# Patient Record
Sex: FEMALE | Race: WHITE | NOT HISPANIC OR LATINO | Employment: OTHER | URBAN - METROPOLITAN AREA
[De-identification: names, ages, dates, MRNs, and addresses within clinical notes are randomized per-mention and may not be internally consistent; named-entity substitution may affect disease eponyms.]

---

## 2017-01-25 DIAGNOSIS — I10 BENIGN ESSENTIAL HYPERTENSION: ICD-10-CM

## 2017-01-26 RX ORDER — METOPROLOL TARTRATE 50 MG/1
TABLET, FILM COATED ORAL
Qty: 180 TABLET | OUTPATIENT
Start: 2017-01-26

## 2017-08-24 ENCOUNTER — OFFICE VISIT (OUTPATIENT)
Dept: CARDIOLOGY | Facility: CLINIC | Age: 72
End: 2017-08-24

## 2017-08-24 VITALS
DIASTOLIC BLOOD PRESSURE: 70 MMHG | BODY MASS INDEX: 19.29 KG/M2 | HEART RATE: 70 BPM | WEIGHT: 113 LBS | SYSTOLIC BLOOD PRESSURE: 110 MMHG | HEIGHT: 64 IN

## 2017-08-24 DIAGNOSIS — I25.10 CHRONIC CORONARY ARTERY DISEASE: Primary | ICD-10-CM

## 2017-08-24 DIAGNOSIS — I10 BENIGN ESSENTIAL HYPERTENSION: ICD-10-CM

## 2017-08-24 DIAGNOSIS — E78.5 HYPERLIPIDEMIA, UNSPECIFIED HYPERLIPIDEMIA TYPE: ICD-10-CM

## 2017-08-24 DIAGNOSIS — N18.30 CHRONIC KIDNEY DISEASE, STAGE III (MODERATE) (HCC): ICD-10-CM

## 2017-08-24 DIAGNOSIS — I63.9 CEREBROVASCULAR ACCIDENT (CVA), UNSPECIFIED MECHANISM (HCC): ICD-10-CM

## 2017-08-24 PROCEDURE — 93000 ELECTROCARDIOGRAM COMPLETE: CPT | Performed by: INTERNAL MEDICINE

## 2017-08-24 PROCEDURE — 99213 OFFICE O/P EST LOW 20 MIN: CPT | Performed by: INTERNAL MEDICINE

## 2017-08-24 RX ORDER — LEVOTHYROXINE SODIUM 0.07 MG/1
1 TABLET ORAL DAILY
COMMUNITY
Start: 2017-07-08 | End: 2019-07-02 | Stop reason: DRUGHIGH

## 2017-08-24 NOTE — PROGRESS NOTES
Date of Office Visit: 2017  Encounter Provider: Tammy Pina MD  Place of Service: [facility] CARDIOLOGY  Patient Name: Anaid Moura  :1945      Patient ID:  Anaid Moura is a 72 y.o. female is here for  followup for CAd.         History of Present Illness    She had an abnormal stress study showing anterior infarct with bryan-infarct  ischemia done in 2009. She had a known history of myocardial  infarction in . She had a cardiac catheterization in  which showed  100% occlusion of the right coronary artery with collateralization to that  area. She had no intervention at that time.     In 2008, she suffered a hemorrhagic stroke leaving her with left arm  and left leg paresis. She has been unable to walk and has been  wheelchair-bound. She had transient ischemic attack prior to that. Her  carotid duplex study done on May 16, 2011, showed right internal carotid  artery occlusion with mild intimal thickening of the left internal carotid  artery. It also showed a large thyroid cyst which she is following up with  Dr. Aguilar about.      In 2010, she was not feeling well and had gastrointestinal upset with  vomiting. She says her symptoms are similar to what she had prior to her  myocardial infarction in , and she was very fatigued. We set her up for  a repeat cardiac catheterization done in 2010 which showed occluded  right coronary artery with collateralization and moderate left anterior  descending artery disease. She was treated medically after that.     She follows with Dr. Kaminski from neurology who has got her in [facility] for physical therapy which she is enjoying greatly. She has  lost about eight pounds since her last visit.     She had a carotid duplex study done on 2013, which showed occluded right  internal carotid artery and a 2.3 cm x 2.2 cm mass in the right neck which is a thyroid cyst.   She had  surgery for her thyroid nodules.     Overall she's doing well.  She is wheelchair-bound but maneuvers herself in a wheelchair.  She has no chest pain, dyspnea, tachycardia, dizziness or syncope.  Her energy level stable and she is taking her medications as directed.  She and her  do take care of his father is 95 with dementia.  She also several grandchildren which she cares for which she enjoys.    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   • EYE SURGERY  1951   • OTHER SURGICAL HISTORY      Ventricular [place] for drainage of subdural hematoma   • TOTAL THYROIDECTOMY  08/2015    Dr. Ahmadi       Current Outpatient Prescriptions on File Prior to Visit   Medication Sig Dispense Refill   • atorvastatin (LIPITOR) 80 MG tablet Take 1 tablet by mouth Daily. Needs an appointment for further refills 90 tablet 0   • Calcium Citrate (CALCITRATE PO) Take by mouth.     • losartan (COZAAR) 50 MG tablet Take 1 tablet by mouth daily. 90 tablet 1   • metoprolol  tartrate (LOPRESSOR) 50 MG tablet Take 1 tablet by mouth 2 (Two) Times a Day. 180 tablet 0   • Multiple Vitamins-Minerals (MULTIVITAL) tablet Take by mouth daily.     • tolterodine (DETROL) 1 MG tablet TAKE 1 TABLET TWICE A  tablet 0   • [DISCONTINUED] benzonatate (TESSALON) 100 MG capsule Take by mouth.     • [DISCONTINUED] levothyroxine (SYNTHROID, LEVOTHROID) 88 MCG tablet      • [DISCONTINUED] tiotropium (SPIRIVA HANDIHALER) 18 MCG per inhalation capsule Spiriva HandiHaler 18 MCG Inhalation Capsule; Patient Sig: Spiriva HandiHaler 18 MCG Inhalation Capsule ; 0; 09-Jan-2013; Active       No current facility-administered medications on file prior to visit.        Social History     Social History   • Marital status:      Spouse name: N/A   • Number of children: N/A   • Years of education: N/A     Occupational History   • Not on file.     Social History Main Topics   • Smoking status: Former Smoker   • Smokeless tobacco: Never Used   • Alcohol use 8.4 oz/week     14 Cans of beer per week      Comment: caffeine use   • Drug use: No   • Sexual activity: Defer     Other Topics Concern   • Not on file     Social History Narrative           Review of Systems   Constitution: Negative.   HENT: Negative for congestion and headaches.    Eyes: Negative for vision loss in left eye and vision loss in right eye.   Respiratory: Negative.  Negative for cough, hemoptysis, shortness of breath, sleep disturbances due to breathing, snoring, sputum production and wheezing.    Endocrine: Negative.    Hematologic/Lymphatic: Negative.    Skin: Negative for poor wound healing and rash.   Musculoskeletal: Negative for falls, gout, muscle cramps and myalgias.   Gastrointestinal: Negative for abdominal pain, diarrhea, dysphagia, hematemesis, melena, nausea and vomiting.   Neurological: Negative for excessive daytime sleepiness, dizziness, light-headedness, loss of balance, seizures and vertigo.   Psychiatric/Behavioral:  "Negative for depression and substance abuse. The patient is not nervous/anxious.        Procedures    ECG 12 Lead  Date/Time: 8/24/2017 1:55 PM  Performed by: TONI BURR  Authorized by: TONI BURR   Comparison: compared with previous ECG   Similar to previous ECG  Rhythm: sinus rhythm  Clinical impression: normal ECG               Objective:      Vitals:    08/24/17 1341   BP: 110/70   BP Location: Right arm   Patient Position: Sitting   Pulse: 70   Weight: 113 lb (51.3 kg)   Height: 64\" (162.6 cm)     Body mass index is 19.4 kg/(m^2).    Physical Exam   Constitutional: She is oriented to person, place, and time. She appears well-developed and well-nourished. No distress.   HENT:   Head: Normocephalic and atraumatic.   Eyes: Conjunctivae are normal. No scleral icterus.   Neck: Neck supple. No JVD present. Carotid bruit is not present. No thyromegaly present.   Cardiovascular: Normal rate, regular rhythm, S1 normal, S2 normal, normal heart sounds and intact distal pulses.   No extrasystoles are present. PMI is not displaced.  Exam reveals no gallop.    No murmur heard.  Pulses:       Carotid pulses are 2+ on the right side, and 2+ on the left side.       Radial pulses are 2+ on the right side, and 2+ on the left side.        Dorsalis pedis pulses are 2+ on the right side, and 2+ on the left side.        Posterior tibial pulses are 2+ on the right side, and 2+ on the left side.   Pulmonary/Chest: Effort normal and breath sounds normal. No respiratory distress. She has no wheezes. She has no rhonchi. She has no rales. She exhibits no tenderness.   Abdominal: Soft. Bowel sounds are normal. She exhibits no distension, no abdominal bruit and no mass. There is no tenderness.   Musculoskeletal: She exhibits no edema or deformity.   Lymphadenopathy:     She has no cervical adenopathy.   Neurological: She is alert and oriented to person, place, and time. No cranial nerve deficit.   Skin: Skin is warm and " dry. No rash noted. She is not diaphoretic. No cyanosis. No pallor. Nails show no clubbing.   Psychiatric: She has a normal mood and affect. Judgment normal.   Vitals reviewed.      Lab Review:       Assessment:      Diagnosis Plan   1. Chronic coronary artery disease     2. Hyperlipidemia, unspecified hyperlipidemia type     3. Benign essential hypertension     4. Cerebrovascular accident (CVA), unspecified mechanism     5. Chronic kidney disease, stage III (moderate)       1. Coronary artery disease with occluded right coronary artery,  collateralization to the vessel, and moderate left anterior descending  artery disease. She has an abnormal ECG which is chronically abnormal with  anterior lateral T-wave inversions. She has had a history of an abnormal  stress nuclear perfusion study showing inferior infarct with bryan-infarct  ischemia. At this time, she has no angina. Continue medical therapy. There  is no evidence of heart failure.  2. Hemorrhagic stroke with left spastic hemiplegia.   3. Occluded right carotid artery with mild disease of the left internal  carotid artery.  4. Hypertension, BP is controlled.  5. Hyperlipidemia on statin therapy.   6. Normal left ventricular systolic function.   7. Right thyroid cyst and thyroid nodules - s/p surgery for this.     Plan:       She is doing well, no changes, see in 1 year. Take asa 81mg daily.     Coronary Artery Disease  Assessment  • The patient has no angina    Subjective - Objective  • There is a history of past MI  • Current antiplatelet therapy includes aspirin 81 mg

## 2018-08-29 ENCOUNTER — OFFICE VISIT (OUTPATIENT)
Dept: CARDIOLOGY | Facility: CLINIC | Age: 73
End: 2018-08-29

## 2018-08-29 DIAGNOSIS — I65.23 BILATERAL CAROTID ARTERY STENOSIS: ICD-10-CM

## 2018-08-29 DIAGNOSIS — I10 BENIGN ESSENTIAL HYPERTENSION: ICD-10-CM

## 2018-08-29 DIAGNOSIS — N18.30 CHRONIC KIDNEY DISEASE, STAGE III (MODERATE) (HCC): ICD-10-CM

## 2018-08-29 DIAGNOSIS — I63.9 CEREBROVASCULAR ACCIDENT (CVA), UNSPECIFIED MECHANISM (HCC): ICD-10-CM

## 2018-08-29 DIAGNOSIS — E78.5 HYPERLIPIDEMIA, UNSPECIFIED HYPERLIPIDEMIA TYPE: ICD-10-CM

## 2018-08-29 DIAGNOSIS — I25.10 CORONARY ARTERY DISEASE INVOLVING NATIVE CORONARY ARTERY OF NATIVE HEART WITHOUT ANGINA PECTORIS: Primary | ICD-10-CM

## 2018-08-29 PROCEDURE — 99214 OFFICE O/P EST MOD 30 MIN: CPT | Performed by: NURSE PRACTITIONER

## 2018-08-29 PROCEDURE — 93000 ELECTROCARDIOGRAM COMPLETE: CPT | Performed by: NURSE PRACTITIONER

## 2018-08-30 VITALS
WEIGHT: 99.8 LBS | HEART RATE: 66 BPM | DIASTOLIC BLOOD PRESSURE: 60 MMHG | BODY MASS INDEX: 17.04 KG/M2 | SYSTOLIC BLOOD PRESSURE: 110 MMHG | HEIGHT: 64 IN

## 2018-09-07 ENCOUNTER — TELEPHONE (OUTPATIENT)
Dept: CARDIOLOGY | Facility: CLINIC | Age: 73
End: 2018-09-07

## 2018-09-07 NOTE — TELEPHONE ENCOUNTER
----- Message from SOPHIA Sutherland sent at 9/3/2018 10:15 AM EDT -----    Please obtain last CBC, CMP/BMP, lipid panel from PCP. Thanks

## 2018-09-11 NOTE — TELEPHONE ENCOUNTER
I called and left a message at Dr. Taylor's office regarding lab results to be faxed./[facility]    Dr. Taylor# [phone]

## 2018-10-23 ENCOUNTER — TELEPHONE (OUTPATIENT)
Dept: CARDIOLOGY | Facility: CLINIC | Age: 73
End: 2018-10-23

## 2018-10-23 ENCOUNTER — HOSPITAL ENCOUNTER (OUTPATIENT)
Dept: CARDIOLOGY | Facility: HOSPITAL | Age: 73
Discharge: HOME | End: 2018-10-23
Admitting: NURSE PRACTITIONER

## 2018-10-23 DIAGNOSIS — I65.23 BILATERAL CAROTID ARTERY STENOSIS: ICD-10-CM

## 2018-10-23 LAB
BH CV XLRA MEAS LEFT DIST ICA EDV: -22.2 CM/SEC
BH CV XLRA MEAS LEFT DIST ICA PSV: -61.7 CM/SEC
BH CV XLRA MEAS LEFT ICA/CCA RATIO: 0.92
BH CV XLRA MEAS LEFT MID ICA EDV: -19.7 CM/SEC
BH CV XLRA MEAS LEFT MID ICA PSV: -69.3 CM/SEC
BH CV XLRA MEAS LEFT PROX CCA EDV: -17.3 CM/SEC
BH CV XLRA MEAS LEFT PROX CCA PSV: -75.7 CM/SEC
BH CV XLRA MEAS LEFT PROX ECA PSV: -168 CM/SEC
BH CV XLRA MEAS LEFT PROX ICA EDV: -17.8 CM/SEC
BH CV XLRA MEAS LEFT PROX ICA PSV: -64.8 CM/SEC
BH CV XLRA MEAS LEFT PROX SCLA PSV: 60.9 CM/SEC
BH CV XLRA MEAS RIGHT DIST ICA EDV: -11.7 CM/SEC
BH CV XLRA MEAS RIGHT DIST ICA PSV: -101 CM/SEC
BH CV XLRA MEAS RIGHT ICA/CCA RATIO: 2.5
BH CV XLRA MEAS RIGHT MID ICA EDV: -16.8 CM/SEC
BH CV XLRA MEAS RIGHT MID ICA PSV: -131 CM/SEC
BH CV XLRA MEAS RIGHT PROX CCA EDV: 3.9 CM/SEC
BH CV XLRA MEAS RIGHT PROX CCA PSV: 56.6 CM/SEC
BH CV XLRA MEAS RIGHT PROX ECA PSV: -114 CM/SEC
BH CV XLRA MEAS RIGHT PROX ICA EDV: -16.8 CM/SEC
BH CV XLRA MEAS RIGHT PROX ICA PSV: -140 CM/SEC
BH CV XLRA MEAS RIGHT PROX SCLA PSV: 66.3 CM/SEC

## 2018-10-23 PROCEDURE — 93880 EXTRACRANIAL BILAT STUDY: CPT

## 2018-10-23 PROCEDURE — 93880 EXTRACRANIAL BILAT STUDY: CPT | Performed by: INTERNAL MEDICINE

## 2018-10-23 NOTE — TELEPHONE ENCOUNTER
Carotid Results:    · There is a complex plaque in the right carotid bulb without evidence of stenosis.  · Left common carotid artery with extensive plaquing that extends into the carotid bulb. No definitive stenosis noted.     Continue aspirin and atorvastatin.     I left a message for patient to return my call.

## 2018-10-29 ENCOUNTER — TRANSCRIBE ORDERS (OUTPATIENT)
Dept: ADMINISTRATIVE | Facility: HOSPITAL | Age: 73
End: 2018-10-29

## 2018-10-29 DIAGNOSIS — Z12.31 VISIT FOR SCREENING MAMMOGRAM: Primary | ICD-10-CM

## 2018-11-06 ENCOUNTER — HOSPITAL ENCOUNTER (OUTPATIENT)
Dept: MAMMOGRAPHY | Facility: HOSPITAL | Age: 73
Discharge: HOME | End: 2018-11-06
Attending: SPECIALIST | Admitting: SPECIALIST

## 2018-11-06 DIAGNOSIS — Z12.31 VISIT FOR SCREENING MAMMOGRAM: ICD-10-CM

## 2018-11-06 PROCEDURE — 77063 BREAST TOMOSYNTHESIS BI: CPT

## 2018-11-06 PROCEDURE — 77067 SCR MAMMO BI INCL CAD: CPT

## 2019-01-01 ENCOUNTER — APPOINTMENT (OUTPATIENT)
Dept: ONCOLOGY | Facility: HOSPITAL | Age: 74
End: 2019-01-01

## 2019-01-01 ENCOUNTER — LAB (OUTPATIENT)
Dept: LAB | Facility: HOSPITAL | Age: 74
End: 2019-01-01

## 2019-01-01 ENCOUNTER — OFFICE VISIT (OUTPATIENT)
Dept: ONCOLOGY | Facility: CLINIC | Age: 74
End: 2019-01-01

## 2019-01-01 ENCOUNTER — HOSPITAL ENCOUNTER (OUTPATIENT)
Dept: CT IMAGING | Facility: HOSPITAL | Age: 74
Discharge: HOME | End: 2019-10-29

## 2019-01-01 ENCOUNTER — OFFICE VISIT (OUTPATIENT)
Dept: PSYCHIATRY | Facility: HOSPITAL | Age: 74
End: 2019-01-01

## 2019-01-01 ENCOUNTER — HOSPITAL ENCOUNTER (OUTPATIENT)
Dept: PET IMAGING | Facility: HOSPITAL | Age: 74
Discharge: HOME | End: 2019-11-14

## 2019-01-01 ENCOUNTER — TELEPHONE (OUTPATIENT)
Dept: ONCOLOGY | Facility: CLINIC | Age: 74
End: 2019-01-01

## 2019-01-01 ENCOUNTER — HOSPITAL ENCOUNTER (OUTPATIENT)
Dept: MRI IMAGING | Facility: HOSPITAL | Age: 74
Discharge: HOME | End: 2019-10-29
Admitting: INTERNAL MEDICINE

## 2019-01-01 ENCOUNTER — HOSPITAL ENCOUNTER (OUTPATIENT)
Dept: PET IMAGING | Facility: HOSPITAL | Age: 74
Discharge: HOME | End: 2019-11-14
Admitting: INTERNAL MEDICINE

## 2019-01-01 ENCOUNTER — APPOINTMENT (OUTPATIENT)
Dept: RADIATION ONCOLOGY | Facility: HOSPITAL | Age: 74
End: 2019-01-01

## 2019-01-01 ENCOUNTER — INFUSION (OUTPATIENT)
Dept: ONCOLOGY | Facility: HOSPITAL | Age: 74
End: 2019-01-01

## 2019-01-01 ENCOUNTER — HOSPITAL ENCOUNTER (OUTPATIENT)
Dept: CT IMAGING | Facility: HOSPITAL | Age: 74
Discharge: HOME | End: 2019-12-04
Admitting: INTERNAL MEDICINE

## 2019-01-01 ENCOUNTER — RADIATION ONCOLOGY WEEKLY ASSESSMENT (OUTPATIENT)
Dept: RADIATION ONCOLOGY | Facility: HOSPITAL | Age: 74
End: 2019-01-01

## 2019-01-01 ENCOUNTER — CONSULT (OUTPATIENT)
Dept: RADIATION ONCOLOGY | Facility: HOSPITAL | Age: 74
End: 2019-01-01

## 2019-01-01 ENCOUNTER — TELEPHONE (OUTPATIENT)
Dept: ONCOLOGY | Facility: HOSPITAL | Age: 74
End: 2019-01-01

## 2019-01-01 ENCOUNTER — TRANSCRIBE ORDERS (OUTPATIENT)
Dept: ADMINISTRATIVE | Facility: HOSPITAL | Age: 74
End: 2019-01-01

## 2019-01-01 ENCOUNTER — OFFICE VISIT (OUTPATIENT)
Dept: CARDIOLOGY | Facility: CLINIC | Age: 74
End: 2019-01-01

## 2019-01-01 VITALS
OXYGEN SATURATION: 89 % | WEIGHT: 89.4 LBS | HEIGHT: 64 IN | BODY MASS INDEX: 15.26 KG/M2 | HEART RATE: 110 BPM | DIASTOLIC BLOOD PRESSURE: 63 MMHG | TEMPERATURE: 97.4 F | RESPIRATION RATE: 18 BRPM | SYSTOLIC BLOOD PRESSURE: 93 MMHG

## 2019-01-01 VITALS
BODY MASS INDEX: 15.3 KG/M2 | RESPIRATION RATE: 18 BRPM | HEART RATE: 114 BPM | OXYGEN SATURATION: 90 % | HEIGHT: 64 IN | WEIGHT: 89.6 LBS | DIASTOLIC BLOOD PRESSURE: 71 MMHG | SYSTOLIC BLOOD PRESSURE: 115 MMHG | TEMPERATURE: 97.6 F

## 2019-01-01 VITALS
WEIGHT: 87.8 LBS | OXYGEN SATURATION: 92 % | DIASTOLIC BLOOD PRESSURE: 80 MMHG | BODY MASS INDEX: 14.99 KG/M2 | SYSTOLIC BLOOD PRESSURE: 153 MMHG | RESPIRATION RATE: 18 BRPM | TEMPERATURE: 97.6 F | HEIGHT: 64 IN | HEART RATE: 114 BPM

## 2019-01-01 VITALS
TEMPERATURE: 97.8 F | OXYGEN SATURATION: 94 % | BODY MASS INDEX: 15.96 KG/M2 | HEART RATE: 104 BPM | RESPIRATION RATE: 16 BRPM | DIASTOLIC BLOOD PRESSURE: 69 MMHG | HEIGHT: 64 IN | SYSTOLIC BLOOD PRESSURE: 108 MMHG

## 2019-01-01 VITALS
BODY MASS INDEX: 15.98 KG/M2 | WEIGHT: 93.6 LBS | OXYGEN SATURATION: 91 % | SYSTOLIC BLOOD PRESSURE: 127 MMHG | DIASTOLIC BLOOD PRESSURE: 84 MMHG | HEIGHT: 64 IN | TEMPERATURE: 97.9 F | HEART RATE: 110 BPM | RESPIRATION RATE: 18 BRPM

## 2019-01-01 VITALS
HEART RATE: 114 BPM | WEIGHT: 88.4 LBS | DIASTOLIC BLOOD PRESSURE: 81 MMHG | HEIGHT: 64 IN | SYSTOLIC BLOOD PRESSURE: 146 MMHG | TEMPERATURE: 97.6 F | BODY MASS INDEX: 15.09 KG/M2 | RESPIRATION RATE: 14 BRPM | OXYGEN SATURATION: 91 %

## 2019-01-01 VITALS
HEART RATE: 121 BPM | TEMPERATURE: 97.4 F | HEIGHT: 64 IN | OXYGEN SATURATION: 92 % | DIASTOLIC BLOOD PRESSURE: 79 MMHG | SYSTOLIC BLOOD PRESSURE: 127 MMHG | RESPIRATION RATE: 16 BRPM | WEIGHT: 88 LBS | BODY MASS INDEX: 15.03 KG/M2

## 2019-01-01 VITALS
OXYGEN SATURATION: 87 % | BODY MASS INDEX: 15.1 KG/M2 | HEART RATE: 92 BPM | DIASTOLIC BLOOD PRESSURE: 73 MMHG | WEIGHT: 88 LBS | SYSTOLIC BLOOD PRESSURE: 115 MMHG

## 2019-01-01 VITALS
SYSTOLIC BLOOD PRESSURE: 103 MMHG | WEIGHT: 89.4 LBS | OXYGEN SATURATION: 90 % | BODY MASS INDEX: 15.34 KG/M2 | HEART RATE: 115 BPM | DIASTOLIC BLOOD PRESSURE: 68 MMHG

## 2019-01-01 VITALS
HEIGHT: 64 IN | BODY MASS INDEX: 15.03 KG/M2 | SYSTOLIC BLOOD PRESSURE: 141 MMHG | OXYGEN SATURATION: 90 % | TEMPERATURE: 97.6 F | WEIGHT: 88 LBS | DIASTOLIC BLOOD PRESSURE: 68 MMHG | HEART RATE: 89 BPM | RESPIRATION RATE: 16 BRPM

## 2019-01-01 VITALS
OXYGEN SATURATION: 88 % | HEART RATE: 107 BPM | HEIGHT: 64 IN | DIASTOLIC BLOOD PRESSURE: 62 MMHG | SYSTOLIC BLOOD PRESSURE: 112 MMHG | WEIGHT: 93 LBS | BODY MASS INDEX: 15.88 KG/M2

## 2019-01-01 VITALS — HEART RATE: 110 BPM | OXYGEN SATURATION: 90 %

## 2019-01-01 DIAGNOSIS — C34.92 ADENOCARCINOMA, LUNG, LEFT (HCC): Primary | ICD-10-CM

## 2019-01-01 DIAGNOSIS — Z79.899 HIGH RISK MEDICATION USE: ICD-10-CM

## 2019-01-01 DIAGNOSIS — F33.42 RECURRENT MAJOR DEPRESSIVE DISORDER, IN FULL REMISSION (HCC): Primary | ICD-10-CM

## 2019-01-01 DIAGNOSIS — E03.9 ACQUIRED HYPOTHYROIDISM: ICD-10-CM

## 2019-01-01 DIAGNOSIS — C34.92 ADENOCARCINOMA, LUNG, LEFT (HCC): ICD-10-CM

## 2019-01-01 DIAGNOSIS — C34.32 MALIGNANT NEOPLASM OF LOWER LOBE, LEFT BRONCHUS OR LUNG (HCC): ICD-10-CM

## 2019-01-01 DIAGNOSIS — J44.9 CHRONIC OBSTRUCTIVE PULMONARY DISEASE, UNSPECIFIED COPD TYPE (HCC): ICD-10-CM

## 2019-01-01 DIAGNOSIS — R42 LIGHTHEADEDNESS: Primary | ICD-10-CM

## 2019-01-01 DIAGNOSIS — C79.72 MALIGNANT NEOPLASM METASTATIC TO LEFT ADRENAL GLAND (HCC): Primary | ICD-10-CM

## 2019-01-01 DIAGNOSIS — I65.23 BILATERAL CAROTID ARTERY STENOSIS: ICD-10-CM

## 2019-01-01 DIAGNOSIS — Z79.899 HIGH RISK MEDICATION USE: Primary | ICD-10-CM

## 2019-01-01 DIAGNOSIS — R53.1 WEAKNESS: ICD-10-CM

## 2019-01-01 DIAGNOSIS — E27.8 ADRENAL NODULE (HCC): ICD-10-CM

## 2019-01-01 DIAGNOSIS — J18.9 PNEUMONITIS: ICD-10-CM

## 2019-01-01 DIAGNOSIS — E87.6 HYPOKALEMIA: ICD-10-CM

## 2019-01-01 DIAGNOSIS — R91.8 PULMONARY INFILTRATE: ICD-10-CM

## 2019-01-01 DIAGNOSIS — R91.8 PULMONARY INFILTRATE: Primary | ICD-10-CM

## 2019-01-01 DIAGNOSIS — I25.10 CHRONIC CORONARY ARTERY DISEASE: ICD-10-CM

## 2019-01-01 DIAGNOSIS — Z86.73 HISTORY OF STROKE: ICD-10-CM

## 2019-01-01 DIAGNOSIS — I10 BENIGN ESSENTIAL HYPERTENSION: ICD-10-CM

## 2019-01-01 LAB
ALBUMIN SERPL-MCNC: 3 G/DL (ref 3.5–5.2)
ALBUMIN SERPL-MCNC: 3.1 G/DL (ref 3.5–5.2)
ALBUMIN SERPL-MCNC: 3.3 G/DL (ref 3.5–5.2)
ALBUMIN SERPL-MCNC: 3.5 G/DL (ref 3.5–5.2)
ALBUMIN/GLOB SERPL: 0.6 G/DL (ref 1.1–2.4)
ALBUMIN/GLOB SERPL: 0.6 G/DL (ref 1.1–2.4)
ALBUMIN/GLOB SERPL: 0.7 G/DL (ref 1.1–2.4)
ALP SERPL-CCNC: 111 U/L (ref 38–116)
ALP SERPL-CCNC: 77 U/L (ref 38–116)
ALP SERPL-CCNC: 81 U/L (ref 38–116)
ALP SERPL-CCNC: 81 U/L (ref 38–116)
ALP SERPL-CCNC: 82 U/L (ref 38–116)
ALP SERPL-CCNC: 86 U/L (ref 38–116)
ALP SERPL-CCNC: 90 U/L (ref 38–116)
ALP SERPL-CCNC: 91 U/L (ref 38–116)
ALT SERPL W P-5'-P-CCNC: 10 U/L (ref 0–33)
ALT SERPL W P-5'-P-CCNC: 10 U/L (ref 0–33)
ALT SERPL W P-5'-P-CCNC: 11 U/L (ref 0–33)
ALT SERPL W P-5'-P-CCNC: 6 U/L (ref 0–33)
ALT SERPL W P-5'-P-CCNC: 7 U/L (ref 0–33)
ALT SERPL W P-5'-P-CCNC: 7 U/L (ref 0–33)
ALT SERPL W P-5'-P-CCNC: 8 U/L (ref 0–33)
ALT SERPL W P-5'-P-CCNC: <5 U/L (ref 0–33)
ANION GAP SERPL CALCULATED.3IONS-SCNC: 11.1 MMOL/L (ref 5–15)
ANION GAP SERPL CALCULATED.3IONS-SCNC: 11.3 MMOL/L (ref 5–15)
ANION GAP SERPL CALCULATED.3IONS-SCNC: 12.3 MMOL/L (ref 5–15)
ANION GAP SERPL CALCULATED.3IONS-SCNC: 12.6 MMOL/L (ref 5–15)
ANION GAP SERPL CALCULATED.3IONS-SCNC: 13.4 MMOL/L (ref 5–15)
ANION GAP SERPL CALCULATED.3IONS-SCNC: 14.1 MMOL/L (ref 5–15)
ANION GAP SERPL CALCULATED.3IONS-SCNC: 14.4 MMOL/L (ref 5–15)
ANION GAP SERPL CALCULATED.3IONS-SCNC: 14.6 MMOL/L (ref 5–15)
AST SERPL-CCNC: 11 U/L (ref 0–32)
AST SERPL-CCNC: 11 U/L (ref 0–32)
AST SERPL-CCNC: 12 U/L (ref 0–32)
AST SERPL-CCNC: 13 U/L (ref 0–32)
AST SERPL-CCNC: 15 U/L (ref 0–32)
AST SERPL-CCNC: 16 U/L (ref 0–32)
AST SERPL-CCNC: 17 U/L (ref 0–32)
AST SERPL-CCNC: 22 U/L (ref 0–32)
BASOPHILS # BLD AUTO: 0.01 10*3/MM3 (ref 0–0.2)
BASOPHILS # BLD AUTO: 0.02 10*3/MM3 (ref 0–0.2)
BASOPHILS # BLD AUTO: 0.03 10*3/MM3 (ref 0–0.2)
BASOPHILS # BLD AUTO: 0.05 10*3/MM3 (ref 0–0.2)
BASOPHILS NFR BLD AUTO: 0.1 % (ref 0–1.5)
BASOPHILS NFR BLD AUTO: 0.3 % (ref 0–1.5)
BASOPHILS NFR BLD AUTO: 0.3 % (ref 0–1.5)
BASOPHILS NFR BLD AUTO: 0.4 % (ref 0–1.5)
BASOPHILS NFR BLD AUTO: 0.6 % (ref 0–1.5)
BILIRUB SERPL-MCNC: 0.3 MG/DL (ref 0.2–1.2)
BILIRUB SERPL-MCNC: 0.4 MG/DL (ref 0.2–1.2)
BUN BLD-MCNC: 10 MG/DL (ref 6–20)
BUN BLD-MCNC: 12 MG/DL (ref 6–20)
BUN BLD-MCNC: 15 MG/DL (ref 6–20)
BUN BLD-MCNC: 16 MG/DL (ref 6–20)
BUN BLD-MCNC: 17 MG/DL (ref 6–20)
BUN BLD-MCNC: 19 MG/DL (ref 6–20)
BUN BLD-MCNC: 8 MG/DL (ref 6–20)
BUN BLD-MCNC: 9 MG/DL (ref 6–20)
BUN/CREAT SERPL: 11.1 (ref 7.3–30)
BUN/CREAT SERPL: 12.2 (ref 7.3–30)
BUN/CREAT SERPL: 12.7 (ref 7.3–30)
BUN/CREAT SERPL: 17.1 (ref 7.3–30)
BUN/CREAT SERPL: 21.4 (ref 7.3–30)
BUN/CREAT SERPL: 22.2 (ref 7.3–30)
BUN/CREAT SERPL: 25.8 (ref 7.3–30)
BUN/CREAT SERPL: 27.1 (ref 7.3–30)
CALCIUM SPEC-SCNC: 8.5 MG/DL (ref 8.5–10.2)
CALCIUM SPEC-SCNC: 8.6 MG/DL (ref 8.5–10.2)
CALCIUM SPEC-SCNC: 8.7 MG/DL (ref 8.5–10.2)
CALCIUM SPEC-SCNC: 8.8 MG/DL (ref 8.5–10.2)
CALCIUM SPEC-SCNC: 9.3 MG/DL (ref 8.5–10.2)
CALCIUM SPEC-SCNC: 9.4 MG/DL (ref 8.5–10.2)
CALCIUM SPEC-SCNC: 9.5 MG/DL (ref 8.5–10.2)
CALCIUM SPEC-SCNC: 9.6 MG/DL (ref 8.5–10.2)
CHLORIDE SERPL-SCNC: 102 MMOL/L (ref 98–107)
CHLORIDE SERPL-SCNC: 105 MMOL/L (ref 98–107)
CHLORIDE SERPL-SCNC: 98 MMOL/L (ref 98–107)
CO2 SERPL-SCNC: 20.9 MMOL/L (ref 22–29)
CO2 SERPL-SCNC: 22.6 MMOL/L (ref 22–29)
CO2 SERPL-SCNC: 23.6 MMOL/L (ref 22–29)
CO2 SERPL-SCNC: 24.4 MMOL/L (ref 22–29)
CO2 SERPL-SCNC: 24.7 MMOL/L (ref 22–29)
CO2 SERPL-SCNC: 25.4 MMOL/L (ref 22–29)
CO2 SERPL-SCNC: 25.9 MMOL/L (ref 22–29)
CO2 SERPL-SCNC: 27.7 MMOL/L (ref 22–29)
CREAT BLD-MCNC: 0.66 MG/DL (ref 0.6–1.1)
CREAT BLD-MCNC: 0.7 MG/DL (ref 0.6–1.1)
CREAT BLD-MCNC: 0.71 MG/DL (ref 0.6–1.1)
CREAT BLD-MCNC: 0.72 MG/DL (ref 0.6–1.1)
CREAT BLD-MCNC: 0.72 MG/DL (ref 0.6–1.1)
CREAT BLD-MCNC: 0.82 MG/DL (ref 0.6–1.1)
CREAT BLDA-MCNC: 0.8 MG/DL (ref 0.6–1.3)
DEPRECATED RDW RBC AUTO: 48.3 FL (ref 37–54)
DEPRECATED RDW RBC AUTO: 51.2 FL (ref 37–54)
DEPRECATED RDW RBC AUTO: 51.5 FL (ref 37–54)
DEPRECATED RDW RBC AUTO: 52.6 FL (ref 37–54)
DEPRECATED RDW RBC AUTO: 53.8 FL (ref 37–54)
DEPRECATED RDW RBC AUTO: 55.6 FL (ref 37–54)
DEPRECATED RDW RBC AUTO: 55.9 FL (ref 37–54)
DEPRECATED RDW RBC AUTO: 56.3 FL (ref 37–54)
EOSINOPHIL # BLD AUTO: 0 10*3/MM3 (ref 0–0.4)
EOSINOPHIL # BLD AUTO: 0.01 10*3/MM3 (ref 0–0.4)
EOSINOPHIL # BLD AUTO: 0.01 10*3/MM3 (ref 0–0.4)
EOSINOPHIL # BLD AUTO: 0.04 10*3/MM3 (ref 0–0.4)
EOSINOPHIL # BLD AUTO: 0.04 10*3/MM3 (ref 0–0.4)
EOSINOPHIL # BLD AUTO: 0.16 10*3/MM3 (ref 0–0.4)
EOSINOPHIL # BLD AUTO: 0.22 10*3/MM3 (ref 0–0.4)
EOSINOPHIL # BLD AUTO: 0.39 10*3/MM3 (ref 0–0.4)
EOSINOPHIL NFR BLD AUTO: 0 % (ref 0.3–6.2)
EOSINOPHIL NFR BLD AUTO: 0.1 % (ref 0.3–6.2)
EOSINOPHIL NFR BLD AUTO: 0.1 % (ref 0.3–6.2)
EOSINOPHIL NFR BLD AUTO: 0.4 % (ref 0.3–6.2)
EOSINOPHIL NFR BLD AUTO: 0.6 % (ref 0.3–6.2)
EOSINOPHIL NFR BLD AUTO: 2.1 % (ref 0.3–6.2)
EOSINOPHIL NFR BLD AUTO: 3.8 % (ref 0.3–6.2)
EOSINOPHIL NFR BLD AUTO: 4.6 % (ref 0.3–6.2)
ERYTHROCYTE [DISTWIDTH] IN BLOOD BY AUTOMATED COUNT: 15 % (ref 12.3–15.4)
ERYTHROCYTE [DISTWIDTH] IN BLOOD BY AUTOMATED COUNT: 15.1 % (ref 12.3–15.4)
ERYTHROCYTE [DISTWIDTH] IN BLOOD BY AUTOMATED COUNT: 15.9 % (ref 12.3–15.4)
ERYTHROCYTE [DISTWIDTH] IN BLOOD BY AUTOMATED COUNT: 16.5 % (ref 12.3–15.4)
ERYTHROCYTE [DISTWIDTH] IN BLOOD BY AUTOMATED COUNT: 16.8 % (ref 12.3–15.4)
ERYTHROCYTE [DISTWIDTH] IN BLOOD BY AUTOMATED COUNT: 17.3 % (ref 12.3–15.4)
ERYTHROCYTE [DISTWIDTH] IN BLOOD BY AUTOMATED COUNT: 17.3 % (ref 12.3–15.4)
ERYTHROCYTE [DISTWIDTH] IN BLOOD BY AUTOMATED COUNT: 17.4 % (ref 12.3–15.4)
GFR SERPL CREATININE-BSD FRML MDRD: 68 ML/MIN/1.73
GFR SERPL CREATININE-BSD FRML MDRD: 79 ML/MIN/1.73
GFR SERPL CREATININE-BSD FRML MDRD: 79 ML/MIN/1.73
GFR SERPL CREATININE-BSD FRML MDRD: 81 ML/MIN/1.73
GFR SERPL CREATININE-BSD FRML MDRD: 82 ML/MIN/1.73
GFR SERPL CREATININE-BSD FRML MDRD: 88 ML/MIN/1.73
GLOBULIN UR ELPH-MCNC: 4.2 GM/DL (ref 1.8–3.5)
GLOBULIN UR ELPH-MCNC: 4.3 GM/DL (ref 1.8–3.5)
GLOBULIN UR ELPH-MCNC: 4.3 GM/DL (ref 1.8–3.5)
GLOBULIN UR ELPH-MCNC: 4.4 GM/DL (ref 1.8–3.5)
GLOBULIN UR ELPH-MCNC: 4.6 GM/DL (ref 1.8–3.5)
GLOBULIN UR ELPH-MCNC: 4.8 GM/DL (ref 1.8–3.5)
GLOBULIN UR ELPH-MCNC: 4.8 GM/DL (ref 1.8–3.5)
GLOBULIN UR ELPH-MCNC: 4.9 GM/DL (ref 1.8–3.5)
GLUCOSE BLD-MCNC: 100 MG/DL (ref 74–124)
GLUCOSE BLD-MCNC: 102 MG/DL (ref 74–124)
GLUCOSE BLD-MCNC: 106 MG/DL (ref 74–124)
GLUCOSE BLD-MCNC: 113 MG/DL (ref 74–124)
GLUCOSE BLD-MCNC: 117 MG/DL (ref 74–124)
GLUCOSE BLD-MCNC: 119 MG/DL (ref 74–124)
GLUCOSE BLD-MCNC: 151 MG/DL (ref 74–124)
GLUCOSE BLD-MCNC: 99 MG/DL (ref 74–124)
GLUCOSE BLDC GLUCOMTR-MCNC: 107 MG/DL (ref 70–130)
HCT VFR BLD AUTO: 34.6 % (ref 34–46.6)
HCT VFR BLD AUTO: 36 % (ref 34–46.6)
HCT VFR BLD AUTO: 36.4 % (ref 34–46.6)
HCT VFR BLD AUTO: 37.4 % (ref 34–46.6)
HCT VFR BLD AUTO: 37.5 % (ref 34–46.6)
HCT VFR BLD AUTO: 38.1 % (ref 34–46.6)
HCT VFR BLD AUTO: 39.3 % (ref 34–46.6)
HCT VFR BLD AUTO: 39.8 % (ref 34–46.6)
HGB BLD-MCNC: 10.5 G/DL (ref 12–15.9)
HGB BLD-MCNC: 10.5 G/DL (ref 12–15.9)
HGB BLD-MCNC: 10.8 G/DL (ref 12–15.9)
HGB BLD-MCNC: 10.9 G/DL (ref 12–15.9)
HGB BLD-MCNC: 11.2 G/DL (ref 12–15.9)
HGB BLD-MCNC: 11.2 G/DL (ref 12–15.9)
HGB BLD-MCNC: 11.5 G/DL (ref 12–15.9)
HGB BLD-MCNC: 11.6 G/DL (ref 12–15.9)
IMM GRANULOCYTES # BLD AUTO: 0.04 10*3/MM3 (ref 0–0.05)
IMM GRANULOCYTES # BLD AUTO: 0.05 10*3/MM3 (ref 0–0.05)
IMM GRANULOCYTES # BLD AUTO: 0.06 10*3/MM3 (ref 0–0.05)
IMM GRANULOCYTES # BLD AUTO: 0.09 10*3/MM3 (ref 0–0.05)
IMM GRANULOCYTES # BLD AUTO: 0.1 10*3/MM3 (ref 0–0.05)
IMM GRANULOCYTES NFR BLD AUTO: 0.5 % (ref 0–0.5)
IMM GRANULOCYTES NFR BLD AUTO: 0.6 % (ref 0–0.5)
IMM GRANULOCYTES NFR BLD AUTO: 0.7 % (ref 0–0.5)
IMM GRANULOCYTES NFR BLD AUTO: 0.7 % (ref 0–0.5)
IMM GRANULOCYTES NFR BLD AUTO: 0.8 % (ref 0–0.5)
IMM GRANULOCYTES NFR BLD AUTO: 0.9 % (ref 0–0.5)
LYMPHOCYTES # BLD AUTO: 0.14 10*3/MM3 (ref 0.7–3.1)
LYMPHOCYTES # BLD AUTO: 0.22 10*3/MM3 (ref 0.7–3.1)
LYMPHOCYTES # BLD AUTO: 0.52 10*3/MM3 (ref 0.7–3.1)
LYMPHOCYTES # BLD AUTO: 0.53 10*3/MM3 (ref 0.7–3.1)
LYMPHOCYTES # BLD AUTO: 0.54 10*3/MM3 (ref 0.7–3.1)
LYMPHOCYTES # BLD AUTO: 0.65 10*3/MM3 (ref 0.7–3.1)
LYMPHOCYTES # BLD AUTO: 0.76 10*3/MM3 (ref 0.7–3.1)
LYMPHOCYTES # BLD AUTO: 0.98 10*3/MM3 (ref 0.7–3.1)
LYMPHOCYTES NFR BLD AUTO: 1.8 % (ref 19.6–45.3)
LYMPHOCYTES NFR BLD AUTO: 3 % (ref 19.6–45.3)
LYMPHOCYTES NFR BLD AUTO: 5 % (ref 19.6–45.3)
LYMPHOCYTES NFR BLD AUTO: 6.2 % (ref 19.6–45.3)
LYMPHOCYTES NFR BLD AUTO: 7.2 % (ref 19.6–45.3)
LYMPHOCYTES NFR BLD AUTO: 8.8 % (ref 19.6–45.3)
LYMPHOCYTES NFR BLD AUTO: 8.9 % (ref 19.6–45.3)
LYMPHOCYTES NFR BLD AUTO: 9 % (ref 19.6–45.3)
MCH RBC QN AUTO: 25.6 PG (ref 26.6–33)
MCH RBC QN AUTO: 25.8 PG (ref 26.6–33)
MCH RBC QN AUTO: 25.9 PG (ref 26.6–33)
MCH RBC QN AUTO: 26 PG (ref 26.6–33)
MCH RBC QN AUTO: 26.1 PG (ref 26.6–33)
MCH RBC QN AUTO: 26.3 PG (ref 26.6–33)
MCH RBC QN AUTO: 26.5 PG (ref 26.6–33)
MCH RBC QN AUTO: 26.9 PG (ref 26.6–33)
MCHC RBC AUTO-ENTMCNC: 28.9 G/DL (ref 31.5–35.7)
MCHC RBC AUTO-ENTMCNC: 29.1 G/DL (ref 31.5–35.7)
MCHC RBC AUTO-ENTMCNC: 29.2 G/DL (ref 31.5–35.7)
MCHC RBC AUTO-ENTMCNC: 29.4 G/DL (ref 31.5–35.7)
MCHC RBC AUTO-ENTMCNC: 29.5 G/DL (ref 31.5–35.7)
MCHC RBC AUTO-ENTMCNC: 29.7 G/DL (ref 31.5–35.7)
MCHC RBC AUTO-ENTMCNC: 29.9 G/DL (ref 31.5–35.7)
MCHC RBC AUTO-ENTMCNC: 30.3 G/DL (ref 31.5–35.7)
MCV RBC AUTO: 85.6 FL (ref 79–97)
MCV RBC AUTO: 87.8 FL (ref 79–97)
MCV RBC AUTO: 88 FL (ref 79–97)
MCV RBC AUTO: 88.3 FL (ref 79–97)
MCV RBC AUTO: 88.6 FL (ref 79–97)
MCV RBC AUTO: 88.6 FL (ref 79–97)
MCV RBC AUTO: 89.4 FL (ref 79–97)
MCV RBC AUTO: 92.6 FL (ref 79–97)
MONOCYTES # BLD AUTO: 0.09 10*3/MM3 (ref 0.1–0.9)
MONOCYTES # BLD AUTO: 0.22 10*3/MM3 (ref 0.1–0.9)
MONOCYTES # BLD AUTO: 0.25 10*3/MM3 (ref 0.1–0.9)
MONOCYTES # BLD AUTO: 0.4 10*3/MM3 (ref 0.1–0.9)
MONOCYTES # BLD AUTO: 0.41 10*3/MM3 (ref 0.1–0.9)
MONOCYTES # BLD AUTO: 0.43 10*3/MM3 (ref 0.1–0.9)
MONOCYTES # BLD AUTO: 0.5 10*3/MM3 (ref 0.1–0.9)
MONOCYTES # BLD AUTO: 0.6 10*3/MM3 (ref 0.1–0.9)
MONOCYTES NFR BLD AUTO: 1.2 % (ref 5–12)
MONOCYTES NFR BLD AUTO: 3 % (ref 5–12)
MONOCYTES NFR BLD AUTO: 3.4 % (ref 5–12)
MONOCYTES NFR BLD AUTO: 3.9 % (ref 5–12)
MONOCYTES NFR BLD AUTO: 3.9 % (ref 5–12)
MONOCYTES NFR BLD AUTO: 5.3 % (ref 5–12)
MONOCYTES NFR BLD AUTO: 5.9 % (ref 5–12)
MONOCYTES NFR BLD AUTO: 7 % (ref 5–12)
NEUTROPHILS # BLD AUTO: 13.71 10*3/MM3 (ref 1.7–7)
NEUTROPHILS # BLD AUTO: 4.65 10*3/MM3 (ref 1.7–7)
NEUTROPHILS # BLD AUTO: 6.25 10*3/MM3 (ref 1.7–7)
NEUTROPHILS # BLD AUTO: 6.35 10*3/MM3 (ref 1.7–7)
NEUTROPHILS # BLD AUTO: 6.91 10*3/MM3 (ref 1.7–7)
NEUTROPHILS # BLD AUTO: 6.98 10*3/MM3 (ref 1.7–7)
NEUTROPHILS # BLD AUTO: 7.46 10*3/MM3 (ref 1.7–7)
NEUTROPHILS # BLD AUTO: 9.52 10*3/MM3 (ref 1.7–7)
NEUTROPHILS NFR BLD AUTO: 79.3 % (ref 42.7–76)
NEUTROPHILS NFR BLD AUTO: 82.2 % (ref 42.7–76)
NEUTROPHILS NFR BLD AUTO: 84.5 % (ref 42.7–76)
NEUTROPHILS NFR BLD AUTO: 85.9 % (ref 42.7–76)
NEUTROPHILS NFR BLD AUTO: 86.2 % (ref 42.7–76)
NEUTROPHILS NFR BLD AUTO: 90.3 % (ref 42.7–76)
NEUTROPHILS NFR BLD AUTO: 92.8 % (ref 42.7–76)
NEUTROPHILS NFR BLD AUTO: 96.4 % (ref 42.7–76)
NRBC BLD AUTO-RTO: 0 /100 WBC (ref 0–0.2)
PLATELET # BLD AUTO: 255 10*3/MM3 (ref 140–450)
PLATELET # BLD AUTO: 262 10*3/MM3 (ref 140–450)
PLATELET # BLD AUTO: 270 10*3/MM3 (ref 140–450)
PLATELET # BLD AUTO: 287 10*3/MM3 (ref 140–450)
PLATELET # BLD AUTO: 296 10*3/MM3 (ref 140–450)
PLATELET # BLD AUTO: 307 10*3/MM3 (ref 140–450)
PLATELET # BLD AUTO: 343 10*3/MM3 (ref 140–450)
PLATELET # BLD AUTO: 372 10*3/MM3 (ref 140–450)
PMV BLD AUTO: 8.3 FL (ref 6–12)
PMV BLD AUTO: 8.5 FL (ref 6–12)
PMV BLD AUTO: 8.6 FL (ref 6–12)
PMV BLD AUTO: 8.6 FL (ref 6–12)
PMV BLD AUTO: 8.8 FL (ref 6–12)
PMV BLD AUTO: 9.1 FL (ref 6–12)
POTASSIUM BLD-SCNC: 3.7 MMOL/L (ref 3.5–4.7)
POTASSIUM BLD-SCNC: 3.8 MMOL/L (ref 3.5–4.7)
POTASSIUM BLD-SCNC: 4.3 MMOL/L (ref 3.5–4.7)
POTASSIUM BLD-SCNC: 4.4 MMOL/L (ref 3.5–4.7)
POTASSIUM BLD-SCNC: 4.4 MMOL/L (ref 3.5–4.7)
POTASSIUM BLD-SCNC: 4.5 MMOL/L (ref 3.5–4.7)
POTASSIUM BLD-SCNC: 4.7 MMOL/L (ref 3.5–4.7)
POTASSIUM BLD-SCNC: 4.8 MMOL/L (ref 3.5–4.7)
PROT SERPL-MCNC: 7.3 G/DL (ref 6.3–8)
PROT SERPL-MCNC: 7.4 G/DL (ref 6.3–8)
PROT SERPL-MCNC: 7.8 G/DL (ref 6.3–8)
PROT SERPL-MCNC: 7.8 G/DL (ref 6.3–8)
PROT SERPL-MCNC: 7.9 G/DL (ref 6.3–8)
PROT SERPL-MCNC: 8.4 G/DL (ref 6.3–8)
RBC # BLD AUTO: 4.04 10*6/MM3 (ref 3.77–5.28)
RBC # BLD AUTO: 4.05 10*6/MM3 (ref 3.77–5.28)
RBC # BLD AUTO: 4.1 10*6/MM3 (ref 3.77–5.28)
RBC # BLD AUTO: 4.11 10*6/MM3 (ref 3.77–5.28)
RBC # BLD AUTO: 4.22 10*6/MM3 (ref 3.77–5.28)
RBC # BLD AUTO: 4.33 10*6/MM3 (ref 3.77–5.28)
RBC # BLD AUTO: 4.45 10*6/MM3 (ref 3.77–5.28)
RBC # BLD AUTO: 4.45 10*6/MM3 (ref 3.77–5.28)
SODIUM BLD-SCNC: 138 MMOL/L (ref 134–145)
SODIUM BLD-SCNC: 138 MMOL/L (ref 134–145)
SODIUM BLD-SCNC: 139 MMOL/L (ref 134–145)
SODIUM BLD-SCNC: 140 MMOL/L (ref 134–145)
SODIUM BLD-SCNC: 142 MMOL/L (ref 134–145)
T4 FREE SERPL-MCNC: 1.15 NG/DL (ref 0.93–1.7)
T4 FREE SERPL-MCNC: 1.3 NG/DL (ref 0.93–1.7)
T4 FREE SERPL-MCNC: 1.31 NG/DL (ref 0.93–1.7)
TSH SERPL DL<=0.05 MIU/L-ACNC: 12.1 UIU/ML (ref 0.27–4.2)
TSH SERPL DL<=0.05 MIU/L-ACNC: 19.3 UIU/ML (ref 0.27–4.2)
TSH SERPL DL<=0.05 MIU/L-ACNC: 37.5 UIU/ML (ref 0.27–4.2)
WBC NRBC COR # BLD: 11.08 10*3/MM3 (ref 3.4–10.8)
WBC NRBC COR # BLD: 15.19 10*3/MM3 (ref 3.4–10.8)
WBC NRBC COR # BLD: 5.86 10*3/MM3 (ref 3.4–10.8)
WBC NRBC COR # BLD: 7.25 10*3/MM3 (ref 3.4–10.8)
WBC NRBC COR # BLD: 7.44 10*3/MM3 (ref 3.4–10.8)
WBC NRBC COR # BLD: 7.52 10*3/MM3 (ref 3.4–10.8)
WBC NRBC COR # BLD: 7.74 10*3/MM3 (ref 3.4–10.8)
WBC NRBC COR # BLD: 8.49 10*3/MM3 (ref 3.4–10.8)

## 2019-01-01 PROCEDURE — 25010000002 IOPAMIDOL 61 % SOLUTION: Performed by: INTERNAL MEDICINE

## 2019-01-01 PROCEDURE — 77387 GUIDANCE FOR RADJ TX DLVR: CPT | Performed by: RADIOLOGY

## 2019-01-01 PROCEDURE — 77263 THER RADIOLOGY TX PLNG CPLX: CPT | Performed by: RADIOLOGY

## 2019-01-01 PROCEDURE — 80053 COMPREHEN METABOLIC PANEL: CPT

## 2019-01-01 PROCEDURE — 71260 CT THORAX DX C+: CPT

## 2019-01-01 PROCEDURE — 78815 PET IMAGE W/CT SKULL-THIGH: CPT

## 2019-01-01 PROCEDURE — 99215 OFFICE O/P EST HI 40 MIN: CPT | Performed by: INTERNAL MEDICINE

## 2019-01-01 PROCEDURE — 74177 CT ABD & PELVIS W/CONTRAST: CPT

## 2019-01-01 PROCEDURE — 85025 COMPLETE CBC W/AUTO DIFF WBC: CPT

## 2019-01-01 PROCEDURE — 77412 RADIATION TX DELIVERY LVL 3: CPT | Performed by: RADIOLOGY

## 2019-01-01 PROCEDURE — 77014 CHG CT GUIDANCE RADIATION THERAPY FLDS PLACEMENT: CPT | Performed by: RADIOLOGY

## 2019-01-01 PROCEDURE — 77336 RADIATION PHYSICS CONSULT: CPT | Performed by: RADIOLOGY

## 2019-01-01 PROCEDURE — 77334 RADIATION TREATMENT AID(S): CPT | Performed by: RADIOLOGY

## 2019-01-01 PROCEDURE — 77290 THER RAD SIMULAJ FIELD CPLX: CPT | Performed by: RADIOLOGY

## 2019-01-01 PROCEDURE — 25010000002 PEMBROLIZUMAB 100 MG/4ML SOLUTION 4 ML VIAL: Performed by: NURSE PRACTITIONER

## 2019-01-01 PROCEDURE — 82565 ASSAY OF CREATININE: CPT

## 2019-01-01 PROCEDURE — 99213 OFFICE O/P EST LOW 20 MIN: CPT | Performed by: NURSE PRACTITIONER

## 2019-01-01 PROCEDURE — A9577 INJ MULTIHANCE: HCPCS | Performed by: INTERNAL MEDICINE

## 2019-01-01 PROCEDURE — 36415 COLL VENOUS BLD VENIPUNCTURE: CPT

## 2019-01-01 PROCEDURE — 77300 RADIATION THERAPY DOSE PLAN: CPT | Performed by: RADIOLOGY

## 2019-01-01 PROCEDURE — 84439 ASSAY OF FREE THYROXINE: CPT | Performed by: INTERNAL MEDICINE

## 2019-01-01 PROCEDURE — A9552 F18 FDG: HCPCS | Performed by: INTERNAL MEDICINE

## 2019-01-01 PROCEDURE — 99214 OFFICE O/P EST MOD 30 MIN: CPT | Performed by: NURSE PRACTITIONER

## 2019-01-01 PROCEDURE — 77280 THER RAD SIMULAJ FIELD SMPL: CPT | Performed by: RADIOLOGY

## 2019-01-01 PROCEDURE — 71250 CT THORAX DX C-: CPT

## 2019-01-01 PROCEDURE — 84443 ASSAY THYROID STIM HORMONE: CPT | Performed by: INTERNAL MEDICINE

## 2019-01-01 PROCEDURE — 77293 RESPIRATOR MOTION MGMT SIMUL: CPT | Performed by: RADIOLOGY

## 2019-01-01 PROCEDURE — 77427 RADIATION TX MANAGEMENT X5: CPT | Performed by: RADIOLOGY

## 2019-01-01 PROCEDURE — 70553 MRI BRAIN STEM W/O & W/DYE: CPT

## 2019-01-01 PROCEDURE — 99214 OFFICE O/P EST MOD 30 MIN: CPT | Performed by: RADIOLOGY

## 2019-01-01 PROCEDURE — 77295 3-D RADIOTHERAPY PLAN: CPT | Performed by: RADIOLOGY

## 2019-01-01 PROCEDURE — G0463 HOSPITAL OUTPT CLINIC VISIT: HCPCS | Performed by: INTERNAL MEDICINE

## 2019-01-01 PROCEDURE — 0 FLUDEOXYGLUCOSE F18 SOLUTION: Performed by: INTERNAL MEDICINE

## 2019-01-01 PROCEDURE — G0463 HOSPITAL OUTPT CLINIC VISIT: HCPCS | Performed by: RADIOLOGY

## 2019-01-01 PROCEDURE — 82962 GLUCOSE BLOOD TEST: CPT

## 2019-01-01 PROCEDURE — G0463 HOSPITAL OUTPT CLINIC VISIT: HCPCS | Performed by: NURSE PRACTITIONER

## 2019-01-01 PROCEDURE — 0 GADOBENATE DIMEGLUMINE 529 MG/ML SOLUTION: Performed by: INTERNAL MEDICINE

## 2019-01-01 PROCEDURE — 96413 CHEMO IV INFUSION 1 HR: CPT | Performed by: NURSE PRACTITIONER

## 2019-01-01 RX ORDER — SODIUM CHLORIDE 9 MG/ML
250 INJECTION, SOLUTION INTRAVENOUS ONCE
Status: COMPLETED | OUTPATIENT
Start: 2019-01-01 | End: 2019-01-01

## 2019-01-01 RX ORDER — PREDNISONE 1 MG/1
5 TABLET ORAL 2 TIMES DAILY
Qty: 60 TABLET | Refills: 1 | Status: ON HOLD | OUTPATIENT
Start: 2019-01-01 | End: 2020-01-01

## 2019-01-01 RX ORDER — SODIUM CHLORIDE 9 MG/ML
250 INJECTION, SOLUTION INTRAVENOUS ONCE
Status: CANCELLED | OUTPATIENT
Start: 2019-01-01

## 2019-01-01 RX ORDER — POTASSIUM CHLORIDE 1500 MG/1
20 TABLET, FILM COATED, EXTENDED RELEASE ORAL DAILY
Qty: 30 TABLET | Refills: 2 | Status: SHIPPED | OUTPATIENT
Start: 2019-01-01 | End: 2020-01-01 | Stop reason: HOSPADM

## 2019-01-01 RX ORDER — LEVOTHYROXINE SODIUM 0.07 MG/1
75 TABLET ORAL DAILY
Qty: 30 TABLET | Refills: 0 | Status: SHIPPED | OUTPATIENT
Start: 2019-01-01 | End: 2020-01-01 | Stop reason: SDUPTHER

## 2019-01-01 RX ORDER — LEVOTHYROXINE SODIUM 0.07 MG/1
75 TABLET ORAL DAILY
Qty: 30 TABLET | Refills: 3 | Status: SHIPPED | OUTPATIENT
Start: 2019-01-01 | End: 2019-01-01 | Stop reason: SDUPTHER

## 2019-01-01 RX ORDER — AMOXICILLIN AND CLAVULANATE POTASSIUM 500; 125 MG/1; MG/1
1 TABLET, FILM COATED ORAL EVERY 8 HOURS
COMMUNITY
Start: 2019-01-01 | End: 2019-01-01

## 2019-01-01 RX ORDER — PREDNISONE 20 MG/1
20 TABLET ORAL DAILY
Qty: 60 TABLET | Refills: 1 | Status: SHIPPED | OUTPATIENT
Start: 2019-01-01 | End: 2020-01-01

## 2019-01-01 RX ADMIN — SODIUM CHLORIDE 200 MG: 9 INJECTION, SOLUTION INTRAVENOUS at 12:38

## 2019-01-01 RX ADMIN — GADOBENATE DIMEGLUMINE 8 ML: 529 INJECTION, SOLUTION INTRAVENOUS at 11:59

## 2019-01-01 RX ADMIN — IOPAMIDOL 85 ML: 612 INJECTION, SOLUTION INTRAVENOUS at 13:37

## 2019-01-01 RX ADMIN — SODIUM CHLORIDE 250 ML: 9 INJECTION, SOLUTION INTRAVENOUS at 12:35

## 2019-01-01 RX ADMIN — FLUDEOXYGLUCOSE F18 1 DOSE: 300 INJECTION INTRAVENOUS at 09:53

## 2019-04-30 ENCOUNTER — HOSPITAL ENCOUNTER (OUTPATIENT)
Dept: GENERAL RADIOLOGY | Facility: HOSPITAL | Age: 74
Discharge: HOME | End: 2019-04-30
Admitting: SPECIALIST

## 2019-04-30 DIAGNOSIS — J18.9 PNEUMONIA DUE TO INFECTIOUS ORGANISM, UNSPECIFIED LATERALITY, UNSPECIFIED PART OF LUNG: ICD-10-CM

## 2019-04-30 PROCEDURE — 71046 X-RAY EXAM CHEST 2 VIEWS: CPT

## 2019-05-16 ENCOUNTER — HOSPITAL ENCOUNTER (INPATIENT)
Facility: HOSPITAL | Age: 74
LOS: 6 days | Discharge: HOME | End: 2019-05-22
Attending: EMERGENCY MEDICINE | Admitting: SPECIALIST

## 2019-05-16 ENCOUNTER — APPOINTMENT (OUTPATIENT)
Dept: CT IMAGING | Facility: HOSPITAL | Age: 74
End: 2019-05-16

## 2019-05-16 ENCOUNTER — APPOINTMENT (OUTPATIENT)
Dept: GENERAL RADIOLOGY | Facility: HOSPITAL | Age: 74
End: 2019-05-16

## 2019-05-16 DIAGNOSIS — A41.9 SEPSIS, DUE TO UNSPECIFIED ORGANISM: ICD-10-CM

## 2019-05-16 DIAGNOSIS — J18.9 COMMUNITY ACQUIRED PNEUMONIA OF RIGHT LOWER LOBE OF LUNG: Primary | ICD-10-CM

## 2019-05-16 DIAGNOSIS — J43.1 PANLOBULAR EMPHYSEMA (HCC): ICD-10-CM

## 2019-05-16 DIAGNOSIS — J98.4 CAVITARY LESION OF LUNG: ICD-10-CM

## 2019-05-16 DIAGNOSIS — R59.0 MEDIASTINAL ADENOPATHY: ICD-10-CM

## 2019-05-16 LAB
ALBUMIN SERPL-MCNC: 3.3 G/DL (ref 3.5–5.2)
ALBUMIN/GLOB SERPL: 0.8 G/DL
ALP SERPL-CCNC: 111 U/L (ref 39–117)
ALT SERPL W P-5'-P-CCNC: 12 U/L (ref 1–33)
ANION GAP SERPL CALCULATED.3IONS-SCNC: 14.3 MMOL/L
AST SERPL-CCNC: 20 U/L (ref 1–32)
B PARAPERT DNA SPEC QL NAA+PROBE: NOT DETECTED
B PERT DNA SPEC QL NAA+PROBE: NOT DETECTED
BACTERIA UR QL AUTO: NORMAL /HPF
BASOPHILS # BLD AUTO: 0.02 10*3/MM3 (ref 0–0.2)
BASOPHILS NFR BLD AUTO: 0.1 % (ref 0–1.5)
BILIRUB SERPL-MCNC: 0.5 MG/DL (ref 0.2–1.2)
BILIRUB UR QL STRIP: NEGATIVE
BUN BLD-MCNC: 13 MG/DL (ref 8–23)
BUN/CREAT SERPL: 14.9 (ref 7–25)
C PNEUM DNA NPH QL NAA+NON-PROBE: NOT DETECTED
CALCIUM SPEC-SCNC: 9.3 MG/DL (ref 8.6–10.5)
CHLORIDE SERPL-SCNC: 96 MMOL/L (ref 98–107)
CLARITY UR: CLEAR
CO2 SERPL-SCNC: 19.7 MMOL/L (ref 22–29)
COLOR UR: YELLOW
CREAT BLD-MCNC: 0.87 MG/DL (ref 0.57–1)
D-LACTATE SERPL-SCNC: 1.3 MMOL/L (ref 0.5–2)
DEPRECATED RDW RBC AUTO: 47.2 FL (ref 37–54)
EOSINOPHIL # BLD AUTO: 0.06 10*3/MM3 (ref 0–0.4)
EOSINOPHIL NFR BLD AUTO: 0.4 % (ref 0.3–6.2)
ERYTHROCYTE [DISTWIDTH] IN BLOOD BY AUTOMATED COUNT: 14.7 % (ref 12.3–15.4)
FLUAV H1 2009 PAND RNA NPH QL NAA+PROBE: NOT DETECTED
FLUAV H1 HA GENE NPH QL NAA+PROBE: NOT DETECTED
FLUAV H3 RNA NPH QL NAA+PROBE: NOT DETECTED
FLUAV SUBTYP SPEC NAA+PROBE: NOT DETECTED
FLUBV RNA ISLT QL NAA+PROBE: NOT DETECTED
GFR SERPL CREATININE-BSD FRML MDRD: 64 ML/MIN/1.73
GLOBULIN UR ELPH-MCNC: 4.2 GM/DL
GLUCOSE BLD-MCNC: 113 MG/DL (ref 65–99)
GLUCOSE UR STRIP-MCNC: NEGATIVE MG/DL
HADV DNA SPEC NAA+PROBE: NOT DETECTED
HCOV 229E RNA SPEC QL NAA+PROBE: NOT DETECTED
HCOV HKU1 RNA SPEC QL NAA+PROBE: NOT DETECTED
HCOV NL63 RNA SPEC QL NAA+PROBE: NOT DETECTED
HCOV OC43 RNA SPEC QL NAA+PROBE: NOT DETECTED
HCT VFR BLD AUTO: 42.4 % (ref 34–46.6)
HGB BLD-MCNC: 13.1 G/DL (ref 12–15.9)
HGB UR QL STRIP.AUTO: ABNORMAL
HMPV RNA NPH QL NAA+NON-PROBE: NOT DETECTED
HPIV1 RNA SPEC QL NAA+PROBE: NOT DETECTED
HPIV2 RNA SPEC QL NAA+PROBE: NOT DETECTED
HPIV3 RNA NPH QL NAA+PROBE: NOT DETECTED
HPIV4 P GENE NPH QL NAA+PROBE: NOT DETECTED
HYALINE CASTS UR QL AUTO: NORMAL /LPF
IMM GRANULOCYTES # BLD AUTO: 0.07 10*3/MM3 (ref 0–0.05)
IMM GRANULOCYTES NFR BLD AUTO: 0.5 % (ref 0–0.5)
KETONES UR QL STRIP: NEGATIVE
LEUKOCYTE ESTERASE UR QL STRIP.AUTO: NEGATIVE
LYMPHOCYTES # BLD AUTO: 0.52 10*3/MM3 (ref 0.7–3.1)
LYMPHOCYTES NFR BLD AUTO: 3.9 % (ref 19.6–45.3)
M PNEUMO IGG SER IA-ACNC: NOT DETECTED
MCH RBC QN AUTO: 27.3 PG (ref 26.6–33)
MCHC RBC AUTO-ENTMCNC: 30.9 G/DL (ref 31.5–35.7)
MCV RBC AUTO: 88.3 FL (ref 79–97)
MONOCYTES # BLD AUTO: 0.35 10*3/MM3 (ref 0.1–0.9)
MONOCYTES NFR BLD AUTO: 2.6 % (ref 5–12)
NEUTROPHILS # BLD AUTO: 12.44 10*3/MM3 (ref 1.7–7)
NEUTROPHILS NFR BLD AUTO: 92.5 % (ref 42.7–76)
NITRITE UR QL STRIP: NEGATIVE
NRBC BLD AUTO-RTO: 0 /100 WBC (ref 0–0.2)
PH UR STRIP.AUTO: 6.5 [PH] (ref 5–8)
PLATELET # BLD AUTO: 241 10*3/MM3 (ref 140–450)
PMV BLD AUTO: 10.7 FL (ref 6–12)
POTASSIUM BLD-SCNC: 3.9 MMOL/L (ref 3.5–5.2)
PROCALCITONIN SERPL-MCNC: 0.22 NG/ML (ref 0.1–0.25)
PROT SERPL-MCNC: 7.5 G/DL (ref 6–8.5)
PROT UR QL STRIP: NEGATIVE
RBC # BLD AUTO: 4.8 10*6/MM3 (ref 3.77–5.28)
RBC # UR: NORMAL /HPF
REF LAB TEST METHOD: NORMAL
RHINOVIRUS RNA SPEC NAA+PROBE: NOT DETECTED
RSV RNA NPH QL NAA+NON-PROBE: NOT DETECTED
SODIUM BLD-SCNC: 130 MMOL/L (ref 136–145)
SP GR UR STRIP: 1.01 (ref 1–1.03)
SQUAMOUS #/AREA URNS HPF: NORMAL /HPF
TROPONIN T SERPL-MCNC: <0.01 NG/ML (ref 0–0.03)
UROBILINOGEN UR QL STRIP: ABNORMAL
WBC NRBC COR # BLD: 13.46 10*3/MM3 (ref 3.4–10.8)
WBC UR QL AUTO: NORMAL /HPF

## 2019-05-16 PROCEDURE — 93010 ELECTROCARDIOGRAM REPORT: CPT | Performed by: INTERNAL MEDICINE

## 2019-05-16 PROCEDURE — 80053 COMPREHEN METABOLIC PANEL: CPT | Performed by: EMERGENCY MEDICINE

## 2019-05-16 PROCEDURE — 25010000002 AZITHROMYCIN PER 500 MG: Performed by: EMERGENCY MEDICINE

## 2019-05-16 PROCEDURE — 87040 BLOOD CULTURE FOR BACTERIA: CPT | Performed by: EMERGENCY MEDICINE

## 2019-05-16 PROCEDURE — 85025 COMPLETE CBC W/AUTO DIFF WBC: CPT | Performed by: EMERGENCY MEDICINE

## 2019-05-16 PROCEDURE — 71046 X-RAY EXAM CHEST 2 VIEWS: CPT

## 2019-05-16 PROCEDURE — 93005 ELECTROCARDIOGRAM TRACING: CPT | Performed by: EMERGENCY MEDICINE

## 2019-05-16 PROCEDURE — 81001 URINALYSIS AUTO W/SCOPE: CPT | Performed by: EMERGENCY MEDICINE

## 2019-05-16 PROCEDURE — 87581 M.PNEUMON DNA AMP PROBE: CPT | Performed by: EMERGENCY MEDICINE

## 2019-05-16 PROCEDURE — 84145 PROCALCITONIN (PCT): CPT | Performed by: EMERGENCY MEDICINE

## 2019-05-16 PROCEDURE — 25010000002 CEFTRIAXONE PER 250 MG: Performed by: EMERGENCY MEDICINE

## 2019-05-16 PROCEDURE — 87633 RESP VIRUS 12-25 TARGETS: CPT | Performed by: EMERGENCY MEDICINE

## 2019-05-16 PROCEDURE — 87798 DETECT AGENT NOS DNA AMP: CPT | Performed by: EMERGENCY MEDICINE

## 2019-05-16 PROCEDURE — 84484 ASSAY OF TROPONIN QUANT: CPT | Performed by: EMERGENCY MEDICINE

## 2019-05-16 PROCEDURE — 83605 ASSAY OF LACTIC ACID: CPT | Performed by: EMERGENCY MEDICINE

## 2019-05-16 PROCEDURE — 99285 EMERGENCY DEPT VISIT HI MDM: CPT

## 2019-05-16 PROCEDURE — 71250 CT THORAX DX C-: CPT

## 2019-05-16 PROCEDURE — 87486 CHLMYD PNEUM DNA AMP PROBE: CPT | Performed by: EMERGENCY MEDICINE

## 2019-05-16 RX ORDER — CEFTRIAXONE SODIUM 1 G/50ML
1 INJECTION, SOLUTION INTRAVENOUS ONCE
Status: COMPLETED | OUTPATIENT
Start: 2019-05-16 | End: 2019-05-16

## 2019-05-16 RX ORDER — ONDANSETRON 2 MG/ML
4 INJECTION INTRAMUSCULAR; INTRAVENOUS EVERY 4 HOURS PRN
Status: DISCONTINUED | OUTPATIENT
Start: 2019-05-16 | End: 2019-05-22 | Stop reason: HOSPADM

## 2019-05-16 RX ORDER — ACETAMINOPHEN 500 MG
1000 TABLET ORAL ONCE
Status: COMPLETED | OUTPATIENT
Start: 2019-05-16 | End: 2019-05-16

## 2019-05-16 RX ORDER — IPRATROPIUM BROMIDE AND ALBUTEROL SULFATE 2.5; .5 MG/3ML; MG/3ML
3 SOLUTION RESPIRATORY (INHALATION) EVERY 4 HOURS PRN
Status: DISCONTINUED | OUTPATIENT
Start: 2019-05-16 | End: 2019-05-22 | Stop reason: HOSPADM

## 2019-05-16 RX ORDER — CEFTRIAXONE SODIUM 1 G/50ML
1 INJECTION, SOLUTION INTRAVENOUS EVERY 24 HOURS
Status: DISCONTINUED | OUTPATIENT
Start: 2019-05-17 | End: 2019-05-21

## 2019-05-16 RX ORDER — IPRATROPIUM BROMIDE AND ALBUTEROL SULFATE 2.5; .5 MG/3ML; MG/3ML
3 SOLUTION RESPIRATORY (INHALATION)
Status: DISCONTINUED | OUTPATIENT
Start: 2019-05-16 | End: 2019-05-16

## 2019-05-16 RX ORDER — IPRATROPIUM BROMIDE AND ALBUTEROL SULFATE 2.5; .5 MG/3ML; MG/3ML
3 SOLUTION RESPIRATORY (INHALATION)
Status: DISCONTINUED | OUTPATIENT
Start: 2019-05-16 | End: 2019-05-22 | Stop reason: HOSPADM

## 2019-05-16 RX ORDER — ACETAMINOPHEN 325 MG/1
650 TABLET ORAL EVERY 4 HOURS PRN
Status: DISCONTINUED | OUTPATIENT
Start: 2019-05-16 | End: 2019-05-22 | Stop reason: HOSPADM

## 2019-05-16 RX ORDER — SODIUM CHLORIDE AND POTASSIUM CHLORIDE 150; 900 MG/100ML; MG/100ML
100 INJECTION, SOLUTION INTRAVENOUS CONTINUOUS
Status: DISCONTINUED | OUTPATIENT
Start: 2019-05-16 | End: 2019-05-20

## 2019-05-16 RX ORDER — ASPIRIN 81 MG/1
81 TABLET, CHEWABLE ORAL DAILY
COMMUNITY

## 2019-05-16 RX ORDER — SODIUM CHLORIDE 0.9 % (FLUSH) 0.9 %
10 SYRINGE (ML) INJECTION AS NEEDED
Status: DISCONTINUED | OUTPATIENT
Start: 2019-05-16 | End: 2019-05-22 | Stop reason: HOSPADM

## 2019-05-16 RX ADMIN — SODIUM CHLORIDE 500 ML: 9 INJECTION, SOLUTION INTRAVENOUS at 19:46

## 2019-05-16 RX ADMIN — AZITHROMYCIN MONOHYDRATE 500 MG: 500 INJECTION, POWDER, LYOPHILIZED, FOR SOLUTION INTRAVENOUS at 20:29

## 2019-05-16 RX ADMIN — ACETAMINOPHEN 1000 MG: 500 TABLET, FILM COATED ORAL at 19:39

## 2019-05-16 RX ADMIN — CEFTRIAXONE SODIUM 1 G: 1 INJECTION, SOLUTION INTRAVENOUS at 19:46

## 2019-05-17 PROBLEM — R59.0 MEDIASTINAL ADENOPATHY: Status: ACTIVE | Noted: 2019-05-17

## 2019-05-17 PROBLEM — J98.4 CAVITARY LESION OF LUNG: Status: ACTIVE | Noted: 2019-05-17

## 2019-05-17 LAB
ALBUMIN SERPL-MCNC: 2.2 G/DL (ref 3.5–5.2)
ALBUMIN/GLOB SERPL: 0.7 G/DL
ALP SERPL-CCNC: 76 U/L (ref 39–117)
ALT SERPL W P-5'-P-CCNC: 8 U/L (ref 1–33)
ANION GAP SERPL CALCULATED.3IONS-SCNC: 11.1 MMOL/L
AST SERPL-CCNC: 12 U/L (ref 1–32)
BILIRUB SERPL-MCNC: 0.3 MG/DL (ref 0.2–1.2)
BUN BLD-MCNC: 12 MG/DL (ref 8–23)
BUN/CREAT SERPL: 15.6 (ref 7–25)
CALCIUM SPEC-SCNC: 7.8 MG/DL (ref 8.6–10.5)
CHLORIDE SERPL-SCNC: 107 MMOL/L (ref 98–107)
CO2 SERPL-SCNC: 19.9 MMOL/L (ref 22–29)
CREAT BLD-MCNC: 0.77 MG/DL (ref 0.57–1)
GFR SERPL CREATININE-BSD FRML MDRD: 73 ML/MIN/1.73
GLOBULIN UR ELPH-MCNC: 3.2 GM/DL
GLUCOSE BLD-MCNC: 109 MG/DL (ref 65–99)
MAGNESIUM SERPL-MCNC: 1.7 MG/DL (ref 1.6–2.4)
POTASSIUM BLD-SCNC: 3.5 MMOL/L (ref 3.5–5.2)
PROT SERPL-MCNC: 5.4 G/DL (ref 6–8.5)
SODIUM BLD-SCNC: 138 MMOL/L (ref 136–145)

## 2019-05-17 PROCEDURE — 25010000002 ENOXAPARIN PER 10 MG: Performed by: SPECIALIST

## 2019-05-17 PROCEDURE — 94799 UNLISTED PULMONARY SVC/PX: CPT

## 2019-05-17 PROCEDURE — 25010000002 AZITHROMYCIN PER 500 MG: Performed by: SPECIALIST

## 2019-05-17 PROCEDURE — 25810000003 SODIUM CHLORIDE 0.9 % WITH KCL 20 MEQ 20-0.9 MEQ/L-% SOLUTION: Performed by: SPECIALIST

## 2019-05-17 PROCEDURE — 80053 COMPREHEN METABOLIC PANEL: CPT | Performed by: SPECIALIST

## 2019-05-17 PROCEDURE — 25010000002 CEFTRIAXONE PER 250 MG: Performed by: SPECIALIST

## 2019-05-17 PROCEDURE — 94640 AIRWAY INHALATION TREATMENT: CPT

## 2019-05-17 PROCEDURE — 83735 ASSAY OF MAGNESIUM: CPT | Performed by: SPECIALIST

## 2019-05-17 RX ORDER — METOPROLOL TARTRATE 50 MG/1
50 TABLET, FILM COATED ORAL EVERY 12 HOURS SCHEDULED
Status: DISCONTINUED | OUTPATIENT
Start: 2019-05-18 | End: 2019-05-22 | Stop reason: HOSPADM

## 2019-05-17 RX ORDER — LOSARTAN POTASSIUM 50 MG/1
50 TABLET ORAL
Status: DISCONTINUED | OUTPATIENT
Start: 2019-05-18 | End: 2019-05-22 | Stop reason: HOSPADM

## 2019-05-17 RX ORDER — ASPIRIN 81 MG/1
81 TABLET, CHEWABLE ORAL DAILY
Status: DISCONTINUED | OUTPATIENT
Start: 2019-05-17 | End: 2019-05-19

## 2019-05-17 RX ORDER — MELATONIN
5000 DAILY
Status: DISCONTINUED | OUTPATIENT
Start: 2019-05-17 | End: 2019-05-22 | Stop reason: HOSPADM

## 2019-05-17 RX ORDER — LEVOTHYROXINE SODIUM 0.07 MG/1
75 TABLET ORAL
Status: DISCONTINUED | OUTPATIENT
Start: 2019-05-17 | End: 2019-05-22 | Stop reason: HOSPADM

## 2019-05-17 RX ORDER — SODIUM CHLORIDE 0.9 % (FLUSH) 0.9 %
3-10 SYRINGE (ML) INJECTION AS NEEDED
Status: DISCONTINUED | OUTPATIENT
Start: 2019-05-17 | End: 2019-05-22 | Stop reason: HOSPADM

## 2019-05-17 RX ORDER — ACETAMINOPHEN 325 MG/1
650 TABLET ORAL EVERY 4 HOURS PRN
Status: DISCONTINUED | OUTPATIENT
Start: 2019-05-17 | End: 2019-05-22 | Stop reason: HOSPADM

## 2019-05-17 RX ORDER — SODIUM CHLORIDE 0.9 % (FLUSH) 0.9 %
3 SYRINGE (ML) INJECTION EVERY 12 HOURS SCHEDULED
Status: DISCONTINUED | OUTPATIENT
Start: 2019-05-17 | End: 2019-05-22 | Stop reason: HOSPADM

## 2019-05-17 RX ORDER — ATORVASTATIN CALCIUM 80 MG/1
80 TABLET, FILM COATED ORAL NIGHTLY
Status: DISCONTINUED | OUTPATIENT
Start: 2019-05-17 | End: 2019-05-22 | Stop reason: HOSPADM

## 2019-05-17 RX ORDER — OXYBUTYNIN CHLORIDE 5 MG/1
5 TABLET, EXTENDED RELEASE ORAL DAILY
Status: DISCONTINUED | OUTPATIENT
Start: 2019-05-17 | End: 2019-05-22 | Stop reason: HOSPADM

## 2019-05-17 RX ADMIN — ATORVASTATIN CALCIUM 80 MG: 80 TABLET, FILM COATED ORAL at 02:38

## 2019-05-17 RX ADMIN — ASPIRIN 81 MG: 81 TABLET, CHEWABLE ORAL at 08:16

## 2019-05-17 RX ADMIN — AZITHROMYCIN MONOHYDRATE 500 MG: 500 INJECTION, POWDER, LYOPHILIZED, FOR SOLUTION INTRAVENOUS at 20:11

## 2019-05-17 RX ADMIN — SODIUM CHLORIDE, PRESERVATIVE FREE 3 ML: 5 INJECTION INTRAVENOUS at 20:10

## 2019-05-17 RX ADMIN — POTASSIUM CHLORIDE AND SODIUM CHLORIDE 100 ML/HR: 900; 150 INJECTION, SOLUTION INTRAVENOUS at 00:53

## 2019-05-17 RX ADMIN — CEFTRIAXONE SODIUM 1 G: 1 INJECTION, SOLUTION INTRAVENOUS at 18:34

## 2019-05-17 RX ADMIN — POTASSIUM CHLORIDE AND SODIUM CHLORIDE 100 ML/HR: 900; 150 INJECTION, SOLUTION INTRAVENOUS at 10:46

## 2019-05-17 RX ADMIN — IPRATROPIUM BROMIDE AND ALBUTEROL SULFATE 3 ML: 2.5; .5 SOLUTION RESPIRATORY (INHALATION) at 07:14

## 2019-05-17 RX ADMIN — IPRATROPIUM BROMIDE AND ALBUTEROL SULFATE 3 ML: 2.5; .5 SOLUTION RESPIRATORY (INHALATION) at 23:09

## 2019-05-17 RX ADMIN — IPRATROPIUM BROMIDE AND ALBUTEROL SULFATE 3 ML: 2.5; .5 SOLUTION RESPIRATORY (INHALATION) at 14:55

## 2019-05-17 RX ADMIN — IPRATROPIUM BROMIDE AND ALBUTEROL SULFATE 3 ML: 2.5; .5 SOLUTION RESPIRATORY (INHALATION) at 00:01

## 2019-05-17 RX ADMIN — ATORVASTATIN CALCIUM 80 MG: 80 TABLET, FILM COATED ORAL at 20:10

## 2019-05-17 RX ADMIN — VITAMIN D, TAB 1000IU (100/BT) 5000 UNITS: 25 TAB at 08:16

## 2019-05-17 RX ADMIN — SODIUM CHLORIDE, PRESERVATIVE FREE 3 ML: 5 INJECTION INTRAVENOUS at 14:06

## 2019-05-17 RX ADMIN — LEVOTHYROXINE SODIUM 75 MCG: 75 TABLET ORAL at 05:57

## 2019-05-17 RX ADMIN — OXYBUTYNIN CHLORIDE 5 MG: 5 TABLET, EXTENDED RELEASE ORAL at 08:17

## 2019-05-18 PROCEDURE — 94799 UNLISTED PULMONARY SVC/PX: CPT

## 2019-05-18 PROCEDURE — 25010000002 CEFTRIAXONE PER 250 MG: Performed by: SPECIALIST

## 2019-05-18 PROCEDURE — 25810000003 SODIUM CHLORIDE 0.9 % WITH KCL 20 MEQ 20-0.9 MEQ/L-% SOLUTION: Performed by: SPECIALIST

## 2019-05-18 RX ADMIN — ASPIRIN 81 MG: 81 TABLET, CHEWABLE ORAL at 08:32

## 2019-05-18 RX ADMIN — LOSARTAN POTASSIUM 50 MG: 50 TABLET, FILM COATED ORAL at 08:32

## 2019-05-18 RX ADMIN — IPRATROPIUM BROMIDE AND ALBUTEROL SULFATE 3 ML: 2.5; .5 SOLUTION RESPIRATORY (INHALATION) at 15:50

## 2019-05-18 RX ADMIN — LEVOTHYROXINE SODIUM 75 MCG: 75 TABLET ORAL at 06:23

## 2019-05-18 RX ADMIN — SODIUM CHLORIDE, PRESERVATIVE FREE 3 ML: 5 INJECTION INTRAVENOUS at 08:33

## 2019-05-18 RX ADMIN — IPRATROPIUM BROMIDE AND ALBUTEROL SULFATE 3 ML: 2.5; .5 SOLUTION RESPIRATORY (INHALATION) at 23:06

## 2019-05-18 RX ADMIN — VITAMIN D, TAB 1000IU (100/BT) 5000 UNITS: 25 TAB at 08:32

## 2019-05-18 RX ADMIN — POTASSIUM CHLORIDE AND SODIUM CHLORIDE 100 ML/HR: 900; 150 INJECTION, SOLUTION INTRAVENOUS at 21:50

## 2019-05-18 RX ADMIN — CEFTRIAXONE SODIUM 1 G: 1 INJECTION, SOLUTION INTRAVENOUS at 17:56

## 2019-05-18 RX ADMIN — ATORVASTATIN CALCIUM 80 MG: 80 TABLET, FILM COATED ORAL at 20:33

## 2019-05-18 RX ADMIN — POTASSIUM CHLORIDE AND SODIUM CHLORIDE 100 ML/HR: 900; 150 INJECTION, SOLUTION INTRAVENOUS at 10:11

## 2019-05-18 RX ADMIN — IPRATROPIUM BROMIDE AND ALBUTEROL SULFATE 3 ML: 2.5; .5 SOLUTION RESPIRATORY (INHALATION) at 06:59

## 2019-05-18 RX ADMIN — AZITHROMYCIN MONOHYDRATE 500 MG: 500 INJECTION, POWDER, LYOPHILIZED, FOR SOLUTION INTRAVENOUS at 19:42

## 2019-05-18 RX ADMIN — METOPROLOL TARTRATE 50 MG: 50 TABLET, FILM COATED ORAL at 20:33

## 2019-05-18 RX ADMIN — METOPROLOL TARTRATE 50 MG: 50 TABLET, FILM COATED ORAL at 08:32

## 2019-05-18 RX ADMIN — SODIUM CHLORIDE, PRESERVATIVE FREE 3 ML: 5 INJECTION INTRAVENOUS at 20:33

## 2019-05-18 RX ADMIN — OXYBUTYNIN CHLORIDE 5 MG: 5 TABLET, EXTENDED RELEASE ORAL at 08:32

## 2019-05-19 PROCEDURE — 94799 UNLISTED PULMONARY SVC/PX: CPT

## 2019-05-19 PROCEDURE — 25010000002 CEFTRIAXONE PER 250 MG: Performed by: SPECIALIST

## 2019-05-19 PROCEDURE — 25010000002 AZITHROMYCIN PER 500 MG: Performed by: SPECIALIST

## 2019-05-19 PROCEDURE — 25810000003 SODIUM CHLORIDE 0.9 % WITH KCL 20 MEQ 20-0.9 MEQ/L-% SOLUTION: Performed by: SPECIALIST

## 2019-05-19 RX ADMIN — POTASSIUM CHLORIDE AND SODIUM CHLORIDE 100 ML/HR: 900; 150 INJECTION, SOLUTION INTRAVENOUS at 19:49

## 2019-05-19 RX ADMIN — LOSARTAN POTASSIUM 50 MG: 50 TABLET, FILM COATED ORAL at 08:51

## 2019-05-19 RX ADMIN — METOPROLOL TARTRATE 50 MG: 50 TABLET, FILM COATED ORAL at 08:51

## 2019-05-19 RX ADMIN — IPRATROPIUM BROMIDE AND ALBUTEROL SULFATE 3 ML: 2.5; .5 SOLUTION RESPIRATORY (INHALATION) at 23:22

## 2019-05-19 RX ADMIN — AZITHROMYCIN MONOHYDRATE 500 MG: 500 INJECTION, POWDER, LYOPHILIZED, FOR SOLUTION INTRAVENOUS at 21:31

## 2019-05-19 RX ADMIN — CEFTRIAXONE SODIUM 1 G: 1 INJECTION, SOLUTION INTRAVENOUS at 18:06

## 2019-05-19 RX ADMIN — IPRATROPIUM BROMIDE AND ALBUTEROL SULFATE 3 ML: 2.5; .5 SOLUTION RESPIRATORY (INHALATION) at 07:07

## 2019-05-19 RX ADMIN — VITAMIN D, TAB 1000IU (100/BT) 5000 UNITS: 25 TAB at 08:51

## 2019-05-19 RX ADMIN — POTASSIUM CHLORIDE AND SODIUM CHLORIDE 100 ML/HR: 900; 150 INJECTION, SOLUTION INTRAVENOUS at 08:51

## 2019-05-19 RX ADMIN — SODIUM CHLORIDE, PRESERVATIVE FREE 3 ML: 5 INJECTION INTRAVENOUS at 11:59

## 2019-05-19 RX ADMIN — ATORVASTATIN CALCIUM 80 MG: 80 TABLET, FILM COATED ORAL at 19:49

## 2019-05-19 RX ADMIN — ASPIRIN 81 MG: 81 TABLET, CHEWABLE ORAL at 08:51

## 2019-05-19 RX ADMIN — OXYBUTYNIN CHLORIDE 5 MG: 5 TABLET, EXTENDED RELEASE ORAL at 08:51

## 2019-05-19 RX ADMIN — SODIUM CHLORIDE, PRESERVATIVE FREE 3 ML: 5 INJECTION INTRAVENOUS at 21:35

## 2019-05-19 RX ADMIN — IPRATROPIUM BROMIDE AND ALBUTEROL SULFATE 3 ML: 2.5; .5 SOLUTION RESPIRATORY (INHALATION) at 14:24

## 2019-05-19 RX ADMIN — LEVOTHYROXINE SODIUM 75 MCG: 75 TABLET ORAL at 06:13

## 2019-05-19 RX ADMIN — METOPROLOL TARTRATE 50 MG: 50 TABLET, FILM COATED ORAL at 19:49

## 2019-05-20 ENCOUNTER — ANESTHESIA (OUTPATIENT)
Dept: GASTROENTEROLOGY | Facility: HOSPITAL | Age: 74
End: 2019-05-20

## 2019-05-20 ENCOUNTER — ANESTHESIA EVENT (OUTPATIENT)
Dept: GASTROENTEROLOGY | Facility: HOSPITAL | Age: 74
End: 2019-05-20

## 2019-05-20 LAB
APPEARANCE FLD: ABNORMAL
B PARAPERT DNA SPEC QL NAA+PROBE: NOT DETECTED
B PERT DNA SPEC QL NAA+PROBE: NOT DETECTED
C PNEUM DNA NPH QL NAA+NON-PROBE: NOT DETECTED
COLOR FLD: ABNORMAL
EOSINOPHIL NFR FLD MANUAL: 1 %
FLUAV H1 2009 PAND RNA NPH QL NAA+PROBE: NOT DETECTED
FLUAV H1 HA GENE NPH QL NAA+PROBE: NOT DETECTED
FLUAV H3 RNA NPH QL NAA+PROBE: NOT DETECTED
FLUAV SUBTYP SPEC NAA+PROBE: NOT DETECTED
FLUBV RNA ISLT QL NAA+PROBE: NOT DETECTED
GIE STN SPEC: NORMAL
HADV DNA SPEC NAA+PROBE: NOT DETECTED
HCOV 229E RNA SPEC QL NAA+PROBE: NOT DETECTED
HCOV HKU1 RNA SPEC QL NAA+PROBE: NOT DETECTED
HCOV NL63 RNA SPEC QL NAA+PROBE: NOT DETECTED
HCOV OC43 RNA SPEC QL NAA+PROBE: NOT DETECTED
HMPV RNA NPH QL NAA+NON-PROBE: NOT DETECTED
HPIV1 RNA SPEC QL NAA+PROBE: NOT DETECTED
HPIV2 RNA SPEC QL NAA+PROBE: NOT DETECTED
HPIV3 RNA NPH QL NAA+PROBE: NOT DETECTED
HPIV4 P GENE NPH QL NAA+PROBE: NOT DETECTED
LYMPHOCYTES NFR FLD MANUAL: 14 %
M PNEUMO IGG SER IA-ACNC: NOT DETECTED
MONOS+MACROS NFR FLD: 5 %
NEUTROPHILS NFR FLD MANUAL: 80 %
RBC # FLD AUTO: 845 /MM3
RHINOVIRUS RNA SPEC NAA+PROBE: NOT DETECTED
RSV RNA NPH QL NAA+NON-PROBE: NOT DETECTED
WBC # FLD AUTO: 188 /MM3

## 2019-05-20 PROCEDURE — 94799 UNLISTED PULMONARY SVC/PX: CPT

## 2019-05-20 PROCEDURE — 87633 RESP VIRUS 12-25 TARGETS: CPT | Performed by: INTERNAL MEDICINE

## 2019-05-20 PROCEDURE — 87116 MYCOBACTERIA CULTURE: CPT | Performed by: INTERNAL MEDICINE

## 2019-05-20 PROCEDURE — 0B9D8ZX DRAINAGE OF RIGHT MIDDLE LUNG LOBE, VIA NATURAL OR ARTIFICIAL OPENING ENDOSCOPIC, DIAGNOSTIC: ICD-10-PCS | Performed by: INTERNAL MEDICINE

## 2019-05-20 PROCEDURE — 0BB78ZX EXCISION OF LEFT MAIN BRONCHUS, VIA NATURAL OR ARTIFICIAL OPENING ENDOSCOPIC, DIAGNOSTIC: ICD-10-PCS | Performed by: INTERNAL MEDICINE

## 2019-05-20 PROCEDURE — 25010000002 CEFTRIAXONE PER 250 MG: Performed by: SPECIALIST

## 2019-05-20 PROCEDURE — 88305 TISSUE EXAM BY PATHOLOGIST: CPT | Performed by: INTERNAL MEDICINE

## 2019-05-20 PROCEDURE — 0B9J8ZX DRAINAGE OF LEFT LOWER LUNG LOBE, VIA NATURAL OR ARTIFICIAL OPENING ENDOSCOPIC, DIAGNOSTIC: ICD-10-PCS | Performed by: INTERNAL MEDICINE

## 2019-05-20 PROCEDURE — 87071 CULTURE AEROBIC QUANT OTHER: CPT | Performed by: INTERNAL MEDICINE

## 2019-05-20 PROCEDURE — 88360 TUMOR IMMUNOHISTOCHEM/MANUAL: CPT

## 2019-05-20 PROCEDURE — 87102 FUNGUS ISOLATION CULTURE: CPT | Performed by: INTERNAL MEDICINE

## 2019-05-20 PROCEDURE — 0BD28ZX EXTRACTION OF CARINA, VIA NATURAL OR ARTIFICIAL OPENING ENDOSCOPIC, DIAGNOSTIC: ICD-10-PCS | Performed by: INTERNAL MEDICINE

## 2019-05-20 PROCEDURE — 87486 CHLMYD PNEUM DNA AMP PROBE: CPT | Performed by: INTERNAL MEDICINE

## 2019-05-20 PROCEDURE — 88112 CYTOPATH CELL ENHANCE TECH: CPT | Performed by: INTERNAL MEDICINE

## 2019-05-20 PROCEDURE — 88313 SPECIAL STAINS GROUP 2: CPT | Performed by: INTERNAL MEDICINE

## 2019-05-20 PROCEDURE — 25010000002 AZITHROMYCIN PER 500 MG: Performed by: SPECIALIST

## 2019-05-20 PROCEDURE — 88341 IMHCHEM/IMCYTCHM EA ADD ANTB: CPT | Performed by: INTERNAL MEDICINE

## 2019-05-20 PROCEDURE — C1726 CATH, BAL DIL, NON-VASCULAR: HCPCS | Performed by: INTERNAL MEDICINE

## 2019-05-20 PROCEDURE — 88342 IMHCHEM/IMCYTCHM 1ST ANTB: CPT | Performed by: INTERNAL MEDICINE

## 2019-05-20 PROCEDURE — 88173 CYTOPATH EVAL FNA REPORT: CPT | Performed by: INTERNAL MEDICINE

## 2019-05-20 PROCEDURE — 25010000002 PROPOFOL 1000 MG/ML EMULSION: Performed by: ANESTHESIOLOGY

## 2019-05-20 PROCEDURE — 89051 BODY FLUID CELL COUNT: CPT | Performed by: INTERNAL MEDICINE

## 2019-05-20 PROCEDURE — 87206 SMEAR FLUORESCENT/ACID STAI: CPT | Performed by: INTERNAL MEDICINE

## 2019-05-20 PROCEDURE — 88381 MICRODISSECTION MANUAL: CPT

## 2019-05-20 PROCEDURE — 25810000003 SODIUM CHLORIDE 0.9 % WITH KCL 20 MEQ 20-0.9 MEQ/L-% SOLUTION: Performed by: SPECIALIST

## 2019-05-20 PROCEDURE — 87205 SMEAR GRAM STAIN: CPT | Performed by: INTERNAL MEDICINE

## 2019-05-20 PROCEDURE — 25010000002 PROPOFOL 10 MG/ML EMULSION: Performed by: ANESTHESIOLOGY

## 2019-05-20 PROCEDURE — 87581 M.PNEUMON DNA AMP PROBE: CPT | Performed by: INTERNAL MEDICINE

## 2019-05-20 PROCEDURE — 87798 DETECT AGENT NOS DNA AMP: CPT | Performed by: INTERNAL MEDICINE

## 2019-05-20 PROCEDURE — 88312 SPECIAL STAINS GROUP 1: CPT | Performed by: INTERNAL MEDICINE

## 2019-05-20 PROCEDURE — 88377 M/PHMTRC ALYS ISHQUANT/SEMIQ: CPT

## 2019-05-20 PROCEDURE — 07D78ZX EXTRACTION OF THORAX LYMPHATIC, VIA NATURAL OR ARTIFICIAL OPENING ENDOSCOPIC, DIAGNOSTIC: ICD-10-PCS | Performed by: INTERNAL MEDICINE

## 2019-05-20 RX ORDER — CLONIDINE HYDROCHLORIDE 0.1 MG/1
0.1 TABLET ORAL EVERY 6 HOURS PRN
Status: DISCONTINUED | OUTPATIENT
Start: 2019-05-20 | End: 2019-05-22 | Stop reason: HOSPADM

## 2019-05-20 RX ORDER — LIDOCAINE HYDROCHLORIDE 20 MG/ML
INJECTION, SOLUTION INFILTRATION; PERINEURAL AS NEEDED
Status: DISCONTINUED | OUTPATIENT
Start: 2019-05-20 | End: 2019-05-20 | Stop reason: SURG

## 2019-05-20 RX ORDER — LIDOCAINE HYDROCHLORIDE 20 MG/ML
INJECTION, SOLUTION EPIDURAL; INFILTRATION; INTRACAUDAL; PERINEURAL AS NEEDED
Status: DISCONTINUED | OUTPATIENT
Start: 2019-05-20 | End: 2019-05-20 | Stop reason: HOSPADM

## 2019-05-20 RX ORDER — PROPOFOL 10 MG/ML
VIAL (ML) INTRAVENOUS AS NEEDED
Status: DISCONTINUED | OUTPATIENT
Start: 2019-05-20 | End: 2019-05-20 | Stop reason: SURG

## 2019-05-20 RX ORDER — SODIUM CHLORIDE 9 MG/ML
30 INJECTION, SOLUTION INTRAVENOUS CONTINUOUS PRN
Status: DISCONTINUED | OUTPATIENT
Start: 2019-05-20 | End: 2019-05-22 | Stop reason: HOSPADM

## 2019-05-20 RX ORDER — LIDOCAINE HYDROCHLORIDE 10 MG/ML
INJECTION, SOLUTION EPIDURAL; INFILTRATION; INTRACAUDAL; PERINEURAL AS NEEDED
Status: DISCONTINUED | OUTPATIENT
Start: 2019-05-20 | End: 2019-05-20 | Stop reason: HOSPADM

## 2019-05-20 RX ADMIN — IPRATROPIUM BROMIDE AND ALBUTEROL SULFATE 3 ML: 2.5; .5 SOLUTION RESPIRATORY (INHALATION) at 15:18

## 2019-05-20 RX ADMIN — ALFENTANIL HYDROCHLORIDE 250 MCG: 500 INJECTION, SOLUTION INTRAVENOUS at 09:43

## 2019-05-20 RX ADMIN — SODIUM CHLORIDE, PRESERVATIVE FREE 3 ML: 5 INJECTION INTRAVENOUS at 20:08

## 2019-05-20 RX ADMIN — SODIUM CHLORIDE 30 ML/HR: 9 INJECTION, SOLUTION INTRAVENOUS at 08:49

## 2019-05-20 RX ADMIN — OXYBUTYNIN CHLORIDE 5 MG: 5 TABLET, EXTENDED RELEASE ORAL at 12:50

## 2019-05-20 RX ADMIN — LOSARTAN POTASSIUM 50 MG: 50 TABLET, FILM COATED ORAL at 08:00

## 2019-05-20 RX ADMIN — ATORVASTATIN CALCIUM 80 MG: 80 TABLET, FILM COATED ORAL at 20:08

## 2019-05-20 RX ADMIN — ALFENTANIL HYDROCHLORIDE 250 MCG: 500 INJECTION, SOLUTION INTRAVENOUS at 09:32

## 2019-05-20 RX ADMIN — LEVOTHYROXINE SODIUM 75 MCG: 75 TABLET ORAL at 08:00

## 2019-05-20 RX ADMIN — AZITHROMYCIN MONOHYDRATE 500 MG: 500 INJECTION, POWDER, LYOPHILIZED, FOR SOLUTION INTRAVENOUS at 19:49

## 2019-05-20 RX ADMIN — LIDOCAINE HYDROCHLORIDE 40 MG: 20 INJECTION, SOLUTION INFILTRATION; PERINEURAL at 09:32

## 2019-05-20 RX ADMIN — IPRATROPIUM BROMIDE AND ALBUTEROL SULFATE 3 ML: 2.5; .5 SOLUTION RESPIRATORY (INHALATION) at 07:12

## 2019-05-20 RX ADMIN — VITAMIN D, TAB 1000IU (100/BT) 5000 UNITS: 25 TAB at 12:50

## 2019-05-20 RX ADMIN — PROPOFOL 200 MCG/KG/MIN: 10 INJECTION, EMULSION INTRAVENOUS at 09:32

## 2019-05-20 RX ADMIN — SODIUM CHLORIDE, PRESERVATIVE FREE 3 ML: 5 INJECTION INTRAVENOUS at 08:01

## 2019-05-20 RX ADMIN — POTASSIUM CHLORIDE AND SODIUM CHLORIDE 100 ML/HR: 900; 150 INJECTION, SOLUTION INTRAVENOUS at 05:40

## 2019-05-20 RX ADMIN — METOPROLOL TARTRATE 50 MG: 50 TABLET, FILM COATED ORAL at 08:00

## 2019-05-20 RX ADMIN — PROPOFOL 100 MG: 10 INJECTION, EMULSION INTRAVENOUS at 09:32

## 2019-05-20 RX ADMIN — CEFTRIAXONE SODIUM 1 G: 1 INJECTION, SOLUTION INTRAVENOUS at 18:30

## 2019-05-20 RX ADMIN — IPRATROPIUM BROMIDE AND ALBUTEROL SULFATE 3 ML: 2.5; .5 SOLUTION RESPIRATORY (INHALATION) at 23:21

## 2019-05-20 RX ADMIN — METOPROLOL TARTRATE 50 MG: 50 TABLET, FILM COATED ORAL at 20:08

## 2019-05-20 RX ADMIN — ACETAMINOPHEN 650 MG: 325 TABLET, FILM COATED ORAL at 00:27

## 2019-05-20 RX ADMIN — SODIUM CHLORIDE, PRESERVATIVE FREE 10 ML: 5 INJECTION INTRAVENOUS at 10:36

## 2019-05-20 NOTE — ANESTHESIA POSTPROCEDURE EVALUATION
"Patient: Anaid Moura    Procedure Summary     Date:  05/20/19 Room / Location:  [facility] ENDOSCOPY 7 /  [facility]    Anesthesia Start:  0927 Anesthesia Stop:  1011    Procedure:  BRONCHOSCOPY WITH  BIOPSY AND BAL, WITH ENDOBRONCHIAL ULTRASOUND WITH FNA (Bilateral Bronchus) Diagnosis:       Cavitary lesion of lung      Mediastinal adenopathy      (Cavitary lesion of lung [J98.4])      (Mediastinal adenopathy [R59.0])    Surgeon:  Chris Tirado MD Provider:  Dyllan Rg MD    Anesthesia Type:  general ASA Status:  3          Anesthesia Type: general  Last vitals  BP   163/95 (05/20/19 1132)   Temp   36.6 °C (97.9 °F) (05/20/19 0833)   Pulse   86 (05/20/19 1132)   Resp   16 (05/20/19 1035)     SpO2   94 % (05/20/19 1132)     Post Anesthesia Care and Evaluation    Patient location during evaluation: bedside  Patient participation: complete - patient participated  Level of consciousness: awake and alert  Pain management: adequate  Airway patency: patent  Anesthetic complications: No anesthetic complications    Cardiovascular status: acceptable  Respiratory status: acceptable  Hydration status: acceptable    Comments: /95   Pulse 86   Temp 36.6 °C (97.9 °F) (Oral)   Resp 16   Ht 162.6 cm (64.02\")   Wt 49.4 kg (109 lb)   SpO2 94%   BMI 18.70 kg/m²           "

## 2019-05-20 NOTE — ANESTHESIA PROCEDURE NOTES
Airway  Urgency: elective    Airway not difficult    General Information and Staff    Patient location during procedure: OR    Indications and Patient Condition    Preoxygenated: yes  MILS maintained throughout  Mask difficulty assessment: 1 - vent by mask    Final Airway Details  Final airway type: supraglottic airway      Successful airway: unique  Size 4    Number of attempts at approach: 1

## 2019-05-20 NOTE — ANESTHESIA PREPROCEDURE EVALUATION
Anesthesia Evaluation     Patient summary reviewed and Nursing notes reviewed   no history of anesthetic complications:  NPO Solid Status: > 6 hours  NPO Liquid Status: > 6 hours           Airway   Mallampati: II  TM distance: >3 FB  Neck ROM: full  no difficulty expected and No difficulty expected  Dental - normal exam     Pulmonary - normal exam    breath sounds clear to auscultation  (+) pneumonia , COPD,   (-) rhonchi, decreased breath sounds, wheezes, rales, stridor  Cardiovascular - normal exam    NYHA Classification: I  ECG reviewed  Patient on routine beta blocker and Beta blocker given within 24 hours of surgery  Rhythm: regular  Rate: normal    (+) hypertension, past MI  >12 months, CAD, PVD, hyperlipidemia,  carotid artery disease  (-) murmur, weak pulses, friction rub, systolic click, carotid bruits, JVD, peripheral edema      Neuro/Psych  (+) CVA,     GI/Hepatic/Renal/Endo    (+)  GERD,      Musculoskeletal (-) negative ROS    Abdominal  - normal exam    Abdomen: soft.   Substance History - negative use     OB/GYN negative ob/gyn ROS         Other - negative ROS                       Anesthesia Plan    ASA 3     general     intravenous induction   Anesthetic plan, all risks, benefits, and alternatives have been provided, discussed and informed consent has been obtained with: patient.

## 2019-05-21 LAB
BACTERIA SPEC AEROBE CULT: NORMAL
BACTERIA SPEC AEROBE CULT: NORMAL
CYTO UR: NORMAL
CYTO UR: NORMAL
LAB AP CASE REPORT: NORMAL
LAB AP CASE REPORT: NORMAL
LAB AP DIAGNOSIS COMMENT: NORMAL
LAB AP DIAGNOSIS COMMENT: NORMAL
LAB AP NON-GYN SPECIMEN ADEQUACY: NORMAL
PATH REPORT.FINAL DX SPEC: NORMAL
PATH REPORT.FINAL DX SPEC: NORMAL
PATH REPORT.GROSS SPEC: NORMAL
PATH REPORT.GROSS SPEC: NORMAL

## 2019-05-21 PROCEDURE — 94799 UNLISTED PULMONARY SVC/PX: CPT

## 2019-05-21 RX ORDER — ALBUTEROL SULFATE 2.5 MG/3ML
2.5 SOLUTION RESPIRATORY (INHALATION) 4 TIMES DAILY PRN
Qty: 125 VIAL | Refills: 11 | Status: SHIPPED | OUTPATIENT
Start: 2019-05-21 | End: 2019-06-04 | Stop reason: HOSPADM

## 2019-05-21 RX ORDER — CEFDINIR 300 MG/1
300 CAPSULE ORAL EVERY 12 HOURS SCHEDULED
Status: DISCONTINUED | OUTPATIENT
Start: 2019-05-21 | End: 2019-05-22 | Stop reason: HOSPADM

## 2019-05-21 RX ADMIN — SODIUM CHLORIDE, PRESERVATIVE FREE 3 ML: 5 INJECTION INTRAVENOUS at 21:39

## 2019-05-21 RX ADMIN — METOPROLOL TARTRATE 50 MG: 50 TABLET, FILM COATED ORAL at 08:00

## 2019-05-21 RX ADMIN — CEFDINIR 300 MG: 300 CAPSULE ORAL at 10:33

## 2019-05-21 RX ADMIN — LEVOTHYROXINE SODIUM 75 MCG: 75 TABLET ORAL at 06:29

## 2019-05-21 RX ADMIN — IPRATROPIUM BROMIDE AND ALBUTEROL SULFATE 3 ML: 2.5; .5 SOLUTION RESPIRATORY (INHALATION) at 16:09

## 2019-05-21 RX ADMIN — IPRATROPIUM BROMIDE AND ALBUTEROL SULFATE 3 ML: 2.5; .5 SOLUTION RESPIRATORY (INHALATION) at 07:25

## 2019-05-21 RX ADMIN — VITAMIN D, TAB 1000IU (100/BT) 5000 UNITS: 25 TAB at 08:00

## 2019-05-21 RX ADMIN — METOPROLOL TARTRATE 50 MG: 50 TABLET, FILM COATED ORAL at 21:38

## 2019-05-21 RX ADMIN — ATORVASTATIN CALCIUM 80 MG: 80 TABLET, FILM COATED ORAL at 21:38

## 2019-05-21 RX ADMIN — SODIUM CHLORIDE, PRESERVATIVE FREE 3 ML: 5 INJECTION INTRAVENOUS at 10:33

## 2019-05-21 RX ADMIN — LOSARTAN POTASSIUM 50 MG: 50 TABLET, FILM COATED ORAL at 08:00

## 2019-05-21 RX ADMIN — CEFDINIR 300 MG: 300 CAPSULE ORAL at 21:38

## 2019-05-21 RX ADMIN — OXYBUTYNIN CHLORIDE 5 MG: 5 TABLET, EXTENDED RELEASE ORAL at 08:00

## 2019-05-22 VITALS
OXYGEN SATURATION: 95 % | SYSTOLIC BLOOD PRESSURE: 142 MMHG | RESPIRATION RATE: 18 BRPM | BODY MASS INDEX: 18.59 KG/M2 | TEMPERATURE: 97.9 F | WEIGHT: 108.91 LBS | HEART RATE: 88 BPM | HEIGHT: 64 IN | DIASTOLIC BLOOD PRESSURE: 86 MMHG

## 2019-05-22 LAB
ALBUMIN SERPL-MCNC: 2.9 G/DL (ref 3.5–5.2)
ALBUMIN/GLOB SERPL: 0.9 G/DL
ALP SERPL-CCNC: 83 U/L (ref 39–117)
ALT SERPL W P-5'-P-CCNC: 10 U/L (ref 1–33)
ANION GAP SERPL CALCULATED.3IONS-SCNC: 11.8 MMOL/L
AST SERPL-CCNC: 15 U/L (ref 1–32)
BACTERIA SPEC AEROBE CULT: NO GROWTH
BILIRUB SERPL-MCNC: 0.5 MG/DL (ref 0.2–1.2)
BUN BLD-MCNC: 7 MG/DL (ref 8–23)
BUN/CREAT SERPL: 7.9 (ref 7–25)
CALCIUM SPEC-SCNC: 8.3 MG/DL (ref 8.6–10.5)
CHLORIDE SERPL-SCNC: 104 MMOL/L (ref 98–107)
CO2 SERPL-SCNC: 22.2 MMOL/L (ref 22–29)
CREAT BLD-MCNC: 0.89 MG/DL (ref 0.57–1)
DEPRECATED RDW RBC AUTO: 47.8 FL (ref 37–54)
ERYTHROCYTE [DISTWIDTH] IN BLOOD BY AUTOMATED COUNT: 15.1 % (ref 12.3–15.4)
GFR SERPL CREATININE-BSD FRML MDRD: 62 ML/MIN/1.73
GLOBULIN UR ELPH-MCNC: 3.3 GM/DL
GLUCOSE BLD-MCNC: 87 MG/DL (ref 65–99)
GRAM STN SPEC: NORMAL
HCT VFR BLD AUTO: 33.3 % (ref 34–46.6)
HGB BLD-MCNC: 10.2 G/DL (ref 12–15.9)
MAGNESIUM SERPL-MCNC: 1.7 MG/DL (ref 1.6–2.4)
MCH RBC QN AUTO: 26.6 PG (ref 26.6–33)
MCHC RBC AUTO-ENTMCNC: 30.6 G/DL (ref 31.5–35.7)
MCV RBC AUTO: 86.9 FL (ref 79–97)
PLATELET # BLD AUTO: 257 10*3/MM3 (ref 140–450)
PMV BLD AUTO: 10 FL (ref 6–12)
POTASSIUM BLD-SCNC: 4.3 MMOL/L (ref 3.5–5.2)
PROT SERPL-MCNC: 6.2 G/DL (ref 6–8.5)
RBC # BLD AUTO: 3.83 10*6/MM3 (ref 3.77–5.28)
SODIUM BLD-SCNC: 138 MMOL/L (ref 136–145)
WBC NRBC COR # BLD: 7.54 10*3/MM3 (ref 3.4–10.8)

## 2019-05-22 PROCEDURE — 83735 ASSAY OF MAGNESIUM: CPT | Performed by: SPECIALIST

## 2019-05-22 PROCEDURE — 94799 UNLISTED PULMONARY SVC/PX: CPT

## 2019-05-22 PROCEDURE — 80053 COMPREHEN METABOLIC PANEL: CPT | Performed by: SPECIALIST

## 2019-05-22 PROCEDURE — 85027 COMPLETE CBC AUTOMATED: CPT | Performed by: SPECIALIST

## 2019-05-22 RX ORDER — CEFDINIR 300 MG/1
300 CAPSULE ORAL EVERY 12 HOURS SCHEDULED
Qty: 4 CAPSULE | Refills: 0 | Status: SHIPPED | OUTPATIENT
Start: 2019-05-22 | End: 2019-05-24

## 2019-05-22 RX ADMIN — LEVOTHYROXINE SODIUM 75 MCG: 75 TABLET ORAL at 05:37

## 2019-05-22 RX ADMIN — OXYBUTYNIN CHLORIDE 5 MG: 5 TABLET, EXTENDED RELEASE ORAL at 09:32

## 2019-05-22 RX ADMIN — SODIUM CHLORIDE, PRESERVATIVE FREE 3 ML: 5 INJECTION INTRAVENOUS at 09:33

## 2019-05-22 RX ADMIN — IPRATROPIUM BROMIDE AND ALBUTEROL SULFATE 3 ML: 2.5; .5 SOLUTION RESPIRATORY (INHALATION) at 14:19

## 2019-05-22 RX ADMIN — IPRATROPIUM BROMIDE AND ALBUTEROL SULFATE 3 ML: 2.5; .5 SOLUTION RESPIRATORY (INHALATION) at 06:56

## 2019-05-22 RX ADMIN — LOSARTAN POTASSIUM 50 MG: 50 TABLET, FILM COATED ORAL at 09:32

## 2019-05-22 RX ADMIN — METOPROLOL TARTRATE 50 MG: 50 TABLET, FILM COATED ORAL at 09:32

## 2019-05-22 RX ADMIN — VITAMIN D, TAB 1000IU (100/BT) 5000 UNITS: 25 TAB at 09:32

## 2019-05-22 RX ADMIN — CEFDINIR 300 MG: 300 CAPSULE ORAL at 09:32

## 2019-05-22 RX ADMIN — IPRATROPIUM BROMIDE AND ALBUTEROL SULFATE 3 ML: 2.5; .5 SOLUTION RESPIRATORY (INHALATION) at 00:54

## 2019-05-23 ENCOUNTER — READMISSION MANAGEMENT (OUTPATIENT)
Dept: CALL CENTER | Facility: HOSPITAL | Age: 74
End: 2019-05-23

## 2019-05-23 NOTE — OUTREACH NOTE
Prep Survey      Responses   Facility patient discharged from?  [facility]   Is patient eligible?  Yes   Discharge diagnosis  **Community acquired pneumonia of right lower lobe of lung    Does the patient have one of the following disease processes/diagnoses(primary or secondary)?  COPD/Pneumonia   Does the patient have Home health ordered?  No   Is there a DME ordered?  Yes   What DME was ordered?  Nebulizer per Susanne's    Prep survey completed?  Yes          Lacey Weathers RN

## 2019-05-24 ENCOUNTER — READMISSION MANAGEMENT (OUTPATIENT)
Dept: CALL CENTER | Facility: HOSPITAL | Age: 74
End: 2019-05-24

## 2019-05-24 NOTE — OUTREACH NOTE
COPD/PN Week 1 Survey      Responses   Facility patient discharged from?  [facility]   Does the patient have one of the following disease processes/diagnoses(primary or secondary)?  COPD/Pneumonia   Is there a successful TCM telephone encounter documented?  No   Was the primary reason for admission:  Pneumonia   Week 1 attempt successful?  No   Unsuccessful attempts  Attempt 1          Paul Paiz RN

## 2019-05-27 ENCOUNTER — APPOINTMENT (OUTPATIENT)
Dept: GENERAL RADIOLOGY | Facility: HOSPITAL | Age: 74
End: 2019-05-27

## 2019-05-27 ENCOUNTER — READMISSION MANAGEMENT (OUTPATIENT)
Dept: CALL CENTER | Facility: HOSPITAL | Age: 74
End: 2019-05-27

## 2019-05-27 ENCOUNTER — HOSPITAL ENCOUNTER (INPATIENT)
Facility: HOSPITAL | Age: 74
LOS: 8 days | Discharge: HOME HEALTH | End: 2019-06-04
Attending: EMERGENCY MEDICINE | Admitting: SPECIALIST

## 2019-05-27 DIAGNOSIS — C34.92 ADENOCARCINOMA, LUNG, LEFT (HCC): ICD-10-CM

## 2019-05-27 DIAGNOSIS — R26.89 DECREASED MOBILITY: ICD-10-CM

## 2019-05-27 DIAGNOSIS — I63.9 CEREBROVASCULAR ACCIDENT (CVA), UNSPECIFIED MECHANISM (HCC): ICD-10-CM

## 2019-05-27 DIAGNOSIS — J44.1 COPD EXACERBATION (HCC): Primary | ICD-10-CM

## 2019-05-27 PROBLEM — J96.21 ACUTE ON CHRONIC RESPIRATORY FAILURE WITH HYPOXEMIA (HCC): Status: ACTIVE | Noted: 2019-05-27

## 2019-05-27 PROBLEM — J98.11 ATELECTASIS OF LEFT LUNG: Status: ACTIVE | Noted: 2019-05-27

## 2019-05-27 LAB
ALBUMIN SERPL-MCNC: 3.5 G/DL (ref 3.5–5.2)
ALBUMIN/GLOB SERPL: 0.9 G/DL
ALP SERPL-CCNC: 107 U/L (ref 39–117)
ALT SERPL W P-5'-P-CCNC: 9 U/L (ref 1–33)
ANION GAP SERPL CALCULATED.3IONS-SCNC: 14.8 MMOL/L
ARTERIAL PATENCY WRIST A: ABNORMAL
ARTERIAL PATENCY WRIST A: POSITIVE
AST SERPL-CCNC: 17 U/L (ref 1–32)
ATMOSPHERIC PRESS: 747.9 MMHG
ATMOSPHERIC PRESS: 754.6 MMHG
BASE EXCESS BLDA CALC-SCNC: -0.6 MMOL/L (ref 0–2)
BASE EXCESS BLDA CALC-SCNC: -2.5 MMOL/L (ref 0–2)
BASOPHILS # BLD AUTO: 0.03 10*3/MM3 (ref 0–0.2)
BASOPHILS NFR BLD AUTO: 0.3 % (ref 0–1.5)
BDY SITE: ABNORMAL
BDY SITE: ABNORMAL
BILIRUB SERPL-MCNC: 0.5 MG/DL (ref 0.2–1.2)
BUN BLD-MCNC: 7 MG/DL (ref 8–23)
BUN/CREAT SERPL: 8.4 (ref 7–25)
CALCIUM SPEC-SCNC: 9 MG/DL (ref 8.6–10.5)
CHLORIDE SERPL-SCNC: 100 MMOL/L (ref 98–107)
CO2 SERPL-SCNC: 24.2 MMOL/L (ref 22–29)
CREAT BLD-MCNC: 0.83 MG/DL (ref 0.57–1)
D-LACTATE SERPL-SCNC: 1.6 MMOL/L (ref 0.5–2)
DEPRECATED RDW RBC AUTO: 47.9 FL (ref 37–54)
EOSINOPHIL # BLD AUTO: 0.25 10*3/MM3 (ref 0–0.4)
EOSINOPHIL NFR BLD AUTO: 2.4 % (ref 0.3–6.2)
ERYTHROCYTE [DISTWIDTH] IN BLOOD BY AUTOMATED COUNT: 15 % (ref 12.3–15.4)
GAS FLOW AIRWAY: 15 LPM
GFR SERPL CREATININE-BSD FRML MDRD: 67 ML/MIN/1.73
GLOBULIN UR ELPH-MCNC: 4 GM/DL
GLUCOSE BLD-MCNC: 125 MG/DL (ref 65–99)
GLUCOSE BLDC GLUCOMTR-MCNC: 136 MG/DL (ref 70–130)
GLUCOSE BLDC GLUCOMTR-MCNC: 148 MG/DL (ref 70–130)
GLUCOSE BLDC GLUCOMTR-MCNC: 179 MG/DL (ref 70–130)
HCO3 BLDA-SCNC: 20.3 MMOL/L (ref 22–28)
HCO3 BLDA-SCNC: 21.4 MMOL/L (ref 22–28)
HCT VFR BLD AUTO: 38.9 % (ref 34–46.6)
HGB BLD-MCNC: 11.9 G/DL (ref 12–15.9)
HOROWITZ INDEX BLD+IHG-RTO: 100 %
IMM GRANULOCYTES # BLD AUTO: 0.05 10*3/MM3 (ref 0–0.05)
IMM GRANULOCYTES NFR BLD AUTO: 0.5 % (ref 0–0.5)
LYMPHOCYTES # BLD AUTO: 1.19 10*3/MM3 (ref 0.7–3.1)
LYMPHOCYTES NFR BLD AUTO: 11.4 % (ref 19.6–45.3)
MCH RBC QN AUTO: 26.4 PG (ref 26.6–33)
MCHC RBC AUTO-ENTMCNC: 30.6 G/DL (ref 31.5–35.7)
MCV RBC AUTO: 86.4 FL (ref 79–97)
MODALITY: ABNORMAL
MODALITY: ABNORMAL
MONOCYTES # BLD AUTO: 0.36 10*3/MM3 (ref 0.1–0.9)
MONOCYTES NFR BLD AUTO: 3.4 % (ref 5–12)
NEUTROPHILS # BLD AUTO: 8.58 10*3/MM3 (ref 1.7–7)
NEUTROPHILS NFR BLD AUTO: 82 % (ref 42.7–76)
NRBC BLD AUTO-RTO: 0 /100 WBC (ref 0–0.2)
NT-PROBNP SERPL-MCNC: 1475 PG/ML (ref 5–900)
O2 A-A PPRESDIFF RESPIRATORY: 0.8 MMHG
PCO2 BLDA: 27.2 MM HG (ref 35–45)
PCO2 BLDA: 28.2 MM HG (ref 35–45)
PEEP RESPIRATORY: 10 CM[H2O]
PH BLDA: 7.46 PH UNITS (ref 7.35–7.45)
PH BLDA: 7.5 PH UNITS (ref 7.35–7.45)
PLATELET # BLD AUTO: 417 10*3/MM3 (ref 140–450)
PMV BLD AUTO: 9.4 FL (ref 6–12)
PO2 BLDA: 45.5 MM HG (ref 80–100)
PO2 BLDA: 584.8 MM HG (ref 80–100)
POTASSIUM BLD-SCNC: 3.5 MMOL/L (ref 3.5–5.2)
PROCALCITONIN SERPL-MCNC: 0.14 NG/ML (ref 0.1–0.25)
PROT SERPL-MCNC: 7.5 G/DL (ref 6–8.5)
RBC # BLD AUTO: 4.5 10*6/MM3 (ref 3.77–5.28)
SAO2 % BLDCOA: 100 % (ref 92–99)
SAO2 % BLDCOA: 86.1 % (ref 92–99)
SET MECH RESP RATE: 16
SODIUM BLD-SCNC: 139 MMOL/L (ref 136–145)
TOTAL RATE: 16 BREATHS/MINUTE
TOTAL RATE: 28 BREATHS/MINUTE
TROPONIN T SERPL-MCNC: <0.01 NG/ML (ref 0–0.03)
VENTILATOR MODE: ABNORMAL
WBC NRBC COR # BLD: 10.46 10*3/MM3 (ref 3.4–10.8)

## 2019-05-27 PROCEDURE — 83605 ASSAY OF LACTIC ACID: CPT | Performed by: PHYSICIAN ASSISTANT

## 2019-05-27 PROCEDURE — 25010000002 METHYLPREDNISOLONE PER 40 MG: Performed by: INTERNAL MEDICINE

## 2019-05-27 PROCEDURE — 71045 X-RAY EXAM CHEST 1 VIEW: CPT

## 2019-05-27 PROCEDURE — 94799 UNLISTED PULMONARY SVC/PX: CPT

## 2019-05-27 PROCEDURE — 82962 GLUCOSE BLOOD TEST: CPT

## 2019-05-27 PROCEDURE — 25010000002 PROPOFOL 1000 MG/ML EMULSION: Performed by: INTERNAL MEDICINE

## 2019-05-27 PROCEDURE — 83880 ASSAY OF NATRIURETIC PEPTIDE: CPT | Performed by: PHYSICIAN ASSISTANT

## 2019-05-27 PROCEDURE — 0BH17EZ INSERTION OF ENDOTRACHEAL AIRWAY INTO TRACHEA, VIA NATURAL OR ARTIFICIAL OPENING: ICD-10-PCS | Performed by: INTERNAL MEDICINE

## 2019-05-27 PROCEDURE — 25010000002 PIPERACILLIN SOD-TAZOBACTAM PER 1 G: Performed by: INTERNAL MEDICINE

## 2019-05-27 PROCEDURE — 0B9G8ZX DRAINAGE OF LEFT UPPER LUNG LOBE, VIA NATURAL OR ARTIFICIAL OPENING ENDOSCOPIC, DIAGNOSTIC: ICD-10-PCS | Performed by: INTERNAL MEDICINE

## 2019-05-27 PROCEDURE — 84145 PROCALCITONIN (PCT): CPT | Performed by: PHYSICIAN ASSISTANT

## 2019-05-27 PROCEDURE — 87070 CULTURE OTHR SPECIMN AEROBIC: CPT | Performed by: INTERNAL MEDICINE

## 2019-05-27 PROCEDURE — 36600 WITHDRAWAL OF ARTERIAL BLOOD: CPT

## 2019-05-27 PROCEDURE — 93010 ELECTROCARDIOGRAM REPORT: CPT | Performed by: INTERNAL MEDICINE

## 2019-05-27 PROCEDURE — 25010000002 PROPOFOL 10 MG/ML EMULSION

## 2019-05-27 PROCEDURE — 80053 COMPREHEN METABOLIC PANEL: CPT | Performed by: PHYSICIAN ASSISTANT

## 2019-05-27 PROCEDURE — 93005 ELECTROCARDIOGRAM TRACING: CPT | Performed by: PHYSICIAN ASSISTANT

## 2019-05-27 PROCEDURE — 94640 AIRWAY INHALATION TREATMENT: CPT

## 2019-05-27 PROCEDURE — 94002 VENT MGMT INPAT INIT DAY: CPT

## 2019-05-27 PROCEDURE — 25010000002 VANCOMYCIN PER 500 MG: Performed by: INTERNAL MEDICINE

## 2019-05-27 PROCEDURE — 82803 BLOOD GASES ANY COMBINATION: CPT

## 2019-05-27 PROCEDURE — 87205 SMEAR GRAM STAIN: CPT | Performed by: INTERNAL MEDICINE

## 2019-05-27 PROCEDURE — 85025 COMPLETE CBC W/AUTO DIFF WBC: CPT | Performed by: PHYSICIAN ASSISTANT

## 2019-05-27 PROCEDURE — 88112 CYTOPATH CELL ENHANCE TECH: CPT | Performed by: INTERNAL MEDICINE

## 2019-05-27 PROCEDURE — 99285 EMERGENCY DEPT VISIT HI MDM: CPT

## 2019-05-27 PROCEDURE — 87040 BLOOD CULTURE FOR BACTERIA: CPT | Performed by: PHYSICIAN ASSISTANT

## 2019-05-27 PROCEDURE — 84484 ASSAY OF TROPONIN QUANT: CPT | Performed by: PHYSICIAN ASSISTANT

## 2019-05-27 PROCEDURE — 25010000002 METHYLPREDNISOLONE PER 125 MG: Performed by: PHYSICIAN ASSISTANT

## 2019-05-27 PROCEDURE — 25010000002 FENTANYL CITRATE (PF) 100 MCG/2ML SOLUTION: Performed by: INTERNAL MEDICINE

## 2019-05-27 PROCEDURE — 5A1935Z RESPIRATORY VENTILATION, LESS THAN 24 CONSECUTIVE HOURS: ICD-10-PCS | Performed by: INTERNAL MEDICINE

## 2019-05-27 RX ORDER — SODIUM CHLORIDE 0.9 % (FLUSH) 0.9 %
3 SYRINGE (ML) INJECTION EVERY 12 HOURS SCHEDULED
Status: DISCONTINUED | OUTPATIENT
Start: 2019-05-27 | End: 2019-05-29

## 2019-05-27 RX ORDER — METHYLPREDNISOLONE SODIUM SUCCINATE 125 MG/2ML
125 INJECTION, POWDER, LYOPHILIZED, FOR SOLUTION INTRAMUSCULAR; INTRAVENOUS ONCE
Status: COMPLETED | OUTPATIENT
Start: 2019-05-27 | End: 2019-05-27

## 2019-05-27 RX ORDER — SODIUM CHLORIDE, SODIUM LACTATE, POTASSIUM CHLORIDE, CALCIUM CHLORIDE 600; 310; 30; 20 MG/100ML; MG/100ML; MG/100ML; MG/100ML
100 INJECTION, SOLUTION INTRAVENOUS CONTINUOUS
Status: DISCONTINUED | OUTPATIENT
Start: 2019-05-27 | End: 2019-05-28

## 2019-05-27 RX ORDER — ALBUTEROL SULFATE 90 UG/1
6 AEROSOL, METERED RESPIRATORY (INHALATION)
Status: DISCONTINUED | OUTPATIENT
Start: 2019-05-27 | End: 2019-05-28

## 2019-05-27 RX ORDER — FENTANYL CITRATE 50 UG/ML
50 INJECTION, SOLUTION INTRAMUSCULAR; INTRAVENOUS
Status: DISCONTINUED | OUTPATIENT
Start: 2019-05-27 | End: 2019-05-28

## 2019-05-27 RX ORDER — VANCOMYCIN HYDROCHLORIDE 1 G/200ML
1000 INJECTION, SOLUTION INTRAVENOUS ONCE
Status: COMPLETED | OUTPATIENT
Start: 2019-05-27 | End: 2019-05-27

## 2019-05-27 RX ORDER — ALBUTEROL SULFATE 2.5 MG/3ML
2.5 SOLUTION RESPIRATORY (INHALATION) ONCE
Status: COMPLETED | OUTPATIENT
Start: 2019-05-27 | End: 2019-05-27

## 2019-05-27 RX ORDER — FAMOTIDINE 10 MG/ML
20 INJECTION, SOLUTION INTRAVENOUS DAILY
Status: DISCONTINUED | OUTPATIENT
Start: 2019-05-27 | End: 2019-05-28

## 2019-05-27 RX ORDER — SODIUM CHLORIDE 0.9 % (FLUSH) 0.9 %
3-10 SYRINGE (ML) INJECTION AS NEEDED
Status: DISCONTINUED | OUTPATIENT
Start: 2019-05-27 | End: 2019-05-28

## 2019-05-27 RX ORDER — PROPOFOL 10 MG/ML
VIAL (ML) INTRAVENOUS
Status: COMPLETED
Start: 2019-05-27 | End: 2019-05-27

## 2019-05-27 RX ORDER — METHYLPREDNISOLONE SODIUM SUCCINATE 40 MG/ML
20 INJECTION, POWDER, LYOPHILIZED, FOR SOLUTION INTRAMUSCULAR; INTRAVENOUS EVERY 12 HOURS
Status: DISCONTINUED | OUTPATIENT
Start: 2019-05-27 | End: 2019-05-28

## 2019-05-27 RX ORDER — DEXMEDETOMIDINE HYDROCHLORIDE 4 UG/ML
.2-1.5 INJECTION, SOLUTION INTRAVENOUS
Status: DISCONTINUED | OUTPATIENT
Start: 2019-05-27 | End: 2019-05-28

## 2019-05-27 RX ORDER — CHLORHEXIDINE GLUCONATE 0.12 MG/ML
15 RINSE ORAL EVERY 12 HOURS SCHEDULED
Status: DISCONTINUED | OUTPATIENT
Start: 2019-05-27 | End: 2019-05-28

## 2019-05-27 RX ORDER — SODIUM CHLORIDE 0.9 % (FLUSH) 0.9 %
10 SYRINGE (ML) INJECTION AS NEEDED
Status: DISCONTINUED | OUTPATIENT
Start: 2019-05-27 | End: 2019-06-04 | Stop reason: HOSPADM

## 2019-05-27 RX ADMIN — SODIUM CHLORIDE, PRESERVATIVE FREE 3 ML: 5 INJECTION INTRAVENOUS at 20:12

## 2019-05-27 RX ADMIN — CHLORHEXIDINE GLUCONATE 15 ML: 1.2 RINSE ORAL at 20:12

## 2019-05-27 RX ADMIN — SODIUM CHLORIDE, POTASSIUM CHLORIDE, SODIUM LACTATE AND CALCIUM CHLORIDE 100 ML/HR: 600; 310; 30; 20 INJECTION, SOLUTION INTRAVENOUS at 13:36

## 2019-05-27 RX ADMIN — METHYLPREDNISOLONE SODIUM SUCCINATE 125 MG: 125 INJECTION, POWDER, FOR SOLUTION INTRAMUSCULAR; INTRAVENOUS at 07:44

## 2019-05-27 RX ADMIN — PROPOFOL 40 MCG/KG/MIN: 10 INJECTION, EMULSION INTRAVENOUS at 19:39

## 2019-05-27 RX ADMIN — TAZOBACTAM SODIUM AND PIPERACILLIN SODIUM 3.38 G: 375; 3 INJECTION, SOLUTION INTRAVENOUS at 12:21

## 2019-05-27 RX ADMIN — FENTANYL CITRATE 50 MCG: 50 INJECTION INTRAMUSCULAR; INTRAVENOUS at 11:48

## 2019-05-27 RX ADMIN — IPRATROPIUM BROMIDE 6 PUFF: 17 AEROSOL, METERED RESPIRATORY (INHALATION) at 11:59

## 2019-05-27 RX ADMIN — SODIUM CHLORIDE, POTASSIUM CHLORIDE, SODIUM LACTATE AND CALCIUM CHLORIDE 100 ML/HR: 600; 310; 30; 20 INJECTION, SOLUTION INTRAVENOUS at 19:35

## 2019-05-27 RX ADMIN — ALBUTEROL SULFATE 2.5 MG: 2.5 SOLUTION RESPIRATORY (INHALATION) at 07:50

## 2019-05-27 RX ADMIN — TAZOBACTAM SODIUM AND PIPERACILLIN SODIUM 3.38 G: 375; 3 INJECTION, SOLUTION INTRAVENOUS at 19:41

## 2019-05-27 RX ADMIN — METHYLPREDNISOLONE SODIUM SUCCINATE 20 MG: 40 INJECTION, POWDER, FOR SOLUTION INTRAMUSCULAR; INTRAVENOUS at 13:36

## 2019-05-27 RX ADMIN — SODIUM CHLORIDE, POTASSIUM CHLORIDE, SODIUM LACTATE AND CALCIUM CHLORIDE 1000 ML: 600; 310; 30; 20 INJECTION, SOLUTION INTRAVENOUS at 14:30

## 2019-05-27 RX ADMIN — PROPOFOL 40 MCG/KG/MIN: 10 INJECTION, EMULSION INTRAVENOUS at 11:30

## 2019-05-27 RX ADMIN — IPRATROPIUM BROMIDE 6 PUFF: 17 AEROSOL, METERED RESPIRATORY (INHALATION) at 19:35

## 2019-05-27 RX ADMIN — IPRATROPIUM BROMIDE 6 PUFF: 17 AEROSOL, METERED RESPIRATORY (INHALATION) at 15:26

## 2019-05-27 RX ADMIN — SODIUM CHLORIDE 0.4 MCG/KG/HR: 9 INJECTION, SOLUTION INTRAVENOUS at 12:20

## 2019-05-27 RX ADMIN — FENTANYL CITRATE 50 MCG: 50 INJECTION INTRAMUSCULAR; INTRAVENOUS at 16:11

## 2019-05-27 RX ADMIN — FAMOTIDINE 20 MG: 10 INJECTION INTRAVENOUS at 13:36

## 2019-05-27 RX ADMIN — SODIUM CHLORIDE, PRESERVATIVE FREE 3 ML: 5 INJECTION INTRAVENOUS at 12:22

## 2019-05-27 RX ADMIN — IPRATROPIUM BROMIDE 6 PUFF: 17 AEROSOL, METERED RESPIRATORY (INHALATION) at 23:07

## 2019-05-27 RX ADMIN — VANCOMYCIN HYDROCHLORIDE 1000 MG: 1 INJECTION, SOLUTION INTRAVENOUS at 13:37

## 2019-05-27 RX ADMIN — SODIUM CHLORIDE, POTASSIUM CHLORIDE, SODIUM LACTATE AND CALCIUM CHLORIDE 1000 ML: 600; 310; 30; 20 INJECTION, SOLUTION INTRAVENOUS at 15:15

## 2019-05-27 RX ADMIN — ALBUTEROL SULFATE 6 PUFF: 90 AEROSOL, METERED RESPIRATORY (INHALATION) at 19:35

## 2019-05-27 RX ADMIN — ALBUTEROL SULFATE 6 PUFF: 90 AEROSOL, METERED RESPIRATORY (INHALATION) at 15:26

## 2019-05-27 NOTE — OUTREACH NOTE
COPD/PN Week 1 Survey      Responses   Facility patient discharged from?  [facility]   Does the patient have one of the following disease processes/diagnoses(primary or secondary)?  COPD/Pneumonia   Is there a successful TCM telephone encounter documented?  No   Was the primary reason for admission:  Pneumonia   Week 1 attempt successful?  No   Revoke  Readmitted          Valorie Cardenas RN

## 2019-05-28 ENCOUNTER — APPOINTMENT (OUTPATIENT)
Dept: GENERAL RADIOLOGY | Facility: HOSPITAL | Age: 74
End: 2019-05-28

## 2019-05-28 LAB
ANION GAP SERPL CALCULATED.3IONS-SCNC: 16.7 MMOL/L
ARTERIAL PATENCY WRIST A: POSITIVE
ATMOSPHERIC PRESS: 744.1 MMHG
BASE EXCESS BLDA CALC-SCNC: -2.3 MMOL/L (ref 0–2)
BDY SITE: ABNORMAL
BUN BLD-MCNC: 9 MG/DL (ref 8–23)
BUN/CREAT SERPL: 9.9 (ref 7–25)
CALCIUM SPEC-SCNC: 8.2 MG/DL (ref 8.6–10.5)
CHLORIDE SERPL-SCNC: 104 MMOL/L (ref 98–107)
CO2 SERPL-SCNC: 19.3 MMOL/L (ref 22–29)
CREAT BLD-MCNC: 0.91 MG/DL (ref 0.57–1)
DEPRECATED RDW RBC AUTO: 46.2 FL (ref 37–54)
ERYTHROCYTE [DISTWIDTH] IN BLOOD BY AUTOMATED COUNT: 15 % (ref 12.3–15.4)
GFR SERPL CREATININE-BSD FRML MDRD: 61 ML/MIN/1.73
GLUCOSE BLD-MCNC: 128 MG/DL (ref 65–99)
GLUCOSE BLDC GLUCOMTR-MCNC: 122 MG/DL (ref 70–130)
GLUCOSE BLDC GLUCOMTR-MCNC: 133 MG/DL (ref 70–130)
HCO3 BLDA-SCNC: 19.8 MMOL/L (ref 22–28)
HCT VFR BLD AUTO: 31.8 % (ref 34–46.6)
HGB BLD-MCNC: 10 G/DL (ref 12–15.9)
HOROWITZ INDEX BLD+IHG-RTO: 30 %
MCH RBC QN AUTO: 26.5 PG (ref 26.6–33)
MCHC RBC AUTO-ENTMCNC: 31.4 G/DL (ref 31.5–35.7)
MCV RBC AUTO: 84.4 FL (ref 79–97)
MODALITY: ABNORMAL
O2 A-A PPRESDIFF RESPIRATORY: 0.7 MMHG
PCO2 BLDA: 24.7 MM HG (ref 35–45)
PEEP RESPIRATORY: 5 CM[H2O]
PH BLDA: 7.51 PH UNITS (ref 7.35–7.45)
PLATELET # BLD AUTO: 367 10*3/MM3 (ref 140–450)
PMV BLD AUTO: 9.9 FL (ref 6–12)
PO2 BLDA: 138 MM HG (ref 80–100)
POTASSIUM BLD-SCNC: 3.7 MMOL/L (ref 3.5–5.2)
PSV: 5 CMH2O
RBC # BLD AUTO: 3.77 10*6/MM3 (ref 3.77–5.28)
SAO2 % BLDCOA: 99.4 % (ref 92–99)
SODIUM BLD-SCNC: 140 MMOL/L (ref 136–145)
TOTAL RATE: 15 BREATHS/MINUTE
VENTILATOR MODE: ABNORMAL
WBC NRBC COR # BLD: 17.05 10*3/MM3 (ref 3.4–10.8)

## 2019-05-28 PROCEDURE — 94668 MNPJ CHEST WALL SBSQ: CPT

## 2019-05-28 PROCEDURE — 82803 BLOOD GASES ANY COMBINATION: CPT

## 2019-05-28 PROCEDURE — 25010000002 VANCOMYCIN 750 MG RECONSTITUTED SOLUTION: Performed by: INTERNAL MEDICINE

## 2019-05-28 PROCEDURE — 94799 UNLISTED PULMONARY SVC/PX: CPT

## 2019-05-28 PROCEDURE — 25010000002 METHYLPREDNISOLONE PER 40 MG: Performed by: INTERNAL MEDICINE

## 2019-05-28 PROCEDURE — 36600 WITHDRAWAL OF ARTERIAL BLOOD: CPT

## 2019-05-28 PROCEDURE — 85027 COMPLETE CBC AUTOMATED: CPT | Performed by: INTERNAL MEDICINE

## 2019-05-28 PROCEDURE — 99223 1ST HOSP IP/OBS HIGH 75: CPT | Performed by: INTERNAL MEDICINE

## 2019-05-28 PROCEDURE — 82962 GLUCOSE BLOOD TEST: CPT

## 2019-05-28 PROCEDURE — 94003 VENT MGMT INPAT SUBQ DAY: CPT

## 2019-05-28 PROCEDURE — 71045 X-RAY EXAM CHEST 1 VIEW: CPT

## 2019-05-28 PROCEDURE — 80048 BASIC METABOLIC PNL TOTAL CA: CPT | Performed by: INTERNAL MEDICINE

## 2019-05-28 PROCEDURE — 25010000002 PIPERACILLIN SOD-TAZOBACTAM PER 1 G: Performed by: INTERNAL MEDICINE

## 2019-05-28 PROCEDURE — 94664 DEMO&/EVAL PT USE INHALER: CPT

## 2019-05-28 PROCEDURE — 94667 MNPJ CHEST WALL 1ST: CPT

## 2019-05-28 RX ORDER — LEVOTHYROXINE SODIUM 0.07 MG/1
75 TABLET ORAL
Status: DISCONTINUED | OUTPATIENT
Start: 2019-05-29 | End: 2019-06-04 | Stop reason: HOSPADM

## 2019-05-28 RX ORDER — METHYLPREDNISOLONE SODIUM SUCCINATE 40 MG/ML
20 INJECTION, POWDER, LYOPHILIZED, FOR SOLUTION INTRAMUSCULAR; INTRAVENOUS EVERY 12 HOURS
Status: DISCONTINUED | OUTPATIENT
Start: 2019-05-28 | End: 2019-05-30

## 2019-05-28 RX ORDER — GUAIFENESIN 600 MG/1
1200 TABLET, EXTENDED RELEASE ORAL EVERY 12 HOURS SCHEDULED
Status: DISCONTINUED | OUTPATIENT
Start: 2019-05-28 | End: 2019-05-28

## 2019-05-28 RX ORDER — METOPROLOL TARTRATE 50 MG/1
50 TABLET, FILM COATED ORAL EVERY 12 HOURS SCHEDULED
Status: DISCONTINUED | OUTPATIENT
Start: 2019-05-28 | End: 2019-06-04 | Stop reason: HOSPADM

## 2019-05-28 RX ORDER — SODIUM CHLORIDE, SODIUM LACTATE, POTASSIUM CHLORIDE, CALCIUM CHLORIDE 600; 310; 30; 20 MG/100ML; MG/100ML; MG/100ML; MG/100ML
75 INJECTION, SOLUTION INTRAVENOUS CONTINUOUS
Status: DISCONTINUED | OUTPATIENT
Start: 2019-05-28 | End: 2019-06-03

## 2019-05-28 RX ORDER — SODIUM CHLORIDE FOR INHALATION 7 %
4 VIAL, NEBULIZER (ML) INHALATION
Status: DISCONTINUED | OUTPATIENT
Start: 2019-05-28 | End: 2019-06-04 | Stop reason: HOSPADM

## 2019-05-28 RX ORDER — OXYBUTYNIN CHLORIDE 5 MG/1
5 TABLET, EXTENDED RELEASE ORAL DAILY
Status: DISCONTINUED | OUTPATIENT
Start: 2019-05-28 | End: 2019-06-04 | Stop reason: HOSPADM

## 2019-05-28 RX ORDER — LOSARTAN POTASSIUM 50 MG/1
50 TABLET ORAL DAILY
Status: DISCONTINUED | OUTPATIENT
Start: 2019-05-28 | End: 2019-05-31

## 2019-05-28 RX ORDER — IPRATROPIUM BROMIDE AND ALBUTEROL SULFATE 2.5; .5 MG/3ML; MG/3ML
3 SOLUTION RESPIRATORY (INHALATION)
Status: DISCONTINUED | OUTPATIENT
Start: 2019-05-28 | End: 2019-06-04 | Stop reason: HOSPADM

## 2019-05-28 RX ORDER — SODIUM CHLORIDE 9 MG/ML
9 INJECTION, SOLUTION INTRAVENOUS CONTINUOUS
Status: DISCONTINUED | OUTPATIENT
Start: 2019-05-28 | End: 2019-06-04 | Stop reason: HOSPADM

## 2019-05-28 RX ADMIN — TAZOBACTAM SODIUM AND PIPERACILLIN SODIUM 3.38 G: 375; 3 INJECTION, SOLUTION INTRAVENOUS at 02:31

## 2019-05-28 RX ADMIN — GUAIFENESIN 400 MG: 100 SOLUTION ORAL at 21:34

## 2019-05-28 RX ADMIN — SODIUM CHLORIDE 750 MG: 900 INJECTION, SOLUTION INTRAVENOUS at 19:21

## 2019-05-28 RX ADMIN — SODIUM CHLORIDE, POTASSIUM CHLORIDE, SODIUM LACTATE AND CALCIUM CHLORIDE 75 ML/HR: 600; 310; 30; 20 INJECTION, SOLUTION INTRAVENOUS at 21:34

## 2019-05-28 RX ADMIN — METHYLPREDNISOLONE SODIUM SUCCINATE 20 MG: 40 INJECTION, POWDER, FOR SOLUTION INTRAMUSCULAR; INTRAVENOUS at 00:15

## 2019-05-28 RX ADMIN — SODIUM CHLORIDE 9 ML/HR: 9 INJECTION, SOLUTION INTRAVENOUS at 18:08

## 2019-05-28 RX ADMIN — IPRATROPIUM BROMIDE AND ALBUTEROL SULFATE 3 ML: 2.5; .5 SOLUTION RESPIRATORY (INHALATION) at 15:35

## 2019-05-28 RX ADMIN — TAZOBACTAM SODIUM AND PIPERACILLIN SODIUM 3.38 G: 375; 3 INJECTION, SOLUTION INTRAVENOUS at 18:06

## 2019-05-28 RX ADMIN — SODIUM CHLORIDE 4 ML: 7 NEBU SOLN,3 % NEBU at 10:47

## 2019-05-28 RX ADMIN — METHYLPREDNISOLONE SODIUM SUCCINATE 20 MG: 40 INJECTION, POWDER, FOR SOLUTION INTRAMUSCULAR; INTRAVENOUS at 20:00

## 2019-05-28 RX ADMIN — ALBUTEROL SULFATE 6 PUFF: 90 AEROSOL, METERED RESPIRATORY (INHALATION) at 07:03

## 2019-05-28 RX ADMIN — LOSARTAN POTASSIUM 50 MG: 50 TABLET, FILM COATED ORAL at 18:34

## 2019-05-28 RX ADMIN — SODIUM CHLORIDE 4 ML: 7 NEBU SOLN,3 % NEBU at 19:43

## 2019-05-28 RX ADMIN — METOPROLOL TARTRATE 50 MG: 50 TABLET, FILM COATED ORAL at 21:34

## 2019-05-28 RX ADMIN — IPRATROPIUM BROMIDE AND ALBUTEROL SULFATE 3 ML: 2.5; .5 SOLUTION RESPIRATORY (INHALATION) at 10:47

## 2019-05-28 RX ADMIN — SODIUM CHLORIDE, PRESERVATIVE FREE 3 ML: 5 INJECTION INTRAVENOUS at 20:01

## 2019-05-28 RX ADMIN — OXYBUTYNIN CHLORIDE 5 MG: 5 TABLET, EXTENDED RELEASE ORAL at 18:33

## 2019-05-28 RX ADMIN — IPRATROPIUM BROMIDE 6 PUFF: 17 AEROSOL, METERED RESPIRATORY (INHALATION) at 07:03

## 2019-05-28 RX ADMIN — IPRATROPIUM BROMIDE AND ALBUTEROL SULFATE 3 ML: 2.5; .5 SOLUTION RESPIRATORY (INHALATION) at 19:42

## 2019-05-29 ENCOUNTER — APPOINTMENT (OUTPATIENT)
Dept: CT IMAGING | Facility: HOSPITAL | Age: 74
End: 2019-05-29

## 2019-05-29 ENCOUNTER — APPOINTMENT (OUTPATIENT)
Dept: MRI IMAGING | Facility: HOSPITAL | Age: 74
End: 2019-05-29

## 2019-05-29 ENCOUNTER — APPOINTMENT (OUTPATIENT)
Dept: NUCLEAR MEDICINE | Facility: HOSPITAL | Age: 74
End: 2019-05-29

## 2019-05-29 LAB
BACTERIA SPEC RESP CULT: NORMAL
CYTO UR: NORMAL
GRAM STN SPEC: NORMAL
GRAM STN SPEC: NORMAL
LAB AP CASE REPORT: NORMAL
LAB AP CLINICAL INFORMATION: NORMAL
PATH REPORT.FINAL DX SPEC: NORMAL
PATH REPORT.GROSS SPEC: NORMAL

## 2019-05-29 PROCEDURE — 94799 UNLISTED PULMONARY SVC/PX: CPT

## 2019-05-29 PROCEDURE — 74177 CT ABD & PELVIS W/CONTRAST: CPT

## 2019-05-29 PROCEDURE — 92610 EVALUATE SWALLOWING FUNCTION: CPT | Performed by: SPEECH-LANGUAGE PATHOLOGIST

## 2019-05-29 PROCEDURE — 78306 BONE IMAGING WHOLE BODY: CPT

## 2019-05-29 PROCEDURE — 25010000002 ENOXAPARIN PER 10 MG: Performed by: INTERNAL MEDICINE

## 2019-05-29 PROCEDURE — 25010000002 METHYLPREDNISOLONE PER 40 MG: Performed by: INTERNAL MEDICINE

## 2019-05-29 PROCEDURE — A9577 INJ MULTIHANCE: HCPCS | Performed by: SPECIALIST

## 2019-05-29 PROCEDURE — 70553 MRI BRAIN STEM W/O & W/DYE: CPT

## 2019-05-29 PROCEDURE — 0 GADOBENATE DIMEGLUMINE 529 MG/ML SOLUTION: Performed by: SPECIALIST

## 2019-05-29 PROCEDURE — 25010000002 VANCOMYCIN 750 MG RECONSTITUTED SOLUTION: Performed by: INTERNAL MEDICINE

## 2019-05-29 PROCEDURE — A9503 TC99M MEDRONATE: HCPCS | Performed by: INTERNAL MEDICINE

## 2019-05-29 PROCEDURE — 25010000002 PIPERACILLIN SOD-TAZOBACTAM PER 1 G: Performed by: INTERNAL MEDICINE

## 2019-05-29 PROCEDURE — 0 TECHNETIUM MEDRONATE KIT: Performed by: INTERNAL MEDICINE

## 2019-05-29 PROCEDURE — 25010000002 IOPAMIDOL 61 % SOLUTION: Performed by: SPECIALIST

## 2019-05-29 PROCEDURE — 99232 SBSQ HOSP IP/OBS MODERATE 35: CPT | Performed by: INTERNAL MEDICINE

## 2019-05-29 PROCEDURE — 94668 MNPJ CHEST WALL SBSQ: CPT

## 2019-05-29 RX ORDER — TC 99M MEDRONATE 20 MG/10ML
20.6 INJECTION, POWDER, LYOPHILIZED, FOR SOLUTION INTRAVENOUS
Status: COMPLETED | OUTPATIENT
Start: 2019-05-29 | End: 2019-05-29

## 2019-05-29 RX ADMIN — Medication 20.6 MILLICURIE: at 11:45

## 2019-05-29 RX ADMIN — GUAIFENESIN 400 MG: 100 SOLUTION ORAL at 19:56

## 2019-05-29 RX ADMIN — IPRATROPIUM BROMIDE AND ALBUTEROL SULFATE 3 ML: 2.5; .5 SOLUTION RESPIRATORY (INHALATION) at 10:20

## 2019-05-29 RX ADMIN — TAZOBACTAM SODIUM AND PIPERACILLIN SODIUM 3.38 G: 375; 3 INJECTION, SOLUTION INTRAVENOUS at 12:35

## 2019-05-29 RX ADMIN — METHYLPREDNISOLONE SODIUM SUCCINATE 20 MG: 40 INJECTION, POWDER, FOR SOLUTION INTRAMUSCULAR; INTRAVENOUS at 06:44

## 2019-05-29 RX ADMIN — SODIUM CHLORIDE 4 ML: 7 NEBU SOLN,3 % NEBU at 06:54

## 2019-05-29 RX ADMIN — METHYLPREDNISOLONE SODIUM SUCCINATE 20 MG: 40 INJECTION, POWDER, FOR SOLUTION INTRAMUSCULAR; INTRAVENOUS at 19:56

## 2019-05-29 RX ADMIN — GUAIFENESIN 400 MG: 100 SOLUTION ORAL at 17:26

## 2019-05-29 RX ADMIN — METOPROLOL TARTRATE 50 MG: 50 TABLET, FILM COATED ORAL at 21:14

## 2019-05-29 RX ADMIN — LOSARTAN POTASSIUM 50 MG: 50 TABLET, FILM COATED ORAL at 12:35

## 2019-05-29 RX ADMIN — SODIUM CHLORIDE 4 ML: 7 NEBU SOLN,3 % NEBU at 19:11

## 2019-05-29 RX ADMIN — IOPAMIDOL 85 ML: 612 INJECTION, SOLUTION INTRAVENOUS at 14:51

## 2019-05-29 RX ADMIN — SODIUM CHLORIDE 750 MG: 900 INJECTION, SOLUTION INTRAVENOUS at 19:56

## 2019-05-29 RX ADMIN — GUAIFENESIN 400 MG: 100 SOLUTION ORAL at 00:05

## 2019-05-29 RX ADMIN — IPRATROPIUM BROMIDE AND ALBUTEROL SULFATE 3 ML: 2.5; .5 SOLUTION RESPIRATORY (INHALATION) at 06:53

## 2019-05-29 RX ADMIN — GADOBENATE DIMEGLUMINE 8 ML: 529 INJECTION, SOLUTION INTRAVENOUS at 16:31

## 2019-05-29 RX ADMIN — TAZOBACTAM SODIUM AND PIPERACILLIN SODIUM 3.38 G: 375; 3 INJECTION, SOLUTION INTRAVENOUS at 02:22

## 2019-05-29 RX ADMIN — GUAIFENESIN 400 MG: 100 SOLUTION ORAL at 04:21

## 2019-05-29 RX ADMIN — LEVOTHYROXINE SODIUM 75 MCG: 75 TABLET ORAL at 06:44

## 2019-05-29 RX ADMIN — IPRATROPIUM BROMIDE AND ALBUTEROL SULFATE 3 ML: 2.5; .5 SOLUTION RESPIRATORY (INHALATION) at 19:11

## 2019-05-29 RX ADMIN — TAZOBACTAM SODIUM AND PIPERACILLIN SODIUM 3.38 G: 375; 3 INJECTION, SOLUTION INTRAVENOUS at 17:29

## 2019-05-29 RX ADMIN — OXYBUTYNIN CHLORIDE 5 MG: 5 TABLET, EXTENDED RELEASE ORAL at 10:30

## 2019-05-29 RX ADMIN — GUAIFENESIN 400 MG: 100 SOLUTION ORAL at 10:33

## 2019-05-29 RX ADMIN — METOPROLOL TARTRATE 50 MG: 50 TABLET, FILM COATED ORAL at 10:30

## 2019-05-30 ENCOUNTER — TELEPHONE (OUTPATIENT)
Dept: ONCOLOGY | Facility: CLINIC | Age: 74
End: 2019-05-30

## 2019-05-30 ENCOUNTER — APPOINTMENT (OUTPATIENT)
Dept: GENERAL RADIOLOGY | Facility: HOSPITAL | Age: 74
End: 2019-05-30

## 2019-05-30 DIAGNOSIS — C34.92 ADENOCARCINOMA, LUNG, LEFT (HCC): Primary | ICD-10-CM

## 2019-05-30 DIAGNOSIS — C34.02 MALIGNANT NEOPLASM OF LEFT MAIN BRONCHUS (HCC): ICD-10-CM

## 2019-05-30 DIAGNOSIS — C34.12 MALIGNANT NEOPLASM OF UPPER LOBE OF LEFT LUNG (HCC): ICD-10-CM

## 2019-05-30 LAB
CREAT BLD-MCNC: 0.85 MG/DL (ref 0.57–1)
GFR SERPL CREATININE-BSD FRML MDRD: 66 ML/MIN/1.73
VANCOMYCIN TROUGH SERPL-MCNC: 11.5 MCG/ML (ref 5–20)

## 2019-05-30 PROCEDURE — 74230 X-RAY XM SWLNG FUNCJ C+: CPT

## 2019-05-30 PROCEDURE — 82565 ASSAY OF CREATININE: CPT | Performed by: INTERNAL MEDICINE

## 2019-05-30 PROCEDURE — 25010000002 METHYLPREDNISOLONE PER 40 MG: Performed by: INTERNAL MEDICINE

## 2019-05-30 PROCEDURE — 25010000002 PIPERACILLIN SOD-TAZOBACTAM PER 1 G: Performed by: INTERNAL MEDICINE

## 2019-05-30 PROCEDURE — 94799 UNLISTED PULMONARY SVC/PX: CPT

## 2019-05-30 PROCEDURE — 97162 PT EVAL MOD COMPLEX 30 MIN: CPT

## 2019-05-30 PROCEDURE — 99233 SBSQ HOSP IP/OBS HIGH 50: CPT | Performed by: INTERNAL MEDICINE

## 2019-05-30 PROCEDURE — 94668 MNPJ CHEST WALL SBSQ: CPT

## 2019-05-30 PROCEDURE — 99222 1ST HOSP IP/OBS MODERATE 55: CPT | Performed by: RADIOLOGY

## 2019-05-30 PROCEDURE — 97110 THERAPEUTIC EXERCISES: CPT

## 2019-05-30 PROCEDURE — 94669 MECHANICAL CHEST WALL OSCILL: CPT

## 2019-05-30 PROCEDURE — 92611 MOTION FLUOROSCOPY/SWALLOW: CPT | Performed by: SPEECH-LANGUAGE PATHOLOGIST

## 2019-05-30 PROCEDURE — 80202 ASSAY OF VANCOMYCIN: CPT | Performed by: INTERNAL MEDICINE

## 2019-05-30 RX ORDER — MEGESTROL ACETATE 20 MG/1
20 TABLET ORAL DAILY
Status: DISCONTINUED | OUTPATIENT
Start: 2019-05-30 | End: 2019-06-04 | Stop reason: HOSPADM

## 2019-05-30 RX ADMIN — METHYLPREDNISOLONE SODIUM SUCCINATE 20 MG: 40 INJECTION, POWDER, FOR SOLUTION INTRAMUSCULAR; INTRAVENOUS at 19:09

## 2019-05-30 RX ADMIN — LOSARTAN POTASSIUM 50 MG: 50 TABLET, FILM COATED ORAL at 08:32

## 2019-05-30 RX ADMIN — GUAIFENESIN 400 MG: 100 SOLUTION ORAL at 00:57

## 2019-05-30 RX ADMIN — IPRATROPIUM BROMIDE AND ALBUTEROL SULFATE 3 ML: 2.5; .5 SOLUTION RESPIRATORY (INHALATION) at 07:34

## 2019-05-30 RX ADMIN — GUAIFENESIN 400 MG: 100 SOLUTION ORAL at 21:24

## 2019-05-30 RX ADMIN — TAZOBACTAM SODIUM AND PIPERACILLIN SODIUM 3.38 G: 375; 3 INJECTION, SOLUTION INTRAVENOUS at 10:06

## 2019-05-30 RX ADMIN — OXYBUTYNIN CHLORIDE 5 MG: 5 TABLET, EXTENDED RELEASE ORAL at 08:32

## 2019-05-30 RX ADMIN — MEGESTROL ACETATE 20 MG: 20 TABLET ORAL at 21:24

## 2019-05-30 RX ADMIN — METOPROLOL TARTRATE 50 MG: 50 TABLET, FILM COATED ORAL at 21:24

## 2019-05-30 RX ADMIN — GUAIFENESIN 400 MG: 100 SOLUTION ORAL at 08:33

## 2019-05-30 RX ADMIN — SODIUM CHLORIDE, POTASSIUM CHLORIDE, SODIUM LACTATE AND CALCIUM CHLORIDE 75 ML/HR: 600; 310; 30; 20 INJECTION, SOLUTION INTRAVENOUS at 00:57

## 2019-05-30 RX ADMIN — METHYLPREDNISOLONE SODIUM SUCCINATE 20 MG: 40 INJECTION, POWDER, FOR SOLUTION INTRAMUSCULAR; INTRAVENOUS at 08:32

## 2019-05-30 RX ADMIN — GUAIFENESIN 400 MG: 100 SOLUTION ORAL at 23:53

## 2019-05-30 RX ADMIN — METOPROLOL TARTRATE 50 MG: 50 TABLET, FILM COATED ORAL at 08:32

## 2019-05-30 RX ADMIN — LEVOTHYROXINE SODIUM 75 MCG: 75 TABLET ORAL at 08:32

## 2019-05-30 RX ADMIN — IPRATROPIUM BROMIDE AND ALBUTEROL SULFATE 3 ML: 2.5; .5 SOLUTION RESPIRATORY (INHALATION) at 12:43

## 2019-05-30 RX ADMIN — BARIUM SULFATE 50 ML: 400 SUSPENSION ORAL at 11:48

## 2019-05-30 RX ADMIN — BARIUM SULFATE 183 ML: 960 POWDER, FOR SUSPENSION ORAL at 11:49

## 2019-05-30 RX ADMIN — TAZOBACTAM SODIUM AND PIPERACILLIN SODIUM 3.38 G: 375; 3 INJECTION, SOLUTION INTRAVENOUS at 00:57

## 2019-05-30 RX ADMIN — TAZOBACTAM SODIUM AND PIPERACILLIN SODIUM 3.38 G: 375; 3 INJECTION, SOLUTION INTRAVENOUS at 18:06

## 2019-05-30 RX ADMIN — GUAIFENESIN 400 MG: 100 SOLUTION ORAL at 16:31

## 2019-05-30 RX ADMIN — SODIUM CHLORIDE 4 ML: 7 NEBU SOLN,3 % NEBU at 21:02

## 2019-05-30 RX ADMIN — GUAIFENESIN 400 MG: 100 SOLUTION ORAL at 05:29

## 2019-05-30 RX ADMIN — IPRATROPIUM BROMIDE AND ALBUTEROL SULFATE 3 ML: 2.5; .5 SOLUTION RESPIRATORY (INHALATION) at 15:34

## 2019-05-30 RX ADMIN — GUAIFENESIN 400 MG: 100 SOLUTION ORAL at 12:11

## 2019-05-30 RX ADMIN — SODIUM CHLORIDE 4 ML: 7 NEBU SOLN,3 % NEBU at 07:34

## 2019-05-30 RX ADMIN — IPRATROPIUM BROMIDE AND ALBUTEROL SULFATE 3 ML: 2.5; .5 SOLUTION RESPIRATORY (INHALATION) at 21:02

## 2019-05-30 NOTE — PROGRESS NOTES
Order placed for PET on 6/10/19 and CMP CBC on 6/17/19 with MD visit per STEVE Godfrey  Message to appt desk to arrange

## 2019-05-30 NOTE — TELEPHONE ENCOUNTER
----- Message from Solis Godfrey Jr., MD sent at 5/30/2019  9:08 AM EDT -----  Please schedule for PET scan early week of 6/10. 2 unit see me 6/17/19 with cbc,cmp. Also I need to present at thoracic conference on 6/13.

## 2019-05-31 LAB
BASOPHILS # BLD AUTO: 0.03 10*3/MM3 (ref 0–0.2)
BASOPHILS NFR BLD AUTO: 0.2 % (ref 0–1.5)
DEPRECATED RDW RBC AUTO: 46 FL (ref 37–54)
EOSINOPHIL # BLD AUTO: 0 10*3/MM3 (ref 0–0.4)
EOSINOPHIL NFR BLD AUTO: 0 % (ref 0.3–6.2)
ERYTHROCYTE [DISTWIDTH] IN BLOOD BY AUTOMATED COUNT: 15.1 % (ref 12.3–15.4)
HCT VFR BLD AUTO: 31.1 % (ref 34–46.6)
HGB BLD-MCNC: 10 G/DL (ref 12–15.9)
IMM GRANULOCYTES # BLD AUTO: 0.69 10*3/MM3 (ref 0–0.05)
IMM GRANULOCYTES NFR BLD AUTO: 4.3 % (ref 0–0.5)
LYMPHOCYTES # BLD AUTO: 1.06 10*3/MM3 (ref 0.7–3.1)
LYMPHOCYTES NFR BLD AUTO: 6.7 % (ref 19.6–45.3)
MCH RBC QN AUTO: 27.1 PG (ref 26.6–33)
MCHC RBC AUTO-ENTMCNC: 32.2 G/DL (ref 31.5–35.7)
MCV RBC AUTO: 84.3 FL (ref 79–97)
MONOCYTES # BLD AUTO: 0.47 10*3/MM3 (ref 0.1–0.9)
MONOCYTES NFR BLD AUTO: 3 % (ref 5–12)
NEUTROPHILS # BLD AUTO: 13.68 10*3/MM3 (ref 1.7–7)
NEUTROPHILS NFR BLD AUTO: 85.8 % (ref 42.7–76)
NRBC BLD AUTO-RTO: 0.1 /100 WBC (ref 0–0.2)
PLATELET # BLD AUTO: 429 10*3/MM3 (ref 140–450)
PMV BLD AUTO: 9.8 FL (ref 6–12)
RBC # BLD AUTO: 3.69 10*6/MM3 (ref 3.77–5.28)
WBC NRBC COR # BLD: 15.93 10*3/MM3 (ref 3.4–10.8)

## 2019-05-31 PROCEDURE — 94669 MECHANICAL CHEST WALL OSCILL: CPT

## 2019-05-31 PROCEDURE — 25010000002 PIPERACILLIN SOD-TAZOBACTAM PER 1 G: Performed by: INTERNAL MEDICINE

## 2019-05-31 PROCEDURE — 94668 MNPJ CHEST WALL SBSQ: CPT

## 2019-05-31 PROCEDURE — 94799 UNLISTED PULMONARY SVC/PX: CPT

## 2019-05-31 PROCEDURE — 99231 SBSQ HOSP IP/OBS SF/LOW 25: CPT | Performed by: INTERNAL MEDICINE

## 2019-05-31 PROCEDURE — 85025 COMPLETE CBC W/AUTO DIFF WBC: CPT | Performed by: INTERNAL MEDICINE

## 2019-05-31 PROCEDURE — 97110 THERAPEUTIC EXERCISES: CPT

## 2019-05-31 RX ORDER — LOSARTAN POTASSIUM 50 MG/1
50 TABLET ORAL ONCE
Status: COMPLETED | OUTPATIENT
Start: 2019-05-31 | End: 2019-05-31

## 2019-05-31 RX ORDER — LOSARTAN POTASSIUM 100 MG/1
100 TABLET ORAL DAILY
Status: DISCONTINUED | OUTPATIENT
Start: 2019-06-01 | End: 2019-06-04 | Stop reason: HOSPADM

## 2019-05-31 RX ADMIN — GUAIFENESIN 400 MG: 100 SOLUTION ORAL at 16:19

## 2019-05-31 RX ADMIN — LOSARTAN POTASSIUM 50 MG: 50 TABLET, FILM COATED ORAL at 08:04

## 2019-05-31 RX ADMIN — GUAIFENESIN 400 MG: 100 SOLUTION ORAL at 05:08

## 2019-05-31 RX ADMIN — IPRATROPIUM BROMIDE AND ALBUTEROL SULFATE 3 ML: 2.5; .5 SOLUTION RESPIRATORY (INHALATION) at 07:15

## 2019-05-31 RX ADMIN — SODIUM CHLORIDE, POTASSIUM CHLORIDE, SODIUM LACTATE AND CALCIUM CHLORIDE 75 ML/HR: 600; 310; 30; 20 INJECTION, SOLUTION INTRAVENOUS at 02:23

## 2019-05-31 RX ADMIN — LOSARTAN POTASSIUM 50 MG: 50 TABLET, FILM COATED ORAL at 14:25

## 2019-05-31 RX ADMIN — IPRATROPIUM BROMIDE AND ALBUTEROL SULFATE 3 ML: 2.5; .5 SOLUTION RESPIRATORY (INHALATION) at 15:24

## 2019-05-31 RX ADMIN — GUAIFENESIN 400 MG: 100 SOLUTION ORAL at 20:27

## 2019-05-31 RX ADMIN — OXYBUTYNIN CHLORIDE 5 MG: 5 TABLET, EXTENDED RELEASE ORAL at 08:04

## 2019-05-31 RX ADMIN — TAZOBACTAM SODIUM AND PIPERACILLIN SODIUM 3.38 G: 375; 3 INJECTION, SOLUTION INTRAVENOUS at 02:22

## 2019-05-31 RX ADMIN — TAZOBACTAM SODIUM AND PIPERACILLIN SODIUM 3.38 G: 375; 3 INJECTION, SOLUTION INTRAVENOUS at 10:00

## 2019-05-31 RX ADMIN — TAZOBACTAM SODIUM AND PIPERACILLIN SODIUM 3.38 G: 375; 3 INJECTION, SOLUTION INTRAVENOUS at 17:57

## 2019-05-31 RX ADMIN — IPRATROPIUM BROMIDE AND ALBUTEROL SULFATE 3 ML: 2.5; .5 SOLUTION RESPIRATORY (INHALATION) at 20:55

## 2019-05-31 RX ADMIN — IPRATROPIUM BROMIDE AND ALBUTEROL SULFATE 3 ML: 2.5; .5 SOLUTION RESPIRATORY (INHALATION) at 10:42

## 2019-05-31 RX ADMIN — SODIUM CHLORIDE 4 ML: 7 NEBU SOLN,3 % NEBU at 07:15

## 2019-05-31 RX ADMIN — METOPROLOL TARTRATE 50 MG: 50 TABLET, FILM COATED ORAL at 20:27

## 2019-05-31 RX ADMIN — LEVOTHYROXINE SODIUM 75 MCG: 75 TABLET ORAL at 06:18

## 2019-05-31 RX ADMIN — MEGESTROL ACETATE 20 MG: 20 TABLET ORAL at 08:04

## 2019-05-31 RX ADMIN — SODIUM CHLORIDE 4 ML: 7 NEBU SOLN,3 % NEBU at 20:55

## 2019-05-31 RX ADMIN — GUAIFENESIN 400 MG: 100 SOLUTION ORAL at 12:08

## 2019-05-31 RX ADMIN — METOPROLOL TARTRATE 50 MG: 50 TABLET, FILM COATED ORAL at 08:04

## 2019-05-31 RX ADMIN — GUAIFENESIN 400 MG: 100 SOLUTION ORAL at 08:04

## 2019-06-01 ENCOUNTER — APPOINTMENT (OUTPATIENT)
Dept: GENERAL RADIOLOGY | Facility: HOSPITAL | Age: 74
End: 2019-06-01

## 2019-06-01 LAB
ANION GAP SERPL CALCULATED.3IONS-SCNC: 14.7 MMOL/L
BACTERIA SPEC AEROBE CULT: NORMAL
BUN BLD-MCNC: 10 MG/DL (ref 8–23)
BUN/CREAT SERPL: 12.2 (ref 7–25)
CALCIUM SPEC-SCNC: 8.4 MG/DL (ref 8.6–10.5)
CHLORIDE SERPL-SCNC: 103 MMOL/L (ref 98–107)
CO2 SERPL-SCNC: 24.3 MMOL/L (ref 22–29)
CREAT BLD-MCNC: 0.82 MG/DL (ref 0.57–1)
DEPRECATED RDW RBC AUTO: 46.6 FL (ref 37–54)
ERYTHROCYTE [DISTWIDTH] IN BLOOD BY AUTOMATED COUNT: 15.2 % (ref 12.3–15.4)
GFR SERPL CREATININE-BSD FRML MDRD: 68 ML/MIN/1.73
GLUCOSE BLD-MCNC: 90 MG/DL (ref 65–99)
HCT VFR BLD AUTO: 34.8 % (ref 34–46.6)
HGB BLD-MCNC: 10.8 G/DL (ref 12–15.9)
MCH RBC QN AUTO: 26.4 PG (ref 26.6–33)
MCHC RBC AUTO-ENTMCNC: 31 G/DL (ref 31.5–35.7)
MCV RBC AUTO: 85.1 FL (ref 79–97)
PLATELET # BLD AUTO: 440 10*3/MM3 (ref 140–450)
PMV BLD AUTO: 9.3 FL (ref 6–12)
POTASSIUM BLD-SCNC: 2.9 MMOL/L (ref 3.5–5.2)
RBC # BLD AUTO: 4.09 10*6/MM3 (ref 3.77–5.28)
SODIUM BLD-SCNC: 142 MMOL/L (ref 136–145)
WBC NRBC COR # BLD: 12.65 10*3/MM3 (ref 3.4–10.8)

## 2019-06-01 PROCEDURE — 94669 MECHANICAL CHEST WALL OSCILL: CPT

## 2019-06-01 PROCEDURE — 80048 BASIC METABOLIC PNL TOTAL CA: CPT | Performed by: INTERNAL MEDICINE

## 2019-06-01 PROCEDURE — 94799 UNLISTED PULMONARY SVC/PX: CPT

## 2019-06-01 PROCEDURE — 84132 ASSAY OF SERUM POTASSIUM: CPT | Performed by: SPECIALIST

## 2019-06-01 PROCEDURE — 94668 MNPJ CHEST WALL SBSQ: CPT

## 2019-06-01 PROCEDURE — 85027 COMPLETE CBC AUTOMATED: CPT | Performed by: INTERNAL MEDICINE

## 2019-06-01 PROCEDURE — 25010000002 PIPERACILLIN SOD-TAZOBACTAM PER 1 G: Performed by: INTERNAL MEDICINE

## 2019-06-01 PROCEDURE — 71045 X-RAY EXAM CHEST 1 VIEW: CPT

## 2019-06-01 RX ORDER — POTASSIUM CHLORIDE 750 MG/1
40 CAPSULE, EXTENDED RELEASE ORAL AS NEEDED
Status: DISCONTINUED | OUTPATIENT
Start: 2019-06-01 | End: 2019-06-04 | Stop reason: HOSPADM

## 2019-06-01 RX ORDER — POTASSIUM CHLORIDE 1.5 G/1.77G
40 POWDER, FOR SOLUTION ORAL AS NEEDED
Status: DISCONTINUED | OUTPATIENT
Start: 2019-06-01 | End: 2019-06-04 | Stop reason: HOSPADM

## 2019-06-01 RX ORDER — POTASSIUM CHLORIDE 7.45 MG/ML
10 INJECTION INTRAVENOUS
Status: DISCONTINUED | OUTPATIENT
Start: 2019-06-01 | End: 2019-06-04 | Stop reason: HOSPADM

## 2019-06-01 RX ADMIN — POTASSIUM CHLORIDE 40 MEQ: 750 CAPSULE, EXTENDED RELEASE ORAL at 19:15

## 2019-06-01 RX ADMIN — TAZOBACTAM SODIUM AND PIPERACILLIN SODIUM 3.38 G: 375; 3 INJECTION, SOLUTION INTRAVENOUS at 01:46

## 2019-06-01 RX ADMIN — SODIUM CHLORIDE, POTASSIUM CHLORIDE, SODIUM LACTATE AND CALCIUM CHLORIDE 75 ML/HR: 600; 310; 30; 20 INJECTION, SOLUTION INTRAVENOUS at 04:12

## 2019-06-01 RX ADMIN — IPRATROPIUM BROMIDE AND ALBUTEROL SULFATE 3 ML: 2.5; .5 SOLUTION RESPIRATORY (INHALATION) at 10:42

## 2019-06-01 RX ADMIN — GUAIFENESIN 400 MG: 100 SOLUTION ORAL at 12:21

## 2019-06-01 RX ADMIN — SODIUM CHLORIDE 4 ML: 7 NEBU SOLN,3 % NEBU at 06:38

## 2019-06-01 RX ADMIN — GUAIFENESIN 400 MG: 100 SOLUTION ORAL at 20:22

## 2019-06-01 RX ADMIN — METOPROLOL TARTRATE 50 MG: 50 TABLET, FILM COATED ORAL at 20:22

## 2019-06-01 RX ADMIN — GUAIFENESIN 400 MG: 100 SOLUTION ORAL at 09:28

## 2019-06-01 RX ADMIN — IPRATROPIUM BROMIDE AND ALBUTEROL SULFATE 3 ML: 2.5; .5 SOLUTION RESPIRATORY (INHALATION) at 14:51

## 2019-06-01 RX ADMIN — GUAIFENESIN 400 MG: 100 SOLUTION ORAL at 17:16

## 2019-06-01 RX ADMIN — MEGESTROL ACETATE 20 MG: 20 TABLET ORAL at 09:28

## 2019-06-01 RX ADMIN — TAZOBACTAM SODIUM AND PIPERACILLIN SODIUM 3.38 G: 375; 3 INJECTION, SOLUTION INTRAVENOUS at 17:16

## 2019-06-01 RX ADMIN — GUAIFENESIN 400 MG: 100 SOLUTION ORAL at 04:12

## 2019-06-01 RX ADMIN — LEVOTHYROXINE SODIUM 75 MCG: 75 TABLET ORAL at 06:00

## 2019-06-01 RX ADMIN — POTASSIUM CHLORIDE 40 MEQ: 750 CAPSULE, EXTENDED RELEASE ORAL at 10:55

## 2019-06-01 RX ADMIN — POTASSIUM CHLORIDE 40 MEQ: 750 CAPSULE, EXTENDED RELEASE ORAL at 15:07

## 2019-06-01 RX ADMIN — TAZOBACTAM SODIUM AND PIPERACILLIN SODIUM 3.38 G: 375; 3 INJECTION, SOLUTION INTRAVENOUS at 09:31

## 2019-06-01 RX ADMIN — IPRATROPIUM BROMIDE AND ALBUTEROL SULFATE 3 ML: 2.5; .5 SOLUTION RESPIRATORY (INHALATION) at 06:38

## 2019-06-01 RX ADMIN — METOPROLOL TARTRATE 50 MG: 50 TABLET, FILM COATED ORAL at 09:28

## 2019-06-01 RX ADMIN — SODIUM CHLORIDE, POTASSIUM CHLORIDE, SODIUM LACTATE AND CALCIUM CHLORIDE 75 ML/HR: 600; 310; 30; 20 INJECTION, SOLUTION INTRAVENOUS at 16:21

## 2019-06-01 RX ADMIN — LOSARTAN POTASSIUM 100 MG: 100 TABLET, FILM COATED ORAL at 09:28

## 2019-06-01 RX ADMIN — OXYBUTYNIN CHLORIDE 5 MG: 5 TABLET, EXTENDED RELEASE ORAL at 09:28

## 2019-06-01 RX ADMIN — SODIUM CHLORIDE 4 ML: 7 NEBU SOLN,3 % NEBU at 19:45

## 2019-06-01 RX ADMIN — IPRATROPIUM BROMIDE AND ALBUTEROL SULFATE 3 ML: 2.5; .5 SOLUTION RESPIRATORY (INHALATION) at 19:45

## 2019-06-01 RX ADMIN — GUAIFENESIN 400 MG: 100 SOLUTION ORAL at 00:07

## 2019-06-01 NOTE — PLAN OF CARE
Problem: Nutrition, Imbalanced: Inadequate Oral Intake (Adult)  Goal: Improved Oral Intake  Outcome: Ongoing (interventions implemented as appropriate)   06/01/19 1431   Nutrition, Imbalanced: Inadequate Oral Intake (Adult)   Improved Oral Intake making progress toward outcome     Goal: Prevent Further Weight Loss  Outcome: Ongoing (interventions implemented as appropriate)   06/01/19 1431   Nutrition, Imbalanced: Inadequate Oral Intake (Adult)   Prevent Further Weight Loss making progress toward outcome       Problem: Fall Risk (Adult)  Goal: Absence of Fall  Outcome: Ongoing (interventions implemented as appropriate)   06/01/19 1431   Fall Risk (Adult)   Absence of Fall making progress toward outcome       Problem: Skin Injury Risk (Adult)  Goal: Skin Health and Integrity  Outcome: Ongoing (interventions implemented as appropriate)   06/01/19 1431   Skin Injury Risk (Adult)   Skin Health and Integrity making progress toward outcome       Problem: Patient Care Overview  Goal: Plan of Care Review  Outcome: Ongoing (interventions implemented as appropriate)   06/01/19 0505 06/01/19 1427   Plan of Care Review   Progress no change --    Coping/Psychosocial   Plan of Care Reviewed With --  patient     Goal: Individualization and Mutuality  Outcome: Ongoing (interventions implemented as appropriate)   06/01/19 1431   Individualization   Patient Specific Goals (Include Timeframe) no falls, pain control, vitals stable    Patient Specific Interventions bed alarm, check vitals 2xs per shift       Problem: Pain, Acute (Adult)  Goal: Acceptable Pain Control/Comfort Level  Outcome: Ongoing (interventions implemented as appropriate)   06/01/19 1431   Pain, Acute (Adult)   Acceptable Pain Control/Comfort Level making progress toward outcome

## 2019-06-01 NOTE — THERAPY TREATMENT NOTE
Acute Care - Physical Therapy Treatment Note  [facility]     Patient Name: Anaid Moura  : 1945  MRN: [id number]  Today's Date: 2019        Referring Physician: Rhea    Admit Date: 2019    Visit Dx:    ICD-10-CM ICD-9-CM   1. COPD exacerbation (CMS/HCC) J44.1 491.21   2. Decreased mobility R26.89 781.99     Patient Active Problem List   Diagnosis   • Benign essential hypertension   • Stenosis of carotid artery   • Chronic kidney disease, stage III (moderate) (CMS/HCC)   • Chronic obstructive pulmonary disease (CMS/HCC)   • Chronic coronary artery disease   • Hyperlipidemia   • Osteopenia   • Cerebrovascular accident (CMS/HCC)   • Urge incontinence of urine   • CAP (community acquired pneumonia)   • Cavitary lesion of lung   • Mediastinal adenopathy   • COPD exacerbation (CMS/HCC)   • Acute on chronic respiratory failure with hypoxemia (CMS/HCC)   • Atelectasis of left lung   • Adenocarcinoma, lung, left (CMS/HCC)       Therapy Treatment    Rehabilitation Treatment Summary     Row Name 19 1427             Treatment Time/Intention    Discipline  physical therapist  -LB      Document Type  therapy note (daily note)  -LB      Subjective Information  no complaints  -LB      Mode of Treatment  physical therapy  -LB      Patient/Family Observations  Family present; pt supine in bed.  -LB      Total Minutes, Physical Therapy Treatment  25  -LB      Patient Effort  good  -LB      Comment  L mando (11 years ago)  -LB      Existing Precautions/Restrictions  fall;other (see comments) L mando; pt uses quad cane at home  -LB      Treatment Considerations/Comments  Pt uses quad cane at home  -LB      Recorded by [LB] Alanis Quinones, PT 19 1445      Row Name 19 1427             Vital Signs    Rest Breaks   1  -LB      Activity Duration  15  -LB      Recorded by [LB] Alanis Quinones, PT 19 1445      Row Name 19 1427             Cognitive Assessment/Intervention- PT/OT     Affect/Mental Status (Cognitive)  WFL  -LB      Orientation Status (Cognition)  oriented x 4  -LB      Follows Commands (Cognition)  WFL  -LB      Recorded by [LB] Alanis Quinones, PT 06/01/19 1446      Row Name 06/01/19 1427             Bed Mobility Assessment/Treatment    Bed Mobility Assessment/Treatment  supine-sit  -LB      Supine-Sit [place] (Bed Mobility)  contact guard  -LB      Bed Mobility, Safety Issues  decreased use of arms for pushing/pulling  -LB      Recorded by [LB] Alanis Quinones, PT 06/01/19 1453      Row Name 06/01/19 1427             Sit-Stand Transfer    Sit-Stand [place] (Transfers)  supervision  -LB      Assistive Device (Sit-Stand Transfers)  wheelchair  -LB      Recorded by [LB] Alanis Quinones, PT 06/01/19 1453      Row Name 06/01/19 1427             Stand-Sit Transfer    Stand-Sit [place] (Transfers)  contact guard  -LB      Assistive Device (Stand-Sit Transfers)  wheelchair  -LB      Recorded by [LB] Alanis Quinones, PT 06/01/19 1453      Row Name 06/01/19 1427             Gait/Stairs Assessment/Training    [place] Level (Gait)  contact guard  -LB      Assistive Device (Gait)  other (see comments) handrail on R in hallway  -LB      Distance in Feet (Gait)  15' x 3 forward and back  -LB      Pattern (Gait)  step-to  -LB      Left Sided Gait Deviations  foot drop/toe drag wearing L AFO  -LB      Recorded by [LB] Alanis Quinones, PT 06/01/19 1453      Row Name 06/01/19 1427             Positioning and Restraints    Pre-Treatment Position  in bed  -LB      Post Treatment Position  chair  -LB      In Chair  sitting;call light within reach;encouraged to call for assist;with family/caregiver  -LB      Recorded by [LB] Alanis Quinones, PT 06/01/19 1453        User Key  (r) = Recorded By, (t) = Taken By, (c) = Cosigned By    Initials Name Effective Dates Discipline    Alanis Phillips, PT 03/04/19 -  PT                   Physical Therapy Education     Title: PT OT SLP Therapies (Done)      Topic: Physical Therapy (Done)     Point: Mobility training (Done)     Learning Progress Summary           Patient Acceptance, E,TB, VU,DU by  at 6/1/2019  2:55 PM    Acceptance, E, NR,VU by  at 5/31/2019  4:16 PM    Acceptance, E, VU,NR by MA at 5/30/2019  9:31 AM                   Point: Home exercise program (Done)     Learning Progress Summary           Patient Acceptance, E,TB, VU,DU by  at 6/1/2019  2:55 PM    Acceptance, E, NR,VU by  at 5/31/2019  4:16 PM    Acceptance, E, VU,NR by MA at 5/30/2019  9:31 AM                   Point: Body mechanics (Done)     Learning Progress Summary           Patient Acceptance, E,TB, VU,DU by  at 6/1/2019  2:55 PM    Acceptance, E, NR,VU by  at 5/31/2019  4:16 PM    Acceptance, E, VU,NR by MA at 5/30/2019  9:31 AM                   Point: Precautions (Done)     Learning Progress Summary           Patient Acceptance, E,TB, VU,DU by  at 6/1/2019  2:55 PM    Acceptance, E, NR,VU by  at 5/31/2019  4:16 PM    Acceptance, E, VU,NR by MA at 5/30/2019  9:31 AM                               User Key     Initials Effective Dates Name Provider Type Discipline     04/03/18 -  Georgia Arellano, PT Physical Therapist PT     03/04/19 -  Alanis Quinones, PT Physical Therapist PT    MA 10/19/18 -  Delia Monahan, PT Physical Therapist PT                PT Recommendation and Plan  Anticipated Discharge Disposition (PT): home with assist, home with home health  Outcome Summary/Treatment Plan (PT)  Anticipated Discharge Disposition (PT): home with assist, home with home health  Plan of Care Reviewed With: patient  Outcome Summary: Pt continues to progress well with ambulation. She does well with safety and preparation of environment prior to transfer. Continued to use railing in hallway for forward/retro on R side as pt is used to using quad cane. Only one seated rest break today.   Outcome Measures     Row Name 06/01/19 1400 05/31/19 1600 05/30/19 0900       How much help from  another person do you currently need...    Turning from your back to your side while in flat bed without using bedrails?  4  -LB  3  -LH  3  -MA    Moving from lying on back to sitting on the side of a flat bed without bedrails?  3  -LB  3  -LH  3  -MA    Moving to and from a bed to a chair (including a wheelchair)?  3  -LB  3  -LH  3  -MA    Standing up from a chair using your arms (e.g., wheelchair, bedside chair)?  3  -LB  3  -LH  3  -MA    Climbing 3-5 steps with a railing?  2  -LB  3  -LH  2  -MA    To walk in hospital room?  3  -LB  3  -LH  3  -MA    AM-PAC 6 Clicks Score  18  -LB  18  -LH  17  -MA       Functional Assessment    Outcome Measure Options  AM-PAC 6 Clicks Basic Mobility (PT)  -LB  AM-PAC 6 Clicks Basic Mobility (PT)  -  AM-PAC 6 Clicks Basic Mobility (PT)  -MA      User Key  (r) = Recorded By, (t) = Taken By, (c) = Cosigned By    Initials Name Provider Type    Georgia Campbell, PT Physical Therapist    Alanis Phillips, PT Physical Therapist    Delia Obregon, PT Physical Therapist         Time Calculation:   PT Charges     Row Name 06/01/19 1455             Time Calculation    Start Time  1420  -LB      Stop Time  1445  -LB      Time Calculation (min)  25 min  -LB      PT Received On  06/01/19  -LB      PT - Next Appointment  06/02/19  -LB         Time Calculation- PT    Total Timed Code Minutes- PT  25 minute(s)  -LB        User Key  (r) = Recorded By, (t) = Taken By, (c) = Cosigned By    Initials Name Provider Type    Alanis Phillips, PT Physical Therapist            PT G-Codes  Outcome Measure Options: AM-PAC 6 Clicks Basic Mobility (PT)  AM-PAC 6 Clicks Score: 18    Alanis Quinones PT  6/1/2019

## 2019-06-01 NOTE — PROGRESS NOTES
Progress Note  Timothy Taylor MD    Patient ID:  Name:  Anaid Moura  MRN:  [id number]  1945  74 y.o.  female            CC/Reason for visit:  Acute on chronic respiratory failure with hypoxemia (CMS/HCC)    CAP (community acquired pneumonia)    COPD exacerbation (CMS/HCC)    Atelectasis of left lung    Adenocarcinoma, lung, left (CMS/HCC)    Interval History :     Patient is up in bed, breathing ok, intermittent tachypnea.  Saturation is 96% on room air.  Blood pressure has been fluctuating over the last few days  Has been working with physical therapy.   On antibiotics for probably pneumonia  On treatment for COPD and atelectasis.  Has history of left hemiparesis from an old CVA.  Has diagnosis of malnourishment as well however she is eating better now.  Taking Megace.  White blood cell still elevated, but it is  trending down.  Potassium 2.9. Started supplementation this morning    Vitals:  Vitals:    05/31/19 2336 06/01/19 0422 06/01/19 0635 06/01/19 0745   BP: 174/81   167/90   BP Location: Right arm   Right arm   Patient Position: Lying   Sitting   Pulse:    91   Resp: 20  18 18   Temp: 97.8 °F (36.6 °C)   97.4 °F (36.3 °C)   TempSrc: Oral   Oral   SpO2:    96%   Weight:  43.3 kg (95 lb 7.4 oz)     Height:         FiO2 (%): 39 %     Body mass index is 17.46 kg/m².    Intake/Output Summary (Last 24 hours) at 6/1/2019 0958  Last data filed at 6/1/2019 0900  Gross per 24 hour   Intake 720 ml   Output 500 ml   Net 220 ml       Exam:  GEN:  No distress at this time. Daughter present  EYES:   EOM-i, anicteric sclera bilat  ENT:    External ears/nose normal, OP clear  NECK:  Supple, midline trachea  LUNGS: Very diminished breath sounds bilaterally, no rales.  Few rhonchi's scattered.  nonlabored breathing  CV:  Normal S1S2, without murmur, no edema  ABD:  Non tender, no enlarged liver or masses  EXT:  No cyanosis or clubbing; left hemiparesis.    Scheduled meds:      enoxaparin 30 mg Subcutaneous Q24H    guaiFENesin 400 mg Oral Q4H   ipratropium-albuterol 3 mL Nebulization 4x Daily - RT   levothyroxine 75 mcg Oral Q AM   losartan 100 mg Oral Daily   megestrol 20 mg Oral Daily   metoprolol tartrate 50 mg Oral Q12H   oxybutynin XL 5 mg Oral Daily   piperacillin-tazobactam 3.375 g Intravenous Q8H   sodium chloride 4 mL Nebulization BID - RT     IV meds:                          lactated ringers 75 mL/hr Last Rate: 75 mL/hr (06/01/19 0412)   sodium chloride 9 mL/hr Last Rate: 9 mL/hr (05/28/19 1808)       Data Review:   I reviewed the patient's medications and new clinical results.  Lab Results   Component Value Date    CALCIUM 8.4 (L) 06/01/2019     Results from last 7 days   Lab Units 06/01/19 0317 05/31/19  0429 05/30/19  0457 05/28/19  0644 05/27/19  0735   SODIUM mmol/L 142  --   --  140 139   POTASSIUM mmol/L 2.9*  --   --  3.7 3.5   CHLORIDE mmol/L 103  --   --  104 100   CO2 mmol/L 24.3  --   --  19.3* 24.2   BUN mg/dL 10  --   --  9 7*   CREATININE mg/dL 0.82  --  0.85 0.91 0.83   CALCIUM mg/dL 8.4*  --   --  8.2* 9.0   BILIRUBIN mg/dL  --   --   --   --  0.5   ALK PHOS U/L  --   --   --   --  107   ALT (SGPT) U/L  --   --   --   --  9   AST (SGOT) U/L  --   --   --   --  17   GLUCOSE mg/dL 90  --   --  128* 125*   WBC 10*3/mm3 12.65* 15.93*  --  17.05* 10.46   HEMOGLOBIN g/dL 10.8* 10.0*  --  10.0* 11.9*   PLATELETS 10*3/mm3 440 429  --  367 417   PROBNP pg/mL  --   --   --   --  1,475.0*   PROCALCITONIN ng/mL  --   --   --   --  0.14     .  Results from last 7 days   Lab Units 06/01/19 0317 05/31/19  0429 05/28/19  0644   WBC 10*3/mm3 12.65* 15.93* 17.05*   HEMOGLOBIN g/dL 10.8* 10.0* 10.0*   HEMATOCRIT % 34.8 31.1* 31.8*   PLATELETS 10*3/mm3 440 429 367         Results from last 7 days   Lab Units 05/27/19  1137 05/27/19  0840   BLOODCX   --  No growth at 5 days   RESPCX  Scant growth (1+) Normal Respiratory Sakina  --        Results from last 7 days   Lab Units 05/27/19  0735   TROPONIN T ng/mL <0.010      Results from last 7 days   Lab Units 05/28/19  0953 05/27/19  1231 05/27/19  0915   PH, ARTERIAL pH units 7.511* 7.465* 7.505*   PCO2, ARTERIAL mm Hg 24.7* 28.2* 27.2*   PO2 ART mm Hg 138.0* 584.8* 45.5*   FLOW RATE lpm  --   --  15   MODALITY  Adult Vent Adult Vent NRB   O2 SATURATION CALC % 99.4* 100.0* 86.1*       Estimated Creatinine Clearance: 41.8 mL/min (by C-G formula based on SCr of 0.82 mg/dL).    WEIGHTS:     Wt Readings from Last 1 Encounters:   06/01/19 0422 43.3 kg (95 lb 7.4 oz)   05/30/19 0500 43.5 kg (96 lb)   05/29/19 1723 43.7 kg (96 lb 5.5 oz)   05/29/19 0425 43.7 kg (96 lb 5.5 oz)   05/28/19 0559 45.4 kg (100 lb 1.4 oz)   05/27/19 1129 40.7 kg (89 lb 11.6 oz)   05/27/19 0722 48.3 kg (106 lb 8 oz)         ASSESSMENT:     Acute on chronic respiratory failure with hypoxemia (CMS/HCC)    CAP (community acquired pneumonia)    COPD exacerbation (CMS/HCC)    Atelectasis of left lung    Adenocarcinoma, lung, left (CMS/HCC)      PLAN:    1. Adenocarcinoma of the lung she has endobronchial disease on the left extending up to about the level of the jose.  She will not be a resection candidate further staging work-up at her functional class along with tumor markers will determine her treatment options.  Oncology following  2. Possible pneumonia, antibiotics adjusted, holding suction at this time.  Probably need a short course of it.   3. Acute hypoxemic respiratory failure secondary to plugging left mainstem bronchus and underlying COPD.  Resolved oxygenating well on room air  4. COPD severe with acute exacerbation initially, thought it looks like everything was due to plugging.  Much improved at this time.  off steroids.  Repeat CxR stable  5. Left upper lobe atelectasis secondary to mucous plugging resolved with bronchoscopy the biggest issue is going to be trying to keep this from recurring again this was the second episode.  I have her on very aggressive pulmonary hygiene and will mobilize her as  much as possible  6. Left hemiparesis secondary to old CVA  7. Protein calorie malnutrition looks pretty significant she had a significant weight loss over the last several months at least 16 pounds and a BMI now of under 18.  8. Hypertension, we will need to adjust blood pressure medication, she is on losartan 50 mg daily as well as metoprolol 50 mg twice a day.  Increase losartan to 100 mg daily.  Will monitor BP  9. Hypokalemia - started on replacement protocol    Seen by Dr Whalen discussed outpatient radiation therapy  Will need home health for nursing, physical therapy at home.  Case management aware  Continue Ozzie, appears to help  Probably discharge home Monday per d/w patient and family today.    check labs in the morning.        Florecita Clayton MD  6/1/2019

## 2019-06-01 NOTE — PLAN OF CARE
Problem: Nutrition, Imbalanced: Inadequate Oral Intake (Adult)  Goal: Improved Oral Intake  Outcome: Ongoing (interventions implemented as appropriate)    Goal: Prevent Further Weight Loss  Outcome: Ongoing (interventions implemented as appropriate)      Problem: Fall Risk (Adult)  Goal: Absence of Fall  Outcome: Ongoing (interventions implemented as appropriate)      Problem: Skin Injury Risk (Adult)  Goal: Skin Health and Integrity  Outcome: Ongoing (interventions implemented as appropriate)      Problem: Patient Care Overview  Goal: Plan of Care Review  Outcome: Ongoing (interventions implemented as appropriate)   06/01/19 0505   Plan of Care Review   Progress no change   OTHER   Outcome Summary A&O, no complaints of pain, is developing a cough, up with assist q2hrs last night, when up patient needs to have socks on, leg brace on, and then her shoes before she can stand,  at bedside, blood pressure elevated, meds increased yesterday, patient very concerned about increase in BP, IVF infusing, IV ABX as ordered, potassium 2.9, not on replacement protocol at this time, will notify physician, will continue to monitor.   Coping/Psychosocial   Plan of Care Reviewed With patient;family     Goal: Individualization and Mutuality  Outcome: Ongoing (interventions implemented as appropriate)    Goal: Discharge Needs Assessment  Outcome: Ongoing (interventions implemented as appropriate)

## 2019-06-01 NOTE — PROGRESS NOTES
LOS: 5 days   Patient Care Team:  Timothy Taylor MD as PCP - General (Internal Medicine)  Miah Whalen MD as Consulting Physician (Radiation Oncology)    Subjective     Patient sitting up in a chair she is been up walking today she says she is really been working to keep her chest clear        Objective     Vital Signs  Vital Sign Min/Max for last 24 hours  Temp  Min: 97.4 °F (36.3 °C)  Max: 98.1 °F (36.7 °C)   BP  Min: 142/63  Max: 174/81   Pulse  Min: 68  Max: 91   Resp  Min: 16  Max: 20   SpO2  Min: 96 %  Max: 97 %   No Data Recorded   Weight  Min: 43.3 kg (95 lb 7.4 oz)  Max: 43.3 kg (95 lb 7.4 oz)        Ventilator/Non-Invasive Ventilation Settings (From admission, onward)    Start     Ordered    05/27/19 1133  Ventilator - AC/PC; (16); 100; 90%; 10; 16  Continuous     Question Answer Comment   Vent Mode AC/PC    Breath rate  16   FiO2 100    FiO2 titrate for Sp02% =/> 90%    PEEP 10    Inspiratory Pressure 16        05/27/19 1134                       Body mass index is 17.46 kg/m².  I/O last 3 completed shifts:  In: 1700 [P.O.:700; I.V.:900; IV Piggyback:100]  Out: 500 [Urine:500]  I/O this shift:  In: 420 [P.O.:420]  Out: -         Physical Exam:  General Appearance: Well-developed elderly white female looks much older stated age.  Eyes: Conjunctiva are clear and anicteric  ENT: Mucous membranes moist no exudates   Neck: No adenopathy trachea midline  Lungs: Sounds dramatically better today chest is actually pretty clear and she is not labored saturating well on room air  Cardiac: Regular rate rhythm no murmur,   Abdomen: Soft no palpable organomegaly or masses  : Not examined  Musculoskeletal: She does not have much muscle mass  Skin: No jaundice no petechiae  Neuro: She is alert oriented cooperative she has left hemiparesis with left foot drop weak left leg and almost no movement in the left upper extremity.  Right upper and lower extremity well  Extremities/P Vascular: No clubbing no  cyanosis no edema palpable radial dorsalis pedis pulses  MSE: Seems to have a strong desire to fight    Labs:  Results from last 7 days   Lab Units 06/01/19 0317 05/30/19 0457 05/28/19  0644 05/27/19  0735   GLUCOSE mg/dL 90  --  128* 125*   SODIUM mmol/L 142  --  140 139   POTASSIUM mmol/L 2.9*  --  3.7 3.5   CO2 mmol/L 24.3  --  19.3* 24.2   CHLORIDE mmol/L 103  --  104 100   ANION GAP mmol/L 14.7  --  16.7 14.8   CREATININE mg/dL 0.82 0.85 0.91 0.83   BUN mg/dL 10  --  9 7*   BUN / CREAT RATIO  12.2  --  9.9 8.4   CALCIUM mg/dL 8.4*  --  8.2* 9.0   EGFR IF NONAFRICN AM mL/min/1.73 68 66 61 67   ALK PHOS U/L  --   --   --  107   TOTAL PROTEIN g/dL  --   --   --  7.5   ALT (SGPT) U/L  --   --   --  9   AST (SGOT) U/L  --   --   --  17   BILIRUBIN mg/dL  --   --   --  0.5   ALBUMIN g/dL  --   --   --  3.50   GLOBULIN gm/dL  --   --   --  4.0     Estimated Creatinine Clearance: 41.8 mL/min (by C-G formula based on SCr of 0.82 mg/dL).      Results from last 7 days   Lab Units 06/01/19 0317 05/31/19 0429 05/28/19  0644 05/27/19  0735   WBC 10*3/mm3 12.65* 15.93* 17.05* 10.46   RBC 10*6/mm3 4.09 3.69* 3.77 4.50   HEMOGLOBIN g/dL 10.8* 10.0* 10.0* 11.9*   HEMATOCRIT % 34.8 31.1* 31.8* 38.9   MCV fL 85.1 84.3 84.4 86.4   MCH pg 26.4* 27.1 26.5* 26.4*   MCHC g/dL 31.0* 32.2 31.4* 30.6*   RDW % 15.2 15.1 15.0 15.0   RDW-SD fl 46.6 46.0 46.2 47.9   MPV fL 9.3 9.8 9.9 9.4   PLATELETS 10*3/mm3 440 429 367 417   NEUTROPHIL % %  --  85.8*  --  82.0*   LYMPHOCYTE % %  --  6.7*  --  11.4*   MONOCYTES % %  --  3.0*  --  3.4*   EOSINOPHIL % %  --  0.0*  --  2.4   BASOPHIL % %  --  0.2  --  0.3   IMM GRAN % %  --  4.3*  --  0.5   NEUTROS ABS 10*3/mm3  --  13.68*  --  8.58*   LYMPHS ABS 10*3/mm3  --  1.06  --  1.19   MONOS ABS 10*3/mm3  --  0.47  --  0.36   EOS ABS 10*3/mm3  --  0.00  --  0.25   BASOS ABS 10*3/mm3  --  0.03  --  0.03   IMMATURE GRANS (ABS) 10*3/mm3  --  0.69*  --  0.05   NRBC /100 WBC  --  0.1  --  0.0     Results  from last 7 days   Lab Units 05/28/19  0953   PH, ARTERIAL pH units 7.511*   PO2 ART mm Hg 138.0*   PCO2, ARTERIAL mm Hg 24.7*   HCO3 ART mmol/L 19.8*     Results from last 7 days   Lab Units 05/27/19  0735   TROPONIN T ng/mL <0.010     Results from last 7 days   Lab Units 05/27/19  0735   PROBNP pg/mL 1,475.0*         Results from last 7 days   Lab Units 05/27/19  0840 05/27/19  0735   LACTATE mmol/L 1.6  --    PROCALCITONIN ng/mL  --  0.14         Microbiology Results (last 10 days)     Procedure Component Value - Date/Time    Respiratory Culture - Lavage, Lung, Left Upper Lobe [[id number]] Collected:  05/27/19 1137    Lab Status:  Final result Specimen:  Lavage from Lung, Left Upper Lobe Updated:  05/29/19 0616     Respiratory Culture Scant growth (1+) Normal Respiratory Sakina     Gram Stain No WBCs or organisms seen      No epithelial cells seen    Blood Culture - Blood, Arm, Left [[id number]] Collected:  05/27/19 0840    Lab Status:  Final result Specimen:  Blood from Arm, Left Updated:  06/01/19 0846     Blood Culture No growth at 5 days                enoxaparin 30 mg Subcutaneous Q24H   guaiFENesin 400 mg Oral Q4H   ipratropium-albuterol 3 mL Nebulization 4x Daily - RT   levothyroxine 75 mcg Oral Q AM   losartan 100 mg Oral Daily   megestrol 20 mg Oral Daily   metoprolol tartrate 50 mg Oral Q12H   oxybutynin XL 5 mg Oral Daily   piperacillin-tazobactam 3.375 g Intravenous Q8H   sodium chloride 4 mL Nebulization BID - RT       lactated ringers 75 mL/hr Last Rate: 75 mL/hr (06/01/19 0412)   sodium chloride 9 mL/hr Last Rate: 9 mL/hr (05/28/19 1808)       Diagnostics:  Xr Chest 2 View    Result Date: 5/17/2019  EXAMINATION: 2 VIEWS OF THE CHEST  HISTORY: 74-year-old female with a history of a fever and recurrent pneumonia.  FINDINGS: PA and lateral chest radiographs were obtained. Comparison is made to a prior chest radiograph dated 04/30/2019. There is a persistent area of opacification within the left upper  lobe. However, the appearance suggests there may be some cavitation or air-fluid level. The region measures on the order of 2.3 x 3.0 cm.  Initially, there is greater interstitial prominence at the base of the right lower lobe with hazy opacification seen in the posterior costophrenic angle.  The lungs are hyperinflated.      1. There are imaging features that raise concern for a cavitary lesion in the left upper lobe measuring on the order of 3 cm in greatest dimension. Correlation with a CT of the chest is recommended.  2. There is interstitial prominence of the base of the right lower lobe that is concerning for right lower lobe pneumonia.  This report was finalized on 5/17/2019 8:46 AM by Dr. Rene Nicole M.D.      Ct Chest Without Contrast    Result Date: 5/16/2019  THORACIC CT SCAN WITHOUT CONTRAST  HISTORY: possible cavitary lesion STEVE; J18.1-Lobar pneumonia, unspecified organism; A41.9-Sepsis, unspecified organism  COMPARISON: None.  TECHNIQUE:  Radiation dose reduction techniques were utilized, including automated exposure control and exposure modulation based on body size. Axial images of the thorax obtained without IV contrast, per request.  FINDINGS: The lungs are hyperexpanded suggesting COPD. Mild emphysema and fibrosis. There is an unusual-appearing, somewhat cavitary lesion along the posterior aspect of the left upper lobe. Portions have a thin, near imperceptible wall while along its more inferior aspect, it appears more mass like with a solid component on images 26 and 27 measuring approximately 2.3 x 1.5 cm in greatest dimensions. Neoplasm suspected although atypical infection could have this appearance also. There is a similar lesion in the right upper lobe best seen on images 25-29. The more nodular component inferiorly on image 29 measures approximately 8 mm.  There are irregular basilar consolidations, right greater than left which could be related to multifocal pneumonia and/or  atelectasis. There are small pleural effusions, right greater than left. Neither appears loculated. Aorta moderately calcified but nonaneurysmal. Normal heart size.  Although performed without IV contrast, there does appear to be multifocal adenopathy. On image 43, interaortocaval adenopathy measures at least 2.7 x 1.8 cm. On image 36, prevascular adenopathy measures 1.9 x 1.4 cm. There are additional smaller nodes in the anterior and middle mediastinum. These may be reactive or metastatic.        1. Emphysema/COPD with right greater than left basilar airspace opacities and small effusions as discussed. 2. Unusual cavitary upper lobe lesions as discussed. Neoplasm suspected although these could be infectious as well. 3. Multifocal mediastinal adenopathy, metastatic disease not excluded   .  This report was finalized on 5/16/2019 10:12 PM by Dhruv Gifford M.D.      Xr Chest 1 View    Result Date: 5/28/2019  PORTABLE CHEST X-RAY  CLINICAL HISTORY: Intubated Patient; J44.1-Chronic obstructive pulmonary disease with (acute) exacerbation  COMPARISON: 05/27/2019.  FINDINGS: Portable AP view of the chest was obtained with overlying monitor leads in place. ET tube has been placed and terminates at the level of the mid thoracic trachea. The lungs are well inflated. There is underlying emphysema and fibrosis. Vague airspace disease in the left perihilar region felt to reflect pneumonia does show some slight improvement. No edema or significant pleural fluid. Normal heart size.      Interval intubation, lungs well inflated with improving left perihilar predominant pneumonia.         Xr Chest 1 View    Result Date: 5/27/2019  ONE VIEW PORTABLE CHEST  HISTORY: Shortness of breath. COPD and cavitary lesion in left upper lobe.  FINDINGS: There is severe pulmonary emphysema with hyperinflation and the patient has recent CT scan and plain film on 05/16/2019 showing a cavitary lesion in the left upper lobe as well as a smaller  lesion in the right upper lobe. There is now further volume loss and consolidation, in the left upper lobe on today's exam, and suspicious for pneumonia. The heart remains enlarged.  This report was finalized on 5/27/2019 10:18 AM by Dr. Eleno Mahajan M.D.      Xr Chest Pa & Lateral    Result Date: 4/30/2019  2-VIEW CHEST  HISTORY: Pneumonia. Cough.  FINDINGS: There is prominent COPD with hyperinflation. There is some localized pneumonia in the left upper lobe laterally that is new since the study of 08/19/2015 and continued follow-up evaluation is recommended to ensure appropriate resolution. The heart remains mildly enlarged.  This report was finalized on 4/30/2019 1:19 PM by Dr. Eleno Mahajan M.D.               Active Hospital Problems    Diagnosis  POA   • **Acute on chronic respiratory failure with hypoxemia (CMS/HCC) [J96.21]  Unknown   • COPD exacerbation (CMS/HCC) [J44.1]  Yes   • Atelectasis of left lung [J98.11]  Unknown   • Adenocarcinoma, lung, left (CMS/HCC) [C34.92]  Unknown   • CAP (community acquired pneumonia) [J18.9]  Unknown      Resolved Hospital Problems   No resolved problems to display.   X-ray reviewed no acute infiltrates or atelectasis      Assessment/Plan     1. Adenocarcinoma of the lung she has endobronchial disease on the left extending up to about the level of the jose.  She will not be a resection candidate further staging work-up , her functional class along with tumor markers will determine her treatment options.  Oncology following  2. Possible pneumonia I am not convinced she really has much of a pneumonia I think she just had mucous plugging.  Cultures negative I have discontinued vancomycin probably complete a short course of antibiotics she is on Zosyn when she is ready for discharge she can be changed to Augmentin to complete 7-day course of therapy  3. Acute hypoxemic respiratory failure secondary to plugging left mainstem bronchus and underlying COPD.  Resolved  oxygenating well on room air  4. COPD severe with acute exacerbation I am not sure how much exacerbation she had I think her worst problem with some mucus plugging she seems to be doing very well now off steroids  5. Left upper lobe atelectasis secondary to mucous plugging resolved with bronchoscopy the biggest issue is going to be trying to keep this from recurring again this was the second episode.  I have her on very aggressive pulmonary hygiene and will mobilize her as much as possible.  She can go home with hypertonic saline and albuterol nebs, guaifenesin and take a flutter valve with her.  I do not think she has to go home with vest therapy first of all she did not feel it helped a whole lot and if she needs percussion her family can do it I actually showed them how to do percussion today.  It might be very difficult to get a machine approved and paid for especially since she did not feel it helped significantly  6. Left hemiparesis secondary to old CVA  7. Protein calorie malnutrition looks pretty significant she had a significant weight loss over the last several months at least 16 pounds and a BMI now of under 18.  Continue nutritional supplements    Plan for disposition: From a pulmonary standpoint she could probably be discharged any time to continue aggressive pulmonary hygiene at home .  Family had some questions about the equipment etc. and I explained to them.    Arthur Wilkerson MD  06/01/19  3:45 PM    Time:

## 2019-06-01 NOTE — PLAN OF CARE
Problem: Patient Care Overview  Goal: Plan of Care Review  Outcome: Ongoing (interventions implemented as appropriate)   06/01/19 1086   OTHER   Outcome Summary Pt continues to progress well with ambulation. She does well with safety and preparation of environment prior to transfer. Continued to use railing in hallway for forward/retro on R side as pt is used to using quad cane. Only one seated rest break today.    Coping/Psychosocial   Plan of Care Reviewed With patient

## 2019-06-02 LAB
ANION GAP SERPL CALCULATED.3IONS-SCNC: 11.8 MMOL/L
BUN BLD-MCNC: 8 MG/DL (ref 8–23)
BUN/CREAT SERPL: 8.9 (ref 7–25)
CALCIUM SPEC-SCNC: 8.7 MG/DL (ref 8.6–10.5)
CHLORIDE SERPL-SCNC: 104 MMOL/L (ref 98–107)
CO2 SERPL-SCNC: 24.2 MMOL/L (ref 22–29)
CREAT BLD-MCNC: 0.9 MG/DL (ref 0.57–1)
GFR SERPL CREATININE-BSD FRML MDRD: 61 ML/MIN/1.73
GLUCOSE BLD-MCNC: 100 MG/DL (ref 65–99)
MAGNESIUM SERPL-MCNC: 1.5 MG/DL (ref 1.6–2.4)
POTASSIUM BLD-SCNC: 4.3 MMOL/L (ref 3.5–5.2)
POTASSIUM BLD-SCNC: 4.4 MMOL/L (ref 3.5–5.2)
SODIUM BLD-SCNC: 140 MMOL/L (ref 136–145)

## 2019-06-02 PROCEDURE — 94669 MECHANICAL CHEST WALL OSCILL: CPT

## 2019-06-02 PROCEDURE — 94799 UNLISTED PULMONARY SVC/PX: CPT

## 2019-06-02 PROCEDURE — 97110 THERAPEUTIC EXERCISES: CPT

## 2019-06-02 PROCEDURE — 25010000002 PIPERACILLIN SOD-TAZOBACTAM PER 1 G: Performed by: INTERNAL MEDICINE

## 2019-06-02 PROCEDURE — 80048 BASIC METABOLIC PNL TOTAL CA: CPT | Performed by: INTERNAL MEDICINE

## 2019-06-02 PROCEDURE — 25010000002 ENOXAPARIN PER 10 MG: Performed by: INTERNAL MEDICINE

## 2019-06-02 PROCEDURE — 83735 ASSAY OF MAGNESIUM: CPT | Performed by: INTERNAL MEDICINE

## 2019-06-02 PROCEDURE — 99233 SBSQ HOSP IP/OBS HIGH 50: CPT | Performed by: INTERNAL MEDICINE

## 2019-06-02 RX ADMIN — METOPROLOL TARTRATE 50 MG: 50 TABLET, FILM COATED ORAL at 21:59

## 2019-06-02 RX ADMIN — TAZOBACTAM SODIUM AND PIPERACILLIN SODIUM 3.38 G: 375; 3 INJECTION, SOLUTION INTRAVENOUS at 17:41

## 2019-06-02 RX ADMIN — SODIUM CHLORIDE, POTASSIUM CHLORIDE, SODIUM LACTATE AND CALCIUM CHLORIDE 75 ML/HR: 600; 310; 30; 20 INJECTION, SOLUTION INTRAVENOUS at 06:16

## 2019-06-02 RX ADMIN — GUAIFENESIN 400 MG: 100 SOLUTION ORAL at 01:07

## 2019-06-02 RX ADMIN — GUAIFENESIN 400 MG: 100 SOLUTION ORAL at 21:59

## 2019-06-02 RX ADMIN — LEVOTHYROXINE SODIUM 75 MCG: 75 TABLET ORAL at 06:15

## 2019-06-02 RX ADMIN — IPRATROPIUM BROMIDE AND ALBUTEROL SULFATE 3 ML: 2.5; .5 SOLUTION RESPIRATORY (INHALATION) at 15:04

## 2019-06-02 RX ADMIN — IPRATROPIUM BROMIDE AND ALBUTEROL SULFATE 3 ML: 2.5; .5 SOLUTION RESPIRATORY (INHALATION) at 20:14

## 2019-06-02 RX ADMIN — ENOXAPARIN SODIUM 30 MG: 30 INJECTION SUBCUTANEOUS at 15:24

## 2019-06-02 RX ADMIN — METOPROLOL TARTRATE 50 MG: 50 TABLET, FILM COATED ORAL at 08:52

## 2019-06-02 RX ADMIN — IPRATROPIUM BROMIDE AND ALBUTEROL SULFATE 3 ML: 2.5; .5 SOLUTION RESPIRATORY (INHALATION) at 06:44

## 2019-06-02 RX ADMIN — IPRATROPIUM BROMIDE AND ALBUTEROL SULFATE 3 ML: 2.5; .5 SOLUTION RESPIRATORY (INHALATION) at 11:03

## 2019-06-02 RX ADMIN — MEGESTROL ACETATE 20 MG: 20 TABLET ORAL at 08:52

## 2019-06-02 RX ADMIN — LOSARTAN POTASSIUM 100 MG: 100 TABLET, FILM COATED ORAL at 08:52

## 2019-06-02 RX ADMIN — GUAIFENESIN 400 MG: 100 SOLUTION ORAL at 15:20

## 2019-06-02 RX ADMIN — OXYBUTYNIN CHLORIDE 5 MG: 5 TABLET, EXTENDED RELEASE ORAL at 08:52

## 2019-06-02 RX ADMIN — GUAIFENESIN 400 MG: 100 SOLUTION ORAL at 08:52

## 2019-06-02 RX ADMIN — SODIUM CHLORIDE 4 ML: 7 NEBU SOLN,3 % NEBU at 06:48

## 2019-06-02 RX ADMIN — SODIUM CHLORIDE 4 ML: 7 NEBU SOLN,3 % NEBU at 20:14

## 2019-06-02 RX ADMIN — SODIUM CHLORIDE 9 ML/HR: 9 INJECTION, SOLUTION INTRAVENOUS at 17:42

## 2019-06-02 RX ADMIN — TAZOBACTAM SODIUM AND PIPERACILLIN SODIUM 3.38 G: 375; 3 INJECTION, SOLUTION INTRAVENOUS at 01:54

## 2019-06-02 RX ADMIN — TAZOBACTAM SODIUM AND PIPERACILLIN SODIUM 3.38 G: 375; 3 INJECTION, SOLUTION INTRAVENOUS at 09:56

## 2019-06-02 RX ADMIN — GUAIFENESIN 400 MG: 100 SOLUTION ORAL at 05:12

## 2019-06-02 NOTE — THERAPY TREATMENT NOTE
Acute Care - Physical Therapy Treatment Note  [facility]     Patient Name: Anaid Moura  : 1945  MRN: [id number]  Today's Date: 2019        Referring Physician: Rhea    Admit Date: 2019    Visit Dx:    ICD-10-CM ICD-9-CM   1. COPD exacerbation (CMS/HCC) J44.1 491.21   2. Decreased mobility R26.89 781.99     Patient Active Problem List   Diagnosis   • Benign essential hypertension   • Stenosis of carotid artery   • Chronic kidney disease, stage III (moderate) (CMS/HCC)   • Chronic obstructive pulmonary disease (CMS/HCC)   • Chronic coronary artery disease   • Hyperlipidemia   • Osteopenia   • Cerebrovascular accident (CMS/HCC)   • Urge incontinence of urine   • CAP (community acquired pneumonia)   • Cavitary lesion of lung   • Mediastinal adenopathy   • COPD exacerbation (CMS/HCC)   • Acute on chronic respiratory failure with hypoxemia (CMS/HCC)   • Atelectasis of left lung   • Adenocarcinoma, lung, left (CMS/HCC)       Therapy Treatment    Rehabilitation Treatment Summary     Row Name 19 1400             Treatment Time/Intention    Discipline  physical therapist  -LB      Document Type  therapy note (daily note)  -LB      Subjective Information  no complaints  -LB      Mode of Treatment  physical therapy  -LB      Patient/Family Observations  Family present.  -LB      Total Minutes, Physical Therapy Treatment  15  -LB      Patient Effort  good  -LB      Comment  L mando (11 years ago)  -LB      Recorded by [LB] Alanis Quinones, PT 19 1414      Row Name 19 1400             Cognitive Assessment/Intervention- PT/OT    Affect/Mental Status (Cognitive)  WFL  -LB      Orientation Status (Cognition)  oriented x 4  -LB      Follows Commands (Cognition)  WFL  -LB      Personal Safety Interventions  gait belt;fall prevention program maintained;nonskid shoes/slippers when out of bed  -LB      Recorded by [LB] Alanis Quinones, PT 19 1414      Row Name 19 1400             Bed  Mobility Assessment/Treatment    Supine-Sit Goehner (Bed Mobility)  contact guard  -LB      Bed Mobility, Safety Issues  decreased use of legs for bridging/pushing;decreased use of arms for pushing/pulling  -LB      Assistive Device (Bed Mobility)  head of bed elevated  -LB      Recorded by [LB] Alanis Quinones, PT 06/02/19 1414      Row Name 06/02/19 1400             Sit-Stand Transfer    Sit-Stand Goehner (Transfers)  contact guard  -LB      Assistive Device (Sit-Stand Transfers)  wheelchair stand pivot from bed to w/c using bed rail and w/c arms  -LB      Recorded by [LB] Alanis Quinones, PT 06/02/19 1417      Row Name 06/02/19 1400             Stand-Sit Transfer    Stand-Sit Goehner (Transfers)  contact guard  -LB      Recorded by [LB] Alanis Quinones, PT 06/02/19 1417      Row Name 06/02/19 1400             Gait/Stairs Assessment/Training    Goehner Level (Gait)  contact guard;verbal cues;other (see comments);1 person to manage equipment handrail in hallway on R  -LB      Distance in Feet (Gait)  15' x 4 forward/retro  -LB2      Pattern (Gait)  step-to  -LB2      Left Sided Gait Deviations  hip hiking;hip circumduction  -LB2      Comment (Gait/Stairs)  cued to inc hip extension using glute LLE  -LB2      Recorded by [LB] Alanis Quinones, PT 06/02/19 1419  [LB2] Alanis Quinones, PT 06/02/19 1417      Row Name 06/02/19 1400             Positioning and Restraints    Pre-Treatment Position  in bed  -LB      Post Treatment Position  chair  -LB      In Chair  sitting;call light within reach;encouraged to call for assist;with family/caregiver  -LB      Recorded by [LB] Alanis Quinones, PT 06/02/19 1417        User Key  (r) = Recorded By, (t) = Taken By, (c) = Cosigned By    Initials Name Effective Dates Discipline    Alanis Phillips, PT 03/04/19 -  PT                   Physical Therapy Education     Title: PT OT SLP Therapies (Done)     Topic: Physical Therapy (Done)     Point: Mobility training (Done)      Learning Progress Summary           Patient Acceptance, E,TB, VU,DU by LB at 6/2/2019  2:17 PM    Acceptance, E,TB, VU,DU by LB at 6/1/2019  2:55 PM    Acceptance, E, NR,VU by  at 5/31/2019  4:16 PM    Acceptance, E, VU,NR by MA at 5/30/2019  9:31 AM                   Point: Home exercise program (Done)     Learning Progress Summary           Patient Acceptance, E,TB, VU,DU by LB at 6/2/2019  2:17 PM    Acceptance, E,TB, VU,DU by LB at 6/1/2019  2:55 PM    Acceptance, E, NR,VU by  at 5/31/2019  4:16 PM    Acceptance, E, VU,NR by MA at 5/30/2019  9:31 AM                   Point: Body mechanics (Done)     Learning Progress Summary           Patient Acceptance, E,TB, VU,DU by LB at 6/2/2019  2:17 PM    Acceptance, E,TB, VU,DU by LB at 6/1/2019  2:55 PM    Acceptance, E, NR,VU by  at 5/31/2019  4:16 PM    Acceptance, E, VU,NR by MA at 5/30/2019  9:31 AM                   Point: Precautions (Done)     Learning Progress Summary           Patient Acceptance, E,TB, VU,DU by LB at 6/2/2019  2:17 PM    Acceptance, E,TB, VU,DU by  at 6/1/2019  2:55 PM    Acceptance, E, NR,VU by  at 5/31/2019  4:16 PM    Acceptance, E, VU,NR by MA at 5/30/2019  9:31 AM                               User Key     Initials Effective Dates Name Provider Type Discipline     04/03/18 -  Georgia Arellano, PT Physical Therapist PT     03/04/19 -  Alanis Quinones, PT Physical Therapist PT    MA 10/19/18 -  Delia Monahan, PT Physical Therapist PT                PT Recommendation and Plan  Anticipated Discharge Disposition (PT): home with home health  Outcome Summary/Treatment Plan (PT)  Anticipated Discharge Disposition (PT): home with home health  Plan of Care Reviewed With: patient  Outcome Summary: Pt continues to do well with transfers to w/c and forward/retro gait in hallway. Cued to improve hip extension vs. circumduction as able with retro gait. Inc repetitions today.   Outcome Measures     Row Name 06/02/19 1400 06/01/19 1400 05/31/19  1600       How much help from another person do you currently need...    Turning from your back to your side while in flat bed without using bedrails?  4  -LB  4  -LB  3  -LH    Moving from lying on back to sitting on the side of a flat bed without bedrails?  4  -LB  3  -LB  3  -LH    Moving to and from a bed to a chair (including a wheelchair)?  3  -LB  3  -LB  3  -LH    Standing up from a chair using your arms (e.g., wheelchair, bedside chair)?  3  -LB  3  -LB  3  -LH    Climbing 3-5 steps with a railing?  2  -LB  2  -LB  3  -LH    To walk in hospital room?  3  -LB  3  -LB  3  -LH    AM-PAC 6 Clicks Score  19  -LB  18  -LB  18  -LH       Functional Assessment    Outcome Measure Options  AM-PAC 6 Clicks Basic Mobility (PT)  -LB  AM-PAC 6 Clicks Basic Mobility (PT)  -LB  AM-PAC 6 Clicks Basic Mobility (PT)  -LH      User Key  (r) = Recorded By, (t) = Taken By, (c) = Cosigned By    Initials Name Provider Type     Georgia Arellano, PT Physical Therapist    Alanis Phillips PT Physical Therapist         Time Calculation:   PT Charges     Row Name 06/02/19 1418             Time Calculation    Start Time  1400  -LB      Stop Time  1415  -LB      Time Calculation (min)  15 min  -LB      PT Received On  06/02/19  -LB      PT - Next Appointment  06/03/19  -LB         Time Calculation- PT    Total Timed Code Minutes- PT  15 minute(s)  -LB        User Key  (r) = Recorded By, (t) = Taken By, (c) = Cosigned By    Initials Name Provider Type    Alanis Phillips PT Physical Therapist        Therapy Charges for Today     Code Description Service Date Service Provider Modifiers Qty    53787269171 HC PT THER PROC EA 15 MIN 6/2/2019 Alanis Quinones, PT GP 1    36716365561 HC PT THER SUPP EA 15 MIN 6/2/2019 Alanis Quinones, PT GP 1          PT G-Codes  Outcome Measure Options: AM-PAC 6 Clicks Basic Mobility (PT)  AM-PAC 6 Clicks Score: 19    Alanis Quinones PT  6/2/2019

## 2019-06-02 NOTE — PROGRESS NOTES
LOS: 6 days       Chief Complaint: Newly diagnosed non-small cell lung cancer, history of CVA with left hemiparesis, COPD with acute on chronic hypoxic respiratory failure, possible pneumonia      Interval History: Patient is continuing to improve from respiratory standpoint.  She is more mobile, ambulating with physical therapy.  Appetite is normal.  She has no new complaints.  I received a telephone call today from neuro radiology Dr. Nicholson noting that there were subtle findings on MRI brain that were suspicious for metastatic disease and this was not indicated in the initial report but was subsequently added prior to the report being signed off.        Review of Systems:    Review of systems was obtained with pertinent positive findings as noted in the interval history.  All other systems negative.    Objective     Vital Signs  Temp:  [97.7 °F (36.5 °C)-98.3 °F (36.8 °C)] 98.1 °F (36.7 °C)  Heart Rate:  [71-87] 87  Resp:  [16-18] 18  BP: (134-166)/(64-95) 134/64        Physical Exam:     GENERAL: Elderly female no distress sitting on the side of the bed  MOUTH:  Tongue is well-papillated; no stomatitis or ulcers.  Lips normal.  CHEST: Clear to auscultation bilaterally  CARDIAC:  Regular rate and rhythm without murmurs, rubs or gallops. Normal S1,S2.  ABDOMEN:  Soft, nontender with no organomegaly or masses.  EXTREMITIES:  No clubbing, cyanosis or edema.  NEUROLOGICAL:  Cranial Nerves II-XII grossly intact.  Left hemiparesis  PSYCHIATRIC:  Normal affect and mood.           Results Review:     I reviewed the patient's new clinical results.    Results from last 7 days   Lab Units 06/01/19  0317   WBC 10*3/mm3 12.65*   HEMOGLOBIN g/dL 10.8*   HEMATOCRIT % 34.8   PLATELETS 10*3/mm3 440     Lab Results   Component Value Date    NEUTROABS 13.68 (H) 05/31/2019     Results from last 7 days   Lab Units 06/02/19  0516   SODIUM mmol/L 140   POTASSIUM mmol/L 4.3   CHLORIDE mmol/L 104   CO2 mmol/L 24.2   BUN mg/dL 8    CREATININE mg/dL 0.90   GLUCOSE mg/dL 100*   CALCIUM mg/dL 8.7         Results from last 7 days   Lab Units 06/02/19  0516   MAGNESIUM mg/dL 1.5*             Medication Review: Yes     Assessment/Plan     1. Non-small cell lung cancer (adenocarcinoma):  · Patient with long-standing smoking history x40 years quit in 2008  · Underlying significant COPD with FEV1 1.4 (58%) on 11/20/2014  · CT 5/16/2019 with left upper lobe mass, partially cavitary measuring 2.3 x 1.5 cm.  Additional 8 mm similar appearing right upper lobe nodule.  Bibasilar consolidation, small bilateral pleural effusions, mediastinal lymphadenopathy up to 2.7 centimeters.  · Bronchoscopy 5/20/2019 with identification of left mainstem malignancy extending to the level of the jose, biopsy showed poorly differentiated adenocarcinoma of pulmonary origin. EBUS with FNA station 7 lymph node negative for malignant cells, only scattered lymphoid aggregates.  BAL left upper lobe negative for malignant cells.  · Patient was intubated with possible pneumonia, significant mucus plugging with underlying significant COPD.  Extubated on 5/28/2019.   · Request made for analysis of biopsy for EGFR mutation, ALK/ROS 1 rearrangement. PDL1 95%.  · Staging evaluation performed during hospitalization with negative CT abdomen/pelvis, negative bone scan, and negative MRI brain 5/29/2019.  · Staging evaluation 5/29/2019 with no evidence of metastatic disease on CT abdomen/pelvis, bone scan, and MRI brain.  It appeared initially that she had stage III disease clinically with a primary tumor 2.3 cm left upper lobe with evidence of extension to left mainstem up to level of jose on bronchoscopy.  There was clinical suspicion for mediastinal lymph node involvement due to lymphadenopathy on CT although negative biopsy via EBUS.  There is also a cavitary 8 mm right upper lobe nodule of unclear significance.  The patient was seen by radiation oncology Dr. Whalen and plans were  made for PET scan as outpatient on 6/10/2019.  Subsequent plan to present her at the thoracic oncology conference on 6/13/2019 and plan to see her back in the office on 6/17/2019 to review PET scan findings, assess her functional and pulmonary status and make final decisions regarding recommended treatment for her lung cancer.    · I am returning to see her today however as I received a telephone call from neuro radiology Dr. Nicholson today.  Apparently there was a preliminary report on the MRI and the scan was subsequently reviewed with neuroradiology with identification of subtle suspicious appearing small lesions in the left occipital and parietal regions with surrounding slight edema with high suspicion for small brain metastases.  This is reflected in an addended/final report.  I did return to see the patient today and discussed these findings at length with the patient and her family.  We discussed implications of these findings to suggest metastatic disease which changes the focus of our treatment.  Any treatment at this point is palliative in nature.  In terms of management overall, she is asymptomatic and the lesions in question remain extremely small with a minimal amount of edema.  This does not warrant currently the use of steroids.  This likely does not warrant treatment with radiation either.  I will notify radiation oncology regarding this finding.  We likely will elect to monitor her CNS disease with close interval follow-up MRI at 4 to 6 weeks as we discuss options for her systemic therapy.  With PDL1 results of 95%, frontline single agent immunotherapy will likely be preferred.  2. Acute on chronic hypoxemic respiratory failure:  · Possible underlying pneumonia continuing on empiric Zosyn and vancomycin  · Underlying COPD with acute exacerbation continuing on Solu-Medrol 20 mg every 12 hours  · Patient required intubation. Patient had significant mucus plugging which was removed  endoscopically.  · Patient extubated 5/28/2019.  · She continues to improve from a pulmonary standpoint  3. Prior hemorrhagic CVA:  · Occurred in 2008, residual left upper extremity weakness and left lower extremity weakness.   4. Anemia:  · Hemoglobin prior to admission was 13.1 on 5/16/2019  · Hemoglobin has declined in the 10-11 range, related to malignancy, pneumonia  · Hemoglobin stable at 10     Plan:  1. Await results from molecular analysis of the patient's tumor (EGFR mutation, ALK/ROS 1 rearrangement).  2. Outpatient follow-up arranged with PET scan 6/10/2019 and follow-up visit in our office on 6/17/2019.  In the interim I will also present the patient at the thoracic oncology conference on 6/13/2019.  3. I will notify radiation oncology of patient's MRI brain findings.  Will likely elect to monitor with short-term interval follow-up MRI.  4. We will pursue approval for possible future treatment with first-line immunotherapy with Keytruda.  We will discuss treatment options at patient's return visit on 6/17/2019 and potentially proceed shortly thereafter with Keytruda pending her overall status.    Discussed with patient and family at bedside today.      Solis Godfrey MD  06/02/19  2:31 PM

## 2019-06-02 NOTE — PLAN OF CARE
Problem: Patient Care Overview  Goal: Plan of Care Review  Outcome: Ongoing (interventions implemented as appropriate)   06/02/19 1509   Plan of Care Review   Progress no change   OTHER   Outcome Summary pt on RA, continue vest & current treatments, pt requesting increase in hypertonic frequency- note left for MD, also advised pt to mention it to MD   Coping/Psychosocial   Plan of Care Reviewed With patient

## 2019-06-02 NOTE — PROGRESS NOTES
LOS: 6 days   Patient Care Team:  Timothy Taylor MD as PCP - General (Internal Medicine)  Miah Whalen MD as Consulting Physician (Radiation Oncology)    Subjective     Patient sitting up in a chair she is been up walking today she says she is really been working to keep her chest clear        Objective     Vital Signs  Vital Sign Min/Max for last 24 hours  Temp  Min: 97.7 °F (36.5 °C)  Max: 98.3 °F (36.8 °C)   BP  Min: 134/64  Max: 166/77   Pulse  Min: 71  Max: 87   Resp  Min: 16  Max: 18   SpO2  Min: 95 %  Max: 99 %   No Data Recorded   Weight  Min: 43.3 kg (95 lb 7.4 oz)  Max: 43.3 kg (95 lb 7.4 oz)        Ventilator/Non-Invasive Ventilation Settings (From admission, onward)    Start     Ordered    05/27/19 1133  Ventilator - AC/PC; (16); 100; 90%; 10; 16  Continuous     Question Answer Comment   Vent Mode AC/PC    Breath rate  16   FiO2 100    FiO2 titrate for Sp02% =/> 90%    PEEP 10    Inspiratory Pressure 16        05/27/19 1134                       Body mass index is 17.46 kg/m².  I/O last 3 completed shifts:  In: 1630 [P.O.:630; I.V.:900; IV Piggyback:100]  Out: 950 [Urine:950]  I/O this shift:  In: 360 [P.O.:360]  Out: -         Physical Exam:  General Appearance: Well-developed elderly white female looks much older stated age.  Eyes: Conjunctiva are clear and anicteric  ENT: Mucous membranes moist no exudates   Neck: No adenopathy trachea midline  Lungs: Sounds dramatically better today chest is actually pretty clear and she is not labored saturating well on room air  Cardiac: Regular rate rhythm no murmur,   Abdomen: Soft no palpable organomegaly or masses  : Not examined  Musculoskeletal: She does not have much muscle mass  Skin: No jaundice no petechiae  Neuro: She is alert oriented cooperative she has left hemiparesis with left foot drop weak left leg and almost no movement in the left upper extremity.  Right upper and lower extremity well  Extremities/P Vascular: No clubbing no  cyanosis no edema palpable radial dorsalis pedis pulses  MSE: Seems to have a strong desire to fight    Labs:  Results from last 7 days   Lab Units 06/02/19  0516 06/01/19  2323 06/01/19 0317 05/30/19 0457 05/28/19 0644 05/27/19  0735   GLUCOSE mg/dL 100*  --  90  --  128* 125*   SODIUM mmol/L 140  --  142  --  140 139   POTASSIUM mmol/L 4.3 4.4 2.9*  --  3.7 3.5   MAGNESIUM mg/dL 1.5*  --   --   --   --   --    CO2 mmol/L 24.2  --  24.3  --  19.3* 24.2   CHLORIDE mmol/L 104  --  103  --  104 100   ANION GAP mmol/L 11.8  --  14.7  --  16.7 14.8   CREATININE mg/dL 0.90  --  0.82 0.85 0.91 0.83   BUN mg/dL 8  --  10  --  9 7*   BUN / CREAT RATIO  8.9  --  12.2  --  9.9 8.4   CALCIUM mg/dL 8.7  --  8.4*  --  8.2* 9.0   EGFR IF NONAFRICN AM mL/min/1.73 61  --  68 66 61 67   ALK PHOS U/L  --   --   --   --   --  107   TOTAL PROTEIN g/dL  --   --   --   --   --  7.5   ALT (SGPT) U/L  --   --   --   --   --  9   AST (SGOT) U/L  --   --   --   --   --  17   BILIRUBIN mg/dL  --   --   --   --   --  0.5   ALBUMIN g/dL  --   --   --   --   --  3.50   GLOBULIN gm/dL  --   --   --   --   --  4.0     Estimated Creatinine Clearance: 38.1 mL/min (by C-G formula based on SCr of 0.9 mg/dL).      Results from last 7 days   Lab Units 06/01/19 0317 05/31/19 0429 05/28/19 0644 05/27/19  0735   WBC 10*3/mm3 12.65* 15.93* 17.05* 10.46   RBC 10*6/mm3 4.09 3.69* 3.77 4.50   HEMOGLOBIN g/dL 10.8* 10.0* 10.0* 11.9*   HEMATOCRIT % 34.8 31.1* 31.8* 38.9   MCV fL 85.1 84.3 84.4 86.4   MCH pg 26.4* 27.1 26.5* 26.4*   MCHC g/dL 31.0* 32.2 31.4* 30.6*   RDW % 15.2 15.1 15.0 15.0   RDW-SD fl 46.6 46.0 46.2 47.9   MPV fL 9.3 9.8 9.9 9.4   PLATELETS 10*3/mm3 440 429 367 417   NEUTROPHIL % %  --  85.8*  --  82.0*   LYMPHOCYTE % %  --  6.7*  --  11.4*   MONOCYTES % %  --  3.0*  --  3.4*   EOSINOPHIL % %  --  0.0*  --  2.4   BASOPHIL % %  --  0.2  --  0.3   IMM GRAN % %  --  4.3*  --  0.5   NEUTROS ABS 10*3/mm3  --  13.68*  --  8.58*   LYMPHS ABS  10*3/mm3  --  1.06  --  1.19   MONOS ABS 10*3/mm3  --  0.47  --  0.36   EOS ABS 10*3/mm3  --  0.00  --  0.25   BASOS ABS 10*3/mm3  --  0.03  --  0.03   IMMATURE GRANS (ABS) 10*3/mm3  --  0.69*  --  0.05   NRBC /100 WBC  --  0.1  --  0.0     Results from last 7 days   Lab Units 05/28/19  0953   PH, ARTERIAL pH units 7.511*   PO2 ART mm Hg 138.0*   PCO2, ARTERIAL mm Hg 24.7*   HCO3 ART mmol/L 19.8*     Results from last 7 days   Lab Units 05/27/19  0735   TROPONIN T ng/mL <0.010     Results from last 7 days   Lab Units 05/27/19  0735   PROBNP pg/mL 1,475.0*         Results from last 7 days   Lab Units 05/27/19  0840 05/27/19  0735   LACTATE mmol/L 1.6  --    PROCALCITONIN ng/mL  --  0.14         Microbiology Results (last 10 days)     Procedure Component Value - Date/Time    Respiratory Culture - Lavage, Lung, Left Upper Lobe [[id number]] Collected:  05/27/19 1137    Lab Status:  Final result Specimen:  Lavage from Lung, Left Upper Lobe Updated:  05/29/19 0616     Respiratory Culture Scant growth (1+) Normal Respiratory Sakina     Gram Stain No WBCs or organisms seen      No epithelial cells seen    Blood Culture - Blood, Arm, Left [[id number]] Collected:  05/27/19 0840    Lab Status:  Final result Specimen:  Blood from Arm, Left Updated:  06/01/19 0846     Blood Culture No growth at 5 days                enoxaparin 30 mg Subcutaneous Q24H   guaiFENesin 400 mg Oral Q4H   ipratropium-albuterol 3 mL Nebulization 4x Daily - RT   levothyroxine 75 mcg Oral Q AM   losartan 100 mg Oral Daily   megestrol 20 mg Oral Daily   metoprolol tartrate 50 mg Oral Q12H   oxybutynin XL 5 mg Oral Daily   piperacillin-tazobactam 3.375 g Intravenous Q8H   sodium chloride 4 mL Nebulization BID - RT       lactated ringers 75 mL/hr Last Rate: 75 mL/hr (06/02/19 0616)   sodium chloride 9 mL/hr Last Rate: 9 mL/hr (05/28/19 1808)       Diagnostics:  Xr Chest 2 View    Result Date: 5/17/2019  EXAMINATION: 2 VIEWS OF THE CHEST  HISTORY: 74-year-old  female with a history of a fever and recurrent pneumonia.  FINDINGS: PA and lateral chest radiographs were obtained. Comparison is made to a prior chest radiograph dated 04/30/2019. There is a persistent area of opacification within the left upper lobe. However, the appearance suggests there may be some cavitation or air-fluid level. The region measures on the order of 2.3 x 3.0 cm.  Initially, there is greater interstitial prominence at the base of the right lower lobe with hazy opacification seen in the posterior costophrenic angle.  The lungs are hyperinflated.      1. There are imaging features that raise concern for a cavitary lesion in the left upper lobe measuring on the order of 3 cm in greatest dimension. Correlation with a CT of the chest is recommended.  2. There is interstitial prominence of the base of the right lower lobe that is concerning for right lower lobe pneumonia.  This report was finalized on 5/17/2019 8:46 AM by Dr. Rene Nicole M.D.      Ct Chest Without Contrast    Result Date: 5/16/2019  THORACIC CT SCAN WITHOUT CONTRAST  HISTORY: possible cavitary lesion STEVE; J18.1-Lobar pneumonia, unspecified organism; A41.9-Sepsis, unspecified organism  COMPARISON: None.  TECHNIQUE:  Radiation dose reduction techniques were utilized, including automated exposure control and exposure modulation based on body size. Axial images of the thorax obtained without IV contrast, per request.  FINDINGS: The lungs are hyperexpanded suggesting COPD. Mild emphysema and fibrosis. There is an unusual-appearing, somewhat cavitary lesion along the posterior aspect of the left upper lobe. Portions have a thin, near imperceptible wall while along its more inferior aspect, it appears more mass like with a solid component on images 26 and 27 measuring approximately 2.3 x 1.5 cm in greatest dimensions. Neoplasm suspected although atypical infection could have this appearance also. There is a similar lesion in the right  upper lobe best seen on images 25-29. The more nodular component inferiorly on image 29 measures approximately 8 mm.  There are irregular basilar consolidations, right greater than left which could be related to multifocal pneumonia and/or atelectasis. There are small pleural effusions, right greater than left. Neither appears loculated. Aorta moderately calcified but nonaneurysmal. Normal heart size.  Although performed without IV contrast, there does appear to be multifocal adenopathy. On image 43, interaortocaval adenopathy measures at least 2.7 x 1.8 cm. On image 36, prevascular adenopathy measures 1.9 x 1.4 cm. There are additional smaller nodes in the anterior and middle mediastinum. These may be reactive or metastatic.        1. Emphysema/COPD with right greater than left basilar airspace opacities and small effusions as discussed. 2. Unusual cavitary upper lobe lesions as discussed. Neoplasm suspected although these could be infectious as well. 3. Multifocal mediastinal adenopathy, metastatic disease not excluded   .  This report was finalized on 5/16/2019 10:12 PM by Dhruv Gifford M.D.      Xr Chest 1 View    Result Date: 5/28/2019  PORTABLE CHEST X-RAY  CLINICAL HISTORY: Intubated Patient; J44.1-Chronic obstructive pulmonary disease with (acute) exacerbation  COMPARISON: 05/27/2019.  FINDINGS: Portable AP view of the chest was obtained with overlying monitor leads in place. ET tube has been placed and terminates at the level of the mid thoracic trachea. The lungs are well inflated. There is underlying emphysema and fibrosis. Vague airspace disease in the left perihilar region felt to reflect pneumonia does show some slight improvement. No edema or significant pleural fluid. Normal heart size.      Interval intubation, lungs well inflated with improving left perihilar predominant pneumonia.         Xr Chest 1 View    Result Date: 5/27/2019  ONE VIEW PORTABLE CHEST  HISTORY: Shortness of breath. COPD and  cavitary lesion in left upper lobe.  FINDINGS: There is severe pulmonary emphysema with hyperinflation and the patient has recent CT scan and plain film on 05/16/2019 showing a cavitary lesion in the left upper lobe as well as a smaller lesion in the right upper lobe. There is now further volume loss and consolidation, in the left upper lobe on today's exam, and suspicious for pneumonia. The heart remains enlarged.  This report was finalized on 5/27/2019 10:18 AM by Dr. Eleno Mahajan M.D.      Xr Chest Pa & Lateral    Result Date: 4/30/2019  2-VIEW CHEST  HISTORY: Pneumonia. Cough.  FINDINGS: There is prominent COPD with hyperinflation. There is some localized pneumonia in the left upper lobe laterally that is new since the study of 08/19/2015 and continued follow-up evaluation is recommended to ensure appropriate resolution. The heart remains mildly enlarged.  This report was finalized on 4/30/2019 1:19 PM by Dr. Eleno Mahajan M.D.               Active Hospital Problems    Diagnosis  POA   • **Acute on chronic respiratory failure with hypoxemia (CMS/HCC) [J96.21]  Unknown   • COPD exacerbation (CMS/HCC) [J44.1]  Yes   • Atelectasis of left lung [J98.11]  Unknown   • Adenocarcinoma, lung, left (CMS/HCC) [C34.92]  Unknown   • CAP (community acquired pneumonia) [J18.9]  Unknown      Resolved Hospital Problems   No resolved problems to display.   X-ray reviewed no acute infiltrates or atelectasis      Assessment/Plan     1. Adenocarcinoma of the lung she has endobronchial disease on the left extending up to about the level of the jose.  She will not be a resection candidate further staging work-up , her functional class along with tumor markers will determine her treatment options.  Oncology following  2. MRI reviewed by neuroradiology and there are 3 or 4 of small metastatic lesions in the left cerebral hemisphere  3. Possible pneumonia I am not convinced she really has much of a pneumonia I think she just had  mucous plugging.  Cultures negative patient will complete 7 days of Zosyn today and then discontinue antibiotics  4. Acute hypoxemic respiratory failure secondary to plugging left mainstem bronchus and underlying COPD.  Resolved oxygenating well on room air  5. COPD severe with acute exacerbation I am not sure how much exacerbation she had I think her worst problem with some mucus plugging she seems to be doing very well now off steroids  6. Left upper lobe atelectasis secondary to mucous plugging resolved with bronchoscopy the biggest issue is going to be trying to keep this from recurring again this was the second episode.  I have her on very aggressive pulmonary hygiene and will mobilize her as much as possible.  She can go home with hypertonic saline and albuterol nebs, guaifenesin and take a flutter valve with her.  I do not think she has to go home with vest therapy first of all she did not feel it helped a whole lot and if she needs percussion her family can do it I actually showed them how to do percussion today.  It might be very difficult to get a machine approved and paid for especially since she did not feel it helped significantly  7. 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 Protein calorie malnutrition looks pretty significant she had a significant weight loss over the last several months at least 16 pounds and a BMI now of under 18.  Continue nutritional supplements    Plan for disposition: From a pulmonary standpoint she could probably be discharged any time to continue aggressive pulmonary hygiene at home .  Family had some questions about the equipment etc. and I explained to them.    Arthur Wilkerson MD  06/02/19  5:03 PM    Time:

## 2019-06-02 NOTE — PLAN OF CARE
Problem: Patient Care Overview  Goal: Plan of Care Review  Outcome: Ongoing (interventions implemented as appropriate)   06/02/19 1417   OTHER   Outcome Summary Pt continues to do well with transfers to w/c and forward/retro gait in hallway. Cued to improve hip extension vs. circumduction as able with retro gait. Inc repetitions today.    Coping/Psychosocial   Plan of Care Reviewed With patient

## 2019-06-02 NOTE — PLAN OF CARE
Problem: Nutrition, Imbalanced: Inadequate Oral Intake (Adult)  Goal: Improved Oral Intake  Outcome: Ongoing (interventions implemented as appropriate)    Goal: Prevent Further Weight Loss  Outcome: Ongoing (interventions implemented as appropriate)      Problem: Fall Risk (Adult)  Goal: Absence of Fall  Outcome: Ongoing (interventions implemented as appropriate)      Problem: Skin Injury Risk (Adult)  Goal: Skin Health and Integrity  Outcome: Ongoing (interventions implemented as appropriate)      Problem: Patient Care Overview  Goal: Plan of Care Review  Outcome: Ongoing (interventions implemented as appropriate)   06/02/19 0519   Plan of Care Review   Progress improving   OTHER   Outcome Summary A&O, no complaints of pain, nonproductive cough, up with assist, family at bedside to care for patient, planning to go home on Monday, BP still a little elevated, physician aware, IVF infusing, IV ABX as ordered, potassium redraw at 2300 4.4, will continue to monitor.   Coping/Psychosocial   Plan of Care Reviewed With patient;family     Goal: Individualization and Mutuality  Outcome: Ongoing (interventions implemented as appropriate)    Goal: Discharge Needs Assessment  Outcome: Ongoing (interventions implemented as appropriate)

## 2019-06-02 NOTE — PROGRESS NOTES
Progress Note  Timothy Taylor MD    Patient ID:  Name:  Anaid Moura  MRN:  [id number]  1945  74 y.o.  female            CC/Reason for visit:  Acute on chronic respiratory failure with hypoxemia (CMS/HCC)    CAP (community acquired pneumonia)    COPD exacerbation (CMS/HCC)    Atelectasis of left lung    Adenocarcinoma, lung, left (CMS/HCC)    Interval History :     Patient is up in bed, breathing ok, intermittent tachypnea.  Saturation is 96% on room air.  Blood pressure has been fluctuating over the last few days  Has been working with physical therapy.   On antibiotics for probably pneumonia  On treatment for COPD and atelectasis.  Has history of left hemiparesis from an old CVA.  Has diagnosis of malnourishment as well however she is eating better now.  Taking Megace.  White blood cell still elevated, but it is  trending down.  Potassium 4.3.     Vitals:  Vitals:    06/02/19 0500 06/02/19 0644 06/02/19 0710 06/02/19 0712   BP:    166/77   BP Location:    Right arm   Patient Position:    Sitting   Pulse:  78 79 74   Resp:  16  16   Temp:    98.3 °F (36.8 °C)   TempSrc:    Oral   SpO2:  95% 98% 96%   Weight: 43.3 kg (95 lb 7.4 oz)      Height:         FiO2 (%): 39 %     Body mass index is 17.46 kg/m².    Intake/Output Summary (Last 24 hours) at 6/2/2019 1031  Last data filed at 6/2/2019 0900  Gross per 24 hour   Intake 1550 ml   Output 450 ml   Net 1100 ml       Exam:  GEN:  No distress at this time.  and brother present  EYES:   EOM-i, anicteric sclera bilat  ENT:    External ears/nose normal, OP clear  NECK:  Supple, midline trachea  LUNGS: Very diminished breath sounds bilaterally, no rales.  Few rhonchi's scattered.  nonlabored breathing  CV:  Normal S1S2, without murmur, no edema  ABD:  Non tender, no enlarged liver or masses  EXT:  No cyanosis or clubbing; left hemiparesis.    Scheduled meds:      enoxaparin 30 mg Subcutaneous Q24H   guaiFENesin 400 mg Oral Q4H   ipratropium-albuterol 3 mL  Nebulization 4x Daily - RT   levothyroxine 75 mcg Oral Q AM   losartan 100 mg Oral Daily   megestrol 20 mg Oral Daily   metoprolol tartrate 50 mg Oral Q12H   oxybutynin XL 5 mg Oral Daily   piperacillin-tazobactam 3.375 g Intravenous Q8H   sodium chloride 4 mL Nebulization BID - RT     IV meds:                          lactated ringers 75 mL/hr Last Rate: 75 mL/hr (06/02/19 0616)   sodium chloride 9 mL/hr Last Rate: 9 mL/hr (05/28/19 1808)       Data Review:   I reviewed the patient's medications and new clinical results.  Lab Results   Component Value Date    CALCIUM 8.7 06/02/2019     Results from last 7 days   Lab Units 06/02/19  0516 06/01/19  2323 06/01/19  0317 05/31/19  0429 05/30/19  0457 05/28/19  0644 05/27/19  0735   SODIUM mmol/L 140  --  142  --   --  140 139   POTASSIUM mmol/L 4.3 4.4 2.9*  --   --  3.7 3.5   CHLORIDE mmol/L 104  --  103  --   --  104 100   CO2 mmol/L 24.2  --  24.3  --   --  19.3* 24.2   BUN mg/dL 8  --  10  --   --  9 7*   CREATININE mg/dL 0.90  --  0.82  --  0.85 0.91 0.83   CALCIUM mg/dL 8.7  --  8.4*  --   --  8.2* 9.0   BILIRUBIN mg/dL  --   --   --   --   --   --  0.5   ALK PHOS U/L  --   --   --   --   --   --  107   ALT (SGPT) U/L  --   --   --   --   --   --  9   AST (SGOT) U/L  --   --   --   --   --   --  17   GLUCOSE mg/dL 100*  --  90  --   --  128* 125*   WBC 10*3/mm3  --   --  12.65* 15.93*  --  17.05* 10.46   HEMOGLOBIN g/dL  --   --  10.8* 10.0*  --  10.0* 11.9*   PLATELETS 10*3/mm3  --   --  440 429  --  367 417   PROBNP pg/mL  --   --   --   --   --   --  1,475.0*   PROCALCITONIN ng/mL  --   --   --   --   --   --  0.14     .  Results from last 7 days   Lab Units 06/01/19  0317 05/31/19  0429 05/28/19  0644   WBC 10*3/mm3 12.65* 15.93* 17.05*   HEMOGLOBIN g/dL 10.8* 10.0* 10.0*   HEMATOCRIT % 34.8 31.1* 31.8*   PLATELETS 10*3/mm3 440 429 367         Results from last 7 days   Lab Units 05/27/19  1137 05/27/19  0840   BLOODCX   --  No growth at 5 days   RESPCX  Scant  growth (1+) Normal Respiratory Sakina  --        Results from last 7 days   Lab Units 05/27/19  0735   TROPONIN T ng/mL <0.010     Results from last 7 days   Lab Units 05/28/19  0953 05/27/19  1231 05/27/19  0915   PH, ARTERIAL pH units 7.511* 7.465* 7.505*   PCO2, ARTERIAL mm Hg 24.7* 28.2* 27.2*   PO2 ART mm Hg 138.0* 584.8* 45.5*   FLOW RATE lpm  --   --  15   MODALITY  Adult Vent Adult Vent NRB   O2 SATURATION CALC % 99.4* 100.0* 86.1*       Estimated Creatinine Clearance: 38.1 mL/min (by C-G formula based on SCr of 0.9 mg/dL).    WEIGHTS:     Wt Readings from Last 1 Encounters:   06/02/19 0500 43.3 kg (95 lb 7.4 oz)   06/01/19 0422 43.3 kg (95 lb 7.4 oz)   05/30/19 0500 43.5 kg (96 lb)   05/29/19 1723 43.7 kg (96 lb 5.5 oz)   05/29/19 0425 43.7 kg (96 lb 5.5 oz)   05/28/19 0559 45.4 kg (100 lb 1.4 oz)   05/27/19 1129 40.7 kg (89 lb 11.6 oz)   05/27/19 0722 48.3 kg (106 lb 8 oz)         ASSESSMENT:     Acute on chronic respiratory failure with hypoxemia (CMS/HCC)    CAP (community acquired pneumonia)    COPD exacerbation (CMS/HCC)    Atelectasis of left lung    Adenocarcinoma, lung, left (CMS/HCC)      PLAN:    1. Adenocarcinoma of the lung she has endobronchial disease on the left extending up to about the level of the jose.  She will not be a resection candidate further staging work-up at her functional class along with tumor markers will determine her treatment options.  Oncology following  2. Possible pneumonia, antibiotics adjusted, chest physio   3. Acute hypoxemic respiratory failure secondary to plugging left mainstem bronchus and underlying COPD.  Resolved oxygenating well on room air  4. COPD severe with acute exacerbation initially, thought it looks like everything was due to plugging.  Much improved at this time.  off steroids.  Repeat CxR stable  5. Left upper lobe atelectasis secondary to mucous plugging resolved with bronchoscopy the biggest issue is going to be trying to keep this from recurring  again this was the second episode.  I have her on very aggressive pulmonary hygiene and will mobilize her as much as possible  6. Left hemiparesis secondary to old CVA  7. Protein calorie malnutrition looks pretty significant she had a significant weight loss over the last several months at least 16 pounds and a BMI now of under 18.  8. Hypertension, we will need to adjust blood pressure medication, she is on losartan 50 mg daily as well as metoprolol 50 mg twice a day.  Increase losartan to 100 mg daily.  Will monitor BP  9. Hypokalemia - started on replacement protocol    Seen by Dr Whalen discussed outpatient radiation therapy  Will need home health for nursing, physical therapy at home.  Case management aware  Continue Megajyoti, appears to help  Probably discharge home Monday per d/w patient and family today.    check labs in the morning.        Florecita Clayton MD  6/2/2019

## 2019-06-03 LAB
ANION GAP SERPL CALCULATED.3IONS-SCNC: 11.9 MMOL/L
BASOPHILS # BLD AUTO: 0.05 10*3/MM3 (ref 0–0.2)
BASOPHILS NFR BLD AUTO: 0.4 % (ref 0–1.5)
BUN BLD-MCNC: 7 MG/DL (ref 8–23)
BUN/CREAT SERPL: 9.5 (ref 7–25)
CALCIUM SPEC-SCNC: 8.2 MG/DL (ref 8.6–10.5)
CHLORIDE SERPL-SCNC: 103 MMOL/L (ref 98–107)
CO2 SERPL-SCNC: 21.1 MMOL/L (ref 22–29)
CREAT BLD-MCNC: 0.74 MG/DL (ref 0.57–1)
DEPRECATED RDW RBC AUTO: 50.4 FL (ref 37–54)
EOSINOPHIL # BLD AUTO: 0.5 10*3/MM3 (ref 0–0.4)
EOSINOPHIL NFR BLD AUTO: 3.8 % (ref 0.3–6.2)
ERYTHROCYTE [DISTWIDTH] IN BLOOD BY AUTOMATED COUNT: 15.6 % (ref 12.3–15.4)
GFR SERPL CREATININE-BSD FRML MDRD: 77 ML/MIN/1.73
GLUCOSE BLD-MCNC: 96 MG/DL (ref 65–99)
HCT VFR BLD AUTO: 32.7 % (ref 34–46.6)
HGB BLD-MCNC: 9.9 G/DL (ref 12–15.9)
IMM GRANULOCYTES # BLD AUTO: 0.44 10*3/MM3 (ref 0–0.05)
IMM GRANULOCYTES NFR BLD AUTO: 3.3 % (ref 0–0.5)
LYMPHOCYTES # BLD AUTO: 1.3 10*3/MM3 (ref 0.7–3.1)
LYMPHOCYTES NFR BLD AUTO: 9.8 % (ref 19.6–45.3)
MCH RBC QN AUTO: 27 PG (ref 26.6–33)
MCHC RBC AUTO-ENTMCNC: 30.3 G/DL (ref 31.5–35.7)
MCV RBC AUTO: 89.1 FL (ref 79–97)
MONOCYTES # BLD AUTO: 0.71 10*3/MM3 (ref 0.1–0.9)
MONOCYTES NFR BLD AUTO: 5.4 % (ref 5–12)
NEUTROPHILS # BLD AUTO: 10.22 10*3/MM3 (ref 1.7–7)
NEUTROPHILS NFR BLD AUTO: 77.3 % (ref 42.7–76)
NRBC BLD AUTO-RTO: 0 /100 WBC (ref 0–0.2)
PLATELET # BLD AUTO: 345 10*3/MM3 (ref 140–450)
PMV BLD AUTO: 9.3 FL (ref 6–12)
POTASSIUM BLD-SCNC: 3.4 MMOL/L (ref 3.5–5.2)
POTASSIUM BLD-SCNC: 4.8 MMOL/L (ref 3.5–5.2)
RBC # BLD AUTO: 3.67 10*6/MM3 (ref 3.77–5.28)
SODIUM BLD-SCNC: 136 MMOL/L (ref 136–145)
WBC NRBC COR # BLD: 13.22 10*3/MM3 (ref 3.4–10.8)

## 2019-06-03 PROCEDURE — 85025 COMPLETE CBC W/AUTO DIFF WBC: CPT | Performed by: INTERNAL MEDICINE

## 2019-06-03 PROCEDURE — 25010000002 PIPERACILLIN SOD-TAZOBACTAM PER 1 G: Performed by: INTERNAL MEDICINE

## 2019-06-03 PROCEDURE — 94799 UNLISTED PULMONARY SVC/PX: CPT

## 2019-06-03 PROCEDURE — 97110 THERAPEUTIC EXERCISES: CPT

## 2019-06-03 PROCEDURE — 94668 MNPJ CHEST WALL SBSQ: CPT

## 2019-06-03 PROCEDURE — 94669 MECHANICAL CHEST WALL OSCILL: CPT

## 2019-06-03 PROCEDURE — 25010000002 ENOXAPARIN PER 10 MG: Performed by: INTERNAL MEDICINE

## 2019-06-03 PROCEDURE — 80048 BASIC METABOLIC PNL TOTAL CA: CPT | Performed by: INTERNAL MEDICINE

## 2019-06-03 PROCEDURE — 99233 SBSQ HOSP IP/OBS HIGH 50: CPT | Performed by: INTERNAL MEDICINE

## 2019-06-03 PROCEDURE — 84132 ASSAY OF SERUM POTASSIUM: CPT | Performed by: SPECIALIST

## 2019-06-03 RX ADMIN — TAZOBACTAM SODIUM AND PIPERACILLIN SODIUM 3.38 G: 375; 3 INJECTION, SOLUTION INTRAVENOUS at 09:28

## 2019-06-03 RX ADMIN — SODIUM CHLORIDE 4 ML: 7 NEBU SOLN,3 % NEBU at 06:44

## 2019-06-03 RX ADMIN — IPRATROPIUM BROMIDE AND ALBUTEROL SULFATE 3 ML: 2.5; .5 SOLUTION RESPIRATORY (INHALATION) at 10:43

## 2019-06-03 RX ADMIN — GUAIFENESIN 400 MG: 100 SOLUTION ORAL at 04:45

## 2019-06-03 RX ADMIN — POTASSIUM CHLORIDE 40 MEQ: 750 CAPSULE, EXTENDED RELEASE ORAL at 09:06

## 2019-06-03 RX ADMIN — MEGESTROL ACETATE 20 MG: 20 TABLET ORAL at 09:06

## 2019-06-03 RX ADMIN — OXYBUTYNIN CHLORIDE 5 MG: 5 TABLET, EXTENDED RELEASE ORAL at 09:09

## 2019-06-03 RX ADMIN — LEVOTHYROXINE SODIUM 75 MCG: 75 TABLET ORAL at 06:55

## 2019-06-03 RX ADMIN — SODIUM CHLORIDE, POTASSIUM CHLORIDE, SODIUM LACTATE AND CALCIUM CHLORIDE 75 ML/HR: 600; 310; 30; 20 INJECTION, SOLUTION INTRAVENOUS at 06:55

## 2019-06-03 RX ADMIN — IPRATROPIUM BROMIDE AND ALBUTEROL SULFATE 3 ML: 2.5; .5 SOLUTION RESPIRATORY (INHALATION) at 06:44

## 2019-06-03 RX ADMIN — IPRATROPIUM BROMIDE AND ALBUTEROL SULFATE 3 ML: 2.5; .5 SOLUTION RESPIRATORY (INHALATION) at 19:56

## 2019-06-03 RX ADMIN — POTASSIUM CHLORIDE 40 MEQ: 750 CAPSULE, EXTENDED RELEASE ORAL at 13:10

## 2019-06-03 RX ADMIN — GUAIFENESIN 400 MG: 100 SOLUTION ORAL at 13:10

## 2019-06-03 RX ADMIN — SODIUM CHLORIDE 4 ML: 7 NEBU SOLN,3 % NEBU at 19:56

## 2019-06-03 RX ADMIN — METOPROLOL TARTRATE 50 MG: 50 TABLET, FILM COATED ORAL at 09:06

## 2019-06-03 RX ADMIN — GUAIFENESIN 400 MG: 100 SOLUTION ORAL at 16:41

## 2019-06-03 RX ADMIN — ENOXAPARIN SODIUM 30 MG: 30 INJECTION SUBCUTANEOUS at 13:18

## 2019-06-03 RX ADMIN — GUAIFENESIN 400 MG: 100 SOLUTION ORAL at 09:05

## 2019-06-03 RX ADMIN — METOPROLOL TARTRATE 50 MG: 50 TABLET, FILM COATED ORAL at 21:44

## 2019-06-03 RX ADMIN — TAZOBACTAM SODIUM AND PIPERACILLIN SODIUM 3.38 G: 375; 3 INJECTION, SOLUTION INTRAVENOUS at 02:55

## 2019-06-03 RX ADMIN — LOSARTAN POTASSIUM 100 MG: 100 TABLET, FILM COATED ORAL at 09:06

## 2019-06-03 RX ADMIN — GUAIFENESIN 400 MG: 100 SOLUTION ORAL at 21:44

## 2019-06-03 RX ADMIN — IPRATROPIUM BROMIDE AND ALBUTEROL SULFATE 3 ML: 2.5; .5 SOLUTION RESPIRATORY (INHALATION) at 15:02

## 2019-06-03 NOTE — THERAPY TREATMENT NOTE
Acute Care - Physical Therapy Treatment Note  [facility]     Patient Name: Anaid Moura  : 1945  MRN: [id number]  Today's Date: 6/3/2019        Referring Physician: Rhea    Admit Date: 2019    Visit Dx:    ICD-10-CM ICD-9-CM   1. COPD exacerbation (CMS/HCC) J44.1 491.21   2. Decreased mobility R26.89 781.99     Patient Active Problem List   Diagnosis   • Benign essential hypertension   • Stenosis of carotid artery   • Chronic kidney disease, stage III (moderate) (CMS/HCC)   • Chronic obstructive pulmonary disease (CMS/HCC)   • Chronic coronary artery disease   • Hyperlipidemia   • Osteopenia   • Cerebrovascular accident (CMS/HCC)   • Urge incontinence of urine   • CAP (community acquired pneumonia)   • Cavitary lesion of lung   • Mediastinal adenopathy   • COPD exacerbation (CMS/HCC)   • Acute on chronic respiratory failure with hypoxemia (CMS/HCC)   • Atelectasis of left lung   • Adenocarcinoma, lung, left (CMS/HCC)       Therapy Treatment    Rehabilitation Treatment Summary     Row Name 19 6044             Treatment Time/Intention    Discipline  physical therapist  -MS      Document Type  therapy note (daily note)  -MS      Subjective Information  complains of;fatigue;weakness  -MS      Mode of Treatment  physical therapy;individual therapy  -MS      Patient Effort  good  -MS      Existing Precautions/Restrictions  fall  (Significant)  Left Hemiparesis from old CVA; Exit alarm; LLE AFO  -MS      Recorded by [MS] Dhruv Sandhu, PT 19 1354      Row Name 19 7532             Cognitive Assessment/Intervention- PT/OT    Orientation Status (Cognition)  oriented x 3  -MS      Follows Commands (Cognition)  follows one step commands;WNL  -MS      Personal Safety Interventions  fall prevention program maintained;gait belt;nonskid shoes/slippers when out of bed;supervised activity  -MS      Recorded by [MS] Dhruv Sandhu, PT 19 9010      Row Name 19 3939              Bed Mobility Assessment/Treatment    Bed Mobility Assessment/Treatment  supine-sit;sit-supine  -MS      Supine-Sit [place] (Bed Mobility)  contact guard  -MS      Sit-Supine [place] (Bed Mobility)  minimum assist (75% patient effort) Assist with pt.'s BLE's  -MS      Comment (Bed Mobility)  Once sitting EOB, pt. requires CGA x 1 for static sitting balance. Family assisted in donning pt.'s shoes and Left AFO  -MS2      Recorded by [MS] Dhruv Sandhu, PT 06/03/19 1359  [MS2] Dhruv Sandhu, PT 06/03/19 1403      Row Name 06/03/19 1353             Transfer Assessment/Treatment    Comment (Transfers)  Pt. transfers from bed to Wheelchair.  -MS      Recorded by [MS] Dhruv Sandhu, PT 06/03/19 1359      Row Name 06/03/19 1353             Sit-Stand Transfer    Sit-Stand [place] (Transfers)  contact guard  -MS      Assistive Device (Sit-Stand Transfers)  -- HHA RUE  -MS      Recorded by [MS] Dhruv Sandhu, PT 06/03/19 1359      Row Name 06/03/19 1353             Stand-Sit Transfer    Stand-Sit [place] (Transfers)  contact guard  -MS      Assistive Device (Stand-Sit Transfers)  -- HHA RUE  -MS      Recorded by [MS] Dhruv Sandhu, PT 06/03/19 1359      Row Name 06/03/19 1353             Gait/Stairs Assessment/Training    [place] Level (Gait)  contact guard  -MS      Distance in Feet (Gait)  80 feet  -MS      Pattern (Gait)  step-to  -MS      Deviations/Abnormal Patterns (Gait)  stride length decreased;andriy decreased  -MS      Bilateral Gait Deviations  heel strike decreased AFO Left L.E.  -MS      Comment (Gait/Stairs)  Pt. ambulated forward/backward at 20 foot intervals x 4, using the wall rail for support (per pt.'s request).  Followed pt. with her wheelchair for safety.  x 2 seated rest breaks due to fatigue.  -MS      Recorded by [MS] Dhruv Sandhu, PT 06/03/19 1359      Row Name 06/03/19 1353             Positioning and Restraints    Pre-Treatment Position  in bed  -MS       Post Treatment Position  bed  -MS      In Bed  notified nsg;supine;call light within reach;encouraged to call for assist;exit alarm on;with family/caregiver;RUE elevated;LUE elevated;R heel elevated;L heel elevated All lines intact.  -MS      Recorded by [MS] Dhruv Sandhu, PT 06/03/19 1359      Row Name 06/03/19 1352             Pain Scale: Numbers Pre/Post-Treatment    Pain Scale: Numbers, Pretreatment  0/10 - no pain  -MS      Pain Scale: Numbers, Post-Treatment  0/10 - no pain  -MS      Recorded by [MS] Dhruv Sandhu, PT 06/03/19 1353        User Key  (r) = Recorded By, (t) = Taken By, (c) = Cosigned By    Initials Name Effective Dates Discipline    Dhruv Branch, PT 04/03/18 -  PT                   Physical Therapy Education     Title: PT OT SLP Therapies (Done)     Topic: Physical Therapy (Done)     Point: Mobility training (Done)     Learning Progress Summary           Patient Acceptance, E,D, VU,NR by MS at 6/3/2019  1:59 PM    Acceptance, E,TB, VU,DU by LB at 6/2/2019  2:17 PM    Acceptance, E,TB, VU,DU by  at 6/1/2019  2:55 PM    Acceptance, E, NR,VU by  at 5/31/2019  4:16 PM    Acceptance, E, VU,NR by MA at 5/30/2019  9:31 AM                   Point: Home exercise program (Done)     Learning Progress Summary           Patient Acceptance, E,D, VU,NR by MS at 6/3/2019  1:59 PM    Acceptance, E,TB, VU,DU by LB at 6/2/2019  2:17 PM    Acceptance, E,TB, VU,DU by LB at 6/1/2019  2:55 PM    Acceptance, E, NR,VU by  at 5/31/2019  4:16 PM    Acceptance, E, VU,NR by MA at 5/30/2019  9:31 AM                   Point: Body mechanics (Done)     Learning Progress Summary           Patient Acceptance, E,D, VU,NR by MS at 6/3/2019  1:59 PM    Acceptance, E,TB, VU,DU by LB at 6/2/2019  2:17 PM    Acceptance, E,TB, VU,DU by  at 6/1/2019  2:55 PM    Acceptance, EDOUARD CROSS VU by PRIMITIVO at 5/31/2019  4:16 PM    AcceptanceTAY VU, NR by MA at 5/30/2019  9:31 AM                   Point: Precautions (Done)      Learning Progress Summary           Patient Acceptance, E,D, VU,NR by MS at 6/3/2019  1:59 PM    Acceptance, E,TB, VU,DU by LB at 6/2/2019  2:17 PM    Acceptance, E,TB, VU,DU by LB at 6/1/2019  2:55 PM    Acceptance, E, NR,VU by  at 5/31/2019  4:16 PM    Acceptance, E, VU,NR by MA at 5/30/2019  9:31 AM                               User Key     Initials Effective Dates Name Provider Type Discipline     04/03/18 -  Georgia Arellano, PT Physical Therapist PT    MS 04/03/18 -  Dhruv Sandhu, PT Physical Therapist PT     03/04/19 -  Alanis Quinones, PT Physical Therapist PT    MA 10/19/18 -  Delia Monahan, PT Physical Therapist PT                PT Recommendation and Plan     Plan of Care Reviewed With: patient  Progress: improving  Outcome Summary: Pt. able to ambulate 80 feet total (20 feet intervals, 10 feet forward, 10 feet backward x 4) with use of Wall rail RUE per pt.'s request.  x 2 seated rest breaks due to overall fatigue, weakness.   Outcome Measures     Row Name 06/03/19 1400 06/02/19 1400 06/01/19 1400       How much help from another person do you currently need...    Turning from your back to your side while in flat bed without using bedrails?  4  -MS  4  -LB  4  -LB    Moving from lying on back to sitting on the side of a flat bed without bedrails?  3  -MS  4  -LB  3  -LB    Moving to and from a bed to a chair (including a wheelchair)?  3  -MS  3  -LB  3  -LB    Standing up from a chair using your arms (e.g., wheelchair, bedside chair)?  3  -MS  3  -LB  3  -LB    Climbing 3-5 steps with a railing?  2  -MS  2  -LB  2  -LB    To walk in hospital room?  3  -MS  3  -LB  3  -LB    AM-PAC 6 Clicks Score  18  -MS  19  -LB  18  -LB       Functional Assessment    Outcome Measure Options  AM-PAC 6 Clicks Basic Mobility (PT)  -MS  AM-PAC 6 Clicks Basic Mobility (PT)  -LB  AM-PAC 6 Clicks Basic Mobility (PT)  -LB    Row Name 05/31/19 1600             How much help from another person do you currently need...     Turning from your back to your side while in flat bed without using bedrails?  3  -LH      Moving from lying on back to sitting on the side of a flat bed without bedrails?  3  -LH      Moving to and from a bed to a chair (including a wheelchair)?  3  -LH      Standing up from a chair using your arms (e.g., wheelchair, bedside chair)?  3  -LH      Climbing 3-5 steps with a railing?  3  -LH      To walk in hospital room?  3  -LH      AM-PAC 6 Clicks Score  18  -         Functional Assessment    Outcome Measure Options  AM-PAC 6 Clicks Basic Mobility (PT)  -        User Key  (r) = Recorded By, (t) = Taken By, (c) = Cosigned By    Initials Name Provider Type     Georgia Arellano, PT Physical Therapist    Dhruv Branch, PT Physical Therapist    Alanis Phillips, PT Physical Therapist         Time Calculation:   PT Charges     Row Name 06/03/19 1403             Time Calculation    Start Time  1325  -MS      Stop Time  1347  -MS      Time Calculation (min)  22 min  -MS      PT Received On  06/03/19  -MS      PT - Next Appointment  06/04/19  -MS         Time Calculation- PT    Total Timed Code Minutes- PT  18 minute(s)  -MS        User Key  (r) = Recorded By, (t) = Taken By, (c) = Cosigned By    Initials Name Provider Type    Dhruv Branch, PT Physical Therapist        Therapy Charges for Today     Code Description Service Date Service Provider Modifiers Qty    [id number] HC PT THER PROC EA 15 MIN 6/3/2019 Dhruv Sandhu, PT GP 1          PT G-Codes  Outcome Measure Options: AM-PAC 6 Clicks Basic Mobility (PT)  AM-PAC 6 Clicks Score: 18    Dhruv Sandhu, TAYLA  6/3/2019

## 2019-06-03 NOTE — PROGRESS NOTES
LOS: 7 days   Patient Care Team:  Timothy Taylor MD as PCP - General (Internal Medicine)  Miah Whalen MD as Consulting Physician (Radiation Oncology)    Subjective     Patient is currently sitting on the side of the bed said she is continuing to do her pulmonary hygiene and keeping herself fairly clear she thinks        Objective     Vital Signs  Vital Sign Min/Max for last 24 hours  Temp  Min: 97.4 °F (36.3 °C)  Max: 99.6 °F (37.6 °C)   BP  Min: 153/75  Max: 167/82   Pulse  Min: 73  Max: 99   Resp  Min: 18  Max: 20   SpO2  Min: 95 %  Max: 97 %   No Data Recorded   No Data Recorded        Ventilator/Non-Invasive Ventilation Settings (From admission, onward)    Start     Ordered    05/27/19 1133  Ventilator - AC/PC; (16); 100; 90%; 10; 16  Continuous     Question Answer Comment   Vent Mode AC/PC    Breath rate  16   FiO2 100    FiO2 titrate for Sp02% =/> 90%    PEEP 10    Inspiratory Pressure 16        05/27/19 1134                       Body mass index is 17.46 kg/m².  I/O last 3 completed shifts:  In: 2440 [P.O.:540; I.V.:1850; IV Piggyback:50]  Out: 1100 [Urine:1100]  No intake/output data recorded.        Physical Exam:  General Appearance: Well-developed elderly white female looks much older stated age.  Eyes: Conjunctiva are clear and anicteric  ENT: Mucous membranes moist no exudates   Neck: No adenopathy trachea midline  Lungs:  chest is  clear and she is not labored saturating well on room air  Cardiac: Regular rate rhythm no murmur,   Abdomen: Soft no palpable organomegaly or masses  : Not examined  Musculoskeletal: She does not have much muscle mass  Skin: No jaundice no petechiae  Neuro: She is alert oriented cooperative she has left hemiparesis with left foot drop weak left leg and almost no movement in the left upper extremity.  Right upper and lower extremity well  Extremities/P Vascular: No clubbing no cyanosis no edema palpable radial dorsalis pedis pulses  MSE: Seems to have a strong  desire to fight still very determined    Labs:  Results from last 7 days   Lab Units 06/03/19  0615 06/02/19  0516 06/01/19  2323 06/01/19 0317 05/30/19  0457 05/28/19  0644   GLUCOSE mg/dL 96 100*  --  90  --  128*   SODIUM mmol/L 136 140  --  142  --  140   POTASSIUM mmol/L 3.4* 4.3 4.4 2.9*  --  3.7   MAGNESIUM mg/dL  --  1.5*  --   --   --   --    CO2 mmol/L 21.1* 24.2  --  24.3  --  19.3*   CHLORIDE mmol/L 103 104  --  103  --  104   ANION GAP mmol/L 11.9 11.8  --  14.7  --  16.7   CREATININE mg/dL 0.74 0.90  --  0.82 0.85 0.91   BUN mg/dL 7* 8  --  10  --  9   BUN / CREAT RATIO  9.5 8.9  --  12.2  --  9.9   CALCIUM mg/dL 8.2* 8.7  --  8.4*  --  8.2*   EGFR IF NONAFRICN AM mL/min/1.73 77 61  --  68 66 61     Estimated Creatinine Clearance: 42.8 mL/min (by C-G formula based on SCr of 0.74 mg/dL).      Results from last 7 days   Lab Units 06/03/19 0615 06/01/19 0317 05/31/19  0429 05/28/19  0644   WBC 10*3/mm3 13.22* 12.65* 15.93* 17.05*   RBC 10*6/mm3 3.67* 4.09 3.69* 3.77   HEMOGLOBIN g/dL 9.9* 10.8* 10.0* 10.0*   HEMATOCRIT % 32.7* 34.8 31.1* 31.8*   MCV fL 89.1 85.1 84.3 84.4   MCH pg 27.0 26.4* 27.1 26.5*   MCHC g/dL 30.3* 31.0* 32.2 31.4*   RDW % 15.6* 15.2 15.1 15.0   RDW-SD fl 50.4 46.6 46.0 46.2   MPV fL 9.3 9.3 9.8 9.9   PLATELETS 10*3/mm3 345 440 429 367   NEUTROPHIL % % 77.3*  --  85.8*  --    LYMPHOCYTE % % 9.8*  --  6.7*  --    MONOCYTES % % 5.4  --  3.0*  --    EOSINOPHIL % % 3.8  --  0.0*  --    BASOPHIL % % 0.4  --  0.2  --    IMM GRAN % % 3.3*  --  4.3*  --    NEUTROS ABS 10*3/mm3 10.22*  --  13.68*  --    LYMPHS ABS 10*3/mm3 1.30  --  1.06  --    MONOS ABS 10*3/mm3 0.71  --  0.47  --    EOS ABS 10*3/mm3 0.50*  --  0.00  --    BASOS ABS 10*3/mm3 0.05  --  0.03  --    IMMATURE GRANS (ABS) 10*3/mm3 0.44*  --  0.69*  --    NRBC /100 WBC 0.0  --  0.1  --      Results from last 7 days   Lab Units 05/28/19  0953   PH, ARTERIAL pH units 7.511*   PO2 ART mm Hg 138.0*   PCO2, ARTERIAL mm Hg 24.7*    HCO3 ART mmol/L 19.8*                         Microbiology Results (last 10 days)     Procedure Component Value - Date/Time    Respiratory Culture - Lavage, Lung, Left Upper Lobe [[id number]] Collected:  05/27/19 1137    Lab Status:  Final result Specimen:  Lavage from Lung, Left Upper Lobe Updated:  05/29/19 0616     Respiratory Culture Scant growth (1+) Normal Respiratory Sakina     Gram Stain No WBCs or organisms seen      No epithelial cells seen    Blood Culture - Blood, Arm, Left [[id number]] Collected:  05/27/19 0840    Lab Status:  Final result Specimen:  Blood from Arm, Left Updated:  06/01/19 0846     Blood Culture No growth at 5 days                enoxaparin 30 mg Subcutaneous Q24H   guaiFENesin 400 mg Oral Q4H   ipratropium-albuterol 3 mL Nebulization 4x Daily - RT   levothyroxine 75 mcg Oral Q AM   losartan 100 mg Oral Daily   megestrol 20 mg Oral Daily   metoprolol tartrate 50 mg Oral Q12H   oxybutynin XL 5 mg Oral Daily   piperacillin-tazobactam 3.375 g Intravenous Q8H   sodium chloride 4 mL Nebulization BID - RT       sodium chloride 9 mL/hr Last Rate: 9 mL/hr (06/02/19 1742)       Diagnostics:  Xr Chest 2 View    Result Date: 5/17/2019  EXAMINATION: 2 VIEWS OF THE CHEST  HISTORY: 74-year-old female with a history of a fever and recurrent pneumonia.  FINDINGS: PA and lateral chest radiographs were obtained. Comparison is made to a prior chest radiograph dated 04/30/2019. There is a persistent area of opacification within the left upper lobe. However, the appearance suggests there may be some cavitation or air-fluid level. The region measures on the order of 2.3 x 3.0 cm.  Initially, there is greater interstitial prominence at the base of the right lower lobe with hazy opacification seen in the posterior costophrenic angle.  The lungs are hyperinflated.      1. There are imaging features that raise concern for a cavitary lesion in the left upper lobe measuring on the order of 3 cm in greatest  dimension. Correlation with a CT of the chest is recommended.  2. There is interstitial prominence of the base of the right lower lobe that is concerning for right lower lobe pneumonia.  This report was finalized on 5/17/2019 8:46 AM by Dr. Rene Nicole M.D.      Ct Chest Without Contrast    Result Date: 5/16/2019  THORACIC CT SCAN WITHOUT CONTRAST  HISTORY: possible cavitary lesion STEVE; J18.1-Lobar pneumonia, unspecified organism; A41.9-Sepsis, unspecified organism  COMPARISON: None.  TECHNIQUE:  Radiation dose reduction techniques were utilized, including automated exposure control and exposure modulation based on body size. Axial images of the thorax obtained without IV contrast, per request.  FINDINGS: The lungs are hyperexpanded suggesting COPD. Mild emphysema and fibrosis. There is an unusual-appearing, somewhat cavitary lesion along the posterior aspect of the left upper lobe. Portions have a thin, near imperceptible wall while along its more inferior aspect, it appears more mass like with a solid component on images 26 and 27 measuring approximately 2.3 x 1.5 cm in greatest dimensions. Neoplasm suspected although atypical infection could have this appearance also. There is a similar lesion in the right upper lobe best seen on images 25-29. The more nodular component inferiorly on image 29 measures approximately 8 mm.  There are irregular basilar consolidations, right greater than left which could be related to multifocal pneumonia and/or atelectasis. There are small pleural effusions, right greater than left. Neither appears loculated. Aorta moderately calcified but nonaneurysmal. Normal heart size.  Although performed without IV contrast, there does appear to be multifocal adenopathy. On image 43, interaortocaval adenopathy measures at least 2.7 x 1.8 cm. On image 36, prevascular adenopathy measures 1.9 x 1.4 cm. There are additional smaller nodes in the anterior and middle mediastinum. These may be  reactive or metastatic.        1. Emphysema/COPD with right greater than left basilar airspace opacities and small effusions as discussed. 2. Unusual cavitary upper lobe lesions as discussed. Neoplasm suspected although these could be infectious as well. 3. Multifocal mediastinal adenopathy, metastatic disease not excluded   .  This report was finalized on 5/16/2019 10:12 PM by Dhruv Gifford M.D.      Xr Chest 1 View    Result Date: 5/28/2019  PORTABLE CHEST X-RAY  CLINICAL HISTORY: Intubated Patient; J44.1-Chronic obstructive pulmonary disease with (acute) exacerbation  COMPARISON: 05/27/2019.  FINDINGS: Portable AP view of the chest was obtained with overlying monitor leads in place. ET tube has been placed and terminates at the level of the mid thoracic trachea. The lungs are well inflated. There is underlying emphysema and fibrosis. Vague airspace disease in the left perihilar region felt to reflect pneumonia does show some slight improvement. No edema or significant pleural fluid. Normal heart size.      Interval intubation, lungs well inflated with improving left perihilar predominant pneumonia.         Xr Chest 1 View    Result Date: 5/27/2019  ONE VIEW PORTABLE CHEST  HISTORY: Shortness of breath. COPD and cavitary lesion in left upper lobe.  FINDINGS: There is severe pulmonary emphysema with hyperinflation and the patient has recent CT scan and plain film on 05/16/2019 showing a cavitary lesion in the left upper lobe as well as a smaller lesion in the right upper lobe. There is now further volume loss and consolidation, in the left upper lobe on today's exam, and suspicious for pneumonia. The heart remains enlarged.  This report was finalized on 5/27/2019 10:18 AM by Dr. Eleno Mahajan M.D.      Xr Chest Pa & Lateral    Result Date: 4/30/2019  2-VIEW CHEST  HISTORY: Pneumonia. Cough.  FINDINGS: There is prominent COPD with hyperinflation. There is some localized pneumonia in the left upper lobe laterally  that is new since the study of 08/19/2015 and continued follow-up evaluation is recommended to ensure appropriate resolution. The heart remains mildly enlarged.  This report was finalized on 4/30/2019 1:19 PM by Dr. Eleno Mahajan M.D.               Active Hospital Problems    Diagnosis  POA   • **Acute on chronic respiratory failure with hypoxemia (CMS/HCC) [J96.21]  Unknown   • COPD exacerbation (CMS/HCC) [J44.1]  Yes   • Atelectasis of left lung [J98.11]  Unknown   • Adenocarcinoma, lung, left (CMS/HCC) [C34.92]  Unknown   • CAP (community acquired pneumonia) [J18.9]  Unknown      Resolved Hospital Problems   No resolved problems to display.         Assessment/Plan     1. Adenocarcinoma of the lung she has endobronchial disease on the left extending up to about the level of the jose.  She will not be a resection candidate further staging work-up , her functional class along with tumor markers will determine her treatment options.  Oncology following  2. MRI reviewed by neuroradiology and there are 3 or 4 of small metastatic lesions in the left cerebral hemisphere  3. Possible pneumonia I am not convinced she really has much of a pneumonia I think she just had mucous plugging.  Cultures negative patient completed 7 days of antibiotics.  4. Acute hypoxemic respiratory failure secondary to plugging left mainstem bronchus and underlying COPD.  Resolved oxygenating well on room air  5. COPD severe with acute exacerbation I am not sure how much exacerbation she had I think her worst problem with some mucus plugging she seems to be doing very well now off steroids  6. Left upper lobe atelectasis secondary to mucous plugging resolved with bronchoscopy the biggest issue is going to be trying to keep this from recurring again this was the second episode.  I have her on very aggressive pulmonary hygiene and will mobilize her as much as possible.  She can go home with hypertonic saline and albuterol nebs, guaifenesin and  take a flutter valve with her.  I do not think she has to go home with vest therapy first of all she did not feel it helped a whole lot and if she needs percussion her family can do it I actually showed them how to do percussion today.  It might be very difficult to get a machine approved and paid for especially since she did not feel it helped significantly  7. 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 Protein calorie malnutrition looks pretty significant she had a significant weight loss over the last several months at least 16 pounds and a BMI now of under 18.  Continue nutritional supplements    Plan for disposition: From a pulmonary standpoint she could probably be discharged any time to continue aggressive pulmonary hygiene at home .  She is to take the OPEP device with her and incentive spirometer.  I have entered into the discharge written med rec her nebulizer medications I want her to use    Arthur Wilkerson MD  06/03/19  3:38 PM    Time:

## 2019-06-03 NOTE — PROGRESS NOTES
LOS: 7 days       Chief Complaint: Newly diagnosed non-small cell lung cancer, history of CVA with left hemiparesis, COPD with acute on chronic hypoxic respiratory failure, possible pneumonia      Interval History:   Participated in PT so she is a little tired and short of breath.  Otherwise feels ready for discharge.  Afebrile.  Eating/drinking adequately with thickened liquids.    Review of Systems:    Review of systems was obtained with pertinent positive findings as noted in the interval history.  All other systems negative.    Objective     Vital Signs  Temp:  [98.1 °F (36.7 °C)-99.6 °F (37.6 °C)] 98.9 °F (37.2 °C)  Heart Rate:  [73-99] 73  Resp:  [18-20] 20  BP: (134-167)/(64-82) 158/75        Physical Exam:     GENERAL: Elderly female no distress, a little SOB  MOUTH:  Tongue is well-papillated; no stomatitis or ulcers.  Lips normal.  CHEST: scattered rhonchi  CARDIAC:  Regular rate and rhythm without murmurs, rubs or gallops. Normal S1,S2.  ABDOMEN:  Soft, nontender with no organomegaly or masses.  EXTREMITIES:  No clubbing, cyanosis or edema.  NEUROLOGICAL:  Cranial Nerves II-XII grossly intact.  Left hemiparesis  PSYCHIATRIC:  Normal affect and mood.           Results Review:     I reviewed the patient's new clinical results.    Results from last 7 days   Lab Units 06/03/19  0615   WBC 10*3/mm3 13.22*   HEMOGLOBIN g/dL 9.9*   HEMATOCRIT % 32.7*   PLATELETS 10*3/mm3 345     Lab Results   Component Value Date    NEUTROABS 10.22 (H) 06/03/2019     Results from last 7 days   Lab Units 06/03/19  0615   SODIUM mmol/L 136   POTASSIUM mmol/L 3.4*   CHLORIDE mmol/L 103   CO2 mmol/L 21.1*   BUN mg/dL 7*   CREATININE mg/dL 0.74   GLUCOSE mg/dL 96   CALCIUM mg/dL 8.2*         Results from last 7 days   Lab Units 06/02/19  0516   MAGNESIUM mg/dL 1.5*       MRI brain images from 5/29 images personally reviewed.  Several small lesions present that may represent metastatic disease.      1. At least 3 or 4 enhancing  lesions in the left cerebral hemisphere  with small amount of surrounding edema. These are consistent with  metastatic lesions. Please see full discussion above.  2. Evidence of old ischemic disease. There is some hemosiderin  deposition in the right thalamus from old hemorrhage.      Medication Review: Yes     Assessment/Plan     1. Non-small cell lung cancer (adenocarcinoma):  · Patient with long-standing smoking history x40 years quit in 2008  · Underlying significant COPD with FEV1 1.4 (58%) on 11/20/2014  · CT 5/16/2019 with left upper lobe mass, partially cavitary measuring 2.3 x 1.5 cm.  Additional 8 mm similar appearing right upper lobe nodule.  Bibasilar consolidation, small bilateral pleural effusions, mediastinal lymphadenopathy up to 2.7 centimeters.  · Bronchoscopy 5/20/2019 with identification of left mainstem malignancy extending to the level of the jose, biopsy showed poorly differentiated adenocarcinoma of pulmonary origin. EBUS with FNA station 7 lymph node negative for malignant cells, only scattered lymphoid aggregates.  BAL left upper lobe negative for malignant cells.  · Patient was intubated with possible pneumonia, significant mucus plugging with underlying significant COPD.  Extubated on 5/28/2019.   · Request made for analysis of biopsy for EGFR mutation, ALK/ROS 1 rearrangement. PDL1 95%.  · Staging evaluation performed during hospitalization with negative CT abdomen/pelvis, negative bone scan, and negative MRI brain 5/29/2019.  · Staging evaluation 5/29/2019 with no evidence of metastatic disease on CT abdomen/pelvis, bone scan, and MRI brain.  It appeared initially that she had stage III disease clinically with a primary tumor 2.3 cm left upper lobe with evidence of extension to left mainstem up to level of jose on bronchoscopy.  There was clinical suspicion for mediastinal lymph node involvement due to lymphadenopathy on CT although negative biopsy via EBUS.  There is also a  cavitary 8 mm right upper lobe nodule of unclear significance.  The patient was seen by radiation oncology Dr. Whalen and plans were made for PET scan as outpatient on 6/10/2019.  Subsequent plan to present her at the thoracic oncology conference on 6/13/2019 and plan to see her back in the office on 6/17/2019 to review PET scan findings, assess her functional and pulmonary status and make final decisions regarding recommended treatment for her lung cancer.    · On 6/2 Dr. Godfrey received a telephone call from neuro radiology Dr. Nicholson.  Apparently there was a preliminary report on the MRI and the scan was subsequently reviewed with neuroradiology with identification of subtle suspicious appearing small lesions in the left occipital and parietal regions with surrounding slight edema with high suspicion for small brain metastases.  This is reflected in an addended/final report.  Dr. Godfrey did return to see the patient and discussed these findings at length with the patient and her family.  They discussed implications of these findings to suggest metastatic disease which changes the focus of our treatment.  Any treatment at this point is palliative in nature.  In terms of management overall, she is asymptomatic and the lesions in question remain extremely small with a minimal amount of edema.  This does not warrant currently the use of steroids.  This likely does not warrant treatment with radiation either.  Dr. Godfrey did notify radiation oncology regarding this finding.  We likely will elect to monitor her CNS disease with close interval follow-up MRI at 4 to 6 weeks as we discuss options for her systemic therapy.  With PD-L1 results of 95%, frontline single agent immunotherapy will likely be preferred.  2. Acute on chronic hypoxemic respiratory failure:  · Possible underlying pneumonia continuing on empiric Zosyn and vancomycin  · Underlying COPD with acute exacerbation continuing on Solu-Medrol 20 mg every 12  hours  · Patient required intubation. Patient had significant mucus plugging which was removed endoscopically.  · Patient extubated 5/28/2019.  · She continues to improve from a pulmonary standpoint  3. Prior hemorrhagic CVA:  · Occurred in 2008, residual left upper extremity weakness and left lower extremity weakness.   4. Anemia:  · Hemoglobin prior to admission was 13.1 on 5/16/2019  · Hemoglobin has declined in the 10-11 range, related to malignancy, pneumonia  · Hemoglobin stable at 10     Plan:  1. Await results from molecular analysis of the patient's tumor (EGFR mutation, ALK/ROS 1 rearrangement).  2. Outpatient follow-up arranged with PET scan 6/10/2019 and follow-up visit in our office with Dr. Godfrey on 6/17/2019.  In the interim Dr. Godfrey will also present her at the thoracic oncology conference on 6/13/2019.  3. We will pursue approval for possible future treatment with first-line immunotherapy with Keytruda.  We will discuss treatment options at patient's return visit on 6/17/2019 and potentially proceed shortly thereafter with Keytruda pending her overall status.    Discussed with patient, , and brother at the bedside today.  Multiple questions answered.  Brain images personally reviewed. All issues new to me today.      Anticipating d/c tomorrow, will sign off to f/u as scheduled.         Yadiel Lind MD  06/03/19  1:24 PM

## 2019-06-03 NOTE — PLAN OF CARE
Problem: Patient Care Overview  Goal: Plan of Care Review   06/03/19 1400   Plan of Care Review   Progress improving   OTHER   Outcome Summary Pt. able to ambulate 80 feet total (20 feet intervals, 10 feet forward, 10 feet backward x 4) with use of Wall rail RUE per pt.'s request. x 2 seated rest breaks due to overall fatigue, weakness.    Coping/Psychosocial   Plan of Care Reviewed With patient

## 2019-06-03 NOTE — PROGRESS NOTES
Continued Stay Note  [facility]     Patient Name: Anaid Moura  MRN: [id number]  Today's Date: 6/3/2019    Admit Date: 5/27/2019    Discharge Plan     Row Name 06/03/19 1618       Plan    Plan  Home with Berta  and family support- spouse to transport    Patient/Family in Agreement with Plan  yes    Plan Comments  Spoke with pt and spouse and pts brother at bedside, followed up on dc planning. Pt states after speaking with the doctors no plan on vest at home. Pt still requests quad cane for home. [facility] left note for MD to order quad cane. [facility] spoke with Lesvia/Berta  to notify of anticipated dc tomorrow. [facility] spoke with Cherry/Eliud and notified that pt will not be going home with compression vest. She stated would be unlikely be covered without diagnosis of cystic fibrosis ([facility] explained this info from [employer] to pt). No additional dc orders noted. Cesario RNCCP        Discharge Codes    No documentation.             Syeda Pinto, RN

## 2019-06-03 NOTE — PROGRESS NOTES
Progress Note  Timothy Taylor MD    Patient ID:  Name:  Anaid Moura  MRN:  [id number]  [id number]  74 y.o.  female            CC/Reason for visit:      Acute on chronic respiratory failure with hypoxemia (CMS/HCC)    CAP (community acquired pneumonia)    COPD exacerbation (CMS/HCC)    Atelectasis of left lung    Adenocarcinoma, lung, left (CMS/HCC)    Interval history: Still with multiple questions ans issues. A lot of logistiv issues with transition to home. Slowly improving,apetite picking up,low potassium..    Vitals:  Vitals:    06/02/19 2300 06/03/19 0644 06/03/19 0700 06/03/19 1043   BP: 167/82  158/75    BP Location: Right arm  Right arm    Patient Position: Lying  Lying    Pulse: 85 87 82 73   Resp: 18 20 20 20   Temp: 99.6 °F (37.6 °C)  98.9 °F (37.2 °C)    TempSrc: Oral  Oral    SpO2: 97% 96%  96%   Weight:       Height:         FiO2 (%): 39 %     Body mass index is 17.46 kg/m².    Intake/Output Summary (Last 24 hours) at 6/3/2019 1307  Last data filed at 6/3/2019 0655  Gross per 24 hour   Intake 1130 ml   Output 650 ml   Net 480 ml       Exam:  GEN:  No distress, appears stated age  EYES:   EOM-i, anicteric sclera bilat  ENT:    External ears/nose normal, OP clear  NECK:  Supple, midline trachea  LUNGS: Clear breath sounds bilat, nonlabored breathing  CV:  Normal S1S2, without murmur, no edema  ABD:  Non tender, no enlarged liver or masses  EXT:  No cyanosis or clubbing    Scheduled meds:    enoxaparin 30 mg Subcutaneous Q24H   guaiFENesin 400 mg Oral Q4H   ipratropium-albuterol 3 mL Nebulization 4x Daily - RT   levothyroxine 75 mcg Oral Q AM   losartan 100 mg Oral Daily   megestrol 20 mg Oral Daily   metoprolol tartrate 50 mg Oral Q12H   oxybutynin XL 5 mg Oral Daily   piperacillin-tazobactam 3.375 g Intravenous Q8H   sodium chloride 4 mL Nebulization BID - RT     IV meds:                        lactated ringers 75 mL/hr Last Rate: Stopped (06/03/19 0929)   sodium chloride 9 mL/hr Last Rate: 9 mL/hr  (06/02/19 0220)       Data Review:   I reviewed the patient's medications and new clinical results.  Lab Results   Component Value Date    CALCIUM 8.2 (L) 06/03/2019     Results from last 7 days   Lab Units 06/03/19  0615 06/02/19  0516 06/01/19  2323 06/01/19  0317 05/31/19  0429   SODIUM mmol/L 136 140  --  142  --    POTASSIUM mmol/L 3.4* 4.3 4.4 2.9*  --    CHLORIDE mmol/L 103 104  --  103  --    CO2 mmol/L 21.1* 24.2  --  24.3  --    BUN mg/dL 7* 8  --  10  --    CREATININE mg/dL 0.74 0.90  --  0.82  --    CALCIUM mg/dL 8.2* 8.7  --  8.4*  --    GLUCOSE mg/dL 96 100*  --  90  --    WBC 10*3/mm3 13.22*  --   --  12.65* 15.93*   HEMOGLOBIN g/dL 9.9*  --   --  10.8* 10.0*   PLATELETS 10*3/mm3 345  --   --  440 429                 Results from last 7 days   Lab Units 05/28/19  0953   PH, ARTERIAL pH units 7.511*   PCO2, ARTERIAL mm Hg 24.7*   PO2 ART mm Hg 138.0*   MODALITY  Adult Vent   O2 SATURATION CALC % 99.4*       Estimated Creatinine Clearance: 42.8 mL/min (by C-G formula based on SCr of 0.74 mg/dL).    WEIGHTS:     Wt Readings from Last 1 Encounters:   06/02/19 0500 43.3 kg (95 lb 7.4 oz)   06/01/19 0422 43.3 kg (95 lb 7.4 oz)   05/30/19 0500 43.5 kg (96 lb)   05/29/19 1723 43.7 kg (96 lb 5.5 oz)   05/29/19 0425 43.7 kg (96 lb 5.5 oz)   05/28/19 0559 45.4 kg (100 lb 1.4 oz)   05/27/19 1129 40.7 kg (89 lb 11.6 oz)   05/27/19 0722 48.3 kg (106 lb 8 oz)         ASSESSMENT:     Acute on chronic respiratory failure with hypoxemia (CMS/HCC)    CAP (community acquired pneumonia)    COPD exacerbation (CMS/HCC)    Atelectasis of left lung    Adenocarcinoma, lung, left (CMS/HCC)      PLAN:    1. Adenocarcinoma of the lung she has endobronchial disease on the left extending up to about the level of the jose.  She will not be a resection candidate further staging work-up , her functional class along with tumor markers will determine her treatment options.  Oncology following  2. MRI reviewed by neuroradiology and  there are 3 or 4 of small metastatic lesions in the left cerebral hemisphere  3. Possible pneumonia I am not convinced she really has much of a pneumonia I think she just had mucous plugging.  Cultures negative patient will complete 7 days of Zosyn today and then discontinue antibiotics  4. Acute hypoxemic respiratory failure secondary to plugging left mainstem bronchus and underlying COPD.  Resolved oxygenating well on room air  5. COPD severe with acute exacerbation I am not sure how much exacerbation she had I think her worst problem with some mucus plugging she seems to be doing very well now off steroids  6. Left upper lobe atelectasis secondary to mucous plugging resolved with bronchoscopy the biggest issue is going to be trying to keep this from recurring again this was the second episode.  I have her on very aggressive pulmonary hygiene and will mobilize her as much as possible.  She can go home with hypertonic saline and albuterol nebs, guaifenesin and take a flutter valve with her.  I do not think she has to go home with vest therapy first of all she did not feel it helped a whole lot and if she needs percussion her family can do it I actually showed them how to do percussion today.  It might be very difficult to get a machine approved and paid for especially since she did not feel it helped significantly  7. 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 Protein calorie malnutrition looks pretty significant she had a significant weight loss over the last several months at least 16 pounds and a BMI now of under 18.  Continue nutritional supplements    D/w CCP and we are working to put everything in place prior to d/c.  Multiple questions answered. Did 10 minutes question/answer with a family.  Replace potassium. Finish iv antibiotic 7 days.  Pulmonary nto order hypertonic solution breathing treatments.  Plan if stable to d/c home with [facility] tomorrow.          Timothy Taylor MD  6/3/2019

## 2019-06-03 NOTE — PROGRESS NOTES
MR brain notes 4 small metastases in the 3-5 mm range, received word from Dr. Godfrey that radiation will now be put on hold and systemic therapy will start likely with Keytruda.  No radiation therapy planned at this time.

## 2019-06-03 NOTE — PLAN OF CARE
Problem: Fall Risk (Adult)  Goal: Absence of Fall  Outcome: Ongoing (interventions implemented as appropriate)      Problem: Skin Injury Risk (Adult)  Goal: Skin Health and Integrity  Outcome: Ongoing (interventions implemented as appropriate)      Problem: Patient Care Overview  Goal: Plan of Care Review  Outcome: Ongoing (interventions implemented as appropriate)   06/03/19 1605   Plan of Care Review   Progress improving   OTHER   Outcome Summary Potassium 3.4 treated recheck 1730. No complaint of pain.Will continue to monitor.   Coping/Psychosocial   Plan of Care Reviewed With patient     Goal: Individualization and Mutuality  Outcome: Ongoing (interventions implemented as appropriate)      Problem: Pain, Acute (Adult)  Goal: Acceptable Pain Control/Comfort Level  Outcome: Ongoing (interventions implemented as appropriate)

## 2019-06-04 VITALS
HEART RATE: 86 BPM | TEMPERATURE: 98.3 F | SYSTOLIC BLOOD PRESSURE: 151 MMHG | BODY MASS INDEX: 19.8 KG/M2 | WEIGHT: 107.58 LBS | OXYGEN SATURATION: 96 % | DIASTOLIC BLOOD PRESSURE: 70 MMHG | RESPIRATION RATE: 19 BRPM | HEIGHT: 62 IN

## 2019-06-04 LAB
ANION GAP SERPL CALCULATED.3IONS-SCNC: 12.5 MMOL/L
BUN BLD-MCNC: 8 MG/DL (ref 8–23)
BUN/CREAT SERPL: 9.2 (ref 7–25)
CALCIUM SPEC-SCNC: 8.6 MG/DL (ref 8.6–10.5)
CHLORIDE SERPL-SCNC: 105 MMOL/L (ref 98–107)
CO2 SERPL-SCNC: 20.5 MMOL/L (ref 22–29)
CREAT BLD-MCNC: 0.87 MG/DL (ref 0.57–1)
CYTO UR: NORMAL
GFR SERPL CREATININE-BSD FRML MDRD: 64 ML/MIN/1.73
GLUCOSE BLD-MCNC: 90 MG/DL (ref 65–99)
LAB AP CASE REPORT: NORMAL
LAB AP DIAGNOSIS COMMENT: NORMAL
Lab: NORMAL
MAGNESIUM SERPL-MCNC: 1.6 MG/DL (ref 1.6–2.4)
PATH REPORT.ADDENDUM SPEC: NORMAL
PATH REPORT.FINAL DX SPEC: NORMAL
PATH REPORT.GROSS SPEC: NORMAL
POTASSIUM BLD-SCNC: 4 MMOL/L (ref 3.5–5.2)
SODIUM BLD-SCNC: 138 MMOL/L (ref 136–145)

## 2019-06-04 PROCEDURE — 94669 MECHANICAL CHEST WALL OSCILL: CPT

## 2019-06-04 PROCEDURE — 94799 UNLISTED PULMONARY SVC/PX: CPT

## 2019-06-04 PROCEDURE — 25010000002 ENOXAPARIN PER 10 MG: Performed by: INTERNAL MEDICINE

## 2019-06-04 PROCEDURE — 83735 ASSAY OF MAGNESIUM: CPT | Performed by: SPECIALIST

## 2019-06-04 PROCEDURE — 80048 BASIC METABOLIC PNL TOTAL CA: CPT | Performed by: SPECIALIST

## 2019-06-04 RX ORDER — SODIUM CHLORIDE FOR INHALATION 7 %
4 VIAL, NEBULIZER (ML) INHALATION
Qty: 480 ML | Refills: 6 | Status: SHIPPED | OUTPATIENT
Start: 2019-06-04 | End: 2020-01-01

## 2019-06-04 RX ORDER — MEGESTROL ACETATE 20 MG/1
20 TABLET ORAL DAILY
Qty: 30 TABLET | Refills: 5
Start: 2019-06-05 | End: 2019-06-26 | Stop reason: HOSPADM

## 2019-06-04 RX ORDER — IPRATROPIUM BROMIDE AND ALBUTEROL SULFATE 2.5; .5 MG/3ML; MG/3ML
3 SOLUTION RESPIRATORY (INHALATION)
Qty: 480 ML | Refills: 11 | Status: SHIPPED | OUTPATIENT
Start: 2019-06-04 | End: 2019-01-01

## 2019-06-04 RX ADMIN — METOPROLOL TARTRATE 50 MG: 50 TABLET, FILM COATED ORAL at 08:18

## 2019-06-04 RX ADMIN — OXYBUTYNIN CHLORIDE 5 MG: 5 TABLET, EXTENDED RELEASE ORAL at 08:18

## 2019-06-04 RX ADMIN — GUAIFENESIN 400 MG: 100 SOLUTION ORAL at 12:16

## 2019-06-04 RX ADMIN — GUAIFENESIN 400 MG: 100 SOLUTION ORAL at 00:30

## 2019-06-04 RX ADMIN — IPRATROPIUM BROMIDE AND ALBUTEROL SULFATE 3 ML: 2.5; .5 SOLUTION RESPIRATORY (INHALATION) at 12:26

## 2019-06-04 RX ADMIN — GUAIFENESIN 400 MG: 100 SOLUTION ORAL at 08:18

## 2019-06-04 RX ADMIN — SODIUM CHLORIDE 4 ML: 7 NEBU SOLN,3 % NEBU at 06:37

## 2019-06-04 RX ADMIN — GUAIFENESIN 400 MG: 100 SOLUTION ORAL at 04:43

## 2019-06-04 RX ADMIN — MEGESTROL ACETATE 20 MG: 20 TABLET ORAL at 08:19

## 2019-06-04 RX ADMIN — ENOXAPARIN SODIUM 30 MG: 30 INJECTION SUBCUTANEOUS at 15:27

## 2019-06-04 RX ADMIN — IPRATROPIUM BROMIDE AND ALBUTEROL SULFATE 3 ML: 2.5; .5 SOLUTION RESPIRATORY (INHALATION) at 06:32

## 2019-06-04 RX ADMIN — LEVOTHYROXINE SODIUM 75 MCG: 75 TABLET ORAL at 07:44

## 2019-06-04 RX ADMIN — LOSARTAN POTASSIUM 100 MG: 100 TABLET, FILM COATED ORAL at 08:18

## 2019-06-04 NOTE — PLAN OF CARE
Problem: Fall Risk (Adult)  Goal: Absence of Fall  Outcome: Ongoing (interventions implemented as appropriate)      Problem: Skin Injury Risk (Adult)  Goal: Skin Health and Integrity  Outcome: Ongoing (interventions implemented as appropriate)      Problem: Patient Care Overview  Goal: Plan of Care Review  Outcome: Ongoing (interventions implemented as appropriate)   06/04/19 1485   Plan of Care Review   Progress improving   OTHER   Outcome Summary To be discharged home with [facility] home health today.   Coping/Psychosocial   Plan of Care Reviewed With patient     Goal: Individualization and Mutuality  Outcome: Ongoing (interventions implemented as appropriate)      Problem: Pain, Acute (Adult)  Goal: Acceptable Pain Control/Comfort Level  Outcome: Ongoing (interventions implemented as appropriate)

## 2019-06-04 NOTE — PROGRESS NOTES
Continued Stay Note  [facility]     Patient Name: Anaid Moura  MRN: [id number]  Today's Date: 6/4/2019    Admit Date: 5/27/2019    Discharge Plan     Row Name 06/04/19 1207       Plan    Plan  Home with [facility] and spouse- spouse to transport    Patient/Family in Agreement with Plan  yes    Plan Comments  DC orders noted. Faxed over DME order for Quad cane to [facility]. CCP called Cesilia/Eliud to notify of faxed dme orders. Spoke with Lesvia Hampton  all arranged. Spoke with [facility] he states they can fill all prescirptions but megastrol is not in stock and will be a day before arrives. He stated pt will fill at [facility]. CCP provided pt with prescriptions and they will get that prescription filled at [facility]. CCP faxed over Megstrol to [facility] also. Spouse to transport home. Cesario CARVALHO/CCP        Discharge Codes    No documentation.       Expected Discharge Date and Time     Expected Discharge Date Expected Discharge Time    Jun 4, 2019             Syeda Pinto RN

## 2019-06-04 NOTE — DISCHARGE SUMMARY
Discharge Summary  Timothy Taylor MD     NAME: Anaid Moura ADMIT: 2019   : 1945  PCP: Timothy Taylor MD    MRN: [id number] LOS: 8 days   AGE/SEX: 74 y.o. female  ROOM: [facility]     Date of Admission:  2019  Date of Discharge:  2019    PCP: Timothy Taylor MD    CHIEF COMPLAINT  Shortness of Breath      DISCHARGE DIAGNOSIS  Active Hospital Problems    Diagnosis  POA   • **Acute on chronic respiratory failure with hypoxemia (CMS/formerly [facility]) [J96.21]  Unknown   • COPD exacerbation (CMS/formerly [facility]) [J44.1]  Yes   • Atelectasis of left lung [J98.11]  Unknown   • Adenocarcinoma, lung, left (CMS/formerly [facility]) [C34.92]  Unknown   • CAP (community acquired pneumonia) [J18.9]  Unknown      Resolved Hospital Problems   No resolved problems to display.       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   • Hyperlipidemia    • Left hemiplegia (CMS/formerly [facility]) 12/10/2015   • Myocardial infarct (CMS/formerly [facility])    • Osteopenia    • Stroke syndrome    • Thyroid cyst     right   • Thyroid lump 12/10/2015    Description: Biopsy 05/15 at [facility], benign   • Transient cerebral ischemia    • Urge incontinence of urine        CONSULTS   Consults     Date and Time Order Name Status Description    2019 0908 Inpatient Radiation Oncology Consult Completed     2019 1313 Hematology & Oncology Inpatient Consult Completed     2019 0846 Family Medicine Consult Completed     2019 0827 Pulmonology (on-call MD unless specified) Completed     2019 0030 Inpatient Pulmonology Consult Completed     20195 Inpatient Internal Medicine  Consult Completed           PROCEDURES PERFORMED  Imaging Results (last 72 hours)     ** No results found for the last 72 hours. **            HOSPITAL COURSE      Pt is a 74 y.o. female who presents complaining of worsening SOA for the past 5 days following d/c from the hospital on 5/22/19, exacerbated with ambulation. Pt confirms generalized weakness and productive cough with clear sputum as well as decreased PO intake per spouse, but denies fever, chest pain, and N/V. Per pt, she was admitted from 5/16 - 5/22 for CAP and discharged with breathing treatments and abx, has had no improvement of symptoms outpatient. Pt affirms she is not on O2 at baseline, was placed on 15L NRM by EMS due to O2 sats in the 80s on room air upon their arrival.      She has apparently known severe COPD.  She was recently in the hospital with bilateral lower lobe pneumonias presents with severe hypoxemia and not responding to therapy.  Dr Wilkerson talked with she and her .  She does want intubation and mechanical ventilation.  She is in such respiratory distress she is not able to provide any history patient has had a couple of nodules including one in the bit larger in the left upper lobe with some mediastinal adenopathy and the plan was to follow-up with CT after treating her pneumonia and if these had not progress to  biopsy.    The pathology that we were waiting from recent admission came back positive for   AdenoCarcinoma of the lung.Hem?onc Consulted plus Radiation Oncology.  Cancer work up ordered.     MRI BRAIN W WO CONTRAST-  05/29/2019.     HISTORY: Lung cancer. Staging.     TECHNIQUE: Multiple pre and post contrast sagittal, axial and coronal  images were obtained through the brain.     FINDINGS: The diffusion images show no evidence of acute infarction.     FLAIR images show scattered areas of bright signal intensity in the  bilateral cerebral white matter consistent with old ischemic disease.  There is encephalomalacia  with hemosiderin deposition in the right  thalamus from old hemorrhage. This was seen on the previous CT scans  from 2008. There is compensatory enlargement of the right lateral  ventricle. Moderate-sized old infarct is seen in the right  frontoparietal region.     On the post gadolinium images there are 3 or 4 small enhancing lesions.  One of these is seen in the left occipital lobe on axial series 10 image  11 measuring approximately 5 mm. Small lesion in the left posterior  parietal lobe on series 10 image 20 measures approximately 4 mm. Tiny  enhancing lesion in the far anterior left frontal lobe on series 10  image 13 measures approximately 4 mm in size. There are additional very  tiny 3 mm to 4 mm enhancing lesion in the superior medial left parietal  lobe on series 10 image 24. These lesions demonstrate small amount of  surrounding edema. These lesions are worrisome for metastatic lesions.     There are no previous MRI studies of the brain available for comparison.     The craniocervical junction and sella appear normal. There is absent  flow signal in the right internal carotid artery which has been  previously been shown to be occluded.:  1. At least 3 or 4 enhancing lesions in the left cerebral hemisphere  with small amount of surrounding edema. These are consistent with  metastatic lesions. Please see full discussion above.  2. Evidence of old ischemic disease. There is some hemosiderin  deposition in the right thalamus from old hemorrhage.     This report was finalized on 5/31/2019 5:17 PM by Dr. Francisco Lui M.D.      Imaging     MRI Brain With & Without Contrast (Order: [id number]) - 5/29/2019   Result History     MRI Brain With & Without Contrast       NUCLEAR MEDICINE WHOLE BODY BONE SCAN     HISTORY: Lung cancer. Evaluate for bony metastatic disease.     TECHNIQUE:  20.6 mCi of 99m technetium MDP was administered IV followed  by 3 hour delayed phase whole body imaging with selected spot  views.     COMPARISON: CT abdomen and pelvis 05/29/2019, CT chest 05/16/2019.     FINDINGS:  There are no abnormal foci of uptake to suggest bony  metastatic disease. There is degenerative uptake within the lumbar  spine. The right kidney and urinary bladder are visualized. There is  nonvisualization of the left kidney which exhibits advanced atrophy on  CT.     IMPRESSION:  1.  No evidence for bony metastatic disease.  2.  Nonvisualization of the left kidney which exhibits severe atrophy on        EXAMINATION: CT OF THE ABDOMEN AND PELVIS WITH CONTRAST     HISTORY: 74-year-old female with a history of lung carcinoma undergoing  staging evaluation of the abdomen and pelvis.     TECHNIQUE: Contiguous axial images were obtained through the abdomen and  pelvis following intravenous administration of nonionic contrast.     COMPARISON: None.     FINDINGS: There is minimal atelectasis at the base of the right lower  lobe. The spleen, adrenal glands, liver and right kidney have a normal  appearance. Severe atrophy of the left kidney is noted. The pancreas and  gallbladder also have a normal appearance.     There is no evidence for adenopathy of the abdomen or pelvis. Severe  atheromatous calcification of the abdominal aorta is noted. There is a  1.8 cm distal abdominal aortic saccular aneurysm. Severe bilateral  atheromatous calcification of the iliac arteries is noted. No  abnormality of the bowel is appreciated. A scant amount of free fluid is  seen within the pelvic cul-de-sac. The osseous structures of the pelvis  and lumbar spine appear normal.     IMPRESSION:  1. There is no evidence for a neoplastic and/or metastatic process of  the abdomen or pelvis.  2. There is a 1.8 cm saccular aneurysm of the mid abdominal aorta.  Severe atheromatous calcification of the abdominal aorta and bilateral  common iliac arteries is noted.  3. Severe left renal atrophy.     Radiation dose reduction techniques were utilized, including  automated  exposure control and exposure modulation based on body size.     This report was finalized on 5/29/2019 8:57 PM by Dr. Rene Nicole M.D.     Dr Whalen Radiation Oncology:      Assessment: *Grade 3 adenocarcinoma of the left upper lobe, staging pending.  I understand a PET scan is planned for 6/10 and the patient to be presented at the multidisciplinary thoracic conference on 6/13 and will see Dr. Godfrey back to go over plans on 6/17.  I went over the details of a possible full course of radiation therapy with the patient today.  A likely dose aim would be  60-66 Gy.     Plan: We will await completion of PET scan and conference discussion before deciding on final plan.      1. Adenocarcinoma of the lung she has endobronchial disease on the left extending up to about the level of the jose.  She will not be a resection candidate further staging work-up , her functional class along with tumor markers will determine her treatment options.  Oncology following  2. MRI reviewed by neuroradiology and there are 3 or 4 of small metastatic lesions in the left cerebral hemisphere  3. Possible pneumonia I am not convinced she really has much of a pneumonia I think she just had mucous plugging.  Cultures negative patient will complete 7 days of Zosyn today and then discontinue antibiotics  4. Acute hypoxemic respiratory failure secondary to plugging left mainstem bronchus and underlying COPD.  Resolved oxygenating well on room air  5. COPD severe with acute exacerbation I am not sure how much exacerbation she had I think her worst problem with some mucus plugging she seems to be doing very well now off steroids  6. Left upper lobe atelectasis secondary to mucous plugging resolved with bronchoscopy the biggest issue is going to be trying to keep this from recurring again this was the second episode.  I have her on very aggressive pulmonary hygiene and will mobilize her as much as possible.  She can go home with  "hypertonic saline and albuterol nebs, guaifenesin and take a flutter valve with her.  I do not think she has to go home with vest therapy first of all she did not feel it helped a whole lot and if she needs percussion her family can do it I actually showed them how to do percussion today.  It might be very difficult to get a machine approved and paid for especially since she did not feel it helped significantly  7. 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 Protein calorie malnutrition looks pretty significant she had a significant weight loss over the last several months at least 16 pounds and a BMI now of under 18.  Continue nutritional supplements     The patient is going home with ADAMA ALEX.       PHYSICAL EXAM  Objective       Physical Exam:  /70 (BP Location: Right arm, Patient Position: Lying)   Pulse 86   Temp 98.3 °F (36.8 °C) (Oral)   Resp 19   Ht 157.5 cm (62.01\")   Wt 48.8 kg (107 lb 9.4 oz)   SpO2 96%   BMI 19.67 kg/m²     General Appearance:    Alert, cooperative, no distress, appears stated age   Head:    Normocephalic, without obvious abnormality, atraumatic   Eyes:    PERRL, conjunctiva/corneas clear, EOM's intact, both eyes   Ears:    Normal external ear canals, both ears   Nose:   Nares normal, septum midline, mucosa normal, no drainage    or sinus tenderness   Throat:   Lips, mucosa, and tongue normal   Neck:   Supple, symmetrical, trachea midline, no adenopathy;     thyroid:  no enlargement/tenderness/nodules; no carotid    bruit or JVD   Back:     Symmetric, no curvature, ROM normal, no CVA tenderness   Lungs:     Clear to auscultation bilaterally, respirations unlabored   Chest Wall:    No tenderness or deformity    Heart:    Regular rate and rhythm, S1 and S2 normal, no murmur, rub   or gallop   Abdomen:     Soft, non-tender, bowel sounds active all four quadrants,     no masses, no hepatomegaly, no splenomegaly   Extremities:   Extremities normal, atraumatic, no cyanosis or edema "   Pulses:   2+ and symmetric all extremities   Skin:   Skin color, texture, turgor normal, no rashes or lesions   Lymph nodes:   Cervical, supraclavicular, and axillary nodes normal   Neurologic:   CNII-XII intact, normal strength, sensation intact throughout          CONDITION ON DISCHARGE  Stable.      DISCHARGE DISPOSITION   Home or Self Care      DISCHARGE MEDICATIONS     Discharge Medications      New Medications      Instructions Start Date   ipratropium-albuterol 0.5-2.5 mg/3 ml nebulizer  Commonly known as:  DUO-NEB   3 mL, Nebulization, 4 Times Daily - RT      megestrol 20 MG tablet  Commonly known as:  MEGACE  Notes to patient:  Next dose due: 6/5/2019   20 mg, Oral, Daily   Start Date:  6/5/2019     sodium chloride 7 % nebulizer solution nebulizer solution  Notes to patient:  Hospital schedule: 7:30 pm and 9:30 pm  Next dose due:6/4/2019 at 9:30 pm.   4 mL, Nebulization, 2 Times Daily - RT, And may use additional 2 times as needed chest congestion      TUSSIN MUCUS+CHEST CONGESTION 100 MG/5ML liquid  Generic drug:  guaifenesin  Notes to patient:  Hospital schedule: 8:30 am, 12:30 pm,4:30 pm, 8:30 pm.  Last dose: 6/4/2019 at 12:16 pm   400 mg, Oral, Every 4 Hours, While awake         Changes to Medications      Instructions Start Date   atorvastatin 80 MG tablet  Commonly known as:  LIPITOR  What changed:    · when to take this  · additional instructions   80 mg, Oral, Daily, Needs an appointment for further refills         Continue These Medications      Instructions Start Date   aspirin 81 MG chewable tablet   81 mg, Oral, Daily      levothyroxine 75 MCG tablet  Commonly known as:  SYNTHROID, LEVOTHROID  Notes to patient:  Next dose due: 6/5/2019   1 tablet, Oral, Daily      losartan 50 MG tablet  Commonly known as:  COZAAR  Notes to patient:  Next dose due: 6/5/2019   50 mg, Oral, Daily      metoprolol tartrate 50 MG tablet  Commonly known as:  LOPRESSOR  Notes to patient:  Next dose due: 6/4/2019 at  bedtime.  Hospital schedule: 9;00 am and 9:00 pm.   50 mg, Oral, 2 Times Daily      tolterodine 1 MG tablet  Commonly known as:  DETROL   TAKE 1 TABLET TWICE A DAY      vitamin D3 5000 units tablet tablet   5,000 Units, Oral, Daily         Stop These Medications    albuterol (2.5 MG/3ML) 0.083% nebulizer solution  Commonly known as:  PROVENTIL     ANORO ELLIPTA 62.5-25 MCG/INH aerosol powder  inhaler  Generic drug:  umeclidinium-vilanterol          Diet Instructions     Regular chopped, nectar thick liquids.                Future Appointments   Date Time Provider Department Center   6/10/2019  9:45 AM [facility] PET ADMIN RM 1  [facility]   6/10/2019 11:15 AM [facility] PET 1  [facility] PET [facility]   6/17/2019  2:40 PM LAB CHAIR [facility] LAB [facility] None   6/17/2019  3:20 PM Solis Godfrey Jr., MD [facility]   9/3/2019 11:30 AM Tammy Pina MD [facility]  [facility] None     Additional Instructions for the Follow-ups that You Need to Schedule     Discharge Follow-up with PCP   As directed       Currently Documented PCP:    Timothy Taylor MD    PCP Phone Number:    [phone]     Follow Up Details:  dr Taylor in 3 weeks            Contact information for follow-up providers     Timothy Taylor MD Follow up.    Specialties:  Internal Medicine, Hospitalist  Why:  dr Taylor in 3 weeks  Contact information:  [address]  [facility] [id number] [phone]                   Contact information for after-discharge care     Home Medical Care     [facility] Follow up.    Service:  Home Health Services  Contact information:  [address] [facility] [address]  [facility] [facility] [address] [phone]                             TEST  RESULTS PENDING AT DISCHARGE         Timothy Taylor MD  06/04/19  4:35 PM

## 2019-06-05 ENCOUNTER — READMISSION MANAGEMENT (OUTPATIENT)
Dept: CALL CENTER | Facility: HOSPITAL | Age: 74
End: 2019-06-05

## 2019-06-05 NOTE — OUTREACH NOTE
Prep Survey      Responses   Facility patient discharged from?  [facility]   Is patient eligible?  Yes   Discharge diagnosis  Acute on chronic respiratory failure with hypoxemia [facility] C34.92 Unknown   Does the patient have one of the following disease processes/diagnoses(primary or secondary)?  COPD/Pneumonia   Does the patient have Home health ordered?  Yes   What is the Home health agency?   [facility]   Is there a DME ordered?  Yes   What DME was ordered?  quad cane _ jazmine   Prep survey completed?  Yes          Carrol Lorenzo RN

## 2019-06-06 ENCOUNTER — HOSPITAL ENCOUNTER (INPATIENT)
Facility: HOSPITAL | Age: 74
LOS: 20 days | Discharge: HOME HEALTH | End: 2019-06-26
Attending: EMERGENCY MEDICINE | Admitting: SPECIALIST

## 2019-06-06 ENCOUNTER — APPOINTMENT (OUTPATIENT)
Dept: GENERAL RADIOLOGY | Facility: HOSPITAL | Age: 74
End: 2019-06-06

## 2019-06-06 DIAGNOSIS — J44.1 COPD EXACERBATION (HCC): Primary | ICD-10-CM

## 2019-06-06 LAB
ALBUMIN SERPL-MCNC: 3.1 G/DL (ref 3.5–5.2)
ALBUMIN/GLOB SERPL: 0.6 G/DL
ALP SERPL-CCNC: 82 U/L (ref 39–117)
ALT SERPL W P-5'-P-CCNC: 12 U/L (ref 1–33)
ANION GAP SERPL CALCULATED.3IONS-SCNC: 12.5 MMOL/L
AST SERPL-CCNC: 15 U/L (ref 1–32)
BASOPHILS # BLD AUTO: 0.05 10*3/MM3 (ref 0–0.2)
BASOPHILS NFR BLD AUTO: 0.2 % (ref 0–1.5)
BILIRUB SERPL-MCNC: 0.4 MG/DL (ref 0.2–1.2)
BUN BLD-MCNC: 5 MG/DL (ref 8–23)
BUN/CREAT SERPL: 7.2 (ref 7–25)
CALCIUM SPEC-SCNC: 9.2 MG/DL (ref 8.6–10.5)
CHLORIDE SERPL-SCNC: 93 MMOL/L (ref 98–107)
CO2 SERPL-SCNC: 19.5 MMOL/L (ref 22–29)
CREAT BLD-MCNC: 0.69 MG/DL (ref 0.57–1)
D-LACTATE SERPL-SCNC: 0.9 MMOL/L (ref 0.5–2)
DEPRECATED RDW RBC AUTO: 48.4 FL (ref 37–54)
EOSINOPHIL # BLD AUTO: 0.18 10*3/MM3 (ref 0–0.4)
EOSINOPHIL NFR BLD AUTO: 0.9 % (ref 0.3–6.2)
ERYTHROCYTE [DISTWIDTH] IN BLOOD BY AUTOMATED COUNT: 15.7 % (ref 12.3–15.4)
GFR SERPL CREATININE-BSD FRML MDRD: 83 ML/MIN/1.73
GLOBULIN UR ELPH-MCNC: 4.9 GM/DL
GLUCOSE BLD-MCNC: 108 MG/DL (ref 65–99)
HCT VFR BLD AUTO: 35.9 % (ref 34–46.6)
HGB BLD-MCNC: 11.3 G/DL (ref 12–15.9)
IMM GRANULOCYTES # BLD AUTO: 0.18 10*3/MM3 (ref 0–0.05)
IMM GRANULOCYTES NFR BLD AUTO: 0.9 % (ref 0–0.5)
LYMPHOCYTES # BLD AUTO: 1.29 10*3/MM3 (ref 0.7–3.1)
LYMPHOCYTES NFR BLD AUTO: 6.2 % (ref 19.6–45.3)
MCH RBC QN AUTO: 27 PG (ref 26.6–33)
MCHC RBC AUTO-ENTMCNC: 31.5 G/DL (ref 31.5–35.7)
MCV RBC AUTO: 85.9 FL (ref 79–97)
MONOCYTES # BLD AUTO: 1.21 10*3/MM3 (ref 0.1–0.9)
MONOCYTES NFR BLD AUTO: 5.8 % (ref 5–12)
NEUTROPHILS # BLD AUTO: 17.97 10*3/MM3 (ref 1.7–7)
NEUTROPHILS NFR BLD AUTO: 86 % (ref 42.7–76)
NRBC BLD AUTO-RTO: 0 /100 WBC (ref 0–0.2)
NT-PROBNP SERPL-MCNC: 1116 PG/ML (ref 5–900)
PLATELET # BLD AUTO: 381 10*3/MM3 (ref 140–450)
PMV BLD AUTO: 9.6 FL (ref 6–12)
POTASSIUM BLD-SCNC: 3.4 MMOL/L (ref 3.5–5.2)
PROCALCITONIN SERPL-MCNC: 0.13 NG/ML (ref 0.1–0.25)
PROT SERPL-MCNC: 8 G/DL (ref 6–8.5)
RBC # BLD AUTO: 4.18 10*6/MM3 (ref 3.77–5.28)
SODIUM BLD-SCNC: 125 MMOL/L (ref 136–145)
TROPONIN T SERPL-MCNC: <0.01 NG/ML (ref 0–0.03)
WBC NRBC COR # BLD: 20.88 10*3/MM3 (ref 3.4–10.8)

## 2019-06-06 PROCEDURE — 83605 ASSAY OF LACTIC ACID: CPT | Performed by: EMERGENCY MEDICINE

## 2019-06-06 PROCEDURE — 93010 ELECTROCARDIOGRAM REPORT: CPT | Performed by: INTERNAL MEDICINE

## 2019-06-06 PROCEDURE — 93005 ELECTROCARDIOGRAM TRACING: CPT | Performed by: EMERGENCY MEDICINE

## 2019-06-06 PROCEDURE — 94799 UNLISTED PULMONARY SVC/PX: CPT

## 2019-06-06 PROCEDURE — 80053 COMPREHEN METABOLIC PANEL: CPT | Performed by: EMERGENCY MEDICINE

## 2019-06-06 PROCEDURE — 84484 ASSAY OF TROPONIN QUANT: CPT | Performed by: EMERGENCY MEDICINE

## 2019-06-06 PROCEDURE — 71045 X-RAY EXAM CHEST 1 VIEW: CPT

## 2019-06-06 PROCEDURE — 84145 PROCALCITONIN (PCT): CPT | Performed by: EMERGENCY MEDICINE

## 2019-06-06 PROCEDURE — 25010000002 METHYLPREDNISOLONE PER 125 MG: Performed by: EMERGENCY MEDICINE

## 2019-06-06 PROCEDURE — 94640 AIRWAY INHALATION TREATMENT: CPT

## 2019-06-06 PROCEDURE — 85025 COMPLETE CBC W/AUTO DIFF WBC: CPT | Performed by: EMERGENCY MEDICINE

## 2019-06-06 PROCEDURE — 99284 EMERGENCY DEPT VISIT MOD MDM: CPT

## 2019-06-06 PROCEDURE — 83880 ASSAY OF NATRIURETIC PEPTIDE: CPT | Performed by: EMERGENCY MEDICINE

## 2019-06-06 RX ORDER — SODIUM CHLORIDE 0.9 % (FLUSH) 0.9 %
10 SYRINGE (ML) INJECTION AS NEEDED
Status: DISCONTINUED | OUTPATIENT
Start: 2019-06-06 | End: 2019-06-26 | Stop reason: HOSPADM

## 2019-06-06 RX ORDER — IPRATROPIUM BROMIDE AND ALBUTEROL SULFATE 2.5; .5 MG/3ML; MG/3ML
3 SOLUTION RESPIRATORY (INHALATION) ONCE
Status: COMPLETED | OUTPATIENT
Start: 2019-06-06 | End: 2019-06-06

## 2019-06-06 RX ORDER — SODIUM CHLORIDE FOR INHALATION 7 %
4 VIAL, NEBULIZER (ML) INHALATION ONCE
Status: COMPLETED | OUTPATIENT
Start: 2019-06-06 | End: 2019-06-06

## 2019-06-06 RX ORDER — METHYLPREDNISOLONE SODIUM SUCCINATE 125 MG/2ML
125 INJECTION, POWDER, LYOPHILIZED, FOR SOLUTION INTRAMUSCULAR; INTRAVENOUS ONCE
Status: COMPLETED | OUTPATIENT
Start: 2019-06-06 | End: 2019-06-06

## 2019-06-06 RX ORDER — ALBUTEROL SULFATE 2.5 MG/3ML
2.5 SOLUTION RESPIRATORY (INHALATION) ONCE
Status: COMPLETED | OUTPATIENT
Start: 2019-06-06 | End: 2019-06-06

## 2019-06-06 RX ADMIN — METHYLPREDNISOLONE SODIUM SUCCINATE 125 MG: 125 INJECTION, POWDER, FOR SOLUTION INTRAMUSCULAR; INTRAVENOUS at 21:43

## 2019-06-06 RX ADMIN — ALBUTEROL SULFATE 2.5 MG: 2.5 SOLUTION RESPIRATORY (INHALATION) at 23:13

## 2019-06-06 RX ADMIN — SODIUM CHLORIDE 4 ML: 7 NEBU SOLN,3 % NEBU at 23:14

## 2019-06-06 RX ADMIN — IPRATROPIUM BROMIDE AND ALBUTEROL SULFATE 3 ML: 2.5; .5 SOLUTION RESPIRATORY (INHALATION) at 21:52

## 2019-06-07 ENCOUNTER — READMISSION MANAGEMENT (OUTPATIENT)
Dept: CALL CENTER | Facility: HOSPITAL | Age: 74
End: 2019-06-07

## 2019-06-07 PROBLEM — R13.10 DYSPHAGIA: Status: ACTIVE | Noted: 2019-06-07

## 2019-06-07 PROCEDURE — 94799 UNLISTED PULMONARY SVC/PX: CPT

## 2019-06-07 PROCEDURE — 94640 AIRWAY INHALATION TREATMENT: CPT

## 2019-06-07 PROCEDURE — 25010000002 ENOXAPARIN PER 10 MG: Performed by: SPECIALIST

## 2019-06-07 PROCEDURE — 25010000002 METHYLPREDNISOLONE PER 125 MG: Performed by: SPECIALIST

## 2019-06-07 PROCEDURE — 94669 MECHANICAL CHEST WALL OSCILL: CPT

## 2019-06-07 RX ORDER — OXYBUTYNIN CHLORIDE 5 MG/1
5 TABLET, EXTENDED RELEASE ORAL DAILY
Status: DISCONTINUED | OUTPATIENT
Start: 2019-06-08 | End: 2019-06-16

## 2019-06-07 RX ORDER — LEVOTHYROXINE SODIUM 0.07 MG/1
75 TABLET ORAL
Status: DISCONTINUED | OUTPATIENT
Start: 2019-06-07 | End: 2019-06-26 | Stop reason: HOSPADM

## 2019-06-07 RX ORDER — METOPROLOL TARTRATE 50 MG/1
50 TABLET, FILM COATED ORAL ONCE
Status: COMPLETED | OUTPATIENT
Start: 2019-06-07 | End: 2019-06-07

## 2019-06-07 RX ORDER — SODIUM CHLORIDE FOR INHALATION 7 %
4 VIAL, NEBULIZER (ML) INHALATION
Status: DISCONTINUED | OUTPATIENT
Start: 2019-06-07 | End: 2019-06-26 | Stop reason: HOSPADM

## 2019-06-07 RX ORDER — ATORVASTATIN CALCIUM 80 MG/1
80 TABLET, FILM COATED ORAL ONCE
Status: DISCONTINUED | OUTPATIENT
Start: 2019-06-07 | End: 2019-06-07

## 2019-06-07 RX ORDER — ACETAMINOPHEN 325 MG/1
650 TABLET ORAL EVERY 4 HOURS PRN
Status: DISCONTINUED | OUTPATIENT
Start: 2019-06-07 | End: 2019-06-26 | Stop reason: HOSPADM

## 2019-06-07 RX ORDER — SODIUM CHLORIDE 0.9 % (FLUSH) 0.9 %
3 SYRINGE (ML) INJECTION EVERY 12 HOURS SCHEDULED
Status: DISCONTINUED | OUTPATIENT
Start: 2019-06-07 | End: 2019-06-26 | Stop reason: HOSPADM

## 2019-06-07 RX ORDER — SODIUM CHLORIDE 0.9 % (FLUSH) 0.9 %
3-10 SYRINGE (ML) INJECTION AS NEEDED
Status: DISCONTINUED | OUTPATIENT
Start: 2019-06-07 | End: 2019-06-26 | Stop reason: HOSPADM

## 2019-06-07 RX ORDER — ALBUTEROL SULFATE 2.5 MG/3ML
2.5 SOLUTION RESPIRATORY (INHALATION) ONCE
Status: COMPLETED | OUTPATIENT
Start: 2019-06-07 | End: 2019-06-07

## 2019-06-07 RX ORDER — METHYLPREDNISOLONE SODIUM SUCCINATE 125 MG/2ML
60 INJECTION, POWDER, LYOPHILIZED, FOR SOLUTION INTRAMUSCULAR; INTRAVENOUS ONCE
Status: COMPLETED | OUTPATIENT
Start: 2019-06-07 | End: 2019-06-07

## 2019-06-07 RX ORDER — MEGESTROL ACETATE 20 MG/1
20 TABLET ORAL DAILY
Status: DISCONTINUED | OUTPATIENT
Start: 2019-06-07 | End: 2019-06-08

## 2019-06-07 RX ORDER — METHYLPREDNISOLONE SODIUM SUCCINATE 125 MG/2ML
60 INJECTION, POWDER, LYOPHILIZED, FOR SOLUTION INTRAMUSCULAR; INTRAVENOUS EVERY 8 HOURS
Status: DISCONTINUED | OUTPATIENT
Start: 2019-06-07 | End: 2019-06-07

## 2019-06-07 RX ORDER — IPRATROPIUM BROMIDE AND ALBUTEROL SULFATE 2.5; .5 MG/3ML; MG/3ML
3 SOLUTION RESPIRATORY (INHALATION)
Status: DISCONTINUED | OUTPATIENT
Start: 2019-06-07 | End: 2019-06-26 | Stop reason: HOSPADM

## 2019-06-07 RX ORDER — METOPROLOL TARTRATE 50 MG/1
50 TABLET, FILM COATED ORAL 2 TIMES DAILY
Status: DISCONTINUED | OUTPATIENT
Start: 2019-06-07 | End: 2019-06-07

## 2019-06-07 RX ORDER — ALBUTEROL SULFATE 2.5 MG/3ML
2.5 SOLUTION RESPIRATORY (INHALATION) EVERY 6 HOURS PRN
Status: DISCONTINUED | OUTPATIENT
Start: 2019-06-07 | End: 2019-06-26 | Stop reason: HOSPADM

## 2019-06-07 RX ORDER — PREDNISONE 20 MG/1
40 TABLET ORAL
Status: DISCONTINUED | OUTPATIENT
Start: 2019-06-08 | End: 2019-06-11

## 2019-06-07 RX ORDER — ATORVASTATIN CALCIUM 80 MG/1
80 TABLET, FILM COATED ORAL ONCE
Status: COMPLETED | OUTPATIENT
Start: 2019-06-07 | End: 2019-06-07

## 2019-06-07 RX ORDER — OXYBUTYNIN CHLORIDE 5 MG/1
5 TABLET, EXTENDED RELEASE ORAL ONCE
Status: COMPLETED | OUTPATIENT
Start: 2019-06-07 | End: 2019-06-07

## 2019-06-07 RX ORDER — ASPIRIN 81 MG/1
81 TABLET, CHEWABLE ORAL DAILY
Status: DISCONTINUED | OUTPATIENT
Start: 2019-06-07 | End: 2019-06-26 | Stop reason: HOSPADM

## 2019-06-07 RX ORDER — METOPROLOL TARTRATE 50 MG/1
50 TABLET, FILM COATED ORAL 2 TIMES DAILY
Status: DISCONTINUED | OUTPATIENT
Start: 2019-06-08 | End: 2019-06-26 | Stop reason: HOSPADM

## 2019-06-07 RX ORDER — ATORVASTATIN CALCIUM 80 MG/1
80 TABLET, FILM COATED ORAL NIGHTLY
Status: DISCONTINUED | OUTPATIENT
Start: 2019-06-07 | End: 2019-06-26 | Stop reason: HOSPADM

## 2019-06-07 RX ADMIN — GUAIFENESIN 400 MG: 200 SOLUTION ORAL at 03:03

## 2019-06-07 RX ADMIN — GUAIFENESIN 400 MG: 200 SOLUTION ORAL at 20:41

## 2019-06-07 RX ADMIN — GUAIFENESIN 400 MG: 200 SOLUTION ORAL at 12:06

## 2019-06-07 RX ADMIN — METHYLPREDNISOLONE SODIUM SUCCINATE 60 MG: 125 INJECTION, POWDER, FOR SOLUTION INTRAMUSCULAR; INTRAVENOUS at 06:20

## 2019-06-07 RX ADMIN — Medication 4 ML: at 19:10

## 2019-06-07 RX ADMIN — IPRATROPIUM BROMIDE AND ALBUTEROL SULFATE 3 ML: 2.5; .5 SOLUTION RESPIRATORY (INHALATION) at 19:10

## 2019-06-07 RX ADMIN — Medication 4 ML: at 15:18

## 2019-06-07 RX ADMIN — LOSARTAN POTASSIUM: 50 TABLET, FILM COATED ORAL at 13:36

## 2019-06-07 RX ADMIN — ENOXAPARIN SODIUM 40 MG: 40 INJECTION SUBCUTANEOUS at 17:02

## 2019-06-07 RX ADMIN — METHYLPREDNISOLONE SODIUM SUCCINATE 60 MG: 125 INJECTION, POWDER, FOR SOLUTION INTRAMUSCULAR; INTRAVENOUS at 20:41

## 2019-06-07 RX ADMIN — METOPROLOL TARTRATE 50 MG: 50 TABLET, FILM COATED ORAL at 03:02

## 2019-06-07 RX ADMIN — ATORVASTATIN CALCIUM 80 MG: 80 TABLET, FILM COATED ORAL at 03:02

## 2019-06-07 RX ADMIN — SODIUM CHLORIDE, PRESERVATIVE FREE 3 ML: 5 INJECTION INTRAVENOUS at 20:46

## 2019-06-07 RX ADMIN — IPRATROPIUM BROMIDE AND ALBUTEROL SULFATE 3 ML: 2.5; .5 SOLUTION RESPIRATORY (INHALATION) at 15:18

## 2019-06-07 RX ADMIN — OXYBUTYNIN CHLORIDE 5 MG: 5 TABLET, EXTENDED RELEASE ORAL at 03:02

## 2019-06-07 RX ADMIN — ASPIRIN 81 MG: 81 TABLET, CHEWABLE ORAL at 13:37

## 2019-06-07 RX ADMIN — OXYBUTYNIN CHLORIDE 5 MG: 5 TABLET, EXTENDED RELEASE ORAL at 13:37

## 2019-06-07 RX ADMIN — ALBUTEROL SULFATE 2.5 MG: 2.5 SOLUTION RESPIRATORY (INHALATION) at 10:35

## 2019-06-07 RX ADMIN — METOPROLOL TARTRATE 50 MG: 50 TABLET, FILM COATED ORAL at 13:37

## 2019-06-07 RX ADMIN — METOPROLOL TARTRATE 50 MG: 25 TABLET ORAL at 20:41

## 2019-06-07 RX ADMIN — SODIUM CHLORIDE, PRESERVATIVE FREE 3 ML: 5 INJECTION INTRAVENOUS at 17:03

## 2019-06-07 RX ADMIN — ATORVASTATIN CALCIUM 80 MG: 80 TABLET, FILM COATED ORAL at 20:40

## 2019-06-07 RX ADMIN — MEGESTROL ACETATE 20 MG: 20 TABLET ORAL at 13:36

## 2019-06-07 RX ADMIN — METHYLPREDNISOLONE SODIUM SUCCINATE 60 MG: 125 INJECTION, POWDER, FOR SOLUTION INTRAMUSCULAR; INTRAVENOUS at 13:37

## 2019-06-07 NOTE — OUTREACH NOTE
COPD/PN Week 1 Survey      Responses   Facility patient discharged from?  [facility]   Does the patient have one of the following disease processes/diagnoses(primary or secondary)?  COPD/Pneumonia   Is there a successful TCM telephone encounter documented?  No   Was the primary reason for admission:  Pneumonia   Week 1 attempt successful?  No   Revoke  Readmitted          Brenda Banerjee RN

## 2019-06-08 ENCOUNTER — APPOINTMENT (OUTPATIENT)
Dept: CT IMAGING | Facility: HOSPITAL | Age: 74
End: 2019-06-08

## 2019-06-08 PROBLEM — J44.1 COPD EXACERBATION (HCC): Status: RESOLVED | Noted: 2019-05-27 | Resolved: 2019-06-08

## 2019-06-08 LAB
ALBUMIN SERPL-MCNC: 3.3 G/DL (ref 3.5–5.2)
ALBUMIN/GLOB SERPL: 0.9 G/DL
ALP SERPL-CCNC: 82 U/L (ref 39–117)
ALT SERPL W P-5'-P-CCNC: 10 U/L (ref 1–33)
ANION GAP SERPL CALCULATED.3IONS-SCNC: 14.9 MMOL/L
AST SERPL-CCNC: 14 U/L (ref 1–32)
BILIRUB SERPL-MCNC: 0.3 MG/DL (ref 0.2–1.2)
BUN BLD-MCNC: 12 MG/DL (ref 8–23)
BUN/CREAT SERPL: 15.6 (ref 7–25)
CALCIUM SPEC-SCNC: 9.1 MG/DL (ref 8.6–10.5)
CHLORIDE SERPL-SCNC: 100 MMOL/L (ref 98–107)
CO2 SERPL-SCNC: 21.1 MMOL/L (ref 22–29)
CREAT BLD-MCNC: 0.77 MG/DL (ref 0.57–1)
GFR SERPL CREATININE-BSD FRML MDRD: 73 ML/MIN/1.73
GLOBULIN UR ELPH-MCNC: 3.8 GM/DL
GLUCOSE BLD-MCNC: 151 MG/DL (ref 65–99)
MAGNESIUM SERPL-MCNC: 2.1 MG/DL (ref 1.6–2.4)
POTASSIUM BLD-SCNC: 4.9 MMOL/L (ref 3.5–5.2)
PROT SERPL-MCNC: 7.1 G/DL (ref 6–8.5)
SODIUM BLD-SCNC: 136 MMOL/L (ref 136–145)

## 2019-06-08 PROCEDURE — 94799 UNLISTED PULMONARY SVC/PX: CPT

## 2019-06-08 PROCEDURE — 25010000002 ENOXAPARIN PER 10 MG: Performed by: SPECIALIST

## 2019-06-08 PROCEDURE — 71275 CT ANGIOGRAPHY CHEST: CPT

## 2019-06-08 PROCEDURE — 63710000001 PREDNISONE PER 1 MG: Performed by: INTERNAL MEDICINE

## 2019-06-08 PROCEDURE — 83735 ASSAY OF MAGNESIUM: CPT | Performed by: SPECIALIST

## 2019-06-08 PROCEDURE — 80053 COMPREHEN METABOLIC PANEL: CPT | Performed by: SPECIALIST

## 2019-06-08 PROCEDURE — 94669 MECHANICAL CHEST WALL OSCILL: CPT

## 2019-06-08 PROCEDURE — 0 IOPAMIDOL PER 1 ML: Performed by: SPECIALIST

## 2019-06-08 RX ORDER — LORAZEPAM 0.5 MG/1
0.5 TABLET ORAL EVERY 6 HOURS PRN
Status: DISCONTINUED | OUTPATIENT
Start: 2019-06-08 | End: 2019-06-18

## 2019-06-08 RX ORDER — MEGESTROL ACETATE 40 MG/1
40 TABLET ORAL DAILY
Status: DISCONTINUED | OUTPATIENT
Start: 2019-06-09 | End: 2019-06-16

## 2019-06-08 RX ADMIN — OXYBUTYNIN CHLORIDE 5 MG: 5 TABLET, EXTENDED RELEASE ORAL at 08:41

## 2019-06-08 RX ADMIN — METOPROLOL TARTRATE 50 MG: 50 TABLET, FILM COATED ORAL at 08:40

## 2019-06-08 RX ADMIN — ALBUTEROL SULFATE 2.5 MG: 2.5 SOLUTION RESPIRATORY (INHALATION) at 02:30

## 2019-06-08 RX ADMIN — MEGESTROL ACETATE 20 MG: 20 TABLET ORAL at 08:41

## 2019-06-08 RX ADMIN — Medication 4 ML: at 21:02

## 2019-06-08 RX ADMIN — ENOXAPARIN SODIUM 40 MG: 40 INJECTION SUBCUTANEOUS at 12:50

## 2019-06-08 RX ADMIN — IPRATROPIUM BROMIDE AND ALBUTEROL SULFATE 3 ML: 2.5; .5 SOLUTION RESPIRATORY (INHALATION) at 12:26

## 2019-06-08 RX ADMIN — ATORVASTATIN CALCIUM 80 MG: 80 TABLET, FILM COATED ORAL at 20:46

## 2019-06-08 RX ADMIN — GUAIFENESIN 400 MG: 200 SOLUTION ORAL at 12:50

## 2019-06-08 RX ADMIN — LEVOTHYROXINE SODIUM 75 MCG: 75 TABLET ORAL at 08:41

## 2019-06-08 RX ADMIN — PREDNISONE 40 MG: 20 TABLET ORAL at 08:41

## 2019-06-08 RX ADMIN — LORAZEPAM 0.5 MG: 0.5 TABLET ORAL at 23:58

## 2019-06-08 RX ADMIN — LOSARTAN POTASSIUM: 50 TABLET, FILM COATED ORAL at 08:41

## 2019-06-08 RX ADMIN — SODIUM CHLORIDE, PRESERVATIVE FREE 3 ML: 5 INJECTION INTRAVENOUS at 08:41

## 2019-06-08 RX ADMIN — GUAIFENESIN 400 MG: 200 SOLUTION ORAL at 20:48

## 2019-06-08 RX ADMIN — ALBUTEROL SULFATE 2.5 MG: 2.5 SOLUTION RESPIRATORY (INHALATION) at 18:43

## 2019-06-08 RX ADMIN — ASPIRIN 81 MG: 81 TABLET, CHEWABLE ORAL at 08:41

## 2019-06-08 RX ADMIN — IOPAMIDOL 95 ML: 755 INJECTION, SOLUTION INTRAVENOUS at 01:01

## 2019-06-08 RX ADMIN — SODIUM CHLORIDE, PRESERVATIVE FREE 3 ML: 5 INJECTION INTRAVENOUS at 20:46

## 2019-06-08 RX ADMIN — Medication 4 ML: at 09:03

## 2019-06-08 RX ADMIN — IPRATROPIUM BROMIDE AND ALBUTEROL SULFATE 3 ML: 2.5; .5 SOLUTION RESPIRATORY (INHALATION) at 21:01

## 2019-06-08 RX ADMIN — METOPROLOL TARTRATE 50 MG: 50 TABLET, FILM COATED ORAL at 20:46

## 2019-06-08 RX ADMIN — IPRATROPIUM BROMIDE AND ALBUTEROL SULFATE 3 ML: 2.5; .5 SOLUTION RESPIRATORY (INHALATION) at 09:03

## 2019-06-08 RX ADMIN — IPRATROPIUM BROMIDE AND ALBUTEROL SULFATE 3 ML: 2.5; .5 SOLUTION RESPIRATORY (INHALATION) at 16:00

## 2019-06-09 LAB
BASOPHILS # BLD AUTO: 0.04 10*3/MM3 (ref 0–0.2)
BASOPHILS NFR BLD AUTO: 0.2 % (ref 0–1.5)
DEPRECATED RDW RBC AUTO: 50.7 FL (ref 37–54)
EOSINOPHIL # BLD AUTO: 0 10*3/MM3 (ref 0–0.4)
EOSINOPHIL NFR BLD AUTO: 0 % (ref 0.3–6.2)
ERYTHROCYTE [DISTWIDTH] IN BLOOD BY AUTOMATED COUNT: 16.2 % (ref 12.3–15.4)
HCT VFR BLD AUTO: 30.6 % (ref 34–46.6)
HGB BLD-MCNC: 9.6 G/DL (ref 12–15.9)
IMM GRANULOCYTES # BLD AUTO: 0.25 10*3/MM3 (ref 0–0.05)
IMM GRANULOCYTES NFR BLD AUTO: 1 % (ref 0–0.5)
LYMPHOCYTES # BLD AUTO: 1.15 10*3/MM3 (ref 0.7–3.1)
LYMPHOCYTES NFR BLD AUTO: 4.6 % (ref 19.6–45.3)
MCH RBC QN AUTO: 27.4 PG (ref 26.6–33)
MCHC RBC AUTO-ENTMCNC: 31.4 G/DL (ref 31.5–35.7)
MCV RBC AUTO: 87.2 FL (ref 79–97)
MONOCYTES # BLD AUTO: 1.1 10*3/MM3 (ref 0.1–0.9)
MONOCYTES NFR BLD AUTO: 4.4 % (ref 5–12)
NEUTROPHILS # BLD AUTO: 22.44 10*3/MM3 (ref 1.7–7)
NEUTROPHILS NFR BLD AUTO: 89.8 % (ref 42.7–76)
NRBC BLD AUTO-RTO: 0 /100 WBC (ref 0–0.2)
PLATELET # BLD AUTO: 391 10*3/MM3 (ref 140–450)
PMV BLD AUTO: 10.4 FL (ref 6–12)
RBC # BLD AUTO: 3.51 10*6/MM3 (ref 3.77–5.28)
WBC NRBC COR # BLD: 24.98 10*3/MM3 (ref 3.4–10.8)

## 2019-06-09 PROCEDURE — 94799 UNLISTED PULMONARY SVC/PX: CPT

## 2019-06-09 PROCEDURE — 94669 MECHANICAL CHEST WALL OSCILL: CPT

## 2019-06-09 PROCEDURE — 63710000001 PREDNISONE PER 1 MG: Performed by: INTERNAL MEDICINE

## 2019-06-09 PROCEDURE — 25010000002 ENOXAPARIN PER 10 MG: Performed by: SPECIALIST

## 2019-06-09 PROCEDURE — 99223 1ST HOSP IP/OBS HIGH 75: CPT | Performed by: INTERNAL MEDICINE

## 2019-06-09 PROCEDURE — 85025 COMPLETE CBC W/AUTO DIFF WBC: CPT | Performed by: SPECIALIST

## 2019-06-09 RX ADMIN — SODIUM CHLORIDE, PRESERVATIVE FREE 3 ML: 5 INJECTION INTRAVENOUS at 09:17

## 2019-06-09 RX ADMIN — OXYBUTYNIN CHLORIDE 5 MG: 5 TABLET, EXTENDED RELEASE ORAL at 09:16

## 2019-06-09 RX ADMIN — ASPIRIN 81 MG: 81 TABLET, CHEWABLE ORAL at 09:16

## 2019-06-09 RX ADMIN — ENOXAPARIN SODIUM 40 MG: 40 INJECTION SUBCUTANEOUS at 13:58

## 2019-06-09 RX ADMIN — IPRATROPIUM BROMIDE AND ALBUTEROL SULFATE 3 ML: 2.5; .5 SOLUTION RESPIRATORY (INHALATION) at 07:54

## 2019-06-09 RX ADMIN — PREDNISONE 40 MG: 20 TABLET ORAL at 09:16

## 2019-06-09 RX ADMIN — GUAIFENESIN 400 MG: 200 SOLUTION ORAL at 09:15

## 2019-06-09 RX ADMIN — IPRATROPIUM BROMIDE AND ALBUTEROL SULFATE 3 ML: 2.5; .5 SOLUTION RESPIRATORY (INHALATION) at 16:44

## 2019-06-09 RX ADMIN — Medication 4 ML: at 07:54

## 2019-06-09 RX ADMIN — LOSARTAN POTASSIUM: 50 TABLET, FILM COATED ORAL at 09:16

## 2019-06-09 RX ADMIN — METOPROLOL TARTRATE 50 MG: 50 TABLET, FILM COATED ORAL at 22:01

## 2019-06-09 RX ADMIN — IPRATROPIUM BROMIDE AND ALBUTEROL SULFATE 3 ML: 2.5; .5 SOLUTION RESPIRATORY (INHALATION) at 12:49

## 2019-06-09 RX ADMIN — ATORVASTATIN CALCIUM 80 MG: 80 TABLET, FILM COATED ORAL at 22:01

## 2019-06-09 RX ADMIN — GUAIFENESIN 400 MG: 200 SOLUTION ORAL at 17:04

## 2019-06-09 RX ADMIN — IPRATROPIUM BROMIDE AND ALBUTEROL SULFATE 3 ML: 2.5; .5 SOLUTION RESPIRATORY (INHALATION) at 20:29

## 2019-06-09 RX ADMIN — MEGESTROL ACETATE 40 MG: 40 TABLET ORAL at 09:16

## 2019-06-09 RX ADMIN — METOPROLOL TARTRATE 50 MG: 50 TABLET, FILM COATED ORAL at 09:16

## 2019-06-09 RX ADMIN — Medication 4 ML: at 20:30

## 2019-06-09 RX ADMIN — SODIUM CHLORIDE, PRESERVATIVE FREE 3 ML: 5 INJECTION INTRAVENOUS at 22:01

## 2019-06-09 RX ADMIN — LEVOTHYROXINE SODIUM 75 MCG: 75 TABLET ORAL at 05:37

## 2019-06-10 ENCOUNTER — APPOINTMENT (OUTPATIENT)
Dept: PET IMAGING | Facility: HOSPITAL | Age: 74
End: 2019-06-10

## 2019-06-10 ENCOUNTER — HOSPITAL ENCOUNTER (OUTPATIENT)
Dept: PET IMAGING | Facility: HOSPITAL | Age: 74
End: 2019-06-10

## 2019-06-10 LAB
BASOPHILS # BLD AUTO: 0.02 10*3/MM3 (ref 0–0.2)
BASOPHILS NFR BLD AUTO: 0.1 % (ref 0–1.5)
DEPRECATED RDW RBC AUTO: 50.3 FL (ref 37–54)
EOSINOPHIL # BLD AUTO: 0.01 10*3/MM3 (ref 0–0.4)
EOSINOPHIL NFR BLD AUTO: 0.1 % (ref 0.3–6.2)
ERYTHROCYTE [DISTWIDTH] IN BLOOD BY AUTOMATED COUNT: 16 % (ref 12.3–15.4)
HCT VFR BLD AUTO: 28.4 % (ref 34–46.6)
HGB BLD-MCNC: 9 G/DL (ref 12–15.9)
IMM GRANULOCYTES # BLD AUTO: 0.19 10*3/MM3 (ref 0–0.05)
IMM GRANULOCYTES NFR BLD AUTO: 1 % (ref 0–0.5)
LYMPHOCYTES # BLD AUTO: 1.2 10*3/MM3 (ref 0.7–3.1)
LYMPHOCYTES NFR BLD AUTO: 6.3 % (ref 19.6–45.3)
MCH RBC QN AUTO: 27.3 PG (ref 26.6–33)
MCHC RBC AUTO-ENTMCNC: 31.7 G/DL (ref 31.5–35.7)
MCV RBC AUTO: 86.1 FL (ref 79–97)
MONOCYTES # BLD AUTO: 1.06 10*3/MM3 (ref 0.1–0.9)
MONOCYTES NFR BLD AUTO: 5.5 % (ref 5–12)
NEUTROPHILS # BLD AUTO: 16.63 10*3/MM3 (ref 1.7–7)
NEUTROPHILS NFR BLD AUTO: 87 % (ref 42.7–76)
NRBC BLD AUTO-RTO: 0 /100 WBC (ref 0–0.2)
PLATELET # BLD AUTO: 365 10*3/MM3 (ref 140–450)
PMV BLD AUTO: 10 FL (ref 6–12)
RBC # BLD AUTO: 3.3 10*6/MM3 (ref 3.77–5.28)
WBC NRBC COR # BLD: 19.11 10*3/MM3 (ref 3.4–10.8)

## 2019-06-10 PROCEDURE — 94668 MNPJ CHEST WALL SBSQ: CPT

## 2019-06-10 PROCEDURE — 63710000001 PREDNISONE PER 1 MG: Performed by: INTERNAL MEDICINE

## 2019-06-10 PROCEDURE — 25010000002 ENOXAPARIN PER 10 MG: Performed by: SPECIALIST

## 2019-06-10 PROCEDURE — DB022ZZ BEAM RADIATION OF LUNG USING PHOTONS >10 MEV: ICD-10-PCS | Performed by: RADIOLOGY

## 2019-06-10 PROCEDURE — 77263 THER RADIOLOGY TX PLNG CPLX: CPT | Performed by: RADIOLOGY

## 2019-06-10 PROCEDURE — 85025 COMPLETE CBC W/AUTO DIFF WBC: CPT | Performed by: INTERNAL MEDICINE

## 2019-06-10 PROCEDURE — 94669 MECHANICAL CHEST WALL OSCILL: CPT

## 2019-06-10 PROCEDURE — 94762 N-INVAS EAR/PLS OXIMTRY CONT: CPT

## 2019-06-10 PROCEDURE — 94799 UNLISTED PULMONARY SVC/PX: CPT

## 2019-06-10 PROCEDURE — 99232 SBSQ HOSP IP/OBS MODERATE 35: CPT | Performed by: RADIOLOGY

## 2019-06-10 PROCEDURE — 77334 RADIATION TREATMENT AID(S): CPT | Performed by: RADIOLOGY

## 2019-06-10 PROCEDURE — 99232 SBSQ HOSP IP/OBS MODERATE 35: CPT | Performed by: INTERNAL MEDICINE

## 2019-06-10 RX ADMIN — IPRATROPIUM BROMIDE AND ALBUTEROL SULFATE 3 ML: 2.5; .5 SOLUTION RESPIRATORY (INHALATION) at 07:05

## 2019-06-10 RX ADMIN — LORAZEPAM 0.5 MG: 0.5 TABLET ORAL at 09:59

## 2019-06-10 RX ADMIN — PREDNISONE 40 MG: 20 TABLET ORAL at 09:02

## 2019-06-10 RX ADMIN — ATORVASTATIN CALCIUM 80 MG: 80 TABLET, FILM COATED ORAL at 20:48

## 2019-06-10 RX ADMIN — ENOXAPARIN SODIUM 40 MG: 40 INJECTION SUBCUTANEOUS at 13:56

## 2019-06-10 RX ADMIN — IPRATROPIUM BROMIDE AND ALBUTEROL SULFATE 3 ML: 2.5; .5 SOLUTION RESPIRATORY (INHALATION) at 11:30

## 2019-06-10 RX ADMIN — IPRATROPIUM BROMIDE AND ALBUTEROL SULFATE 3 ML: 2.5; .5 SOLUTION RESPIRATORY (INHALATION) at 15:25

## 2019-06-10 RX ADMIN — OXYBUTYNIN CHLORIDE 5 MG: 5 TABLET, EXTENDED RELEASE ORAL at 09:01

## 2019-06-10 RX ADMIN — METOPROLOL TARTRATE 50 MG: 50 TABLET, FILM COATED ORAL at 09:01

## 2019-06-10 RX ADMIN — GUAIFENESIN 400 MG: 200 SOLUTION ORAL at 20:49

## 2019-06-10 RX ADMIN — LOSARTAN POTASSIUM: 50 TABLET, FILM COATED ORAL at 09:01

## 2019-06-10 RX ADMIN — SODIUM CHLORIDE, PRESERVATIVE FREE 3 ML: 5 INJECTION INTRAVENOUS at 20:48

## 2019-06-10 RX ADMIN — MEGESTROL ACETATE 40 MG: 40 TABLET ORAL at 09:01

## 2019-06-10 RX ADMIN — GUAIFENESIN 400 MG: 200 SOLUTION ORAL at 09:02

## 2019-06-10 RX ADMIN — Medication 4 ML: at 07:05

## 2019-06-10 RX ADMIN — LEVOTHYROXINE SODIUM 75 MCG: 75 TABLET ORAL at 06:24

## 2019-06-10 RX ADMIN — IPRATROPIUM BROMIDE AND ALBUTEROL SULFATE 3 ML: 2.5; .5 SOLUTION RESPIRATORY (INHALATION) at 19:40

## 2019-06-10 RX ADMIN — ASPIRIN 81 MG: 81 TABLET, CHEWABLE ORAL at 09:01

## 2019-06-10 RX ADMIN — METOPROLOL TARTRATE 50 MG: 50 TABLET, FILM COATED ORAL at 20:48

## 2019-06-10 RX ADMIN — SODIUM CHLORIDE, PRESERVATIVE FREE 3 ML: 5 INJECTION INTRAVENOUS at 09:02

## 2019-06-10 RX ADMIN — Medication 4 ML: at 19:51

## 2019-06-11 PROCEDURE — 94799 UNLISTED PULMONARY SVC/PX: CPT

## 2019-06-11 PROCEDURE — 99232 SBSQ HOSP IP/OBS MODERATE 35: CPT | Performed by: INTERNAL MEDICINE

## 2019-06-11 PROCEDURE — 63710000001 PREDNISONE PER 1 MG: Performed by: INTERNAL MEDICINE

## 2019-06-11 PROCEDURE — 94668 MNPJ CHEST WALL SBSQ: CPT

## 2019-06-11 PROCEDURE — 25010000002 ENOXAPARIN PER 10 MG: Performed by: SPECIALIST

## 2019-06-11 PROCEDURE — 94669 MECHANICAL CHEST WALL OSCILL: CPT

## 2019-06-11 RX ORDER — DIVALPROEX SODIUM 125 MG/1
250 CAPSULE, COATED PELLETS ORAL EVERY 12 HOURS SCHEDULED
Status: DISCONTINUED | OUTPATIENT
Start: 2019-06-11 | End: 2019-06-26 | Stop reason: HOSPADM

## 2019-06-11 RX ADMIN — GUAIFENESIN 400 MG: 100 SOLUTION ORAL at 22:00

## 2019-06-11 RX ADMIN — IPRATROPIUM BROMIDE AND ALBUTEROL SULFATE 3 ML: 2.5; .5 SOLUTION RESPIRATORY (INHALATION) at 15:05

## 2019-06-11 RX ADMIN — Medication 4 ML: at 21:37

## 2019-06-11 RX ADMIN — IPRATROPIUM BROMIDE AND ALBUTEROL SULFATE 3 ML: 2.5; .5 SOLUTION RESPIRATORY (INHALATION) at 06:51

## 2019-06-11 RX ADMIN — GUAIFENESIN 400 MG: 100 SOLUTION ORAL at 14:55

## 2019-06-11 RX ADMIN — Medication 4 ML: at 06:51

## 2019-06-11 RX ADMIN — ATORVASTATIN CALCIUM 80 MG: 80 TABLET, FILM COATED ORAL at 21:59

## 2019-06-11 RX ADMIN — MEGESTROL ACETATE 40 MG: 40 TABLET ORAL at 09:51

## 2019-06-11 RX ADMIN — IPRATROPIUM BROMIDE AND ALBUTEROL SULFATE 3 ML: 2.5; .5 SOLUTION RESPIRATORY (INHALATION) at 21:37

## 2019-06-11 RX ADMIN — IPRATROPIUM BROMIDE AND ALBUTEROL SULFATE 3 ML: 2.5; .5 SOLUTION RESPIRATORY (INHALATION) at 10:59

## 2019-06-11 RX ADMIN — SODIUM CHLORIDE, PRESERVATIVE FREE 3 ML: 5 INJECTION INTRAVENOUS at 09:52

## 2019-06-11 RX ADMIN — ASPIRIN 81 MG: 81 TABLET, CHEWABLE ORAL at 09:51

## 2019-06-11 RX ADMIN — METOPROLOL TARTRATE 50 MG: 50 TABLET, FILM COATED ORAL at 09:51

## 2019-06-11 RX ADMIN — DIVALPROEX SODIUM 250 MG: 125 CAPSULE, COATED PELLETS ORAL at 22:00

## 2019-06-11 RX ADMIN — LEVOTHYROXINE SODIUM 75 MCG: 75 TABLET ORAL at 06:02

## 2019-06-11 RX ADMIN — LOSARTAN POTASSIUM: 50 TABLET, FILM COATED ORAL at 09:52

## 2019-06-11 RX ADMIN — OXYBUTYNIN CHLORIDE 5 MG: 5 TABLET, EXTENDED RELEASE ORAL at 09:51

## 2019-06-11 RX ADMIN — PREDNISONE 40 MG: 20 TABLET ORAL at 09:52

## 2019-06-11 RX ADMIN — ENOXAPARIN SODIUM 40 MG: 40 INJECTION SUBCUTANEOUS at 12:39

## 2019-06-11 RX ADMIN — METOPROLOL TARTRATE 50 MG: 50 TABLET, FILM COATED ORAL at 21:59

## 2019-06-12 ENCOUNTER — APPOINTMENT (OUTPATIENT)
Dept: GENERAL RADIOLOGY | Facility: HOSPITAL | Age: 74
End: 2019-06-12

## 2019-06-12 ENCOUNTER — APPOINTMENT (OUTPATIENT)
Dept: PET IMAGING | Facility: HOSPITAL | Age: 74
End: 2019-06-12

## 2019-06-12 ENCOUNTER — HOSPITAL ENCOUNTER (OUTPATIENT)
Dept: PET IMAGING | Facility: HOSPITAL | Age: 74
End: 2019-06-12

## 2019-06-12 LAB
ANION GAP SERPL CALCULATED.3IONS-SCNC: 12.8 MMOL/L
BUN BLD-MCNC: 16 MG/DL (ref 8–23)
BUN/CREAT SERPL: 20 (ref 7–25)
CALCIUM SPEC-SCNC: 8.2 MG/DL (ref 8.6–10.5)
CHLORIDE SERPL-SCNC: 94 MMOL/L (ref 98–107)
CO2 SERPL-SCNC: 22.2 MMOL/L (ref 22–29)
CREAT BLD-MCNC: 0.8 MG/DL (ref 0.57–1)
DEPRECATED RDW RBC AUTO: 49.7 FL (ref 37–54)
ERYTHROCYTE [DISTWIDTH] IN BLOOD BY AUTOMATED COUNT: 15.9 % (ref 12.3–15.4)
GFR SERPL CREATININE-BSD FRML MDRD: 70 ML/MIN/1.73
GLUCOSE BLD-MCNC: 125 MG/DL (ref 65–99)
HCT VFR BLD AUTO: 33.7 % (ref 34–46.6)
HGB BLD-MCNC: 10.5 G/DL (ref 12–15.9)
MCH RBC QN AUTO: 26.9 PG (ref 26.6–33)
MCHC RBC AUTO-ENTMCNC: 31.2 G/DL (ref 31.5–35.7)
MCV RBC AUTO: 86.4 FL (ref 79–97)
PLATELET # BLD AUTO: 431 10*3/MM3 (ref 140–450)
PMV BLD AUTO: 9.6 FL (ref 6–12)
POTASSIUM BLD-SCNC: 3.7 MMOL/L (ref 3.5–5.2)
RBC # BLD AUTO: 3.9 10*6/MM3 (ref 3.77–5.28)
SODIUM BLD-SCNC: 129 MMOL/L (ref 136–145)
WBC NRBC COR # BLD: 20.15 10*3/MM3 (ref 3.4–10.8)

## 2019-06-12 PROCEDURE — 94669 MECHANICAL CHEST WALL OSCILL: CPT

## 2019-06-12 PROCEDURE — 77427 RADIATION TX MANAGEMENT X5: CPT | Performed by: RADIOLOGY

## 2019-06-12 PROCEDURE — 71046 X-RAY EXAM CHEST 2 VIEWS: CPT

## 2019-06-12 PROCEDURE — 77293 RESPIRATOR MOTION MGMT SIMUL: CPT | Performed by: RADIOLOGY

## 2019-06-12 PROCEDURE — 85027 COMPLETE CBC AUTOMATED: CPT | Performed by: INTERNAL MEDICINE

## 2019-06-12 PROCEDURE — 63710000001 PREDNISONE PER 5 MG: Performed by: INTERNAL MEDICINE

## 2019-06-12 PROCEDURE — 77301 RADIOTHERAPY DOSE PLAN IMRT: CPT | Performed by: RADIOLOGY

## 2019-06-12 PROCEDURE — 94799 UNLISTED PULMONARY SVC/PX: CPT

## 2019-06-12 PROCEDURE — 77386 CHG INTENSITY MODULATED RADIATION TX DLVR COMPLEX: CPT | Performed by: RADIOLOGY

## 2019-06-12 PROCEDURE — 77014 CHG CT GUIDANCE RADIATION THERAPY FLDS PLACEMENT: CPT | Performed by: RADIOLOGY

## 2019-06-12 PROCEDURE — 63710000001 PREDNISONE PER 1 MG: Performed by: INTERNAL MEDICINE

## 2019-06-12 PROCEDURE — 99231 SBSQ HOSP IP/OBS SF/LOW 25: CPT | Performed by: INTERNAL MEDICINE

## 2019-06-12 PROCEDURE — 77338 DESIGN MLC DEVICE FOR IMRT: CPT | Performed by: RADIOLOGY

## 2019-06-12 PROCEDURE — 77386: CPT | Performed by: RADIOLOGY

## 2019-06-12 PROCEDURE — 77300 RADIATION THERAPY DOSE PLAN: CPT | Performed by: RADIOLOGY

## 2019-06-12 PROCEDURE — 25010000002 ENOXAPARIN PER 10 MG: Performed by: SPECIALIST

## 2019-06-12 PROCEDURE — 80048 BASIC METABOLIC PNL TOTAL CA: CPT | Performed by: INTERNAL MEDICINE

## 2019-06-12 PROCEDURE — 94668 MNPJ CHEST WALL SBSQ: CPT

## 2019-06-12 RX ORDER — PREDNISONE 20 MG/1
20 TABLET ORAL
Status: DISCONTINUED | OUTPATIENT
Start: 2019-06-13 | End: 2019-06-15

## 2019-06-12 RX ADMIN — IPRATROPIUM BROMIDE AND ALBUTEROL SULFATE 3 ML: 2.5; .5 SOLUTION RESPIRATORY (INHALATION) at 11:17

## 2019-06-12 RX ADMIN — SODIUM CHLORIDE, PRESERVATIVE FREE 3 ML: 5 INJECTION INTRAVENOUS at 09:15

## 2019-06-12 RX ADMIN — ATORVASTATIN CALCIUM 80 MG: 80 TABLET, FILM COATED ORAL at 21:32

## 2019-06-12 RX ADMIN — METOPROLOL TARTRATE 50 MG: 50 TABLET, FILM COATED ORAL at 09:13

## 2019-06-12 RX ADMIN — Medication 4 ML: at 08:53

## 2019-06-12 RX ADMIN — IPRATROPIUM BROMIDE AND ALBUTEROL SULFATE 3 ML: 2.5; .5 SOLUTION RESPIRATORY (INHALATION) at 16:27

## 2019-06-12 RX ADMIN — IPRATROPIUM BROMIDE AND ALBUTEROL SULFATE 3 ML: 2.5; .5 SOLUTION RESPIRATORY (INHALATION) at 08:53

## 2019-06-12 RX ADMIN — ENOXAPARIN SODIUM 40 MG: 40 INJECTION SUBCUTANEOUS at 12:59

## 2019-06-12 RX ADMIN — METOPROLOL TARTRATE 50 MG: 50 TABLET, FILM COATED ORAL at 21:32

## 2019-06-12 RX ADMIN — GUAIFENESIN 400 MG: 100 SOLUTION ORAL at 21:31

## 2019-06-12 RX ADMIN — DIVALPROEX SODIUM 250 MG: 125 CAPSULE, COATED PELLETS ORAL at 09:12

## 2019-06-12 RX ADMIN — LOSARTAN POTASSIUM: 50 TABLET, FILM COATED ORAL at 09:11

## 2019-06-12 RX ADMIN — PREDNISONE 30 MG: 20 TABLET ORAL at 09:13

## 2019-06-12 RX ADMIN — GUAIFENESIN 400 MG: 100 SOLUTION ORAL at 09:11

## 2019-06-12 RX ADMIN — Medication 4 ML: at 19:50

## 2019-06-12 RX ADMIN — OXYBUTYNIN CHLORIDE 5 MG: 5 TABLET, EXTENDED RELEASE ORAL at 09:13

## 2019-06-12 RX ADMIN — IPRATROPIUM BROMIDE AND ALBUTEROL SULFATE 3 ML: 2.5; .5 SOLUTION RESPIRATORY (INHALATION) at 19:50

## 2019-06-12 RX ADMIN — ASPIRIN 81 MG: 81 TABLET, CHEWABLE ORAL at 09:13

## 2019-06-12 RX ADMIN — LEVOTHYROXINE SODIUM 75 MCG: 75 TABLET ORAL at 06:30

## 2019-06-12 RX ADMIN — GUAIFENESIN 400 MG: 100 SOLUTION ORAL at 16:02

## 2019-06-12 RX ADMIN — SODIUM CHLORIDE, PRESERVATIVE FREE 3 ML: 5 INJECTION INTRAVENOUS at 21:40

## 2019-06-12 RX ADMIN — DIVALPROEX SODIUM 250 MG: 125 CAPSULE, COATED PELLETS ORAL at 21:32

## 2019-06-12 RX ADMIN — MEGESTROL ACETATE 40 MG: 40 TABLET ORAL at 09:13

## 2019-06-12 RX ADMIN — ACETAMINOPHEN 650 MG: 325 TABLET, FILM COATED ORAL at 21:31

## 2019-06-12 RX ADMIN — GUAIFENESIN 400 MG: 100 SOLUTION ORAL at 03:48

## 2019-06-12 RX ADMIN — SODIUM CHLORIDE, PRESERVATIVE FREE 3 ML: 5 INJECTION INTRAVENOUS at 00:41

## 2019-06-13 ENCOUNTER — APPOINTMENT (OUTPATIENT)
Dept: GENERAL RADIOLOGY | Facility: HOSPITAL | Age: 74
End: 2019-06-13

## 2019-06-13 LAB
ANION GAP SERPL CALCULATED.3IONS-SCNC: 13.7 MMOL/L
BASOPHILS # BLD AUTO: 0.03 10*3/MM3 (ref 0–0.2)
BASOPHILS NFR BLD AUTO: 0.1 % (ref 0–1.5)
BUN BLD-MCNC: 18 MG/DL (ref 8–23)
BUN/CREAT SERPL: 23.4 (ref 7–25)
CALCIUM SPEC-SCNC: 8.3 MG/DL (ref 8.6–10.5)
CHLORIDE SERPL-SCNC: 95 MMOL/L (ref 98–107)
CO2 SERPL-SCNC: 21.3 MMOL/L (ref 22–29)
CREAT BLD-MCNC: 0.77 MG/DL (ref 0.57–1)
DEPRECATED RDW RBC AUTO: 48.9 FL (ref 37–54)
EOSINOPHIL # BLD AUTO: 0.04 10*3/MM3 (ref 0–0.4)
EOSINOPHIL NFR BLD AUTO: 0.2 % (ref 0.3–6.2)
ERYTHROCYTE [DISTWIDTH] IN BLOOD BY AUTOMATED COUNT: 16.1 % (ref 12.3–15.4)
GFR SERPL CREATININE-BSD FRML MDRD: 73 ML/MIN/1.73
GLUCOSE BLD-MCNC: 113 MG/DL (ref 65–99)
GLUCOSE BLDC GLUCOMTR-MCNC: 109 MG/DL (ref 70–130)
GLUCOSE BLDC GLUCOMTR-MCNC: 113 MG/DL (ref 70–130)
GLUCOSE BLDC GLUCOMTR-MCNC: 97 MG/DL (ref 70–130)
HCT VFR BLD AUTO: 31.6 % (ref 34–46.6)
HGB BLD-MCNC: 10.1 G/DL (ref 12–15.9)
IMM GRANULOCYTES # BLD AUTO: 0.65 10*3/MM3 (ref 0–0.05)
IMM GRANULOCYTES NFR BLD AUTO: 3.2 % (ref 0–0.5)
LYMPHOCYTES # BLD AUTO: 1.6 10*3/MM3 (ref 0.7–3.1)
LYMPHOCYTES NFR BLD AUTO: 7.9 % (ref 19.6–45.3)
MCH RBC QN AUTO: 27.2 PG (ref 26.6–33)
MCHC RBC AUTO-ENTMCNC: 32 G/DL (ref 31.5–35.7)
MCV RBC AUTO: 84.9 FL (ref 79–97)
MONOCYTES # BLD AUTO: 0.8 10*3/MM3 (ref 0.1–0.9)
MONOCYTES NFR BLD AUTO: 4 % (ref 5–12)
NEUTROPHILS # BLD AUTO: 17.09 10*3/MM3 (ref 1.7–7)
NEUTROPHILS NFR BLD AUTO: 84.6 % (ref 42.7–76)
NRBC BLD AUTO-RTO: 0 /100 WBC (ref 0–0.2)
PLATELET # BLD AUTO: 476 10*3/MM3 (ref 140–450)
PMV BLD AUTO: 9.8 FL (ref 6–12)
POTASSIUM BLD-SCNC: 3.6 MMOL/L (ref 3.5–5.2)
RBC # BLD AUTO: 3.72 10*6/MM3 (ref 3.77–5.28)
SODIUM BLD-SCNC: 130 MMOL/L (ref 136–145)
WBC NRBC COR # BLD: 20.21 10*3/MM3 (ref 3.4–10.8)

## 2019-06-13 PROCEDURE — 94799 UNLISTED PULMONARY SVC/PX: CPT

## 2019-06-13 PROCEDURE — 77386: CPT | Performed by: RADIOLOGY

## 2019-06-13 PROCEDURE — 77014 CHG CT GUIDANCE RADIATION THERAPY FLDS PLACEMENT: CPT | Performed by: RADIOLOGY

## 2019-06-13 PROCEDURE — 25010000002 ENOXAPARIN PER 10 MG: Performed by: INTERNAL MEDICINE

## 2019-06-13 PROCEDURE — 77386 CHG INTENSITY MODULATED RADIATION TX DLVR COMPLEX: CPT | Performed by: RADIOLOGY

## 2019-06-13 PROCEDURE — 63710000001 PREDNISONE PER 1 MG: Performed by: INTERNAL MEDICINE

## 2019-06-13 PROCEDURE — 99231 SBSQ HOSP IP/OBS SF/LOW 25: CPT | Performed by: INTERNAL MEDICINE

## 2019-06-13 PROCEDURE — 71045 X-RAY EXAM CHEST 1 VIEW: CPT

## 2019-06-13 PROCEDURE — 85025 COMPLETE CBC W/AUTO DIFF WBC: CPT | Performed by: INTERNAL MEDICINE

## 2019-06-13 PROCEDURE — 94669 MECHANICAL CHEST WALL OSCILL: CPT

## 2019-06-13 PROCEDURE — 80048 BASIC METABOLIC PNL TOTAL CA: CPT | Performed by: INTERNAL MEDICINE

## 2019-06-13 PROCEDURE — 94668 MNPJ CHEST WALL SBSQ: CPT

## 2019-06-13 PROCEDURE — 82962 GLUCOSE BLOOD TEST: CPT

## 2019-06-13 RX ORDER — SODIUM CHLORIDE FOR INHALATION 7 %
4 VIAL, NEBULIZER (ML) INHALATION ONCE
Status: COMPLETED | OUTPATIENT
Start: 2019-06-13 | End: 2019-06-13

## 2019-06-13 RX ORDER — IPRATROPIUM BROMIDE AND ALBUTEROL SULFATE 2.5; .5 MG/3ML; MG/3ML
3 SOLUTION RESPIRATORY (INHALATION) ONCE
Status: COMPLETED | OUTPATIENT
Start: 2019-06-13 | End: 2019-06-13

## 2019-06-13 RX ADMIN — SODIUM CHLORIDE, PRESERVATIVE FREE 3 ML: 5 INJECTION INTRAVENOUS at 21:58

## 2019-06-13 RX ADMIN — Medication 4 ML: at 21:09

## 2019-06-13 RX ADMIN — SODIUM CHLORIDE, PRESERVATIVE FREE 3 ML: 5 INJECTION INTRAVENOUS at 09:01

## 2019-06-13 RX ADMIN — ASPIRIN 81 MG: 81 TABLET, CHEWABLE ORAL at 09:01

## 2019-06-13 RX ADMIN — MEGESTROL ACETATE 40 MG: 40 TABLET ORAL at 09:01

## 2019-06-13 RX ADMIN — DIVALPROEX SODIUM 250 MG: 125 CAPSULE, COATED PELLETS ORAL at 21:58

## 2019-06-13 RX ADMIN — OXYBUTYNIN CHLORIDE 5 MG: 5 TABLET, EXTENDED RELEASE ORAL at 09:01

## 2019-06-13 RX ADMIN — LOSARTAN POTASSIUM: 50 TABLET, FILM COATED ORAL at 09:01

## 2019-06-13 RX ADMIN — GUAIFENESIN 400 MG: 100 SOLUTION ORAL at 09:00

## 2019-06-13 RX ADMIN — METOPROLOL TARTRATE 50 MG: 50 TABLET, FILM COATED ORAL at 21:58

## 2019-06-13 RX ADMIN — DIVALPROEX SODIUM 250 MG: 125 CAPSULE, COATED PELLETS ORAL at 09:00

## 2019-06-13 RX ADMIN — PREDNISONE 20 MG: 20 TABLET ORAL at 09:01

## 2019-06-13 RX ADMIN — SODIUM CHLORIDE 4 ML: 7 NEBU SOLN,3 % NEBU at 13:07

## 2019-06-13 RX ADMIN — Medication 4 ML: at 07:15

## 2019-06-13 RX ADMIN — GUAIFENESIN 400 MG: 100 SOLUTION ORAL at 21:58

## 2019-06-13 RX ADMIN — ENOXAPARIN SODIUM 40 MG: 40 INJECTION SUBCUTANEOUS at 16:14

## 2019-06-13 RX ADMIN — METOPROLOL TARTRATE 50 MG: 50 TABLET, FILM COATED ORAL at 09:01

## 2019-06-13 RX ADMIN — ATORVASTATIN CALCIUM 80 MG: 80 TABLET, FILM COATED ORAL at 21:58

## 2019-06-13 RX ADMIN — IPRATROPIUM BROMIDE AND ALBUTEROL SULFATE 3 ML: 2.5; .5 SOLUTION RESPIRATORY (INHALATION) at 07:10

## 2019-06-13 RX ADMIN — LEVOTHYROXINE SODIUM 75 MCG: 75 TABLET ORAL at 06:37

## 2019-06-13 RX ADMIN — IPRATROPIUM BROMIDE AND ALBUTEROL SULFATE 3 ML: 2.5; .5 SOLUTION RESPIRATORY (INHALATION) at 21:09

## 2019-06-13 RX ADMIN — IPRATROPIUM BROMIDE AND ALBUTEROL SULFATE 3 ML: 2.5; .5 SOLUTION RESPIRATORY (INHALATION) at 13:05

## 2019-06-13 RX ADMIN — IPRATROPIUM BROMIDE AND ALBUTEROL SULFATE 3 ML: 2.5; .5 SOLUTION RESPIRATORY (INHALATION) at 11:30

## 2019-06-13 RX ADMIN — IPRATROPIUM BROMIDE AND ALBUTEROL SULFATE 3 ML: 2.5; .5 SOLUTION RESPIRATORY (INHALATION) at 16:07

## 2019-06-14 LAB
ANION GAP SERPL CALCULATED.3IONS-SCNC: 12.5 MMOL/L
BASOPHILS # BLD AUTO: 0.02 10*3/MM3 (ref 0–0.2)
BASOPHILS NFR BLD AUTO: 0.1 % (ref 0–1.5)
BUN BLD-MCNC: 18 MG/DL (ref 8–23)
BUN/CREAT SERPL: 20 (ref 7–25)
CALCIUM SPEC-SCNC: 8.8 MG/DL (ref 8.6–10.5)
CHLORIDE SERPL-SCNC: 93 MMOL/L (ref 98–107)
CO2 SERPL-SCNC: 24.5 MMOL/L (ref 22–29)
CREAT BLD-MCNC: 0.9 MG/DL (ref 0.57–1)
DEPRECATED RDW RBC AUTO: 51.4 FL (ref 37–54)
EOSINOPHIL # BLD AUTO: 0.08 10*3/MM3 (ref 0–0.4)
EOSINOPHIL NFR BLD AUTO: 0.4 % (ref 0.3–6.2)
ERYTHROCYTE [DISTWIDTH] IN BLOOD BY AUTOMATED COUNT: 16.6 % (ref 12.3–15.4)
GFR SERPL CREATININE-BSD FRML MDRD: 61 ML/MIN/1.73
GLUCOSE BLD-MCNC: 104 MG/DL (ref 65–99)
GLUCOSE BLDC GLUCOMTR-MCNC: 106 MG/DL (ref 70–130)
GLUCOSE BLDC GLUCOMTR-MCNC: 113 MG/DL (ref 70–130)
GLUCOSE BLDC GLUCOMTR-MCNC: 142 MG/DL (ref 70–130)
GLUCOSE BLDC GLUCOMTR-MCNC: 97 MG/DL (ref 70–130)
HCT VFR BLD AUTO: 34.7 % (ref 34–46.6)
HGB BLD-MCNC: 10.8 G/DL (ref 12–15.9)
IMM GRANULOCYTES # BLD AUTO: 0.42 10*3/MM3 (ref 0–0.05)
IMM GRANULOCYTES NFR BLD AUTO: 2.3 % (ref 0–0.5)
LYMPHOCYTES # BLD AUTO: 1.56 10*3/MM3 (ref 0.7–3.1)
LYMPHOCYTES NFR BLD AUTO: 8.7 % (ref 19.6–45.3)
MCH RBC QN AUTO: 26.7 PG (ref 26.6–33)
MCHC RBC AUTO-ENTMCNC: 31.1 G/DL (ref 31.5–35.7)
MCV RBC AUTO: 85.9 FL (ref 79–97)
MONOCYTES # BLD AUTO: 0.75 10*3/MM3 (ref 0.1–0.9)
MONOCYTES NFR BLD AUTO: 4.2 % (ref 5–12)
NEUTROPHILS # BLD AUTO: 15.15 10*3/MM3 (ref 1.7–7)
NEUTROPHILS NFR BLD AUTO: 84.3 % (ref 42.7–76)
NRBC BLD AUTO-RTO: 0 /100 WBC (ref 0–0.2)
PLATELET # BLD AUTO: 527 10*3/MM3 (ref 140–450)
PMV BLD AUTO: 10 FL (ref 6–12)
POTASSIUM BLD-SCNC: 3.8 MMOL/L (ref 3.5–5.2)
RBC # BLD AUTO: 4.04 10*6/MM3 (ref 3.77–5.28)
SODIUM BLD-SCNC: 130 MMOL/L (ref 136–145)
WBC NRBC COR # BLD: 17.98 10*3/MM3 (ref 3.4–10.8)

## 2019-06-14 PROCEDURE — 77014 CHG CT GUIDANCE RADIATION THERAPY FLDS PLACEMENT: CPT | Performed by: RADIOLOGY

## 2019-06-14 PROCEDURE — 85025 COMPLETE CBC W/AUTO DIFF WBC: CPT | Performed by: INTERNAL MEDICINE

## 2019-06-14 PROCEDURE — 94668 MNPJ CHEST WALL SBSQ: CPT

## 2019-06-14 PROCEDURE — 94669 MECHANICAL CHEST WALL OSCILL: CPT

## 2019-06-14 PROCEDURE — 94799 UNLISTED PULMONARY SVC/PX: CPT

## 2019-06-14 PROCEDURE — 63710000001 PREDNISONE PER 1 MG: Performed by: INTERNAL MEDICINE

## 2019-06-14 PROCEDURE — 77386: CPT | Performed by: RADIOLOGY

## 2019-06-14 PROCEDURE — 25010000002 ENOXAPARIN PER 10 MG: Performed by: INTERNAL MEDICINE

## 2019-06-14 PROCEDURE — 99231 SBSQ HOSP IP/OBS SF/LOW 25: CPT | Performed by: INTERNAL MEDICINE

## 2019-06-14 PROCEDURE — 80048 BASIC METABOLIC PNL TOTAL CA: CPT | Performed by: INTERNAL MEDICINE

## 2019-06-14 PROCEDURE — 77386 CHG INTENSITY MODULATED RADIATION TX DLVR COMPLEX: CPT | Performed by: RADIOLOGY

## 2019-06-14 PROCEDURE — 82962 GLUCOSE BLOOD TEST: CPT

## 2019-06-14 RX ORDER — DOCUSATE SODIUM 100 MG/1
100 CAPSULE, LIQUID FILLED ORAL 2 TIMES DAILY
Status: DISCONTINUED | OUTPATIENT
Start: 2019-06-14 | End: 2019-06-26 | Stop reason: HOSPADM

## 2019-06-14 RX ORDER — BISACODYL 10 MG
10 SUPPOSITORY, RECTAL RECTAL ONCE
Status: COMPLETED | OUTPATIENT
Start: 2019-06-14 | End: 2019-06-14

## 2019-06-14 RX ORDER — POLYETHYLENE GLYCOL 3350 17 G/17G
17 POWDER, FOR SOLUTION ORAL ONCE
Status: COMPLETED | OUTPATIENT
Start: 2019-06-14 | End: 2019-06-14

## 2019-06-14 RX ORDER — SENNA AND DOCUSATE SODIUM 50; 8.6 MG/1; MG/1
2 TABLET, FILM COATED ORAL NIGHTLY PRN
Status: DISCONTINUED | OUTPATIENT
Start: 2019-06-14 | End: 2019-06-26 | Stop reason: HOSPADM

## 2019-06-14 RX ADMIN — LEVOTHYROXINE SODIUM 75 MCG: 75 TABLET ORAL at 08:07

## 2019-06-14 RX ADMIN — DOCUSATE SODIUM 100 MG: 100 CAPSULE, LIQUID FILLED ORAL at 08:08

## 2019-06-14 RX ADMIN — MEGESTROL ACETATE 40 MG: 40 TABLET ORAL at 08:08

## 2019-06-14 RX ADMIN — GUAIFENESIN 400 MG: 100 SOLUTION ORAL at 20:53

## 2019-06-14 RX ADMIN — DIVALPROEX SODIUM 250 MG: 125 CAPSULE, COATED PELLETS ORAL at 08:08

## 2019-06-14 RX ADMIN — METOPROLOL TARTRATE 50 MG: 50 TABLET, FILM COATED ORAL at 20:53

## 2019-06-14 RX ADMIN — LOSARTAN POTASSIUM: 50 TABLET, FILM COATED ORAL at 08:08

## 2019-06-14 RX ADMIN — IPRATROPIUM BROMIDE AND ALBUTEROL SULFATE 3 ML: 2.5; .5 SOLUTION RESPIRATORY (INHALATION) at 15:59

## 2019-06-14 RX ADMIN — ENOXAPARIN SODIUM 40 MG: 40 INJECTION SUBCUTANEOUS at 12:37

## 2019-06-14 RX ADMIN — Medication 4 ML: at 07:22

## 2019-06-14 RX ADMIN — GUAIFENESIN 400 MG: 100 SOLUTION ORAL at 03:35

## 2019-06-14 RX ADMIN — Medication 4 ML: at 23:37

## 2019-06-14 RX ADMIN — ASPIRIN 81 MG: 81 TABLET, CHEWABLE ORAL at 08:08

## 2019-06-14 RX ADMIN — POLYETHYLENE GLYCOL 3350 17 G: 17 POWDER, FOR SOLUTION ORAL at 12:37

## 2019-06-14 RX ADMIN — BISACODYL 10 MG: 10 SUPPOSITORY RECTAL at 18:16

## 2019-06-14 RX ADMIN — PREDNISONE 20 MG: 20 TABLET ORAL at 08:08

## 2019-06-14 RX ADMIN — DOCUSATE SODIUM 100 MG: 100 CAPSULE, LIQUID FILLED ORAL at 00:43

## 2019-06-14 RX ADMIN — ATORVASTATIN CALCIUM 80 MG: 80 TABLET, FILM COATED ORAL at 20:53

## 2019-06-14 RX ADMIN — METOPROLOL TARTRATE 50 MG: 50 TABLET, FILM COATED ORAL at 08:06

## 2019-06-14 RX ADMIN — GUAIFENESIN 400 MG: 100 SOLUTION ORAL at 10:02

## 2019-06-14 RX ADMIN — IPRATROPIUM BROMIDE AND ALBUTEROL SULFATE 3 ML: 2.5; .5 SOLUTION RESPIRATORY (INHALATION) at 07:21

## 2019-06-14 RX ADMIN — IPRATROPIUM BROMIDE AND ALBUTEROL SULFATE 3 ML: 2.5; .5 SOLUTION RESPIRATORY (INHALATION) at 23:37

## 2019-06-14 RX ADMIN — IPRATROPIUM BROMIDE AND ALBUTEROL SULFATE 3 ML: 2.5; .5 SOLUTION RESPIRATORY (INHALATION) at 11:05

## 2019-06-14 RX ADMIN — GUAIFENESIN 400 MG: 100 SOLUTION ORAL at 18:13

## 2019-06-14 RX ADMIN — DOCUSATE SODIUM 100 MG: 100 CAPSULE, LIQUID FILLED ORAL at 20:53

## 2019-06-14 RX ADMIN — DIVALPROEX SODIUM 250 MG: 125 CAPSULE, COATED PELLETS ORAL at 20:53

## 2019-06-14 RX ADMIN — SODIUM CHLORIDE, PRESERVATIVE FREE 3 ML: 5 INJECTION INTRAVENOUS at 20:53

## 2019-06-14 RX ADMIN — SODIUM CHLORIDE, PRESERVATIVE FREE 3 ML: 5 INJECTION INTRAVENOUS at 08:08

## 2019-06-14 RX ADMIN — OXYBUTYNIN CHLORIDE 5 MG: 5 TABLET, EXTENDED RELEASE ORAL at 08:08

## 2019-06-15 LAB
ANION GAP SERPL CALCULATED.3IONS-SCNC: 13.3 MMOL/L
BUN BLD-MCNC: 18 MG/DL (ref 8–23)
BUN/CREAT SERPL: 23.1 (ref 7–25)
CALCIUM SPEC-SCNC: 8.5 MG/DL (ref 8.6–10.5)
CHLORIDE SERPL-SCNC: 95 MMOL/L (ref 98–107)
CO2 SERPL-SCNC: 24.7 MMOL/L (ref 22–29)
CREAT BLD-MCNC: 0.78 MG/DL (ref 0.57–1)
DEPRECATED RDW RBC AUTO: 51.1 FL (ref 37–54)
ERYTHROCYTE [DISTWIDTH] IN BLOOD BY AUTOMATED COUNT: 16.7 % (ref 12.3–15.4)
GFR SERPL CREATININE-BSD FRML MDRD: 72 ML/MIN/1.73
GLUCOSE BLD-MCNC: 86 MG/DL (ref 65–99)
HCT VFR BLD AUTO: 33.1 % (ref 34–46.6)
HGB BLD-MCNC: 10.5 G/DL (ref 12–15.9)
MCH RBC QN AUTO: 26.9 PG (ref 26.6–33)
MCHC RBC AUTO-ENTMCNC: 31.7 G/DL (ref 31.5–35.7)
MCV RBC AUTO: 84.7 FL (ref 79–97)
PLATELET # BLD AUTO: 411 10*3/MM3 (ref 140–450)
PMV BLD AUTO: 9.2 FL (ref 6–12)
POTASSIUM BLD-SCNC: 3.7 MMOL/L (ref 3.5–5.2)
RBC # BLD AUTO: 3.91 10*6/MM3 (ref 3.77–5.28)
SODIUM BLD-SCNC: 133 MMOL/L (ref 136–145)
WBC NRBC COR # BLD: 15.71 10*3/MM3 (ref 3.4–10.8)

## 2019-06-15 PROCEDURE — 94799 UNLISTED PULMONARY SVC/PX: CPT

## 2019-06-15 PROCEDURE — 85027 COMPLETE CBC AUTOMATED: CPT | Performed by: INTERNAL MEDICINE

## 2019-06-15 PROCEDURE — 94669 MECHANICAL CHEST WALL OSCILL: CPT

## 2019-06-15 PROCEDURE — 63710000001 PREDNISONE PER 1 MG: Performed by: INTERNAL MEDICINE

## 2019-06-15 PROCEDURE — 99231 SBSQ HOSP IP/OBS SF/LOW 25: CPT | Performed by: INTERNAL MEDICINE

## 2019-06-15 PROCEDURE — 80048 BASIC METABOLIC PNL TOTAL CA: CPT | Performed by: INTERNAL MEDICINE

## 2019-06-15 PROCEDURE — 25010000002 ENOXAPARIN PER 10 MG: Performed by: INTERNAL MEDICINE

## 2019-06-15 RX ORDER — PREDNISONE 10 MG/1
10 TABLET ORAL
Status: DISCONTINUED | OUTPATIENT
Start: 2019-06-16 | End: 2019-06-18

## 2019-06-15 RX ORDER — SODIUM CHLORIDE 9 MG/ML
100 INJECTION, SOLUTION INTRAVENOUS CONTINUOUS
Status: DISCONTINUED | OUTPATIENT
Start: 2019-06-15 | End: 2019-06-16

## 2019-06-15 RX ADMIN — IPRATROPIUM BROMIDE AND ALBUTEROL SULFATE 3 ML: 2.5; .5 SOLUTION RESPIRATORY (INHALATION) at 06:50

## 2019-06-15 RX ADMIN — LOSARTAN POTASSIUM: 50 TABLET, FILM COATED ORAL at 08:50

## 2019-06-15 RX ADMIN — DOCUSATE SODIUM 100 MG: 100 CAPSULE, LIQUID FILLED ORAL at 20:45

## 2019-06-15 RX ADMIN — IPRATROPIUM BROMIDE AND ALBUTEROL SULFATE 3 ML: 2.5; .5 SOLUTION RESPIRATORY (INHALATION) at 15:20

## 2019-06-15 RX ADMIN — IPRATROPIUM BROMIDE AND ALBUTEROL SULFATE 3 ML: 2.5; .5 SOLUTION RESPIRATORY (INHALATION) at 18:58

## 2019-06-15 RX ADMIN — SODIUM CHLORIDE, PRESERVATIVE FREE 3 ML: 5 INJECTION INTRAVENOUS at 08:51

## 2019-06-15 RX ADMIN — ENOXAPARIN SODIUM 40 MG: 40 INJECTION SUBCUTANEOUS at 12:36

## 2019-06-15 RX ADMIN — ASPIRIN 81 MG: 81 TABLET, CHEWABLE ORAL at 08:50

## 2019-06-15 RX ADMIN — GUAIFENESIN 400 MG: 100 SOLUTION ORAL at 08:50

## 2019-06-15 RX ADMIN — GUAIFENESIN 400 MG: 100 SOLUTION ORAL at 16:21

## 2019-06-15 RX ADMIN — LEVOTHYROXINE SODIUM 75 MCG: 75 TABLET ORAL at 05:59

## 2019-06-15 RX ADMIN — ATORVASTATIN CALCIUM 80 MG: 80 TABLET, FILM COATED ORAL at 20:44

## 2019-06-15 RX ADMIN — GUAIFENESIN 400 MG: 100 SOLUTION ORAL at 04:11

## 2019-06-15 RX ADMIN — Medication 4 ML: at 18:58

## 2019-06-15 RX ADMIN — Medication 4 ML: at 06:53

## 2019-06-15 RX ADMIN — DOCUSATE SODIUM 100 MG: 100 CAPSULE, LIQUID FILLED ORAL at 08:50

## 2019-06-15 RX ADMIN — OXYBUTYNIN CHLORIDE 5 MG: 5 TABLET, EXTENDED RELEASE ORAL at 08:50

## 2019-06-15 RX ADMIN — SODIUM CHLORIDE, PRESERVATIVE FREE 3 ML: 5 INJECTION INTRAVENOUS at 20:44

## 2019-06-15 RX ADMIN — GUAIFENESIN 400 MG: 100 SOLUTION ORAL at 20:45

## 2019-06-15 RX ADMIN — IPRATROPIUM BROMIDE AND ALBUTEROL SULFATE 3 ML: 2.5; .5 SOLUTION RESPIRATORY (INHALATION) at 11:30

## 2019-06-15 RX ADMIN — DIVALPROEX SODIUM 250 MG: 125 CAPSULE, COATED PELLETS ORAL at 08:50

## 2019-06-15 RX ADMIN — PREDNISONE 20 MG: 20 TABLET ORAL at 08:50

## 2019-06-15 RX ADMIN — METOPROLOL TARTRATE 50 MG: 50 TABLET, FILM COATED ORAL at 08:50

## 2019-06-15 RX ADMIN — MEGESTROL ACETATE 40 MG: 40 TABLET ORAL at 08:50

## 2019-06-15 RX ADMIN — SODIUM CHLORIDE 100 ML/HR: 9 INJECTION, SOLUTION INTRAVENOUS at 19:17

## 2019-06-15 RX ADMIN — DIVALPROEX SODIUM 250 MG: 125 CAPSULE, COATED PELLETS ORAL at 20:45

## 2019-06-15 RX ADMIN — METOPROLOL TARTRATE 50 MG: 50 TABLET, FILM COATED ORAL at 20:44

## 2019-06-16 LAB
ABO GROUP BLD: NORMAL
ABO GROUP BLD: NORMAL
ANION GAP SERPL CALCULATED.3IONS-SCNC: 12.6 MMOL/L
BASOPHILS # BLD AUTO: 0.02 10*3/MM3 (ref 0–0.2)
BASOPHILS NFR BLD AUTO: 0.1 % (ref 0–1.5)
BLD GP AB SCN SERPL QL: NEGATIVE
BUN BLD-MCNC: 16 MG/DL (ref 8–23)
BUN/CREAT SERPL: 20 (ref 7–25)
CALCIUM SPEC-SCNC: 7.8 MG/DL (ref 8.6–10.5)
CHLORIDE SERPL-SCNC: 97 MMOL/L (ref 98–107)
CO2 SERPL-SCNC: 22.4 MMOL/L (ref 22–29)
CREAT BLD-MCNC: 0.8 MG/DL (ref 0.57–1)
DEPRECATED RDW RBC AUTO: 52.2 FL (ref 37–54)
DEPRECATED RDW RBC AUTO: 53.4 FL (ref 37–54)
EOSINOPHIL # BLD AUTO: 0.04 10*3/MM3 (ref 0–0.4)
EOSINOPHIL NFR BLD AUTO: 0.2 % (ref 0.3–6.2)
ERYTHROCYTE [DISTWIDTH] IN BLOOD BY AUTOMATED COUNT: 16.4 % (ref 12.3–15.4)
ERYTHROCYTE [DISTWIDTH] IN BLOOD BY AUTOMATED COUNT: 16.5 % (ref 12.3–15.4)
GFR SERPL CREATININE-BSD FRML MDRD: 70 ML/MIN/1.73
GLUCOSE BLD-MCNC: 85 MG/DL (ref 65–99)
HCT VFR BLD AUTO: 26.3 % (ref 34–46.6)
HCT VFR BLD AUTO: 29.3 % (ref 34–46.6)
HGB BLD-MCNC: 8 G/DL (ref 12–15.9)
HGB BLD-MCNC: 9.3 G/DL (ref 12–15.9)
IMM GRANULOCYTES # BLD AUTO: 0.26 10*3/MM3 (ref 0–0.05)
IMM GRANULOCYTES NFR BLD AUTO: 1.6 % (ref 0–0.5)
LYMPHOCYTES # BLD AUTO: 0.28 10*3/MM3 (ref 0.7–3.1)
LYMPHOCYTES NFR BLD AUTO: 1.7 % (ref 19.6–45.3)
MCH RBC QN AUTO: 27.2 PG (ref 26.6–33)
MCH RBC QN AUTO: 27.5 PG (ref 26.6–33)
MCHC RBC AUTO-ENTMCNC: 30.4 G/DL (ref 31.5–35.7)
MCHC RBC AUTO-ENTMCNC: 31.7 G/DL (ref 31.5–35.7)
MCV RBC AUTO: 86.7 FL (ref 79–97)
MCV RBC AUTO: 89.5 FL (ref 79–97)
MONOCYTES # BLD AUTO: 0.33 10*3/MM3 (ref 0.1–0.9)
MONOCYTES NFR BLD AUTO: 2 % (ref 5–12)
NEUTROPHILS # BLD AUTO: 15.41 10*3/MM3 (ref 1.7–7)
NEUTROPHILS NFR BLD AUTO: 94.4 % (ref 42.7–76)
NRBC BLD AUTO-RTO: 0 /100 WBC (ref 0–0.2)
PLATELET # BLD AUTO: 280 10*3/MM3 (ref 140–450)
PLATELET # BLD AUTO: 330 10*3/MM3 (ref 140–450)
PMV BLD AUTO: 9.5 FL (ref 6–12)
PMV BLD AUTO: 9.6 FL (ref 6–12)
POTASSIUM BLD-SCNC: 3.8 MMOL/L (ref 3.5–5.2)
RBC # BLD AUTO: 2.94 10*6/MM3 (ref 3.77–5.28)
RBC # BLD AUTO: 3.38 10*6/MM3 (ref 3.77–5.28)
RH BLD: POSITIVE
RH BLD: POSITIVE
SODIUM BLD-SCNC: 132 MMOL/L (ref 136–145)
T&S EXPIRATION DATE: NORMAL
WBC NRBC COR # BLD: 11.93 10*3/MM3 (ref 3.4–10.8)
WBC NRBC COR # BLD: 16.34 10*3/MM3 (ref 3.4–10.8)

## 2019-06-16 PROCEDURE — 94799 UNLISTED PULMONARY SVC/PX: CPT

## 2019-06-16 PROCEDURE — 97162 PT EVAL MOD COMPLEX 30 MIN: CPT

## 2019-06-16 PROCEDURE — P9016 RBC LEUKOCYTES REDUCED: HCPCS

## 2019-06-16 PROCEDURE — 99232 SBSQ HOSP IP/OBS MODERATE 35: CPT | Performed by: INTERNAL MEDICINE

## 2019-06-16 PROCEDURE — 86923 COMPATIBILITY TEST ELECTRIC: CPT

## 2019-06-16 PROCEDURE — 94668 MNPJ CHEST WALL SBSQ: CPT

## 2019-06-16 PROCEDURE — 86901 BLOOD TYPING SEROLOGIC RH(D): CPT

## 2019-06-16 PROCEDURE — 85027 COMPLETE CBC AUTOMATED: CPT | Performed by: INTERNAL MEDICINE

## 2019-06-16 PROCEDURE — 86900 BLOOD TYPING SEROLOGIC ABO: CPT

## 2019-06-16 PROCEDURE — 86850 RBC ANTIBODY SCREEN: CPT | Performed by: INTERNAL MEDICINE

## 2019-06-16 PROCEDURE — 25010000002 ENOXAPARIN PER 10 MG: Performed by: INTERNAL MEDICINE

## 2019-06-16 PROCEDURE — 94669 MECHANICAL CHEST WALL OSCILL: CPT

## 2019-06-16 PROCEDURE — 80048 BASIC METABOLIC PNL TOTAL CA: CPT | Performed by: INTERNAL MEDICINE

## 2019-06-16 PROCEDURE — 97110 THERAPEUTIC EXERCISES: CPT

## 2019-06-16 PROCEDURE — 77336 RADIATION PHYSICS CONSULT: CPT | Performed by: RADIOLOGY

## 2019-06-16 PROCEDURE — 86901 BLOOD TYPING SEROLOGIC RH(D): CPT | Performed by: INTERNAL MEDICINE

## 2019-06-16 PROCEDURE — 63710000001 PREDNISONE PER 5 MG: Performed by: INTERNAL MEDICINE

## 2019-06-16 PROCEDURE — 86900 BLOOD TYPING SEROLOGIC ABO: CPT | Performed by: INTERNAL MEDICINE

## 2019-06-16 PROCEDURE — 85025 COMPLETE CBC W/AUTO DIFF WBC: CPT | Performed by: INTERNAL MEDICINE

## 2019-06-16 PROCEDURE — 36430 TRANSFUSION BLD/BLD COMPNT: CPT

## 2019-06-16 RX ADMIN — IPRATROPIUM BROMIDE AND ALBUTEROL SULFATE 3 ML: 2.5; .5 SOLUTION RESPIRATORY (INHALATION) at 19:58

## 2019-06-16 RX ADMIN — DOCUSATE SODIUM 100 MG: 100 CAPSULE, LIQUID FILLED ORAL at 09:07

## 2019-06-16 RX ADMIN — LEVOTHYROXINE SODIUM 75 MCG: 75 TABLET ORAL at 07:19

## 2019-06-16 RX ADMIN — Medication 4 ML: at 06:58

## 2019-06-16 RX ADMIN — IPRATROPIUM BROMIDE AND ALBUTEROL SULFATE 3 ML: 2.5; .5 SOLUTION RESPIRATORY (INHALATION) at 15:08

## 2019-06-16 RX ADMIN — DIVALPROEX SODIUM 250 MG: 125 CAPSULE, COATED PELLETS ORAL at 09:07

## 2019-06-16 RX ADMIN — GUAIFENESIN 400 MG: 100 SOLUTION ORAL at 09:07

## 2019-06-16 RX ADMIN — IPRATROPIUM BROMIDE AND ALBUTEROL SULFATE 3 ML: 2.5; .5 SOLUTION RESPIRATORY (INHALATION) at 10:54

## 2019-06-16 RX ADMIN — PREDNISONE 10 MG: 10 TABLET ORAL at 09:07

## 2019-06-16 RX ADMIN — METOPROLOL TARTRATE 50 MG: 50 TABLET, FILM COATED ORAL at 20:30

## 2019-06-16 RX ADMIN — DIVALPROEX SODIUM 250 MG: 125 CAPSULE, COATED PELLETS ORAL at 20:30

## 2019-06-16 RX ADMIN — OXYBUTYNIN CHLORIDE 5 MG: 5 TABLET, EXTENDED RELEASE ORAL at 09:07

## 2019-06-16 RX ADMIN — DOCUSATE SODIUM 100 MG: 100 CAPSULE, LIQUID FILLED ORAL at 20:30

## 2019-06-16 RX ADMIN — SODIUM CHLORIDE 100 ML/HR: 9 INJECTION, SOLUTION INTRAVENOUS at 04:39

## 2019-06-16 RX ADMIN — Medication 4 ML: at 19:58

## 2019-06-16 RX ADMIN — ATORVASTATIN CALCIUM 80 MG: 80 TABLET, FILM COATED ORAL at 20:30

## 2019-06-16 RX ADMIN — MEGESTROL ACETATE 40 MG: 40 TABLET ORAL at 09:07

## 2019-06-16 RX ADMIN — ENOXAPARIN SODIUM 40 MG: 40 INJECTION SUBCUTANEOUS at 12:24

## 2019-06-16 RX ADMIN — SODIUM CHLORIDE, PRESERVATIVE FREE 3 ML: 5 INJECTION INTRAVENOUS at 20:31

## 2019-06-16 RX ADMIN — IPRATROPIUM BROMIDE AND ALBUTEROL SULFATE 3 ML: 2.5; .5 SOLUTION RESPIRATORY (INHALATION) at 06:55

## 2019-06-16 RX ADMIN — GUAIFENESIN 400 MG: 100 SOLUTION ORAL at 16:55

## 2019-06-16 RX ADMIN — METOPROLOL TARTRATE 50 MG: 50 TABLET, FILM COATED ORAL at 09:07

## 2019-06-16 RX ADMIN — ASPIRIN 81 MG: 81 TABLET, CHEWABLE ORAL at 09:07

## 2019-06-16 RX ADMIN — GUAIFENESIN 400 MG: 100 SOLUTION ORAL at 22:04

## 2019-06-16 RX ADMIN — LOSARTAN POTASSIUM: 50 TABLET, FILM COATED ORAL at 09:07

## 2019-06-16 RX ADMIN — SODIUM CHLORIDE, PRESERVATIVE FREE 3 ML: 5 INJECTION INTRAVENOUS at 09:07

## 2019-06-17 ENCOUNTER — APPOINTMENT (OUTPATIENT)
Dept: ONCOLOGY | Facility: CLINIC | Age: 74
End: 2019-06-17

## 2019-06-17 ENCOUNTER — APPOINTMENT (OUTPATIENT)
Dept: GENERAL RADIOLOGY | Facility: HOSPITAL | Age: 74
End: 2019-06-17

## 2019-06-17 ENCOUNTER — APPOINTMENT (OUTPATIENT)
Dept: OTHER | Facility: HOSPITAL | Age: 74
End: 2019-06-17

## 2019-06-17 LAB
ANION GAP SERPL CALCULATED.3IONS-SCNC: 11.4 MMOL/L
BUN BLD-MCNC: 10 MG/DL (ref 8–23)
BUN/CREAT SERPL: 15.9 (ref 7–25)
CALCIUM SPEC-SCNC: 7.9 MG/DL (ref 8.6–10.5)
CHLORIDE SERPL-SCNC: 95 MMOL/L (ref 98–107)
CO2 SERPL-SCNC: 20.6 MMOL/L (ref 22–29)
CREAT BLD-MCNC: 0.63 MG/DL (ref 0.57–1)
DEPRECATED RDW RBC AUTO: 49.1 FL (ref 37–54)
ERYTHROCYTE [DISTWIDTH] IN BLOOD BY AUTOMATED COUNT: 15.8 % (ref 12.3–15.4)
FUNGUS WND CULT: NORMAL
GFR SERPL CREATININE-BSD FRML MDRD: 93 ML/MIN/1.73
GLUCOSE BLD-MCNC: 92 MG/DL (ref 65–99)
HCT VFR BLD AUTO: 34.7 % (ref 34–46.6)
HGB BLD-MCNC: 11.1 G/DL (ref 12–15.9)
MCH RBC QN AUTO: 27.5 PG (ref 26.6–33)
MCHC RBC AUTO-ENTMCNC: 32 G/DL (ref 31.5–35.7)
MCV RBC AUTO: 86.1 FL (ref 79–97)
PLATELET # BLD AUTO: 277 10*3/MM3 (ref 140–450)
PMV BLD AUTO: 9.3 FL (ref 6–12)
POTASSIUM BLD-SCNC: 3.4 MMOL/L (ref 3.5–5.2)
RBC # BLD AUTO: 4.03 10*6/MM3 (ref 3.77–5.28)
SODIUM BLD-SCNC: 127 MMOL/L (ref 136–145)
WBC NRBC COR # BLD: 11.95 10*3/MM3 (ref 3.4–10.8)

## 2019-06-17 PROCEDURE — 85027 COMPLETE CBC AUTOMATED: CPT | Performed by: INTERNAL MEDICINE

## 2019-06-17 PROCEDURE — 99232 SBSQ HOSP IP/OBS MODERATE 35: CPT | Performed by: INTERNAL MEDICINE

## 2019-06-17 PROCEDURE — 94669 MECHANICAL CHEST WALL OSCILL: CPT

## 2019-06-17 PROCEDURE — 94799 UNLISTED PULMONARY SVC/PX: CPT

## 2019-06-17 PROCEDURE — 80048 BASIC METABOLIC PNL TOTAL CA: CPT | Performed by: INTERNAL MEDICINE

## 2019-06-17 PROCEDURE — 92611 MOTION FLUOROSCOPY/SWALLOW: CPT

## 2019-06-17 PROCEDURE — 77386: CPT | Performed by: RADIOLOGY

## 2019-06-17 PROCEDURE — 25010000002 ENOXAPARIN PER 10 MG: Performed by: INTERNAL MEDICINE

## 2019-06-17 PROCEDURE — 77386 CHG INTENSITY MODULATED RADIATION TX DLVR COMPLEX: CPT | Performed by: RADIOLOGY

## 2019-06-17 PROCEDURE — 74230 X-RAY XM SWLNG FUNCJ C+: CPT

## 2019-06-17 PROCEDURE — 94668 MNPJ CHEST WALL SBSQ: CPT

## 2019-06-17 PROCEDURE — 63710000001 PREDNISONE PER 5 MG: Performed by: INTERNAL MEDICINE

## 2019-06-17 PROCEDURE — 77014 CHG CT GUIDANCE RADIATION THERAPY FLDS PLACEMENT: CPT | Performed by: RADIOLOGY

## 2019-06-17 RX ORDER — POTASSIUM CHLORIDE 750 MG/1
40 CAPSULE, EXTENDED RELEASE ORAL 2 TIMES DAILY WITH MEALS
Status: COMPLETED | OUTPATIENT
Start: 2019-06-17 | End: 2019-06-18

## 2019-06-17 RX ADMIN — ENOXAPARIN SODIUM 40 MG: 40 INJECTION SUBCUTANEOUS at 14:27

## 2019-06-17 RX ADMIN — IPRATROPIUM BROMIDE AND ALBUTEROL SULFATE 3 ML: 2.5; .5 SOLUTION RESPIRATORY (INHALATION) at 12:01

## 2019-06-17 RX ADMIN — DIVALPROEX SODIUM 250 MG: 125 CAPSULE, COATED PELLETS ORAL at 20:39

## 2019-06-17 RX ADMIN — DOCUSATE SODIUM 100 MG: 100 CAPSULE, LIQUID FILLED ORAL at 08:19

## 2019-06-17 RX ADMIN — ATORVASTATIN CALCIUM 80 MG: 80 TABLET, FILM COATED ORAL at 20:39

## 2019-06-17 RX ADMIN — GUAIFENESIN 400 MG: 100 SOLUTION ORAL at 08:22

## 2019-06-17 RX ADMIN — PREDNISONE 10 MG: 10 TABLET ORAL at 08:19

## 2019-06-17 RX ADMIN — IPRATROPIUM BROMIDE AND ALBUTEROL SULFATE 3 ML: 2.5; .5 SOLUTION RESPIRATORY (INHALATION) at 20:00

## 2019-06-17 RX ADMIN — GUAIFENESIN 400 MG: 100 SOLUTION ORAL at 20:50

## 2019-06-17 RX ADMIN — DOCUSATE SODIUM 100 MG: 100 CAPSULE, LIQUID FILLED ORAL at 20:39

## 2019-06-17 RX ADMIN — SODIUM CHLORIDE, PRESERVATIVE FREE 3 ML: 5 INJECTION INTRAVENOUS at 20:39

## 2019-06-17 RX ADMIN — METOPROLOL TARTRATE 50 MG: 50 TABLET, FILM COATED ORAL at 20:39

## 2019-06-17 RX ADMIN — BARIUM SULFATE 65 ML: 960 POWDER, FOR SUSPENSION ORAL at 13:18

## 2019-06-17 RX ADMIN — SODIUM CHLORIDE, PRESERVATIVE FREE 3 ML: 5 INJECTION INTRAVENOUS at 08:19

## 2019-06-17 RX ADMIN — IPRATROPIUM BROMIDE AND ALBUTEROL SULFATE 3 ML: 2.5; .5 SOLUTION RESPIRATORY (INHALATION) at 14:32

## 2019-06-17 RX ADMIN — ASPIRIN 81 MG: 81 TABLET, CHEWABLE ORAL at 08:19

## 2019-06-17 RX ADMIN — IPRATROPIUM BROMIDE AND ALBUTEROL SULFATE 3 ML: 2.5; .5 SOLUTION RESPIRATORY (INHALATION) at 07:16

## 2019-06-17 RX ADMIN — LOSARTAN POTASSIUM: 50 TABLET, FILM COATED ORAL at 08:19

## 2019-06-17 RX ADMIN — METOPROLOL TARTRATE 50 MG: 50 TABLET, FILM COATED ORAL at 08:19

## 2019-06-17 RX ADMIN — Medication 4 ML: at 07:16

## 2019-06-17 RX ADMIN — GUAIFENESIN 400 MG: 100 SOLUTION ORAL at 17:04

## 2019-06-17 RX ADMIN — DIVALPROEX SODIUM 250 MG: 125 CAPSULE, COATED PELLETS ORAL at 08:18

## 2019-06-17 RX ADMIN — Medication 4 ML: at 20:00

## 2019-06-17 RX ADMIN — BARIUM SULFATE 4 ML: 980 POWDER, FOR SUSPENSION ORAL at 13:18

## 2019-06-17 RX ADMIN — POTASSIUM CHLORIDE 40 MEQ: 750 CAPSULE, EXTENDED RELEASE ORAL at 17:04

## 2019-06-17 RX ADMIN — SENNOSIDES,DOCUSATE SODIUM 2 TABLET: 50; 8.6 TABLET, FILM COATED ORAL at 21:33

## 2019-06-17 RX ADMIN — Medication 1 APPLICATION: at 20:39

## 2019-06-17 RX ADMIN — LEVOTHYROXINE SODIUM 75 MCG: 75 TABLET ORAL at 06:26

## 2019-06-17 RX ADMIN — GUAIFENESIN 400 MG: 100 SOLUTION ORAL at 04:13

## 2019-06-18 ENCOUNTER — RADIATION ONCOLOGY WEEKLY ASSESSMENT (OUTPATIENT)
Dept: RADIATION ONCOLOGY | Facility: HOSPITAL | Age: 74
End: 2019-06-18

## 2019-06-18 DIAGNOSIS — C34.92 ADENOCARCINOMA, LUNG, LEFT (HCC): Primary | ICD-10-CM

## 2019-06-18 LAB
ANION GAP SERPL CALCULATED.3IONS-SCNC: 13.9 MMOL/L
BUN BLD-MCNC: 8 MG/DL (ref 8–23)
BUN/CREAT SERPL: 13.3 (ref 7–25)
CALCIUM SPEC-SCNC: 8 MG/DL (ref 8.6–10.5)
CHLORIDE SERPL-SCNC: 100 MMOL/L (ref 98–107)
CO2 SERPL-SCNC: 16.1 MMOL/L (ref 22–29)
CREAT BLD-MCNC: 0.6 MG/DL (ref 0.57–1)
DEPRECATED RDW RBC AUTO: 57.3 FL (ref 37–54)
ERYTHROCYTE [DISTWIDTH] IN BLOOD BY AUTOMATED COUNT: 16.3 % (ref 12.3–15.4)
GFR SERPL CREATININE-BSD FRML MDRD: 98 ML/MIN/1.73
GLUCOSE BLD-MCNC: 89 MG/DL (ref 65–99)
HCT VFR BLD AUTO: 37.8 % (ref 34–46.6)
HGB BLD-MCNC: 11.1 G/DL (ref 12–15.9)
MCH RBC QN AUTO: 28.3 PG (ref 26.6–33)
MCHC RBC AUTO-ENTMCNC: 29.4 G/DL (ref 31.5–35.7)
MCV RBC AUTO: 96.4 FL (ref 79–97)
PLATELET # BLD AUTO: 254 10*3/MM3 (ref 140–450)
PMV BLD AUTO: 9.3 FL (ref 6–12)
POTASSIUM BLD-SCNC: 4.1 MMOL/L (ref 3.5–5.2)
RBC # BLD AUTO: 3.92 10*6/MM3 (ref 3.77–5.28)
SODIUM BLD-SCNC: 130 MMOL/L (ref 136–145)
WBC NRBC COR # BLD: 10.92 10*3/MM3 (ref 3.4–10.8)

## 2019-06-18 PROCEDURE — 25010000002 ENOXAPARIN PER 10 MG: Performed by: INTERNAL MEDICINE

## 2019-06-18 PROCEDURE — 94799 UNLISTED PULMONARY SVC/PX: CPT

## 2019-06-18 PROCEDURE — 97110 THERAPEUTIC EXERCISES: CPT

## 2019-06-18 PROCEDURE — 80048 BASIC METABOLIC PNL TOTAL CA: CPT | Performed by: INTERNAL MEDICINE

## 2019-06-18 PROCEDURE — 94669 MECHANICAL CHEST WALL OSCILL: CPT

## 2019-06-18 PROCEDURE — 63710000001 PREDNISONE PER 5 MG: Performed by: INTERNAL MEDICINE

## 2019-06-18 PROCEDURE — 99232 SBSQ HOSP IP/OBS MODERATE 35: CPT | Performed by: INTERNAL MEDICINE

## 2019-06-18 PROCEDURE — 77014 CHG CT GUIDANCE RADIATION THERAPY FLDS PLACEMENT: CPT | Performed by: RADIOLOGY

## 2019-06-18 PROCEDURE — 77386 CHG INTENSITY MODULATED RADIATION TX DLVR COMPLEX: CPT | Performed by: RADIOLOGY

## 2019-06-18 PROCEDURE — 77386: CPT | Performed by: RADIOLOGY

## 2019-06-18 PROCEDURE — 85027 COMPLETE CBC AUTOMATED: CPT | Performed by: INTERNAL MEDICINE

## 2019-06-18 RX ORDER — LORAZEPAM 0.5 MG/1
0.25 TABLET ORAL EVERY 6 HOURS PRN
Status: DISCONTINUED | OUTPATIENT
Start: 2019-06-18 | End: 2019-06-26 | Stop reason: HOSPADM

## 2019-06-18 RX ADMIN — Medication 4 ML: at 07:56

## 2019-06-18 RX ADMIN — IPRATROPIUM BROMIDE AND ALBUTEROL SULFATE 3 ML: 2.5; .5 SOLUTION RESPIRATORY (INHALATION) at 21:27

## 2019-06-18 RX ADMIN — GUAIFENESIN 400 MG: 100 SOLUTION ORAL at 20:42

## 2019-06-18 RX ADMIN — LEVOTHYROXINE SODIUM 75 MCG: 75 TABLET ORAL at 10:35

## 2019-06-18 RX ADMIN — DIVALPROEX SODIUM 250 MG: 125 CAPSULE, COATED PELLETS ORAL at 20:32

## 2019-06-18 RX ADMIN — Medication 1 APPLICATION: at 20:33

## 2019-06-18 RX ADMIN — METOPROLOL TARTRATE 50 MG: 50 TABLET, FILM COATED ORAL at 20:32

## 2019-06-18 RX ADMIN — DOCUSATE SODIUM 100 MG: 100 CAPSULE, LIQUID FILLED ORAL at 20:33

## 2019-06-18 RX ADMIN — Medication 4 ML: at 21:27

## 2019-06-18 RX ADMIN — IPRATROPIUM BROMIDE AND ALBUTEROL SULFATE 3 ML: 2.5; .5 SOLUTION RESPIRATORY (INHALATION) at 07:56

## 2019-06-18 RX ADMIN — ATORVASTATIN CALCIUM 80 MG: 80 TABLET, FILM COATED ORAL at 20:32

## 2019-06-18 RX ADMIN — ASPIRIN 81 MG: 81 TABLET, CHEWABLE ORAL at 10:33

## 2019-06-18 RX ADMIN — ENOXAPARIN SODIUM 40 MG: 40 INJECTION SUBCUTANEOUS at 15:12

## 2019-06-18 RX ADMIN — PREDNISONE 10 MG: 10 TABLET ORAL at 10:33

## 2019-06-18 RX ADMIN — SODIUM CHLORIDE, PRESERVATIVE FREE 3 ML: 5 INJECTION INTRAVENOUS at 10:33

## 2019-06-18 RX ADMIN — IPRATROPIUM BROMIDE AND ALBUTEROL SULFATE 3 ML: 2.5; .5 SOLUTION RESPIRATORY (INHALATION) at 12:07

## 2019-06-18 RX ADMIN — POTASSIUM CHLORIDE 40 MEQ: 750 CAPSULE, EXTENDED RELEASE ORAL at 10:33

## 2019-06-18 RX ADMIN — GUAIFENESIN 400 MG: 100 SOLUTION ORAL at 15:12

## 2019-06-18 RX ADMIN — METOPROLOL TARTRATE 50 MG: 50 TABLET, FILM COATED ORAL at 10:33

## 2019-06-18 RX ADMIN — DOCUSATE SODIUM 100 MG: 100 CAPSULE, LIQUID FILLED ORAL at 10:35

## 2019-06-18 RX ADMIN — IPRATROPIUM BROMIDE AND ALBUTEROL SULFATE 3 ML: 2.5; .5 SOLUTION RESPIRATORY (INHALATION) at 16:18

## 2019-06-18 RX ADMIN — LOSARTAN POTASSIUM: 50 TABLET, FILM COATED ORAL at 10:33

## 2019-06-18 RX ADMIN — LORAZEPAM 0.5 MG: 0.5 TABLET ORAL at 03:15

## 2019-06-18 RX ADMIN — Medication 1 APPLICATION: at 10:34

## 2019-06-18 RX ADMIN — GUAIFENESIN 400 MG: 100 SOLUTION ORAL at 10:33

## 2019-06-18 RX ADMIN — DIVALPROEX SODIUM 250 MG: 125 CAPSULE, COATED PELLETS ORAL at 10:33

## 2019-06-18 RX ADMIN — SODIUM CHLORIDE, PRESERVATIVE FREE 3 ML: 5 INJECTION INTRAVENOUS at 20:33

## 2019-06-19 LAB
ANION GAP SERPL CALCULATED.3IONS-SCNC: 12.1 MMOL/L
BUN BLD-MCNC: 13 MG/DL (ref 8–23)
BUN/CREAT SERPL: 20 (ref 7–25)
CALCIUM SPEC-SCNC: 8.4 MG/DL (ref 8.6–10.5)
CHLORIDE SERPL-SCNC: 101 MMOL/L (ref 98–107)
CO2 SERPL-SCNC: 20.9 MMOL/L (ref 22–29)
CREAT BLD-MCNC: 0.65 MG/DL (ref 0.57–1)
DEPRECATED RDW RBC AUTO: 50.7 FL (ref 37–54)
ERYTHROCYTE [DISTWIDTH] IN BLOOD BY AUTOMATED COUNT: 16.3 % (ref 12.3–15.4)
GFR SERPL CREATININE-BSD FRML MDRD: 89 ML/MIN/1.73
GLUCOSE BLD-MCNC: 87 MG/DL (ref 65–99)
HCT VFR BLD AUTO: 35.5 % (ref 34–46.6)
HGB BLD-MCNC: 11.4 G/DL (ref 12–15.9)
MCH RBC QN AUTO: 27.7 PG (ref 26.6–33)
MCHC RBC AUTO-ENTMCNC: 32.1 G/DL (ref 31.5–35.7)
MCV RBC AUTO: 86.4 FL (ref 79–97)
PLATELET # BLD AUTO: 281 10*3/MM3 (ref 140–450)
PMV BLD AUTO: 9.3 FL (ref 6–12)
POTASSIUM BLD-SCNC: 4.7 MMOL/L (ref 3.5–5.2)
RBC # BLD AUTO: 4.11 10*6/MM3 (ref 3.77–5.28)
SODIUM BLD-SCNC: 134 MMOL/L (ref 136–145)
WBC NRBC COR # BLD: 13.09 10*3/MM3 (ref 3.4–10.8)

## 2019-06-19 PROCEDURE — 77014 CHG CT GUIDANCE RADIATION THERAPY FLDS PLACEMENT: CPT | Performed by: RADIOLOGY

## 2019-06-19 PROCEDURE — 77386: CPT | Performed by: RADIOLOGY

## 2019-06-19 PROCEDURE — 97110 THERAPEUTIC EXERCISES: CPT

## 2019-06-19 PROCEDURE — 94667 MNPJ CHEST WALL 1ST: CPT

## 2019-06-19 PROCEDURE — 94799 UNLISTED PULMONARY SVC/PX: CPT

## 2019-06-19 PROCEDURE — 94668 MNPJ CHEST WALL SBSQ: CPT

## 2019-06-19 PROCEDURE — 94669 MECHANICAL CHEST WALL OSCILL: CPT

## 2019-06-19 PROCEDURE — 80048 BASIC METABOLIC PNL TOTAL CA: CPT | Performed by: INTERNAL MEDICINE

## 2019-06-19 PROCEDURE — 77386 CHG INTENSITY MODULATED RADIATION TX DLVR COMPLEX: CPT | Performed by: RADIOLOGY

## 2019-06-19 PROCEDURE — 99232 SBSQ HOSP IP/OBS MODERATE 35: CPT | Performed by: INTERNAL MEDICINE

## 2019-06-19 PROCEDURE — 85027 COMPLETE CBC AUTOMATED: CPT | Performed by: INTERNAL MEDICINE

## 2019-06-19 PROCEDURE — 77427 RADIATION TX MANAGEMENT X5: CPT | Performed by: RADIOLOGY

## 2019-06-19 PROCEDURE — 25010000002 ENOXAPARIN PER 10 MG: Performed by: INTERNAL MEDICINE

## 2019-06-19 RX ADMIN — IPRATROPIUM BROMIDE AND ALBUTEROL SULFATE 3 ML: 2.5; .5 SOLUTION RESPIRATORY (INHALATION) at 16:09

## 2019-06-19 RX ADMIN — Medication 1 APPLICATION: at 20:15

## 2019-06-19 RX ADMIN — METOPROLOL TARTRATE 50 MG: 50 TABLET, FILM COATED ORAL at 20:14

## 2019-06-19 RX ADMIN — LORAZEPAM 0.25 MG: 0.5 TABLET ORAL at 22:09

## 2019-06-19 RX ADMIN — ASPIRIN 81 MG: 81 TABLET, CHEWABLE ORAL at 09:15

## 2019-06-19 RX ADMIN — Medication 1 APPLICATION: at 09:18

## 2019-06-19 RX ADMIN — Medication 4 ML: at 20:55

## 2019-06-19 RX ADMIN — DIVALPROEX SODIUM 250 MG: 125 CAPSULE, COATED PELLETS ORAL at 09:14

## 2019-06-19 RX ADMIN — SODIUM CHLORIDE, PRESERVATIVE FREE 3 ML: 5 INJECTION INTRAVENOUS at 20:14

## 2019-06-19 RX ADMIN — GUAIFENESIN 400 MG: 100 SOLUTION ORAL at 03:25

## 2019-06-19 RX ADMIN — LEVOTHYROXINE SODIUM 75 MCG: 75 TABLET ORAL at 06:08

## 2019-06-19 RX ADMIN — DIVALPROEX SODIUM 250 MG: 125 CAPSULE, COATED PELLETS ORAL at 20:14

## 2019-06-19 RX ADMIN — ATORVASTATIN CALCIUM 80 MG: 80 TABLET, FILM COATED ORAL at 20:14

## 2019-06-19 RX ADMIN — GUAIFENESIN 400 MG: 100 SOLUTION ORAL at 10:34

## 2019-06-19 RX ADMIN — ENOXAPARIN SODIUM 40 MG: 40 INJECTION SUBCUTANEOUS at 12:34

## 2019-06-19 RX ADMIN — IPRATROPIUM BROMIDE AND ALBUTEROL SULFATE 3 ML: 2.5; .5 SOLUTION RESPIRATORY (INHALATION) at 08:21

## 2019-06-19 RX ADMIN — METOPROLOL TARTRATE 50 MG: 50 TABLET, FILM COATED ORAL at 09:14

## 2019-06-19 RX ADMIN — GUAIFENESIN 400 MG: 100 SOLUTION ORAL at 16:44

## 2019-06-19 RX ADMIN — LORAZEPAM 0.25 MG: 0.5 TABLET ORAL at 02:50

## 2019-06-19 RX ADMIN — Medication 4 ML: at 08:30

## 2019-06-19 RX ADMIN — SODIUM CHLORIDE, PRESERVATIVE FREE 3 ML: 5 INJECTION INTRAVENOUS at 09:16

## 2019-06-19 RX ADMIN — IPRATROPIUM BROMIDE AND ALBUTEROL SULFATE 3 ML: 2.5; .5 SOLUTION RESPIRATORY (INHALATION) at 20:55

## 2019-06-19 RX ADMIN — GUAIFENESIN 400 MG: 100 SOLUTION ORAL at 22:09

## 2019-06-19 RX ADMIN — IPRATROPIUM BROMIDE AND ALBUTEROL SULFATE 3 ML: 2.5; .5 SOLUTION RESPIRATORY (INHALATION) at 12:01

## 2019-06-19 RX ADMIN — LOSARTAN POTASSIUM: 50 TABLET, FILM COATED ORAL at 09:15

## 2019-06-19 NOTE — PROGRESS NOTES
Physician Weekly Management Note    Diagnosis:     Diagnosis Plan   1. Adenocarcinoma, lung, left (CMS/HCC)          RT Details:  Treatment #5/10        Notes on Treatment course, Films, Medical progress:   Whispery voice, denies new shortness of breath cough or chest pain.  No esophageal symptoms.  Continue as planned.      Weekly Management:  Medication reviewed?   Yes  New medications given?   No  Problemlist reviewed?   Yes  Problem added?   No       Technical aspects reviewed:  Weekly OBI approved?   Yes  Weekly port films approved?   Yes  Change requests noted on port film?   No  Patient setup and plan reviewed?   Yes    Chart Reviewed:  Continue current treatment plan?   Yes  Treatment plan change requested?   No  CBC reviewed?   No  Concurrent Chemo?   No    Objective     Toxicities:   As above      Review of Systems   As above    There were no vitals filed for this visit.    No flowsheet data found.    Physical Exam  As above      Problem Summary List    Diagnosis:     Diagnosis Plan   1. 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, benign   • Transient cerebral ischemia    • Urge incontinence of urine          Past Surgical History:   Procedure Laterality Date   • BREAST BIOPSY     • BRONCHOSCOPY  Bilateral 5/20/2019    Procedure: BRONCHOSCOPY WITH  BIOPSY AND BAL, WITH ENDOBRONCHIAL ULTRASOUND WITH FNA;  Surgeon: Chris Tirado MD;  Location: [facility] ENDOSCOPY;  Service: Pulmonary   • COLONOSCOPY      REC 07/2007, REC 02/2009, REC 12/2011, REC 01/14,  She says she cant do the prep because of poor mobility to get to BR.   • EYE SURGERY  1951   • OTHER SURGICAL HISTORY      Ventricular lake holes for drainage of subdural hematoma   • TOTAL THYROIDECTOMY  08/2015    Dr. Ahmadi         Current Facility-Administered Medications on File Prior to Visit   Medication Dose Route Frequency Provider Last Rate Last Dose   • acetaminophen (TYLENOL) tablet 650 mg  650 mg Oral Q4H PRN Timothy Taylor MD   650 mg at 06/12/19 2131   • albuterol (PROVENTIL) nebulizer solution 0.083% 2.5 mg/3mL  2.5 mg Nebulization Q6H PRN Timothy Taylor MD   2.5 mg at 06/08/19 1843   • aspirin chewable tablet 81 mg  81 mg Oral Daily Timothy Taylor MD   81 mg at 06/19/19 0915   • atorvastatin (LIPITOR) tablet 80 mg  80 mg Oral Nightly Timothy Taylor MD   80 mg at 06/18/19 2032   • Divalproex Sodium (DEPAKOTE SPRINKLE) capsule 250 mg  250 mg Oral Q12H Marquis Gonsales MD   250 mg at 06/19/19 0914   • docusate sodium (COLACE) capsule 100 mg  100 mg Oral BID Arthur Wilkerson MD   100 mg at 06/18/19 2033   • enoxaparin (LOVENOX) syringe 40 mg  40 mg Subcutaneous Q24H Marquis Gonsales MD   40 mg at 06/18/19 1512   • guaiFENesin (ROBITUSSIN) 100 MG/5ML oral solution 400 mg  400 mg Oral Q6H Shan Tirado MD   400 mg at 06/19/19 1034   • hydrocortisone-bacitracin-zinc oxide-nystatin (MAGIC BARRIER) cream 1 application  1 application Topical BID Marquis Gonsales MD   1 application at 06/19/19 0918   • ipratropium-albuterol (DUO-NEB) nebulizer solution 3 mL  3 mL Nebulization 4x Daily - RT Timothy Taylor MD   3 mL at 06/19/19 0821   • levothyroxine (SYNTHROID, LEVOTHROID) tablet 75 mcg  75 mcg Oral Q AM Timothy Taylor MD   75 mcg at 06/19/19 0608    • LORazepam (ATIVAN) tablet 0.25 mg  0.25 mg Oral Q6H PRN Marquis Gonsales MD   0.25 mg at 06/19/19 0250   • losartan (COZAAR) 50 mg, hydrochlorothiazide (MICROZIDE) 12.5 mg for HYZAAR 50-12.5   Oral Daily Timothy Taylor MD       • metoprolol tartrate (LOPRESSOR) tablet 50 mg  50 mg Oral BID Timothy Taylor MD   50 mg at 06/19/19 0914   • sennosides-docusate sodium (SENOKOT-S) 8.6-50 MG tablet 2 tablet  2 tablet Oral Nightly PRN Shan Tirado MD   2 tablet at 06/17/19 2133   • sodium chloride 0.9 % flush 10 mL  10 mL Intravenous PRN Terell Plata MD       • sodium chloride 0.9 % flush 3 mL  3 mL Intravenous Q12H Timothy Taylor MD   3 mL at 06/19/19 0916   • sodium chloride 0.9 % flush 3-10 mL  3-10 mL Intravenous PRN Timothy Taylor MD       • sodium chloride 7 % nebulizer solution nebulizer solution 4 mL  4 mL Nebulization BID - RT Timothy Taylor MD   4 mL at 06/19/19 0830   • [DISCONTINUED] predniSONE (DELTASONE) tablet 10 mg  10 mg Oral Daily With Breakfast Marquis Gonsales MD   10 mg at 06/18/19 1033     Current Outpatient Medications on File Prior to Visit   Medication Sig Dispense Refill   • aspirin 81 MG chewable tablet Chew 81 mg Daily.     • atorvastatin (LIPITOR) 80 MG tablet Take 1 tablet by mouth Daily. Needs an appointment for further refills (Patient taking differently: Take 80 mg by mouth Every Night. Needs an appointment for further refills) 90 tablet 0   • Cholecalciferol (VITAMIN D3) 5000 units tablet tablet Take 5,000 Units by mouth Daily.     • guaiFENesin (ROBITUSSIN) 100 MG/5ML solution oral solution Take 20 mL by mouth Every 4 (Four) Hours. While awake 3600 mL 3   • ipratropium-albuterol (DUO-NEB) 0.5-2.5 mg/3 ml nebulizer Take 3 mL by nebulization 4 (Four) Times a Day. And every 4 hours as needed for chest congestion 480 mL 11   • levothyroxine (SYNTHROID, LEVOTHROID) 75 MCG tablet Take 1 tablet by mouth Daily.     • losartan (COZAAR) 50 MG tablet Take 1 tablet by mouth daily.  (Patient taking differently: Take 100 mg by mouth Daily.) 90 tablet 1   • megestrol (MEGACE) 20 MG tablet Take 1 tablet by mouth Daily. 30 tablet 5   • metoprolol tartrate (LOPRESSOR) 50 MG tablet Take 1 tablet by mouth 2 (Two) Times a Day. 180 tablet 0   • sodium chloride 7 % nebulizer solution nebulizer solution Take 4 mL by nebulization 2 (Two) Times a Day. And may use additional 2 times as needed chest congestion 480 mL 6   • tolterodine (DETROL) 1 MG tablet TAKE 1 TABLET TWICE A  tablet 0       Allergies   Allergen Reactions   • Ace Inhibitors      Other reaction(s): Cough         Primary care MD:    Timothy Taylor MD    Oncologist:   THEO Godfrey MD    Seen and approved by:  Miah Whalen MD  06/18/2019

## 2019-06-20 LAB
ABO + RH BLD: NORMAL
ABO + RH BLD: NORMAL
ANION GAP SERPL CALCULATED.3IONS-SCNC: 11.7 MMOL/L
BASOPHILS # BLD AUTO: 0.03 10*3/MM3 (ref 0–0.2)
BASOPHILS NFR BLD AUTO: 0.3 % (ref 0–1.5)
BH BB BLOOD EXPIRATION DATE: NORMAL
BH BB BLOOD EXPIRATION DATE: NORMAL
BH BB BLOOD TYPE BARCODE: 7300
BH BB BLOOD TYPE BARCODE: 7300
BH BB DISPENSE STATUS: NORMAL
BH BB DISPENSE STATUS: NORMAL
BH BB PRODUCT CODE: NORMAL
BH BB PRODUCT CODE: NORMAL
BH BB UNIT NUMBER: NORMAL
BH BB UNIT NUMBER: NORMAL
BUN BLD-MCNC: 10 MG/DL (ref 8–23)
BUN/CREAT SERPL: 17.5 (ref 7–25)
CALCIUM SPEC-SCNC: 7.7 MG/DL (ref 8.6–10.5)
CHLORIDE SERPL-SCNC: 95 MMOL/L (ref 98–107)
CO2 SERPL-SCNC: 20.3 MMOL/L (ref 22–29)
CREAT BLD-MCNC: 0.57 MG/DL (ref 0.57–1)
DEPRECATED RDW RBC AUTO: 49.7 FL (ref 37–54)
EOSINOPHIL # BLD AUTO: 0.34 10*3/MM3 (ref 0–0.4)
EOSINOPHIL NFR BLD AUTO: 3.3 % (ref 0.3–6.2)
ERYTHROCYTE [DISTWIDTH] IN BLOOD BY AUTOMATED COUNT: 15.9 % (ref 12.3–15.4)
GFR SERPL CREATININE-BSD FRML MDRD: 104 ML/MIN/1.73
GLUCOSE BLD-MCNC: 77 MG/DL (ref 65–99)
HCT VFR BLD AUTO: 34.5 % (ref 34–46.6)
HGB BLD-MCNC: 11.1 G/DL (ref 12–15.9)
IMM GRANULOCYTES # BLD AUTO: 0.32 10*3/MM3 (ref 0–0.05)
IMM GRANULOCYTES NFR BLD AUTO: 3.1 % (ref 0–0.5)
LYMPHOCYTES # BLD AUTO: 0.64 10*3/MM3 (ref 0.7–3.1)
LYMPHOCYTES NFR BLD AUTO: 6.2 % (ref 19.6–45.3)
MCH RBC QN AUTO: 27.8 PG (ref 26.6–33)
MCHC RBC AUTO-ENTMCNC: 32.2 G/DL (ref 31.5–35.7)
MCV RBC AUTO: 86.3 FL (ref 79–97)
MONOCYTES # BLD AUTO: 0.62 10*3/MM3 (ref 0.1–0.9)
MONOCYTES NFR BLD AUTO: 6 % (ref 5–12)
NEUTROPHILS # BLD AUTO: 8.43 10*3/MM3 (ref 1.7–7)
NEUTROPHILS NFR BLD AUTO: 81.1 % (ref 42.7–76)
NRBC BLD AUTO-RTO: 0 /100 WBC (ref 0–0.2)
PLATELET # BLD AUTO: 253 10*3/MM3 (ref 140–450)
PMV BLD AUTO: 9.4 FL (ref 6–12)
POTASSIUM BLD-SCNC: 3.8 MMOL/L (ref 3.5–5.2)
RBC # BLD AUTO: 4 10*6/MM3 (ref 3.77–5.28)
SODIUM BLD-SCNC: 127 MMOL/L (ref 136–145)
UNIT  ABO: NORMAL
UNIT  ABO: NORMAL
UNIT  RH: NORMAL
UNIT  RH: NORMAL
WBC NRBC COR # BLD: 10.38 10*3/MM3 (ref 3.4–10.8)

## 2019-06-20 PROCEDURE — 99231 SBSQ HOSP IP/OBS SF/LOW 25: CPT | Performed by: INTERNAL MEDICINE

## 2019-06-20 PROCEDURE — 85025 COMPLETE CBC W/AUTO DIFF WBC: CPT | Performed by: INTERNAL MEDICINE

## 2019-06-20 PROCEDURE — 77386 CHG INTENSITY MODULATED RADIATION TX DLVR COMPLEX: CPT | Performed by: RADIOLOGY

## 2019-06-20 PROCEDURE — 77014 CHG CT GUIDANCE RADIATION THERAPY FLDS PLACEMENT: CPT | Performed by: RADIOLOGY

## 2019-06-20 PROCEDURE — 94668 MNPJ CHEST WALL SBSQ: CPT

## 2019-06-20 PROCEDURE — 80048 BASIC METABOLIC PNL TOTAL CA: CPT | Performed by: INTERNAL MEDICINE

## 2019-06-20 PROCEDURE — 94669 MECHANICAL CHEST WALL OSCILL: CPT

## 2019-06-20 PROCEDURE — 77386: CPT | Performed by: RADIOLOGY

## 2019-06-20 PROCEDURE — 94799 UNLISTED PULMONARY SVC/PX: CPT

## 2019-06-20 PROCEDURE — 25010000002 ENOXAPARIN PER 10 MG: Performed by: INTERNAL MEDICINE

## 2019-06-20 PROCEDURE — 97110 THERAPEUTIC EXERCISES: CPT

## 2019-06-20 RX ADMIN — METOPROLOL TARTRATE 50 MG: 50 TABLET, FILM COATED ORAL at 20:10

## 2019-06-20 RX ADMIN — IPRATROPIUM BROMIDE AND ALBUTEROL SULFATE 3 ML: 2.5; .5 SOLUTION RESPIRATORY (INHALATION) at 16:26

## 2019-06-20 RX ADMIN — ASPIRIN 81 MG: 81 TABLET, CHEWABLE ORAL at 09:26

## 2019-06-20 RX ADMIN — SODIUM CHLORIDE, PRESERVATIVE FREE 3 ML: 5 INJECTION INTRAVENOUS at 09:28

## 2019-06-20 RX ADMIN — Medication 4 ML: at 21:11

## 2019-06-20 RX ADMIN — GUAIFENESIN 400 MG: 100 SOLUTION ORAL at 09:27

## 2019-06-20 RX ADMIN — GUAIFENESIN 400 MG: 100 SOLUTION ORAL at 21:12

## 2019-06-20 RX ADMIN — DIVALPROEX SODIUM 250 MG: 125 CAPSULE, COATED PELLETS ORAL at 20:10

## 2019-06-20 RX ADMIN — ATORVASTATIN CALCIUM 80 MG: 80 TABLET, FILM COATED ORAL at 20:10

## 2019-06-20 RX ADMIN — Medication 4 ML: at 07:35

## 2019-06-20 RX ADMIN — ENOXAPARIN SODIUM 40 MG: 40 INJECTION SUBCUTANEOUS at 12:18

## 2019-06-20 RX ADMIN — DIVALPROEX SODIUM 250 MG: 125 CAPSULE, COATED PELLETS ORAL at 09:30

## 2019-06-20 RX ADMIN — Medication 1 APPLICATION: at 20:10

## 2019-06-20 RX ADMIN — IPRATROPIUM BROMIDE AND ALBUTEROL SULFATE 3 ML: 2.5; .5 SOLUTION RESPIRATORY (INHALATION) at 07:34

## 2019-06-20 RX ADMIN — METOPROLOL TARTRATE 50 MG: 50 TABLET, FILM COATED ORAL at 09:26

## 2019-06-20 RX ADMIN — IPRATROPIUM BROMIDE AND ALBUTEROL SULFATE 3 ML: 2.5; .5 SOLUTION RESPIRATORY (INHALATION) at 21:11

## 2019-06-20 RX ADMIN — LEVOTHYROXINE SODIUM 75 MCG: 75 TABLET ORAL at 05:56

## 2019-06-20 RX ADMIN — LORAZEPAM 0.25 MG: 0.5 TABLET ORAL at 21:12

## 2019-06-20 RX ADMIN — GUAIFENESIN 400 MG: 100 SOLUTION ORAL at 16:02

## 2019-06-20 RX ADMIN — IPRATROPIUM BROMIDE AND ALBUTEROL SULFATE 3 ML: 2.5; .5 SOLUTION RESPIRATORY (INHALATION) at 12:40

## 2019-06-20 RX ADMIN — Medication 1 APPLICATION: at 09:28

## 2019-06-20 RX ADMIN — SODIUM CHLORIDE, PRESERVATIVE FREE 3 ML: 5 INJECTION INTRAVENOUS at 20:11

## 2019-06-20 RX ADMIN — LOSARTAN POTASSIUM: 50 TABLET, FILM COATED ORAL at 09:26

## 2019-06-21 LAB
ANION GAP SERPL CALCULATED.3IONS-SCNC: 12.2 MMOL/L
BUN BLD-MCNC: 13 MG/DL (ref 8–23)
BUN/CREAT SERPL: 24.5 (ref 7–25)
CALCIUM SPEC-SCNC: 8 MG/DL (ref 8.6–10.5)
CHLORIDE SERPL-SCNC: 94 MMOL/L (ref 98–107)
CO2 SERPL-SCNC: 21.8 MMOL/L (ref 22–29)
CREAT BLD-MCNC: 0.53 MG/DL (ref 0.57–1)
DEPRECATED RDW RBC AUTO: 48 FL (ref 37–54)
ERYTHROCYTE [DISTWIDTH] IN BLOOD BY AUTOMATED COUNT: 15.5 % (ref 12.3–15.4)
GFR SERPL CREATININE-BSD FRML MDRD: 113 ML/MIN/1.73
GLUCOSE BLD-MCNC: 85 MG/DL (ref 65–99)
HCT VFR BLD AUTO: 35.1 % (ref 34–46.6)
HGB BLD-MCNC: 11.5 G/DL (ref 12–15.9)
MCH RBC QN AUTO: 28 PG (ref 26.6–33)
MCHC RBC AUTO-ENTMCNC: 32.8 G/DL (ref 31.5–35.7)
MCV RBC AUTO: 85.4 FL (ref 79–97)
PLATELET # BLD AUTO: 239 10*3/MM3 (ref 140–450)
PMV BLD AUTO: 9.5 FL (ref 6–12)
POTASSIUM BLD-SCNC: 3.5 MMOL/L (ref 3.5–5.2)
RBC # BLD AUTO: 4.11 10*6/MM3 (ref 3.77–5.28)
SODIUM BLD-SCNC: 128 MMOL/L (ref 136–145)
WBC NRBC COR # BLD: 9.72 10*3/MM3 (ref 3.4–10.8)

## 2019-06-21 PROCEDURE — 85027 COMPLETE CBC AUTOMATED: CPT | Performed by: INTERNAL MEDICINE

## 2019-06-21 PROCEDURE — 94668 MNPJ CHEST WALL SBSQ: CPT

## 2019-06-21 PROCEDURE — 77386: CPT | Performed by: RADIOLOGY

## 2019-06-21 PROCEDURE — 77386 CHG INTENSITY MODULATED RADIATION TX DLVR COMPLEX: CPT | Performed by: RADIOLOGY

## 2019-06-21 PROCEDURE — 80048 BASIC METABOLIC PNL TOTAL CA: CPT | Performed by: INTERNAL MEDICINE

## 2019-06-21 PROCEDURE — 94799 UNLISTED PULMONARY SVC/PX: CPT

## 2019-06-21 PROCEDURE — 77014 CHG CT GUIDANCE RADIATION THERAPY FLDS PLACEMENT: CPT | Performed by: RADIOLOGY

## 2019-06-21 PROCEDURE — 25010000002 ENOXAPARIN PER 10 MG: Performed by: INTERNAL MEDICINE

## 2019-06-21 PROCEDURE — 94669 MECHANICAL CHEST WALL OSCILL: CPT

## 2019-06-21 RX ADMIN — LOSARTAN POTASSIUM: 50 TABLET, FILM COATED ORAL at 10:43

## 2019-06-21 RX ADMIN — DIVALPROEX SODIUM 250 MG: 125 CAPSULE, COATED PELLETS ORAL at 10:43

## 2019-06-21 RX ADMIN — GUAIFENESIN 400 MG: 100 SOLUTION ORAL at 15:44

## 2019-06-21 RX ADMIN — GUAIFENESIN 400 MG: 100 SOLUTION ORAL at 10:43

## 2019-06-21 RX ADMIN — Medication 1 APPLICATION: at 10:45

## 2019-06-21 RX ADMIN — ATORVASTATIN CALCIUM 80 MG: 80 TABLET, FILM COATED ORAL at 20:54

## 2019-06-21 RX ADMIN — LORAZEPAM 0.25 MG: 0.5 TABLET ORAL at 20:54

## 2019-06-21 RX ADMIN — ASPIRIN 81 MG: 81 TABLET, CHEWABLE ORAL at 10:43

## 2019-06-21 RX ADMIN — LORAZEPAM 0.25 MG: 0.5 TABLET ORAL at 04:02

## 2019-06-21 RX ADMIN — SODIUM CHLORIDE, PRESERVATIVE FREE 3 ML: 5 INJECTION INTRAVENOUS at 20:55

## 2019-06-21 RX ADMIN — DIVALPROEX SODIUM 250 MG: 125 CAPSULE, COATED PELLETS ORAL at 20:54

## 2019-06-21 RX ADMIN — IPRATROPIUM BROMIDE AND ALBUTEROL SULFATE 3 ML: 2.5; .5 SOLUTION RESPIRATORY (INHALATION) at 21:15

## 2019-06-21 RX ADMIN — SODIUM CHLORIDE, PRESERVATIVE FREE 3 ML: 5 INJECTION INTRAVENOUS at 10:44

## 2019-06-21 RX ADMIN — METOPROLOL TARTRATE 50 MG: 50 TABLET, FILM COATED ORAL at 10:43

## 2019-06-21 RX ADMIN — METOPROLOL TARTRATE 50 MG: 50 TABLET, FILM COATED ORAL at 20:54

## 2019-06-21 RX ADMIN — IPRATROPIUM BROMIDE AND ALBUTEROL SULFATE 3 ML: 2.5; .5 SOLUTION RESPIRATORY (INHALATION) at 16:04

## 2019-06-21 RX ADMIN — Medication 4 ML: at 21:15

## 2019-06-21 RX ADMIN — Medication 4 ML: at 07:54

## 2019-06-21 RX ADMIN — IPRATROPIUM BROMIDE AND ALBUTEROL SULFATE 3 ML: 2.5; .5 SOLUTION RESPIRATORY (INHALATION) at 07:54

## 2019-06-21 RX ADMIN — GUAIFENESIN 400 MG: 100 SOLUTION ORAL at 04:04

## 2019-06-21 RX ADMIN — GUAIFENESIN 400 MG: 100 SOLUTION ORAL at 20:54

## 2019-06-21 RX ADMIN — LEVOTHYROXINE SODIUM 75 MCG: 75 TABLET ORAL at 05:45

## 2019-06-21 RX ADMIN — ENOXAPARIN SODIUM 40 MG: 40 INJECTION SUBCUTANEOUS at 15:44

## 2019-06-21 RX ADMIN — IPRATROPIUM BROMIDE AND ALBUTEROL SULFATE 3 ML: 2.5; .5 SOLUTION RESPIRATORY (INHALATION) at 12:13

## 2019-06-22 PROCEDURE — 94799 UNLISTED PULMONARY SVC/PX: CPT

## 2019-06-22 PROCEDURE — 94668 MNPJ CHEST WALL SBSQ: CPT

## 2019-06-22 PROCEDURE — 25010000002 ENOXAPARIN PER 10 MG: Performed by: INTERNAL MEDICINE

## 2019-06-22 PROCEDURE — 94669 MECHANICAL CHEST WALL OSCILL: CPT

## 2019-06-22 PROCEDURE — 97110 THERAPEUTIC EXERCISES: CPT

## 2019-06-22 RX ADMIN — SODIUM CHLORIDE, PRESERVATIVE FREE 3 ML: 5 INJECTION INTRAVENOUS at 11:06

## 2019-06-22 RX ADMIN — METOPROLOL TARTRATE 50 MG: 50 TABLET, FILM COATED ORAL at 11:06

## 2019-06-22 RX ADMIN — METOPROLOL TARTRATE 50 MG: 50 TABLET, FILM COATED ORAL at 21:30

## 2019-06-22 RX ADMIN — GUAIFENESIN 400 MG: 100 SOLUTION ORAL at 11:06

## 2019-06-22 RX ADMIN — IPRATROPIUM BROMIDE AND ALBUTEROL SULFATE 3 ML: 2.5; .5 SOLUTION RESPIRATORY (INHALATION) at 16:19

## 2019-06-22 RX ADMIN — GUAIFENESIN 400 MG: 100 SOLUTION ORAL at 21:31

## 2019-06-22 RX ADMIN — LORAZEPAM 0.25 MG: 0.5 TABLET ORAL at 21:30

## 2019-06-22 RX ADMIN — LEVOTHYROXINE SODIUM 75 MCG: 75 TABLET ORAL at 10:09

## 2019-06-22 RX ADMIN — Medication 4 ML: at 08:38

## 2019-06-22 RX ADMIN — ASPIRIN 81 MG: 81 TABLET, CHEWABLE ORAL at 11:05

## 2019-06-22 RX ADMIN — DOCUSATE SODIUM 100 MG: 100 CAPSULE, LIQUID FILLED ORAL at 21:31

## 2019-06-22 RX ADMIN — Medication 1 APPLICATION: at 21:31

## 2019-06-22 RX ADMIN — DIVALPROEX SODIUM 250 MG: 125 CAPSULE, COATED PELLETS ORAL at 21:30

## 2019-06-22 RX ADMIN — DIVALPROEX SODIUM 250 MG: 125 CAPSULE, COATED PELLETS ORAL at 11:06

## 2019-06-22 RX ADMIN — GUAIFENESIN 400 MG: 100 SOLUTION ORAL at 16:44

## 2019-06-22 RX ADMIN — GUAIFENESIN 400 MG: 100 SOLUTION ORAL at 05:41

## 2019-06-22 RX ADMIN — ENOXAPARIN SODIUM 40 MG: 40 INJECTION SUBCUTANEOUS at 11:57

## 2019-06-22 RX ADMIN — IPRATROPIUM BROMIDE AND ALBUTEROL SULFATE 3 ML: 2.5; .5 SOLUTION RESPIRATORY (INHALATION) at 12:45

## 2019-06-22 RX ADMIN — LOSARTAN POTASSIUM: 50 TABLET, FILM COATED ORAL at 11:05

## 2019-06-22 RX ADMIN — IPRATROPIUM BROMIDE AND ALBUTEROL SULFATE 3 ML: 2.5; .5 SOLUTION RESPIRATORY (INHALATION) at 20:07

## 2019-06-22 RX ADMIN — IPRATROPIUM BROMIDE AND ALBUTEROL SULFATE 3 ML: 2.5; .5 SOLUTION RESPIRATORY (INHALATION) at 08:38

## 2019-06-22 RX ADMIN — Medication 4 ML: at 20:07

## 2019-06-22 RX ADMIN — SODIUM CHLORIDE, PRESERVATIVE FREE 3 ML: 5 INJECTION INTRAVENOUS at 21:31

## 2019-06-22 RX ADMIN — ATORVASTATIN CALCIUM 80 MG: 80 TABLET, FILM COATED ORAL at 21:31

## 2019-06-23 PROCEDURE — 25010000002 ENOXAPARIN PER 10 MG: Performed by: INTERNAL MEDICINE

## 2019-06-23 PROCEDURE — 94799 UNLISTED PULMONARY SVC/PX: CPT

## 2019-06-23 PROCEDURE — 94669 MECHANICAL CHEST WALL OSCILL: CPT

## 2019-06-23 PROCEDURE — 97110 THERAPEUTIC EXERCISES: CPT

## 2019-06-23 PROCEDURE — 94668 MNPJ CHEST WALL SBSQ: CPT

## 2019-06-23 RX ADMIN — DIVALPROEX SODIUM 250 MG: 125 CAPSULE, COATED PELLETS ORAL at 21:03

## 2019-06-23 RX ADMIN — Medication 4 ML: at 07:03

## 2019-06-23 RX ADMIN — IPRATROPIUM BROMIDE AND ALBUTEROL SULFATE 3 ML: 2.5; .5 SOLUTION RESPIRATORY (INHALATION) at 21:20

## 2019-06-23 RX ADMIN — Medication 1 APPLICATION: at 10:28

## 2019-06-23 RX ADMIN — DOCUSATE SODIUM 100 MG: 100 CAPSULE, LIQUID FILLED ORAL at 10:20

## 2019-06-23 RX ADMIN — LOSARTAN POTASSIUM: 50 TABLET, FILM COATED ORAL at 10:20

## 2019-06-23 RX ADMIN — ATORVASTATIN CALCIUM 80 MG: 80 TABLET, FILM COATED ORAL at 21:03

## 2019-06-23 RX ADMIN — LEVOTHYROXINE SODIUM 75 MCG: 75 TABLET ORAL at 06:34

## 2019-06-23 RX ADMIN — GUAIFENESIN 400 MG: 100 SOLUTION ORAL at 22:05

## 2019-06-23 RX ADMIN — METOPROLOL TARTRATE 50 MG: 50 TABLET, FILM COATED ORAL at 10:20

## 2019-06-23 RX ADMIN — IPRATROPIUM BROMIDE AND ALBUTEROL SULFATE 3 ML: 2.5; .5 SOLUTION RESPIRATORY (INHALATION) at 15:40

## 2019-06-23 RX ADMIN — GUAIFENESIN 400 MG: 100 SOLUTION ORAL at 16:18

## 2019-06-23 RX ADMIN — LORAZEPAM 0.25 MG: 0.5 TABLET ORAL at 22:04

## 2019-06-23 RX ADMIN — GUAIFENESIN 400 MG: 100 SOLUTION ORAL at 10:20

## 2019-06-23 RX ADMIN — DIVALPROEX SODIUM 250 MG: 125 CAPSULE, COATED PELLETS ORAL at 10:20

## 2019-06-23 RX ADMIN — SODIUM CHLORIDE, PRESERVATIVE FREE 3 ML: 5 INJECTION INTRAVENOUS at 10:23

## 2019-06-23 RX ADMIN — ENOXAPARIN SODIUM 40 MG: 40 INJECTION SUBCUTANEOUS at 12:47

## 2019-06-23 RX ADMIN — IPRATROPIUM BROMIDE AND ALBUTEROL SULFATE 3 ML: 2.5; .5 SOLUTION RESPIRATORY (INHALATION) at 12:04

## 2019-06-23 RX ADMIN — METOPROLOL TARTRATE 50 MG: 50 TABLET, FILM COATED ORAL at 21:02

## 2019-06-23 RX ADMIN — SODIUM CHLORIDE, PRESERVATIVE FREE 3 ML: 5 INJECTION INTRAVENOUS at 21:07

## 2019-06-23 RX ADMIN — IPRATROPIUM BROMIDE AND ALBUTEROL SULFATE 3 ML: 2.5; .5 SOLUTION RESPIRATORY (INHALATION) at 07:03

## 2019-06-23 RX ADMIN — Medication 4 ML: at 21:24

## 2019-06-23 RX ADMIN — DOCUSATE SODIUM 100 MG: 100 CAPSULE, LIQUID FILLED ORAL at 21:02

## 2019-06-23 RX ADMIN — ASPIRIN 81 MG: 81 TABLET, CHEWABLE ORAL at 10:20

## 2019-06-24 ENCOUNTER — DOCUMENTATION (OUTPATIENT)
Dept: ONCOLOGY | Facility: CLINIC | Age: 74
End: 2019-06-24

## 2019-06-24 LAB
ANION GAP SERPL CALCULATED.3IONS-SCNC: 9 MMOL/L
BUN BLD-MCNC: 12 MG/DL (ref 8–23)
BUN/CREAT SERPL: 19.7 (ref 7–25)
CALCIUM SPEC-SCNC: 7.8 MG/DL (ref 8.6–10.5)
CHLORIDE SERPL-SCNC: 99 MMOL/L (ref 98–107)
CO2 SERPL-SCNC: 21 MMOL/L (ref 22–29)
CREAT BLD-MCNC: 0.61 MG/DL (ref 0.57–1)
DEPRECATED RDW RBC AUTO: 48 FL (ref 37–54)
ERYTHROCYTE [DISTWIDTH] IN BLOOD BY AUTOMATED COUNT: 15.7 % (ref 12.3–15.4)
GFR SERPL CREATININE-BSD FRML MDRD: 96 ML/MIN/1.73
GLUCOSE BLD-MCNC: 90 MG/DL (ref 65–99)
HCT VFR BLD AUTO: 31.4 % (ref 34–46.6)
HGB BLD-MCNC: 10.4 G/DL (ref 12–15.9)
MCH RBC QN AUTO: 28.1 PG (ref 26.6–33)
MCHC RBC AUTO-ENTMCNC: 33.1 G/DL (ref 31.5–35.7)
MCV RBC AUTO: 84.9 FL (ref 79–97)
PLATELET # BLD AUTO: 192 10*3/MM3 (ref 140–450)
PMV BLD AUTO: 9.2 FL (ref 6–12)
POTASSIUM BLD-SCNC: 3.5 MMOL/L (ref 3.5–5.2)
RBC # BLD AUTO: 3.7 10*6/MM3 (ref 3.77–5.28)
SODIUM BLD-SCNC: 129 MMOL/L (ref 136–145)
WBC NRBC COR # BLD: 6.15 10*3/MM3 (ref 3.4–10.8)

## 2019-06-24 PROCEDURE — 80048 BASIC METABOLIC PNL TOTAL CA: CPT | Performed by: INTERNAL MEDICINE

## 2019-06-24 PROCEDURE — 85027 COMPLETE CBC AUTOMATED: CPT | Performed by: INTERNAL MEDICINE

## 2019-06-24 PROCEDURE — 97165 OT EVAL LOW COMPLEX 30 MIN: CPT

## 2019-06-24 PROCEDURE — 94669 MECHANICAL CHEST WALL OSCILL: CPT

## 2019-06-24 PROCEDURE — 94799 UNLISTED PULMONARY SVC/PX: CPT

## 2019-06-24 PROCEDURE — 25010000002 ENOXAPARIN PER 10 MG: Performed by: INTERNAL MEDICINE

## 2019-06-24 PROCEDURE — 77386 CHG INTENSITY MODULATED RADIATION TX DLVR COMPLEX: CPT | Performed by: RADIOLOGY

## 2019-06-24 PROCEDURE — 77336 RADIATION PHYSICS CONSULT: CPT | Performed by: RADIOLOGY

## 2019-06-24 PROCEDURE — 77386: CPT | Performed by: RADIOLOGY

## 2019-06-24 PROCEDURE — 77014 CHG CT GUIDANCE RADIATION THERAPY FLDS PLACEMENT: CPT | Performed by: RADIOLOGY

## 2019-06-24 PROCEDURE — 97110 THERAPEUTIC EXERCISES: CPT

## 2019-06-24 PROCEDURE — 97535 SELF CARE MNGMENT TRAINING: CPT

## 2019-06-24 RX ADMIN — LEVOTHYROXINE SODIUM 75 MCG: 75 TABLET ORAL at 06:32

## 2019-06-24 RX ADMIN — IPRATROPIUM BROMIDE AND ALBUTEROL SULFATE 3 ML: 2.5; .5 SOLUTION RESPIRATORY (INHALATION) at 21:24

## 2019-06-24 RX ADMIN — GUAIFENESIN 400 MG: 100 SOLUTION ORAL at 16:43

## 2019-06-24 RX ADMIN — Medication 1 APPLICATION: at 23:32

## 2019-06-24 RX ADMIN — SODIUM CHLORIDE, PRESERVATIVE FREE 3 ML: 5 INJECTION INTRAVENOUS at 11:22

## 2019-06-24 RX ADMIN — METOPROLOL TARTRATE 50 MG: 50 TABLET, FILM COATED ORAL at 11:21

## 2019-06-24 RX ADMIN — IPRATROPIUM BROMIDE AND ALBUTEROL SULFATE 3 ML: 2.5; .5 SOLUTION RESPIRATORY (INHALATION) at 17:19

## 2019-06-24 RX ADMIN — ATORVASTATIN CALCIUM 80 MG: 80 TABLET, FILM COATED ORAL at 23:05

## 2019-06-24 RX ADMIN — DIVALPROEX SODIUM 250 MG: 125 CAPSULE, COATED PELLETS ORAL at 23:05

## 2019-06-24 RX ADMIN — ENOXAPARIN SODIUM 40 MG: 40 INJECTION SUBCUTANEOUS at 12:46

## 2019-06-24 RX ADMIN — DOCUSATE SODIUM 100 MG: 100 CAPSULE, LIQUID FILLED ORAL at 23:06

## 2019-06-24 RX ADMIN — SODIUM CHLORIDE, PRESERVATIVE FREE 3 ML: 5 INJECTION INTRAVENOUS at 23:32

## 2019-06-24 RX ADMIN — IPRATROPIUM BROMIDE AND ALBUTEROL SULFATE 3 ML: 2.5; .5 SOLUTION RESPIRATORY (INHALATION) at 11:51

## 2019-06-24 RX ADMIN — METOPROLOL TARTRATE 50 MG: 50 TABLET, FILM COATED ORAL at 23:05

## 2019-06-24 RX ADMIN — ASPIRIN 81 MG: 81 TABLET, CHEWABLE ORAL at 11:21

## 2019-06-24 RX ADMIN — IPRATROPIUM BROMIDE AND ALBUTEROL SULFATE 3 ML: 2.5; .5 SOLUTION RESPIRATORY (INHALATION) at 08:09

## 2019-06-24 RX ADMIN — Medication 4 ML: at 21:48

## 2019-06-24 RX ADMIN — GUAIFENESIN 400 MG: 100 SOLUTION ORAL at 11:20

## 2019-06-24 RX ADMIN — Medication 4 ML: at 08:16

## 2019-06-24 RX ADMIN — DIVALPROEX SODIUM 250 MG: 125 CAPSULE, COATED PELLETS ORAL at 11:20

## 2019-06-24 RX ADMIN — LOSARTAN POTASSIUM: 50 TABLET, FILM COATED ORAL at 11:21

## 2019-06-24 RX ADMIN — GUAIFENESIN 400 MG: 100 SOLUTION ORAL at 23:06

## 2019-06-24 NOTE — PROGRESS NOTES
Call rec'd from Maribell Johnson, admissions coordinator for the acute care rehab unit.  Ms Moura is scheduled to be seen in our office on Thursday for the initiation of Keytruda, however the plan is for her to be admitted to the acute rehab unit, therefore unable to also receive outpatient treatment until discharged from ipt stay.  Dr Franks was made aware of this.  I requested Maribell ask Mrs. Chaudhari'  to contact our office when they have an idea of her discharge date to reschedule the appointment.  Message sent to schedulers to cancel Thursday's appts.

## 2019-06-25 ENCOUNTER — RADIATION ONCOLOGY WEEKLY ASSESSMENT (OUTPATIENT)
Dept: RADIATION ONCOLOGY | Facility: HOSPITAL | Age: 74
End: 2019-06-25

## 2019-06-25 DIAGNOSIS — C34.92 ADENOCARCINOMA, LUNG, LEFT (HCC): Primary | ICD-10-CM

## 2019-06-25 LAB
ANION GAP SERPL CALCULATED.3IONS-SCNC: 9.2 MMOL/L
BUN BLD-MCNC: 10 MG/DL (ref 8–23)
BUN/CREAT SERPL: 15.6 (ref 7–25)
CALCIUM SPEC-SCNC: 8 MG/DL (ref 8.6–10.5)
CHLORIDE SERPL-SCNC: 96 MMOL/L (ref 98–107)
CO2 SERPL-SCNC: 21.8 MMOL/L (ref 22–29)
CREAT BLD-MCNC: 0.64 MG/DL (ref 0.57–1)
DEPRECATED RDW RBC AUTO: 50.4 FL (ref 37–54)
ERYTHROCYTE [DISTWIDTH] IN BLOOD BY AUTOMATED COUNT: 15.7 % (ref 12.3–15.4)
GFR SERPL CREATININE-BSD FRML MDRD: 91 ML/MIN/1.73
GLUCOSE BLD-MCNC: 101 MG/DL (ref 65–99)
HCT VFR BLD AUTO: 34.4 % (ref 34–46.6)
HGB BLD-MCNC: 10.9 G/DL (ref 12–15.9)
MCH RBC QN AUTO: 27.7 PG (ref 26.6–33)
MCHC RBC AUTO-ENTMCNC: 31.7 G/DL (ref 31.5–35.7)
MCV RBC AUTO: 87.5 FL (ref 79–97)
PLATELET # BLD AUTO: 184 10*3/MM3 (ref 140–450)
PMV BLD AUTO: 9.3 FL (ref 6–12)
POTASSIUM BLD-SCNC: 3.6 MMOL/L (ref 3.5–5.2)
RBC # BLD AUTO: 3.93 10*6/MM3 (ref 3.77–5.28)
SODIUM BLD-SCNC: 127 MMOL/L (ref 136–145)
WBC NRBC COR # BLD: 5.79 10*3/MM3 (ref 3.4–10.8)

## 2019-06-25 PROCEDURE — 80048 BASIC METABOLIC PNL TOTAL CA: CPT | Performed by: INTERNAL MEDICINE

## 2019-06-25 PROCEDURE — 94799 UNLISTED PULMONARY SVC/PX: CPT

## 2019-06-25 PROCEDURE — 77386 CHG INTENSITY MODULATED RADIATION TX DLVR COMPLEX: CPT | Performed by: RADIOLOGY

## 2019-06-25 PROCEDURE — 77014 CHG CT GUIDANCE RADIATION THERAPY FLDS PLACEMENT: CPT | Performed by: RADIOLOGY

## 2019-06-25 PROCEDURE — 77386: CPT | Performed by: RADIOLOGY

## 2019-06-25 PROCEDURE — 97110 THERAPEUTIC EXERCISES: CPT

## 2019-06-25 PROCEDURE — 25010000002 ENOXAPARIN PER 10 MG: Performed by: INTERNAL MEDICINE

## 2019-06-25 PROCEDURE — 94669 MECHANICAL CHEST WALL OSCILL: CPT

## 2019-06-25 PROCEDURE — 85027 COMPLETE CBC AUTOMATED: CPT | Performed by: INTERNAL MEDICINE

## 2019-06-25 RX ADMIN — SODIUM CHLORIDE, PRESERVATIVE FREE 3 ML: 5 INJECTION INTRAVENOUS at 10:20

## 2019-06-25 RX ADMIN — GUAIFENESIN 400 MG: 100 SOLUTION ORAL at 10:16

## 2019-06-25 RX ADMIN — ENOXAPARIN SODIUM 40 MG: 40 INJECTION SUBCUTANEOUS at 13:04

## 2019-06-25 RX ADMIN — METOPROLOL TARTRATE 50 MG: 50 TABLET, FILM COATED ORAL at 10:16

## 2019-06-25 RX ADMIN — GUAIFENESIN 400 MG: 100 SOLUTION ORAL at 04:23

## 2019-06-25 RX ADMIN — Medication 1 APPLICATION: at 21:00

## 2019-06-25 RX ADMIN — Medication 4 ML: at 20:31

## 2019-06-25 RX ADMIN — Medication 4 ML: at 08:17

## 2019-06-25 RX ADMIN — IPRATROPIUM BROMIDE AND ALBUTEROL SULFATE 3 ML: 2.5; .5 SOLUTION RESPIRATORY (INHALATION) at 20:31

## 2019-06-25 RX ADMIN — ASPIRIN 81 MG: 81 TABLET, CHEWABLE ORAL at 10:17

## 2019-06-25 RX ADMIN — ATORVASTATIN CALCIUM 80 MG: 80 TABLET, FILM COATED ORAL at 21:00

## 2019-06-25 RX ADMIN — IPRATROPIUM BROMIDE AND ALBUTEROL SULFATE 3 ML: 2.5; .5 SOLUTION RESPIRATORY (INHALATION) at 17:16

## 2019-06-25 RX ADMIN — Medication 1 APPLICATION: at 10:20

## 2019-06-25 RX ADMIN — IPRATROPIUM BROMIDE AND ALBUTEROL SULFATE 3 ML: 2.5; .5 SOLUTION RESPIRATORY (INHALATION) at 12:24

## 2019-06-25 RX ADMIN — LOSARTAN POTASSIUM: 50 TABLET, FILM COATED ORAL at 10:17

## 2019-06-25 RX ADMIN — IPRATROPIUM BROMIDE AND ALBUTEROL SULFATE 3 ML: 2.5; .5 SOLUTION RESPIRATORY (INHALATION) at 08:16

## 2019-06-25 RX ADMIN — SODIUM CHLORIDE, PRESERVATIVE FREE 3 ML: 5 INJECTION INTRAVENOUS at 21:00

## 2019-06-25 RX ADMIN — DIVALPROEX SODIUM 250 MG: 125 CAPSULE, COATED PELLETS ORAL at 10:17

## 2019-06-25 RX ADMIN — METOPROLOL TARTRATE 50 MG: 50 TABLET, FILM COATED ORAL at 21:01

## 2019-06-25 RX ADMIN — DIVALPROEX SODIUM 250 MG: 125 CAPSULE, COATED PELLETS ORAL at 21:00

## 2019-06-25 RX ADMIN — LORAZEPAM 0.25 MG: 0.5 TABLET ORAL at 01:04

## 2019-06-25 RX ADMIN — GUAIFENESIN 400 MG: 100 SOLUTION ORAL at 16:21

## 2019-06-25 RX ADMIN — LEVOTHYROXINE SODIUM 75 MCG: 75 TABLET ORAL at 06:54

## 2019-06-25 RX ADMIN — GUAIFENESIN 400 MG: 100 SOLUTION ORAL at 21:00

## 2019-06-25 NOTE — PROGRESS NOTES
Physician Weekly Management Note    Diagnosis:     Diagnosis Plan   1. Adenocarcinoma, lung, left (CMS/HCC)         RT Details:  Treatment #10/10    Notes on Treatment course, Films, Medical progress:  Voice is stronger, seems more alert, no treatment related issues.  We will plan on follow-up imaging in approximately 5 to 6 weeks.    Weekly Management:  Medication reviewed?   Yes  New medications given?   No  Problemlist reviewed?   Yes  Problem added?   No       Technical aspects reviewed:  Weekly OBI approved?   Yes  Weekly port films approved?   Yes  Change requests noted on port film?   No  Patient setup and plan reviewed?   Yes    Chart Reviewed:  Continue current treatment plan?   Yes  Treatment plan change requested?   No  CBC reviewed?   No  Concurrent Chemo?   No    Objective     Toxicities:   As above     Review of Systems   As above    There were no vitals filed for this visit.    No flowsheet data found.    Physical Exam  As above      Problem Summary List    Diagnosis:     Diagnosis Plan   1. 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, benign   • Transient cerebral ischemia    • Urge incontinence of urine          Past Surgical History:   Procedure Laterality Date   • BREAST BIOPSY     •  BRONCHOSCOPY Bilateral 5/20/2019    Procedure: BRONCHOSCOPY WITH  BIOPSY AND BAL, WITH ENDOBRONCHIAL ULTRASOUND WITH FNA;  Surgeon: Chris Tirado MD;  Location: [facility] ENDOSCOPY;  Service: Pulmonary   • COLONOSCOPY      REC 07/2007, REC 02/2009, REC 12/2011, REC 01/14,  She says she cant do the prep because of poor mobility to get to .   • EYE SURGERY  1951   • OTHER SURGICAL HISTORY      Ventricular lake holes for drainage of subdural hematoma   • TOTAL THYROIDECTOMY  08/2015    Dr. Ahmadi         Current Facility-Administered Medications on File Prior to Visit   Medication Dose Route Frequency Provider Last Rate Last Dose   • acetaminophen (TYLENOL) tablet 650 mg  650 mg Oral Q4H PRN Timothy Taylor MD   650 mg at 06/12/19 2131   • albuterol (PROVENTIL) nebulizer solution 0.083% 2.5 mg/3mL  2.5 mg Nebulization Q6H PRN Timothy Taylor MD   2.5 mg at 06/08/19 1843   • aspirin chewable tablet 81 mg  81 mg Oral Daily Timothy Taylor MD   81 mg at 06/25/19 1017   • atorvastatin (LIPITOR) tablet 80 mg  80 mg Oral Nightly Timothy Taylor MD   80 mg at 06/24/19 2305   • Divalproex Sodium (DEPAKOTE SPRINKLE) capsule 250 mg  250 mg Oral Q12H Marquis Gonsales MD   250 mg at 06/25/19 1017   • docusate sodium (COLACE) capsule 100 mg  100 mg Oral BID Arthur Wilkerson MD   100 mg at 06/24/19 2306   • enoxaparin (LOVENOX) syringe 40 mg  40 mg Subcutaneous Q24H Marquis Gonsales MD   40 mg at 06/25/19 1304   • guaiFENesin (ROBITUSSIN) 100 MG/5ML oral solution 400 mg  400 mg Oral Q6H Shan Tirado MD   400 mg at 06/25/19 1016   • hydrocortisone-bacitracin-zinc oxide-nystatin (MAGIC BARRIER) cream 1 application  1 application Topical BID Marquis Gonsales MD   1 application at 06/25/19 1020   • ipratropium-albuterol (DUO-NEB) nebulizer solution 3 mL  3 mL Nebulization 4x Daily - RT Timothy Taylor MD   3 mL at 06/25/19 1224   • levothyroxine (SYNTHROID, LEVOTHROID) tablet 75 mcg  75 mcg Oral Q AM Timothy Taylor MD   75 mcg at  06/25/19 0654   • LORazepam (ATIVAN) tablet 0.25 mg  0.25 mg Oral Q6H PRN Marquis Gonsales MD   0.25 mg at 06/25/19 0104   • losartan (COZAAR) 50 mg, hydrochlorothiazide (MICROZIDE) 12.5 mg for HYZAAR 50-12.5   Oral Daily Timothy Taylor MD       • metoprolol tartrate (LOPRESSOR) tablet 50 mg  50 mg Oral BID Timothy Taylor MD   50 mg at 06/25/19 1016   • sennosides-docusate sodium (SENOKOT-S) 8.6-50 MG tablet 2 tablet  2 tablet Oral Nightly PRN Shan Tirado MD   2 tablet at 06/17/19 2133   • sodium chloride 0.9 % flush 10 mL  10 mL Intravenous PRN Terell Plata MD       • sodium chloride 0.9 % flush 3 mL  3 mL Intravenous Q12H Timothy Taylor MD   3 mL at 06/25/19 1020   • sodium chloride 0.9 % flush 3-10 mL  3-10 mL Intravenous PRN Timothy Taylor MD       • sodium chloride 7 % nebulizer solution nebulizer solution 4 mL  4 mL Nebulization BID - RT Timothy Taylor MD   4 mL at 06/25/19 0817     Current Outpatient Medications on File Prior to Visit   Medication Sig Dispense Refill   • aspirin 81 MG chewable tablet Chew 81 mg Daily.     • atorvastatin (LIPITOR) 80 MG tablet Take 1 tablet by mouth Daily. Needs an appointment for further refills (Patient taking differently: Take 80 mg by mouth Every Night. Needs an appointment for further refills) 90 tablet 0   • Cholecalciferol (VITAMIN D3) 5000 units tablet tablet Take 5,000 Units by mouth Daily.     • guaiFENesin (ROBITUSSIN) 100 MG/5ML solution oral solution Take 20 mL by mouth Every 4 (Four) Hours. While awake 3600 mL 3   • ipratropium-albuterol (DUO-NEB) 0.5-2.5 mg/3 ml nebulizer Take 3 mL by nebulization 4 (Four) Times a Day. And every 4 hours as needed for chest congestion 480 mL 11   • levothyroxine (SYNTHROID, LEVOTHROID) 75 MCG tablet Take 1 tablet by mouth Daily.     • losartan (COZAAR) 50 MG tablet Take 1 tablet by mouth daily. (Patient taking differently: Take 100 mg by mouth Daily.) 90 tablet 1   • megestrol (MEGACE) 20 MG tablet Take 1 tablet by  mouth Daily. 30 tablet 5   • metoprolol tartrate (LOPRESSOR) 50 MG tablet Take 1 tablet by mouth 2 (Two) Times a Day. 180 tablet 0   • sodium chloride 7 % nebulizer solution nebulizer solution Take 4 mL by nebulization 2 (Two) Times a Day. And may use additional 2 times as needed chest congestion 480 mL 6   • tolterodine (DETROL) 1 MG tablet TAKE 1 TABLET TWICE A  tablet 0       Allergies   Allergen Reactions   • Ace Inhibitors      Other reaction(s): Cough         Primary care MD:    Timothy Taylor MD    Oncologist:   THEO Godfrey MD    Seen and approved by:  Miah Whalen MD  06/25/2019

## 2019-06-26 ENCOUNTER — DOCUMENTATION (OUTPATIENT)
Dept: RADIATION ONCOLOGY | Facility: HOSPITAL | Age: 74
End: 2019-06-26

## 2019-06-26 VITALS
HEART RATE: 83 BPM | DIASTOLIC BLOOD PRESSURE: 60 MMHG | WEIGHT: 106.2 LBS | RESPIRATION RATE: 18 BRPM | OXYGEN SATURATION: 100 % | BODY MASS INDEX: 18.13 KG/M2 | HEIGHT: 64 IN | TEMPERATURE: 98.3 F | SYSTOLIC BLOOD PRESSURE: 106 MMHG

## 2019-06-26 PROCEDURE — 94799 UNLISTED PULMONARY SVC/PX: CPT

## 2019-06-26 PROCEDURE — 25010000002 ENOXAPARIN PER 10 MG: Performed by: INTERNAL MEDICINE

## 2019-06-26 PROCEDURE — 94669 MECHANICAL CHEST WALL OSCILL: CPT

## 2019-06-26 RX ORDER — LORAZEPAM 0.5 MG/1
0.25 TABLET ORAL EVERY 8 HOURS PRN
Qty: 15 TABLET | Refills: 0 | Status: SHIPPED | OUTPATIENT
Start: 2019-06-26 | End: 2019-07-06

## 2019-06-26 RX ORDER — DIVALPROEX SODIUM 125 MG/1
250 CAPSULE, COATED PELLETS ORAL EVERY 12 HOURS SCHEDULED
Qty: 60 CAPSULE | Refills: 3 | Status: SHIPPED | OUTPATIENT
Start: 2019-06-26 | End: 2019-08-14

## 2019-06-26 RX ADMIN — Medication 1 APPLICATION: at 10:07

## 2019-06-26 RX ADMIN — GUAIFENESIN 400 MG: 100 SOLUTION ORAL at 15:56

## 2019-06-26 RX ADMIN — METOPROLOL TARTRATE 50 MG: 50 TABLET, FILM COATED ORAL at 10:05

## 2019-06-26 RX ADMIN — IPRATROPIUM BROMIDE AND ALBUTEROL SULFATE 3 ML: 2.5; .5 SOLUTION RESPIRATORY (INHALATION) at 11:31

## 2019-06-26 RX ADMIN — GUAIFENESIN 400 MG: 100 SOLUTION ORAL at 10:05

## 2019-06-26 RX ADMIN — SODIUM CHLORIDE, PRESERVATIVE FREE 3 ML: 5 INJECTION INTRAVENOUS at 10:07

## 2019-06-26 RX ADMIN — Medication 4 ML: at 08:38

## 2019-06-26 RX ADMIN — DIVALPROEX SODIUM 250 MG: 125 CAPSULE, COATED PELLETS ORAL at 10:05

## 2019-06-26 RX ADMIN — IPRATROPIUM BROMIDE AND ALBUTEROL SULFATE 3 ML: 2.5; .5 SOLUTION RESPIRATORY (INHALATION) at 16:13

## 2019-06-26 RX ADMIN — GUAIFENESIN 400 MG: 100 SOLUTION ORAL at 03:27

## 2019-06-26 RX ADMIN — ENOXAPARIN SODIUM 40 MG: 40 INJECTION SUBCUTANEOUS at 13:02

## 2019-06-26 RX ADMIN — LOSARTAN POTASSIUM: 50 TABLET, FILM COATED ORAL at 10:06

## 2019-06-26 RX ADMIN — IPRATROPIUM BROMIDE AND ALBUTEROL SULFATE 3 ML: 2.5; .5 SOLUTION RESPIRATORY (INHALATION) at 08:38

## 2019-06-26 RX ADMIN — ASPIRIN 81 MG: 81 TABLET, CHEWABLE ORAL at 10:06

## 2019-06-26 RX ADMIN — LEVOTHYROXINE SODIUM 75 MCG: 75 TABLET ORAL at 06:31

## 2019-06-27 ENCOUNTER — TELEPHONE (OUTPATIENT)
Dept: ONCOLOGY | Facility: CLINIC | Age: 74
End: 2019-06-27

## 2019-06-27 ENCOUNTER — TELEPHONE (OUTPATIENT)
Dept: ONCOLOGY | Facility: HOSPITAL | Age: 74
End: 2019-06-27

## 2019-06-27 DIAGNOSIS — C34.92 ADENOCARCINOMA, LUNG, LEFT (HCC): ICD-10-CM

## 2019-06-27 DIAGNOSIS — Z79.899 HIGH RISK MEDICATION USE: ICD-10-CM

## 2019-06-27 NOTE — TELEPHONE ENCOUNTER
----- Message from Lauren Jefferson RN sent at 6/27/2019 12:16 PM EDT -----  Patient out of hospital and needs see NP tomorrow and get Keytruda. It was scheduled and we cancelled because she was going to rehab but insurance would not pay for rehab.

## 2019-06-27 NOTE — TELEPHONE ENCOUNTER
----- Message from Cherry Hyde sent at 6/27/2019 11:43 AM EDT -----  [phone]    Pt had a treatment scheduled for tomorrow.  It was cancelled but she was discharged from the hospital today.  They want to still come for the treatment if they can.  She couldn't get into rehab, ins wouldn't approve it.  Had appointments but we cancelled because plan was to go to inpatient rehab. She did not qualify so is at home. In basket message to scheduling to put back on schedule tomorrow for NP visit and rehab.  verbalized understanding we would call with a schedule.

## 2019-06-28 ENCOUNTER — APPOINTMENT (OUTPATIENT)
Dept: ONCOLOGY | Facility: HOSPITAL | Age: 74
End: 2019-06-28

## 2019-06-28 ENCOUNTER — INFUSION (OUTPATIENT)
Dept: ONCOLOGY | Facility: HOSPITAL | Age: 74
End: 2019-06-28

## 2019-06-28 ENCOUNTER — APPOINTMENT (OUTPATIENT)
Dept: LAB | Facility: HOSPITAL | Age: 74
End: 2019-06-28

## 2019-06-28 ENCOUNTER — OFFICE VISIT (OUTPATIENT)
Dept: ONCOLOGY | Facility: CLINIC | Age: 74
End: 2019-06-28

## 2019-06-28 ENCOUNTER — APPOINTMENT (OUTPATIENT)
Dept: ONCOLOGY | Facility: CLINIC | Age: 74
End: 2019-06-28

## 2019-06-28 VITALS
HEIGHT: 64 IN | TEMPERATURE: 98.1 F | HEART RATE: 85 BPM | RESPIRATION RATE: 16 BRPM | WEIGHT: 106 LBS | DIASTOLIC BLOOD PRESSURE: 67 MMHG | SYSTOLIC BLOOD PRESSURE: 106 MMHG | BODY MASS INDEX: 18.1 KG/M2 | OXYGEN SATURATION: 95 %

## 2019-06-28 DIAGNOSIS — C34.92 ADENOCARCINOMA, LUNG, LEFT (HCC): ICD-10-CM

## 2019-06-28 DIAGNOSIS — Z79.899 HIGH RISK MEDICATION USE: ICD-10-CM

## 2019-06-28 DIAGNOSIS — Z79.899 HIGH RISK MEDICATION USE: Primary | ICD-10-CM

## 2019-06-28 DIAGNOSIS — C34.92 ADENOCARCINOMA, LUNG, LEFT (HCC): Primary | ICD-10-CM

## 2019-06-28 LAB
ALBUMIN SERPL-MCNC: 2.9 G/DL (ref 3.5–5.2)
ALBUMIN/GLOB SERPL: 0.8 G/DL (ref 1.1–2.4)
ALP SERPL-CCNC: 74 U/L (ref 38–116)
ALT SERPL W P-5'-P-CCNC: 13 U/L (ref 0–33)
ANION GAP SERPL CALCULATED.3IONS-SCNC: 12.8 MMOL/L (ref 5–15)
AST SERPL-CCNC: 21 U/L (ref 0–32)
BASOPHILS # BLD AUTO: 0.02 10*3/MM3 (ref 0–0.2)
BASOPHILS NFR BLD AUTO: 0.3 % (ref 0–1.5)
BILIRUB SERPL-MCNC: 0.4 MG/DL (ref 0.2–1.2)
BUN BLD-MCNC: 8 MG/DL (ref 6–20)
BUN/CREAT SERPL: 12.5 (ref 7.3–30)
CALCIUM SPEC-SCNC: 8.4 MG/DL (ref 8.5–10.2)
CHLORIDE SERPL-SCNC: 93 MMOL/L (ref 98–107)
CO2 SERPL-SCNC: 22.2 MMOL/L (ref 22–29)
CREAT BLD-MCNC: 0.64 MG/DL (ref 0.6–1.1)
DEPRECATED RDW RBC AUTO: 46.3 FL (ref 37–54)
EOSINOPHIL # BLD AUTO: 0.12 10*3/MM3 (ref 0–0.4)
EOSINOPHIL NFR BLD AUTO: 1.7 % (ref 0.3–6.2)
ERYTHROCYTE [DISTWIDTH] IN BLOOD BY AUTOMATED COUNT: 15.5 % (ref 12.3–15.4)
GFR SERPL CREATININE-BSD FRML MDRD: 91 ML/MIN/1.73
GLOBULIN UR ELPH-MCNC: 3.5 GM/DL (ref 1.8–3.5)
GLUCOSE BLD-MCNC: 94 MG/DL (ref 74–124)
HCT VFR BLD AUTO: 34.6 % (ref 34–46.6)
HGB BLD-MCNC: 11.8 G/DL (ref 12–15.9)
IMM GRANULOCYTES # BLD AUTO: 0.26 10*3/MM3 (ref 0–0.05)
IMM GRANULOCYTES NFR BLD AUTO: 3.8 % (ref 0–0.5)
LYMPHOCYTES # BLD AUTO: 0.83 10*3/MM3 (ref 0.7–3.1)
LYMPHOCYTES NFR BLD AUTO: 12 % (ref 19.6–45.3)
MCH RBC QN AUTO: 28.1 PG (ref 26.6–33)
MCHC RBC AUTO-ENTMCNC: 34.1 G/DL (ref 31.5–35.7)
MCV RBC AUTO: 82.4 FL (ref 79–97)
MONOCYTES # BLD AUTO: 0.67 10*3/MM3 (ref 0.1–0.9)
MONOCYTES NFR BLD AUTO: 9.7 % (ref 5–12)
NEUTROPHILS # BLD AUTO: 4.99 10*3/MM3 (ref 1.7–7)
NEUTROPHILS NFR BLD AUTO: 72.5 % (ref 42.7–76)
NRBC BLD AUTO-RTO: 0 /100 WBC (ref 0–0.2)
PLATELET # BLD AUTO: 138 10*3/MM3 (ref 140–450)
PMV BLD AUTO: 8.5 FL (ref 6–12)
POTASSIUM BLD-SCNC: 4.2 MMOL/L (ref 3.5–4.7)
PROT SERPL-MCNC: 6.4 G/DL (ref 6.3–8)
RBC # BLD AUTO: 4.2 10*6/MM3 (ref 3.77–5.28)
SODIUM BLD-SCNC: 128 MMOL/L (ref 134–145)
T4 FREE SERPL-MCNC: 1.43 NG/DL (ref 0.93–1.7)
TSH SERPL DL<=0.05 MIU/L-ACNC: 23.8 MIU/ML (ref 0.27–4.2)
WBC NRBC COR # BLD: 6.89 10*3/MM3 (ref 3.4–10.8)

## 2019-06-28 PROCEDURE — 99213 OFFICE O/P EST LOW 20 MIN: CPT | Performed by: NURSE PRACTITIONER

## 2019-06-28 PROCEDURE — 25010000002 PEMBROLIZUMAB 100 MG/4ML SOLUTION 4 ML VIAL: Performed by: NURSE PRACTITIONER

## 2019-06-28 PROCEDURE — 84439 ASSAY OF FREE THYROXINE: CPT | Performed by: INTERNAL MEDICINE

## 2019-06-28 PROCEDURE — 85025 COMPLETE CBC W/AUTO DIFF WBC: CPT

## 2019-06-28 PROCEDURE — 84443 ASSAY THYROID STIM HORMONE: CPT | Performed by: INTERNAL MEDICINE

## 2019-06-28 PROCEDURE — 96413 CHEMO IV INFUSION 1 HR: CPT | Performed by: NURSE PRACTITIONER

## 2019-06-28 PROCEDURE — 80053 COMPREHEN METABOLIC PANEL: CPT

## 2019-06-28 RX ORDER — SILVER SULFADIAZINE 1 %
CREAM (GRAM) TOPICAL AS NEEDED
COMMUNITY
Start: 2019-05-03 | End: 2020-01-01 | Stop reason: HOSPADM

## 2019-06-28 RX ORDER — SODIUM CHLORIDE 9 MG/ML
250 INJECTION, SOLUTION INTRAVENOUS ONCE
Status: CANCELLED | OUTPATIENT
Start: 2019-06-28

## 2019-06-28 RX ORDER — SODIUM CHLORIDE 9 MG/ML
250 INJECTION, SOLUTION INTRAVENOUS ONCE
Status: COMPLETED | OUTPATIENT
Start: 2019-06-28 | End: 2019-06-28

## 2019-06-28 RX ORDER — TOLTERODINE TARTRATE 1 MG/1
TABLET, EXTENDED RELEASE ORAL
Status: ON HOLD | COMMUNITY
Start: 2019-06-26 | End: 2020-01-01

## 2019-06-28 RX ADMIN — SODIUM CHLORIDE 200 MG: 9 INJECTION, SOLUTION INTRAVENOUS at 15:18

## 2019-06-28 RX ADMIN — SODIUM CHLORIDE 250 ML: 9 INJECTION, SOLUTION INTRAVENOUS at 15:19

## 2019-06-28 NOTE — PROGRESS NOTES
Subjective .     REASONS FOR FOLLOWUP:    1.  Recently diagnosed stage IV non-small cell lung cancer of the left lung (adenocarcinoma) with evidence for progression, brain metastases visible on MRI, PD-L1 95%, EGFR, ROS 1, ALK negative      HISTORY OF PRESENT ILLNESS:  The patient is a 74 y.o. year old female who is here for follow-up with the above-mentioned history.    History of Present Illness   patient is a 74-year-old female with the above-mentioned history here today for hospital return and to initiate treatment with Keytruda.  She is currently at a rehab facility.  She reports that she is breathing okay with the help of her nebulizer treatments and the flutter valve.  She continues to cough quite a bit of phlegm up.  She denies any bleeding issues.      Patient was given education regarding Keytruda today and had an opportunity to ask questions.  She was also provided with written information.    Patient has completed her radiation treatments.       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     • atorvastatin (LIPITOR) 80 MG tablet Take 1 tablet by mouth Daily. Needs an appointment for further refills (Patient taking differently: Take 80 mg by mouth Every Night. Needs an appointment for further refills) 90 tablet 0   • Divalproex Sodium (DEPAKOTE SPRINKLE) 125 MG capsule Take 2 capsules by mouth Every 12 (Twelve) Hours. 60 capsule 3   • guaiFENesin (ROBITUSSIN) 100 MG/5ML solution oral solution Take 20 mL by mouth Every 4 (Four) Hours. While awake 3600 mL 3   • ipratropium-albuterol (DUO-NEB) 0.5-2.5 mg/3 ml nebulizer Take 3 mL by nebulization 4 (Four) Times a Day. And every 4 hours as needed for chest congestion 480 mL 11   • levothyroxine (SYNTHROID, LEVOTHROID) 75 MCG tablet Take 1 tablet by mouth Daily.     • LORazepam (ATIVAN) 0.5 MG tablet Take 0.5 tablets by mouth Every 8 (Eight) Hours As Needed for Anxiety for up to 2 days. 15 tablet 0   • metoprolol tartrate (LOPRESSOR) 50 MG tablet Take 1 tablet by mouth 2 (Two) Times a Day. 180 tablet 0   • sodium chloride 7 % nebulizer solution nebulizer solution Take 4 mL by nebulization 2 (Two) Times a Day. And may use additional 2 times as needed chest congestion 480 mL 6   • SSD 1 % cream      • tolterodine (DETROL) 1 MG tablet        No current facility-administered medications on file prior to visit.        ALLERGIES:     Allergies   Allergen Reactions   • Ace Inhibitors      Other reaction(s): Cough       Social History     Socioeconomic History   • Marital status:      Spouse name: Miah   • Number of children: Not on file   • Years of education: College   • Highest education level: Not on file   Occupational History     Employer: RETIRED   Tobacco Use   • Smoking status: Former Smoker     Types: Cigarettes     Last attempt to quit: 2008     Years since quittin.4   • Smokeless tobacco: Never Used   • Tobacco comment: caffeine use-soda   Substance and Sexual Activity   • Alcohol use: Yes      "Comment: rarely   • Drug use: No   • Sexual activity: Defer         Cancer-related family history is not on file.     Review of Systems   Constitutional: Positive for fatigue. Negative for activity change, appetite change, chills and fever.   HENT: Negative for mouth sores, nosebleeds and trouble swallowing.    Respiratory: Positive for cough and shortness of breath.    Cardiovascular: Negative for chest pain and leg swelling.   Gastrointestinal: Negative for abdominal pain, constipation, diarrhea, nausea and vomiting.   Genitourinary: Negative for difficulty urinating.   Skin: Negative for rash.   Neurological: Positive for weakness. Negative for dizziness and numbness.   Hematological: Negative for adenopathy. Does not bruise/bleed easily.   Psychiatric/Behavioral: Negative for sleep disturbance.       Objective      Vitals:    06/28/19 1331   BP: 106/67   Pulse: 85   Resp: 16   Temp: 98.1 °F (36.7 °C)   SpO2: 95%   Weight: 48.1 kg (106 lb)  Comment: per patient unable to stand   Height: 162.6 cm (64\")  Comment: per patient unable to stand   PainSc: 0-No pain     Current Status 6/28/2019   ECOG score 0       Physical Exam   Constitutional: She is oriented to person, place, and time. She appears well-developed and well-nourished. No distress.   Seated in a wheelchair, accompanied by her daughter.   HENT:   Head: Normocephalic and atraumatic.   Mouth/Throat: Oropharynx is clear and moist and mucous membranes are normal. No oropharyngeal exudate.   Eyes: Pupils are equal, round, and reactive to light.   Neck: Normal range of motion.   Cardiovascular: Normal rate, regular rhythm and normal heart sounds.   No murmur heard.  Pulmonary/Chest: Effort normal and breath sounds normal. No respiratory distress. She has no wheezes. She has no rhonchi. She has no rales.   Musculoskeletal: Normal range of motion. She exhibits no edema.   Brace left ankle   Neurological: She is alert and oriented to person, place, and time. "   Skin: Skin is warm and dry. No rash noted.   Psychiatric: She has a normal mood and affect.   Vitals reviewed.        RECENT LABS:  Hematology WBC   Date Value Ref Range Status   06/28/2019 6.89 3.40 - 10.80 10*3/mm3 Final     RBC   Date Value Ref Range Status   06/28/2019 4.20 3.77 - 5.28 10*6/mm3 Final     Hemoglobin   Date Value Ref Range Status   06/28/2019 11.8 (L) 12.0 - 15.9 g/dL Final     Hematocrit   Date Value Ref Range Status   06/28/2019 34.6 34.0 - 46.6 % Final     Platelets   Date Value Ref Range Status   06/28/2019 138 (L) 140 - 450 10*3/mm3 Final        Assessment/Plan     1. Non-small cell lung cancer (adenocarcinoma):  · Patient with long-standing smoking history x40 years quit in 2008  · Underlying significant COPD with FEV1 1.4 (58%) on 11/20/2014  · CT 5/16/2019 with left upper lobe mass, partially cavitary measuring 2.3 x 1.5 cm.  Additional 8 mm similar appearing right upper lobe nodule.  Bibasilar consolidation, small bilateral pleural effusions, mediastinal lymphadenopathy up to 2.7 centimeters.  · Bronchoscopy 5/20/2019 with identification of left mainstem malignancy extending to the level of the jose, biopsy showed poorly differentiated adenocarcinoma of pulmonary origin. EBUS with FNA station 7 lymph node negative for malignant cells, only scattered lymphoid aggregates.  BAL left upper lobe negative for malignant cells.  · Patient was intubated with possible pneumonia, significant mucus plugging with underlying significant COPD.  Extubated on 5/28/2019.   · PDL1 95%.  EGFR, ROS1, ALK negative.    · Staging evaluation performed during hospitalization with negative CT abdomen/pelvis, negative bone scan, and negative MRI brain 5/29/2019.  · Staging evaluation 5/29/2019 with no evidence of metastatic disease on CT abdomen/pelvis, bone scan, and MRI brain.  It appeared initially that she had stage III disease clinically with a primary tumor 2.3 cm left upper lobe with evidence of extension  to left mainstem up to level of jose on bronchoscopy.  There was clinical suspicion for mediastinal lymph node involvement due to lymphadenopathy on CT although negative biopsy via EBUS.  There is also a cavitary 8 mm right upper lobe nodule of unclear significance.  The patient was seen by radiation oncology Dr. Whalen and plans were made for PET scan as outpatient on 6/10/2019.  Subsequent plan to present her at the thoracic oncology conference on 6/13/2019 and plan to see her back in the office on 6/17/2019 to review PET scan findings, assess her functional and pulmonary status and make final decisions regarding recommended treatment for her lung cancer.    · On 6/2 Dr. Godfrey received a telephone call from neuro radiology Dr. Nicholson.  Apparently there was a preliminary report on the MRI and the scan was subsequently reviewed with neuroradiology with identification of subtle suspicious appearing small lesions in the left occipital and parietal regions with surrounding slight edema with high suspicion for small brain metastases.  This is reflected in an addended/final report.  Dr. Godfrey did return to see the patient and discussed these findings at length with the patient and her family.  They discussed implications of these findings to suggest metastatic disease which changes the focus of our treatment.  Any treatment at this point is palliative in nature.  In terms of management overall, she is asymptomatic and the lesions in question remain extremely small with a minimal amount of edema.  This does not warrant currently the use of steroids.  This likely does not warrant treatment with radiation either.  Dr. Godfrey did notify radiation oncology regarding this finding.  We likely will elect to monitor her CNS disease with close interval follow-up MRI at 4 to 6 weeks as we discuss options for her systemic therapy.  With PD-L1 results of 95%, frontline single agent immunotherapy will likely be preferred.  · She is  now readmitted as of 6/7/2019 with repeat CT imaging of her chest on 6/8/2019 with progression of her malignancy within the left chest with an increase in size in the left upper lobe mass with now occlusion of the left mainstem bronchus at its origin new compared with prior imaging.  There is also an obstruction of the bronchus to the left lower lobe with mucous plugging suspected.  Additional mucus plugging within the bronchus to the right lower lobe.    · Radiation therapy was consulted with plans to initiate treatment just after simulation Lillie 10, 2019, brain metastasis will be observed without treatment at this point.  · As she is seen June 11 she continues to have pulmonary symptoms, reassessment plan today and though PET/CT had been planned June 12 as outpatient we will have to hold this potentially having her return on the 17th for immunotherapy.  · Completed radiation therapy to the left mainstem bronchus in hopes of improving systemic occlusion.  Her radiation was complete on 6/25/2019.    · Here 6/28/2019 to initiate immunotherapy with Keytruda.  I provided them with education today, and the patient and her daughter had the opportunity ask questions.    2. Acute on chronic hypoxemic respiratory failure:  · Possible underlying pneumonia  · Underlying COPD with acute exacerbation currently on prednisone 40 mg daily  · Patient required intubation during her prior admission.. Patient had significant mucus plugging which was removed endoscopically.  · Patient extubated 5/28/2019.  · Worsening mucus plugging seen on CT angiogram imaging from 6/8/2019.        3. Prior hemorrhagic CVA:  · Occurred in 2008, residual left upper extremity weakness and left lower extremity weakness.       Anemia: Hemoglobin is currently stable at 11.8.  Continue to monitor.      5. Leukocytosis with neutrophilia  · Likely reactive due to steroids.  · White blood cell count today 6.89, with an ANC of 4.99.     6.  Hyponatremia: Sodium  128 today.        PLAN:  1.  Proceed with Keytruda today.  2.  Return in 3 weeks for follow-up visit with Dr. Godfrey for reevaluation prior to her second cycle of Keytruda.              Cc:  Timothy Taylor MD

## 2019-07-01 ENCOUNTER — TELEPHONE (OUTPATIENT)
Dept: ONCOLOGY | Facility: CLINIC | Age: 74
End: 2019-07-01

## 2019-07-01 LAB
MYCOBACTERIUM SPEC CULT: NORMAL
NIGHT BLUE STAIN TISS: NORMAL

## 2019-07-01 NOTE — TELEPHONE ENCOUNTER
----- Message from Arben Franks MD sent at 7/1/2019  7:20 AM EDT -----  Please contact this patient about her current Synthroid dose.  She needs to be increased  25 mcg over her current dosing.  I believe she is taking 75 mcg and thus 100 mcg should be her current dose.  We may need to send a new prescription for her as well.Thanks, ROMELIA

## 2019-07-02 RX ORDER — LEVOTHYROXINE SODIUM 0.1 MG/1
100 TABLET ORAL DAILY
Qty: 90 TABLET | Refills: 3 | Status: SHIPPED | OUTPATIENT
Start: 2019-07-02 | End: 2019-08-30

## 2019-07-02 RX ORDER — LEVOTHYROXINE SODIUM 0.1 MG/1
100 TABLET ORAL DAILY
Qty: 14 TABLET | Refills: 0 | Status: SHIPPED | OUTPATIENT
Start: 2019-07-02 | End: 2019-08-30

## 2019-07-02 NOTE — TELEPHONE ENCOUNTER
Spoke with the patient's  and he confirmed that Mrs. Moura is currently taking 75 mcg of Synthroid. He has been advised that the patient will need to increase her dosage to 100 mcg. Per Dr. Franks's request I have sent in a new Rx to the patient's pharmacy that is listed in the chart.

## 2019-07-17 DIAGNOSIS — C34.92 ADENOCARCINOMA, LUNG, LEFT (HCC): ICD-10-CM

## 2019-07-17 DIAGNOSIS — Z79.899 HIGH RISK MEDICATION USE: ICD-10-CM

## 2019-07-19 ENCOUNTER — INFUSION (OUTPATIENT)
Dept: ONCOLOGY | Facility: HOSPITAL | Age: 74
End: 2019-07-19

## 2019-07-19 ENCOUNTER — DOCUMENTATION (OUTPATIENT)
Dept: ONCOLOGY | Facility: CLINIC | Age: 74
End: 2019-07-19

## 2019-07-19 ENCOUNTER — OFFICE VISIT (OUTPATIENT)
Dept: ONCOLOGY | Facility: CLINIC | Age: 74
End: 2019-07-19

## 2019-07-19 VITALS
OXYGEN SATURATION: 94 % | HEIGHT: 64 IN | SYSTOLIC BLOOD PRESSURE: 106 MMHG | DIASTOLIC BLOOD PRESSURE: 71 MMHG | HEART RATE: 97 BPM | BODY MASS INDEX: 18.19 KG/M2 | TEMPERATURE: 98 F | RESPIRATION RATE: 14 BRPM

## 2019-07-19 DIAGNOSIS — C34.92 ADENOCARCINOMA, LUNG, LEFT (HCC): ICD-10-CM

## 2019-07-19 DIAGNOSIS — Z79.899 HIGH RISK MEDICATION USE: ICD-10-CM

## 2019-07-19 DIAGNOSIS — Z79.899 HIGH RISK MEDICATION USE: Primary | ICD-10-CM

## 2019-07-19 LAB
ALBUMIN SERPL-MCNC: 2.9 G/DL (ref 3.5–5.2)
ALBUMIN/GLOB SERPL: 0.7 G/DL (ref 1.1–2.4)
ALP SERPL-CCNC: 103 U/L (ref 38–116)
ALT SERPL W P-5'-P-CCNC: 8 U/L (ref 0–33)
ANION GAP SERPL CALCULATED.3IONS-SCNC: 13.8 MMOL/L (ref 5–15)
AST SERPL-CCNC: 23 U/L (ref 0–32)
BASOPHILS # BLD AUTO: 0.04 10*3/MM3 (ref 0–0.2)
BASOPHILS NFR BLD AUTO: 0.7 % (ref 0–1.5)
BILIRUB SERPL-MCNC: 0.5 MG/DL (ref 0.2–1.2)
BUN BLD-MCNC: 5 MG/DL (ref 6–20)
BUN/CREAT SERPL: 6.8 (ref 7.3–30)
CALCIUM SPEC-SCNC: 8.5 MG/DL (ref 8.5–10.2)
CHLORIDE SERPL-SCNC: 96 MMOL/L (ref 98–107)
CO2 SERPL-SCNC: 22.2 MMOL/L (ref 22–29)
CREAT BLD-MCNC: 0.74 MG/DL (ref 0.6–1.1)
DEPRECATED RDW RBC AUTO: 63.8 FL (ref 37–54)
EOSINOPHIL # BLD AUTO: 0.13 10*3/MM3 (ref 0–0.4)
EOSINOPHIL NFR BLD AUTO: 2.3 % (ref 0.3–6.2)
ERYTHROCYTE [DISTWIDTH] IN BLOOD BY AUTOMATED COUNT: 19.4 % (ref 12.3–15.4)
GFR SERPL CREATININE-BSD FRML MDRD: 77 ML/MIN/1.73
GLOBULIN UR ELPH-MCNC: 3.9 GM/DL (ref 1.8–3.5)
GLUCOSE BLD-MCNC: 98 MG/DL (ref 74–124)
HCT VFR BLD AUTO: 35.8 % (ref 34–46.6)
HGB BLD-MCNC: 11.4 G/DL (ref 12–15.9)
IMM GRANULOCYTES # BLD AUTO: 0.06 10*3/MM3 (ref 0–0.05)
IMM GRANULOCYTES NFR BLD AUTO: 1.1 % (ref 0–0.5)
LYMPHOCYTES # BLD AUTO: 1.2 10*3/MM3 (ref 0.7–3.1)
LYMPHOCYTES NFR BLD AUTO: 21.2 % (ref 19.6–45.3)
MCH RBC QN AUTO: 29 PG (ref 26.6–33)
MCHC RBC AUTO-ENTMCNC: 31.8 G/DL (ref 31.5–35.7)
MCV RBC AUTO: 91.1 FL (ref 79–97)
MONOCYTES # BLD AUTO: 0.46 10*3/MM3 (ref 0.1–0.9)
MONOCYTES NFR BLD AUTO: 8.1 % (ref 5–12)
NEUTROPHILS # BLD AUTO: 3.76 10*3/MM3 (ref 1.7–7)
NEUTROPHILS NFR BLD AUTO: 66.6 % (ref 42.7–76)
NRBC BLD AUTO-RTO: 0 /100 WBC (ref 0–0.2)
PLATELET # BLD AUTO: 260 10*3/MM3 (ref 140–450)
PMV BLD AUTO: 8.1 FL (ref 6–12)
POTASSIUM BLD-SCNC: 3.4 MMOL/L (ref 3.5–4.7)
PROT SERPL-MCNC: 6.8 G/DL (ref 6.3–8)
RBC # BLD AUTO: 3.93 10*6/MM3 (ref 3.77–5.28)
SODIUM BLD-SCNC: 132 MMOL/L (ref 134–145)
WBC NRBC COR # BLD: 5.65 10*3/MM3 (ref 3.4–10.8)

## 2019-07-19 PROCEDURE — 96413 CHEMO IV INFUSION 1 HR: CPT | Performed by: INTERNAL MEDICINE

## 2019-07-19 PROCEDURE — 85025 COMPLETE CBC W/AUTO DIFF WBC: CPT

## 2019-07-19 PROCEDURE — 80053 COMPREHEN METABOLIC PANEL: CPT

## 2019-07-19 PROCEDURE — 99215 OFFICE O/P EST HI 40 MIN: CPT | Performed by: INTERNAL MEDICINE

## 2019-07-19 PROCEDURE — 25010000002 PEMBROLIZUMAB 100 MG/4ML SOLUTION 4 ML VIAL: Performed by: INTERNAL MEDICINE

## 2019-07-19 RX ORDER — SODIUM CHLORIDE 9 MG/ML
250 INJECTION, SOLUTION INTRAVENOUS ONCE
Status: CANCELLED | OUTPATIENT
Start: 2019-07-19

## 2019-07-19 RX ORDER — OLANZAPINE 2.5 MG/1
2.5 TABLET ORAL NIGHTLY
Qty: 30 TABLET | Refills: 2 | Status: SHIPPED | OUTPATIENT
Start: 2019-07-19 | End: 2019-07-19 | Stop reason: SDUPTHER

## 2019-07-19 RX ORDER — SODIUM CHLORIDE 9 MG/ML
250 INJECTION, SOLUTION INTRAVENOUS ONCE
Status: COMPLETED | OUTPATIENT
Start: 2019-07-19 | End: 2019-07-19

## 2019-07-19 RX ORDER — OLANZAPINE 2.5 MG/1
2.5 TABLET ORAL NIGHTLY
Qty: 30 TABLET | Refills: 2 | Status: SHIPPED | OUTPATIENT
Start: 2019-07-19 | End: 2019-09-20

## 2019-07-19 RX ADMIN — SODIUM CHLORIDE 200 MG: 9 INJECTION, SOLUTION INTRAVENOUS at 10:38

## 2019-07-19 RX ADMIN — SODIUM CHLORIDE 250 ML: 9 INJECTION, SOLUTION INTRAVENOUS at 10:38

## 2019-07-19 NOTE — PROGRESS NOTES
Cherry from Scheduling came to me about pts prescription that was sent to [facility]. I see Olanzepine was sent today to [facility] by Dr Godfrey. Pt wants this sent to [facility]. I have resent the rx to [facility].

## 2019-07-20 NOTE — PROGRESS NOTES
Subjective .     REASONS FOR FOLLOWUP:    1. Metastatic non-small cell lung cancer (adenocarcinoma):  · Patient with long-standing smoking history x40 years quit in 2008  · Underlying significant COPD with FEV1 1.4 (58%) on 11/20/2014  · CT 5/16/2019 with left upper lobe mass, partially cavitary measuring 2.3 x 1.5 cm.  Additional 8 mm similar appearing right upper lobe nodule.  Bibasilar consolidation, small bilateral pleural effusions, mediastinal lymphadenopathy up to 2.7 centimeters.  · Bronchoscopy 5/20/2019 with identification of left mainstem malignancy extending to the level of the jose, biopsy showed poorly differentiated adenocarcinoma of pulmonary origin. EBUS with FNA station 7 lymph node negative for malignant cells, only scattered lymphoid aggregates.  BAL left upper lobe negative for malignant cells.  Negative EGFR mutation, negative ALK/ROS 1 rearrangement. PDL1 95%.  · Patient was intubated with possible pneumonia, significant mucus plugging with underlying significant COPD.  Extubated on 5/28/2019.   · Staging evaluation performed during hospitalization with negative CT abdomen/pelvis, negative bone scan.  · MRI brain 5/29/2019 with evidence of metastatic disease, 3-4 enhancing lesions involving left occipital lobe 5 mm, left posterior parietal lobe 4 mm, left frontal lobe 4 mm, left parietal 3-4 mm with small amounts of surrounding edema.  Given the minimal extent of disease elected to observe with short interval MRI.  · Discussion regarding systemic therapy with single agent Keytruda due to PDL1 95%  · Subsequent disease progression on CT 6/8/2019 with increasing left upper lobe mass, mediastinal and hilar lymphadenopathy with occlusion of left mainstem bronchus at origin.  · Received palliative radiation therapy to left mainstem bronchus x10 fractions, completed 6/25/2019.  · Initiated systemic therapy with Keytruda 6/28/2019.  2. COPD/chronic hypoxemic respiratory failure:  · Patient  required intubation in May 2019 due to severe mucous plugging following bronchoscopy.  · Patient extubated 5/28/2019.  · Subsequent occlusion of left mainstem bronchus from malignancy 6/8/2019.  Received palliative radiation completed 6/25/2019.  · She continues to improve from a pulmonary standpoint.  She does have oxygen at home however has not required this.   3. Prior hemorrhagic CVA:  · Occurred in 2008, residual left upper extremity weakness and left lower extremity weakness.   4. Anemia:  · Hemoglobin prior to admission was 13.1 on 5/16/2019  · Hemoglobin has declined in the 10-11 range, related to malignancy, pneumonia      HISTORY OF PRESENT ILLNESS:  The patient is a 74 y.o. year old female who is here for follow-up with the above-mentioned history.    History of Present Illness the patient returns today in follow-up continuing on palliative single agent immunotherapy with PRAMOD gillette for cycle 2 today.  She is tolerating this reasonably well with no significant side effects.  She has improved overall from a respiratory standpoint since completing her radiation therapy to the left mainstem bronchus.  She does continue with significant fatigue and does spend quite a bit of her time in bed, ECOG performance status of 3.  She is limited physically due to her previous CVA however.  The patient does note continued significant depression regarding her own situation as well as her son who is an alcoholic and has been through rehab recently.  He is living at home with them.  Fortunately currently he is doing well.  She has been continuing on Zoloft at an unknown dose, initiated by her primary care physician.  She is not sure whether this has been helping.  She does continue using nebulizer treatments at home and does have Tussionex to use as needed however her cough has improved.  She also reports a significant decline in her appetite.  She was previously on Megace however her primary care physician discontinued  this due to concern regarding thrombotic risk.     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   • Hyperlipidemia    • Left hemiplegia (CMS/HCC) 12/10/2015   • Myocardial infarct (CMS/HCC) 1996   • Osteopenia    • Stroke syndrome 2008   • Thyroid cyst     right   • Thyroid lump 12/10/2015    Description: Biopsy 05/15 at [facility], benign   • Transient cerebral ischemia    • Urge incontinence of urine      Past Surgical History:   Procedure Laterality Date   • BREAST BIOPSY     • BRONCHOSCOPY Bilateral 5/20/2019    Procedure: BRONCHOSCOPY WITH  BIOPSY AND BAL, WITH ENDOBRONCHIAL ULTRASOUND WITH FNA;  Surgeon: Chris Tirado MD;  Location: [facility] ENDOSCOPY;  Service: Pulmonary   • COLONOSCOPY      REC 07/2007, REC 02/2009, REC 12/2011, REC 01/14,  She says she cant do the prep because of poor mobility to get to .   • EYE SURGERY  1950   • OTHER SURGICAL HISTORY      Ventricular [place] for drainage of subdural hematoma   • TOTAL THYROIDECTOMY  08/2015    Dr. Ahmadi         ONCOLOGIC HISTORY:  (History from previous dates can be found in the separate document.)    Current Outpatient Medications on File Prior to Visit   Medication Sig Dispense Refill   • aspirin 81 MG chewable tablet Chew 81 mg Daily.     • atorvastatin (LIPITOR) 80 MG tablet Take 1 tablet by mouth Daily. Needs an appointment for further refills (Patient taking differently: Take 80 mg by mouth Every Night. Needs an appointment for further refills) 90 tablet 0   • Divalproex Sodium (DEPAKOTE SPRINKLE) 125 MG capsule Take 2  capsules by mouth Every 12 (Twelve) Hours. 60 capsule 3   • guaiFENesin (ROBITUSSIN) 100 MG/5ML solution oral solution Take 20 mL by mouth Every 4 (Four) Hours. While awake 3600 mL 3   • ipratropium-albuterol (DUO-NEB) 0.5-2.5 mg/3 ml nebulizer Take 3 mL by nebulization 4 (Four) Times a Day. And every 4 hours as needed for chest congestion 480 mL 11   • levothyroxine (SYNTHROID) 100 MCG tablet Take 1 tablet by mouth Daily. 90 tablet 3   • levothyroxine (SYNTHROID) 100 MCG tablet Take 1 tablet by mouth Daily. 14 tablet 0   • LORazepam (ATIVAN PO) Take 0.25 mg by mouth.     • metoprolol tartrate (LOPRESSOR) 50 MG tablet Take 1 tablet by mouth 2 (Two) Times a Day. 180 tablet 0   • Sertraline HCl (ZOLOFT PO) Take  by mouth.     • sodium chloride 7 % nebulizer solution nebulizer solution Take 4 mL by nebulization 2 (Two) Times a Day. And may use additional 2 times as needed chest congestion 480 mL 6   • SSD 1 % cream      • tolterodine (DETROL) 1 MG tablet        No current facility-administered medications on file prior to visit.        ALLERGIES:     Allergies   Allergen Reactions   • Ace Inhibitors      Other reaction(s): Cough       Social History     Socioeconomic History   • Marital status:      Spouse name: Miah   • Number of children: Not on file   • Years of education: College   • Highest education level: Not on file   Occupational History     Employer: RETIRED   Tobacco Use   • Smoking status: Former Smoker     Types: Cigarettes     Last attempt to quit: 2008     Years since quittin.5   • Smokeless tobacco: Never Used   • Tobacco comment: caffeine use-soda   Substance and Sexual Activity   • Alcohol use: Yes     Comment: rarely   • Drug use: No   • Sexual activity: Defer     Family History   Problem Relation Age of Onset   • Heart attack Mother         Acute   • Heart disease Mother    • Colon polyps Sister         Sigmoid colon   • Coronary artery disease Brother    • Diabetes Brother    • Heart  disease Brother    • Alcohol abuse Son               Review of Systems   Constitutional: Positive for appetite change and fatigue. Negative for activity change, fever and unexpected weight change.   HENT: Positive for congestion. Negative for mouth sores, nosebleeds, sore throat and voice change.    Respiratory: Positive for cough, shortness of breath and wheezing.    Cardiovascular: Negative for chest pain, palpitations and leg swelling.   Gastrointestinal: Negative for abdominal distention, abdominal pain, blood in stool, constipation, diarrhea, nausea and vomiting.   Endocrine: Negative for cold intolerance and heat intolerance.   Genitourinary: Negative for difficulty urinating, dysuria, frequency and hematuria.   Musculoskeletal: Negative for arthralgias, back pain, joint swelling and myalgias.   Skin: Negative for rash.   Neurological: Negative for dizziness, syncope, weakness, light-headedness, numbness and headaches.   Hematological: Negative for adenopathy. Does not bruise/bleed easily.   Psychiatric/Behavioral: Positive for dysphoric mood and sleep disturbance. Negative for confusion. The patient is nervous/anxious.          Objective      Vitals:    07/19/19 0923   BP: 106/71   Pulse: 97   Resp: 14   Temp: 98 °F (36.7 °C)   SpO2: 94%      Current Status 7/19/2019   ECOG score 0   Pain 0/10    Physical Exam   Constitutional: She is oriented to person, place, and time.   Elderly female no distress seated in a wheelchair   HENT:   Mouth/Throat: Oropharynx is clear and moist.   Eyes: Conjunctivae are normal.   Neck: No thyromegaly present.   Cardiovascular: Normal rate and regular rhythm. Exam reveals no gallop and no friction rub.   No murmur heard.  Pulmonary/Chest: No respiratory distress. She has wheezes.   Decreased breath sounds on the left.  Scattered rhonchi and wheezing on the right   Abdominal: Soft. Bowel sounds are normal. She exhibits no distension. There is no tenderness.   Musculoskeletal: She  exhibits no edema.   Lymphadenopathy:        Head (right side): No submandibular adenopathy present.     She has no cervical adenopathy.     She has no axillary adenopathy.        Right: No inguinal and no supraclavicular adenopathy present.        Left: No inguinal and no supraclavicular adenopathy present.   Neurological: She is alert and oriented to person, place, and time. No cranial nerve deficit.   Left hemiparesis from prior CVA   Skin: Skin is warm and dry. No rash noted.   Psychiatric: She has a normal mood and affect. Her behavior is normal.       RECENT LABS:  Hematology WBC   Date Value Ref Range Status   07/19/2019 5.65 3.40 - 10.80 10*3/mm3 Final     RBC   Date Value Ref Range Status   07/19/2019 3.93 3.77 - 5.28 10*6/mm3 Final     Hemoglobin   Date Value Ref Range Status   07/19/2019 11.4 (L) 12.0 - 15.9 g/dL Final     Hematocrit   Date Value Ref Range Status   07/19/2019 35.8 34.0 - 46.6 % Final     Platelets   Date Value Ref Range Status   07/19/2019 260 140 - 450 10*3/mm3 Final        Lab Results   Component Value Date    GLUCOSE 98 07/19/2019    BUN 5 (L) 07/19/2019    CREATININE 0.74 07/19/2019    EGFRIFNONA 77 07/19/2019    EGFRIFAFRI 74 08/11/2016    BCR 6.8 (L) 07/19/2019    K 3.4 (L) 07/19/2019    CO2 22.2 07/19/2019    CALCIUM 8.5 07/19/2019    PROTENTOTREF 7.4 08/11/2016    ALBUMIN 2.90 (L) 07/19/2019    LABIL2 1.2 08/11/2016    AST 23 07/19/2019    ALT 8 07/19/2019     Labs 6/28/2019: TSH 23.8, free T4 1.43      Assessment/Plan     1. Metastatic non-small cell lung cancer (adenocarcinoma):  · Patient with long-standing smoking history x40 years quit in 2008  · Underlying significant COPD with FEV1 1.4 (58%) on 11/20/2014  · CT 5/16/2019 with left upper lobe mass, partially cavitary measuring 2.3 x 1.5 cm.  Additional 8 mm similar appearing right upper lobe nodule.  Bibasilar consolidation, small bilateral pleural effusions, mediastinal lymphadenopathy up to 2.7 centimeters.  · Bronchoscopy  5/20/2019 with identification of left mainstem malignancy extending to the level of the jose, biopsy showed poorly differentiated adenocarcinoma of pulmonary origin. EBUS with FNA station 7 lymph node negative for malignant cells, only scattered lymphoid aggregates.  BAL left upper lobe negative for malignant cells.  Negative EGFR mutation, negative ALK/ROS 1 rearrangement. PDL1 95%.  · Patient was intubated with possible pneumonia, significant mucus plugging with underlying significant COPD.  Extubated on 5/28/2019.   · Staging evaluation performed during hospitalization with negative CT abdomen/pelvis, negative bone scan.  · MRI brain 5/29/2019 with evidence of metastatic disease, 3-4 enhancing lesions involving left occipital lobe 5 mm, left posterior parietal lobe 4 mm, left frontal lobe 4 mm, left parietal 3-4 mm with small amounts of surrounding edema.  Given the minimal extent of disease elected to observe with short interval MRI.  · Discussion regarding systemic therapy with single agent Keytruda due to PDL1 95%  · Subsequent disease progression on CT 6/8/2019 with increasing left upper lobe mass, mediastinal and hilar lymphadenopathy with occlusion of left mainstem bronchus at origin.  · Received palliative radiation therapy to left mainstem bronchus x10 fractions, completed 6/25/2019.  · Initiated systemic therapy with Keytruda 6/28/2019.  · The patient returns today due for cycle 2 Keytruda.  She is tolerating this well with no significant side effects area and it appears that she has responded to previous palliative radiation to the left mainstem bronchus with improvement in her respiratory status overall.  She does still have some diminished breath sounds on the left side.  We will plan to proceed on with treatment today.  We discussed obtaining follow-up scans at the end of the cycle in 2 weeks with repeat CT chest abdomen pelvis and also repeat MRI of the brain to reassess the status of her brain  metastases which we are currently monitoring.  I will see her back in 3 weeks when she returns for cycle 3 Keytruda and we will review her scans.  2. COPD/chronic hypoxemic respiratory failure:  · Patient required intubation in May 2019 due to severe mucous plugging following bronchoscopy.  · Patient extubated 5/28/2019.  · Subsequent occlusion of left mainstem bronchus from malignancy 6/8/2019.  Received palliative radiation completed 6/25/2019.  · She continues to improve from a pulmonary standpoint.  She does have oxygen at home however has not required this.  She also continues to use nebulizer frequently.  On exam today, minimal rhonchi and wheezing on the right side.  3. Prior hemorrhagic CVA:  · Occurred in 2008, residual left upper extremity weakness and left lower extremity weakness.   4. Anemia:  · Hemoglobin prior to admission was 13.1 on 5/16/2019  · Hemoglobin has declined in the 10-11 range, related to malignancy, pneumonia  · Hemoglobin today 11.4  5. Abnormal thyroid function studies:  · Labs and initiation of Keytruda on 6/28/2019 with TSH 23.8, free T4 1.43.  May represent sick euthyroid state.  We will continue to monitor with repeat thyroid function studies in 3 weeks.  6. Depression and nausea/anorexia:  · Patient with depression related to her underlying medical illness as well as social situation with her son who is an alcoholic and lives in the home  · Patient with significant depressive symptoms, currently continuing on Zoloft under the direction of her primary care physician, dose unknown  · Patient also with ongoing anorexia and some nausea.  · Recommended that we also begin Zyprexa 2.5 mg nightly and refer the patient to the supportive oncology clinic for evaluation and additional recommendations for treatment.  Patient agrees.    Plan:  1. Proceed with cycle 2 Keytruda today 200 mg IV day 1 on a 21-day cycle  2. Prescription sent for Zyprexa 2.5 mg nightly  3. Referral to supportive  oncology clinic  4. In 2 weeks CT chest abdomen pelvis and MRI brain  5. MD visit in 3 weeks with CBC, CMP, TSH, free T4.  We will review scans and consider continuation of care treated with cycle 3.

## 2019-07-29 ENCOUNTER — OFFICE VISIT (OUTPATIENT)
Dept: PSYCHIATRY | Facility: HOSPITAL | Age: 74
End: 2019-07-29

## 2019-07-29 DIAGNOSIS — F33.0 MILD EPISODE OF RECURRENT MAJOR DEPRESSIVE DISORDER (HCC): Primary | ICD-10-CM

## 2019-07-29 DIAGNOSIS — C34.92 ADENOCARCINOMA, LUNG, LEFT (HCC): ICD-10-CM

## 2019-07-29 PROCEDURE — 90792 PSYCH DIAG EVAL W/MED SRVCS: CPT | Performed by: NURSE PRACTITIONER

## 2019-07-29 PROCEDURE — G0463 HOSPITAL OUTPT CLINIC VISIT: HCPCS | Performed by: NURSE PRACTITIONER

## 2019-07-29 NOTE — PROGRESS NOTES
Chief Complaint: Fatigue, depression, reduced appetite    Subjective  Patient ID: Anaid Moura is a 74 y.o. female who presents to the [facility] for initial consultation at the request of Solis Godfrey Jr., MD. Patient with newly dx lung cancer, PDL-1 95%. Metastatic to brain. Recent intubation, concurrent with PNU and COPD exacerbation. Completion of palliative rad and initiation of Keytruda.    Patient is referred to [facility] for symptoms of anxiety and depression.  Patient describes these as being new alongside cancer diagnosis.  Denies history of psychiatric care or medication management.  Does report recent initiate initiation of Zoloft as well as Depakote per PCP for symptoms of mood management.  Has recently been initiated on Zyprexa 2.5 mg nightly as well for assistance with appetite.  Denies discussion of seizure control per Depakote.  Is currently working to wean this dose with PCP, although reports interest in keeping care with Dr. Godfrey/[facility] oncology team where possible.  Patient describes recent improvement in symptoms of anxiety.  States she decided she was tired of being sorry for herself and is choosing to work on herself instead.  Finds the last week to 2 weeks has been significantly improved.  With further discussion, this has correlated with addition of Zyprexa.  Patient does question continued use of Ativan.  States she uses 0.25 mg approximately 1-2 times weekly.  Per daughter, cognition is somewhat disrupted in time.  Following dose.    Patient was significant health history, specifically including debilitating CVA in 2001 with general loss of movement in left side.  Despite this, patient has been fully independent, even driving and weeks prior to cancer diagnosis.  Recognizes significant debilitation associated with weight loss of 20 pounds prior to diagnosis, mood disruption, and general health decline.  Has recently initiated  physical therapy and has goals of resuming baseline ADLs.  Patient describes general awareness of incurable nature of disease with need to eventually make decisions regarding treatment/advanced care planning sort of conversations.  Denies having active living will.  This conversation was evaluated at length.    Patient describes sleep to be appropriate.  Easy to initiate, continuous, and restorative.  Describes herself as a night out well.  Generally goes to sleep approximately midnight, sleeps for 9-10 hours.  Additionally naps for approximately 2 hours in the afternoon.  Denies this impacting quality of life or ability to participate in life as desired.  Continues to find anxiety be higher than desired, although mood and anxiety both are significantly improved over past weeks. Has goals of getting back to [employer] on Sundays, and even has power wheelchair to get there on her own as she lives very close.    Social History  3 of 4 siblings; two are   Marital Status:  to  of 48 years.   Children: Yes. 2 daughters and 1 son. 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 Misses it.  Tobacco Use: Significant hx; quit in   Alcohol Use: occasional/rare use  Marijuana/ Other drug Use: denied.    Medical History  Hx CVA  COPD  Depakote 125 mg q 12 hours  Ativan, Zoloft    Recognize hx of death, divorce, and other disruptive   Has reduced dose of depakote to 125 mg bid; working to further reduce  Occasionally utilizes ativan for anxiety reduction; will take 0.25    Family History  Son with anxiety, depression, and substance use disorder    PHQ9 Total Score: 7  GRACY 7 Total Score: 12    The following portions of the patient's history were reviewed and updated as appropriate: She   has a past medical history of Benign essential hypertension, CAD (coronary artery disease), Cancer (CMS/[facility]) (05/2019), Carotid artery stenosis, Cerebellar artery occlusion (06/2008), CKD (chronic kidney disease), stage III (CMS/HCC), COPD (chronic obstructive pulmonary disease) (CMS/HCC), Gastroesophageal reflux disease (12/10/2015), 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  She  has a past surgical history that includes Breast biopsy; Eye surgery (1951); Total thyroidectomy (08/2015); Colonoscopy; Other surgical history; and Bronchoscopy (Bilateral, 5/20/2019).  Her family history includes Alcohol abuse in her son; Colon polyps in her sister; Coronary artery disease in her brother; Diabetes in her brother; Heart attack in her mother; Heart disease in her brother and mother.  She  reports that she quit smoking about 11 years ago. Her smoking use included cigarettes. She has never used smokeless tobacco. She reports that she drinks alcohol. She reports that she does not use drugs.  Current Outpatient Medications   Medication Sig Dispense Refill   • aspirin 81 MG chewable tablet Chew 81 mg Daily.     • atorvastatin (LIPITOR) 80 MG tablet Take 1 tablet by mouth Daily. Needs an appointment for further refills (Patient taking differently: Take 80 mg by mouth Every Night. Needs an appointment for further refills) 90 tablet 0   • Divalproex Sodium (DEPAKOTE SPRINKLE) 125 MG capsule Take 2 capsules by mouth Every 12 (Twelve) Hours. 60 capsule 3   • guaiFENesin (ROBITUSSIN) 100 MG/5ML solution oral solution Take 20 mL by mouth Every 4 (Four) Hours. While awake 3600 mL 3   • ipratropium-albuterol (DUO-NEB) 0.5-2.5 mg/3 ml nebulizer Take 3 mL by nebulization 4 (Four) Times a Day. And  every 4 hours as needed for chest congestion 480 mL 11   • levothyroxine (SYNTHROID) 100 MCG tablet Take 1 tablet by mouth Daily. 90 tablet 3   • levothyroxine (SYNTHROID) 100 MCG tablet Take 1 tablet by mouth Daily. 14 tablet 0   • LORazepam (ATIVAN PO) Take 0.25 mg by mouth.     • metoprolol tartrate (LOPRESSOR) 50 MG tablet Take 1 tablet by mouth 2 (Two) Times a Day. 180 tablet 0   • OLANZapine (zyPREXA) 2.5 MG tablet Take 1 tablet by mouth Every Night. 30 tablet 2   • Sertraline HCl (ZOLOFT PO) Take  by mouth.     • sodium chloride 7 % nebulizer solution nebulizer solution Take 4 mL by nebulization 2 (Two) Times a Day. And may use additional 2 times as needed chest congestion 480 mL 6   • SSD 1 % cream      • tolterodine (DETROL) 1 MG tablet        No current facility-administered medications for this visit.      Current Outpatient Medications on File Prior to Visit   Medication Sig   • aspirin 81 MG chewable tablet Chew 81 mg Daily.   • atorvastatin (LIPITOR) 80 MG tablet Take 1 tablet by mouth Daily. Needs an appointment for further refills (Patient taking differently: Take 80 mg by mouth Every Night. Needs an appointment for further refills)   • Divalproex Sodium (DEPAKOTE SPRINKLE) 125 MG capsule Take 2 capsules by mouth Every 12 (Twelve) Hours.   • guaiFENesin (ROBITUSSIN) 100 MG/5ML solution oral solution Take 20 mL by mouth Every 4 (Four) Hours. While awake   • ipratropium-albuterol (DUO-NEB) 0.5-2.5 mg/3 ml nebulizer Take 3 mL by nebulization 4 (Four) Times a Day. And every 4 hours as needed for chest congestion   • levothyroxine (SYNTHROID) 100 MCG tablet Take 1 tablet by mouth Daily.   • levothyroxine (SYNTHROID) 100 MCG tablet Take 1 tablet by mouth Daily.   • LORazepam (ATIVAN PO) Take 0.25 mg by mouth.   • metoprolol tartrate (LOPRESSOR) 50 MG tablet Take 1 tablet by mouth 2 (Two) Times a Day.   • OLANZapine (zyPREXA) 2.5 MG tablet Take 1 tablet by mouth Every Night.   • Sertraline HCl (ZOLOFT PO)  Take  by mouth.   • sodium chloride 7 % nebulizer solution nebulizer solution Take 4 mL by nebulization 2 (Two) Times a Day. And may use additional 2 times as needed chest congestion   • SSD 1 % cream    • tolterodine (DETROL) 1 MG tablet      No current facility-administered medications on file prior to visit.      She is allergic to ace inhibitors..    Review of Systems    Objective   Mental Status Exam  Appearance:  clean and casually dressed, appropriate  Attitude toward clinician:  cooperative and agreeable   Speech:    Rate:  regular rate and rhythm   Volume:  soft   Motor:  no abnormal movements present  Mood:  Working toward acceptance, goal oriented  Affect: somewhat blunted, although euthymic  Thought Processes:  linear, logical, and goal directed  Thought Content:  normal  Suicidal Thoughts:  absent  Homicidal Thoughts:  absent  Perceptual Disturbance: no perceptual disturbance  Attention and Concentration:  good  Insight and Judgement:  good  Memory:  memory appears to be intact; Formal MMSE not conducted, although cognition appears higher than average.    Physical Exam  Patient presents to clinic in wheelchair.  Noted hemiparesis on the left side, residual from CVA.    Lab Review:   Infusion on 07/19/2019   Component Date Value   • Glucose 07/19/2019 98    • BUN 07/19/2019 5*   • Creatinine 07/19/2019 0.74    • Sodium 07/19/2019 132*   • Potassium 07/19/2019 3.4*   • Chloride 07/19/2019 96*   • CO2 07/19/2019 22.2    • Calcium 07/19/2019 8.5    • Total Protein 07/19/2019 6.8    • Albumin 07/19/2019 2.90*   • ALT (SGPT) 07/19/2019 8    • AST (SGOT) 07/19/2019 23    • Alkaline Phosphatase 07/19/2019 103    • Total Bilirubin 07/19/2019 0.5    • eGFR Non African Amer 07/19/2019 77    • Globulin 07/19/2019 3.9*   • A/G Ratio 07/19/2019 0.7*   • BUN/Creatinine Ratio 07/19/2019 6.8*   • Anion Gap 07/19/2019 13.8    • WBC 07/19/2019 5.65    • RBC 07/19/2019 3.93    • Hemoglobin 07/19/2019 11.4*   • Hematocrit  07/19/2019 35.8    • MCV 07/19/2019 91.1    • MCH 07/19/2019 29.0    • MCHC 07/19/2019 31.8    • RDW 07/19/2019 19.4*   • RDW-SD 07/19/2019 63.8*   • MPV 07/19/2019 8.1    • Platelets 07/19/2019 260    • Neutrophil % 07/19/2019 66.6    • Lymphocyte % 07/19/2019 21.2    • Monocyte % 07/19/2019 8.1    • Eosinophil % 07/19/2019 2.3    • Basophil % 07/19/2019 0.7    • Immature Grans % 07/19/2019 1.1*   • Neutrophils, Absolute 07/19/2019 3.76    • Lymphocytes, Absolute 07/19/2019 1.20    • Monocytes, Absolute 07/19/2019 0.46    • Eosinophils, Absolute 07/19/2019 0.13    • Basophils, Absolute 07/19/2019 0.04    • Immature Grans, Absolute 07/19/2019 0.06*   • nRBC 07/19/2019 0.0    Infusion on 06/28/2019   Component Date Value   • Free T4 06/28/2019 1.43    • TSH 06/28/2019 23.800*   • Glucose 06/28/2019 94    • BUN 06/28/2019 8    • Creatinine 06/28/2019 0.64    • Sodium 06/28/2019 128*   • Potassium 06/28/2019 4.2    • Chloride 06/28/2019 93*   • CO2 06/28/2019 22.2    • Calcium 06/28/2019 8.4*   • Total Protein 06/28/2019 6.4    • Albumin 06/28/2019 2.90*   • ALT (SGPT) 06/28/2019 13    • AST (SGOT) 06/28/2019 21    • Alkaline Phosphatase 06/28/2019 74    • Total Bilirubin 06/28/2019 0.4    • eGFR Non African Amer 06/28/2019 91    • Globulin 06/28/2019 3.5    • A/G Ratio 06/28/2019 0.8*   • BUN/Creatinine Ratio 06/28/2019 12.5    • Anion Gap 06/28/2019 12.8    • WBC 06/28/2019 6.89    • RBC 06/28/2019 4.20    • Hemoglobin 06/28/2019 11.8*   • Hematocrit 06/28/2019 34.6    • MCV 06/28/2019 82.4    • MCH 06/28/2019 28.1    • MCHC 06/28/2019 34.1    • RDW 06/28/2019 15.5*   • RDW-SD 06/28/2019 46.3    • MPV 06/28/2019 8.5    • Platelets 06/28/2019 138*   • Neutrophil % 06/28/2019 72.5    • Lymphocyte % 06/28/2019 12.0*   • Monocyte % 06/28/2019 9.7    • Eosinophil % 06/28/2019 1.7    • Basophil % 06/28/2019 0.3    • Immature Grans % 06/28/2019 3.8*   • Neutrophils, Absolute 06/28/2019 4.99    • Lymphocytes, Absolute  06/28/2019 0.83    • Monocytes, Absolute 06/28/2019 0.67    • Eosinophils, Absolute 06/28/2019 0.12    • Basophils, Absolute 06/28/2019 0.02    • Immature Grans, Absolute 06/28/2019 0.26*   • nRBC 06/28/2019 0.0    Admission on 06/06/2019, Discharged on 06/26/2019   No results displayed because visit has over 200 results.      Admission on 05/27/2019, Discharged on 06/04/2019   No results displayed because visit has over 200 results.        Diagnoses and all orders for this visit:    Mild episode of recurrent major depressive disorder (CMS/HCC)    Adenocarcinoma, lung, left (CMS/HCC)        Plan of Care  Patient presents to clinic with significant improvement, per report, in symptoms of anxiety and depression on current regimen of Zoloft, Depakote, and with newly added Zyprexa.  Evaluated patient goals of care being conducted by cancer care team here at [facility]; explored with patient and daughter limitations of clinic, specifically to include my upcoming medical leave.  Medications, until this point, have been managed per patient primary care provider Dr. Taylor, PCP, whom patient will continue to see.  Explored goals of continuing Zyprexa and maximizing Zoloft, while continuing to wean and discontinue Depakote.  Would first like to discuss with Dr. Godfery/Dr. Taylor to make sure Depakote is only being used for mood symptoms.  Chart reviewed, and this appears to be the case.  Labs reviewed; also will discuss interpretation of Zoloft impact on hyponatremia.  We will continue to monitor electrolyte balance, specifically alongside improved intake.  May consider increasing Zoloft in the future if possible.  Significant discussion with patient and daughter regarding cognitive impact of benzodiazepines; encouraged patient not to utilize this agent unless absolutely necessary and assess potential decreased need with improved symptoms of anxiety.  Thyroid panel being reviewed by med onc.  Follow-up scheduled in clinic in 4  weeks.  At this time, will need to make decision for continued management of symptoms per PCP/medical oncology/or with group management in clinic.    Advance Care Planning   Advance Care Planning Discussion: Explored with patient goals of care and current lack of living well.  Patient does explore not wanting family or  to have to make these decisions for her, and expresses some interest in support services available.  I have explored with patient the concept of a healthcare surrogate, as well as advanced care planning classes available monthly in clinic.  Have provided her with brochure from [facility] regarding the right to make informed decisions.  Both she and daughter recognize I am available to assist with this if needed.

## 2019-08-06 ENCOUNTER — HOSPITAL ENCOUNTER (OUTPATIENT)
Dept: MRI IMAGING | Facility: HOSPITAL | Age: 74
Discharge: HOME | End: 2019-08-06
Admitting: INTERNAL MEDICINE

## 2019-08-06 ENCOUNTER — HOSPITAL ENCOUNTER (OUTPATIENT)
Dept: CT IMAGING | Facility: HOSPITAL | Age: 74
Discharge: HOME | End: 2019-08-06

## 2019-08-06 DIAGNOSIS — C34.92 ADENOCARCINOMA, LUNG, LEFT (HCC): ICD-10-CM

## 2019-08-06 DIAGNOSIS — Z79.899 HIGH RISK MEDICATION USE: ICD-10-CM

## 2019-08-06 LAB — CREAT BLDA-MCNC: 0.8 MG/DL (ref 0.6–1.3)

## 2019-08-06 PROCEDURE — A9577 INJ MULTIHANCE: HCPCS | Performed by: INTERNAL MEDICINE

## 2019-08-06 PROCEDURE — 71260 CT THORAX DX C+: CPT

## 2019-08-06 PROCEDURE — 74177 CT ABD & PELVIS W/CONTRAST: CPT

## 2019-08-06 PROCEDURE — 25010000002 IOPAMIDOL 61 % SOLUTION: Performed by: INTERNAL MEDICINE

## 2019-08-06 PROCEDURE — 82565 ASSAY OF CREATININE: CPT

## 2019-08-06 PROCEDURE — 70553 MRI BRAIN STEM W/O & W/DYE: CPT

## 2019-08-06 PROCEDURE — 0 GADOBENATE DIMEGLUMINE 529 MG/ML SOLUTION: Performed by: INTERNAL MEDICINE

## 2019-08-06 RX ADMIN — GADOBENATE DIMEGLUMINE 10 ML: 529 INJECTION, SOLUTION INTRAVENOUS at 15:32

## 2019-08-06 RX ADMIN — IOPAMIDOL 85 ML: 612 INJECTION, SOLUTION INTRAVENOUS at 14:39

## 2019-08-09 ENCOUNTER — INFUSION (OUTPATIENT)
Dept: ONCOLOGY | Facility: HOSPITAL | Age: 74
End: 2019-08-09

## 2019-08-09 ENCOUNTER — OFFICE VISIT (OUTPATIENT)
Dept: ONCOLOGY | Facility: CLINIC | Age: 74
End: 2019-08-09

## 2019-08-09 VITALS
WEIGHT: 102.7 LBS | OXYGEN SATURATION: 90 % | BODY MASS INDEX: 17.53 KG/M2 | SYSTOLIC BLOOD PRESSURE: 96 MMHG | RESPIRATION RATE: 16 BRPM | TEMPERATURE: 97.5 F | DIASTOLIC BLOOD PRESSURE: 60 MMHG | HEART RATE: 111 BPM | HEIGHT: 64 IN

## 2019-08-09 DIAGNOSIS — E87.6 HYPOKALEMIA: ICD-10-CM

## 2019-08-09 DIAGNOSIS — Z79.899 HIGH RISK MEDICATION USE: ICD-10-CM

## 2019-08-09 DIAGNOSIS — Z79.899 HIGH RISK MEDICATION USE: Primary | ICD-10-CM

## 2019-08-09 DIAGNOSIS — E03.9 ACQUIRED HYPOTHYROIDISM: ICD-10-CM

## 2019-08-09 DIAGNOSIS — C34.92 ADENOCARCINOMA, LUNG, LEFT (HCC): Primary | ICD-10-CM

## 2019-08-09 DIAGNOSIS — C34.92 ADENOCARCINOMA, LUNG, LEFT (HCC): ICD-10-CM

## 2019-08-09 LAB
ALBUMIN SERPL-MCNC: 3.1 G/DL (ref 3.5–5.2)
ALBUMIN/GLOB SERPL: 0.8 G/DL (ref 1.1–2.4)
ALP SERPL-CCNC: 93 U/L (ref 38–116)
ALT SERPL W P-5'-P-CCNC: 6 U/L (ref 0–33)
ANION GAP SERPL CALCULATED.3IONS-SCNC: 15.7 MMOL/L (ref 5–15)
AST SERPL-CCNC: 19 U/L (ref 0–32)
BASOPHILS # BLD AUTO: 0.04 10*3/MM3 (ref 0–0.2)
BASOPHILS NFR BLD AUTO: 0.5 % (ref 0–1.5)
BILIRUB SERPL-MCNC: 0.4 MG/DL (ref 0.2–1.2)
BUN BLD-MCNC: 9 MG/DL (ref 6–20)
BUN/CREAT SERPL: 11.4 (ref 7.3–30)
CALCIUM SPEC-SCNC: 8.6 MG/DL (ref 8.5–10.2)
CHLORIDE SERPL-SCNC: 102 MMOL/L (ref 98–107)
CO2 SERPL-SCNC: 26.3 MMOL/L (ref 22–29)
CREAT BLD-MCNC: 0.79 MG/DL (ref 0.6–1.1)
DEPRECATED RDW RBC AUTO: 60.9 FL (ref 37–54)
EOSINOPHIL # BLD AUTO: 0.51 10*3/MM3 (ref 0–0.4)
EOSINOPHIL NFR BLD AUTO: 7 % (ref 0.3–6.2)
ERYTHROCYTE [DISTWIDTH] IN BLOOD BY AUTOMATED COUNT: 17.5 % (ref 12.3–15.4)
GFR SERPL CREATININE-BSD FRML MDRD: 71 ML/MIN/1.73
GLOBULIN UR ELPH-MCNC: 4 GM/DL (ref 1.8–3.5)
GLUCOSE BLD-MCNC: 103 MG/DL (ref 74–124)
HCT VFR BLD AUTO: 31.7 % (ref 34–46.6)
HGB BLD-MCNC: 9.7 G/DL (ref 12–15.9)
IMM GRANULOCYTES # BLD AUTO: 0.03 10*3/MM3 (ref 0–0.05)
IMM GRANULOCYTES NFR BLD AUTO: 0.4 % (ref 0–0.5)
LYMPHOCYTES # BLD AUTO: 0.59 10*3/MM3 (ref 0.7–3.1)
LYMPHOCYTES NFR BLD AUTO: 8 % (ref 19.6–45.3)
MAGNESIUM SERPL-MCNC: 1.7 MG/DL (ref 1.8–2.5)
MCH RBC QN AUTO: 28.8 PG (ref 26.6–33)
MCHC RBC AUTO-ENTMCNC: 30.6 G/DL (ref 31.5–35.7)
MCV RBC AUTO: 94.1 FL (ref 79–97)
MONOCYTES # BLD AUTO: 0.4 10*3/MM3 (ref 0.1–0.9)
MONOCYTES NFR BLD AUTO: 5.5 % (ref 5–12)
NEUTROPHILS # BLD AUTO: 5.76 10*3/MM3 (ref 1.7–7)
NEUTROPHILS NFR BLD AUTO: 78.6 % (ref 42.7–76)
NRBC BLD AUTO-RTO: 0 /100 WBC (ref 0–0.2)
PLATELET # BLD AUTO: 238 10*3/MM3 (ref 140–450)
PMV BLD AUTO: 8.3 FL (ref 6–12)
POTASSIUM BLD-SCNC: 2.5 MMOL/L (ref 3.5–4.7)
PROT SERPL-MCNC: 7.1 G/DL (ref 6.3–8)
RBC # BLD AUTO: 3.37 10*6/MM3 (ref 3.77–5.28)
SODIUM BLD-SCNC: 144 MMOL/L (ref 134–145)
T4 FREE SERPL-MCNC: 2.06 NG/DL (ref 0.93–1.7)
TSH SERPL DL<=0.05 MIU/L-ACNC: 0.07 MIU/ML (ref 0.27–4.2)
WBC NRBC COR # BLD: 7.33 10*3/MM3 (ref 3.4–10.8)

## 2019-08-09 PROCEDURE — 25010000002 PEMBROLIZUMAB 100 MG/4ML SOLUTION 4 ML VIAL: Performed by: INTERNAL MEDICINE

## 2019-08-09 PROCEDURE — 80053 COMPREHEN METABOLIC PANEL: CPT

## 2019-08-09 PROCEDURE — 96367 TX/PROPH/DG ADDL SEQ IV INF: CPT | Performed by: INTERNAL MEDICINE

## 2019-08-09 PROCEDURE — 25010000003 POTASSIUM CHLORIDE 10 MEQ/100ML SOLUTION: Performed by: INTERNAL MEDICINE

## 2019-08-09 PROCEDURE — 36415 COLL VENOUS BLD VENIPUNCTURE: CPT | Performed by: INTERNAL MEDICINE

## 2019-08-09 PROCEDURE — 96413 CHEMO IV INFUSION 1 HR: CPT | Performed by: INTERNAL MEDICINE

## 2019-08-09 PROCEDURE — 84443 ASSAY THYROID STIM HORMONE: CPT | Performed by: INTERNAL MEDICINE

## 2019-08-09 PROCEDURE — 83735 ASSAY OF MAGNESIUM: CPT | Performed by: INTERNAL MEDICINE

## 2019-08-09 PROCEDURE — 96366 THER/PROPH/DIAG IV INF ADDON: CPT | Performed by: INTERNAL MEDICINE

## 2019-08-09 PROCEDURE — 84439 ASSAY OF FREE THYROXINE: CPT | Performed by: INTERNAL MEDICINE

## 2019-08-09 PROCEDURE — 99215 OFFICE O/P EST HI 40 MIN: CPT | Performed by: INTERNAL MEDICINE

## 2019-08-09 PROCEDURE — 85025 COMPLETE CBC W/AUTO DIFF WBC: CPT

## 2019-08-09 RX ORDER — SODIUM CHLORIDE 9 MG/ML
250 INJECTION, SOLUTION INTRAVENOUS ONCE
Status: CANCELLED | OUTPATIENT
Start: 2019-08-09

## 2019-08-09 RX ORDER — SODIUM CHLORIDE 9 MG/ML
250 INJECTION, SOLUTION INTRAVENOUS ONCE
Status: COMPLETED | OUTPATIENT
Start: 2019-08-09 | End: 2019-08-09

## 2019-08-09 RX ORDER — POTASSIUM CHLORIDE 1500 MG/1
20 TABLET, FILM COATED, EXTENDED RELEASE ORAL DAILY
Qty: 30 TABLET | Refills: 2 | Status: SHIPPED | OUTPATIENT
Start: 2019-08-09 | End: 2019-01-01 | Stop reason: SDUPTHER

## 2019-08-09 RX ORDER — POTASSIUM CHLORIDE 7.45 MG/ML
20 INJECTION INTRAVENOUS ONCE
Status: COMPLETED | OUTPATIENT
Start: 2019-08-09 | End: 2019-08-09

## 2019-08-09 RX ADMIN — SODIUM CHLORIDE 200 MG: 9 INJECTION, SOLUTION INTRAVENOUS at 14:05

## 2019-08-09 RX ADMIN — SODIUM CHLORIDE 250 ML: 9 INJECTION, SOLUTION INTRAVENOUS at 13:31

## 2019-08-09 RX ADMIN — POTASSIUM CHLORIDE 20 MEQ: 7.46 INJECTION, SOLUTION INTRAVENOUS at 13:31

## 2019-08-09 NOTE — PROGRESS NOTES
Pt c/o burning at right hand IV site with IV KCL infusing.  Placed another IV in right forearm for KCL infusion.  Left right hand iv in place for keytruda.

## 2019-08-09 NOTE — PROGRESS NOTES
Subjective .     REASONS FOR FOLLOWUP:    1. Metastatic non-small cell lung cancer (adenocarcinoma):  · Patient with long-standing smoking history x40 years quit in 2008  · Underlying significant COPD with FEV1 1.4 (58%) on 11/20/2014  · CT 5/16/2019 with left upper lobe mass, partially cavitary measuring 2.3 x 1.5 cm.  Additional 8 mm similar appearing right upper lobe nodule.  Bibasilar consolidation, small bilateral pleural effusions, mediastinal lymphadenopathy up to 2.7 centimeters.  · Bronchoscopy 5/20/2019 with identification of left mainstem malignancy extending to the level of the jose, biopsy showed poorly differentiated adenocarcinoma of pulmonary origin. EBUS with FNA station 7 lymph node negative for malignant cells, only scattered lymphoid aggregates.  BAL left upper lobe negative for malignant cells.  Negative EGFR mutation, negative ALK/ROS 1 rearrangement. PDL1 95%.  · Patient was intubated with possible pneumonia, significant mucus plugging with underlying significant COPD.  Extubated on 5/28/2019.   · Staging evaluation performed during hospitalization with negative CT abdomen/pelvis, negative bone scan.  · MRI brain 5/29/2019 with evidence of metastatic disease, 3-4 enhancing lesions involving left occipital lobe 5 mm, left posterior parietal lobe 4 mm, left frontal lobe 4 mm, left parietal 3-4 mm with small amounts of surrounding edema.  Given the minimal extent of disease elected to observe with short interval MRI.  · Discussion regarding systemic therapy with single agent Keytruda due to PDL1 95%  · Subsequent disease progression on CT 6/8/2019 with increasing left upper lobe mass, mediastinal and hilar lymphadenopathy with occlusion of left mainstem bronchus at origin.  · Received palliative radiation therapy to left mainstem bronchus x10 fractions, completed 6/25/2019.  · Initiated systemic therapy with Keytruda 6/28/2019.  2. COPD/chronic hypoxemic respiratory failure:  · Patient  required intubation in May 2019 due to severe mucous plugging following bronchoscopy.  · Patient extubated 5/28/2019.  · Subsequent occlusion of left mainstem bronchus from malignancy 6/8/2019.  Received palliative radiation completed 6/25/2019.  · She continues to improve from a pulmonary standpoint.  She does have oxygen at home however has not required this.   3. Prior hemorrhagic CVA:  · Occurred in 2008, residual left upper extremity weakness and left lower extremity weakness.   4. Anemia:  · Hemoglobin prior to admission was 13.1 on 5/16/2019  · Hemoglobin declined in the 10-11 range, related to malignancy, pneumonia  5. Depression and nausea/anorexia:  · Significant improvement following addition of Zyprexa 2.5 mg nightly on 7/19/2019      HISTORY OF PRESENT ILLNESS:  The patient is a 74 y.o. year old female who is here for follow-up with the above-mentioned history.    History of Present Illness the patient returns today in follow-up with laboratory studies as well as CT scan chest abdomen pelvis and MRI of the brain to review, due for cycle 3 single agent palliative Keytruda.  In the interval since her last visit, the patient reports experiencing dramatic improvement in her depression and anorexia/nausea after initiating Zyprexa 2.5 mg nightly on 7/19/2019.  She notes normalization of her appetite and return to her previous baseline regards to her mood.  She is sleeping much better.  She also elected to discontinue Ativan.  She was seen in the supportive oncology clinic and there was discussion regarding tapering off of Depakote and gradually increasing her Zoloft dose.  This has not yet been pursued.  She notes that she has been continuing on physical therapy at home.  She has been much more active.  She is starting speech therapy as well in regards to her chronic hoarseness and vocal cord paralysis.     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   • Hyperlipidemia    • Left hemiplegia (CMS/HCC) 12/10/2015   • Myocardial infarct (CMS/HCC) 1996   • Osteopenia    • Stroke syndrome 2008   • Thyroid cyst     right   • Thyroid lump 12/10/2015    Description: Biopsy 05/15 at [facility], benign   • Transient cerebral ischemia    • Urge incontinence of urine      Past Surgical History:   Procedure Laterality Date   • BREAST BIOPSY     • BRONCHOSCOPY Bilateral 5/20/2019    Procedure: BRONCHOSCOPY WITH  BIOPSY AND BAL, WITH ENDOBRONCHIAL ULTRASOUND WITH FNA;  Surgeon: Chris Tirado MD;  Location: [facility] ENDOSCOPY;  Service: Pulmonary   • COLONOSCOPY      REC 07/2007, REC 02/2009, REC 12/2011, REC 01/14,  She says she cant do the prep because of poor mobility to get to .   • EYE SURGERY  1950   • OTHER SURGICAL HISTORY      Ventricular [place] holes for drainage of subdural hematoma   • TOTAL THYROIDECTOMY  08/2015    Dr. Ahmadi         ONCOLOGIC HISTORY:  (History from previous dates can be found in the separate document.)    Current Outpatient Medications on File Prior to Visit   Medication Sig Dispense Refill   • aspirin 81 MG chewable tablet Chew 81 mg Daily.     • atorvastatin (LIPITOR) 80 MG tablet Take 1 tablet by mouth Daily. Needs an appointment for further refills (Patient taking differently: Take 80 mg by mouth Every Night. Needs an appointment for further refills) 90 tablet 0   • Divalproex Sodium (DEPAKOTE SPRINKLE) 125 MG capsule Take 2 capsules by mouth Every 12 (Twelve) Hours. 60 capsule 3   • guaiFENesin (ROBITUSSIN) 100 MG/5ML solution oral solution Take 20 mL by mouth Every 4 (Four)  Hours. While awake 3600 mL 3   • ipratropium-albuterol (DUO-NEB) 0.5-2.5 mg/3 ml nebulizer Take 3 mL by nebulization 4 (Four) Times a Day. And every 4 hours as needed for chest congestion 480 mL 11   • levothyroxine (SYNTHROID) 100 MCG tablet Take 1 tablet by mouth Daily. 90 tablet 3   • LORazepam (ATIVAN PO) Take 0.25 mg by mouth As Needed.     • metoprolol tartrate (LOPRESSOR) 50 MG tablet Take 1 tablet by mouth 2 (Two) Times a Day. 180 tablet 0   • OLANZapine (zyPREXA) 2.5 MG tablet Take 1 tablet by mouth Every Night. 30 tablet 2   • Sertraline HCl (ZOLOFT PO) Take  by mouth.     • sodium chloride 7 % nebulizer solution nebulizer solution Take 4 mL by nebulization 2 (Two) Times a Day. And may use additional 2 times as needed chest congestion 480 mL 6   • SSD 1 % cream      • levothyroxine (SYNTHROID) 100 MCG tablet Take 1 tablet by mouth Daily. 14 tablet 0   • tolterodine (DETROL) 1 MG tablet        Current Facility-Administered Medications on File Prior to Visit   Medication Dose Route Frequency Provider Last Rate Last Dose   • [COMPLETED] Pembrolizumab (KEYTRUDA) 200 mg in sodium chloride 0.9 % 100 mL chemo IVPB  200 mg Intravenous Once Solis Godfrey Jr., MD   Stopped at 08/09/19 1437   • [COMPLETED] potassium chloride 10 mEq in 100 mL IVPB  20 mEq Intravenous Once Solis Godfrey Jr., MD   Stopped at 08/09/19 1545   • [COMPLETED] sodium chloride 0.9 % infusion 250 mL  250 mL Intravenous Once Solis Godfrey Jr., MD   Stopped at 08/09/19 1545       ALLERGIES:     Allergies   Allergen Reactions   • Ace Inhibitors      Other reaction(s): Cough       Social History     Socioeconomic History   • Marital status:      Spouse name: Miah   • Number of children: Not on file   • Years of education: College   • Highest education level: Not on file   Occupational History     Employer: RETIRED   Tobacco Use   • Smoking status: Former Smoker     Types: Cigarettes     Last attempt to quit: 2008     Years since quitting:  11.6   • Smokeless tobacco: Never Used   • Tobacco comment: caffeine use-soda   Substance and Sexual Activity   • Alcohol use: Yes     Comment: rarely   • Drug use: No   • Sexual activity: Defer     Family History   Problem Relation Age of Onset   • Heart attack Mother         Acute   • Heart disease Mother    • Colon polyps Sister         Sigmoid colon   • Coronary artery disease Brother    • Diabetes Brother    • Heart disease Brother    • Alcohol abuse Son               Review of Systems   Constitutional: Positive for appetite change and fatigue. Negative for activity change, fever and unexpected weight change.   HENT: Positive for voice change. Negative for congestion, mouth sores, nosebleeds and sore throat.    Respiratory: Positive for cough and shortness of breath. Negative for wheezing.    Cardiovascular: Negative for chest pain, palpitations and leg swelling.   Gastrointestinal: Negative for abdominal distention, abdominal pain, blood in stool, constipation, diarrhea, nausea and vomiting.   Endocrine: Negative for cold intolerance and heat intolerance.   Genitourinary: Negative for difficulty urinating, dysuria, frequency and hematuria.   Musculoskeletal: Negative for arthralgias, back pain, joint swelling and myalgias.   Skin: Negative for rash.   Neurological: Negative for dizziness, syncope, weakness, light-headedness, numbness and headaches.   Hematological: Negative for adenopathy. Does not bruise/bleed easily.   Psychiatric/Behavioral: Negative for confusion, dysphoric mood and sleep disturbance. The patient is not nervous/anxious.          Objective      Vitals:    08/09/19 1205   BP: 96/60   Pulse: 111   Resp: 16   Temp: 97.5 °F (36.4 °C)   SpO2: 90%      Current Status 8/9/2019   ECOG score 3   Pain 0/10    Physical Exam   Constitutional: She is oriented to person, place, and time.   Elderly female no distress seated in a wheelchair   HENT:   Mouth/Throat: Oropharynx is clear and moist.   Eyes:  Conjunctivae are normal.   Neck: No thyromegaly present.   Cardiovascular: Normal rate and regular rhythm. Exam reveals no gallop and no friction rub.   No murmur heard.  Pulmonary/Chest: No respiratory distress. She has no wheezes.   Decreased breath sounds on the left.    Abdominal: Soft. Bowel sounds are normal. She exhibits no distension. There is no tenderness.   Musculoskeletal: She exhibits no edema.   Lymphadenopathy:        Head (right side): No submandibular adenopathy present.     She has no cervical adenopathy.     She has no axillary adenopathy.        Right: No inguinal and no supraclavicular adenopathy present.        Left: No inguinal and no supraclavicular adenopathy present.   Neurological: She is alert and oriented to person, place, and time. No cranial nerve deficit.   Left hemiparesis from prior CVA   Skin: Skin is warm and dry. No rash noted.   Psychiatric: She has a normal mood and affect. Her behavior is normal.       RECENT LABS:  Hematology WBC   Date Value Ref Range Status   08/09/2019 7.33 3.40 - 10.80 10*3/mm3 Final     RBC   Date Value Ref Range Status   08/09/2019 3.37 (L) 3.77 - 5.28 10*6/mm3 Final     Hemoglobin   Date Value Ref Range Status   08/09/2019 9.7 (L) 12.0 - 15.9 g/dL Final     Hematocrit   Date Value Ref Range Status   08/09/2019 31.7 (L) 34.0 - 46.6 % Final     Platelets   Date Value Ref Range Status   08/09/2019 238 140 - 450 10*3/mm3 Final        Lab Results   Component Value Date    GLUCOSE 103 08/09/2019    BUN 9 08/09/2019    CREATININE 0.79 08/09/2019    EGFRIFNONA 71 08/09/2019    EGFRIFAFRI 74 08/11/2016    BCR 11.4 08/09/2019    K 2.5 (C) 08/09/2019    CO2 26.3 08/09/2019    CALCIUM 8.6 08/09/2019    PROTENTOTREF 7.4 08/11/2016    ALBUMIN 3.10 (L) 08/09/2019    LABIL2 1.2 08/11/2016    AST 19 08/09/2019    ALT 6 08/09/2019     TSH 0.065  Free T4 2.06  Magnesium 1.7    CT chest abdomen pelvis 8/6/2019: I did personally review CT images  today.  IMPRESSION:  Significant decrease in size of the patient's small primary  lung neoplasm in the left upper lobe. Preceding CTA of the chest dated  06/08/2019. Essentially no discrete residual lung mass persists.  Lymphadenopathy in the mediastinum and region of the AP window also  demonstrates significant decrease in size, and obstruction of the left  mainstem bronchus by the lymphadenopathy has resolved. Patchy  interstitial thickening and consolidation of both lung bases appears  improved. There is a tiny new posteriorly layering left pleural  effusion. Markedly severe emphysema. There is no evidence of metastatic  disease in the abdomen or pelvis. Atrophic left kidney. Small solitary  nonobstructing right renal calculus. Extensive vascular calcification    MRI brain 8/6/2019:  IMPRESSION:  1. No evidence of acute infarction or hydrocephalus. Evidence of a  previous right thalamic hemorrhage with volume loss. Encephalomalacia  involving the right frontal lobe laterally suggesting remote infarct.  2. There are 3 enhancing lesions involving the left cerebral hemisphere  as described the largest of which measures approximately 5 mm in size.  This includes the left frontal lobe anteriorly, left parietal lobe  posteriorly and superiorly and left occipital lobe posteriorly. The  findings are consistent with metastatic disease. The left occipital  lesion was present on 05/29/2019. Previously it appeared to be  ring-enhancing and is more nodular in configuration of the current  examination. The left parietal cortical lesion has decreased in size  slightly as compared to 05/29/2019 at which time it measured 4 mm in  size, now 2 mm. A left frontal lesion was present previously and was  ring-enhancing now nodular. No new metastatic lesions are identified.  Continued close surveillance is recommended.    Assessment/Plan     1. Metastatic non-small cell lung cancer (adenocarcinoma):  · Patient with long-standing smoking  history x40 years quit in 2008  · Underlying significant COPD with FEV1 1.4 (58%) on 11/20/2014  · CT 5/16/2019 with left upper lobe mass, partially cavitary measuring 2.3 x 1.5 cm.  Additional 8 mm similar appearing right upper lobe nodule.  Bibasilar consolidation, small bilateral pleural effusions, mediastinal lymphadenopathy up to 2.7 centimeters.  · Bronchoscopy 5/20/2019 with identification of left mainstem malignancy extending to the level of the jose, biopsy showed poorly differentiated adenocarcinoma of pulmonary origin. EBUS with FNA station 7 lymph node negative for malignant cells, only scattered lymphoid aggregates.  BAL left upper lobe negative for malignant cells.  Negative EGFR mutation, negative ALK/ROS 1 rearrangement. PDL1 95%.  · Patient was intubated with possible pneumonia, significant mucus plugging with underlying significant COPD.  Extubated on 5/28/2019.   · Staging evaluation performed during hospitalization with negative CT abdomen/pelvis, negative bone scan.  · MRI brain 5/29/2019 with evidence of metastatic disease, 3-4 enhancing lesions involving left occipital lobe 5 mm, left posterior parietal lobe 4 mm, left frontal lobe 4 mm, left parietal 3-4 mm with small amounts of surrounding edema.  Given the minimal extent of disease elected to observe with short interval MRI.  · Discussion regarding systemic therapy with single agent Keytruda due to PDL1 95%  · Subsequent disease progression on CT 6/8/2019 with increasing left upper lobe mass, mediastinal and hilar lymphadenopathy with occlusion of left mainstem bronchus at origin.  · Received palliative radiation therapy to left mainstem bronchus x10 fractions, completed 6/25/2019.  · Initiated systemic therapy with Keytruda 6/28/2019.  · The patient returns today in follow-up due for cycle 3 Keytruda.  We did review her scans following 2 cycles Keytruda and prior radiation.  CT chest abdomen pelvis 8/6/2019 showed decrease in the left  upper lobe primary lesion with essentially no residual discrete lung mass in that area.  Lymphadenopathy in the mediastinum/AP window and left hilum with significant improvement and resolution of left mainstem obstruction.  No new evidence of disease.  Contacted radiation oncology and the patient did receive radiation therapy to both the primary left upper lobe mass and left hilum/mediastinum accounting for most of the improvement.  MRI brain 8/6/2019 with 3 enhancing metastatic lesions, 2 of which had a more nodular appearance as opposed to previous ring-enhancing appearance with stable size.  These were in the left occipital and left frontal regions.  The third lesion in the left parietal region decreased in size from 4 mm down to 2 mm.  There were no new lesions identified.  She has not undergone any specific treatment for her brain metastases.  We will therefore continue on immunotherapy and monitor her CNS disease.  She is tolerating immunotherapy well with no significant side effects at this point.  We will proceed on with cycle 3 today.  We will anticipate repeat CT scan and MRI brain at the end of cycle 5.  She will return here in 3 weeks for MD visit and cycle 6.  2. COPD/chronic hypoxemic respiratory failure:  · Patient required intubation in May 2019 due to severe mucous plugging following bronchoscopy.  · Patient extubated 5/28/2019.  · Subsequent occlusion of left mainstem bronchus from malignancy 6/8/2019.  Received palliative radiation completed 6/25/2019.  · Significant improvement from a pulmonary standpoint.  She does have oxygen at home however has not required this.  She also continues to use nebulizer frequently.   3. Prior hemorrhagic CVA:  · Occurred in 2008, residual left upper extremity weakness and left lower extremity weakness.   4. Anemia:  · Hemoglobin prior to admission was 13.1 on 5/16/2019  · Hemoglobin declined in the 10-11 range, related to malignancy, pneumonia  · Hemoglobin today  9.7, normal MCV 94.1.  If anemia persists we will pursue additional evaluation in 3 weeks.  6. Abnormal thyroid function studies:  · Labs 6/28/2019 with TSH 23.8, normal free T4 of 1.43  · Patient was started on levothyroxine 100 mcg daily  · Current thyroid function studies with TSH suppressed at 0.065 and elevated free T4 of 2.06.  We will ask the patient to decrease levothyroxine dose to 50 mcg daily.  Repeat TSH and free T4 in 3 weeks with next cycle Keytruda.  7. Depression and nausea/anorexia:  · Patient with depression related to her underlying medical illness as well as social situation with her son who is an alcoholic and lives in the home  · Patient has been treated with Depakote 250 mg every 12 hours for mood stabilization under the direction of her primary care physician  · Patient with significant depressive symptoms, currently continuing on Zoloft under the direction of her primary care physician.  · Patient also with ongoing anorexia and nausea, initiated Zyprexa 2.5 mg nightly on 7/19/2019 with dramatic improvement in symptoms  · Patient seen in the supportive oncology clinic on 7/29/2019 with recommendation to taper Depakote off and maximize Zoloft dosing.  · Agree with plans per supportive oncology clinic to taper off Depakote and maximize Zoloft.  Patient will continue on Zyprexa 2.5 mg nightly which appears to have provided significant benefit.  8. Hypokalemia:  · Potassium today profoundly low at 2.5  · Magnesium low normal at 1.7  · Patient will receive 20 mEq IV potassium chloride today  · Prescription sent for potassium chloride 20 mEq p.o. Daily  · In 1 week BMP and magnesium with RN review    Plan:  1. Proceed with cycle 3 Keytruda today 200 mg IV day 1 on a 21-day cycle  2. Potassium chloride 20 mEq IV today  3. Prescription sent for potassium chloride 20 mEq daily  4. Decrease levothyroxine dose to 50 mcg daily  5. Continue Zyprexa 2.5 mg nightly  6. Follow-up with supportive oncology  clinic with plans to taper off Depakote and maximize Zoloft dosing  7. In 1 week CBC, stat BMP and magnesium with RN review  8. In 3 weeks MD visit with CBC, CMP, TSH, free T4 and cycle 4 Keytruda.  Plan repeat CT chest abdomen pelvis and MRI brain at the end of cycle 5.

## 2019-08-12 ENCOUNTER — TELEPHONE (OUTPATIENT)
Dept: ONCOLOGY | Facility: HOSPITAL | Age: 74
End: 2019-08-12

## 2019-08-12 RX ORDER — LEVOTHYROXINE SODIUM 0.05 MG/1
50 TABLET ORAL DAILY
Qty: 30 TABLET | Refills: 2 | Status: SHIPPED | OUTPATIENT
Start: 2019-08-12 | End: 2019-01-01

## 2019-08-12 NOTE — TELEPHONE ENCOUNTER
Called and notified  and escribed new prescription to [facility]    ----- Message from Solis Godfrey Jr., MD sent at 8/9/2019  8:39 PM EDT -----  Please contact patient and notify of need to change levothyroxine dose.  She will need to stop taking her 100 mcg tablets and we will need to send in new prescription for 50 mcg daily dose levothyroxine.  ----- Message -----  From: Lab, Background User  Sent: 8/9/2019  11:58 AM  To: Solis Godfrey Jr., MD

## 2019-08-13 ENCOUNTER — TELEPHONE (OUTPATIENT)
Dept: PSYCHIATRY | Facility: HOSPITAL | Age: 74
End: 2019-08-13

## 2019-08-13 NOTE — TELEPHONE ENCOUNTER
Supportive Oncology Services    Supportive call placed to pt regarding continued communication with med onc, plan for cross taper from depakote to zyprexa. Message left with contact info and request for return call to check in regarding medication adjustment and tolerability.

## 2019-08-14 ENCOUNTER — TELEPHONE (OUTPATIENT)
Dept: PSYCHIATRY | Facility: HOSPITAL | Age: 74
End: 2019-08-14

## 2019-08-14 RX ORDER — DIVALPROEX SODIUM 125 MG/1
125 CAPSULE, COATED PELLETS ORAL DAILY
Qty: 7 CAPSULE | Refills: 0
Start: 2019-08-14 | End: 2019-08-26

## 2019-08-14 NOTE — TELEPHONE ENCOUNTER
Supportive Oncology Services    Supportive call placed to patient  regarding clarification of medication regimen and continued goals of care.  Patient  describes patient to be doing significantly better since initiation of low-dose Zyprexa.  Describes sleep and appetite to be appropriate.  Mood significantly improved.  Currently continues on reduced dose of Depakote 125 mg daily; we will continue this for 1 more week prior to discontinuing.  Additionally continues on Zoloft.  Will not adjust this dose at this time, as  cannot identify any additional target symptoms.  Plan to follow-up in clinic as scheduled.

## 2019-08-16 ENCOUNTER — LAB (OUTPATIENT)
Dept: LAB | Facility: HOSPITAL | Age: 74
End: 2019-08-16

## 2019-08-16 ENCOUNTER — CLINICAL SUPPORT (OUTPATIENT)
Dept: ONCOLOGY | Facility: HOSPITAL | Age: 74
End: 2019-08-16

## 2019-08-16 DIAGNOSIS — Z79.899 HIGH RISK MEDICATION USE: ICD-10-CM

## 2019-08-16 DIAGNOSIS — C34.92 ADENOCARCINOMA, LUNG, LEFT (HCC): ICD-10-CM

## 2019-08-16 DIAGNOSIS — E87.6 HYPOKALEMIA: ICD-10-CM

## 2019-08-16 LAB
ANION GAP SERPL CALCULATED.3IONS-SCNC: 13.6 MMOL/L (ref 5–15)
BASOPHILS # BLD AUTO: 0.04 10*3/MM3 (ref 0–0.2)
BASOPHILS NFR BLD AUTO: 0.5 % (ref 0–1.5)
BUN BLD-MCNC: 10 MG/DL (ref 6–20)
BUN/CREAT SERPL: 11.5 (ref 7.3–30)
CALCIUM SPEC-SCNC: 8.8 MG/DL (ref 8.5–10.2)
CHLORIDE SERPL-SCNC: 103 MMOL/L (ref 98–107)
CO2 SERPL-SCNC: 26.4 MMOL/L (ref 22–29)
CREAT BLD-MCNC: 0.87 MG/DL (ref 0.6–1.1)
DEPRECATED RDW RBC AUTO: 59.8 FL (ref 37–54)
EOSINOPHIL # BLD AUTO: 0.44 10*3/MM3 (ref 0–0.4)
EOSINOPHIL NFR BLD AUTO: 5.6 % (ref 0.3–6.2)
ERYTHROCYTE [DISTWIDTH] IN BLOOD BY AUTOMATED COUNT: 16.8 % (ref 12.3–15.4)
GFR SERPL CREATININE-BSD FRML MDRD: 64 ML/MIN/1.73
GLUCOSE BLD-MCNC: 128 MG/DL (ref 74–124)
HCT VFR BLD AUTO: 34.3 % (ref 34–46.6)
HGB BLD-MCNC: 10.2 G/DL (ref 12–15.9)
IMM GRANULOCYTES # BLD AUTO: 0.04 10*3/MM3 (ref 0–0.05)
IMM GRANULOCYTES NFR BLD AUTO: 0.5 % (ref 0–0.5)
LYMPHOCYTES # BLD AUTO: 0.94 10*3/MM3 (ref 0.7–3.1)
LYMPHOCYTES NFR BLD AUTO: 12.1 % (ref 19.6–45.3)
MAGNESIUM SERPL-MCNC: 1.7 MG/DL (ref 1.8–2.5)
MCH RBC QN AUTO: 28.7 PG (ref 26.6–33)
MCHC RBC AUTO-ENTMCNC: 29.7 G/DL (ref 31.5–35.7)
MCV RBC AUTO: 96.3 FL (ref 79–97)
MONOCYTES # BLD AUTO: 0.67 10*3/MM3 (ref 0.1–0.9)
MONOCYTES NFR BLD AUTO: 8.6 % (ref 5–12)
NEUTROPHILS # BLD AUTO: 5.66 10*3/MM3 (ref 1.7–7)
NEUTROPHILS NFR BLD AUTO: 72.7 % (ref 42.7–76)
NRBC BLD AUTO-RTO: 0 /100 WBC (ref 0–0.2)
PLATELET # BLD AUTO: 281 10*3/MM3 (ref 140–450)
PMV BLD AUTO: 8.1 FL (ref 6–12)
POTASSIUM BLD-SCNC: 3.7 MMOL/L (ref 3.5–4.7)
RBC # BLD AUTO: 3.56 10*6/MM3 (ref 3.77–5.28)
SODIUM BLD-SCNC: 143 MMOL/L (ref 134–145)
WBC NRBC COR # BLD: 7.79 10*3/MM3 (ref 3.4–10.8)

## 2019-08-16 PROCEDURE — 85025 COMPLETE CBC W/AUTO DIFF WBC: CPT

## 2019-08-16 PROCEDURE — 80048 BASIC METABOLIC PNL TOTAL CA: CPT

## 2019-08-16 PROCEDURE — 36415 COLL VENOUS BLD VENIPUNCTURE: CPT

## 2019-08-16 PROCEDURE — 83735 ASSAY OF MAGNESIUM: CPT

## 2019-08-16 RX ORDER — PROCHLORPERAZINE MALEATE 10 MG
10 TABLET ORAL EVERY 6 HOURS PRN
Qty: 30 TABLET | Refills: 1 | Status: SHIPPED | OUTPATIENT
Start: 2019-08-16 | End: 2019-01-01

## 2019-08-16 NOTE — PROGRESS NOTES
Met pt in the lab, informed her it would take about 45 min-1 hr to get lab results back so if they want to get lunch and come back they can.  agrees to stay in this area for approx an hour and I will call them to let them know if they need electrolyte replacement.     Called and reviewed all labs over the phone with pt and . Mag 1.7, potassium 3.7. Pt continues taking potassium supplement. CBC stable. Pt only c/o occasional nausea. Taking zyprexa at night but no other antiemetics ordered. I reviewed with [facility] order for compazine sent to pt's pharmacy. Pt will return in 2 weeks to see Dr. Godfrey and have Keytruda. I have asked the pt to call back with any questions or concerns. Pt v/u    Lab Results   Component Value Date    WBC 7.79 08/16/2019    HGB 10.2 (L) 08/16/2019    HCT 34.3 08/16/2019    MCV 96.3 08/16/2019     08/16/2019

## 2019-08-23 ENCOUNTER — TELEPHONE (OUTPATIENT)
Dept: ONCOLOGY | Facility: HOSPITAL | Age: 74
End: 2019-08-23

## 2019-08-26 ENCOUNTER — OFFICE VISIT (OUTPATIENT)
Dept: PSYCHIATRY | Facility: HOSPITAL | Age: 74
End: 2019-08-26

## 2019-08-26 DIAGNOSIS — F33.42 RECURRENT MAJOR DEPRESSIVE DISORDER, IN FULL REMISSION (HCC): Primary | ICD-10-CM

## 2019-08-26 PROCEDURE — G0463 HOSPITAL OUTPT CLINIC VISIT: HCPCS | Performed by: NURSE PRACTITIONER

## 2019-08-26 PROCEDURE — 99214 OFFICE O/P EST MOD 30 MIN: CPT | Performed by: NURSE PRACTITIONER

## 2019-08-26 NOTE — PROGRESS NOTES
Chief Complaint: Dizziness    Subjective  Patient ID: Anaid Moura is a 74 y.o. female who presents to [facility] clinic regarding medication management and supportive counseling.     Patient presents to clinic alert, oriented, and engaged in conversation.  Patient is in wheelchair, with noted paralysis in left arm.  Affect and mood euthymic.   is present and supportive.  Patient denies impact of anxiety or mood on current quality of life.  Reports new symptom of dizziness which has put persisted over the last 1-3 weeks.  Medications evaluated; patient reports being completely off of Depakote with appropriate titration.  Continues on Zoloft and Zyprexa.  Is not taking Ativan.  Patient does discuss that blood pressure has been somewhat low.  At this time, reports to continue on Lopressor per PCP.   Explored with patient symptoms of nausea, and assisted with prescribed medications, and resulting in reduced intake.  Patient continues to describe sleep to be easy to initiate, continuous, and restorative; somewhat excessive, although found by patient to be appropriate.  Patient describes target symptoms to include dizziness, nausea.    PHQ9 Total Score: 3  GRACY 7 Total Score: 1  Diagnoses and all orders for this visit:    Recurrent major depressive disorder, in full remission (CMS/[facility])    Plan of Care  Patient with appropriate management of mood and anxiety symptoms on current dosing of Zyprexa 2.5 mg nightly and Zoloft.  Both patient and  deny additional needs in terms of mood disruption or anxiety management.  Evaluated primary symptom of dizziness.  Patient has upcoming appointment with Dr. Godfrey, and I have encouraged her to discuss hypertension with him at this time, as they state their home readings are often in the 80s.  Explored with patient safety measures surrounding dizziness, specifically to include changing positions slowly.  Have also discussed benefits of small frequent meals and hydration as  possible.  Plan to follow in clinic in 4 weeks for continued assessment; patient and  are aware that availability in clinic will change dramatically during my scheduled medical leave.  We will plan to either continue medications through med on or have patient seen in group setting during this time.    Total face to face time spent 32 minutes.  26 minutes spent in supportive counseling surrounding issues of safety surrounding dizziness/hypotension, communication techniques with variety of providers, exploration of quality of life, sleep hygiene, and goals of care.

## 2019-08-27 RX ORDER — SERTRALINE HYDROCHLORIDE 25 MG/1
25 TABLET, FILM COATED ORAL DAILY
Qty: 90 TABLET | Refills: 1 | Status: SHIPPED | OUTPATIENT
Start: 2019-08-27 | End: 2020-01-01 | Stop reason: SDUPTHER

## 2019-08-27 NOTE — PROGRESS NOTES
Supportive Oncology Services    Brief discussion with patient  regarding recommendation per Dr. Godfery to make appointment with PCP to evaluate blood pressure, although Dr. Godfrey will also discuss this with her on Friday.  Refill for Zoloft, 25 mg daily, has been authorized.

## 2019-08-30 ENCOUNTER — INFUSION (OUTPATIENT)
Dept: ONCOLOGY | Facility: HOSPITAL | Age: 74
End: 2019-08-30

## 2019-08-30 ENCOUNTER — OFFICE VISIT (OUTPATIENT)
Dept: ONCOLOGY | Facility: CLINIC | Age: 74
End: 2019-08-30

## 2019-08-30 VITALS
HEART RATE: 85 BPM | OXYGEN SATURATION: 90 % | BODY MASS INDEX: 17.62 KG/M2 | SYSTOLIC BLOOD PRESSURE: 123 MMHG | TEMPERATURE: 97.5 F | DIASTOLIC BLOOD PRESSURE: 67 MMHG | RESPIRATION RATE: 14 BRPM | HEIGHT: 64 IN

## 2019-08-30 DIAGNOSIS — C34.92 ADENOCARCINOMA, LUNG, LEFT (HCC): Primary | ICD-10-CM

## 2019-08-30 DIAGNOSIS — R53.82 CHRONIC FATIGUE: ICD-10-CM

## 2019-08-30 DIAGNOSIS — Z79.899 HIGH RISK MEDICATION USE: ICD-10-CM

## 2019-08-30 DIAGNOSIS — C34.92 ADENOCARCINOMA, LUNG, LEFT (HCC): ICD-10-CM

## 2019-08-30 DIAGNOSIS — Z79.899 HIGH RISK MEDICATION USE: Primary | ICD-10-CM

## 2019-08-30 DIAGNOSIS — E03.9 ACQUIRED HYPOTHYROIDISM: ICD-10-CM

## 2019-08-30 LAB
ALBUMIN SERPL-MCNC: 3.1 G/DL (ref 3.5–5.2)
ALBUMIN/GLOB SERPL: 0.6 G/DL (ref 1.1–2.4)
ALP SERPL-CCNC: 106 U/L (ref 38–116)
ALT SERPL W P-5'-P-CCNC: <5 U/L (ref 0–33)
ANION GAP SERPL CALCULATED.3IONS-SCNC: 13.4 MMOL/L (ref 5–15)
AST SERPL-CCNC: 17 U/L (ref 0–32)
BASOPHILS # BLD AUTO: 0.05 10*3/MM3 (ref 0–0.2)
BASOPHILS NFR BLD AUTO: 0.5 % (ref 0–1.5)
BILIRUB SERPL-MCNC: 0.3 MG/DL (ref 0.2–1.2)
BUN BLD-MCNC: 17 MG/DL (ref 6–20)
BUN/CREAT SERPL: 14.5 (ref 7.3–30)
CALCIUM SPEC-SCNC: 9.3 MG/DL (ref 8.5–10.2)
CHLORIDE SERPL-SCNC: 102 MMOL/L (ref 98–107)
CO2 SERPL-SCNC: 23.6 MMOL/L (ref 22–29)
CORTIS SERPL-MCNC: 33.16 MCG/DL
CREAT BLD-MCNC: 1.17 MG/DL (ref 0.6–1.1)
DEPRECATED RDW RBC AUTO: 54.6 FL (ref 37–54)
EOSINOPHIL # BLD AUTO: 0.38 10*3/MM3 (ref 0–0.4)
EOSINOPHIL NFR BLD AUTO: 3.7 % (ref 0.3–6.2)
ERYTHROCYTE [DISTWIDTH] IN BLOOD BY AUTOMATED COUNT: 15.6 % (ref 12.3–15.4)
GFR SERPL CREATININE-BSD FRML MDRD: 45 ML/MIN/1.73
GLOBULIN UR ELPH-MCNC: 4.8 GM/DL (ref 1.8–3.5)
GLUCOSE BLD-MCNC: 159 MG/DL (ref 74–124)
HCT VFR BLD AUTO: 34.2 % (ref 34–46.6)
HGB BLD-MCNC: 10.1 G/DL (ref 12–15.9)
IMM GRANULOCYTES # BLD AUTO: 0.08 10*3/MM3 (ref 0–0.05)
IMM GRANULOCYTES NFR BLD AUTO: 0.8 % (ref 0–0.5)
LYMPHOCYTES # BLD AUTO: 0.81 10*3/MM3 (ref 0.7–3.1)
LYMPHOCYTES NFR BLD AUTO: 8 % (ref 19.6–45.3)
MCH RBC QN AUTO: 28.3 PG (ref 26.6–33)
MCHC RBC AUTO-ENTMCNC: 29.5 G/DL (ref 31.5–35.7)
MCV RBC AUTO: 95.8 FL (ref 79–97)
MONOCYTES # BLD AUTO: 0.38 10*3/MM3 (ref 0.1–0.9)
MONOCYTES NFR BLD AUTO: 3.7 % (ref 5–12)
NEUTROPHILS # BLD AUTO: 8.48 10*3/MM3 (ref 1.7–7)
NEUTROPHILS NFR BLD AUTO: 83.3 % (ref 42.7–76)
NRBC BLD AUTO-RTO: 0 /100 WBC (ref 0–0.2)
PLATELET # BLD AUTO: 419 10*3/MM3 (ref 140–450)
PMV BLD AUTO: 8.6 FL (ref 6–12)
POTASSIUM BLD-SCNC: 4.6 MMOL/L (ref 3.5–4.7)
PROT SERPL-MCNC: 7.9 G/DL (ref 6.3–8)
RBC # BLD AUTO: 3.57 10*6/MM3 (ref 3.77–5.28)
SODIUM BLD-SCNC: 139 MMOL/L (ref 134–145)
T4 FREE SERPL-MCNC: 1.19 NG/DL (ref 0.93–1.7)
TSH SERPL DL<=0.05 MIU/L-ACNC: 6.57 UIU/ML (ref 0.27–4.2)
WBC NRBC COR # BLD: 10.18 10*3/MM3 (ref 3.4–10.8)

## 2019-08-30 PROCEDURE — 85025 COMPLETE CBC W/AUTO DIFF WBC: CPT

## 2019-08-30 PROCEDURE — 96413 CHEMO IV INFUSION 1 HR: CPT | Performed by: INTERNAL MEDICINE

## 2019-08-30 PROCEDURE — 84439 ASSAY OF FREE THYROXINE: CPT | Performed by: INTERNAL MEDICINE

## 2019-08-30 PROCEDURE — 82533 TOTAL CORTISOL: CPT | Performed by: INTERNAL MEDICINE

## 2019-08-30 PROCEDURE — 25010000002 PEMBROLIZUMAB 100 MG/4ML SOLUTION 4 ML VIAL: Performed by: INTERNAL MEDICINE

## 2019-08-30 PROCEDURE — 80053 COMPREHEN METABOLIC PANEL: CPT

## 2019-08-30 PROCEDURE — 99214 OFFICE O/P EST MOD 30 MIN: CPT | Performed by: INTERNAL MEDICINE

## 2019-08-30 PROCEDURE — 36415 COLL VENOUS BLD VENIPUNCTURE: CPT | Performed by: INTERNAL MEDICINE

## 2019-08-30 PROCEDURE — 84443 ASSAY THYROID STIM HORMONE: CPT | Performed by: INTERNAL MEDICINE

## 2019-08-30 RX ORDER — ONDANSETRON 4 MG/1
4 TABLET, FILM COATED ORAL EVERY 6 HOURS PRN
Qty: 60 TABLET | Refills: 2 | Status: ON HOLD | OUTPATIENT
Start: 2019-08-30 | End: 2020-01-01

## 2019-08-30 RX ORDER — SODIUM CHLORIDE 9 MG/ML
250 INJECTION, SOLUTION INTRAVENOUS ONCE
Status: COMPLETED | OUTPATIENT
Start: 2019-08-30 | End: 2019-08-30

## 2019-08-30 RX ORDER — SODIUM CHLORIDE 9 MG/ML
250 INJECTION, SOLUTION INTRAVENOUS ONCE
Status: CANCELLED | OUTPATIENT
Start: 2019-08-30

## 2019-08-30 RX ADMIN — SODIUM CHLORIDE 250 ML: 9 INJECTION, SOLUTION INTRAVENOUS at 10:23

## 2019-08-30 RX ADMIN — SODIUM CHLORIDE 200 MG: 9 INJECTION, SOLUTION INTRAVENOUS at 10:25

## 2019-08-30 NOTE — PROGRESS NOTES
"Pt's  asked if Dr. Godfrey could send in prescription for a nebulizer treatment bc the one that Dr. Francis ordered is $501 and they can not afford that.  states in the last few days he and his daughter have attempted to call Dr. Francis's office 5 times. Received a call back and  states he was treated rudely. I informed him that Dr. Godfrey could not prescribe another medication but that I would try to call Dr. Francis's office    Spoke with pt's local pharmacy. States cost is $501 and that pt may be in the \"donut hole\" with insurance. I called and spoke with Dr. Francis's office. Spoke with  who states there is a message waiting with Dr. Francis's nurse to call pt regarding medication. Called and informed pt's   "

## 2019-08-30 NOTE — PROGRESS NOTES
Subjective .     REASONS FOR FOLLOWUP:    1. Metastatic non-small cell lung cancer (adenocarcinoma):  · Patient with long-standing smoking history x40 years quit in 2008  · Underlying significant COPD with FEV1 1.4 (58%) on 11/20/2014  · CT 5/16/2019 with left upper lobe mass, partially cavitary measuring 2.3 x 1.5 cm.  Additional 8 mm similar appearing right upper lobe nodule.  Bibasilar consolidation, small bilateral pleural effusions, mediastinal lymphadenopathy up to 2.7 centimeters.  · Bronchoscopy 5/20/2019 with identification of left mainstem malignancy extending to the level of the jose, biopsy showed poorly differentiated adenocarcinoma of pulmonary origin. EBUS with FNA station 7 lymph node negative for malignant cells, only scattered lymphoid aggregates.  BAL left upper lobe negative for malignant cells.  Negative EGFR mutation, negative ALK/ROS 1 rearrangement. PDL1 95%.  · Patient was intubated with possible pneumonia, significant mucus plugging with underlying significant COPD.  Extubated on 5/28/2019.   · Staging evaluation performed during hospitalization with negative CT abdomen/pelvis, negative bone scan.  · MRI brain 5/29/2019 with evidence of metastatic disease, 3-4 enhancing lesions involving left occipital lobe 5 mm, left posterior parietal lobe 4 mm, left frontal lobe 4 mm, left parietal 3-4 mm with small amounts of surrounding edema.  Given the minimal extent of disease elected to observe with short interval MRI.  · Discussion regarding systemic therapy with single agent Keytruda due to PDL1 95%  · Subsequent disease progression on CT 6/8/2019 with increasing left upper lobe mass, mediastinal and hilar lymphadenopathy with occlusion of left mainstem bronchus at origin.  · Received palliative radiation therapy to left mainstem bronchus x10 fractions, completed 6/25/2019.  · Initiated systemic therapy with Keytruda 6/28/2019.  · Scans 8/6/2019 following 2 cycles Keytruda with decrease left  upper lobe primary lesion and left hilar/mediastinal lymphadenopathy.  Response primarily felt to be related to radiation therapy.  Continuation of immunotherapy with plans for repeat scan at the end of cycle 5.  2. COPD/chronic hypoxemic respiratory failure:  · Patient required intubation in May 2019 due to severe mucous plugging following bronchoscopy.  · Patient extubated 5/28/2019.  · Subsequent occlusion of left mainstem bronchus from malignancy 6/8/2019.  Received palliative radiation completed 6/25/2019.  · She continues to improve from a pulmonary standpoint.  She does have oxygen at home however has not required this.   3. Prior hemorrhagic CVA:  · Occurred in 2008, residual left upper extremity weakness and left lower extremity weakness.   4. Anemia:  · Hemoglobin prior to admission was 13.1 on 5/16/2019  · Hemoglobin declined in the 10-11 range, related to malignancy, pneumonia  5. Depression and nausea/anorexia and chronic fatigue:  · Significant improvement following addition of Zyprexa 2.5 mg nightly on 7/19/2019  6. Hypothyroidism:  · Labs 6/28/2019 with TSH 23.8, normal free T4 of 1.43  · Treated with levothyroxine 100 mcg daily  · Subsequent thyroid function studies with TSH 0.065 and free T4 elevated at 2.06, levothyroxine dose decreased 8/9/19 to 50 mcg daily.      HISTORY OF PRESENT ILLNESS:  The patient is a 74 y.o. year old female who is here for follow-up with the above-mentioned history.    History of Present Illness the patient returns today in follow-up due for cycle 4 Keytruda with labs to review.  In the interval since her last visit she has had some significant problems.  She notes that the improvement in fatigue she experienced at the last visit has declined significantly.  She developed nausea and Compazine was not effective.  She did not have significant emesis however.  She did develop a respiratory infection recently and was treated with a Z-Mann and reports that her symptoms are  improved, not yet resolved.  She remains in a wheelchair today, able to walk at home with the use of canes and a walker.  Above all else, the patient notes significant difficulty recently with relative hypotension and lightheadedness/dizziness.  She has been continuing on metoprolol 50 mg twice daily and is questioning whether she should decrease her dose.  She is due to see cardiology on 9/3/2019 and to see her primary care physician later next week.    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   • Hyperlipidemia    • Left hemiplegia (CMS/HCC) 12/10/2015   • Myocardial infarct (CMS/HCC) 1996   • Osteopenia    • Stroke syndrome 2008   • Thyroid cyst     right   • Thyroid lump 12/10/2015    Description: Biopsy 05/15 at [facility], benign   • Transient cerebral ischemia    • Urge incontinence of urine      Past Surgical History:   Procedure Laterality Date   • BREAST BIOPSY     • BRONCHOSCOPY Bilateral 5/20/2019    Procedure: BRONCHOSCOPY WITH  BIOPSY AND BAL, WITH ENDOBRONCHIAL ULTRASOUND WITH FNA;  Surgeon: Chris Tirado MD;  Location: [facility];  Service: Pulmonary   • COLONOSCOPY      REC 07/2007, REC 02/2009, REC 12/2011, REC 01/14,  She says she cant do the prep because of poor mobility to get to .   • EYE SURGERY  1950   • OTHER SURGICAL HISTORY      Ventricular [place] for drainage of subdural hematoma   • TOTAL THYROIDECTOMY  08/2015    Dr. Ahmadi         ONCOLOGIC HISTORY:  (History from previous dates can be found in the separate document.)    Current Outpatient Medications  on File Prior to Visit   Medication Sig Dispense Refill   • aspirin 81 MG chewable tablet Chew 81 mg Daily.     • atorvastatin (LIPITOR) 80 MG tablet Take 1 tablet by mouth Daily. Needs an appointment for further refills (Patient taking differently: Take 80 mg by mouth Every Night. Needs an appointment for further refills) 90 tablet 0   • guaiFENesin (ROBITUSSIN) 100 MG/5ML solution oral solution Take 20 mL by mouth Every 4 (Four) Hours. While awake 3600 mL 3   • levothyroxine (SYNTHROID, LEVOTHROID) 50 MCG tablet Take 1 tablet by mouth Daily. 30 tablet 2   • metoprolol tartrate (LOPRESSOR) 50 MG tablet Take 1 tablet by mouth 2 (Two) Times a Day. 180 tablet 0   • OLANZapine (zyPREXA) 2.5 MG tablet Take 1 tablet by mouth Every Night. 30 tablet 2   • potassium chloride ER (K-TAB) 20 MEQ tablet controlled-release ER tablet Take 1 tablet by mouth Daily. 30 tablet 2   • prochlorperazine (COMPAZINE) 10 MG tablet Take 1 tablet by mouth Every 6 (Six) Hours As Needed for Nausea or Vomiting. 30 tablet 1   • sertraline (ZOLOFT) 25 MG tablet Take 1 tablet by mouth Daily. 90 tablet 1   • SSD 1 % cream      • ipratropium-albuterol (DUO-NEB) 0.5-2.5 mg/3 ml nebulizer Take 3 mL by nebulization 4 (Four) Times a Day. And every 4 hours as needed for chest congestion 480 mL 11   • LORazepam (ATIVAN PO) Take 0.25 mg by mouth As Needed.     • sodium chloride 7 % nebulizer solution nebulizer solution Take 4 mL by nebulization 2 (Two) Times a Day. And may use additional 2 times as needed chest congestion 480 mL 6   • tolterodine (DETROL) 1 MG tablet        No current facility-administered medications on file prior to visit.        ALLERGIES:     Allergies   Allergen Reactions   • Ace Inhibitors      Other reaction(s): Cough       Social History     Socioeconomic History   • Marital status:      Spouse name: Miah   • Number of children: Not on file   • Years of education: College   • Highest education level: Not on file    Occupational History     Employer: RETIRED   Tobacco Use   • Smoking status: Former Smoker     Types: Cigarettes     Last attempt to quit: 2008     Years since quittin.6   • Smokeless tobacco: Never Used   • Tobacco comment: caffeine use-soda   Substance and Sexual Activity   • Alcohol use: Yes     Comment: rarely   • Drug use: No   • Sexual activity: Defer     Family History   Problem Relation Age of Onset   • Heart attack Mother         Acute   • Heart disease Mother    • Colon polyps Sister         Sigmoid colon   • Coronary artery disease Brother    • Diabetes Brother    • Heart disease Brother    • Alcohol abuse Son               Review of Systems   Constitutional: Positive for appetite change and fatigue. Negative for activity change, fever and unexpected weight change.   HENT: Positive for congestion and voice change. Negative for mouth sores, nosebleeds and sore throat.    Respiratory: Positive for cough and shortness of breath. Negative for wheezing.    Cardiovascular: Negative for chest pain, palpitations and leg swelling.   Gastrointestinal: Positive for nausea. Negative for abdominal distention, abdominal pain, blood in stool, constipation, diarrhea and vomiting.   Endocrine: Negative for cold intolerance and heat intolerance.   Genitourinary: Negative for difficulty urinating, dysuria, frequency and hematuria.   Musculoskeletal: Negative for arthralgias, back pain, joint swelling and myalgias.   Skin: Negative for rash.   Neurological: Positive for dizziness and light-headedness. Negative for syncope, weakness, numbness and headaches.   Hematological: Negative for adenopathy. Does not bruise/bleed easily.   Psychiatric/Behavioral: Negative for confusion, dysphoric mood and sleep disturbance. The patient is not nervous/anxious.          Objective      Vitals:    19 0846   BP: 123/67   Pulse: 85   Resp: 14   Temp: 97.5 °F (36.4 °C)   SpO2: 90%      Current Status 2019   ECOG score 3    Pain 0/10    Physical Exam   Constitutional: She is oriented to person, place, and time.   Elderly female no distress seated in a wheelchair   HENT:   Mouth/Throat: Oropharynx is clear and moist.   Eyes: Conjunctivae are normal.   Neck: No thyromegaly present.   Cardiovascular: Normal rate and regular rhythm. Exam reveals no gallop and no friction rub.   No murmur heard.  Pulmonary/Chest: No respiratory distress. She has no wheezes.   Decreased breath sounds on the left.    Abdominal: Soft. Bowel sounds are normal. She exhibits no distension. There is no tenderness.   Musculoskeletal: She exhibits no edema.   Lymphadenopathy:        Head (right side): No submandibular adenopathy present.     She has no cervical adenopathy.     She has no axillary adenopathy.        Right: No inguinal and no supraclavicular adenopathy present.        Left: No inguinal and no supraclavicular adenopathy present.   Neurological: She is alert and oriented to person, place, and time. No cranial nerve deficit.   Left hemiparesis from prior CVA   Skin: Skin is warm and dry. No rash noted.   Psychiatric: She has a normal mood and affect. Her behavior is normal.   Patient was examined, unchanged from above    RECENT LABS:  Hematology WBC   Date Value Ref Range Status   08/30/2019 10.18 3.40 - 10.80 10*3/mm3 Final     RBC   Date Value Ref Range Status   08/30/2019 3.57 (L) 3.77 - 5.28 10*6/mm3 Final     Hemoglobin   Date Value Ref Range Status   08/30/2019 10.1 (L) 12.0 - 15.9 g/dL Final     Hematocrit   Date Value Ref Range Status   08/30/2019 34.2 34.0 - 46.6 % Final     Platelets   Date Value Ref Range Status   08/30/2019 419 140 - 450 10*3/mm3 Final        Lab Results   Component Value Date    GLUCOSE 159 (H) 08/30/2019    BUN 17 08/30/2019    CREATININE 1.17 (H) 08/30/2019    EGFRIFNONA 45 (L) 08/30/2019    EGFRIFAFRI 74 08/11/2016    BCR 14.5 08/30/2019    K 4.6 08/30/2019    CO2 23.6 08/30/2019    CALCIUM 9.3 08/30/2019     PROTENTOTREF 7.4 08/11/2016    ALBUMIN 3.10 (L) 08/30/2019    LABIL2 1.2 08/11/2016    AST 17 08/30/2019    ALT <5 08/30/2019     TSH 6.57  Free T4 1.19    Assessment/Plan     1. Metastatic non-small cell lung cancer (adenocarcinoma):  · Patient with long-standing smoking history x40 years quit in 2008  · Underlying significant COPD with FEV1 1.4 (58%) on 11/20/2014  · CT 5/16/2019 with left upper lobe mass, partially cavitary measuring 2.3 x 1.5 cm.  Additional 8 mm similar appearing right upper lobe nodule.  Bibasilar consolidation, small bilateral pleural effusions, mediastinal lymphadenopathy up to 2.7 centimeters.  · Bronchoscopy 5/20/2019 with identification of left mainstem malignancy extending to the level of the jose, biopsy showed poorly differentiated adenocarcinoma of pulmonary origin. EBUS with FNA station 7 lymph node negative for malignant cells, only scattered lymphoid aggregates.  BAL left upper lobe negative for malignant cells.  Negative EGFR mutation, negative ALK/ROS 1 rearrangement. PDL1 95%.  · Patient was intubated with possible pneumonia, significant mucus plugging with underlying significant COPD.  Extubated on 5/28/2019.   · Staging evaluation performed during hospitalization with negative CT abdomen/pelvis, negative bone scan.  · MRI brain 5/29/2019 with evidence of metastatic disease, 3-4 enhancing lesions involving left occipital lobe 5 mm, left posterior parietal lobe 4 mm, left frontal lobe 4 mm, left parietal 3-4 mm with small amounts of surrounding edema.  Given the minimal extent of disease elected to observe with short interval MRI.  · Discussion regarding systemic therapy with single agent Keytruda due to PDL1 95%  · Subsequent disease progression on CT 6/8/2019 with increasing left upper lobe mass, mediastinal and hilar lymphadenopathy with occlusion of left mainstem bronchus at origin.  · Received palliative radiation therapy to left mainstem bronchus x10 fractions, completed  6/25/2019.  · Initiated systemic therapy with Keytruda 6/28/2019.  · CT scan following 2 cycles Keytruda and prior radiation from 8/6/2019 showed decrease in the left upper lobe primary lesion with essentially no residual discrete lung mass in that area.  Lymphadenopathy in the mediastinum/AP window and left hilum with significant improvement and resolution of left mainstem obstruction.  No new evidence of disease.  The patient did receive radiation therapy to both the primary left upper lobe mass and left hilum/mediastinum accounting for most of the improvement.  MRI brain 8/6/2019 with 3 enhancing metastatic lesions, 2 of which had a more nodular appearance as opposed to previous ring-enhancing appearance with stable size.  These were in the left occipital and left frontal regions.  The third lesion in the left parietal region decreased in size from 4 mm down to 2 mm.  There were no new lesions identified.  She has not undergone any specific treatment for her brain metastases.  Continuation of immunotherapy with Keytruda.  · Patient returns today in follow-up due to begin cycle 4 Keytruda.  She has had some difficulties recently with increase in nausea, increase in fatigue as well as decline in blood pressure with associated dizziness and lightheadedness.  She also had a recent upper respiratory infection which has nearly resolved after a Z-Mann.  She has experienced some nausea recently as well which was not relieved on Compazine.  This will be discussed below.  The patient's dizziness and lightheadedness appears to be related to hypotension and this may be related to her metoprolol.  I have asked her to decrease metoprolol dosing from 50 mg twice daily to 50 mg once daily.  She will have further follow-up with cardiology on 9/3/2019.  We will proceed on with treatment as planned.  We discussed repeating scans at the end of cycle 5.  We will reassess her status in 2 weeks to ensure that she is improved clinically.   If not we will move up scans to the end of this cycle.  2. COPD/chronic hypoxemic respiratory failure:  · Patient required intubation in May 2019 due to severe mucous plugging following bronchoscopy.  · Patient extubated 5/28/2019.  · Subsequent occlusion of left mainstem bronchus from malignancy 6/8/2019.  Received palliative radiation completed 6/25/2019.  · Significant improvement from a pulmonary standpoint.  She does have oxygen at home however has not required this.  She also continues to use nebulizer frequently.   · Recent respiratory infection treated with Z-Mann with subsequent improvement  3. Prior hemorrhagic CVA:  · Occurred in 2008, residual left upper extremity weakness and left lower extremity weakness.   4. Anemia:  · Hemoglobin prior to admission was 13.1 on 5/16/2019  · Hemoglobin declined in the 10-11 range, related to malignancy, pneumonia  · Hemoglobin today slightly improved at 10.1  7. Abnormal thyroid function studies:  · Labs 6/28/2019 with TSH 23.8, normal free T4 of 1.43  · Patient was started on levothyroxine 100 mcg daily  · Repeat thyroid function studies on 8/9/2019 with TSH suppressed at 0.065 and elevated free T4 of 2.06.  Decreased levothyroxine dose to 50 mcg daily.   · Today, improvement with free T4 1.19, TSH now slightly increased at 6.57.  Recheck again in 2 weeks at beginning of cycle 5 Keytruda.  8. Depression and nausea/anorexia:  · Patient with depression related to her underlying medical illness as well as social situation with her son who is an alcoholic and lives in the home  · Patient has been treated with Depakote 250 mg every 12 hours for mood stabilization under the direction of her primary care physician  · Patient with significant depressive symptoms, currently continuing on Zoloft under the direction of her primary care physician.  · Patient also with ongoing anorexia and nausea, initiated Zyprexa 2.5 mg nightly on 7/19/2019 with dramatic improvement in  symptoms  · Patient seen in the supportive oncology clinic on 7/29/2019, tapered off Depakote.  · Patient continues with nausea, not relieved with Compazine.  Prescription sent for Zofran 4 mg every 6 hours as needed.  9. Hypokalemia:  · Potassium on 8/9/2019 profoundly low at 2.5  · Magnesium low normal at 1.7  · Prescribed 20 mEq IV potassium chloride   · Potassium today 4.6  10. Dizziness/lightheadedness with relative hypotension:  · Has developed recently, advised patient to decrease metoprolol to 50 mg daily for now and await further recommendations when she is seen by cardiology on 9/3/2019.  · Check cortisol level this morning.    Plan:  1. Proceed with cycle 4 Keytruda today 200 mg IV day 1 on a 21-day cycle  2. And cortisol level to this morning's labs.  3. Decrease metoprolol from 50 mg twice daily down to 50 mg daily.  Follow-up for any further dose adjustment with cardiology as scheduled on 9/3/2019  4. Continue levothyroxine at 50 mcg daily  5. Prescription sent for Zofran 4 mg every 6 hours as needed  6. Continue Zyprexa 2.5 mg nightly  7. Continue potassium chloride 20 mEq daily  8. In 2 weeks nurse practitioner visit with CBC, CMP.  Patient has not significantly improved clinically consider proceeding with CT scans during the cycle  9. MD visit in 3 weeks with CBC, CMP, TSH, free T4 and cycle 5 Keytruda.  If scans not obtained prior to this visit (as above) we will plan at the end of cycle 5.

## 2019-09-03 ENCOUNTER — OFFICE VISIT (OUTPATIENT)
Dept: CARDIOLOGY | Facility: CLINIC | Age: 74
End: 2019-09-03

## 2019-09-03 VITALS
DIASTOLIC BLOOD PRESSURE: 56 MMHG | BODY MASS INDEX: 17.75 KG/M2 | HEIGHT: 64 IN | WEIGHT: 104 LBS | SYSTOLIC BLOOD PRESSURE: 96 MMHG | HEART RATE: 84 BPM

## 2019-09-03 DIAGNOSIS — I25.10 CHRONIC CORONARY ARTERY DISEASE: ICD-10-CM

## 2019-09-03 DIAGNOSIS — I63.9 CEREBROVASCULAR ACCIDENT (CVA), UNSPECIFIED MECHANISM (HCC): Primary | ICD-10-CM

## 2019-09-03 DIAGNOSIS — E78.5 HYPERLIPIDEMIA, UNSPECIFIED HYPERLIPIDEMIA TYPE: ICD-10-CM

## 2019-09-03 DIAGNOSIS — I10 BENIGN ESSENTIAL HYPERTENSION: ICD-10-CM

## 2019-09-03 DIAGNOSIS — N18.30 CHRONIC KIDNEY DISEASE, STAGE III (MODERATE) (HCC): ICD-10-CM

## 2019-09-03 PROCEDURE — 99214 OFFICE O/P EST MOD 30 MIN: CPT | Performed by: INTERNAL MEDICINE

## 2019-09-03 PROCEDURE — 93000 ELECTROCARDIOGRAM COMPLETE: CPT | Performed by: INTERNAL MEDICINE

## 2019-09-03 NOTE — PROGRESS NOTES
Date of Office Visit: 2019  Encounter Provider: Tammy Pina MD  Place of Service: [facility] CARDIOLOGY  Patient Name: Anaid Moura  :1945      Patient ID:  Anaid Moura is a 74 y.o. female is here for  followup for CAD.         History of Present Illness    She had an abnormal stress study showing anterior infarct with bryan-infarct  ischemia done in 2009. She had a known history of myocardial  infarction in . She had a cardiac catheterization in  which showed  100% occlusion of the right coronary artery with collateralization to that  area. She had no intervention at that time.     In 2008, she suffered a hemorrhagic stroke leaving her with left arm  and left leg paresis. She has been unable to walk and has been  wheelchair-bound. She had transient ischemic attack prior to that. Her  carotid duplex study done on May 16, 2011, showed right internal carotid  artery occlusion with mild intimal thickening of the left internal carotid  artery. It also showed a large thyroid cyst which she is following up with  Dr. Aguilar about.      In 2010, she was not feeling well and had gastrointestinal upset with  vomiting. She says her symptoms are similar to what she had prior to her  myocardial infarction in , and she was very fatigued. We set her up for  a repeat cardiac catheterization done in 2010 which showed occluded  right coronary artery with collateralization and moderate left anterior  descending artery disease. She was treated medically after that.     She follows with Dr. Kaminski from neurology who has got her in [facility] for physical therapy which she is enjoying greatly. She has  lost about eight pounds since her last visit.     She had a carotid duplex study done on 2013, which showed occluded right  internal carotid artery and a 2.3 cm x 2.2 cm mass in the right neck which is a thyroid cyst.   She had  surgery for her thyroid nodules.      She is wheelchair-bound but maneuvers herself in a wheelchair. She and her  do take care of his early father with dementia who is in a nursing care facility..  She also several grandchildren which she cares for which she enjoys.    She had a carotid duplex study done 10/20/2018 showing complex plaque of the right carotid bulb without evidence of stenosis.  Left common carotid had extensive plaquing going into the carotid bulb but no stenosis.  Labs on 8/30/2019 show normal CMP except for blood glucose at 159, creatinine 1.17, albumin 3.1, TSH 6.57, free T4 normal, magnesium 1.7, globin 10.1, normal white count and platelets.       She has metastatic non-small cell lung cancer, adenocarcinoma and follows with Dr. Godfrey.  This was found May 2019 when she presented with chest aching to the emergency department 5/17/2019.  Brain metastases have been noted and she also had a mass in the right upper lobe as well as mediastinal lymphadenopathy.  CT of the chest done 8/6/2019 showed decrease of the patient's left upper lobe lung cancer.  There is markedly severe emphysema, atrophic left kidney, extensive vascular calcification.   She is on Keytruda.  she also had chest radiation for this.    She has dizziness and low blood pressure.  She does not feel her heart racing or skipping.  She has not had falls or passing out.  She is had nausea as well.  She has no chest pain.  She does have a cough.  She is producing a lot of mucus and secretions.  She has no orthopnea or PND.    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   • Hyperlipidemia    • Left hemiplegia (CMS/HCC) 12/10/2015   • Myocardial infarct (CMS/HCC) 1996   • Osteopenia    • Stroke syndrome 2008   • Thyroid cyst     right   • Thyroid lump 12/10/2015    Description: Biopsy 05/15 at [facility], benign   • Transient cerebral ischemia    • Urge incontinence of urine          Past Surgical History:   Procedure Laterality Date   • BREAST BIOPSY     • BRONCHOSCOPY Bilateral 5/20/2019    Procedure: BRONCHOSCOPY WITH  BIOPSY AND BAL, WITH ENDOBRONCHIAL ULTRASOUND WITH FNA;  Surgeon: Chris Tirado MD;  Location: [facility] ENDOSCOPY;  Service: Pulmonary   • COLONOSCOPY      REC 07/2007, REC 02/2009, REC 12/2011, REC 01/14,  She says she cant do the prep because of poor mobility to get to .   • EYE SURGERY  1950   • OTHER SURGICAL HISTORY      Ventricular [place] for drainage of subdural hematoma   • TOTAL THYROIDECTOMY  08/2015    Dr. Ahmadi       Current Outpatient Medications on File Prior to Visit   Medication Sig Dispense Refill   • aspirin 81 MG chewable tablet Chew 81 mg Daily.     • atorvastatin (LIPITOR) 80 MG tablet Take 1 tablet by mouth Daily. Needs an appointment for further refills (Patient taking differently: Take 80 mg by mouth Every Night. Needs an appointment for further refills) 90 tablet 0   • guaiFENesin (ROBITUSSIN) 100 MG/5ML solution oral solution Take 20 mL by mouth Every 4 (Four) Hours. While awake 3600 mL 3   • ipratropium-albuterol (DUO-NEB) 0.5-2.5 mg/3 ml nebulizer Take 3 mL by nebulization 4 (Four) Times a Day. And every 4 hours as needed for chest congestion 480 mL 11   • levothyroxine (SYNTHROID, LEVOTHROID) 50 MCG tablet Take 1 tablet by mouth Daily. 30 tablet 2   • LORazepam (ATIVAN PO) Take 0.25 mg by mouth As Needed.     • metoprolol tartrate (LOPRESSOR) 50 MG tablet Take 1 tablet by mouth 2 (Two) Times a Day. 180 tablet 0   • OLANZapine (zyPREXA) 2.5 MG tablet Take 1 tablet by  mouth Every Night. 30 tablet 2   • ondansetron (ZOFRAN) 4 MG tablet Take 1 tablet by mouth Every 6 (Six) Hours As Needed for Nausea or Vomiting. 60 tablet 2   • potassium chloride ER (K-TAB) 20 MEQ tablet controlled-release ER tablet Take 1 tablet by mouth Daily. 30 tablet 2   • prochlorperazine (COMPAZINE) 10 MG tablet Take 1 tablet by mouth Every 6 (Six) Hours As Needed for Nausea or Vomiting. 30 tablet 1   • sertraline (ZOLOFT) 25 MG tablet Take 1 tablet by mouth Daily. 90 tablet 1   • sodium chloride 7 % nebulizer solution nebulizer solution Take 4 mL by nebulization 2 (Two) Times a Day. And may use additional 2 times as needed chest congestion 480 mL 6   • SSD 1 % cream      • tolterodine (DETROL) 1 MG tablet        No current facility-administered medications on file prior to visit.        Social History     Socioeconomic History   • Marital status:      Spouse name: Miah   • Number of children: Not on file   • Years of education: College   • Highest education level: Not on file   Occupational History     Employer: RETIRED   Tobacco Use   • Smoking status: Former Smoker     Types: Cigarettes     Last attempt to quit: 2008     Years since quittin.6   • Smokeless tobacco: Never Used   • Tobacco comment: caffeine use-soda   Substance and Sexual Activity   • Alcohol use: Yes     Comment: rarely   • Drug use: No   • Sexual activity: Defer           Review of Systems   Constitution: Negative.   HENT: Negative for congestion.    Eyes: Negative for vision loss in left eye and vision loss in right eye.   Respiratory: Positive for cough and wheezing. Negative for hemoptysis, shortness of breath, sleep disturbances due to breathing, snoring and sputum production.    Endocrine: Negative.    Hematologic/Lymphatic: Negative.    Skin: Negative for poor wound healing and rash.   Musculoskeletal: Negative for falls, gout, muscle cramps and myalgias.   Gastrointestinal: Negative for abdominal pain, diarrhea,  "dysphagia, hematemesis, melena, nausea and vomiting.   Neurological: Negative for excessive daytime sleepiness, dizziness, headaches, light-headedness, loss of balance, seizures and vertigo.   Psychiatric/Behavioral: Negative for depression and substance abuse. The patient is not nervous/anxious.        Procedures    ECG 12 Lead  Date/Time: 9/3/2019 11:57 AM  Performed by: Tammy Pina MD  Authorized by: Tammy Pina MD   Comparison: compared with previous ECG   Similar to previous ECG  Rhythm: sinus rhythm    Clinical impression: normal ECG                Objective:      Vitals:    09/03/19 1133   BP: 96/56   BP Location: Right arm   Patient Position: Sitting   Pulse: 84   Weight: 47.2 kg (104 lb)   Height: 162.6 cm (64\")     Body mass index is 17.85 kg/m².    Physical Exam   Constitutional: She is oriented to person, place, and time. She appears well-developed and well-nourished. No distress.   HENT:   Head: Normocephalic and atraumatic.   Eyes: Conjunctivae are normal. No scleral icterus.   Neck: Neck supple. No JVD present. Carotid bruit is not present. No thyromegaly present.   Cardiovascular: Normal rate, regular rhythm, S1 normal, S2 normal, normal heart sounds and intact distal pulses.  No extrasystoles are present. PMI is not displaced. Exam reveals no gallop.   No murmur heard.  Pulses:       Carotid pulses are 2+ on the right side, and 2+ on the left side.       Radial pulses are 2+ on the right side, and 2+ on the left side.        Dorsalis pedis pulses are 2+ on the right side, and 2+ on the left side.        Posterior tibial pulses are 2+ on the right side, and 2+ on the left side.   Pulmonary/Chest: Effort normal. No respiratory distress. She has no wheezes. She has rhonchi. She has no rales. She exhibits no tenderness.   Abdominal: Soft. Bowel sounds are normal. She exhibits no distension, no abdominal bruit and no mass. There is no tenderness.   Musculoskeletal: She exhibits no " edema or deformity.   Lymphadenopathy:     She has no cervical adenopathy.   Neurological: She is alert and oriented to person, place, and time. No cranial nerve deficit.   Skin: Skin is warm and dry. No rash noted. She is not diaphoretic. No cyanosis. No pallor. Nails show no clubbing.   Psychiatric: She has a normal mood and affect. Judgment normal.   Vitals reviewed.      Lab Review:       Assessment:      Diagnosis Plan   1. Cerebrovascular accident (CVA), unspecified mechanism (CMS/HCC)     2. Chronic coronary artery disease     3. Hyperlipidemia, unspecified hyperlipidemia type     4. Benign essential hypertension     5. Chronic kidney disease, stage III (moderate) (CMS/HCC)       1. Coronary artery disease with occluded right coronary artery,  collateralization to the vessel, and moderate left anterior descending  artery disease. She has an abnormal ECG which is chronically abnormal with  anterior lateral T-wave inversions. She has had a history of an abnormal  stress nuclear perfusion study showing inferior infarct with bryan-infarct  Ischemia. She has no angina. Continue medical therapy. There  is no evidence of heart failure.  2. Hemorrhagic stroke with left spastic hemiplegia.   3. Occluded right carotid artery with mild disease of the left internal  carotid artery.  4. Hypertension, BP is now low with dizziness.   5. Hyperlipidemia on statin therapy.   6. Normal left ventricular systolic function.   7. Right thyroid cyst and thyroid nodules - s/p surgery for this.  8. Nonsmall lung cancer, metastatic, seeing Dr. Godfrey.   9. Severe emphysema     Plan:       Stop metoprolol for low BP, see APRN in 6 weeks.      Coronary Artery Disease  Assessment  • The patient has no angina    Plan  • Lifestyle modifications discussed include adhering to a heart healthy diet and medication compliance    Subjective - Objective  • Current antiplatelet therapy includes aspirin 81 mg

## 2019-09-13 ENCOUNTER — OFFICE VISIT (OUTPATIENT)
Dept: ONCOLOGY | Facility: CLINIC | Age: 74
End: 2019-09-13

## 2019-09-13 ENCOUNTER — LAB (OUTPATIENT)
Dept: LAB | Facility: HOSPITAL | Age: 74
End: 2019-09-13

## 2019-09-13 VITALS
DIASTOLIC BLOOD PRESSURE: 74 MMHG | BODY MASS INDEX: 16.01 KG/M2 | RESPIRATION RATE: 14 BRPM | TEMPERATURE: 97.4 F | HEART RATE: 129 BPM | OXYGEN SATURATION: 90 % | SYSTOLIC BLOOD PRESSURE: 125 MMHG | WEIGHT: 93.8 LBS | HEIGHT: 64 IN

## 2019-09-13 DIAGNOSIS — C34.92 ADENOCARCINOMA, LUNG, LEFT (HCC): Primary | ICD-10-CM

## 2019-09-13 DIAGNOSIS — C34.92 ADENOCARCINOMA, LUNG, LEFT (HCC): ICD-10-CM

## 2019-09-13 DIAGNOSIS — R42 DIZZINESS: ICD-10-CM

## 2019-09-13 DIAGNOSIS — R11.0 NAUSEA: ICD-10-CM

## 2019-09-13 DIAGNOSIS — Z79.899 HIGH RISK MEDICATION USE: ICD-10-CM

## 2019-09-13 LAB
ALBUMIN SERPL-MCNC: 3.4 G/DL (ref 3.5–5.2)
ALBUMIN/GLOB SERPL: 0.8 G/DL (ref 1.1–2.4)
ALP SERPL-CCNC: 110 U/L (ref 38–116)
ALT SERPL W P-5'-P-CCNC: 7 U/L (ref 0–33)
ANION GAP SERPL CALCULATED.3IONS-SCNC: 13.2 MMOL/L (ref 5–15)
AST SERPL-CCNC: 17 U/L (ref 0–32)
BASOPHILS # BLD AUTO: 0.02 10*3/MM3 (ref 0–0.2)
BASOPHILS NFR BLD AUTO: 0.3 % (ref 0–1.5)
BILIRUB SERPL-MCNC: 0.4 MG/DL (ref 0.2–1.2)
BUN BLD-MCNC: 14 MG/DL (ref 6–20)
BUN/CREAT SERPL: 15.4 (ref 7.3–30)
CALCIUM SPEC-SCNC: 9.2 MG/DL (ref 8.5–10.2)
CHLORIDE SERPL-SCNC: 105 MMOL/L (ref 98–107)
CO2 SERPL-SCNC: 21.8 MMOL/L (ref 22–29)
CREAT BLD-MCNC: 0.91 MG/DL (ref 0.6–1.1)
DEPRECATED RDW RBC AUTO: 53.1 FL (ref 37–54)
EOSINOPHIL # BLD AUTO: 0.19 10*3/MM3 (ref 0–0.4)
EOSINOPHIL NFR BLD AUTO: 2.6 % (ref 0.3–6.2)
ERYTHROCYTE [DISTWIDTH] IN BLOOD BY AUTOMATED COUNT: 15.5 % (ref 12.3–15.4)
GFR SERPL CREATININE-BSD FRML MDRD: 61 ML/MIN/1.73
GLOBULIN UR ELPH-MCNC: 4.4 GM/DL (ref 1.8–3.5)
GLUCOSE BLD-MCNC: 110 MG/DL (ref 74–124)
HCT VFR BLD AUTO: 35.4 % (ref 34–46.6)
HGB BLD-MCNC: 10.4 G/DL (ref 12–15.9)
IMM GRANULOCYTES # BLD AUTO: 0.06 10*3/MM3 (ref 0–0.05)
IMM GRANULOCYTES NFR BLD AUTO: 0.8 % (ref 0–0.5)
LYMPHOCYTES # BLD AUTO: 0.47 10*3/MM3 (ref 0.7–3.1)
LYMPHOCYTES NFR BLD AUTO: 6.3 % (ref 19.6–45.3)
MCH RBC QN AUTO: 27.7 PG (ref 26.6–33)
MCHC RBC AUTO-ENTMCNC: 29.4 G/DL (ref 31.5–35.7)
MCV RBC AUTO: 94.1 FL (ref 79–97)
MONOCYTES # BLD AUTO: 0.35 10*3/MM3 (ref 0.1–0.9)
MONOCYTES NFR BLD AUTO: 4.7 % (ref 5–12)
NEUTROPHILS # BLD AUTO: 6.36 10*3/MM3 (ref 1.7–7)
NEUTROPHILS NFR BLD AUTO: 85.3 % (ref 42.7–76)
NRBC BLD AUTO-RTO: 0 /100 WBC (ref 0–0.2)
PLATELET # BLD AUTO: 346 10*3/MM3 (ref 140–450)
PMV BLD AUTO: 8.2 FL (ref 6–12)
POTASSIUM BLD-SCNC: 4.3 MMOL/L (ref 3.5–4.7)
PROT SERPL-MCNC: 7.8 G/DL (ref 6.3–8)
RBC # BLD AUTO: 3.76 10*6/MM3 (ref 3.77–5.28)
SODIUM BLD-SCNC: 140 MMOL/L (ref 134–145)
WBC NRBC COR # BLD: 7.45 10*3/MM3 (ref 3.4–10.8)

## 2019-09-13 PROCEDURE — 85025 COMPLETE CBC W/AUTO DIFF WBC: CPT | Performed by: INTERNAL MEDICINE

## 2019-09-13 PROCEDURE — 80053 COMPREHEN METABOLIC PANEL: CPT | Performed by: INTERNAL MEDICINE

## 2019-09-13 PROCEDURE — G0463 HOSPITAL OUTPT CLINIC VISIT: HCPCS | Performed by: NURSE PRACTITIONER

## 2019-09-13 PROCEDURE — 36415 COLL VENOUS BLD VENIPUNCTURE: CPT | Performed by: INTERNAL MEDICINE

## 2019-09-13 PROCEDURE — 99214 OFFICE O/P EST MOD 30 MIN: CPT | Performed by: NURSE PRACTITIONER

## 2019-09-13 NOTE — PROGRESS NOTES
Subjective .     REASONS FOR FOLLOWUP:    1. Metastatic non-small cell lung cancer (adenocarcinoma):  · Patient with long-standing smoking history x40 years quit in 2008  · Underlying significant COPD with FEV1 1.4 (58%) on 11/20/2014  · CT 5/16/2019 with left upper lobe mass, partially cavitary measuring 2.3 x 1.5 cm.  Additional 8 mm similar appearing right upper lobe nodule.  Bibasilar consolidation, small bilateral pleural effusions, mediastinal lymphadenopathy up to 2.7 centimeters.  · Bronchoscopy 5/20/2019 with identification of left mainstem malignancy extending to the level of the jose, biopsy showed poorly differentiated adenocarcinoma of pulmonary origin. EBUS with FNA station 7 lymph node negative for malignant cells, only scattered lymphoid aggregates.  BAL left upper lobe negative for malignant cells.  Negative EGFR mutation, negative ALK/ROS 1 rearrangement. PDL1 95%.  · Patient was intubated with possible pneumonia, significant mucus plugging with underlying significant COPD.  Extubated on 5/28/2019.   · Staging evaluation performed during hospitalization with negative CT abdomen/pelvis, negative bone scan.  · MRI brain 5/29/2019 with evidence of metastatic disease, 3-4 enhancing lesions involving left occipital lobe 5 mm, left posterior parietal lobe 4 mm, left frontal lobe 4 mm, left parietal 3-4 mm with small amounts of surrounding edema.  Given the minimal extent of disease elected to observe with short interval MRI.  · Discussion regarding systemic therapy with single agent Keytruda due to PDL1 95%  · Subsequent disease progression on CT 6/8/2019 with increasing left upper lobe mass, mediastinal and hilar lymphadenopathy with occlusion of left mainstem bronchus at origin.  · Received palliative radiation therapy to left mainstem bronchus x10 fractions, completed 6/25/2019.  · Initiated systemic therapy with Keytruda 6/28/2019.  · Scans 8/6/2019 following 2 cycles Keytruda with decrease left  upper lobe primary lesion and left hilar/mediastinal lymphadenopathy.  Response primarily felt to be related to radiation therapy.  Continuation of immunotherapy with plans for repeat scan at the end of cycle 5.  · Scans moved up following cycle 4 due to declining performance status.  2. COPD/chronic hypoxemic respiratory failure:  · Patient required intubation in May 2019 due to severe mucous plugging following bronchoscopy.  · Patient extubated 5/28/2019.  · Subsequent occlusion of left mainstem bronchus from malignancy 6/8/2019.  Received palliative radiation completed 6/25/2019.  · She continues to improve from a pulmonary standpoint.  She does have oxygen at home however has not required this.   3. Prior hemorrhagic CVA:  · Occurred in 2008, residual left upper extremity weakness and left lower extremity weakness.   4. Anemia:  · Hemoglobin prior to admission was 13.1 on 5/16/2019  · Hemoglobin declined in the 10-11 range, related to malignancy, pneumonia  5. Depression and nausea/anorexia and chronic fatigue:  · Significant improvement following addition of Zyprexa 2.5 mg nightly on 7/19/2019  · Zyprexa improvement declined with regards to appetite in early September 2019.  Primary care increase Zyprexa to 5 mg nightly.  6. Hypothyroidism:  · Labs 6/28/2019 with TSH 23.8, normal free T4 of 1.43  · Treated with levothyroxine 100 mcg daily  · Subsequent thyroid function studies with TSH 0.065 and free T4 elevated at 2.06, levothyroxine dose decreased 8/9/19 to 50 mcg daily.      HISTORY OF PRESENT ILLNESS:  The patient is a 74 y.o. year old female who is here for follow-up with the above-mentioned history.    History of Present Illness the patient returns today for follow-up of symptoms and reports that she is feeling about the same as when seen by Dr. Godfrey 2 weeks ago.  She has been seen by cardiology in the interim with metoprolol discontinued however still having dizzy spells.  It is noted today while her  blood pressure is improved, she now is tachycardic.  She reports that the dizzy spells do cause nausea.  The ondansetron does improve her nausea however she feels that the cycle back and forth between dizziness and nausea.  Her appetite is declined she reports.  Her  states that her weight obtained today is not a good measurement as she was not fully standing on her own.  Denies fevers or new pain over the past 2 weeks.    Patient was also seen by primary care since her last visit here.  She states they did increase her Zyprexa to 5 mg nightly from 2.5 hoping that it would help her appetite.  This was done approximately 1 week ago and they have not noticed a change in her appetite.  She drinks maybe 1 [place] instant breakfast a day, eats a very small breakfast, and does not eat again until dinner typically.    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   • Hyperlipidemia    • Left hemiplegia (CMS/HCC) 12/10/2015   • Myocardial infarct (CMS/HCC) 1996   • Osteopenia    • Stroke syndrome 2008   • Thyroid cyst     right   • Thyroid lump 12/10/2015    Description: Biopsy 05/15 at [facility], benign   • Transient cerebral ischemia    • Urge incontinence of urine      Past Surgical History:   Procedure Laterality Date   • BREAST BIOPSY     • BRONCHOSCOPY Bilateral 5/20/2019    Procedure: BRONCHOSCOPY WITH  BIOPSY AND BAL, WITH ENDOBRONCHIAL ULTRASOUND WITH FNA;  Surgeon: Chris Tirado MD;  Location: [facility] ENDOSCOPY;  Service: Pulmonary   • COLONOSCOPY      REC 07/2007, REC 02/2009, REC  12/2011, REC 01/14,  She says she cant do the prep because of poor mobility to get to BR.   • EYE SURGERY  1950   • OTHER SURGICAL HISTORY      Ventricular [place] for drainage of subdural hematoma   • TOTAL THYROIDECTOMY  08/2015    Dr. Ahmadi         ONCOLOGIC HISTORY:  (History from previous dates can be found in the separate document.)    Current Outpatient Medications on File Prior to Visit   Medication Sig Dispense Refill   • aspirin 81 MG chewable tablet Chew 81 mg Daily.     • atorvastatin (LIPITOR) 80 MG tablet Take 1 tablet by mouth Daily. Needs an appointment for further refills (Patient taking differently: Take 80 mg by mouth Every Night. Needs an appointment for further refills) 90 tablet 0   • guaiFENesin (ROBITUSSIN) 100 MG/5ML solution oral solution Take 20 mL by mouth Every 4 (Four) Hours. While awake 3600 mL 3   • ipratropium-albuterol (DUO-NEB) 0.5-2.5 mg/3 ml nebulizer Take 3 mL by nebulization 4 (Four) Times a Day. And every 4 hours as needed for chest congestion 480 mL 11   • levothyroxine (SYNTHROID, LEVOTHROID) 50 MCG tablet Take 1 tablet by mouth Daily. 30 tablet 2   • LORazepam (ATIVAN PO) Take 0.25 mg by mouth As Needed.     • OLANZapine (zyPREXA) 2.5 MG tablet Take 1 tablet by mouth Every Night. 30 tablet 2   • ondansetron (ZOFRAN) 4 MG tablet Take 1 tablet by mouth Every 6 (Six) Hours As Needed for Nausea or Vomiting. 60 tablet 2   • potassium chloride ER (K-TAB) 20 MEQ tablet controlled-release ER tablet Take 1 tablet by mouth Daily. 30 tablet 2   • prochlorperazine (COMPAZINE) 10 MG tablet Take 1 tablet by mouth Every 6 (Six) Hours As Needed for Nausea or Vomiting. 30 tablet 1   • sertraline (ZOLOFT) 25 MG tablet Take 1 tablet by mouth Daily. 90 tablet 1   • sodium chloride 7 % nebulizer solution nebulizer solution Take 4 mL by nebulization 2 (Two) Times a Day. And may use additional 2 times as needed chest congestion 480 mL 6   • SSD 1 % cream      • tolterodine (DETROL) 1 MG tablet         No current facility-administered medications on file prior to visit.        ALLERGIES:     Allergies   Allergen Reactions   • Ace Inhibitors      Other reaction(s): Cough       Social History     Socioeconomic History   • Marital status:      Spouse name: Miah   • Number of children: Not on file   • Years of education: College   • Highest education level: Not on file   Occupational History     Employer: RETIRED   Tobacco Use   • Smoking status: Former Smoker     Types: Cigarettes     Last attempt to quit:      Years since quittin.7   • Smokeless tobacco: Never Used   • Tobacco comment: caffeine use-soda   Substance and Sexual Activity   • Alcohol use: Yes     Comment: rarely   • Drug use: No   • Sexual activity: Defer     Family History   Problem Relation Age of Onset   • Heart attack Mother         Acute   • Heart disease Mother    • Colon polyps Sister         Sigmoid colon   • Coronary artery disease Brother    • Diabetes Brother    • Heart disease Brother    • Alcohol abuse Son               Review of Systems   Constitutional: Positive for appetite change and fatigue. Negative for activity change, fever and unexpected weight change.   HENT: Positive for congestion and voice change. Negative for mouth sores, nosebleeds and sore throat.    Respiratory: Positive for cough and shortness of breath. Negative for wheezing.    Cardiovascular: Negative for chest pain, palpitations and leg swelling.   Gastrointestinal: Positive for nausea. Negative for abdominal distention, abdominal pain, blood in stool, constipation, diarrhea and vomiting.   Endocrine: Negative for cold intolerance and heat intolerance.   Genitourinary: Negative for difficulty urinating, dysuria, frequency and hematuria.   Musculoskeletal: Negative for arthralgias, back pain, joint swelling and myalgias.   Skin: Negative for rash.   Neurological: Positive for dizziness and light-headedness. Negative for syncope, weakness,  numbness and headaches.   Hematological: Negative for adenopathy. Does not bruise/bleed easily.   Psychiatric/Behavioral: Negative for confusion, dysphoric mood and sleep disturbance. The patient is not nervous/anxious.          Objective      There were no vitals filed for this visit.   Current Status 8/30/2019   ECOG score 3   Pain 0/10    Physical Exam   Constitutional: She is oriented to person, place, and time.   Elderly female no distress seated in a wheelchair   HENT:   Mouth/Throat: Oropharynx is clear and moist.   Eyes: Conjunctivae are normal.   Neck: No thyromegaly present.   Cardiovascular: Normal rate and regular rhythm. Exam reveals no gallop and no friction rub.   No murmur heard.  Pulmonary/Chest: No respiratory distress. She has no wheezes.   Decreased breath sounds on the left.    Abdominal: Soft. Bowel sounds are normal. She exhibits no distension. There is no tenderness.   Musculoskeletal: She exhibits no edema.   Lymphadenopathy:        Head (right side): No submandibular adenopathy present.     She has no cervical adenopathy.     She has no axillary adenopathy.        Right: No inguinal and no supraclavicular adenopathy present.        Left: No inguinal and no supraclavicular adenopathy present.   Neurological: She is alert and oriented to person, place, and time. No cranial nerve deficit.   Left hemiparesis from prior CVA   Skin: Skin is warm and dry. No rash noted.   Psychiatric: She has a normal mood and affect. Her behavior is normal.   Patient was examined, unchanged from above    RECENT LABS:  Hematology WBC   Date Value Ref Range Status   09/13/2019 7.45 3.40 - 10.80 10*3/mm3 Final     RBC   Date Value Ref Range Status   09/13/2019 3.76 (L) 3.77 - 5.28 10*6/mm3 Final     Hemoglobin   Date Value Ref Range Status   09/13/2019 10.4 (L) 12.0 - 15.9 g/dL Final     Hematocrit   Date Value Ref Range Status   09/13/2019 35.4 34.0 - 46.6 % Final     Platelets   Date Value Ref Range Status    09/13/2019 346 140 - 450 10*3/mm3 Final        Lab Results   Component Value Date    GLUCOSE 159 (H) 08/30/2019    BUN 17 08/30/2019    CREATININE 1.17 (H) 08/30/2019    EGFRIFNONA 45 (L) 08/30/2019    EGFRIFAFRI 74 08/11/2016    BCR 14.5 08/30/2019    K 4.6 08/30/2019    CO2 23.6 08/30/2019    CALCIUM 9.3 08/30/2019    PROTENTOTREF 7.4 08/11/2016    ALBUMIN 3.10 (L) 08/30/2019    LABIL2 1.2 08/11/2016    AST 17 08/30/2019    ALT <5 08/30/2019     TSH 6.57  Free T4 1.19    Assessment/Plan     1. Metastatic non-small cell lung cancer (adenocarcinoma):  · Patient with long-standing smoking history x40 years quit in 2008  · Underlying significant COPD with FEV1 1.4 (58%) on 11/20/2014  · CT 5/16/2019 with left upper lobe mass, partially cavitary measuring 2.3 x 1.5 cm.  Additional 8 mm similar appearing right upper lobe nodule.  Bibasilar consolidation, small bilateral pleural effusions, mediastinal lymphadenopathy up to 2.7 centimeters.  · Bronchoscopy 5/20/2019 with identification of left mainstem malignancy extending to the level of the jose, biopsy showed poorly differentiated adenocarcinoma of pulmonary origin. EBUS with FNA station 7 lymph node negative for malignant cells, only scattered lymphoid aggregates.  BAL left upper lobe negative for malignant cells.  Negative EGFR mutation, negative ALK/ROS 1 rearrangement. PDL1 95%.  · Patient was intubated with possible pneumonia, significant mucus plugging with underlying significant COPD.  Extubated on 5/28/2019.   · Staging evaluation performed during hospitalization with negative CT abdomen/pelvis, negative bone scan.  · MRI brain 5/29/2019 with evidence of metastatic disease, 3-4 enhancing lesions involving left occipital lobe 5 mm, left posterior parietal lobe 4 mm, left frontal lobe 4 mm, left parietal 3-4 mm with small amounts of surrounding edema.  Given the minimal extent of disease elected to observe with short interval MRI.  · Discussion regarding  systemic therapy with single agent Keytruda due to PDL1 95%  · Subsequent disease progression on CT 6/8/2019 with increasing left upper lobe mass, mediastinal and hilar lymphadenopathy with occlusion of left mainstem bronchus at origin.  · Received palliative radiation therapy to left mainstem bronchus x10 fractions, completed 6/25/2019.  · Initiated systemic therapy with Keytruda 6/28/2019.  · CT scan following 2 cycles Keytruda and prior radiation from 8/6/2019 showed decrease in the left upper lobe primary lesion with essentially no residual discrete lung mass in that area.  Lymphadenopathy in the mediastinum/AP window and left hilum with significant improvement and resolution of left mainstem obstruction.  No new evidence of disease.  The patient did receive radiation therapy to both the primary left upper lobe mass and left hilum/mediastinum accounting for most of the improvement.  MRI brain 8/6/2019 with 3 enhancing metastatic lesions, 2 of which had a more nodular appearance as opposed to previous ring-enhancing appearance with stable size.  These were in the left occipital and left frontal regions.  The third lesion in the left parietal region decreased in size from 4 mm down to 2 mm.  There were no new lesions identified.  She has not undergone any specific treatment for her brain metastases.  Continuation of immunotherapy with Keytruda.  · Patient seen by Dr. Godfrey for cycle 4 Keytruda.  She has had some difficulties recently with increase in nausea, increase in fatigue as well as decline in blood pressure with associated dizziness and lightheadedness.  She also had a recent upper respiratory infection which has nearly resolved after a Z-Mann.  She has experienced some nausea recently as well which was not relieved on Compazine.  The patient's dizziness and lightheadedness appears to be related to hypotension and this may be related to her metoprolol.  We did proceed with Keytruda.  · Patient comes into the  office today for evaluation without improvement in her symptoms despite medication changes per cardiology.  This is discussed with Dr. Godfrey and we will proceed with CT scans chest, abdomen, and pelvis with contrast as well as MRI of the brain with and without contrast.  These will be done within the next week and reviewed by him at her follow-up appointment.    2. COPD/chronic hypoxemic respiratory failure:  · Patient required intubation in May 2019 due to severe mucous plugging following bronchoscopy.  · Patient extubated 5/28/2019.  · Subsequent occlusion of left mainstem bronchus from malignancy 6/8/2019.  Received palliative radiation completed 6/25/2019.  · Significant improvement from a pulmonary standpoint.  She does have oxygen at home however has not required this.  She also continues to use nebulizer frequently.   · Recent respiratory infection treated with Z-Mann with subsequent improvement  3. Prior hemorrhagic CVA:  · Occurred in 2008, residual left upper extremity weakness and left lower extremity weakness.   4. Anemia:  · Hemoglobin prior to admission was 13.1 on 5/16/2019  · Hemoglobin declined in the 10-11 range, related to malignancy, pneumonia  · Hemoglobin today slightly improved at 10.4  7. Abnormal thyroid function studies:  · Labs 6/28/2019 with TSH 23.8, normal free T4 of 1.43  · Patient was started on levothyroxine 100 mcg daily  · Repeat thyroid function studies on 8/9/2019 with TSH suppressed at 0.065 and elevated free T4 of 2.06.  Decreased levothyroxine dose to 50 mcg daily.   · At the last visit, improvement with free T4 1.19, TSH now slightly increased at 6.57.  Recheck again in 1 weeks at beginning of cycle 5 Keytruda.  8. Depression and nausea/anorexia:  · Patient with depression related to her underlying medical illness as well as social situation with her son who is an alcoholic and lives in the home  · Patient has been treated with Depakote 250 mg every 12 hours for mood  stabilization under the direction of her primary care physician  · Patient with significant depressive symptoms, currently continuing on Zoloft under the direction of her primary care physician.  · Patient also with ongoing anorexia and nausea, initiated Zyprexa 2.5 mg nightly on 7/19/2019 with dramatic improvement in symptoms  · Patient seen in the supportive oncology clinic on 7/29/2019, tapered off Depakote.  · Nausea, not relieved with Compazine, Zofran 4 mg every 6 hours as needed.  · Primary care increased Zyprexa to 5 mg nightly.  She has not noticed improvements in this dose.  · Improvement in nausea with ondansetron, however with dizziness she still has nauseating episodes.  9. Hypokalemia:  · Potassium on 8/9/2019 profoundly low at 2.5  · Magnesium low normal at 1.7  · Prescribed 20 mEq IV potassium chloride   · Potassium today 4.3  10. Dizziness/lightheadedness with relative hypotension:  · Cortisol level obtained 8/30/2019 and was 33.16.  · Seen by cardiology on 9/3/2019 and metoprolol was discontinued for hypotension.  She is to follow-up with cardiology in 6 weeks from that time.  11. Diminished appetite:  · Patient's appetite has declined though today's weight was not a good measure per her 's report as it was not her full weight by herself standing.  She was encouraged to increase her [place] instant breakfast to at least 2 daily in addition to her typical intake.    Plan:  1. CT chest, abdomen, and pelvis with contrast as well as MRI of the brain with and without contrast to be done prior to appoint with Dr. Godfrey next week.  2. Patient will call cardiology, updating them on her elevated heart rate since discontinuing metoprolol.    3. Continue levothyroxine at 50 mcg daily  4. Prescription sent for Zofran 4 mg every 6 hours as needed  5. Continue Zyprexa 5 mg nightly  6. Continue potassium chloride 20 mEq daily  7. Increase [place] instant breakfast to at least 2 daily.  8.  MD visit in  1 week with CBC, CMP, TSH, free T4 and cycle 5 Keytruda.  If scans not obtained prior to this visit (as above) we will plan at the end of cycle 5.

## 2019-09-13 NOTE — PROGRESS NOTES
Order placed for CT CAP and MRI brain per STEVE CORTES to be done next week prior to MD appt on 9-20-19  Message to appt desk to mark anthony

## 2019-09-17 ENCOUNTER — HOSPITAL ENCOUNTER (OUTPATIENT)
Dept: CT IMAGING | Facility: HOSPITAL | Age: 74
Discharge: HOME | End: 2019-09-17

## 2019-09-17 ENCOUNTER — HOSPITAL ENCOUNTER (OUTPATIENT)
Dept: MRI IMAGING | Facility: HOSPITAL | Age: 74
Discharge: HOME | End: 2019-09-17
Admitting: NURSE PRACTITIONER

## 2019-09-17 DIAGNOSIS — C34.92 ADENOCARCINOMA, LUNG, LEFT (HCC): ICD-10-CM

## 2019-09-17 DIAGNOSIS — Z79.899 HIGH RISK MEDICATION USE: ICD-10-CM

## 2019-09-17 PROCEDURE — 71260 CT THORAX DX C+: CPT

## 2019-09-17 PROCEDURE — 25010000002 IOPAMIDOL 61 % SOLUTION: Performed by: NURSE PRACTITIONER

## 2019-09-17 PROCEDURE — 0 DIATRIZOATE MEGLUMINE & SODIUM PER 1 ML: Performed by: NURSE PRACTITIONER

## 2019-09-17 PROCEDURE — 0 GADOBENATE DIMEGLUMINE 529 MG/ML SOLUTION: Performed by: NURSE PRACTITIONER

## 2019-09-17 PROCEDURE — 74177 CT ABD & PELVIS W/CONTRAST: CPT

## 2019-09-17 PROCEDURE — 70553 MRI BRAIN STEM W/O & W/DYE: CPT

## 2019-09-17 PROCEDURE — A9577 INJ MULTIHANCE: HCPCS | Performed by: NURSE PRACTITIONER

## 2019-09-17 RX ADMIN — GADOBENATE DIMEGLUMINE 8 ML: 529 INJECTION, SOLUTION INTRAVENOUS at 13:44

## 2019-09-17 RX ADMIN — IOPAMIDOL 85 ML: 612 INJECTION, SOLUTION INTRAVENOUS at 14:09

## 2019-09-17 RX ADMIN — DIATRIZOATE MEGLUMINE AND DIATRIZOATE SODIUM 30 ML: 660; 100 LIQUID ORAL; RECTAL at 14:09

## 2019-09-20 ENCOUNTER — INFUSION (OUTPATIENT)
Dept: ONCOLOGY | Facility: HOSPITAL | Age: 74
End: 2019-09-20

## 2019-09-20 ENCOUNTER — OFFICE VISIT (OUTPATIENT)
Dept: ONCOLOGY | Facility: CLINIC | Age: 74
End: 2019-09-20

## 2019-09-20 VITALS
OXYGEN SATURATION: 92 % | RESPIRATION RATE: 16 BRPM | HEIGHT: 64 IN | DIASTOLIC BLOOD PRESSURE: 71 MMHG | BODY MASS INDEX: 16.13 KG/M2 | HEART RATE: 107 BPM | TEMPERATURE: 97.7 F | SYSTOLIC BLOOD PRESSURE: 113 MMHG | WEIGHT: 94.5 LBS

## 2019-09-20 DIAGNOSIS — Z79.899 HIGH RISK MEDICATION USE: ICD-10-CM

## 2019-09-20 DIAGNOSIS — C34.92 ADENOCARCINOMA, LUNG, LEFT (HCC): ICD-10-CM

## 2019-09-20 DIAGNOSIS — Z79.899 HIGH RISK MEDICATION USE: Primary | ICD-10-CM

## 2019-09-20 DIAGNOSIS — E03.9 ACQUIRED HYPOTHYROIDISM: Primary | ICD-10-CM

## 2019-09-20 LAB
ALBUMIN SERPL-MCNC: 3.2 G/DL (ref 3.5–5.2)
ALBUMIN/GLOB SERPL: 0.7 G/DL (ref 1.1–2.4)
ALP SERPL-CCNC: 108 U/L (ref 38–116)
ALT SERPL W P-5'-P-CCNC: 10 U/L (ref 0–33)
ANION GAP SERPL CALCULATED.3IONS-SCNC: 12.2 MMOL/L (ref 5–15)
AST SERPL-CCNC: 22 U/L (ref 0–32)
BASOPHILS # BLD AUTO: 0.02 10*3/MM3 (ref 0–0.2)
BASOPHILS NFR BLD AUTO: 0.2 % (ref 0–1.5)
BILIRUB SERPL-MCNC: 0.4 MG/DL (ref 0.2–1.2)
BUN BLD-MCNC: 10 MG/DL (ref 6–20)
BUN/CREAT SERPL: 13.3 (ref 7.3–30)
CALCIUM SPEC-SCNC: 9.2 MG/DL (ref 8.5–10.2)
CHLORIDE SERPL-SCNC: 103 MMOL/L (ref 98–107)
CO2 SERPL-SCNC: 22.8 MMOL/L (ref 22–29)
CREAT BLD-MCNC: 0.75 MG/DL (ref 0.6–1.1)
DEPRECATED RDW RBC AUTO: 52.9 FL (ref 37–54)
EOSINOPHIL # BLD AUTO: 0.22 10*3/MM3 (ref 0–0.4)
EOSINOPHIL NFR BLD AUTO: 2.7 % (ref 0.3–6.2)
ERYTHROCYTE [DISTWIDTH] IN BLOOD BY AUTOMATED COUNT: 15.5 % (ref 12.3–15.4)
GFR SERPL CREATININE-BSD FRML MDRD: 76 ML/MIN/1.73
GLOBULIN UR ELPH-MCNC: 4.7 GM/DL (ref 1.8–3.5)
GLUCOSE BLD-MCNC: 91 MG/DL (ref 74–124)
HCT VFR BLD AUTO: 34.3 % (ref 34–46.6)
HGB BLD-MCNC: 10.2 G/DL (ref 12–15.9)
IMM GRANULOCYTES # BLD AUTO: 0.04 10*3/MM3 (ref 0–0.05)
IMM GRANULOCYTES NFR BLD AUTO: 0.5 % (ref 0–0.5)
LYMPHOCYTES # BLD AUTO: 0.48 10*3/MM3 (ref 0.7–3.1)
LYMPHOCYTES NFR BLD AUTO: 5.9 % (ref 19.6–45.3)
MCH RBC QN AUTO: 27.9 PG (ref 26.6–33)
MCHC RBC AUTO-ENTMCNC: 29.7 G/DL (ref 31.5–35.7)
MCV RBC AUTO: 93.7 FL (ref 79–97)
MONOCYTES # BLD AUTO: 0.25 10*3/MM3 (ref 0.1–0.9)
MONOCYTES NFR BLD AUTO: 3.1 % (ref 5–12)
NEUTROPHILS # BLD AUTO: 7.11 10*3/MM3 (ref 1.7–7)
NEUTROPHILS NFR BLD AUTO: 87.6 % (ref 42.7–76)
NRBC BLD AUTO-RTO: 0 /100 WBC (ref 0–0.2)
PLATELET # BLD AUTO: 332 10*3/MM3 (ref 140–450)
PMV BLD AUTO: 8.6 FL (ref 6–12)
POTASSIUM BLD-SCNC: 4 MMOL/L (ref 3.5–4.7)
PROT SERPL-MCNC: 7.9 G/DL (ref 6.3–8)
RBC # BLD AUTO: 3.66 10*6/MM3 (ref 3.77–5.28)
SODIUM BLD-SCNC: 138 MMOL/L (ref 134–145)
T4 FREE SERPL-MCNC: 1.33 NG/DL (ref 0.93–1.7)
TSH SERPL DL<=0.05 MIU/L-ACNC: 18.4 UIU/ML (ref 0.27–4.2)
WBC NRBC COR # BLD: 8.12 10*3/MM3 (ref 3.4–10.8)

## 2019-09-20 PROCEDURE — 96413 CHEMO IV INFUSION 1 HR: CPT | Performed by: INTERNAL MEDICINE

## 2019-09-20 PROCEDURE — 25010000002 PEMBROLIZUMAB 100 MG/4ML SOLUTION 4 ML VIAL: Performed by: INTERNAL MEDICINE

## 2019-09-20 PROCEDURE — 99214 OFFICE O/P EST MOD 30 MIN: CPT | Performed by: INTERNAL MEDICINE

## 2019-09-20 PROCEDURE — 84443 ASSAY THYROID STIM HORMONE: CPT | Performed by: INTERNAL MEDICINE

## 2019-09-20 PROCEDURE — 80053 COMPREHEN METABOLIC PANEL: CPT

## 2019-09-20 PROCEDURE — 84439 ASSAY OF FREE THYROXINE: CPT | Performed by: INTERNAL MEDICINE

## 2019-09-20 PROCEDURE — 85025 COMPLETE CBC W/AUTO DIFF WBC: CPT

## 2019-09-20 RX ORDER — SODIUM CHLORIDE 9 MG/ML
250 INJECTION, SOLUTION INTRAVENOUS ONCE
Status: COMPLETED | OUTPATIENT
Start: 2019-09-20 | End: 2019-09-20

## 2019-09-20 RX ORDER — SODIUM CHLORIDE 9 MG/ML
250 INJECTION, SOLUTION INTRAVENOUS ONCE
Status: CANCELLED | OUTPATIENT
Start: 2019-09-20

## 2019-09-20 RX ORDER — OLANZAPINE 7.5 MG/1
7.5 TABLET ORAL NIGHTLY
Qty: 30 TABLET | Refills: 2 | Status: SHIPPED | OUTPATIENT
Start: 2019-09-20 | End: 2020-01-01 | Stop reason: SDUPTHER

## 2019-09-20 RX ORDER — REVEFENACIN 175 UG/3ML
SOLUTION RESPIRATORY (INHALATION)
Status: ON HOLD | COMMUNITY
Start: 2019-09-18 | End: 2020-01-01

## 2019-09-20 RX ORDER — FORMOTEROL FUMARATE 20 UG/2ML
SOLUTION RESPIRATORY (INHALATION)
COMMUNITY
End: 2020-01-01 | Stop reason: HOSPADM

## 2019-09-20 RX ORDER — BUDESONIDE 0.5 MG/2ML
0.5 INHALANT ORAL
Status: ON HOLD | COMMUNITY
End: 2020-01-01

## 2019-09-20 RX ADMIN — SODIUM CHLORIDE 250 ML: 9 INJECTION, SOLUTION INTRAVENOUS at 13:22

## 2019-09-20 RX ADMIN — SODIUM CHLORIDE 200 MG: 9 INJECTION, SOLUTION INTRAVENOUS at 13:22

## 2019-09-20 NOTE — TELEPHONE ENCOUNTER
Per Dr Godfrey-Pt is asking for her Zyprexa rx be sent to [facility]. He states her dose will be 7.5 mg QHS. I have escribed the rx to [facility].

## 2019-09-21 NOTE — PROGRESS NOTES
Subjective .     REASONS FOR FOLLOWUP:    1. Metastatic non-small cell lung cancer (adenocarcinoma):  · Patient with long-standing smoking history x40 years quit in 2008  · Underlying significant COPD with FEV1 1.4 (58%) on 11/20/2014  · CT 5/16/2019 with left upper lobe mass, partially cavitary measuring 2.3 x 1.5 cm.  Additional 8 mm similar appearing right upper lobe nodule.  Bibasilar consolidation, small bilateral pleural effusions, mediastinal lymphadenopathy up to 2.7 centimeters.  · Bronchoscopy 5/20/2019 with identification of left mainstem malignancy extending to the level of the jose, biopsy showed poorly differentiated adenocarcinoma of pulmonary origin. EBUS with FNA station 7 lymph node negative for malignant cells, only scattered lymphoid aggregates.  BAL left upper lobe negative for malignant cells.  Negative EGFR mutation, negative ALK/ROS 1 rearrangement. PDL1 95%.  · Patient was intubated with possible pneumonia, significant mucus plugging with underlying significant COPD.  Extubated on 5/28/2019.   · Staging evaluation performed during hospitalization with negative CT abdomen/pelvis, negative bone scan.  · MRI brain 5/29/2019 with evidence of metastatic disease, 3-4 enhancing lesions involving left occipital lobe 5 mm, left posterior parietal lobe 4 mm, left frontal lobe 4 mm, left parietal 3-4 mm with small amounts of surrounding edema.  Given the minimal extent of disease elected to observe with short interval MRI.  · Discussion regarding systemic therapy with single agent Keytruda due to PDL1 95%  · Subsequent disease progression on CT 6/8/2019 with increasing left upper lobe mass, mediastinal and hilar lymphadenopathy with occlusion of left mainstem bronchus at origin.  · Received palliative radiation therapy to left mainstem bronchus x10 fractions, completed 6/25/2019.  · Initiated systemic therapy with Keytruda 6/28/2019.  · Scans 8/6/2019 following 2 cycles Keytruda with decrease left  upper lobe primary lesion and left hilar/mediastinal lymphadenopathy.  Response primarily felt to be related to radiation therapy.  Continuation of immunotherapy.  · CT scans 9/17/2019 following 4 cycles Keytruda with further decrease in AP window lymph node, suspected evolving postradiation change in left lower lobe.  MRI brain 9/17/2019 with improvement in 3 small metastatic lesions.  Continuation of immunotherapy.  2. COPD/chronic hypoxemic respiratory failure:  · Patient required intubation in May 2019 due to severe mucous plugging following bronchoscopy.  · Patient extubated 5/28/2019.  · Subsequent occlusion of left mainstem bronchus from malignancy 6/8/2019.  Received palliative radiation completed 6/25/2019.  · She continues to improve from a pulmonary standpoint.  She does have oxygen at home however has not required this.   3. Prior hemorrhagic CVA:  · Occurred in 2008, residual left upper extremity weakness and left lower extremity weakness.   4. Anemia:  · Hemoglobin prior to admission was 13.1 on 5/16/2019  · Hemoglobin declined in the 10-11 range, related to malignancy, pneumonia  5. Depression and nausea/anorexia and chronic fatigue:  · Significant improvement following addition of Zyprexa nightly, dose escalated to 5 mg.  6. Hypothyroidism:  · Labs 6/28/2019 with TSH 23.8, normal free T4 of 1.43  · Treated with levothyroxine 100 mcg daily  · Subsequent thyroid function studies with TSH 0.065 and free T4 elevated at 2.06, levothyroxine dose decreased 8/9/19 to 50 mcg daily.      HISTORY OF PRESENT ILLNESS:  The patient is a 74 y.o. year old female who is here for follow-up with the above-mentioned history.    History of Present Illness the patient returns today in follow-up due for cycle 4 immunotherapy with Keytruda.  She has CT scans, MRI brain, and laboratory studies to review.  Patient was experiencing significant difficulties at the last visit with again worsening of her fatigue as well as  worsening nausea, Compazine ineffective.  She was started on Zyprexa at 2.5 mg nightly.  She had some improvement on this however did have some additional improvement after increasing to 5 mg nightly.  She was experiencing some hypotension and orthostatic symptoms and was tapered off metoprolol by cardiology.  She has also continued with coughing after drinking fluids and is continuing to undergo speech therapy with some improvement in her chronic hoarseness.  She reports that overall her nausea has improved on Zyprexa and is inquiring about further increase in dose.  Fatigue has improved yet again.  She does have slight increase in cough.  Her dyspnea remains stable.  She remains in a wheelchair today.    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   • Hyperlipidemia    • Left hemiplegia (CMS/HCC) 12/10/2015   • Myocardial infarct (CMS/HCC) 1996   • Osteopenia    • Stroke syndrome 2008   • Thyroid cyst     right   • Thyroid lump 12/10/2015    Description: Biopsy 05/15 at [facility], benign   • Transient cerebral ischemia    • Urge incontinence of urine      Past Surgical History:   Procedure Laterality Date   • BREAST BIOPSY     • BRONCHOSCOPY Bilateral 5/20/2019    Procedure: BRONCHOSCOPY WITH  BIOPSY AND BAL, WITH ENDOBRONCHIAL ULTRASOUND WITH FNA;  Surgeon: Chris Tirado MD;  Location: [facility];  Service: Pulmonary   • COLONOSCOPY      REC 07/2007, REC 02/2009, REC 12/2011, REC 01/14,  She says she cant do the prep because of poor mobility to get to .   • EYE SURGERY  1950   •  OTHER SURGICAL HISTORY      Ventricular [place] for drainage of subdural hematoma   • TOTAL THYROIDECTOMY  08/2015    Dr. Ahmadi         ONCOLOGIC HISTORY:  (History from previous dates can be found in the separate document.)    Current Outpatient Medications on File Prior to Visit   Medication Sig Dispense Refill   • aspirin 81 MG chewable tablet Chew 81 mg Daily.     • atorvastatin (LIPITOR) 80 MG tablet Take 1 tablet by mouth Daily. Needs an appointment for further refills (Patient taking differently: Take 80 mg by mouth Every Night. Needs an appointment for further refills) 90 tablet 0   • budesonide (PULMICORT) 0.5 MG/2ML nebulizer solution Take 0.5 mg by nebulization Daily.     • formoterol (PERFOROMIST) 20 MCG/2ML nebulizer solution Take  by nebulization 2 (Two) Times a Day.     • guaiFENesin (ROBITUSSIN) 100 MG/5ML solution oral solution Take 20 mL by mouth Every 4 (Four) Hours. While awake 3600 mL 3   • levothyroxine (SYNTHROID, LEVOTHROID) 50 MCG tablet Take 1 tablet by mouth Daily. 30 tablet 2   • LORazepam (ATIVAN PO) Take 0.25 mg by mouth As Needed.     • ondansetron (ZOFRAN) 4 MG tablet Take 1 tablet by mouth Every 6 (Six) Hours As Needed for Nausea or Vomiting. 60 tablet 2   • potassium chloride ER (K-TAB) 20 MEQ tablet controlled-release ER tablet Take 1 tablet by mouth Daily. 30 tablet 2   • prochlorperazine (COMPAZINE) 10 MG tablet Take 1 tablet by mouth Every 6 (Six) Hours As Needed for Nausea or Vomiting. 30 tablet 1   • sertraline (ZOLOFT) 25 MG tablet Take 1 tablet by mouth Daily. 90 tablet 1   • SSD 1 % cream      • tolterodine (DETROL) 1 MG tablet      • YUPELRI 175 MCG/3ML solution      • [DISCONTINUED] OLANZapine (zyPREXA) 2.5 MG tablet Take 1 tablet by mouth Every Night. (Patient taking differently: Take 5 mg by mouth Every Night.) 30 tablet 2   • ipratropium-albuterol (DUO-NEB) 0.5-2.5 mg/3 ml nebulizer Take 3 mL by nebulization 4 (Four) Times a Day. And every 4 hours as needed for  chest congestion 480 mL 11   • sodium chloride 7 % nebulizer solution nebulizer solution Take 4 mL by nebulization 2 (Two) Times a Day. And may use additional 2 times as needed chest congestion 480 mL 6     No current facility-administered medications on file prior to visit.        ALLERGIES:     Allergies   Allergen Reactions   • Ace Inhibitors      Other reaction(s): Cough       Social History     Socioeconomic History   • Marital status:      Spouse name: Miah   • Number of children: Not on file   • Years of education: College   • Highest education level: Not on file   Occupational History     Employer: RETIRED   Tobacco Use   • Smoking status: Former Smoker     Types: Cigarettes     Last attempt to quit:      Years since quittin.7   • Smokeless tobacco: Never Used   • Tobacco comment: caffeine use-soda   Substance and Sexual Activity   • Alcohol use: Yes     Comment: rarely   • Drug use: No   • Sexual activity: Defer     Family History   Problem Relation Age of Onset   • Heart attack Mother         Acute   • Heart disease Mother    • Colon polyps Sister         Sigmoid colon   • Coronary artery disease Brother    • Diabetes Brother    • Heart disease Brother    • Alcohol abuse Son               Review of Systems   Constitutional: Positive for fatigue. Negative for activity change, appetite change, fever and unexpected weight change.   HENT: Positive for congestion and voice change. Negative for mouth sores, nosebleeds and sore throat.    Respiratory: Positive for cough and shortness of breath. Negative for wheezing.    Cardiovascular: Negative for chest pain, palpitations and leg swelling.   Gastrointestinal: Positive for nausea. Negative for abdominal distention, abdominal pain, blood in stool, constipation, diarrhea and vomiting.   Endocrine: Negative for cold intolerance and heat intolerance.   Genitourinary: Negative for difficulty urinating, dysuria, frequency and hematuria.    Musculoskeletal: Negative for arthralgias, back pain, joint swelling and myalgias.   Skin: Negative for rash.   Neurological: Positive for dizziness and light-headedness. Negative for syncope, weakness, numbness and headaches.   Hematological: Negative for adenopathy. Does not bruise/bleed easily.   Psychiatric/Behavioral: Negative for confusion, dysphoric mood and sleep disturbance. The patient is not nervous/anxious.          Objective      Vitals:    09/20/19 1144   BP: 113/71   Pulse: 107   Resp: 16   Temp: 97.7 °F (36.5 °C)   SpO2: 92%      Current Status 9/20/2019   ECOG score 3   Pain 0/10    Physical Exam   Constitutional: She is oriented to person, place, and time.   Elderly female no distress seated in a wheelchair   HENT:   Mouth/Throat: Oropharynx is clear and moist.   Eyes: Conjunctivae are normal.   Neck: No thyromegaly present.   Cardiovascular: Normal rate and regular rhythm. Exam reveals no gallop and no friction rub.   No murmur heard.  Pulmonary/Chest: No respiratory distress. She has no wheezes.   Decreased breath sounds on the left.    Abdominal: Soft. Bowel sounds are normal. She exhibits no distension. There is no tenderness.   Musculoskeletal: She exhibits no edema.   Lymphadenopathy:        Head (right side): No submandibular adenopathy present.     She has no cervical adenopathy.     She has no axillary adenopathy.        Right: No inguinal and no supraclavicular adenopathy present.        Left: No inguinal and no supraclavicular adenopathy present.   Neurological: She is alert and oriented to person, place, and time. No cranial nerve deficit.   Left hemiparesis from prior CVA   Skin: Skin is warm and dry. No rash noted.   Psychiatric: She has a normal mood and affect. Her behavior is normal.   The patient was examined, unchanged from above.    RECENT LABS:  Hematology WBC   Date Value Ref Range Status   09/20/2019 8.12 3.40 - 10.80 10*3/mm3 Final     RBC   Date Value Ref Range Status    09/20/2019 3.66 (L) 3.77 - 5.28 10*6/mm3 Final     Hemoglobin   Date Value Ref Range Status   09/20/2019 10.2 (L) 12.0 - 15.9 g/dL Final     Hematocrit   Date Value Ref Range Status   09/20/2019 34.3 34.0 - 46.6 % Final     Platelets   Date Value Ref Range Status   09/20/2019 332 140 - 450 10*3/mm3 Final        Lab Results   Component Value Date    GLUCOSE 91 09/20/2019    BUN 10 09/20/2019    CREATININE 0.75 09/20/2019    EGFRIFNONA 76 09/20/2019    EGFRIFAFRI 74 08/11/2016    BCR 13.3 09/20/2019    K 4.0 09/20/2019    CO2 22.8 09/20/2019    CALCIUM 9.2 09/20/2019    PROTENTOTREF 7.4 08/11/2016    ALBUMIN 3.20 (L) 09/20/2019    LABIL2 1.2 08/11/2016    AST 22 09/20/2019    ALT 10 09/20/2019     MRI brain 9/17/2019:  IMPRESSION:  1. When compared to most recent MRI 08/06/2019, the 3 tiny metastatic  lesions to the left cerebral hemisphere have further decreased in size  and there has been resolution of the vasogenic edema indicating further  response to treatment. There remains a 2 mm enhancing lesion in the  posterior inferior medial left occipital juxtacortical white matter, a  1-2 mm enhancing lesion in the posterior inferior left medial left  parietal juxtacortical white matter, and a 3 x 2 x 2 mm enhancing lesion  in the anterior left frontal juxtacortical white matter with no  surrounding edema. No new or additional brain metastases are seen.  2. Back in 06/2008, the patient had a 3 cm hemorrhage extending from the  posterior limb of the right internal capsule into the right thalamus and  now there is a 2.4 x 2 cm area of cystic encephalomalacia at this site  that is lined with hemosiderin from the sequela of old hemorrhage at  this site with Wallerian degeneration, and white matter tracts tracking  inferiorly into the right cerebral peduncle, anterior mary, and medulla.  3. There is evidence of chronic right internal carotid occlusion with  absent flow void in the upper cervical, petrous, and cavernous  segment  of the right internal carotid artery with reconstitution of a small  diameter right internal carotid terminus by collateral circulation from  the [place], and there is a moderate sized 4 x 3.5 cm old  infarct posterior lateral right frontal lobe in distribution posterior  lateral frontal branch of the right middle cerebral artery territory.  4. Previously removed superior left frontal approach ventricular  catheter, and there is a tubular catheter tract in the superior left  frontal white matter with some surrounding gliosis. The remainder of the  MRI of the head is normal.    CT chest abdomen pelvis 9/17/2019: I did personally review CT images today.  IMPRESSION:  Localized patchy increased parenchymal density and pleural  thickening in the posterior aspect of the left upper lobe at the site of  patient's treated lung carcinoma that appears unchanged since the most  recent chest CT scan dated 08/06/2019. There is no evidence of enlarging  residual primary lung neoplasm. An ill-defined devaughn mass within the AP  window shows slight further interval decrease in size. There continues  to be no evidence of metastatic disease within the abdomen or pelvis.  Severe emphysema. Markedly atrophic left kidney. Extensive vascular  calcification.    Assessment/Plan     1. Metastatic non-small cell lung cancer (adenocarcinoma):  · Patient with long-standing smoking history x40 years quit in 2008  · Underlying significant COPD with FEV1 1.4 (58%) on 11/20/2014  · CT 5/16/2019 with left upper lobe mass, partially cavitary measuring 2.3 x 1.5 cm.  Additional 8 mm similar appearing right upper lobe nodule.  Bibasilar consolidation, small bilateral pleural effusions, mediastinal lymphadenopathy up to 2.7 centimeters.  · Bronchoscopy 5/20/2019 with identification of left mainstem malignancy extending to the level of the jose, biopsy showed poorly differentiated adenocarcinoma of pulmonary origin. EBUS with FNA  station 7 lymph node negative for malignant cells, only scattered lymphoid aggregates.  BAL left upper lobe negative for malignant cells.  Negative EGFR mutation, negative ALK/ROS 1 rearrangement. PDL1 95%.  · Patient was intubated with possible pneumonia, significant mucus plugging with underlying significant COPD.  Extubated on 5/28/2019.   · Staging evaluation performed during hospitalization with negative CT abdomen/pelvis, negative bone scan.  · MRI brain 5/29/2019 with evidence of metastatic disease, 3-4 enhancing lesions involving left occipital lobe 5 mm, left posterior parietal lobe 4 mm, left frontal lobe 4 mm, left parietal 3-4 mm with small amounts of surrounding edema.  Given the minimal extent of disease elected to observe with short interval MRI.  · Discussion regarding systemic therapy with single agent Keytruda due to PDL1 95%  · Subsequent disease progression on CT 6/8/2019 with increasing left upper lobe mass, mediastinal and hilar lymphadenopathy with occlusion of left mainstem bronchus at origin.  · Received palliative radiation therapy to left mainstem bronchus x10 fractions, completed 6/25/2019.  · Initiated systemic therapy with Keytruda 6/28/2019.  · CT scan following 2 cycles Keytruda and prior radiation from 8/6/2019 showed decrease in the left upper lobe primary lesion with essentially no residual discrete lung mass in that area.  Lymphadenopathy in the mediastinum/AP window and left hilum with significant improvement and resolution of left mainstem obstruction.  No new evidence of disease.  The patient did receive radiation therapy to both the primary left upper lobe mass and left hilum/mediastinum accounting for most of the improvement.  MRI brain 8/6/2019 with 3 enhancing metastatic lesions, 2 of which had a more nodular appearance as opposed to previous ring-enhancing appearance with stable size.  These were in the left occipital and left frontal regions.  The third lesion in the left  parietal region decreased in size from 4 mm down to 2 mm.  There were no new lesions identified.  She has not undergone any specific treatment for her brain metastases.  Continuation of immunotherapy with Keytruda.  · Patient returns today in follow-up due to begin cycle 5 Keytruda.  Was continuing to experience nausea and profound fatigue and therefore we elected to go ahead and proceed with CT scan chest abdomen pelvis and MRI of the brain prior to her return visit today, performed 1 cycle before we had previously discussed.  Fortunately she has improved in terms of her fatigue and nausea, some of which attributable to Zyprexa, currently receiving 5 mg nightly.  She is inquiring whether we could consider increasing this to 7.5 mg nightly.  She remains in a wheelchair today related to her previous neurologic compromise from CVA.  Symptoms are unchanged in that regard.  She notes that her respiratory status is relatively stable.  She remains off metoprolol with no orthostatic symptoms and this may be a factor in her current improved status.  We did review her scans from 9/17/2019.  CT scans show provement in AP window lymph node from 2.7 x 1.9 down to 2.1 x 1.5 cm.  There has been some progressive change in the left lower lobe around the pleura, with one area having a masslike appearance.  I do suspect however that this is related to radiation pneumonitis rather than malignancy.  There are no other significant findings.  On MRI brain, the small metastatic lesions have decreased in size and there are no new lesions identified.  Therefore we discussed continuation of treatment today however I did suggest that we repeat scans after 2 cycles.  It is noted that the patient will be out of town on a trip and requests that we delay her seventh cycle by 1 week.  2. COPD/chronic hypoxemic respiratory failure:  · Patient required intubation in May 2019 due to severe mucous plugging following bronchoscopy.  · Patient extubated  5/28/2019.  · Subsequent occlusion of left mainstem bronchus from malignancy 6/8/2019.  Received palliative radiation completed 6/25/2019.  · Significant improvement from a pulmonary standpoint.  She does have oxygen at home however has not required this.  She also continues to use nebulizer frequently.   3. Prior hemorrhagic CVA:  · Occurred in 2008, residual left upper extremity weakness and left lower extremity weakness.   4. Anemia:  · Hemoglobin prior to admission was 13.1 on 5/16/2019  · Hemoglobin declined in the 10-11 range, related to malignancy, pneumonia  · Hemoglobin today stable at 10.2  7. Abnormal thyroid function studies:  · Labs 6/28/2019 with TSH 23.8, normal free T4 of 1.43  · Patient was started on levothyroxine 100 mcg daily  · Repeat thyroid function studies on 8/9/2019 with TSH suppressed at 0.065 and elevated free T4 of 2.06.  Decreased levothyroxine dose to 50 mcg daily.   · On 8/30/19, improvement with free T4 1.19, TSH slightly increased at 6.57.   · Today, thyroid function studies have changed significantly again with unclear significance with free T4 high normal at 1.33, TSH increased significantly to 18.4.  We will continue current dose of levothyroxine 50 mcg for now and recheck thyroid function at the beginning of the next 2 cycles  8. Depression and nausea/anorexia:  · Patient with depression related to her underlying medical illness as well as social situation with her son who is an alcoholic and lives in the home  · Patient has been treated with Depakote 250 mg every 12 hours for mood stabilization under the direction of her primary care physician  · Patient with significant depressive symptoms, currently continuing on Zoloft under the direction of her primary care physician.  · Patient also with ongoing anorexia and nausea, initiated Zyprexa 2.5 mg nightly on 7/19/2019 with dramatic improvement in symptoms  · Patient seen in the supportive oncology clinic on 7/29/2019, tapered off  Depakote.  · Patient has PRN Compazine and Zyprexa to use.  · Zyprexa dose titrated to 5 mg and subsequently on 9/20/2019 up to 7.5 mg nightly.  9. Hypokalemia:  · Potassium on 8/9/2019 profoundly low at 2.5  · Magnesium low normal at 1.7  · Prescribed 20 mEq IV potassium chloride   · Potassium today 4.0  10. Dizziness/lightheadedness with relative hypotension:  · Cortisol level normal on 8/30/2019 at 33.16  · Patient tapered off metoprolol by cardiology 9/13/2019  · Symptoms currently significantly improved.    Plan:  1. Proceed with cycle 5 Keytruda today 200 mg IV day 1 on a 21-day cycle  2. Continue levothyroxine at 50 mcg daily  3. Increase Zyprexa from 5 mg up to 7.5 mg nightly  4. Continue potassium chloride 20 mEq daily  5. In 3 weeks nurse practitioner visit with CBC, CMP, TSH, FT4 and cycle 6 Keytruda.  6. In 7 weeks MD visit with CBC, CMP, TSH, free T4 and cycle 7 Keytruda.  Earlier that week of 11/4/2019, patient will undergo CT chest abdomen pelvis and MRI brain.  Patient requests that the cycle of treatment be delayed by 1 week due to vacation plans.

## 2019-09-24 NOTE — PROGRESS NOTES
Chief Complaint: decreased energy and appetite    Subjective  Patient ID: Anaid Moura is a 74 y.o. female who presents to [facility] clinic regarding medication management and supportive counseling.     HPI:  Pt describes improvement in sx of dizziness with discontinuation of metoprolol. Recognizes resultant return of tachycardia, although denies feeling symptomatic related to this.  Continues to report decline in weight; is making self eat. Continues to enjoy fruit and cereal with ability to tolerate this. Usually eating two meals per day.   Feels supported by brother, , and family in general.  Sleep is described to be appropriate with fair report of energy.   Recognizes increased appetite on higher doses of olanzapine and is tolerating well.   Is no longer taking ativan.    Sleep is reported to be appropriate and restorative.  Pain is reported to be is not an issue at this time.   Appetite is reported to be decreased, contributing to continued weight loss.  Pt target sx include increased appetite, weight gain, increased daytime energy.     Pt describes appreciation of ability to go to mother/ daughter trip which will be a cruise to the [facility]. Has been doing this for 20 years.   Generally feels mood and anxiety sx are well controlled at this time.    Diagnoses and all orders for this visit:    Recurrent major depressive disorder, in full remission (CMS/[facility])     Plan of Care  Pt with significant resolution of sx on current regimen of zyprexa 7.5 mg q hs and zoloft 25 mg daily. Denies additional target sx. Will continue as written. Medications reviewed; at present, Dr. Godfrey is prescribing zyprexa. Pt has sufficient supply of zoloft at this time; evaluated potential benefit of increasing zoloft to 50 mg daily with return of mood or anxiety sx. At this time, no further follow up has been made in clinic. Pt aware APRN student, Renee, is available for PRN follow up if needed. Pt agrees to call clinic if this is  desired.    Total face to face time spent 32 minutes. 26 minutes spent in supportive counseling surrounding issues of nutrition, relaxation techniques, radical acceptance, and QOL goals

## 2019-10-11 NOTE — PROGRESS NOTES
Subjective .     REASONS FOR FOLLOWUP:    1. Metastatic non-small cell lung cancer (adenocarcinoma):  · Patient with long-standing smoking history x40 years quit in 2008  · Underlying significant COPD with FEV1 1.4 (58%) on 11/20/2014  · CT 5/16/2019 with left upper lobe mass, partially cavitary measuring 2.3 x 1.5 cm.  Additional 8 mm similar appearing right upper lobe nodule.  Bibasilar consolidation, small bilateral pleural effusions, mediastinal lymphadenopathy up to 2.7 centimeters.  · Bronchoscopy 5/20/2019 with identification of left mainstem malignancy extending to the level of the jose, biopsy showed poorly differentiated adenocarcinoma of pulmonary origin. EBUS with FNA station 7 lymph node negative for malignant cells, only scattered lymphoid aggregates.  BAL left upper lobe negative for malignant cells.  Negative EGFR mutation, negative ALK/ROS 1 rearrangement. PDL1 95%.  · Patient was intubated with possible pneumonia, significant mucus plugging with underlying significant COPD.  Extubated on 5/28/2019.   · Staging evaluation performed during hospitalization with negative CT abdomen/pelvis, negative bone scan.  · MRI brain 5/29/2019 with evidence of metastatic disease, 3-4 enhancing lesions involving left occipital lobe 5 mm, left posterior parietal lobe 4 mm, left frontal lobe 4 mm, left parietal 3-4 mm with small amounts of surrounding edema.  Given the minimal extent of disease elected to observe with short interval MRI.  · Discussion regarding systemic therapy with single agent Keytruda due to PDL1 95%  · Subsequent disease progression on CT 6/8/2019 with increasing left upper lobe mass, mediastinal and hilar lymphadenopathy with occlusion of left mainstem bronchus at origin.  · Received palliative radiation therapy to left mainstem bronchus x10 fractions, completed 6/25/2019.  · Initiated systemic therapy with Keytruda 6/28/2019.  · Scans 8/6/2019 following 2 cycles Keytruda with decrease left  upper lobe primary lesion and left hilar/mediastinal lymphadenopathy.  Response primarily felt to be related to radiation therapy.  Continuation of immunotherapy.  · CT scans 9/17/2019 following 4 cycles Keytruda with further decrease in AP window lymph node, suspected evolving postradiation change in left lower lobe.  MRI brain 9/17/2019 with improvement in 3 small metastatic lesions.  Continuation of immunotherapy.  2. COPD/chronic hypoxemic respiratory failure:  · Patient required intubation in May 2019 due to severe mucous plugging following bronchoscopy.  · Patient extubated 5/28/2019.  · Subsequent occlusion of left mainstem bronchus from malignancy 6/8/2019.  Received palliative radiation completed 6/25/2019.  · She continues to improve from a pulmonary standpoint.  She does have oxygen at home however has not required this.   3. Prior hemorrhagic CVA:  · Occurred in 2008, residual left upper extremity weakness and left lower extremity weakness.   4. Anemia:  · Hemoglobin prior to admission was 13.1 on 5/16/2019  · Hemoglobin declined in the 10-11 range, related to malignancy, pneumonia  5. Depression and nausea/anorexia and chronic fatigue:  · Significant improvement following addition of Zyprexa nightly, dose escalated to 5 mg.  6. Hypothyroidism:  · Labs 6/28/2019 with TSH 23.8, normal free T4 of 1.43  · Treated with levothyroxine 100 mcg daily  · Subsequent thyroid function studies with TSH 0.065 and free T4 elevated at 2.06, levothyroxine dose decreased 8/9/19 to 50 mcg daily.      HISTORY OF PRESENT ILLNESS:  The patient is a 74 y.o. year old female who is here for follow-up with the above-mentioned history.    History of Present Illness the patient returns today in follow-up due for cycle 4 immunotherapy with Keytruda.  She continues on the Zyprexa 7.5 mg in both she and her  report improvement in her appetite though her weight is not increased.  She denies any new fevers, pain, or any concerns.   She reports she feels pretty well today.  She even went out to dinner last night.    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   • Hyperlipidemia    • Left hemiplegia (CMS/HCC) 12/10/2015   • Myocardial infarct (CMS/HCC) 1996   • Osteopenia    • Stroke syndrome 2008   • Thyroid cyst     right   • Thyroid lump 12/10/2015    Description: Biopsy 05/15 at [facility], benign   • Transient cerebral ischemia    • Urge incontinence of urine      Past Surgical History:   Procedure Laterality Date   • BREAST BIOPSY     • BRONCHOSCOPY Bilateral 5/20/2019    Procedure: BRONCHOSCOPY WITH  BIOPSY AND BAL, WITH ENDOBRONCHIAL ULTRASOUND WITH FNA;  Surgeon: Chris Tirado MD;  Location: [facility] ENDOSCOPY;  Service: Pulmonary   • COLONOSCOPY      REC 07/2007, REC 02/2009, REC 12/2011, REC 01/14,  She says she cant do the prep because of poor mobility to get to .   • EYE SURGERY  1950   • OTHER SURGICAL HISTORY      Ventricular [place] for drainage of subdural hematoma   • TOTAL THYROIDECTOMY  08/2015    Dr. Ahmadi         ONCOLOGIC HISTORY:  (History from previous dates can be found in the separate document.)    Current Outpatient Medications on File Prior to Visit   Medication Sig Dispense Refill   • aspirin 81 MG chewable tablet Chew 81 mg Daily.     • atorvastatin (LIPITOR) 80 MG tablet Take 1 tablet by mouth Daily. Needs an appointment for further refills (Patient taking differently: Take 80 mg by mouth Every Night. Needs an appointment for further refills) 90 tablet 0   • budesonide (PULMICORT) 0.5  MG/2ML nebulizer solution Take 0.5 mg by nebulization Daily.     • formoterol (PERFOROMIST) 20 MCG/2ML nebulizer solution Take  by nebulization 2 (Two) Times a Day.     • levothyroxine (SYNTHROID, LEVOTHROID) 50 MCG tablet Take 1 tablet by mouth Daily. 30 tablet 2   • OLANZapine (zyPREXA) 7.5 MG tablet Take 1 tablet by mouth Every Night. 30 tablet 2   • potassium chloride ER (K-TAB) 20 MEQ tablet controlled-release ER tablet Take 1 tablet by mouth Daily. 30 tablet 2   • sertraline (ZOLOFT) 25 MG tablet Take 1 tablet by mouth Daily. 90 tablet 1   • SSD 1 % cream      • tolterodine (DETROL) 1 MG tablet      • YUPELRI 175 MCG/3ML solution      • guaiFENesin (ROBITUSSIN) 100 MG/5ML solution oral solution Take 20 mL by mouth Every 4 (Four) Hours. While awake 3600 mL 3   • ipratropium-albuterol (DUO-NEB) 0.5-2.5 mg/3 ml nebulizer Take 3 mL by nebulization 4 (Four) Times a Day. And every 4 hours as needed for chest congestion 480 mL 11   • LORazepam (ATIVAN PO) Take 0.25 mg by mouth As Needed.     • ondansetron (ZOFRAN) 4 MG tablet Take 1 tablet by mouth Every 6 (Six) Hours As Needed for Nausea or Vomiting. 60 tablet 2   • prochlorperazine (COMPAZINE) 10 MG tablet Take 1 tablet by mouth Every 6 (Six) Hours As Needed for Nausea or Vomiting. 30 tablet 1   • sodium chloride 7 % nebulizer solution nebulizer solution Take 4 mL by nebulization 2 (Two) Times a Day. And may use additional 2 times as needed chest congestion 480 mL 6     No current facility-administered medications on file prior to visit.        ALLERGIES:     Allergies   Allergen Reactions   • Ace Inhibitors      Other reaction(s): Cough       Social History     Socioeconomic History   • Marital status:      Spouse name: Miah   • Number of children: Not on file   • Years of education: College   • Highest education level: Not on file   Occupational History     Employer: RETIRED   Tobacco Use   • Smoking status: Former Smoker     Types: Cigarettes     Last  attempt to quit: 2008     Years since quittin.7   • Smokeless tobacco: Never Used   • Tobacco comment: caffeine use-soda   Substance and Sexual Activity   • Alcohol use: Yes     Comment: rarely   • Drug use: No   • Sexual activity: Defer     Family History   Problem Relation Age of Onset   • Heart attack Mother         Acute   • Heart disease Mother    • Colon polyps Sister         Sigmoid colon   • Coronary artery disease Brother    • Diabetes Brother    • Heart disease Brother    • Alcohol abuse Son               Review of Systems   Constitutional: Positive for fatigue. Negative for activity change, appetite change, fever and unexpected weight change.   HENT: Positive for voice change. Negative for mouth sores, nosebleeds and sore throat.    Respiratory: Positive for cough and shortness of breath. Negative for wheezing.    Cardiovascular: Negative for chest pain, palpitations and leg swelling.   Gastrointestinal: Positive for nausea. Negative for abdominal distention, abdominal pain, blood in stool, constipation, diarrhea and vomiting.   Endocrine: Negative for cold intolerance and heat intolerance.   Genitourinary: Negative for difficulty urinating, dysuria, frequency and hematuria.   Musculoskeletal: Negative for arthralgias, back pain, joint swelling and myalgias.   Skin: Negative for rash.   Neurological: Positive for dizziness and light-headedness. Negative for syncope, weakness, numbness and headaches.   Hematological: Negative for adenopathy. Does not bruise/bleed easily.   Psychiatric/Behavioral: Negative for confusion, dysphoric mood and sleep disturbance. The patient is not nervous/anxious.          Objective      Vitals:    10/11/19 1119   BP: 127/84   Pulse: 110   Resp: 18   Temp: 97.9 °F (36.6 °C)   SpO2: 91%      Current Status 10/11/2019   ECOG score 0     Pain Score    10/11/19 1119   PainSc: 0-No pain         Physical Exam   Constitutional: She is oriented to person, place, and time.    Elderly female no distress seated in a wheelchair   HENT:   Mouth/Throat: Oropharynx is clear and moist.   Eyes: Conjunctivae are normal.   Neck: No thyromegaly present.   Cardiovascular: Normal rate and regular rhythm. Exam reveals no gallop and no friction rub.   No murmur heard.  Pulmonary/Chest: No respiratory distress. She has no wheezes.   Decreased breath sounds on the left.    Abdominal: Soft. Bowel sounds are normal. She exhibits no distension. There is no tenderness.   Musculoskeletal: She exhibits no edema.   Lymphadenopathy:        Head (right side): No submandibular adenopathy present.     She has no cervical adenopathy.     She has no axillary adenopathy.        Right: No inguinal and no supraclavicular adenopathy present.        Left: No inguinal and no supraclavicular adenopathy present.   Neurological: She is alert and oriented to person, place, and time. No cranial nerve deficit.   Left hemiparesis from prior CVA   Skin: Skin is warm and dry. No rash noted.   Psychiatric: She has a normal mood and affect. Her behavior is normal.   The patient was examined 10/11/19, unchanged from above.    RECENT LABS:  Hematology WBC   Date Value Ref Range Status   10/11/2019 8.49 3.40 - 10.80 10*3/mm3 Final     RBC   Date Value Ref Range Status   10/11/2019 4.05 3.77 - 5.28 10*6/mm3 Final     Hemoglobin   Date Value Ref Range Status   10/11/2019 10.9 (L) 12.0 - 15.9 g/dL Final     Hematocrit   Date Value Ref Range Status   10/11/2019 37.5 34.0 - 46.6 % Final     Platelets   Date Value Ref Range Status   10/11/2019 343 140 - 450 10*3/mm3 Final        Lab Results   Component Value Date    GLUCOSE 119 10/11/2019    BUN 10 10/11/2019    CREATININE 0.82 10/11/2019    EGFRIFNONA 68 10/11/2019    EGFRIFAFRI 74 08/11/2016    BCR 12.2 10/11/2019    K 3.8 10/11/2019    CO2 22.6 10/11/2019    CALCIUM 9.5 10/11/2019    PROTENTOTREF 7.4 08/11/2016    ALBUMIN 3.50 10/11/2019    LABIL2 1.2 08/11/2016    AST 22 10/11/2019     ALT 11 10/11/2019     MRI brain 9/17/2019:  IMPRESSION:  1. When compared to most recent MRI 08/06/2019, the 3 tiny metastatic  lesions to the left cerebral hemisphere have further decreased in size  and there has been resolution of the vasogenic edema indicating further  response to treatment. There remains a 2 mm enhancing lesion in the  posterior inferior medial left occipital juxtacortical white matter, a  1-2 mm enhancing lesion in the posterior inferior left medial left  parietal juxtacortical white matter, and a 3 x 2 x 2 mm enhancing lesion  in the anterior left frontal juxtacortical white matter with no  surrounding edema. No new or additional brain metastases are seen.  2. Back in 06/2008, the patient had a 3 cm hemorrhage extending from the  posterior limb of the right internal capsule into the right thalamus and  now there is a 2.4 x 2 cm area of cystic encephalomalacia at this site  that is lined with hemosiderin from the sequela of old hemorrhage at  this site with Wallerian degeneration, and white matter tracts tracking  inferiorly into the right cerebral peduncle, anterior mary, and medulla.  3. There is evidence of chronic right internal carotid occlusion with  absent flow void in the upper cervical, petrous, and cavernous segment  of the right internal carotid artery with reconstitution of a small  diameter right internal carotid terminus by collateral circulation from  the [place], and there is a moderate sized 4 x 3.5 cm old  infarct posterior lateral right frontal lobe in distribution posterior  lateral frontal branch of the right middle cerebral artery territory.  4. Previously removed superior left frontal approach ventricular  catheter, and there is a tubular catheter tract in the superior left  frontal white matter with some surrounding gliosis. The remainder of the  MRI of the head is normal.    CT chest abdomen pelvis 9/17/2019: I did personally review CT images  today.  IMPRESSION:  Localized patchy increased parenchymal density and pleural  thickening in the posterior aspect of the left upper lobe at the site of  patient's treated lung carcinoma that appears unchanged since the most  recent chest CT scan dated 08/06/2019. There is no evidence of enlarging  residual primary lung neoplasm. An ill-defined devaughn mass within the AP  window shows slight further interval decrease in size. There continues  to be no evidence of metastatic disease within the abdomen or pelvis.  Severe emphysema. Markedly atrophic left kidney. Extensive vascular  calcification.    Assessment/Plan     1. Metastatic non-small cell lung cancer (adenocarcinoma):  · Patient with long-standing smoking history x40 years quit in 2008  · Underlying significant COPD with FEV1 1.4 (58%) on 11/20/2014  · CT 5/16/2019 with left upper lobe mass, partially cavitary measuring 2.3 x 1.5 cm.  Additional 8 mm similar appearing right upper lobe nodule.  Bibasilar consolidation, small bilateral pleural effusions, mediastinal lymphadenopathy up to 2.7 centimeters.  · Bronchoscopy 5/20/2019 with identification of left mainstem malignancy extending to the level of the jose, biopsy showed poorly differentiated adenocarcinoma of pulmonary origin. EBUS with FNA station 7 lymph node negative for malignant cells, only scattered lymphoid aggregates.  BAL left upper lobe negative for malignant cells.  Negative EGFR mutation, negative ALK/ROS 1 rearrangement. PDL1 95%.  · Patient was intubated with possible pneumonia, significant mucus plugging with underlying significant COPD.  Extubated on 5/28/2019.   · Staging evaluation performed during hospitalization with negative CT abdomen/pelvis, negative bone scan.  · MRI brain 5/29/2019 with evidence of metastatic disease, 3-4 enhancing lesions involving left occipital lobe 5 mm, left posterior parietal lobe 4 mm, left frontal lobe 4 mm, left parietal 3-4 mm with small amounts of  surrounding edema.  Given the minimal extent of disease elected to observe with short interval MRI.  · Discussion regarding systemic therapy with single agent Keytruda due to PDL1 95%  · Subsequent disease progression on CT 6/8/2019 with increasing left upper lobe mass, mediastinal and hilar lymphadenopathy with occlusion of left mainstem bronchus at origin.  · Received palliative radiation therapy to left mainstem bronchus x10 fractions, completed 6/25/2019.  · Initiated systemic therapy with Keytruda 6/28/2019.  · CT scan following 2 cycles Keytruda and prior radiation from 8/6/2019 showed decrease in the left upper lobe primary lesion with essentially no residual discrete lung mass in that area.  Lymphadenopathy in the mediastinum/AP window and left hilum with significant improvement and resolution of left mainstem obstruction.  No new evidence of disease.  The patient did receive radiation therapy to both the primary left upper lobe mass and left hilum/mediastinum accounting for most of the improvement.  MRI brain 8/6/2019 with 3 enhancing metastatic lesions, 2 of which had a more nodular appearance as opposed to previous ring-enhancing appearance with stable size.  These were in the left occipital and left frontal regions.  The third lesion in the left parietal region decreased in size from 4 mm down to 2 mm.  There were no new lesions identified.  She has not undergone any specific treatment for her brain metastases.  Continuation of immunotherapy with Keytruda.  · Patient returned in follow-up for cycle 5 Keytruda and for review her scans from 9/17/2019.  CT scans show provement in AP window lymph node from 2.7 x 1.9 down to 2.1 x 1.5 cm.  There has been some progressive change in the left lower lobe around the pleura, with one area having a masslike appearance.  Dr. Godfrey suspect however that this is related to radiation pneumonitis rather than malignancy.  There are no other significant findings.  On MRI  brain, the small metastatic lesions have decreased in size and there are no new lesions identified.   · She returns today for Keytruda. We will proceed with cycle 6 today.  She will return in 2 weeks for repeat scans.  It is noted that the patient will be out of town on a trip and requests that we delay her seventh cycle by 1 week, at which time she will follow up with Dr. Godfrey.  2. COPD/chronic hypoxemic respiratory failure:  · Patient required intubation in May 2019 due to severe mucous plugging following bronchoscopy.  · Patient extubated 5/28/2019.  · Subsequent occlusion of left mainstem bronchus from malignancy 6/8/2019.  Received palliative radiation completed 6/25/2019.  · Significant improvement from a pulmonary standpoint.  She does have oxygen at home however has not required this.  She also continues to use nebulizer frequently.   3. Prior hemorrhagic CVA:  · Occurred in 2008, residual left upper extremity weakness and left lower extremity weakness.   4. Anemia:  · Hemoglobin prior to admission was 13.1 on 5/16/2019  · Hemoglobin declined in the 10-11 range, related to malignancy, pneumonia  · Hemoglobin today stable at 10.9  7. Abnormal thyroid function studies:  · Labs 6/28/2019 with TSH 23.8, normal free T4 of 1.43  · Patient was started on levothyroxine 100 mcg daily  · Repeat thyroid function studies on 8/9/2019 with TSH suppressed at 0.065 and elevated free T4 of 2.06.  Decreased levothyroxine dose to 50 mcg daily.   · On 8/30/19, improvement with free T4 1.19, TSH slightly increased at 6.57.   · With cycle 5, thyroid function studies changed significantly again with unclear significance with free T4 high normal at 1.33, TSH increased significantly to 18.4.  We continued her current dose of levothyroxine 50 mcg and planned to recheck thyroid function at the beginning of the next 2 cycles.  [place]iday TSH 19.3 and FreeT4 1.3.  8. Depression and nausea/anorexia:  · Patient with depression related to her  underlying medical illness as well as social situation with her son who is an alcoholic and lives in the home  · Patient has been treated with Depakote 250 mg every 12 hours for mood stabilization under the direction of her primary care physician  · Patient with significant depressive symptoms, currently continuing on Zoloft under the direction of her primary care physician.  · Patient also with ongoing anorexia and nausea, initiated Zyprexa 2.5 mg nightly on 7/19/2019 with dramatic improvement in symptoms  · Patient seen in the supportive oncology clinic on 7/29/2019, tapered off Depakote.  · Patient has PRN Compazine and Zyprexa to use.  · Zyprexa dose titrated to 5 mg and subsequently on 9/20/2019 up to 7.5 mg nightly.  9. Hypokalemia:  · Potassium on 8/9/2019 profoundly low at 2.5  · Magnesium low normal at 1.7  · Prescribed 20 mEq IV potassium chloride   · Potassium today 3.8  10. Dizziness/lightheadedness with relative hypotension:  · Cortisol level normal on 8/30/2019 at 33.16  · Patient tapered off metoprolol by cardiology 9/13/2019  · Symptoms currently significantly improved.    Plan:  1. Proceed with cycle 6 Keytruda today 200 mg IV day 1 on a 21-day cycle  2. Continue levothyroxine at 50 mcg daily  3. Continue Zyprexa 7.5 mg nightly  4. Continue potassium chloride 20 mEq daily  5. In 4 weeks MD visit with CBC, CMP, TSH, free T4 and cycle 7 Keytruda.  Earlier that week of 11/4/2019, patient will undergo CT chest abdomen pelvis and MRI brain.  Patient requests that the cycle of treatment be delayed by 1 week due to vacation plans.

## 2019-10-15 PROBLEM — E55.9 VITAMIN D DEFICIENCY: Status: ACTIVE | Noted: 2018-09-24

## 2019-10-15 NOTE — PROGRESS NOTES
[facility] CARDIOLOGY  [address]  [facility] [address]  Phone: [phone]      Patient Name: Anaid Moura  :1945  Age: 74 y.o.  Primary Cardiologist: Tammy Pina MD  Encounter Provider:  SOPHIA Jaimes      Chief Complaint:   Chief Complaint   Patient presents with   • Follow-up     6 weeks          HPI  Anaid Moura is a 74 y.o. female who presents today for 6-week reevaluation.     Pt has a  history significant for chronic coronary disease, CVA, hyperlipidemia, hypertension, stage III renal disease, history of hemorrhagic stroke, occluded right carotid artery, metastatic lung cancer, severe emphysema.    Patient was seen by Dr. Pina on 9/3/2019 where she was having episodes of low blood pressure.  At that time patient's metoprolol was stopped.    Patient reports that episodes of lightheadedness as well as hypotension have improved since stopping metoprolol.  States that overall she feels better.  Reports chronic but stable shortness of breath and fatigue.  Blood pressure has been much more controlled stating that it is been in the 100s-110s at home.  She denies chest pain, shortness of breath, palpitations, lightheadedness, swelling, fatigue.    The following portions of the patient's history were reviewed and updated as appropriate: allergies, current medications, past family history, past medical history, past social history, past surgical history and problem list.      Review of Systems   Constitution: Positive for malaise/fatigue.   Cardiovascular: Positive for dyspnea on exertion. Negative for chest pain and leg swelling.   Respiratory: Positive for shortness of breath.    Neurological: Negative for light-headedness.   All other systems reviewed and are negative.      OBJECTIVE:     Vitals:    10/15/19 1117   BP: 112/62   BP Location: Right arm   Patient Position: Sitting   Pulse: 107   SpO2: (!) 88%   Weight: 42.2 kg (93  "lb)   Height: 162.6 cm (64\")     Body mass index is 15.96 kg/m².    Physical Exam   Constitutional: She is oriented to person, place, and time. Vital signs are normal. She appears well-developed and well-nourished. She is active.   Eyes: Conjunctivae are normal.   Neck: Carotid bruit is not present.   Cardiovascular: Normal rate, regular rhythm and normal heart sounds.   Pulmonary/Chest: She has wheezes.   Abdominal: Normal appearance.   Musculoskeletal:   No cyanosis, clubbing, edema  Normal ROM   Neurological: She is alert and oriented to person, place, and time. GCS eye subscore is 4. GCS verbal subscore is 5. GCS motor subscore is 6.   Skin: Skin is warm, dry and intact.   Psychiatric: She has a normal mood and affect. Her speech is normal and behavior is normal. Judgment and thought content normal. Cognition and memory are normal.       Procedures    Cardiac Procedures:  1. Carotid artery duplex 10/23/2018.  Complex plaque in the right carotid bulb without evidence of stenosis.  Left common carotid artery with extensive plaquing that extends into the carotid bulbs.  No definitive carotid stenosis.        ASSESSMENT:      Diagnosis Plan   1. Lightheadedness     2. Chronic coronary artery disease     3. Benign essential hypertension     4. Bilateral carotid artery stenosis           PLAN OF CARE:     1. Lightheadedness.  Patient's last evaluation with Dr. Pina she is having episodes of lightheadedness secondary to hypotension.  At that time beta-blocker was stopped.  Patient states that since stopping beta-blocker lightheadedness has improved and her blood pressure has come up to more reasonable level.  Reports that blood pressure has been averaging in the 100s-110s.  2. Chronic coronary disease.  Denies angina, dyspnea.  Continue current regimen.  3. Hypertension.  Patient is actually had episodes of low blood pressure.  Improved with stopping beta-blocker.  Blood pressure 112/62 today.  4. Carotid artery " stenosis.  Last evaluated October 2018.  Currently asymptomatic.  5. Follow-up with Dr. Pina in 9 months.  Sooner for problems or complications.      Coronary Artery Disease  Assessment  • The patient has no angina    Plan  • Lifestyle modifications discussed include adhering to a heart healthy diet, avoidance of tobacco products, maintenance of a healthy weight, medication compliance, regular exercise and regular monitoring of cholesterol and blood pressure    Subjective - Objective  • Current antiplatelet therapy includes aspirin 81 mg             Discharge Medications           Accurate as of 10/15/19 12:53 PM. If you have any questions, ask your nurse or doctor.               Changes to Medications      Instructions Start Date   atorvastatin 80 MG tablet  Commonly known as:  LIPITOR  What changed:    · when to take this  · additional instructions   80 mg, Oral, Daily, Needs an appointment for further refills         Continue These Medications      Instructions Start Date   aspirin 81 MG chewable tablet   81 mg, Oral, Daily      ATIVAN PO   0.25 mg, Oral, As Needed      budesonide 0.5 MG/2ML nebulizer solution  Commonly known as:  PULMICORT   0.5 mg, Nebulization, Daily - RT      ipratropium-albuterol 0.5-2.5 mg/3 ml nebulizer  Commonly known as:  DUO-NEB   3 mL, Nebulization, 4 Times Daily - RT      levothyroxine 50 MCG tablet  Commonly known as:  SYNTHROID, LEVOTHROID   50 mcg, Oral, Daily      OLANZapine 7.5 MG tablet  Commonly known as:  zyPREXA   7.5 mg, Oral, Nightly      ondansetron 4 MG tablet  Commonly known as:  ZOFRAN   4 mg, Oral, Every 6 Hours PRN      PERFOROMIST 20 MCG/2ML nebulizer solution  Generic drug:  formoterol   Nebulization, 2 Times Daily - RT      potassium chloride ER 20 MEQ tablet controlled-release ER tablet  Commonly known as:  K-TAB   20 mEq, Oral, Daily      prochlorperazine 10 MG tablet  Commonly known as:  COMPAZINE   10 mg, Oral, Every 6 Hours PRN      sertraline 25 MG  tablet  Commonly known as:  ZOLOFT   25 mg, Oral, Daily      sodium chloride 7 % nebulizer solution nebulizer solution   4 mL, Nebulization, 2 Times Daily - RT, And may use additional 2 times as needed chest congestion      SSD 1 % cream  Generic drug:  silver sulfadiazine   No dose, route, or frequency recorded.      tolterodine 1 MG tablet  Commonly known as:  DETROL   No dose, route, or frequency recorded.      TUSSIN MUCUS+CHEST CONGESTION 100 MG/5ML liquid  Generic drug:  guaifenesin   400 mg, Oral, Every 4 Hours, While awake      YUPELRI 175 MCG/3ML solution  Generic drug:  Revefenacin   No dose, route, or frequency recorded.             Thank you for allowing me to participate in the care of your patient,         SOPHIA Jaimes  [facility]  10/15/19  12:53 PM    **Haylie Disclaimer:**  Much of this encounter note is an electronic transcription/translation of spoken language to printed text. The electronic translation of spoken language may permit erroneous, or at times, nonsensical words or phrases to be inadvertently transcribed. Although I have reviewed the note for such errors, some may still exist.

## 2019-10-23 NOTE — TELEPHONE ENCOUNTER
S/w pt. .  Pt. Is taking detrol per another md.  Continues to have bladder issues and is wanting to know if the dose can be increased.  S/w dr. Godfrey,  Informed  that he needs to get in contact with the md who placed her on detrol.  States he did try to call the md who placed her on the med and was told he wasn't in the office and for him to call another md??   Informed pt.  that there should be a md who is taking care of his pts while he is out of the office.   Informed him that we do not specialize in those type of meds and it would be best for him to discuss with pcp or her urologist if she has one.  understanding noted.

## 2019-11-08 NOTE — PROGRESS NOTES
Subjective .     REASONS FOR FOLLOWUP:    1. Metastatic non-small cell lung cancer (adenocarcinoma):  · Patient with long-standing smoking history x40 years quit in 2008  · Underlying significant COPD with FEV1 1.4 (58%) on 11/20/2014  · CT 5/16/2019 with left upper lobe mass, partially cavitary measuring 2.3 x 1.5 cm.  Additional 8 mm similar appearing right upper lobe nodule.  Bibasilar consolidation, small bilateral pleural effusions, mediastinal lymphadenopathy up to 2.7 centimeters.  · Bronchoscopy 5/20/2019 with identification of left mainstem malignancy extending to the level of the jose, biopsy showed poorly differentiated adenocarcinoma of pulmonary origin. EBUS with FNA station 7 lymph node negative for malignant cells, only scattered lymphoid aggregates.  BAL left upper lobe negative for malignant cells.  Negative EGFR mutation, negative ALK/ROS 1 rearrangement. PDL1 95%.  · Patient was intubated with possible pneumonia, significant mucus plugging with underlying significant COPD.  Extubated on 5/28/2019.   · Staging evaluation performed during hospitalization with negative CT abdomen/pelvis, negative bone scan.  · MRI brain 5/29/2019 with evidence of metastatic disease, 3-4 enhancing lesions involving left occipital lobe 5 mm, left posterior parietal lobe 4 mm, left frontal lobe 4 mm, left parietal 3-4 mm with small amounts of surrounding edema.  Given the minimal extent of disease elected to observe with short interval MRI.  · Discussion regarding systemic therapy with single agent Keytruda due to PDL1 95%  · Subsequent disease progression on CT 6/8/2019 with increasing left upper lobe mass, mediastinal and hilar lymphadenopathy with occlusion of left mainstem bronchus at origin.  · Received palliative radiation therapy to left mainstem bronchus x10 fractions, completed 6/25/2019.  · Initiated systemic therapy with Keytruda 6/28/2019.  · Scans 8/6/2019 following 2 cycles Keytruda with decrease left  upper lobe primary lesion and left hilar/mediastinal lymphadenopathy.  Response primarily felt to be related to radiation therapy.  Continuation of immunotherapy.  · CT scans 9/17/2019 following 4 cycles Keytruda with further decrease in AP window lymph node, suspected evolving postradiation change in left lower lobe.  MRI brain 9/17/2019 with improvement in 3 small metastatic lesions.  Continuation of immunotherapy.  · CT scan 10/29/2019 following 6 cycles Keytruda with new/enlarging 2 cm left upper lobe lesion, stable mediastinal lymph nodes, increased air space opacities in the lungs bilaterally infectious process/pneumonia, new left adrenal nodule 1.3 cm.  MRI brain with stable to slightly improved findings 10/29/2019.  Cycle 7 Keytruda held 11/8/2019 with plans to pursue PET scan.  2. COPD/chronic hypoxemic respiratory failure:  · Patient required intubation in May 2019 due to severe mucous plugging following bronchoscopy.  · Patient extubated 5/28/2019.  · Subsequent occlusion of left mainstem bronchus from malignancy 6/8/2019.  Received palliative radiation completed 6/25/2019.  · She continues to improve from a pulmonary standpoint.  She does have oxygen at home however has not required this.   3. Prior hemorrhagic CVA:  · Occurred in 2008, residual left upper extremity weakness and left lower extremity weakness.   4. Anemia:  · Hemoglobin prior to admission was 13.1 on 5/16/2019  · Hemoglobin declined in the 10-11 range, related to malignancy, pneumonia  5. Depression and nausea/anorexia and chronic fatigue:  · Significant improvement following addition of Zyprexa nightly, dose escalated to 5 mg.  6. Hypothyroidism:  · Labs 6/28/2019 with TSH 23.8, normal free T4 of 1.43  · Treated with levothyroxine 100 mcg daily  · Subsequent thyroid function studies with TSH 0.065 and free T4 elevated at 2.06, levothyroxine dose decreased 8/9/19 to 50 mcg daily.  · 11/8/2019 TSH 37.5, free T4 1.15, levothyroxine dose  increased to 75 mcg daily.      HISTORY OF PRESENT ILLNESS:  The patient is a 74 y.o. year old female who is here for follow-up with the above-mentioned history.    History of Present Illness the patient returns today in follow-up due for cycle 7K treated with CT scans to review as well as MRI of the brain.  In the interval, the patient attempted to go on a trip for a cruise however when she got on the boat began to feel poorly and was subsequently admitted to [facility] in [place].  She was felt to have aspiration pneumonia and was treated with biotics.  She reports undergoing a swallowing study which she passed.  She felt very weak and required oxygen for the trip home.  She has been continuing on a course of Augmentin with gradual improvement.  She was seen recently by pulmonary and was placed on 7% saline nebulizer solution which has helped with increased expectoration.  She notes gradual improvement in her appetite as well as strength however she continues to be weak and short of breath.  She remains in a wheelchair today with oxygen in place.  Her cough is improving with productive.  Helped with MiraLAX, Colace caused cramping.    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   • Hyperlipidemia    • Left hemiplegia (CMS/HCC) 12/10/2015   • Myocardial infarct (CMS/HCC) 1996   • Osteopenia    • Stroke syndrome 2008   • Thyroid cyst     right   • Thyroid lump 12/10/2015    Description: Biopsy 05/15 at [facility], benign   • Transient cerebral ischemia    • Urge  incontinence of urine      Past Surgical History:   Procedure Laterality Date   • BREAST BIOPSY     • BRONCHOSCOPY Bilateral 5/20/2019    Procedure: BRONCHOSCOPY WITH  BIOPSY AND BAL, WITH ENDOBRONCHIAL ULTRASOUND WITH FNA;  Surgeon: Chris Tirado MD;  Location: [facility] ENDOSCOPY;  Service: Pulmonary   • COLONOSCOPY      REC 07/2007, REC 02/2009, REC 12/2011, REC 01/14,  She says she cant do the prep because of poor mobility to get to .   • EYE SURGERY  1950   • OTHER SURGICAL HISTORY      Ventricular [place] holes for drainage of subdural hematoma   • TOTAL THYROIDECTOMY  08/2015    Dr. Ahmadi         ONCOLOGIC HISTORY:  (History from previous dates can be found in the separate document.)    Current Outpatient Medications on File Prior to Visit   Medication Sig Dispense Refill   • amoxicillin-clavulanate (AUGMENTIN) 500-125 MG per tablet Take 1 tablet by mouth Every 8 (Eight) Hours.     • aspirin 81 MG chewable tablet Chew 81 mg Daily.     • atorvastatin (LIPITOR) 80 MG tablet Take 1 tablet by mouth Daily. Needs an appointment for further refills (Patient taking differently: Take 80 mg by mouth Every Night. Needs an appointment for further refills) 90 tablet 0   • budesonide (PULMICORT) 0.5 MG/2ML nebulizer solution Take 0.5 mg by nebulization Daily.     • formoterol (PERFOROMIST) 20 MCG/2ML nebulizer solution Take  by nebulization 2 (Two) Times a Day.     • LORazepam (ATIVAN PO) Take 0.25 mg by mouth As Needed.     • OLANZapine (zyPREXA) 7.5 MG tablet Take 1 tablet by mouth Every Night. 30 tablet 2   • ondansetron (ZOFRAN) 4 MG tablet Take 1 tablet by mouth Every 6 (Six) Hours As Needed for Nausea or Vomiting. 60 tablet 2   • potassium chloride ER (K-TAB) 20 MEQ tablet controlled-release ER tablet Take 1 tablet by mouth Daily. 30 tablet 2   • sertraline (ZOLOFT) 25 MG tablet Take 1 tablet by mouth Daily. 90 tablet 1   • SSD 1 % cream As Needed.     • tolterodine (DETROL) 1 MG tablet      • YUPELRI 175 MCG/3ML  solution      • [DISCONTINUED] levothyroxine (SYNTHROID, LEVOTHROID) 50 MCG tablet Take 1 tablet by mouth Daily. 30 tablet 2   • guaiFENesin (ROBITUSSIN) 100 MG/5ML solution oral solution Take 20 mL by mouth Every 4 (Four) Hours. While awake 3600 mL 3   • ipratropium-albuterol (DUO-NEB) 0.5-2.5 mg/3 ml nebulizer Take 3 mL by nebulization 4 (Four) Times a Day. And every 4 hours as needed for chest congestion 480 mL 11   • prochlorperazine (COMPAZINE) 10 MG tablet Take 1 tablet by mouth Every 6 (Six) Hours As Needed for Nausea or Vomiting. 30 tablet 1   • sodium chloride 7 % nebulizer solution nebulizer solution Take 4 mL by nebulization 2 (Two) Times a Day. And may use additional 2 times as needed chest congestion 480 mL 6     No current facility-administered medications on file prior to visit.        ALLERGIES:     Allergies   Allergen Reactions   • Ace Inhibitors      Other reaction(s): Cough       Social History     Socioeconomic History   • Marital status:      Spouse name: Miah   • Number of children: Not on file   • Years of education: College   • Highest education level: Not on file   Occupational History     Employer: RETIRED   Tobacco Use   • Smoking status: Former Smoker     Types: Cigarettes     Last attempt to quit:      Years since quittin.8   • Smokeless tobacco: Never Used   • Tobacco comment: caffeine use-soda   Substance and Sexual Activity   • Alcohol use: Yes     Comment: rarely   • Drug use: No   • Sexual activity: Defer     Family History   Problem Relation Age of Onset   • Heart attack Mother         Acute   • Heart disease Mother    • Colon polyps Sister         Sigmoid colon   • Coronary artery disease Brother    • Diabetes Brother    • Heart disease Brother    • Alcohol abuse Son               Review of Systems   Constitutional: Positive for appetite change and fatigue. Negative for activity change, fever and unexpected weight change.   HENT: Negative for congestion, mouth  sores, nosebleeds, sore throat and voice change.    Respiratory: Positive for cough and shortness of breath. Negative for wheezing.    Cardiovascular: Negative for chest pain, palpitations and leg swelling.   Gastrointestinal: Negative for abdominal distention, abdominal pain, blood in stool, constipation, diarrhea, nausea and vomiting.   Endocrine: Negative for cold intolerance and heat intolerance.   Genitourinary: Negative for difficulty urinating, dysuria, frequency and hematuria.   Musculoskeletal: Negative for arthralgias, back pain, joint swelling and myalgias.   Skin: Negative for rash.   Neurological: Positive for weakness. Negative for dizziness, syncope, light-headedness, numbness and headaches.   Hematological: Negative for adenopathy. Does not bruise/bleed easily.   Psychiatric/Behavioral: Negative for confusion, dysphoric mood and sleep disturbance. The patient is not nervous/anxious.          Objective      Vitals:    11/08/19 1206   BP: 108/69   Pulse: 104   Resp: 16   Temp: 97.8 °F (36.6 °C)   SpO2: 94%      Current Status 11/8/2019   ECOG score 2   Pain 0/10    Physical Exam   Constitutional: She is oriented to person, place, and time.   Elderly female no distress seated in a wheelchair with oxygen in place   HENT:   Mouth/Throat: Oropharynx is clear and moist.   Eyes: Conjunctivae are normal.   Neck: No thyromegaly present.   Cardiovascular: Normal rate and regular rhythm. Exam reveals no gallop and no friction rub.   No murmur heard.  Pulmonary/Chest: No respiratory distress. She has no wheezes.   Scattered bilateral rhonchi   Abdominal: Soft. Bowel sounds are normal. She exhibits no distension. There is no tenderness.   Musculoskeletal: She exhibits no edema.   Lymphadenopathy:        Head (right side): No submandibular adenopathy present.     She has no cervical adenopathy.     She has no axillary adenopathy.        Right: No inguinal and no supraclavicular adenopathy present.        Left: No  inguinal and no supraclavicular adenopathy present.   Neurological: She is alert and oriented to person, place, and time. No cranial nerve deficit.   Left hemiparesis from prior CVA   Skin: Skin is warm and dry. No rash noted.   Psychiatric: She has a normal mood and affect. Her behavior is normal.       RECENT LABS:  Hematology WBC   Date Value Ref Range Status   11/08/2019 5.86 3.40 - 10.80 10*3/mm3 Final   11/01/2019 12.33 (H) 4.5 - 11 x10E3/uL Final     RBC   Date Value Ref Range Status   11/08/2019 4.33 3.77 - 5.28 10*6/mm3 Final   11/01/2019 3.58 (L) 4.20 - 5.40 x10E6/uL Final     Hemoglobin   Date Value Ref Range Status   11/08/2019 11.2 (L) 12.0 - 15.9 g/dL Final   11/01/2019 9.5 (L) 12.0 - 16.0 g/dL Final     Hematocrit   Date Value Ref Range Status   11/08/2019 38.1 34.0 - 46.6 % Final   11/01/2019 31.9 (L) 37.0 - 47.0 % Final     Platelets   Date Value Ref Range Status   11/08/2019 307 140 - 450 10*3/mm3 Final     External Platelets   Date Value Ref Range Status   11/01/2019 341 140 - 440 thou/cu mm Final        Lab Results   Component Value Date    GLUCOSE 117 11/08/2019    BUN 8 11/08/2019    CREATININE 0.72 11/08/2019    EGFRIFNONA 79 11/08/2019    EGFRIFAFRI 74 08/11/2016    BCR 11.1 11/08/2019    K 4.5 11/08/2019    CO2 27.7 11/08/2019    CALCIUM 9.6 11/08/2019    PROTENTOTREF 7.4 08/11/2016    ALBUMIN 3.00 (L) 11/08/2019    LABIL2 0.5 11/01/2019    AST 17 11/08/2019    ALT 8 11/08/2019     CT chest abdomen pelvis 10/29/2019: I did personally review CT images today.  Results as summarized below    MRI brain 10/29/2019: Results as summarized below.    Assessment/Plan     1. Metastatic non-small cell lung cancer (adenocarcinoma):  · Patient with long-standing smoking history x40 years quit in 2008  · Underlying significant COPD with FEV1 1.4 (58%) on 11/20/2014  · CT 5/16/2019 with left upper lobe mass, partially cavitary measuring 2.3 x 1.5 cm.  Additional 8 mm similar appearing right upper lobe  nodule.  Bibasilar consolidation, small bilateral pleural effusions, mediastinal lymphadenopathy up to 2.7 centimeters.  · Bronchoscopy 5/20/2019 with identification of left mainstem malignancy extending to the level of the jose, biopsy showed poorly differentiated adenocarcinoma of pulmonary origin. EBUS with FNA station 7 lymph node negative for malignant cells, only scattered lymphoid aggregates.  BAL left upper lobe negative for malignant cells.  Negative EGFR mutation, negative ALK/ROS 1 rearrangement. PDL1 95%.  · Patient was intubated with possible pneumonia, significant mucus plugging with underlying significant COPD.  Extubated on 5/28/2019.   · Staging evaluation performed during hospitalization with negative CT abdomen/pelvis, negative bone scan.  · MRI brain 5/29/2019 with evidence of metastatic disease, 3-4 enhancing lesions involving left occipital lobe 5 mm, left posterior parietal lobe 4 mm, left frontal lobe 4 mm, left parietal 3-4 mm with small amounts of surrounding edema.  Given the minimal extent of disease elected to observe with short interval MRI.  · Discussion regarding systemic therapy with single agent Keytruda due to PDL1 95%  · Subsequent disease progression on CT 6/8/2019 with increasing left upper lobe mass, mediastinal and hilar lymphadenopathy with occlusion of left mainstem bronchus at origin.  · Received palliative radiation therapy to left mainstem bronchus x10 fractions, completed 6/25/2019.  · Initiated systemic therapy with Keytruda 6/28/2019.  · CT scan following 2 cycles Keytruda and prior radiation from 8/6/2019 showed decrease in the left upper lobe primary lesion with essentially no residual discrete lung mass in that area.  Lymphadenopathy in the mediastinum/AP window and left hilum with significant improvement and resolution of left mainstem obstruction.  No new evidence of disease.  The patient did receive radiation therapy to both the primary left upper lobe mass and  left hilum/mediastinum accounting for most of the improvement.  MRI brain 8/6/2019 with 3 enhancing metastatic lesions, 2 of which had a more nodular appearance as opposed to previous ring-enhancing appearance with stable size.  These were in the left occipital and left frontal regions.  The third lesion in the left parietal region decreased in size from 4 mm down to 2 mm.  There were no new lesions identified.  She has not undergone any specific treatment for her brain metastases.  Continuation of immunotherapy with Keytruda.  · Following 4 cycles Keytruda, 9/17/2019 CT scan showed improvement in AP window lymph node from 2.7 x 1.9 down to 2.1 x 1.5 cm.  Progressive change in the left lower lobe around the pleura, with one area having a masslike appearance suspected secondary to radiation pneumonitis rather than malignancy.  MRI brain with small metastatic lesions decreased in size.  · Patient returns today in follow-up due for cycle 7 Keytruda.  We are reviewing CT scans and MRI brain after 6 cycles.  MRI brain from 10/29/2019 show stability in the left frontal 3 mm lesion and left occipital 2 mm lesion, slight decrease in the left parietal 2 mm lesion.  CT scan chest 10/29/2019 showed development of a 2 cm mass in the left upper lobe, no change in mediastinal lymphadenopathy (up to 2.8 cm), increase in airspace consolidation right lower lobe, left upper lobe, left lower lobe likely related to pneumonia.  There was a new 1.3 cm left adrenal nodule.  Patient has recently been hospitalized while out of town in [place] due to suspected aspiration pneumonia.  She appears to be recovering from that episode, completing a course of Augmentin with improvement in cough that has become more productive.  She was seen by pulmonary as well just recently and was placed on a 7% saline nebulizer solution.  Has had some anorexia generalized weakness which she reports are improving.  The etiology of the changes on CT scan are  unclear but are suspicious for progressive disease.  The 2 cm left upper lobe mass on my review has the potential appearance of radiation pneumonitis however could represent progressive malignancy.  The 1.3 cm left adrenal nodule however is highly suspicious for progressive disease.  If the patient has evidence of disease progression only in the left adrenal, we could consider focal radiation and continuation of immunotherapy.  If she has more widespread evidence of progressive disease however we would discuss discontinuation of immunotherapy.  I did suggest a PET scan to help us better assess the overall status of her disease.  We will need to discuss her overall status if there is more widespread evidence of progression in regards to whether she is a candidate for single agent chemotherapy or whether we would pursue palliative/supportive care alone.  We will discuss that further when she returns in 2 weeks pending the scan results and her overall status.  2. COPD/chronic hypoxemic respiratory failure:  · Patient required intubation in May 2019 due to severe mucous plugging following bronchoscopy.  · Patient extubated 5/28/2019.  · Subsequent occlusion of left mainstem bronchus from malignancy 6/8/2019.  Received palliative radiation completed 6/25/2019.  · Patient hospitalized while in [place] at [facility] in October 2019 due to suspected aspiration pneumonia.  Doubtful that pulmonary findings are related to immunotherapy due to improvement on antibiotics and productive cough.  3. Prior hemorrhagic CVA:  · Occurred in 2008, residual left upper extremity weakness and left lower extremity weakness.   4. Anemia:  · Hemoglobin prior to admission was 13.1 on 5/16/2019  · Hemoglobin declined in the 10-11 range, related to malignancy, pneumonia  · Hemoglobin today improved at 11.2  7. Hypothyroidism:  · Labs 6/28/2019 with TSH 23.8, normal free T4 of 1.43  · Patient was started on levothyroxine 100  mcg daily  · Repeat thyroid function studies on 8/9/2019 with TSH suppressed at 0.065 and elevated free T4 of 2.06.  Decreased levothyroxine dose to 50 mcg daily.    · 11/8/2019 TSH 37.5, free T4 1.15, levothyroxine dose increased to 75 mcg daily.  8. Depression and nausea/anorexia:  · Patient with depression related to her underlying medical illness as well as social situation with her son who is an alcoholic and lives in the home  · Patient has been treated with Depakote 250 mg every 12 hours for mood stabilization under the direction of her primary care physician  · Patient with significant depressive symptoms, currently continuing on Zoloft under the direction of her primary care physician.  · Patient also with ongoing anorexia and nausea, initiated Zyprexa 2.5 mg nightly on 7/19/2019 with dramatic improvement in symptoms  · Patient seen in the supportive oncology clinic on 7/29/2019, tapered off Depakote.  · Patient has PRN Compazine and Zyprexa to use.  · Zyprexa dose titrated to 5 mg and subsequently on 9/20/2019 up to 7.5 mg nightly.  9. Hypokalemia:  · Potassium on 8/9/2019 profoundly low at 2.5  · Magnesium low normal at 1.7  · Prescribed 20 mEq IV potassium chloride   · Potassium today 4.5  10. Dizziness/lightheadedness with relative hypotension:  · Cortisol level normal on 8/30/2019 at 33.16  · Patient tapered off metoprolol by cardiology 9/13/2019  · Symptoms currently significantly improved.      Plan:  1. Hold cycle 7 Keytruda today  2. Increase levothyroxine from 50 up to 75 mcg daily  3. We will arrange home health with home physical therapy  4. In 1 week PET scan  5. In 2 weeks MD visit with CBC, CMP to review PET scan and further discuss recommendations for systemic therapy pending results.

## 2019-11-11 NOTE — TELEPHONE ENCOUNTER
----- Message from Solis Godfrey Jr., MD sent at 11/9/2019 12:16 AM EST -----  Please notify patient that thyroid function studies were abnormal and I have changed her levothyroxine dose from 50 up to 75 mcg daily, new prescription sent to pharmacy.    Called and informed pt's . He v/u. Script was sent to [facility] and he states that will take too long to get to her so I sent a 30 day supply to [facility] as well.  also asked for a refill on potassium, sent to [facility].

## 2019-11-18 NOTE — TELEPHONE ENCOUNTER
"Called and spoke with \"axel\" at [facility]. She states she was not the one to call but she will find out who did and have them call back.     ----- Message from Linda Calderon sent at 11/18/2019  9:21 AM EST -----  Contact: [phone]  Axel with [facility] is calling to get orders    "

## 2019-11-18 NOTE — TELEPHONE ENCOUNTER
Spoke with America at [facility]. Asking for PT orders to start. Verbal ok given.

## 2019-11-20 NOTE — TELEPHONE ENCOUNTER
Pt called wanting to know what strength of prednisone to take. Confirmed with Dr. Godfrey that pt is to take 2 20mg tablets in the AM. Called and LVM informing pt's  of this. Advised him to call back if any further questions.

## 2019-11-20 NOTE — PROGRESS NOTES
Subjective .     REASONS FOR FOLLOWUP:    1. Metastatic non-small cell lung cancer (adenocarcinoma):  · Patient with long-standing smoking history x40 years quit in 2008  · Underlying significant COPD with FEV1 1.4 (58%) on 11/20/2014  · CT 5/16/2019 with left upper lobe mass, partially cavitary measuring 2.3 x 1.5 cm.  Additional 8 mm similar appearing right upper lobe nodule.  Bibasilar consolidation, small bilateral pleural effusions, mediastinal lymphadenopathy up to 2.7 centimeters.  · Bronchoscopy 5/20/2019 with identification of left mainstem malignancy extending to the level of the jose, biopsy showed poorly differentiated adenocarcinoma of pulmonary origin. EBUS with FNA station 7 lymph node negative for malignant cells, only scattered lymphoid aggregates.  BAL left upper lobe negative for malignant cells.  Negative EGFR mutation, negative ALK/ROS 1 rearrangement. PDL1 95%.  · Patient was intubated with possible pneumonia, significant mucus plugging with underlying significant COPD.  Extubated on 5/28/2019.   · Staging evaluation performed during hospitalization with negative CT abdomen/pelvis, negative bone scan.  · MRI brain 5/29/2019 with evidence of metastatic disease, 3-4 enhancing lesions involving left occipital lobe 5 mm, left posterior parietal lobe 4 mm, left frontal lobe 4 mm, left parietal 3-4 mm with small amounts of surrounding edema.  Given the minimal extent of disease elected to observe with short interval MRI.  · Discussion regarding systemic therapy with single agent Keytruda due to PDL1 95%  · Subsequent disease progression on CT 6/8/2019 with increasing left upper lobe mass, mediastinal and hilar lymphadenopathy with occlusion of left mainstem bronchus at origin.  · Received palliative radiation therapy to left mainstem bronchus x10 fractions, completed 6/25/2019.  · Initiated systemic therapy with Keytruda 6/28/2019.  · Scans 8/6/2019 following 2 cycles Keytruda with decrease left  upper lobe primary lesion and left hilar/mediastinal lymphadenopathy.  Response primarily felt to be related to radiation therapy.  Continuation of immunotherapy.  · CT scans 9/17/2019 following 4 cycles Keytruda with further decrease in AP window lymph node, suspected evolving postradiation change in left lower lobe.  MRI brain 9/17/2019 with improvement in 3 small metastatic lesions.  Continuation of immunotherapy.  · CT scan 10/29/2019 following 6 cycles Keytruda with new/enlarging 2 cm left upper lobe lesion, stable mediastinal lymph nodes, increased air space opacities in the lungs bilaterally, new left adrenal nodule 1.3 cm.  MRI brain with stable to slightly improved findings 10/29/2019.  Cycle 7 Keytruda held 11/8/2019 with plans to pursue PET scan.  Unclear whether pulmonary findings represented infectious change versus pneumonitis from immunotherapy.  · PET scan 11/14/2019 with worsening areas of consolidation involving the lungs bilaterally, decrease in left upper lobe nodular opacification with minimal uptake, stable mildly hypermetabolic mediastinal and left hilar lymph nodes, intensely hypermetabolic left adrenal 1.7 cm lesion SUV 11.2.  With clinical improvement in pulmonary status, pulmonary changes suspected to represent pneumonitis from immunotherapy.  The only area of true progression in left adrenal, will pursue stereotactic radiation.  Patient will remain off immunotherapy, initiated prednisone 40 mg daily.  2. 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  · Patient extubated 5/28/2019.  · Subsequent occlusion of left mainstem bronchus from malignancy 6/8/2019.  Received palliative radiation completed 6/25/2019.  · Patient with decline in respiratory status in October 2019 requiring oxygen to be initiated.  Suspected related to immunotherapy induced  pneumonitis.  3. Prior hemorrhagic CVA:  · Occurred in 2008, residual left upper extremity weakness and left lower extremity weakness.   4. Anemia:  · Hemoglobin prior to admission was 13.1 on 5/16/2019  · Hemoglobin declined in the 10-11 range, related to malignancy, pneumonia  5. Depression and nausea/anorexia and chronic fatigue:  · Significant improvement following addition of Zyprexa nightly, dose escalated to 5 mg.  6. Hypothyroidism:  · Labs 6/28/2019 with TSH 23.8, normal free T4 of 1.43  · Treated with levothyroxine 100 mcg daily  · Subsequent thyroid function studies with TSH 0.065 and free T4 elevated at 2.06, levothyroxine dose decreased 8/9/19 to 50 mcg daily.  · 11/8/2019 TSH 37.5, free T4 1.15, levothyroxine dose increased to 75 mcg daily.      HISTORY OF PRESENT ILLNESS:  The patient is a 74 y.o. year old female who is here for follow-up with the above-mentioned history.    History of Present Illness the patient returns today in follow-up with PET scan and laboratory studies to improve, having held Keytruda at the last visit due to suspicion for possible immunotherapy induced pneumonitis.  Patient reports that her pulmonary symptoms have improved somewhat since the last visit with decrease in cough and dyspnea on exertion.  She does continue on oxygen with a portable tank in place today, only 1 L required to maintain saturation in the 92-95% range.  Her appetite has improved.  Fatigue has improved.    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   • Hyperlipidemia    • Left  hemiplegia (CMS/HCC) 12/10/2015   • Myocardial infarct (CMS/HCC) 1996   • Osteopenia    • Stroke syndrome 2008   • Thyroid cyst     right   • Thyroid lump 12/10/2015    Description: Biopsy 05/15 at [facility], benign   • Transient cerebral ischemia    • Urge incontinence of urine      Past Surgical History:   Procedure Laterality Date   • BREAST BIOPSY     • BRONCHOSCOPY Bilateral 5/20/2019    Procedure: BRONCHOSCOPY WITH  BIOPSY AND BAL, WITH ENDOBRONCHIAL ULTRASOUND WITH FNA;  Surgeon: Chris Tirado MD;  Location: [facility] ENDOSCOPY;  Service: Pulmonary   • COLONOSCOPY      REC 07/2007, REC 02/2009, REC 12/2011, REC 01/14,  She says she cant do the prep because of poor mobility to get to .   • EYE SURGERY  1950   • OTHER SURGICAL HISTORY      Ventricular [place] for drainage of subdural hematoma   • TOTAL THYROIDECTOMY  08/2015    Dr. Ahmadi         ONCOLOGIC HISTORY:  (History from previous dates can be found in the separate document.)    Current Outpatient Medications on File Prior to Visit   Medication Sig Dispense Refill   • aspirin 81 MG chewable tablet Chew 81 mg Daily.     • atorvastatin (LIPITOR) 80 MG tablet Take 1 tablet by mouth Daily. Needs an appointment for further refills (Patient taking differently: Take 80 mg by mouth Every Night. Needs an appointment for further refills) 90 tablet 0   • budesonide (PULMICORT) 0.5 MG/2ML nebulizer solution Take 0.5 mg by nebulization Daily.     • formoterol (PERFOROMIST) 20 MCG/2ML nebulizer solution Take  by nebulization 2 (Two) Times a Day.     • guaiFENesin (ROBITUSSIN) 100 MG/5ML solution oral solution Take 20 mL by mouth Every 4 (Four) Hours. While awake 3600 mL 3   • levothyroxine (SYNTHROID, LEVOTHROID) 75 MCG tablet Take 1 tablet by mouth Daily. 30 tablet 0   • LORazepam (ATIVAN PO) Take 0.25 mg by mouth As Needed.     • OLANZapine (zyPREXA) 7.5 MG tablet Take 1 tablet by mouth Every Night. 30 tablet 2   • ondansetron (ZOFRAN) 4 MG tablet Take 1 tablet by  mouth Every 6 (Six) Hours As Needed for Nausea or Vomiting. 60 tablet 2   • potassium chloride ER (K-TAB) 20 MEQ tablet controlled-release ER tablet Take 1 tablet by mouth Daily. 30 tablet 2   • prochlorperazine (COMPAZINE) 10 MG tablet Take 1 tablet by mouth Every 6 (Six) Hours As Needed for Nausea or Vomiting. 30 tablet 1   • sertraline (ZOLOFT) 25 MG tablet Take 1 tablet by mouth Daily. 90 tablet 1   • SSD 1 % cream As Needed.     • tolterodine (DETROL) 1 MG tablet      • YUPELRI 175 MCG/3ML solution      • ipratropium-albuterol (DUO-NEB) 0.5-2.5 mg/3 ml nebulizer Take 3 mL by nebulization 4 (Four) Times a Day. And every 4 hours as needed for chest congestion 480 mL 11   • sodium chloride 7 % nebulizer solution nebulizer solution Take 4 mL by nebulization 2 (Two) Times a Day. And may use additional 2 times as needed chest congestion 480 mL 6     No current facility-administered medications on file prior to visit.        ALLERGIES:     Allergies   Allergen Reactions   • Ace Inhibitors Cough     Other reaction(s): Cough       Social History     Socioeconomic History   • Marital status:      Spouse name: Miah   • Number of children: Not on file   • Years of education: College   • Highest education level: Not on file   Occupational History     Employer: RETIRED   Tobacco Use   • Smoking status: Former Smoker     Types: Cigarettes     Last attempt to quit: 2008     Years since quittin.8   • Smokeless tobacco: Never Used   • Tobacco comment: caffeine use-soda   Substance and Sexual Activity   • Alcohol use: Yes     Comment: rarely   • Drug use: No   • Sexual activity: Defer     Family History   Problem Relation Age of Onset   • Heart attack Mother         Acute   • Heart disease Mother    • Colon polyps Sister         Sigmoid colon   • Coronary artery disease Brother    • Diabetes Brother    • Heart disease Brother    • Alcohol abuse Son               Review of Systems   Constitutional: Positive for  appetite change and fatigue. Negative for activity change, fever and unexpected weight change.   HENT: Negative for congestion, mouth sores, nosebleeds, sore throat and voice change.    Respiratory: Positive for cough and shortness of breath. Negative for wheezing.    Cardiovascular: Negative for chest pain, palpitations and leg swelling.   Gastrointestinal: Negative for abdominal distention, abdominal pain, blood in stool, constipation, diarrhea, nausea and vomiting.   Endocrine: Negative for cold intolerance and heat intolerance.   Genitourinary: Negative for difficulty urinating, dysuria, frequency and hematuria.   Musculoskeletal: Negative for arthralgias, back pain, joint swelling and myalgias.   Skin: Negative for rash.   Neurological: Positive for weakness. Negative for dizziness, syncope, light-headedness, numbness and headaches.   Hematological: Negative for adenopathy. Does not bruise/bleed easily.   Psychiatric/Behavioral: Negative for confusion, dysphoric mood and sleep disturbance. The patient is not nervous/anxious.          Objective      Vitals:    11/20/19 1225   BP: 153/80   Pulse: 114   Resp: 18   Temp: 97.6 °F (36.4 °C)   SpO2: 92%      Current Status 11/8/2019   ECOG score 2   Pain 0/10    Physical Exam   Constitutional: She is oriented to person, place, and time.   Elderly female no distress seated in a wheelchair with oxygen in place   HENT:   Mouth/Throat: Oropharynx is clear and moist.   Eyes: Conjunctivae are normal.   Neck: No thyromegaly present.   Cardiovascular: Normal rate and regular rhythm. Exam reveals no gallop and no friction rub.   No murmur heard.  Pulmonary/Chest: No respiratory distress. She has no wheezes.   Scattered bilateral rhonchi   Abdominal: Soft. Bowel sounds are normal. She exhibits no distension. There is no tenderness.   Musculoskeletal: She exhibits no edema.   Lymphadenopathy:        Head (right side): No submandibular adenopathy present.     She has no cervical  adenopathy.     She has no axillary adenopathy.        Right: No inguinal and no supraclavicular adenopathy present.        Left: No inguinal and no supraclavicular adenopathy present.   Neurological: She is alert and oriented to person, place, and time. No cranial nerve deficit.   Left hemiparesis from prior CVA   Skin: Skin is warm and dry. No rash noted.   Psychiatric: She has a normal mood and affect. Her behavior is normal.   Patient was examined today, unchanged from above    RECENT LABS:  Hematology WBC   Date Value Ref Range Status   11/20/2019 7.52 3.40 - 10.80 10*3/mm3 Final   11/01/2019 12.33 (H) 4.5 - 11 x10E3/uL Final     RBC   Date Value Ref Range Status   11/20/2019 4.11 3.77 - 5.28 10*6/mm3 Final   11/01/2019 3.58 (L) 4.20 - 5.40 x10E6/uL Final     Hemoglobin   Date Value Ref Range Status   11/20/2019 10.8 (L) 12.0 - 15.9 g/dL Final   11/01/2019 9.5 (L) 12.0 - 16.0 g/dL Final     Hematocrit   Date Value Ref Range Status   11/20/2019 36.4 34.0 - 46.6 % Final   11/01/2019 31.9 (L) 37.0 - 47.0 % Final     Platelets   Date Value Ref Range Status   11/20/2019 287 140 - 450 10*3/mm3 Final     External Platelets   Date Value Ref Range Status   11/01/2019 341 140 - 440 thou/cu mm Final        Lab Results   Component Value Date    GLUCOSE 113 11/20/2019    BUN 12 11/20/2019    CREATININE 0.70 11/20/2019    EGFRIFNONA 82 11/20/2019    EGFRIFAFRI 74 08/11/2016    BCR 17.1 11/20/2019    K 4.8 (H) 11/20/2019    CO2 25.9 11/20/2019    CALCIUM 9.3 11/20/2019    PROTENTOTREF 7.4 08/11/2016    ALBUMIN 3.00 (L) 11/20/2019    LABIL2 0.5 11/01/2019    AST 16 11/20/2019    ALT 7 11/20/2019     PET scan reviewed from 11/4/2019 as summarized above and below.  I did personally review PET scan images today    Assessment/Plan     1. Metastatic non-small cell lung cancer (adenocarcinoma):  · Patient with long-standing smoking history x40 years quit in 2008  · Underlying significant COPD with FEV1 1.4 (58%) on 11/20/2014  · CT  5/16/2019 with left upper lobe mass, partially cavitary measuring 2.3 x 1.5 cm.  Additional 8 mm similar appearing right upper lobe nodule.  Bibasilar consolidation, small bilateral pleural effusions, mediastinal lymphadenopathy up to 2.7 centimeters.  · Bronchoscopy 5/20/2019 with identification of left mainstem malignancy extending to the level of the jose, biopsy showed poorly differentiated adenocarcinoma of pulmonary origin. EBUS with FNA station 7 lymph node negative for malignant cells, only scattered lymphoid aggregates.  BAL left upper lobe negative for malignant cells.  Negative EGFR mutation, negative ALK/ROS 1 rearrangement. PDL1 95%.  · Patient was intubated with possible pneumonia, significant mucus plugging with underlying significant COPD.  Extubated on 5/28/2019.   · Staging evaluation performed during hospitalization with negative CT abdomen/pelvis, negative bone scan.  · MRI brain 5/29/2019 with evidence of metastatic disease, 3-4 enhancing lesions involving left occipital lobe 5 mm, left posterior parietal lobe 4 mm, left frontal lobe 4 mm, left parietal 3-4 mm with small amounts of surrounding edema.  Given the minimal extent of disease elected to observe with short interval MRI.  · Discussion regarding systemic therapy with single agent Keytruda due to PDL1 95%  · Subsequent disease progression on CT 6/8/2019 with increasing left upper lobe mass, mediastinal and hilar lymphadenopathy with occlusion of left mainstem bronchus at origin.  · Received palliative radiation therapy to left mainstem bronchus x10 fractions, completed 6/25/2019.  · Initiated systemic therapy with Keytruda 6/28/2019.  · CT scan following 2 cycles Keytruda and prior radiation from 8/6/2019 showed decrease in the left upper lobe primary lesion with essentially no residual discrete lung mass in that area.  Lymphadenopathy in the mediastinum/AP window and left hilum with significant improvement and resolution of left  mainstem obstruction.  No new evidence of disease.  The patient did receive radiation therapy to both the primary left upper lobe mass and left hilum/mediastinum accounting for most of the improvement.  MRI brain 8/6/2019 with 3 enhancing metastatic lesions, 2 of which had a more nodular appearance as opposed to previous ring-enhancing appearance with stable size.  These were in the left occipital and left frontal regions.  The third lesion in the left parietal region decreased in size from 4 mm down to 2 mm.  There were no new lesions identified.  She has not undergone any specific treatment for her brain metastases.  Continuation of immunotherapy with Keytruda.  · Following 4 cycles Keytruda, 9/17/2019 CT scan showed improvement in AP window lymph node from 2.7 x 1.9 down to 2.1 x 1.5 cm.  Progressive change in the left lower lobe around the pleura, with one area having a masslike appearance suspected secondary to radiation pneumonitis rather than malignancy.  MRI brain with small metastatic lesions decreased in size.  · CT scan 10/29/2019 following 6 cycles Keytruda with new/enlarging 2 cm left upper lobe lesion, stable mediastinal lymph nodes, increased air space opacities in the lungs bilaterally, new left adrenal nodule 1.3 cm.  MRI brain with stable to slightly improved findings 10/29/2019.  Cycle 7 Keytruda held 11/8/2019 with plans to pursue PET scan.  Unclear whether pulmonary findings represented infectious change versus pneumonitis from immunotherapy.  · PET scan 11/14/2019 with worsening areas of consolidation involving the lungs bilaterally, decrease in left upper lobe nodular opacification with minimal uptake, stable mildly hypermetabolic mediastinal and left hilar lymph nodes, intensely hypermetabolic left adrenal 1.7 cm lesion SUV 11.2.  With clinical improvement in pulmonary status, pulmonary changes suspected to represent pneumonitis from immunotherapy.  The only area of true progression in left  adrenal, will pursue stereotactic radiation.  Patient will remain off immunotherapy, initiated prednisone 40 mg daily.  · As above, the patient returns today with PET scan to review from 11/14/2019.  At the last visit we held Keytruda due to concern for possible radiation induced pneumonitis.  She has improved somewhat from a respiratory standpoint with decreasing cough and dyspnea, still requiring 1 L O2 continuously.  Appetite and fatigue have improved as well.  Despite this, her PET scan shows that the bilateral pulmonary infiltrates have worsened.  Therefore, I have high suspicion that this represents radiation-induced pneumonitis.  She will therefore remain off immunotherapy at this point.  We discussed use of steroids and I will begin prednisone at a slightly lower dose of 40 mg daily.  She will begin GI prophylaxis with over-the-counter Prilosec 20 mg daily.  We will monitor her status over the next few weeks and consider whether we could begin steroid taper in 2 to 3 weeks.  We did discuss potential side effects of steroids today including emotional lability, insomnia, hyperglycemia.  In reviewing her PET scan, the only finding that identifies clear progression of her malignancy is the left adrenal nodule which measures 1.7 cm and is intensely hypermetabolic with SUV of 11.2.  I will refer her to Dr. Whalen to consider stereotactic radiation to the site.  We will plan short-term follow-up CT scan possibly at a 6-week interval to assess her pulmonary status and the status of her malignancy.  For now she remains off systemic therapy and we will certainly need to assess her overall status as we determine whether she is a candidate for single agent palliative chemotherapy in the future.  2. 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  · Patient extubated  5/28/2019.  · Subsequent occlusion of left mainstem bronchus from malignancy 6/8/2019.  Received palliative radiation completed 6/25/2019.  · Patient hospitalized while in [place] at [facility] in October 2019 due to suspected aspiration pneumonia.  Doubtful that pulmonary findings are related to immunotherapy due to improvement on antibiotics and productive cough.  · Patient with decline in respiratory status in October 2019 requiring oxygen to be initiated.  Suspected related to immunotherapy induced pneumonitis.  3. Prior hemorrhagic CVA:  · Occurred in 2008, residual left upper extremity weakness and left lower extremity weakness.   4. Anemia:  · Hemoglobin prior to admission was 13.1 on 5/16/2019  · Hemoglobin declined in the 10-11 range, related to malignancy, pneumonia  · Hemoglobin today 10.8  7. Hypothyroidism:  · Labs 6/28/2019 with TSH 23.8, normal free T4 of 1.43  · Patient was started on levothyroxine 100 mcg daily  · Repeat thyroid function studies on 8/9/2019 with TSH suppressed at 0.065 and elevated free T4 of 2.06.  Decreased levothyroxine dose to 50 mcg daily.    · 11/8/2019 TSH 37.5, free T4 1.15, levothyroxine dose increased to 75 mcg daily.  8. Depression and nausea/anorexia:  · Patient with depression related to her underlying medical illness as well as social situation with her son who is an alcoholic and lives in the home  · Patient has been treated with Depakote 250 mg every 12 hours for mood stabilization under the direction of her primary care physician  · Patient with significant depressive symptoms, currently continuing on Zoloft under the direction of her primary care physician.  · Patient also with ongoing anorexia and nausea, initiated Zyprexa 2.5 mg nightly on 7/19/2019 with dramatic improvement in symptoms  · Patient seen in the supportive oncology clinic on 7/29/2019, tapered off Depakote.  · Patient has PRN Compazine and Zyprexa to use.  · Zyprexa dose  titrated to 5 mg and subsequently on 9/20/2019 up to 7.5 mg nightly.  9. Hypokalemia:  · Potassium on 8/9/2019 profoundly low at 2.5  · Magnesium low normal at 1.7  · Prescribed 20 mEq IV potassium chloride   · Potassium today 4.8  10. Dizziness/lightheadedness with relative hypotension:  · Cortisol level normal on 8/30/2019 at 33.16  · Patient tapered off metoprolol by cardiology 9/13/2019  · Symptoms currently significantly improved.  11. GI prophylaxis:  · The patient will begin over-the-counter Prilosec 20 mg daily as she begins prednisone for immunotherapy induced pneumonitis.      Plan:  1. At this point we will discontinue immunotherapy due to suspected immunotherapy induced pneumonitis  2. Begin prednisone 40 mg daily.  We will determine timeframe and course of steroid taper in the future pending response.  3. Begin GI prophylaxis with Prilosec 20 mg daily over-the-counter  4. Refer back to Dr. Whalen in radiation oncology to consider stereotactic radiation to left adrenal metastasis  5. In 1 week and again in 2 weeks nurse practitioner visit with CBC, CMP.  At 2-week visit if patient has improved significantly we will consider steroid taper  6. MD visit in 3 weeks with CBC, CMP.  We will discuss steroid taper and timeframe for follow-up radiographic evaluation pending patient's clinical status.

## 2019-11-26 NOTE — PROGRESS NOTES
Subjective .     REASONS FOR FOLLOWUP:    1. Metastatic non-small cell lung cancer (adenocarcinoma):  · Patient with long-standing smoking history x40 years quit in 2008  · Underlying significant COPD with FEV1 1.4 (58%) on 11/20/2014  · CT 5/16/2019 with left upper lobe mass, partially cavitary measuring 2.3 x 1.5 cm.  Additional 8 mm similar appearing right upper lobe nodule.  Bibasilar consolidation, small bilateral pleural effusions, mediastinal lymphadenopathy up to 2.7 centimeters.  · Bronchoscopy 5/20/2019 with identification of left mainstem malignancy extending to the level of the jose, biopsy showed poorly differentiated adenocarcinoma of pulmonary origin. EBUS with FNA station 7 lymph node negative for malignant cells, only scattered lymphoid aggregates.  BAL left upper lobe negative for malignant cells.  Negative EGFR mutation, negative ALK/ROS 1 rearrangement. PDL1 95%.  · Patient was intubated with possible pneumonia, significant mucus plugging with underlying significant COPD.  Extubated on 5/28/2019.   · Staging evaluation performed during hospitalization with negative CT abdomen/pelvis, negative bone scan.  · MRI brain 5/29/2019 with evidence of metastatic disease, 3-4 enhancing lesions involving left occipital lobe 5 mm, left posterior parietal lobe 4 mm, left frontal lobe 4 mm, left parietal 3-4 mm with small amounts of surrounding edema.  Given the minimal extent of disease elected to observe with short interval MRI.  · Discussion regarding systemic therapy with single agent Keytruda due to PDL1 95%  · Subsequent disease progression on CT 6/8/2019 with increasing left upper lobe mass, mediastinal and hilar lymphadenopathy with occlusion of left mainstem bronchus at origin.  · Received palliative radiation therapy to left mainstem bronchus x10 fractions, completed 6/25/2019.  · Initiated systemic therapy with Keytruda 6/28/2019.  · Scans 8/6/2019 following 2 cycles Keytruda with decrease left  upper lobe primary lesion and left hilar/mediastinal lymphadenopathy.  Response primarily felt to be related to radiation therapy.  Continuation of immunotherapy.  · CT scans 9/17/2019 following 4 cycles Keytruda with further decrease in AP window lymph node, suspected evolving postradiation change in left lower lobe.  MRI brain 9/17/2019 with improvement in 3 small metastatic lesions.  Continuation of immunotherapy.  · CT scan 10/29/2019 following 6 cycles Keytruda with new/enlarging 2 cm left upper lobe lesion, stable mediastinal lymph nodes, increased air space opacities in the lungs bilaterally, new left adrenal nodule 1.3 cm.  MRI brain with stable to slightly improved findings 10/29/2019.  Cycle 7 Keytruda held 11/8/2019 with plans to pursue PET scan.  Unclear whether pulmonary findings represented infectious change versus pneumonitis from immunotherapy.  · PET scan 11/14/2019 with worsening areas of consolidation involving the lungs bilaterally, decrease in left upper lobe nodular opacification with minimal uptake, stable mildly hypermetabolic mediastinal and left hilar lymph nodes, intensely hypermetabolic left adrenal 1.7 cm lesion SUV 11.2.  With clinical improvement in pulmonary status, pulmonary changes suspected to represent pneumonitis from immunotherapy.  The only area of true progression in left adrenal, will pursue stereotactic radiation.  Patient will remain off immunotherapy, initiated prednisone 40 mg daily.  2. 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  · Patient extubated 5/28/2019.  · Subsequent occlusion of left mainstem bronchus from malignancy 6/8/2019.  Received palliative radiation completed 6/25/2019.  · Patient with decline in respiratory status in October 2019 requiring oxygen to be initiated.  Suspected related to immunotherapy induced  pneumonitis.  3. Prior hemorrhagic CVA:  · Occurred in 2008, residual left upper extremity weakness and left lower extremity weakness.   4. Anemia:  · Hemoglobin prior to admission was 13.1 on 5/16/2019  · Hemoglobin declined in the 10-11 range, related to malignancy, pneumonia  5. Depression and nausea/anorexia and chronic fatigue:  · Significant improvement following addition of Zyprexa nightly, dose escalated to 5 mg.  6. Hypothyroidism:  · Labs 6/28/2019 with TSH 23.8, normal free T4 of 1.43  · Treated with levothyroxine 100 mcg daily  · Subsequent thyroid function studies with TSH 0.065 and free T4 elevated at 2.06, levothyroxine dose decreased 8/9/19 to 50 mcg daily.  · 11/8/2019 TSH 37.5, free T4 1.15, levothyroxine dose increased to 75 mcg daily.      HISTORY OF PRESENT ILLNESS:  The patient is a 74 y.o. year old female who is here for follow-up with the above-mentioned history.    History of Present Illness the patient returns today in follow-up.  We recently permanently discontinued Keytruda due to concern for immunotherapy induced pneumonitis.  Specifically the patient was having trouble with cough and dyspnea.  Patient was placed on prednisone 40 mg daily.    As she is reviewed back today she has been on prednisone for roughly 1 week.  She feels that overall her breathing has improved.  She still continues with a somewhat productive cough at times.  She remains on continuous oxygen at 2 L currently with sats at 92%.  She denies any chest pain or worsening shortness of breath.    She states she is tolerating the steroids fine, with actually improved appetite and energy.  She has gained 1 pound.  She denies any new pain.    She is accompanied by her daughter today.  Her daughter is asking how we know this is immunotherapy induced pneumonitis rather than just pneumonia.  We discussed a combination of clinical and radiologic findings and monitoring symptoms for improvement over time, specifically monitoring  as she tapers the steroids.    They deny other concerns at this time.    Of note, PET scan did show hypermetabolic left adrenal lesion, for which we referred her to radiation oncology for consideration of stereotactic therapy.  She will see Dr. Whalen in consultation next week.         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   • Hyperlipidemia    • Left hemiplegia (CMS/HCC) 12/10/2015   • Myocardial infarct (CMS/HCC) 1996   • Osteopenia    • Stroke syndrome 2008   • Thyroid cyst     right   • Thyroid lump 12/10/2015    Description: Biopsy 05/15 at [facility], benign   • Transient cerebral ischemia    • Urge incontinence of urine      Past Surgical History:   Procedure Laterality Date   • BREAST BIOPSY     • BRONCHOSCOPY Bilateral 5/20/2019    Procedure: BRONCHOSCOPY WITH  BIOPSY AND BAL, WITH ENDOBRONCHIAL ULTRASOUND WITH FNA;  Surgeon: Chris Tirado MD;  Location: [facility] ENDOSCOPY;  Service: Pulmonary   • COLONOSCOPY      REC 07/2007, REC 02/2009, REC 12/2011, REC 01/14,  She says she cant do the prep because of poor mobility to get to .   • EYE SURGERY  1950   • OTHER SURGICAL HISTORY      Ventricular [place] for drainage of subdural hematoma   • TOTAL THYROIDECTOMY  08/2015    Dr. Ahmadi         ONCOLOGIC HISTORY:  (History from previous dates can be found in the separate document.)    Current Outpatient Medications on File Prior to Visit   Medication Sig Dispense Refill   • aspirin 81 MG chewable tablet Chew 81 mg Daily.     • atorvastatin (LIPITOR) 80 MG tablet Take 1 tablet by  mouth Daily. Needs an appointment for further refills (Patient taking differently: Take 80 mg by mouth Every Night. Needs an appointment for further refills) 90 tablet 0   • budesonide (PULMICORT) 0.5 MG/2ML nebulizer solution Take 0.5 mg by nebulization Daily.     • formoterol (PERFOROMIST) 20 MCG/2ML nebulizer solution Take  by nebulization 2 (Two) Times a Day.     • guaiFENesin (ROBITUSSIN) 100 MG/5ML solution oral solution Take 20 mL by mouth Every 4 (Four) Hours. While awake 3600 mL 3   • ipratropium-albuterol (DUO-NEB) 0.5-2.5 mg/3 ml nebulizer Take 3 mL by nebulization 4 (Four) Times a Day. And every 4 hours as needed for chest congestion 480 mL 11   • levothyroxine (SYNTHROID, LEVOTHROID) 75 MCG tablet Take 1 tablet by mouth Daily. 30 tablet 0   • LORazepam (ATIVAN PO) Take 0.25 mg by mouth As Needed.     • OLANZapine (zyPREXA) 7.5 MG tablet Take 1 tablet by mouth Every Night. 30 tablet 2   • ondansetron (ZOFRAN) 4 MG tablet Take 1 tablet by mouth Every 6 (Six) Hours As Needed for Nausea or Vomiting. 60 tablet 2   • potassium chloride ER (K-TAB) 20 MEQ tablet controlled-release ER tablet Take 1 tablet by mouth Daily. 30 tablet 2   • predniSONE (DELTASONE) 20 MG tablet Take 1 tablet by mouth Daily. 60 tablet 1   • prochlorperazine (COMPAZINE) 10 MG tablet Take 1 tablet by mouth Every 6 (Six) Hours As Needed for Nausea or Vomiting. 30 tablet 1   • sertraline (ZOLOFT) 25 MG tablet Take 1 tablet by mouth Daily. 90 tablet 1   • sodium chloride 7 % nebulizer solution nebulizer solution Take 4 mL by nebulization 2 (Two) Times a Day. And may use additional 2 times as needed chest congestion 480 mL 6   • SSD 1 % cream As Needed.     • tolterodine (DETROL) 1 MG tablet      • YUPELRI 175 MCG/3ML solution        No current facility-administered medications on file prior to visit.        ALLERGIES:     Allergies   Allergen Reactions   • Ace Inhibitors Cough     Other reaction(s): Cough       Social History      Socioeconomic History   • Marital status:      Spouse name: Miah   • Number of children: Not on file   • Years of education: College   • Highest education level: Not on file   Occupational History     Employer: RETIRED   Tobacco Use   • Smoking status: Former Smoker     Types: Cigarettes     Last attempt to quit:      Years since quittin.9   • Smokeless tobacco: Never Used   • Tobacco comment: caffeine use-soda   Substance and Sexual Activity   • Alcohol use: Yes     Comment: rarely   • Drug use: No   • Sexual activity: Defer     Family History   Problem Relation Age of Onset   • Heart attack Mother         Acute   • Heart disease Mother    • Colon polyps Sister         Sigmoid colon   • Coronary artery disease Brother    • Diabetes Brother    • Heart disease Brother    • Alcohol abuse Son               Review of Systems   Constitutional: Positive for appetite change (for the better), fatigue (stable) and unexpected weight change (improved). Negative for activity change and fever.   HENT: Negative for congestion, mouth sores, nosebleeds, sore throat and voice change.    Respiratory: Positive for cough and shortness of breath. Negative for wheezing.    Cardiovascular: Negative for chest pain, palpitations and leg swelling.   Gastrointestinal: Negative for abdominal distention, abdominal pain, blood in stool, constipation, diarrhea, nausea and vomiting.   Endocrine: Negative for cold intolerance and heat intolerance.   Genitourinary: Negative for difficulty urinating, dysuria, frequency and hematuria.   Musculoskeletal: Negative for arthralgias, back pain, joint swelling and myalgias.   Skin: Negative for rash.   Neurological: Positive for weakness. Negative for dizziness, syncope, light-headedness, numbness and headaches.   Hematological: Negative for adenopathy. Does not bruise/bleed easily.   Psychiatric/Behavioral: Negative for confusion, dysphoric mood and sleep disturbance. The patient is  "not nervous/anxious.          Objective    Vitals:    11/27/19 1035   BP: 127/79   Pulse: (!) 121   Resp: 16   Temp: 97.4 °F (36.3 °C)   SpO2: 92%   Weight: 39.9 kg (88 lb)   Height: 162.6 cm (64.02\")   PainSc: 0-No pain     Current Status 11/27/2019   ECOG score 3     Physical Exam   Constitutional: She is oriented to person, place, and time.   Elderly female no distress seated in a wheelchair with oxygen in place   HENT:   Mouth/Throat: Oropharynx is clear and moist.   Eyes: Conjunctivae are normal.   Neck: No thyromegaly present.   Cardiovascular: Normal rate and regular rhythm. Exam reveals no gallop and no friction rub.   No murmur heard.  Pulmonary/Chest: No respiratory distress. She has no wheezes.   Bilateral rhonchi in all lobes   Abdominal: Soft. Bowel sounds are normal. She exhibits no distension. There is no tenderness.   Musculoskeletal: She exhibits no edema.   Lymphadenopathy:        Head (right side): No submandibular adenopathy present.     She has no cervical adenopathy.     She has no axillary adenopathy.        Right: No inguinal and no supraclavicular adenopathy present.        Left: No inguinal and no supraclavicular adenopathy present.   Neurological: She is alert and oriented to person, place, and time. No cranial nerve deficit.   Left hemiparesis from prior CVA   Skin: Skin is warm and dry. No rash noted.   Psychiatric: She has a normal mood and affect. Her behavior is normal.   Patient was examined today, unchanged from above except where indicated    RECENT LABS:  Results from last 7 days   Lab Units 11/27/19  1023   WBC 10*3/mm3 11.08*   NEUTROS ABS 10*3/mm3 9.52*   HEMOGLOBIN g/dL 11.5*   HEMATOCRIT % 39.8   PLATELETS 10*3/mm3 372     Results from last 7 days   Lab Units 11/27/19  1023   SODIUM mmol/L 142   POTASSIUM mmol/L 4.7   CHLORIDE mmol/L 102   CO2 mmol/L 25.4   BUN mg/dL 19   CREATININE mg/dL 0.70   CALCIUM mg/dL 9.4   ALBUMIN g/dL 3.30*   BILIRUBIN mg/dL 0.3   ALK PHOS U/L 86 "   ALT (SGPT) U/L 10   AST (SGOT) U/L 15   GLUCOSE mg/dL 100         RADIOGRAPHIC DATA:  PET scan from 11/4/2019:  IMPRESSION:  1.  Intensely FDG avid left adrenal nodule which is new since 08/06/2019  likely represents metastatic disease.  2.  Previously seen area of nodular pulmonary opacification within the  posterior aspect of the left upper lobe has decreased in size and  density with FDG uptake similar to below that of adjacent irregular  pulmonary opacification within the left upper lobe. Pulmonary  consolidation and irregular pulmonary and ground glass opacification is  present within the bilateral lung bases and left upper lobe, new and  worsening within the left upper lobe and right lower lobes. Findings are  most suggestive of worsening multifocal pneumonia in the appropriate  clinical context and correlation with patient history is recommended.  3.  Mediastinal and hilar adenopathy stable in size over multiple prior  CTs and demonstrating FDG uptake mildly above that of blood pool.  Findings are nonspecific and continued attention on follow-up is  recommended to exclude malignancy.      Assessment/Plan     1. Metastatic non-small cell lung cancer (adenocarcinoma):  · Patient with long-standing smoking history x40 years quit in 2008  · Underlying significant COPD with FEV1 1.4 (58%) on 11/20/2014  · CT 5/16/2019 with left upper lobe mass, partially cavitary measuring 2.3 x 1.5 cm.  Additional 8 mm similar appearing right upper lobe nodule.  Bibasilar consolidation, small bilateral pleural effusions, mediastinal lymphadenopathy up to 2.7 centimeters.  · Bronchoscopy 5/20/2019 with identification of left mainstem malignancy extending to the level of the jose, biopsy showed poorly differentiated adenocarcinoma of pulmonary origin. EBUS with FNA station 7 lymph node negative for malignant cells, only scattered lymphoid aggregates.  BAL left upper lobe negative for malignant cells.  Negative EGFR mutation,  negative ALK/ROS 1 rearrangement. PDL1 95%.  · Patient was intubated with possible pneumonia, significant mucus plugging with underlying significant COPD.  Extubated on 5/28/2019.   · Staging evaluation performed during hospitalization with negative CT abdomen/pelvis, negative bone scan.  · MRI brain 5/29/2019 with evidence of metastatic disease, 3-4 enhancing lesions involving left occipital lobe 5 mm, left posterior parietal lobe 4 mm, left frontal lobe 4 mm, left parietal 3-4 mm with small amounts of surrounding edema.  Given the minimal extent of disease elected to observe with short interval MRI.  · Discussion regarding systemic therapy with single agent Keytruda due to PDL1 95%  · Subsequent disease progression on CT 6/8/2019 with increasing left upper lobe mass, mediastinal and hilar lymphadenopathy with occlusion of left mainstem bronchus at origin.  · Received palliative radiation therapy to left mainstem bronchus x10 fractions, completed 6/25/2019.  · Initiated systemic therapy with Keytruda 6/28/2019.  · CT scan following 2 cycles Keytruda and prior radiation from 8/6/2019 showed decrease in the left upper lobe primary lesion with essentially no residual discrete lung mass in that area.  Lymphadenopathy in the mediastinum/AP window and left hilum with significant improvement and resolution of left mainstem obstruction.  No new evidence of disease.  The patient did receive radiation therapy to both the primary left upper lobe mass and left hilum/mediastinum accounting for most of the improvement.  MRI brain 8/6/2019 with 3 enhancing metastatic lesions, 2 of which had a more nodular appearance as opposed to previous ring-enhancing appearance with stable size.  These were in the left occipital and left frontal regions.  The third lesion in the left parietal region decreased in size from 4 mm down to 2 mm.  There were no new lesions identified.  She has not undergone any specific treatment for her brain  metastases.  Continuation of immunotherapy with Keytruda.  · Following 4 cycles Keytruda, 9/17/2019 CT scan showed improvement in AP window lymph node from 2.7 x 1.9 down to 2.1 x 1.5 cm.  Progressive change in the left lower lobe around the pleura, with one area having a masslike appearance suspected secondary to radiation pneumonitis rather than malignancy.  MRI brain with small metastatic lesions decreased in size.  · CT scan 10/29/2019 following 6 cycles Keytruda with new/enlarging 2 cm left upper lobe lesion, stable mediastinal lymph nodes, increased air space opacities in the lungs bilaterally, new left adrenal nodule 1.3 cm.  MRI brain with stable to slightly improved findings 10/29/2019.  Cycle 7 Keytruda held 11/8/2019 with plans to pursue PET scan.  Unclear whether pulmonary findings represented infectious change versus pneumonitis from immunotherapy.  · PET scan 11/14/2019 with worsening areas of consolidation involving the lungs bilaterally, decrease in left upper lobe nodular opacification with minimal uptake, stable mildly hypermetabolic mediastinal and left hilar lymph nodes, intensely hypermetabolic left adrenal 1.7 cm lesion SUV 11.2.  We did refer her to Dr. Whalen to consider stereotactic radiation to this site. With clinical improvement in pulmonary status, pulmonary changes suspected to represent pneumonitis from immunotherapy.  The only area of true progression in left adrenal, will pursue stereotactic radiation.  Patient will remain off immunotherapy, initiated prednisone 40 mg daily.  She was also started on GI prophylaxis with Prilosec 20 mg daily. We will plan short-term follow-up CT scan possibly at a 6-week interval to assess her pulmonary status and the status of her malignancy.   · As she is reviewed back today, 11/27/19, patient tolerating steroids well and does feel that her breathing and cough is overall somewhat improved.  We will maintain her on 40 mg daily until she is reviewed  back again next week, at which time we will consider getting steroid taper.  2. 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  · Patient extubated 5/28/2019.  · Subsequent occlusion of left mainstem bronchus from malignancy 6/8/2019.  Received palliative radiation completed 6/25/2019.  · Patient hospitalized while in [place] at [facility] in October 2019 due to suspected aspiration pneumonia.  Doubtful that pulmonary findings are related to immunotherapy due to improvement on antibiotics and productive cough.  · Patient with decline in respiratory status in October 2019 requiring oxygen to be initiated.  Suspected related to immunotherapy induced pneumonitis. Prednisone initiated as outlined above.  3. Prior hemorrhagic CVA:  · Occurred in 2008, residual left upper extremity weakness and left lower extremity weakness.   4. Anemia:  · Hemoglobin prior to admission was 13.1 on 5/16/2019  · Hemoglobin declined in the 10-11 range, related to malignancy, pneumonia  · Hemoglobin today 11.5.  7. Hypothyroidism:  · Labs 6/28/2019 with TSH 23.8, normal free T4 of 1.43  · Patient was started on levothyroxine 100 mcg daily  · Repeat thyroid function studies on 8/9/2019 with TSH suppressed at 0.065 and elevated free T4 of 2.06.  Decreased levothyroxine dose to 50 mcg daily.    · 11/8/2019 TSH 37.5, free T4 1.15, levothyroxine dose increased to 75 mcg daily.  8. Depression and nausea/anorexia:  · Patient with depression related to her underlying medical illness as well as social situation with her son who is an alcoholic and lives in the home  · Patient has been treated with Depakote 250 mg every 12 hours for mood stabilization under the direction of her primary care physician  · Patient with significant depressive symptoms, currently continuing on Zoloft  under the direction of her primary care physician.  · Patient also with ongoing anorexia and nausea, initiated Zyprexa 2.5 mg nightly on 7/19/2019 with dramatic improvement in symptoms  · Patient seen in the supportive oncology clinic on 7/29/2019, tapered off Depakote.  · Patient has PRN Compazine and Zyprexa to use.  · Zyprexa dose titrated to 5 mg and subsequently on 9/20/2019 up to 7.5 mg nightly.  9. Hypokalemia:  · Potassium on 8/9/2019 profoundly low at 2.5  · Magnesium low normal at 1.7  · Prescribed 20 mEq IV potassium chloride   · Potassium today 4.7.  10. Dizziness/lightheadedness with relative hypotension:  · Cortisol level normal on 8/30/2019 at 33.16  · Patient tapered off metoprolol by cardiology 9/13/2019  · Symptoms currently significantly improved.  11. GI prophylaxis:  · Patient will continue over-the-counter Prilosec 20 mg daily as she continues on prednisone for immunotherapy induced pneumonitis.      Plan:  1. Continue prednisone 40 mg.  We will determine timeframe and course of steroid taper in the future pending response.  2. Continue GI prophylaxis with Prilosec 20 mg daily over-the-counter  3. She has been referred back to Dr. Whalen, radiation oncology, and will see him 12/3/2019 to consider stereotactic radiation to the left adrenal metastasis.    4. Return in 1 week for nurse practitioner visit with CBC, CMP.  If patient has improved significantly at this point, we will begin steroid taper per Dr. Godfrey.   5. MD visit in 2 weeks with CBC, CMP.  We will discuss further steroid taper and timeframe for follow-up radiographic evaluation pending patient's clinical status.

## 2019-12-04 PROBLEM — C79.72 MALIGNANT NEOPLASM METASTATIC TO LEFT ADRENAL GLAND (HCC): Status: ACTIVE | Noted: 2019-01-01

## 2019-12-04 NOTE — PROGRESS NOTES
Subjective     Solis Godfrey Jr., MD     Diagnosis Plan   1. 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 Adenocarcinoma, lung, left (CMS/HCC)              Ms. Moura is a very pleasant 74-year-old white female, who was found on CT of the chest in mid May 2019 to have a partially cavitary 2.3 cm left upper lobe mass with mediastinal lymphadenopathy..Diagnosis was established on 5/20/2019 at bronchoscopy at which time disease was identified in the left mainstem extending to the jose, pathology returning grade 3 adenocarcinoma.  PD-L1 was 95% MRI of the brain and work-up on 5/29 demonstrated for enhancing 4 small enhancing lesions in the 3 to 5 mm size and it was elected to observe these with short-term MRIs.  She had quick progression on CT in early June with evidence of occlusion of the left mainstem bronchus and she received a palliative course of radiation therapy to 30 Gy in 10 fractions completing in late June.  She started Keytruda and after 6 cycles, scans that had been showing  response and stability  noted a new left adrenal nodule , PET scan on 11/14/2019 noted decrease in the left upper lobe nodular opacification, stable mediastinal and hilar nodes and a new intensely hypermetabolic left adrenal 1.7 cm with an SUV of 11.2. . We are asked to evaluate the patient for SBRT to this new hypermetabolic left adrenal nodule.                                  Review of Systems   Constitutional: Negative.    Respiratory: Positive for cough and wheezing.    Gastrointestinal: Positive for constipation.   Genitourinary: Negative.    Musculoskeletal: Negative.    Neurological: Negative.          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   • Hyperlipidemia    • Left hemiplegia (CMS/HCC) 12/10/2015   • Lung cancer (CMS/HCC)    • Myocardial infarct (CMS/HCC)    • Osteopenia    • Stroke syndrome    • Thyroid cyst     right   • Thyroid lump 12/10/2015    Description: Biopsy 05/15 at [facility], benign   • Transient cerebral ischemia    • Urge incontinence of urine          Past Surgical History:   Procedure Laterality Date   • BREAST BIOPSY     • BRONCHOSCOPY Bilateral 2019    Procedure: BRONCHOSCOPY WITH  BIOPSY AND BAL, WITH ENDOBRONCHIAL ULTRASOUND WITH FNA;  Surgeon: Chris Tirado MD;  Location: [facility] ENDOSCOPY;  Service: Pulmonary   • COLONOSCOPY      REC 2007, REC 2009, REC 2011, REC ,  She says she cant do the prep because of poor mobility to get to [place].   • EYE SURGERY     • OTHER SURGICAL HISTORY      Ventricular [place] for drainage of subdural hematoma   • TOTAL THYROIDECTOMY  2015    Dr. Ahmadi         Social History     Socioeconomic History   • Marital status:      Spouse name: Miah   • Number of children: Not on file   • Years of education: College   • Highest education level: Not on file   Occupational History     Employer: RETIRED   Tobacco Use   • Smoking status: Former Smoker     Types: Cigarettes     Last attempt to quit: 2008     Years since quittin.9   • Smokeless tobacco: Never Used   • Tobacco comment: caffeine use-soda   Substance and Sexual Activity   • Alcohol use: Yes     Comment: rarely   • Drug use: No   • Sexual activity: Defer         Family History   Problem Relation Age of Onset   • Heart attack Mother         Acute   • Heart disease Mother    • Colon polyps Sister         Sigmoid colon   • Coronary artery disease Brother    • Diabetes Brother    • Heart disease Brother    • Alcohol abuse Son            Objective    Physical Exam      Current Outpatient Medications on File Prior to Visit   Medication Sig Dispense Refill   • aspirin 81 MG chewable tablet Chew 81 mg Daily.     • atorvastatin (LIPITOR) 80 MG tablet Take 1 tablet by mouth Daily. Needs an appointment for further refills (Patient taking differently: Take 80 mg by mouth Every Night. Needs an appointment for further refills) 90 tablet 0   • budesonide (PULMICORT) 0.5 MG/2ML nebulizer solution Take 0.5 mg by nebulization Daily.     • formoterol (PERFOROMIST) 20 MCG/2ML nebulizer solution Take  by nebulization 2 (Two) Times a Day.     • guaiFENesin (ROBITUSSIN) 100 MG/5ML solution oral solution Take 20 mL by mouth Every 4 (Four) Hours. While awake 3600 mL 3   • ipratropium-albuterol (DUO-NEB) 0.5-2.5 mg/3 ml nebulizer Take 3 mL by nebulization 4 (Four) Times a Day. And every 4 hours as needed for chest congestion 480 mL 11   • levothyroxine (SYNTHROID, LEVOTHROID) 75 MCG tablet Take 1 tablet by mouth Daily. 30 tablet 0   • LORazepam (ATIVAN PO) Take 0.25 mg by mouth As Needed.     • OLANZapine (zyPREXA) 7.5 MG tablet Take 1 tablet by mouth Every Night. 30 tablet 2   • ondansetron (ZOFRAN) 4 MG tablet Take 1 tablet by mouth Every 6 (Six) Hours As Needed for Nausea or Vomiting. 60 tablet 2   • potassium chloride ER (K-TAB) 20 MEQ tablet controlled-release ER tablet Take 1 tablet by mouth Daily. 30 tablet 2   • predniSONE (DELTASONE) 20 MG tablet Take 1 tablet by mouth Daily. 60 tablet 1   • prochlorperazine (COMPAZINE) 10 MG tablet Take 1 tablet by mouth Every 6 (Six) Hours As Needed for Nausea or Vomiting. 30 tablet 1   • sertraline (ZOLOFT) 25 MG tablet Take 1 tablet by mouth Daily. 90 tablet 1   • sodium chloride 7 % nebulizer solution nebulizer solution Take 4 mL by nebulization 2 (Two) Times a Day. And may use additional 2 times as needed chest congestion 480 mL 6   • SSD 1 % cream As Needed.     • tolterodine (DETROL) 1 MG tablet      • YUPELRI 175  MCG/3ML solution        No current facility-administered medications on file prior to visit.        ALLERGIES:    Allergies   Allergen Reactions   • Ace Inhibitors Cough     Other reaction(s): Cough       /73   Pulse 92   Wt 39.9 kg (88 lb)   SpO2 (!) 87%   BMI 15.10 kg/m²      Current Status 11/27/2019   ECOG score 3         Assessment/Plan     Metastatic adenocarcinoma the lung, with a new hypermetabolic left adrenal nodule, the only area of true progression.  We will plan to treat with SBRT, likely 5 fractions, dose depending on planning.  We started the planning with immobilization and CT simulation, and should be able to get her course of therapy started in 1 to 1.5 weeks.  She had many good questions which I believe were answered to her satisfaction as did her .  Informed consent was obtained.    I spent greater than 25 minutes of face-to-face time with the patient and her  and greater than 20 minutes that time was spent in counseling and coordination of care including review of imaging as well as discussion of indications, goals, logistics, alternatives, and risks both common and rare.        yasmine Whalen MD

## 2019-12-04 NOTE — PROGRESS NOTES
Subjective     REASONS FOR FOLLOWUP:    1. Metastatic non-small cell lung cancer (adenocarcinoma):  · Patient with long-standing smoking history x40 years quit in 2008  · Underlying significant COPD with FEV1 1.4 (58%) on 11/20/2014  · CT 5/16/2019 with left upper lobe mass, partially cavitary measuring 2.3 x 1.5 cm.  Additional 8 mm similar appearing right upper lobe nodule.  Bibasilar consolidation, small bilateral pleural effusions, mediastinal lymphadenopathy up to 2.7 centimeters.  · Bronchoscopy 5/20/2019 with identification of left mainstem malignancy extending to the level of the jose, biopsy showed poorly differentiated adenocarcinoma of pulmonary origin. EBUS with FNA station 7 lymph node negative for malignant cells, only scattered lymphoid aggregates.  BAL left upper lobe negative for malignant cells.  Negative EGFR mutation, negative ALK/ROS 1 rearrangement. PDL1 95%.  · Patient was intubated with possible pneumonia, significant mucus plugging with underlying significant COPD.  Extubated on 5/28/2019.   · Staging evaluation performed during hospitalization with negative CT abdomen/pelvis, negative bone scan.  · MRI brain 5/29/2019 with evidence of metastatic disease, 3-4 enhancing lesions involving left occipital lobe 5 mm, left posterior parietal lobe 4 mm, left frontal lobe 4 mm, left parietal 3-4 mm with small amounts of surrounding edema.  Given the minimal extent of disease elected to observe with short interval MRI.  · Discussion regarding systemic therapy with single agent Keytruda due to PDL1 95%  · Subsequent disease progression on CT 6/8/2019 with increasing left upper lobe mass, mediastinal and hilar lymphadenopathy with occlusion of left mainstem bronchus at origin.  · Received palliative radiation therapy to left mainstem bronchus x10 fractions, completed 6/25/2019.  · Initiated systemic therapy with Keytruda 6/28/2019.  · Scans 8/6/2019 following 2 cycles Keytruda with decrease left  upper lobe primary lesion and left hilar/mediastinal lymphadenopathy.  Response primarily felt to be related to radiation therapy.  Continuation of immunotherapy.  · CT scans 9/17/2019 following 4 cycles Keytruda with further decrease in AP window lymph node, suspected evolving postradiation change in left lower lobe.  MRI brain 9/17/2019 with improvement in 3 small metastatic lesions.  Continuation of immunotherapy.  · CT scan 10/29/2019 following 6 cycles Keytruda with new/enlarging 2 cm left upper lobe lesion, stable mediastinal lymph nodes, increased air space opacities in the lungs bilaterally, new left adrenal nodule 1.3 cm.  MRI brain with stable to slightly improved findings 10/29/2019.  Cycle 7 Keytruda held 11/8/2019 with plans to pursue PET scan.  Unclear whether pulmonary findings represented infectious change versus pneumonitis from immunotherapy.  · PET scan 11/14/2019 with worsening areas of consolidation involving the lungs bilaterally, decrease in left upper lobe nodular opacification with minimal uptake, stable mildly hypermetabolic mediastinal and left hilar lymph nodes, intensely hypermetabolic left adrenal 1.7 cm lesion SUV 11.2.  With clinical improvement in pulmonary status, pulmonary changes suspected to represent pneumonitis from immunotherapy.  The only area of true progression in left adrenal, will pursue stereotactic radiation.  Patient will remain off immunotherapy, initiated prednisone 40 mg daily.  2. 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  · Patient extubated 5/28/2019.  · Subsequent occlusion of left mainstem bronchus from malignancy 6/8/2019.  Received palliative radiation completed 6/25/2019.  · Patient with decline in respiratory status in October 2019 requiring oxygen to be initiated.  Suspected related to immunotherapy induced  pneumonitis.  3. Prior hemorrhagic CVA:  · Occurred in 2008, residual left upper extremity weakness and left lower extremity weakness.   4. Anemia:  · Hemoglobin prior to admission was 13.1 on 5/16/2019  · Hemoglobin declined in the 10-11 range, related to malignancy, pneumonia  5. Depression and nausea/anorexia and chronic fatigue:  · Significant improvement following addition of Zyprexa nightly, dose escalated to 5 mg.  6. Hypothyroidism:  · Labs 6/28/2019 with TSH 23.8, normal free T4 of 1.43  · Treated with levothyroxine 100 mcg daily  · Subsequent thyroid function studies with TSH 0.065 and free T4 elevated at 2.06, levothyroxine dose decreased 8/9/19 to 50 mcg daily.  · 11/8/2019 TSH 37.5, free T4 1.15, levothyroxine dose increased to 75 mcg daily.      HISTORY OF PRESENT ILLNESS:  The patient is a 74 y.o. year old female who is here for follow-up with the above-mentioned history.    History of Present Illness the patient returns today in follow-up.  We recently permanently discontinued Keytruda due to concern for immunotherapy induced pneumonitis.  Specifically the patient was having trouble with cough and dyspnea.  Patient was placed on prednisone 40 mg daily.    She has been on Prednisone 40 mg for 2 weeks now.  She is tolerating steroids well with improvement in appetite and no significant change to her sleep pattern.  She has continued to improve her breathing standpoint.  She denies any worsening cough.  She denies any worsening shortness of breath or chest pain.  She is currently 92% on 2 L of oxygen though she states at home she does not wear her oxygen.  She states when at rest she is around 90 to 92% on room air.  She does have follow-up CT of the chest scheduled later today as ordered by Dr. Francis.    We discussed beginning taper of prednisone today.  Her  is with her and we discussed dropping her prednisone down to 20 mg daily until she is reviewed back next week.  The patient is in  agreement.    Of note, PET scan did show hypermetabolic left adrenal lesion, for which we referred her to radiation oncology for consideration of stereotactic therapy.  She saw Dr. Whalen yesterday and he plans to treat with SBRT, most likely 5 fractions.  They are in the planning phase for her treatment currently with tentative plans to begin 12/17/2019.    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   • Hyperlipidemia    • Left hemiplegia (CMS/HCC) 12/10/2015   • Lung cancer (CMS/HCC)    • Myocardial infarct (CMS/HCC) 1996   • Osteopenia    • Stroke syndrome 2008   • Thyroid cyst     right   • Thyroid lump 12/10/2015    Description: Biopsy 05/15 at [facility], benign   • Transient cerebral ischemia    • Urge incontinence of urine      Past Surgical History:   Procedure Laterality Date   • BREAST BIOPSY     • BRONCHOSCOPY Bilateral 5/20/2019    Procedure: BRONCHOSCOPY WITH  BIOPSY AND BAL, WITH ENDOBRONCHIAL ULTRASOUND WITH FNA;  Surgeon: Chris Tirado MD;  Location: [facility] ENDOSCOPY;  Service: Pulmonary   • COLONOSCOPY      REC 07/2007, REC 02/2009, REC 12/2011, REC 01/14,  She says she cant do the prep because of poor mobility to get to .   • EYE SURGERY  1951   • OTHER SURGICAL HISTORY      Ventricular [place] for drainage of subdural hematoma   • TOTAL THYROIDECTOMY  08/2015    Dr. Ahmadi         ONCOLOGIC HISTORY:  (History from previous dates can be found in the separate document.)    Current Outpatient Medications on File Prior to Visit   Medication Sig Dispense Refill   • aspirin 81  MG chewable tablet Chew 81 mg Daily.     • atorvastatin (LIPITOR) 80 MG tablet Take 1 tablet by mouth Daily. Needs an appointment for further refills (Patient taking differently: Take 80 mg by mouth Every Night. Needs an appointment for further refills) 90 tablet 0   • budesonide (PULMICORT) 0.5 MG/2ML nebulizer solution Take 0.5 mg by nebulization Daily.     • formoterol (PERFOROMIST) 20 MCG/2ML nebulizer solution Take  by nebulization 2 (Two) Times a Day.     • guaiFENesin (ROBITUSSIN) 100 MG/5ML solution oral solution Take 20 mL by mouth Every 4 (Four) Hours. While awake 3600 mL 3   • levothyroxine (SYNTHROID, LEVOTHROID) 75 MCG tablet Take 1 tablet by mouth Daily. 30 tablet 0   • LORazepam (ATIVAN PO) Take 0.25 mg by mouth As Needed.     • OLANZapine (zyPREXA) 7.5 MG tablet Take 1 tablet by mouth Every Night. 30 tablet 2   • ondansetron (ZOFRAN) 4 MG tablet Take 1 tablet by mouth Every 6 (Six) Hours As Needed for Nausea or Vomiting. 60 tablet 2   • potassium chloride ER (K-TAB) 20 MEQ tablet controlled-release ER tablet Take 1 tablet by mouth Daily. 30 tablet 2   • predniSONE (DELTASONE) 20 MG tablet Take 1 tablet by mouth Daily. 60 tablet 1   • prochlorperazine (COMPAZINE) 10 MG tablet Take 1 tablet by mouth Every 6 (Six) Hours As Needed for Nausea or Vomiting. 30 tablet 1   • sertraline (ZOLOFT) 25 MG tablet Take 1 tablet by mouth Daily. 90 tablet 1   • sodium chloride 7 % nebulizer solution nebulizer solution Take 4 mL by nebulization 2 (Two) Times a Day. And may use additional 2 times as needed chest congestion 480 mL 6   • SSD 1 % cream As Needed.     • tolterodine (DETROL) 1 MG tablet      • YUPELRI 175 MCG/3ML solution      • ipratropium-albuterol (DUO-NEB) 0.5-2.5 mg/3 ml nebulizer Take 3 mL by nebulization 4 (Four) Times a Day. And every 4 hours as needed for chest congestion 480 mL 11     No current facility-administered medications on file prior to visit.        ALLERGIES:     Allergies   Allergen  Reactions   • Ace Inhibitors Cough     Other reaction(s): Cough       Social History     Socioeconomic History   • Marital status:      Spouse name: Miah   • Number of children: Not on file   • Years of education: College   • Highest education level: Not on file   Occupational History     Employer: RETIRED   Tobacco Use   • Smoking status: Former Smoker     Types: Cigarettes     Last attempt to quit:      Years since quittin.9   • Smokeless tobacco: Never Used   • Tobacco comment: caffeine use-soda   Substance and Sexual Activity   • Alcohol use: Yes     Comment: rarely   • Drug use: No   • Sexual activity: Defer     Family History   Problem Relation Age of Onset   • Heart attack Mother         Acute   • Heart disease Mother    • Colon polyps Sister         Sigmoid colon   • Coronary artery disease Brother    • Diabetes Brother    • Heart disease Brother    • Alcohol abuse Son               Review of Systems   Constitutional: Positive for appetite change (for the better), fatigue (stable) and unexpected weight change (improved). Negative for activity change and fever.   HENT: Negative for congestion, mouth sores, nosebleeds, sore throat and voice change.    Respiratory: Positive for cough (improved) and shortness of breath (improved). Negative for wheezing.    Cardiovascular: Negative for chest pain, palpitations and leg swelling.   Gastrointestinal: Negative for abdominal distention, abdominal pain, blood in stool, constipation, diarrhea, nausea and vomiting.   Endocrine: Negative for cold intolerance and heat intolerance.   Genitourinary: Negative for difficulty urinating, dysuria, frequency and hematuria.   Musculoskeletal: Negative for arthralgias, back pain, joint swelling and myalgias.   Skin: Negative for rash.   Neurological: Positive for weakness (stable). Negative for dizziness, syncope, light-headedness, numbness and headaches.   Hematological: Negative for adenopathy. Does not  "bruise/bleed easily.   Psychiatric/Behavioral: Negative for confusion, dysphoric mood and sleep disturbance. The patient is not nervous/anxious.          Objective    Vitals:    12/04/19 1050   BP: 141/68   Pulse: 89   Resp: 16   Temp: 97.6 °F (36.4 °C)   TempSrc: Oral   SpO2: 90%   Weight: 39.9 kg (88 lb)   Height: 162.6 cm (64.02\")   PainSc: 0-No pain     Current Status 12/4/2019   ECOG score 3     Physical Exam   Constitutional: She is oriented to person, place, and time.   Elderly female no distress seated in a wheelchair with oxygen in place   HENT:   Mouth/Throat: Oropharynx is clear and moist.   Eyes: Conjunctivae are normal.   Neck: No thyromegaly present.   Cardiovascular: Normal rate and regular rhythm. Exam reveals no gallop and no friction rub.   No murmur heard.  Pulmonary/Chest: No respiratory distress. She has no wheezes.   Coarse breath sounds but no rales or rhonchi appreciated today.   Abdominal: Soft. Bowel sounds are normal. She exhibits no distension. There is no tenderness.   Musculoskeletal: She exhibits no edema.   Lymphadenopathy:        Head (right side): No submandibular adenopathy present.     She has no cervical adenopathy.     She has no axillary adenopathy.        Right: No inguinal and no supraclavicular adenopathy present.        Left: No inguinal and no supraclavicular adenopathy present.   Neurological: She is alert and oriented to person, place, and time. No cranial nerve deficit.   Left hemiparesis from prior CVA   Skin: Skin is warm and dry. No rash noted.   Psychiatric: She has a normal mood and affect. Her behavior is normal.   Patient was examined today, unchanged from above except where indicated    RECENT LABS:  Results from last 7 days   Lab Units 12/04/19  1023   WBC 10*3/mm3 15.19*   NEUTROS ABS 10*3/mm3 13.71*   HEMOGLOBIN g/dL 11.6*   HEMATOCRIT % 39.3   PLATELETS 10*3/mm3 296     Results from last 7 days   Lab Units 12/04/19  1023   SODIUM mmol/L 140   POTASSIUM " mmol/L 3.7   CHLORIDE mmol/L 105   CO2 mmol/L 20.9*   BUN mg/dL 17   CREATININE mg/dL 0.66   CALCIUM mg/dL 8.6   ALBUMIN g/dL 3.00*   BILIRUBIN mg/dL 0.3   ALK PHOS U/L 77   ALT (SGPT) U/L <5   AST (SGOT) U/L 11   GLUCOSE mg/dL 99         RADIOGRAPHIC DATA:  PET scan from 11/4/2019:  IMPRESSION:  1.  Intensely FDG avid left adrenal nodule which is new since 08/06/2019  likely represents metastatic disease.  2.  Previously seen area of nodular pulmonary opacification within the  posterior aspect of the left upper lobe has decreased in size and  density with FDG uptake similar to below that of adjacent irregular  pulmonary opacification within the left upper lobe. Pulmonary  consolidation and irregular pulmonary and ground glass opacification is  present within the bilateral lung bases and left upper lobe, new and  worsening within the left upper lobe and right lower lobes. Findings are  most suggestive of worsening multifocal pneumonia in the appropriate  clinical context and correlation with patient history is recommended.  3.  Mediastinal and hilar adenopathy stable in size over multiple prior  CTs and demonstrating FDG uptake mildly above that of blood pool.  Findings are nonspecific and continued attention on follow-up is  recommended to exclude malignancy.      Assessment/Plan     1. Metastatic non-small cell lung cancer (adenocarcinoma):  · Patient with long-standing smoking history x40 years quit in 2008  · Underlying significant COPD with FEV1 1.4 (58%) on 11/20/2014  · CT 5/16/2019 with left upper lobe mass, partially cavitary measuring 2.3 x 1.5 cm.  Additional 8 mm similar appearing right upper lobe nodule.  Bibasilar consolidation, small bilateral pleural effusions, mediastinal lymphadenopathy up to 2.7 centimeters.  · Bronchoscopy 5/20/2019 with identification of left mainstem malignancy extending to the level of the jose, biopsy showed poorly differentiated adenocarcinoma of pulmonary  origin. EBUS with FNA station 7 lymph node negative for malignant cells, only scattered lymphoid aggregates.  BAL left upper lobe negative for malignant cells.  Negative EGFR mutation, negative ALK/ROS 1 rearrangement. PDL1 95%.  · Patient was intubated with possible pneumonia, significant mucus plugging with underlying significant COPD.  Extubated on 5/28/2019.   · Staging evaluation performed during hospitalization with negative CT abdomen/pelvis, negative bone scan.  · MRI brain 5/29/2019 with evidence of metastatic disease, 3-4 enhancing lesions involving left occipital lobe 5 mm, left posterior parietal lobe 4 mm, left frontal lobe 4 mm, left parietal 3-4 mm with small amounts of surrounding edema.  Given the minimal extent of disease elected to observe with short interval MRI.  · Discussion regarding systemic therapy with single agent Keytruda due to PDL1 95%  · Subsequent disease progression on CT 6/8/2019 with increasing left upper lobe mass, mediastinal and hilar lymphadenopathy with occlusion of left mainstem bronchus at origin.  · Received palliative radiation therapy to left mainstem bronchus x10 fractions, completed 6/25/2019.  · Initiated systemic therapy with Keytruda 6/28/2019.  · CT scan following 2 cycles Keytruda and prior radiation from 8/6/2019 showed decrease in the left upper lobe primary lesion with essentially no residual discrete lung mass in that area.  Lymphadenopathy in the mediastinum/AP window and left hilum with significant improvement and resolution of left mainstem obstruction.  No new evidence of disease.  The patient did receive radiation therapy to both the primary left upper lobe mass and left hilum/mediastinum accounting for most of the improvement.  MRI brain 8/6/2019 with 3 enhancing metastatic lesions, 2 of which had a more nodular appearance as opposed to previous ring-enhancing appearance with stable size.  These were in the left occipital and left frontal regions.  The  third lesion in the left parietal region decreased in size from 4 mm down to 2 mm.  There were no new lesions identified.  She has not undergone any specific treatment for her brain metastases.  Continuation of immunotherapy with Keytruda.  · Following 4 cycles Keytruda, 9/17/2019 CT scan showed improvement in AP window lymph node from 2.7 x 1.9 down to 2.1 x 1.5 cm.  Progressive change in the left lower lobe around the pleura, with one area having a masslike appearance suspected secondary to radiation pneumonitis rather than malignancy.  MRI brain with small metastatic lesions decreased in size.  · CT scan 10/29/2019 following 6 cycles Keytruda with new/enlarging 2 cm left upper lobe lesion, stable mediastinal lymph nodes, increased air space opacities in the lungs bilaterally, new left adrenal nodule 1.3 cm.  MRI brain with stable to slightly improved findings 10/29/2019.  Cycle 7 Keytruda held 11/8/2019 with plans to pursue PET scan.  Unclear whether pulmonary findings represented infectious change versus pneumonitis from immunotherapy.  · PET scan 11/14/2019 with worsening areas of consolidation involving the lungs bilaterally, decrease in left upper lobe nodular opacification with minimal uptake, stable mildly hypermetabolic mediastinal and left hilar lymph nodes, intensely hypermetabolic left adrenal 1.7 cm lesion SUV 11.2.  We did refer her to Dr. Whalen to consider stereotactic radiation to this site. With clinical improvement in pulmonary status, pulmonary changes suspected to represent pneumonitis from immunotherapy.  The only area of true progression in left adrenal, will pursue stereotactic radiation.  Patient will remain off immunotherapy, initiated prednisone 40 mg daily.  She was also started on GI prophylaxis with Prilosec 20 mg daily. We will plan short-term follow-up CT scan possibly at a 6-week interval to assess her pulmonary status and the status of her malignancy.   · Patient saw Dr. Whalen for  initial consultation 12/3/2019.  SBRT x5 fractions planned beginning 12/17/2019 tentatively.  · Patient reviewed back today, 12/4/2019, now 2 weeks into prednisone 40 mg daily therapy, tolerating well.  She has had improvement in her breathing and cough also improved.  She will have follow-up CT scan of the chest later today.  We will go ahead and begin slow taper of her prednisone, decreasing down to 20 mg daily.  She will see Dr. Godfrey in follow-up next week.  I have advised the patient and her  that if she should have worsening pulmonary symptoms to call back.  They verbalized understanding.  2. 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  · Patient extubated 5/28/2019.  · Subsequent occlusion of left mainstem bronchus from malignancy 6/8/2019.  Received palliative radiation completed 6/25/2019.  · Patient hospitalized while in [place] at [facility] in October 2019 due to suspected aspiration pneumonia.  Doubtful that pulmonary findings are related to immunotherapy due to improvement on antibiotics and productive cough.  · Patient with decline in respiratory status in October 2019 requiring oxygen to be initiated.  Suspected related to immunotherapy induced pneumonitis. Prednisone initiated as outlined above.  3. Prior hemorrhagic CVA:  · Occurred in 2008, residual left upper extremity weakness and left lower extremity weakness.   4. Anemia:  · Hemoglobin prior to admission was 13.1 on 5/16/2019  · Hemoglobin declined in the 10-11 range, related to malignancy, pneumonia  · Hemoglobin today 11.6.  7. Hypothyroidism:  · Labs 6/28/2019 with TSH 23.8, normal free T4 of 1.43  · Patient was started on levothyroxine 100 mcg daily  · Repeat thyroid function studies on 8/9/2019 with TSH suppressed at 0.065 and elevated free T4 of 2.06.   Decreased levothyroxine dose to 50 mcg daily.    · 11/8/2019 TSH 37.5, free T4 1.15, levothyroxine dose increased to 75 mcg daily.  8. Depression and nausea/anorexia:  · Patient with depression related to her underlying medical illness as well as social situation with her son who is an alcoholic and lives in the home  · Patient has been treated with Depakote 250 mg every 12 hours for mood stabilization under the direction of her primary care physician  · Patient with significant depressive symptoms, currently continuing on Zoloft under the direction of her primary care physician.  · Patient also with ongoing anorexia and nausea, initiated Zyprexa 2.5 mg nightly on 7/19/2019 with dramatic improvement in symptoms  · Patient seen in the supportive oncology clinic on 7/29/2019, tapered off Depakote.  · Patient has PRN Compazine and Zyprexa to use.  · Zyprexa dose titrated to 5 mg and subsequently on 9/20/2019 up to 7.5 mg nightly.  9. Hypokalemia:  · Potassium on 8/9/2019 profoundly low at 2.5  · Magnesium low normal at 1.7  · Prescribed 20 mEq IV potassium chloride   · Potassium today 3.7.  10. Dizziness/lightheadedness with relative hypotension:  · Cortisol level normal on 8/30/2019 at 33.16  · Patient tapered off metoprolol by cardiology 9/13/2019  · Symptoms currently significantly improved.  11. GI prophylaxis:  · Patient will continue over-the-counter Prilosec 20 mg daily as she continues on prednisone for immunotherapy induced pneumonitis.      Plan:  1. Decrease prednisone to 20 mg daily.    2. Continue GI prophylaxis with Prilosec 20 mg daily over-the-counter  3. Patient undergo repeat CT scan of the chest later today as ordered by Dr. Francis for follow-up.  4. Patient will initiate SBRT per Dr. Whalen, 5 fractions planned, beginning 12/17/2019.  5. Patient will return in 1 week with CBC, CMP.  We will discuss further steroid taper.

## 2019-12-11 NOTE — PROGRESS NOTES
Subjective .     REASONS FOR FOLLOWUP:    1. Metastatic non-small cell lung cancer (adenocarcinoma):  · Patient with long-standing smoking history x40 years quit in 2008  · Underlying significant COPD with FEV1 1.4 (58%) on 11/20/2014  · CT 5/16/2019 with left upper lobe mass, partially cavitary measuring 2.3 x 1.5 cm.  Additional 8 mm similar appearing right upper lobe nodule.  Bibasilar consolidation, small bilateral pleural effusions, mediastinal lymphadenopathy up to 2.7 centimeters.  · Bronchoscopy 5/20/2019 with identification of left mainstem malignancy extending to the level of the jose, biopsy showed poorly differentiated adenocarcinoma of pulmonary origin. EBUS with FNA station 7 lymph node negative for malignant cells, only scattered lymphoid aggregates.  BAL left upper lobe negative for malignant cells.  Negative EGFR mutation, negative ALK/ROS 1 rearrangement. PDL1 95%.  · Patient was intubated with possible pneumonia, significant mucus plugging with underlying significant COPD.  Extubated on 5/28/2019.   · Staging evaluation performed during hospitalization with negative CT abdomen/pelvis, negative bone scan.  · MRI brain 5/29/2019 with evidence of metastatic disease, 3-4 enhancing lesions involving left occipital lobe 5 mm, left posterior parietal lobe 4 mm, left frontal lobe 4 mm, left parietal 3-4 mm with small amounts of surrounding edema.  Given the minimal extent of disease elected to observe with short interval MRI.  · Discussion regarding systemic therapy with single agent Keytruda due to PDL1 95%  · Subsequent disease progression on CT 6/8/2019 with increasing left upper lobe mass, mediastinal and hilar lymphadenopathy with occlusion of left mainstem bronchus at origin.  · Received palliative radiation therapy to left mainstem bronchus x10 fractions, completed 6/25/2019.  · Initiated systemic therapy with Keytruda 6/28/2019.  · Scans 8/6/2019 following 2 cycles Keytruda with decrease left  upper lobe primary lesion and left hilar/mediastinal lymphadenopathy.  Response primarily felt to be related to radiation therapy.  Continuation of immunotherapy.  · CT scans 9/17/2019 following 4 cycles Keytruda with further decrease in AP window lymph node, suspected evolving postradiation change in left lower lobe.  MRI brain 9/17/2019 with improvement in 3 small metastatic lesions.  Continuation of immunotherapy.  · CT scan 10/29/2019 following 6 cycles Keytruda with new/enlarging 2 cm left upper lobe lesion, stable mediastinal lymph nodes, increased air space opacities in the lungs bilaterally, new left adrenal nodule 1.3 cm.  MRI brain with stable to slightly improved findings 10/29/2019.  Cycle 7 Keytruda held 11/8/2019 with plans to pursue PET scan.  Unclear whether pulmonary findings represented infectious change versus pneumonitis from immunotherapy.  · PET scan 11/14/2019 with worsening areas of consolidation involving the lungs bilaterally, decrease in left upper lobe nodular opacification with minimal uptake, stable mildly hypermetabolic mediastinal and left hilar lymph nodes, intensely hypermetabolic left adrenal 1.7 cm lesion SUV 11.2.  With clinical improvement in pulmonary status, pulmonary changes suspected to represent pneumonitis from immunotherapy.  The only area of true progression in left adrenal, will pursue stereotactic radiation.  Patient will remain off immunotherapy, initiated prednisone 40 mg daily.  · CT chest 12/4/2019 with improvement in left upper and lower lobe consolidation, slight further progression left adrenal.  Prednisone tapered.  · Left adrenal stereotactic radiation to be administered 12/17-12/26/2019 (5 fractions).  2. 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  · Patient extubated 5/28/2019.  · Subsequent occlusion of left  mainstem bronchus from malignancy 6/8/2019.  Received palliative radiation completed 6/25/2019.  · Patient with decline in respiratory status in October 2019 requiring oxygen to be initiated.  Suspected related to immunotherapy induced pneumonitis.  3. Prior hemorrhagic CVA:  · Occurred in 2008, residual left upper extremity weakness and left lower extremity weakness.   4. Anemia:  · Hemoglobin prior to admission was 13.1 on 5/16/2019  · Hemoglobin declined in the 10-11 range, related to malignancy, pneumonia  5. Depression and nausea/anorexia and chronic fatigue:  · Significant improvement following addition of Zyprexa nightly, dose escalated to 5 mg.  6. Hypothyroidism:  · Labs 6/28/2019 with TSH 23.8, normal free T4 of 1.43  · Treated with levothyroxine 100 mcg daily  · Subsequent thyroid function studies with TSH 0.065 and free T4 elevated at 2.06, levothyroxine dose decreased 8/9/19 to 50 mcg daily.  · 11/8/2019 TSH 37.5, free T4 1.15, levothyroxine dose increased to 75 mcg daily.      HISTORY OF PRESENT ILLNESS:  The patient is a 74 y.o. year old female who is here for follow-up with the above-mentioned history.    History of Present Illness the patient returns today in follow-up with laboratory studies and CT chest to review, continuing on prednisone, currently receiving 20 mg daily (dose tapered on 12/4/2019).  The patient notes that she has improved since the last visit.  Her appetite has increased on steroids and her weight has been stable, currently 88 pounds.  She has mild intermittent cough, has tapered her oxygen down to 1 L via nasal cannula at night only, maintaining oxygen saturation during the daytime in the 88-93% range off oxygen.  Patient remains in a wheelchair today as she does chronically related to her prior CVA.  She reports however that her mobility has improved continuing on physical therapy 2 times per week at home.  She has no new complaints.    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   • Hyperlipidemia    • Left hemiplegia (CMS/HCC) 12/10/2015   • Lung cancer (CMS/HCC)    • Myocardial infarct (CMS/HCC) 1996   • Osteopenia    • Stroke syndrome 2008   • Thyroid cyst     right   • Thyroid lump 12/10/2015    Description: Biopsy 05/15 at [facility], benign   • Transient cerebral ischemia    • Urge incontinence of urine      Past Surgical History:   Procedure Laterality Date   • BREAST BIOPSY     • BRONCHOSCOPY Bilateral 5/20/2019    Procedure: BRONCHOSCOPY WITH  BIOPSY AND BAL, WITH ENDOBRONCHIAL ULTRASOUND WITH FNA;  Surgeon: Chris Tirado MD;  Location: [facility] ENDOSCOPY;  Service: Pulmonary   • COLONOSCOPY      REC 07/2007, REC 02/2009, REC 12/2011, REC 01/14,  She says she cant do the prep because of poor mobility to get to .   • EYE SURGERY  1950   • OTHER SURGICAL HISTORY      Ventricular [place] for drainage of subdural hematoma   • TOTAL THYROIDECTOMY  08/2015    Dr. Ahmadi         ONCOLOGIC HISTORY:  (History from previous dates can be found in the separate document.)    Current Outpatient Medications on File Prior to Visit   Medication Sig Dispense Refill   • aspirin 81 MG chewable tablet Chew 81 mg Daily.     • atorvastatin (LIPITOR) 80 MG tablet Take 1 tablet by mouth Daily. Needs an appointment for further refills (Patient taking differently: Take 80 mg by mouth Every Night. Needs an appointment for further refills) 90 tablet 0   • budesonide (PULMICORT) 0.5 MG/2ML nebulizer solution Take 0.5 mg by nebulization Daily.     • formoterol (PERFOROMIST) 20 MCG/2ML  nebulizer solution Take  by nebulization 2 (Two) Times a Day.     • guaiFENesin (ROBITUSSIN) 100 MG/5ML solution oral solution Take 20 mL by mouth Every 4 (Four) Hours. While awake 3600 mL 3   • levothyroxine (SYNTHROID, LEVOTHROID) 75 MCG tablet Take 1 tablet by mouth Daily. 30 tablet 0   • LORazepam (ATIVAN PO) Take 0.25 mg by mouth As Needed.     • OLANZapine (zyPREXA) 7.5 MG tablet Take 1 tablet by mouth Every Night. 30 tablet 2   • ondansetron (ZOFRAN) 4 MG tablet Take 1 tablet by mouth Every 6 (Six) Hours As Needed for Nausea or Vomiting. 60 tablet 2   • potassium chloride ER (K-TAB) 20 MEQ tablet controlled-release ER tablet Take 1 tablet by mouth Daily. 30 tablet 2   • predniSONE (DELTASONE) 20 MG tablet Take 1 tablet by mouth Daily. 60 tablet 1   • prochlorperazine (COMPAZINE) 10 MG tablet Take 1 tablet by mouth Every 6 (Six) Hours As Needed for Nausea or Vomiting. 30 tablet 1   • sertraline (ZOLOFT) 25 MG tablet Take 1 tablet by mouth Daily. 90 tablet 1   • sodium chloride 7 % nebulizer solution nebulizer solution Take 4 mL by nebulization 2 (Two) Times a Day. And may use additional 2 times as needed chest congestion 480 mL 6   • SSD 1 % cream As Needed.     • tolterodine (DETROL) 1 MG tablet      • YUPELRI 175 MCG/3ML solution      • ipratropium-albuterol (DUO-NEB) 0.5-2.5 mg/3 ml nebulizer Take 3 mL by nebulization 4 (Four) Times a Day. And every 4 hours as needed for chest congestion 480 mL 11     No current facility-administered medications on file prior to visit.        ALLERGIES:     Allergies   Allergen Reactions   • Ace Inhibitors Cough     Other reaction(s): Cough       Social History     Socioeconomic History   • Marital status:      Spouse name: Miah   • Number of children: Not on file   • Years of education: College   • Highest education level: Not on file   Occupational History     Employer: RETIRED   Tobacco Use   • Smoking status: Former Smoker     Types: Cigarettes     Last attempt  to quit: 2008     Years since quittin.9   • Smokeless tobacco: Never Used   • Tobacco comment: caffeine use-soda   Substance and Sexual Activity   • Alcohol use: Yes     Comment: rarely   • Drug use: No   • Sexual activity: Defer     Family History   Problem Relation Age of Onset   • Heart attack Mother         Acute   • Heart disease Mother    • Colon polyps Sister         Sigmoid colon   • Coronary artery disease Brother    • Diabetes Brother    • Heart disease Brother    • Alcohol abuse Son               Review of Systems   Constitutional: Positive for appetite change and fatigue. Negative for activity change, fever and unexpected weight change.   HENT: Negative for congestion, mouth sores, nosebleeds, sore throat and voice change.    Respiratory: Positive for cough and shortness of breath. Negative for wheezing.    Cardiovascular: Negative for chest pain, palpitations and leg swelling.   Gastrointestinal: Negative for abdominal distention, abdominal pain, blood in stool, constipation, diarrhea, nausea and vomiting.   Endocrine: Negative for cold intolerance and heat intolerance.   Genitourinary: Negative for difficulty urinating, dysuria, frequency and hematuria.   Musculoskeletal: Negative for arthralgias, back pain, joint swelling and myalgias.   Skin: Negative for rash.   Neurological: Positive for weakness. Negative for dizziness, syncope, light-headedness, numbness and headaches.   Hematological: Negative for adenopathy. Does not bruise/bleed easily.   Psychiatric/Behavioral: Negative for confusion, dysphoric mood and sleep disturbance. The patient is not nervous/anxious.          Objective      Vitals:    19 0825   BP: 146/81   Pulse: 114   Resp: 14   Temp: 97.6 °F (36.4 °C)   SpO2: 91%      Current Status 2019   ECOG score 2   Pain 0/10    Physical Exam   Constitutional: She is oriented to person, place, and time. She appears well-developed and well-nourished.   Elderly female no distress  seated in a wheelchair with oxygen in place   HENT:   Mouth/Throat: Oropharynx is clear and moist.   Eyes: Conjunctivae are normal.   Neck: No thyromegaly present.   Cardiovascular: Normal rate and regular rhythm. Exam reveals no gallop and no friction rub.   No murmur heard.  Pulmonary/Chest: Breath sounds normal. No respiratory distress. She has no wheezes.   Scattered bilateral rhonchi, improved from prior exam   Abdominal: Soft. Bowel sounds are normal. She exhibits no distension. There is no tenderness.   Musculoskeletal: She exhibits no edema.   Lymphadenopathy:        Head (right side): No submandibular adenopathy present.     She has no cervical adenopathy.     She has no axillary adenopathy.        Right: No inguinal and no supraclavicular adenopathy present.        Left: No inguinal and no supraclavicular adenopathy present.   Neurological: She is alert and oriented to person, place, and time. She displays normal reflexes. No cranial nerve deficit. She exhibits normal muscle tone.   Left hemiparesis from prior CVA   Skin: Skin is warm and dry. No rash noted.   Psychiatric: She has a normal mood and affect. Her behavior is normal.       RECENT LABS:  Hematology WBC   Date Value Ref Range Status   12/11/2019 7.25 3.40 - 10.80 10*3/mm3 Final   10/31/2019 15.71 (H) 4.5 - 11 x10E3/uL Final     RBC   Date Value Ref Range Status   12/11/2019 4.22 3.77 - 5.28 10*6/mm3 Final   10/31/2019 4.13 (L) 4.20 - 5.40 x10E6/uL Final     Hemoglobin   Date Value Ref Range Status   12/11/2019 11.2 (L) 12.0 - 15.9 g/dL Final   10/31/2019 11.1 (L) 12.0 - 16.0 g/dL Final     Hematocrit   Date Value Ref Range Status   12/11/2019 37.4 34.0 - 46.6 % Final   10/31/2019 36.7 (L) 37.0 - 47.0 % Final     Platelets   Date Value Ref Range Status   12/11/2019 255 140 - 450 10*3/mm3 Final     External Platelets   Date Value Ref Range Status   10/31/2019 348 140 - 440 thou/cu mm Final        Lab Results   Component Value Date    GLUCOSE 102  12/11/2019    BUN 16 12/11/2019    CREATININE 0.72 12/11/2019    EGFRIFNONA 79 12/11/2019    EGFRIFAFRI 74 08/11/2016    BCR 22.2 12/11/2019    K 4.3 12/11/2019    CO2 24.4 12/11/2019    CALCIUM 8.7 12/11/2019    PROTENTOTREF 7.4 08/11/2016    ALBUMIN 3.10 (L) 12/11/2019    LABIL2 0.5 11/01/2019    AST 11 12/11/2019    ALT 10 12/11/2019     CT chest 12/4/2019 reviewed as outlined below, I did personally review CT images today.    Assessment/Plan     1. Metastatic non-small cell lung cancer (adenocarcinoma):  · Patient with long-standing smoking history x40 years quit in 2008  · Underlying significant COPD with FEV1 1.4 (58%) on 11/20/2014  · CT 5/16/2019 with left upper lobe mass, partially cavitary measuring 2.3 x 1.5 cm.  Additional 8 mm similar appearing right upper lobe nodule.  Bibasilar consolidation, small bilateral pleural effusions, mediastinal lymphadenopathy up to 2.7 centimeters.  · Bronchoscopy 5/20/2019 with identification of left mainstem malignancy extending to the level of the jose, biopsy showed poorly differentiated adenocarcinoma of pulmonary origin. EBUS with FNA station 7 lymph node negative for malignant cells, only scattered lymphoid aggregates.  BAL left upper lobe negative for malignant cells.  Negative EGFR mutation, negative ALK/ROS 1 rearrangement. PDL1 95%.  · Patient was intubated with possible pneumonia, significant mucus plugging with underlying significant COPD.  Extubated on 5/28/2019.   · Staging evaluation performed during hospitalization with negative CT abdomen/pelvis, negative bone scan.  · MRI brain 5/29/2019 with evidence of metastatic disease, 3-4 enhancing lesions involving left occipital lobe 5 mm, left posterior parietal lobe 4 mm, left frontal lobe 4 mm, left parietal 3-4 mm with small amounts of surrounding edema.  Given the minimal extent of disease elected to observe with short interval MRI.  · Discussion regarding systemic therapy with single agent Keytruda due to  PDL1 95%  · Subsequent disease progression on CT 6/8/2019 with increasing left upper lobe mass, mediastinal and hilar lymphadenopathy with occlusion of left mainstem bronchus at origin.  · Received palliative radiation therapy to left mainstem bronchus x10 fractions, completed 6/25/2019.  · Initiated systemic therapy with Keytruda 6/28/2019.  · CT scan following 2 cycles Keytruda and prior radiation from 8/6/2019 showed decrease in the left upper lobe primary lesion with essentially no residual discrete lung mass in that area.  Lymphadenopathy in the mediastinum/AP window and left hilum with significant improvement and resolution of left mainstem obstruction.  No new evidence of disease.  The patient did receive radiation therapy to both the primary left upper lobe mass and left hilum/mediastinum accounting for most of the improvement.  MRI brain 8/6/2019 with 3 enhancing metastatic lesions, 2 of which had a more nodular appearance as opposed to previous ring-enhancing appearance with stable size.  These were in the left occipital and left frontal regions.  The third lesion in the left parietal region decreased in size from 4 mm down to 2 mm.  There were no new lesions identified.  She has not undergone any specific treatment for her brain metastases.  Continuation of immunotherapy with Keytruda.  · Following 4 cycles Keytruda, 9/17/2019 CT scan showed improvement in AP window lymph node from 2.7 x 1.9 down to 2.1 x 1.5 cm.  Progressive change in the left lower lobe around the pleura, with one area having a masslike appearance suspected secondary to radiation pneumonitis rather than malignancy.  MRI brain with small metastatic lesions decreased in size.  · CT scan 10/29/2019 following 6 cycles Keytruda with new/enlarging 2 cm left upper lobe lesion, stable mediastinal lymph nodes, increased air space opacities in the lungs bilaterally, new left adrenal nodule 1.3 cm.  MRI brain with stable to slightly improved  findings 10/29/2019.  Cycle 7 Keytruda held 11/8/2019 with plans to pursue PET scan.  Unclear whether pulmonary findings represented infectious change versus pneumonitis from immunotherapy.  · PET scan 11/14/2019 with worsening areas of consolidation involving the lungs bilaterally, decrease in left upper lobe nodular opacification with minimal uptake, stable mildly hypermetabolic mediastinal and left hilar lymph nodes, intensely hypermetabolic left adrenal 1.7 cm lesion SUV 11.2.  With clinical improvement in pulmonary status, pulmonary changes suspected to represent pneumonitis from immunotherapy.  The only area of true progression in left adrenal, will pursue stereotactic radiation.  Patient will remain off immunotherapy, initiated prednisone 40 mg daily.  · CT chest 12/4/2019 with improvement in left upper and lower lobe consolidation, slight further progression left adrenal.  Prednisone tapered.  · Left adrenal stereotactic radiation to be administered 12/17-12/26/2019 (5 fractions).  The patient returns today in follow-up with CT chest to review from 12/4/2019, continuing on prednisone 20 mg daily (tapered on 12/4/2019 from 40 mg daily).  · In the interval, the patient has improved.  Her appetite has increased and her weight is stable at 88 pounds.  Her respiratory status has improved and she has tapered her oxygen down to 1 L via nasal cannula at night only.  She is maintaining oxygen saturation during the daytime off of oxygen at 88-93%.  She is gaining some strength, participating with physical therapy at home 2 days/week.  We reviewed her CT findings from 12/4/2019 and a short interval.  This shows some improvement with decrease in consolidation in the left upper lobe at site of the primary lesion, decrease in consolidation in the left lower lobe with increased aeration.  There is no change in the right lower lobe consolidation.  There was no change in paratracheal lymph node.  The adrenal gland did  increase slightly further in size up to 1.6 cm on the left.  We discussed that it is unclear how much of the findings represent radiation pneumonitis, improving inflammation from previous pneumonia, or improving immunotherapy induced pneumonitis due to steroids.  We will continue to taper prednisone and will decrease her dose from 20 mg down to 15 mg daily today.  She will proceed on with radiation to the solitary site of definitive progression involving the left adrenal gland.  She will be receiving stereotactic radiation to the site over 5 fractions from 12/17 through 12/26/2019.  For now she will remain off of systemic therapy for her malignancy.  She will return in 1 week and again in 2 weeks for nurse practitioner visit and if she is remaining stable clinically we will continue to taper prednisone in 1 week down to 10 mg daily and in 2 weeks down to 5 mg daily (I sent the new prescription today for 5 mg tablets) and she will remain on the 5 mg dose until I see her in 4 weeks for clinical reassessment.  We will discuss at that time interval for further CT monitoring.  We will continue to consider in the future pending further CT results whether she may be a candidate for further systemic palliative therapy with single agent chemo.  This was all reviewed with the patient, her , and her son today, questions answered to their satisfaction.  2. 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  · Patient extubated 5/28/2019.  · Subsequent occlusion of left mainstem bronchus from malignancy 6/8/2019.  Received palliative radiation completed 6/25/2019.  · Patient hospitalized while in [place] at [facility] in October 2019 due to suspected aspiration pneumonia.  Doubtful that pulmonary findings are related to immunotherapy due to improvement  on antibiotics and productive cough.  · Patient with decline in respiratory status in October 2019 requiring oxygen to be initiated.  Suspected related to immunotherapy induced pneumonitis.  Immunotherapy discontinued and initiated empiric prednisone 40 mg daily on 11/20/2019.  · Patient with overall improvement in respiratory status, currently on oxygen 1 L nasal cannula at night only with improvement in cough.  Prednisone tapered to 20 mg daily on 12/4/2019.  We will taper prednisone today down to 15 mg daily.  As above, patient remains clinically stable in 1 week we will taper down to 10 mg daily and in 2 weeks taper down to 5 mg daily and remain on this dose until I see the patient in 4 weeks for follow-up.  3. Prior hemorrhagic CVA:  · Occurred in 2008, residual left upper extremity weakness and left lower extremity weakness.   4. Anemia:  · Hemoglobin prior to admission was 13.1 on 5/16/2019  · Hemoglobin declined in the 10-11 range, related to malignancy, pneumonia  · Hemoglobin today 11.2  7. Hypothyroidism:  · Labs 6/28/2019 with TSH 23.8, normal free T4 of 1.43  · Patient was started on levothyroxine 100 mcg daily  · Repeat thyroid function studies on 8/9/2019 with TSH suppressed at 0.065 and elevated free T4 of 2.06.  Decreased levothyroxine dose to 50 mcg daily.    · 11/8/2019 TSH 37.5, free T4 1.15, levothyroxine dose increased to 75 mcg daily.  · Recheck TSH, free T4 in 2 weeks.  8. Depression and nausea/anorexia:  · Patient with depression related to her underlying medical illness as well as social situation with her son who is an alcoholic and lives in the home  · Patient has been treated with Depakote 250 mg every 12 hours for mood stabilization under the direction of her primary care physician  · Patient with significant depressive symptoms, currently continuing on Zoloft under the direction of her primary care physician.  · Patient also with ongoing anorexia and nausea, initiated Zyprexa 2.5 mg  nightly on 7/19/2019 with dramatic improvement in symptoms  · Patient seen in the supportive oncology clinic on 7/29/2019, tapered off Depakote.  · Patient has PRN Compazine and Zyprexa to use.  · Zyprexa dose titrated to 5 mg and subsequently on 9/20/2019 up to 7.5 mg nightly.  9. Hypokalemia:  · Potassium on 8/9/2019 profoundly low at 2.5  · Magnesium low normal at 1.7  · Prescribed 20 mEq IV potassium chloride   · Potassium today 4.3  10. Dizziness/lightheadedness with relative hypotension:  · Cortisol level normal on 8/30/2019 at 33.16  · Patient tapered off metoprolol by cardiology 9/13/2019  · Symptoms currently significantly improved.  11. GI prophylaxis:  · Prilosec 20 mg daily while remaining on steroids.      Plan:  1. Taper prednisone to 15 mg daily (prescription sent for 5 mg tablets today).  2. Begin stereotactic radiation to left adrenal with Dr. Whalen on 12/17/2019 with plan for 5 fractions to be completed on 12/26/2019.  3. 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  Pending performance status and status of her disease we will consider possible future use of palliative single agent chemotherapy.  4. Continue home physical therapy  5. In 1 week nurse practitioner visit with CBC, CMP.  If patient is clinically stable, taper prednisone to 10 mg daily  6. In 2 weeks nurse practitioner visit with CBC, CMP, TSH, free T4.  Patient is clinically stable, taper prednisone to 5 mg daily.  Patient will remain on this dose.  7. MD visit in 4 weeks with CBC, CMP.  Consider further prednisone taper and we will discuss timing for further follow-up scans.

## 2019-12-18 NOTE — PROGRESS NOTES
Subjective .     REASONS FOR FOLLOWUP:    1. Metastatic non-small cell lung cancer (adenocarcinoma):  · Patient with long-standing smoking history x40 years quit in 2008  · Underlying significant COPD with FEV1 1.4 (58%) on 11/20/2014  · CT 5/16/2019 with left upper lobe mass, partially cavitary measuring 2.3 x 1.5 cm.  Additional 8 mm similar appearing right upper lobe nodule.  Bibasilar consolidation, small bilateral pleural effusions, mediastinal lymphadenopathy up to 2.7 centimeters.  · Bronchoscopy 5/20/2019 with identification of left mainstem malignancy extending to the level of the jose, biopsy showed poorly differentiated adenocarcinoma of pulmonary origin. EBUS with FNA station 7 lymph node negative for malignant cells, only scattered lymphoid aggregates.  BAL left upper lobe negative for malignant cells.  Negative EGFR mutation, negative ALK/ROS 1 rearrangement. PDL1 95%.  · Patient was intubated with possible pneumonia, significant mucus plugging with underlying significant COPD.  Extubated on 5/28/2019.   · Staging evaluation performed during hospitalization with negative CT abdomen/pelvis, negative bone scan.  · MRI brain 5/29/2019 with evidence of metastatic disease, 3-4 enhancing lesions involving left occipital lobe 5 mm, left posterior parietal lobe 4 mm, left frontal lobe 4 mm, left parietal 3-4 mm with small amounts of surrounding edema.  Given the minimal extent of disease elected to observe with short interval MRI.  · Discussion regarding systemic therapy with single agent Keytruda due to PDL1 95%  · Subsequent disease progression on CT 6/8/2019 with increasing left upper lobe mass, mediastinal and hilar lymphadenopathy with occlusion of left mainstem bronchus at origin.  · Received palliative radiation therapy to left mainstem bronchus x10 fractions, completed 6/25/2019.  · Initiated systemic therapy with Keytruda 6/28/2019.  · Scans 8/6/2019 following 2 cycles Keytruda with decrease left  upper lobe primary lesion and left hilar/mediastinal lymphadenopathy.  Response primarily felt to be related to radiation therapy.  Continuation of immunotherapy.  · CT scans 9/17/2019 following 4 cycles Keytruda with further decrease in AP window lymph node, suspected evolving postradiation change in left lower lobe.  MRI brain 9/17/2019 with improvement in 3 small metastatic lesions.  Continuation of immunotherapy.  · CT scan 10/29/2019 following 6 cycles Keytruda with new/enlarging 2 cm left upper lobe lesion, stable mediastinal lymph nodes, increased air space opacities in the lungs bilaterally, new left adrenal nodule 1.3 cm.  MRI brain with stable to slightly improved findings 10/29/2019.  Cycle 7 Keytruda held 11/8/2019 with plans to pursue PET scan.  Unclear whether pulmonary findings represented infectious change versus pneumonitis from immunotherapy.  · PET scan 11/14/2019 with worsening areas of consolidation involving the lungs bilaterally, decrease in left upper lobe nodular opacification with minimal uptake, stable mildly hypermetabolic mediastinal and left hilar lymph nodes, intensely hypermetabolic left adrenal 1.7 cm lesion SUV 11.2.  With clinical improvement in pulmonary status, pulmonary changes suspected to represent pneumonitis from immunotherapy.  The only area of true progression in left adrenal, will pursue stereotactic radiation.  Patient will remain off immunotherapy, initiated prednisone 40 mg daily.  · CT chest 12/4/2019 with improvement in left upper and lower lobe consolidation, slight further progression left adrenal.  Prednisone tapered.  · Left adrenal stereotactic radiation to be administered 12/17-12/26/2019 (5 fractions).  2. 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  · Patient extubated 5/28/2019.  · Subsequent occlusion of left  mainstem bronchus from malignancy 6/8/2019.  Received palliative radiation completed 6/25/2019.  · Patient with decline in respiratory status in October 2019 requiring oxygen to be initiated.  Suspected related to immunotherapy induced pneumonitis.  3. Prior hemorrhagic CVA:  · Occurred in 2008, residual left upper extremity weakness and left lower extremity weakness.   4. Anemia:  · Hemoglobin prior to admission was 13.1 on 5/16/2019  · Hemoglobin declined in the 10-11 range, related to malignancy, pneumonia  5. Depression and nausea/anorexia and chronic fatigue:  · Significant improvement following addition of Zyprexa nightly, dose escalated to 5 mg.  6. Hypothyroidism:  · Labs 6/28/2019 with TSH 23.8, normal free T4 of 1.43  · Treated with levothyroxine 100 mcg daily  · Subsequent thyroid function studies with TSH 0.065 and free T4 elevated at 2.06, levothyroxine dose decreased 8/9/19 to 50 mcg daily.  · 11/8/2019 TSH 37.5, free T4 1.15, levothyroxine dose increased to 75 mcg daily.      HISTORY OF PRESENT ILLNESS:  The patient is a 74 y.o. year old female who is here for follow-up with the above-mentioned history.    History of Present Illness the patient returns today in follow-up for ongoing monitoring as we taper her prednisone.  For the last week she has been on 15 mg daily.  She is seen today in the company of her son and currently is not wearing oxygen and maintaining oxygen saturation around 90%.  This is a definite improvement for her.  She is still wearing oxygen at night where she is exerting herself at home such as when she does physical therapy.  Physical therapy continues to come to the house twice a week for strengthening.    And tapering prednisone she denies any increasing cough, chest pain or shortness of breath.  She continues with an excellent appetite related to steroids and has gained a few more pounds.    She denies any new concerns today.    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   • Hyperlipidemia    • Left hemiplegia (CMS/HCC) 12/10/2015   • Lung cancer (CMS/HCC)    • Myocardial infarct (CMS/HCC) 1996   • Osteopenia    • Stroke syndrome 2008   • Thyroid cyst     right   • Thyroid lump 12/10/2015    Description: Biopsy 05/15 at [facility], benign   • Transient cerebral ischemia    • Urge incontinence of urine      Past Surgical History:   Procedure Laterality Date   • BREAST BIOPSY     • BRONCHOSCOPY Bilateral 5/20/2019    Procedure: BRONCHOSCOPY WITH  BIOPSY AND BAL, WITH ENDOBRONCHIAL ULTRASOUND WITH FNA;  Surgeon: Chris Tirado MD;  Location: [facility] ENDOSCOPY;  Service: Pulmonary   • COLONOSCOPY      REC 07/2007, REC 02/2009, REC 12/2011, REC 01/14,  She says she cant do the prep because of poor mobility to get to .   • EYE SURGERY  1950   • OTHER SURGICAL HISTORY      Ventricular [place] for drainage of subdural hematoma   • TOTAL THYROIDECTOMY  08/2015    Dr. Ahmadi         ONCOLOGIC HISTORY:  (History from previous dates can be found in the separate document.)    Current Outpatient Medications on File Prior to Visit   Medication Sig Dispense Refill   • aspirin 81 MG chewable tablet Chew 81 mg Daily.     • atorvastatin (LIPITOR) 80 MG tablet Take 1 tablet by mouth Daily. Needs an appointment for further refills (Patient taking differently: Take 80 mg by mouth Every Night. Needs an appointment for further refills) 90 tablet 0   • budesonide (PULMICORT) 0.5 MG/2ML nebulizer solution Take 0.5 mg by nebulization Daily.     • formoterol (PERFOROMIST) 20 MCG/2ML  nebulizer solution Take  by nebulization 2 (Two) Times a Day.     • guaiFENesin (ROBITUSSIN) 100 MG/5ML solution oral solution Take 20 mL by mouth Every 4 (Four) Hours. While awake 3600 mL 3   • ipratropium-albuterol (DUO-NEB) 0.5-2.5 mg/3 ml nebulizer Take 3 mL by nebulization 4 (Four) Times a Day. And every 4 hours as needed for chest congestion 480 mL 11   • levothyroxine (SYNTHROID, LEVOTHROID) 75 MCG tablet Take 1 tablet by mouth Daily. 30 tablet 0   • LORazepam (ATIVAN PO) Take 0.25 mg by mouth As Needed.     • OLANZapine (zyPREXA) 7.5 MG tablet Take 1 tablet by mouth Every Night. 30 tablet 2   • ondansetron (ZOFRAN) 4 MG tablet Take 1 tablet by mouth Every 6 (Six) Hours As Needed for Nausea or Vomiting. 60 tablet 2   • potassium chloride ER (K-TAB) 20 MEQ tablet controlled-release ER tablet Take 1 tablet by mouth Daily. 30 tablet 2   • predniSONE (DELTASONE) 20 MG tablet Take 1 tablet by mouth Daily. 60 tablet 1   • predniSONE (DELTASONE) 5 MG tablet Take 1 tablet by mouth 2 (Two) Times a Day. Take as directed 60 tablet 1   • prochlorperazine (COMPAZINE) 10 MG tablet Take 1 tablet by mouth Every 6 (Six) Hours As Needed for Nausea or Vomiting. 30 tablet 1   • sertraline (ZOLOFT) 25 MG tablet Take 1 tablet by mouth Daily. 90 tablet 1   • sodium chloride 7 % nebulizer solution nebulizer solution Take 4 mL by nebulization 2 (Two) Times a Day. And may use additional 2 times as needed chest congestion 480 mL 6   • SSD 1 % cream As Needed.     • tolterodine (DETROL) 1 MG tablet      • YUPELRI 175 MCG/3ML solution        No current facility-administered medications on file prior to visit.        ALLERGIES:     Allergies   Allergen Reactions   • Ace Inhibitors Cough     Other reaction(s): Cough       Social History     Socioeconomic History   • Marital status:      Spouse name: Miah   • Number of children: Not on file   • Years of education: College   • Highest education level: Not on file   Occupational  History     Employer: RETIRED   Tobacco Use   • Smoking status: Former Smoker     Types: Cigarettes     Last attempt to quit: 2008     Years since quittin.9   • Smokeless tobacco: Never Used   • Tobacco comment: caffeine use-soda   Substance and Sexual Activity   • Alcohol use: Yes     Comment: rarely   • Drug use: No   • Sexual activity: Defer     Family History   Problem Relation Age of Onset   • Heart attack Mother         Acute   • Heart disease Mother    • Colon polyps Sister         Sigmoid colon   • Coronary artery disease Brother    • Diabetes Brother    • Heart disease Brother    • Alcohol abuse Son           Review of Systems   Constitutional: Positive for appetite change (improved) and fatigue. Negative for activity change, fever and unexpected weight change.   HENT: Negative for congestion, mouth sores, nosebleeds, sore throat and voice change.    Respiratory: Positive for shortness of breath. Negative for cough and wheezing.    Cardiovascular: Negative for chest pain, palpitations and leg swelling.   Gastrointestinal: Negative for abdominal distention, abdominal pain, blood in stool, constipation, diarrhea, nausea and vomiting.   Endocrine: Negative for cold intolerance and heat intolerance.   Genitourinary: Negative for difficulty urinating, dysuria, frequency and hematuria.   Musculoskeletal: Negative for arthralgias, back pain, joint swelling and myalgias.   Skin: Negative for rash.   Neurological: Positive for weakness (improving). Negative for dizziness, syncope, light-headedness, numbness and headaches.   Hematological: Negative for adenopathy. Does not bruise/bleed easily.   Psychiatric/Behavioral: Negative for confusion, dysphoric mood and sleep disturbance. The patient is not nervous/anxious.          Objective    Vitals:    19 1342 19 1359   BP: 115/71    Pulse: 114    Resp: 18    Temp: 97.6 °F (36.4 °C)    TempSrc: Oral    SpO2: (!) 85% 90%   Weight: 40.6 kg (89 lb 9.6 oz)   "  Height: 162.6 cm (64.02\")    PainSc: 0-No pain        Current Status 12/18/2019   ECOG score 2       Physical Exam   Constitutional: She is oriented to person, place, and time. She appears well-developed and well-nourished.   Elderly female no distress seated in a wheelchair with oxygen in place   HENT:   Mouth/Throat: Oropharynx is clear and moist.   Eyes: Conjunctivae are normal.   Neck: No thyromegaly present.   Cardiovascular: Normal rate and regular rhythm. Exam reveals no gallop and no friction rub.   No murmur heard.  Pulmonary/Chest: Breath sounds normal. No respiratory distress. She has no wheezes.   Coarse breath sounds bilaterally with some scattered rhonchi, overall improved.   Abdominal: Soft. Bowel sounds are normal. She exhibits no distension. There is no tenderness.   Musculoskeletal: She exhibits no edema.   Lymphadenopathy:        Head (right side): No submandibular adenopathy present.     She has no cervical adenopathy.     She has no axillary adenopathy.        Right: No inguinal and no supraclavicular adenopathy present.        Left: No inguinal and no supraclavicular adenopathy present.   Neurological: She is alert and oriented to person, place, and time. She displays normal reflexes. No cranial nerve deficit. She exhibits normal muscle tone.   Left hemiparesis from prior CVA   Skin: Skin is warm and dry. No rash noted.   Psychiatric: She has a normal mood and affect. Her behavior is normal.       RECENT LABS:  Results from last 7 days   Lab Units 12/18/19  1322   WBC 10*3/mm3 7.74   NEUTROS ABS 10*3/mm3 7.46*   HEMOGLOBIN g/dL 10.5*   HEMATOCRIT % 36.0   PLATELETS 10*3/mm3 262     Results from last 7 days   Lab Units 12/18/19  1322   SODIUM mmol/L 142   POTASSIUM mmol/L 4.4   CHLORIDE mmol/L 105   CO2 mmol/L 23.6   BUN mg/dL 15   CREATININE mg/dL 0.70   CALCIUM mg/dL 8.8   ALBUMIN g/dL 3.00*   BILIRUBIN mg/dL 0.4   ALK PHOS U/L 81   ALT (SGPT) U/L 7   AST (SGOT) U/L 12   GLUCOSE mg/dL 151* "             Assessment/Plan     1. Metastatic non-small cell lung cancer (adenocarcinoma):  · Patient with long-standing smoking history x40 years quit in 2008  · Underlying significant COPD with FEV1 1.4 (58%) on 11/20/2014  · CT 5/16/2019 with left upper lobe mass, partially cavitary measuring 2.3 x 1.5 cm.  Additional 8 mm similar appearing right upper lobe nodule.  Bibasilar consolidation, small bilateral pleural effusions, mediastinal lymphadenopathy up to 2.7 centimeters.  · Bronchoscopy 5/20/2019 with identification of left mainstem malignancy extending to the level of the jose, biopsy showed poorly differentiated adenocarcinoma of pulmonary origin. EBUS with FNA station 7 lymph node negative for malignant cells, only scattered lymphoid aggregates.  BAL left upper lobe negative for malignant cells.  Negative EGFR mutation, negative ALK/ROS 1 rearrangement. PDL1 95%.  · Patient was intubated with possible pneumonia, significant mucus plugging with underlying significant COPD.  Extubated on 5/28/2019.   · Staging evaluation performed during hospitalization with negative CT abdomen/pelvis, negative bone scan.  · MRI brain 5/29/2019 with evidence of metastatic disease, 3-4 enhancing lesions involving left occipital lobe 5 mm, left posterior parietal lobe 4 mm, left frontal lobe 4 mm, left parietal 3-4 mm with small amounts of surrounding edema.  Given the minimal extent of disease elected to observe with short interval MRI.  · Discussion regarding systemic therapy with single agent Keytruda due to PDL1 95%  · Subsequent disease progression on CT 6/8/2019 with increasing left upper lobe mass, mediastinal and hilar lymphadenopathy with occlusion of left mainstem bronchus at origin.  · Received palliative radiation therapy to left mainstem bronchus x10 fractions, completed 6/25/2019.  · Initiated systemic therapy with Keytruda 6/28/2019.  · CT scan following 2 cycles Keytruda and prior radiation from 8/6/2019  showed decrease in the left upper lobe primary lesion with essentially no residual discrete lung mass in that area.  Lymphadenopathy in the mediastinum/AP window and left hilum with significant improvement and resolution of left mainstem obstruction.  No new evidence of disease.  The patient did receive radiation therapy to both the primary left upper lobe mass and left hilum/mediastinum accounting for most of the improvement.  MRI brain 8/6/2019 with 3 enhancing metastatic lesions, 2 of which had a more nodular appearance as opposed to previous ring-enhancing appearance with stable size.  These were in the left occipital and left frontal regions.  The third lesion in the left parietal region decreased in size from 4 mm down to 2 mm.  There were no new lesions identified.  She has not undergone any specific treatment for her brain metastases.  Continuation of immunotherapy with Keytruda.  · Following 4 cycles Keytruda, 9/17/2019 CT scan showed improvement in AP window lymph node from 2.7 x 1.9 down to 2.1 x 1.5 cm.  Progressive change in the left lower lobe around the pleura, with one area having a masslike appearance suspected secondary to radiation pneumonitis rather than malignancy.  MRI brain with small metastatic lesions decreased in size.  · CT scan 10/29/2019 following 6 cycles Keytruda with new/enlarging 2 cm left upper lobe lesion, stable mediastinal lymph nodes, increased air space opacities in the lungs bilaterally, new left adrenal nodule 1.3 cm.  MRI brain with stable to slightly improved findings 10/29/2019.  Cycle 7 Keytruda held 11/8/2019 with plans to pursue PET scan.  Unclear whether pulmonary findings represented infectious change versus pneumonitis from immunotherapy.  · PET scan 11/14/2019 with worsening areas of consolidation involving the lungs bilaterally, decrease in left upper lobe nodular opacification with minimal uptake, stable mildly hypermetabolic mediastinal and left hilar lymph  nodes, intensely hypermetabolic left adrenal 1.7 cm lesion SUV 11.2.  With clinical improvement in pulmonary status, pulmonary changes suspected to represent pneumonitis from immunotherapy.  The only area of true progression in left adrenal, will pursue stereotactic radiation.  Patient will remain off immunotherapy, initiated prednisone 40 mg daily.  · CT chest 12/4/2019 with improvement in left upper and lower lobe consolidation, slight further progression left adrenal.  Prednisone tapered.  · Left adrenal stereotactic radiation to be administered 12/17-12/26/2019 (5 fractions).  The patient returns today in follow-up with CT chest to review from 12/4/2019, continuing on prednisone 20 mg daily (tapered on 12/4/2019 from 40 mg daily).  · She began radiation to the solitary site of definitive progression involving the left adrenal gland yesterday.  She will be receiving stereotactic radiation to the site over 5 fractions from 12/17 through 12/26/2019.  For now she will remain off of systemic therapy for her malignancy.    · Today, 12/18/2019, she is doing well with no worsening pulmonary symptoms but continuing to improve overall.  She is managing most of the time on room air during the day and wearing oxygen just when sleeping or exerting herself.  We will drop her prednisone down to 10 mg daily beginning tomorrow morning.  I will see her back in 1 week and if she continues to do well we will drop her prednisone down to 5 mg daily and maintain this dose until she is reassessed by Dr. Godfrey in 3 weeks.  This plan was reviewed with the patient and her son today.    2. 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  · Patient extubated 5/28/2019.  · Subsequent occlusion of left mainstem bronchus from malignancy 6/8/2019.  Received palliative radiation completed  6/25/2019.  · Patient hospitalized while in [place] at [facility] in October 2019 due to suspected aspiration pneumonia.  Doubtful that pulmonary findings are related to immunotherapy due to improvement on antibiotics and productive cough.  · Patient with decline in respiratory status in October 2019 requiring oxygen to be initiated.  Suspected related to immunotherapy induced pneumonitis.  Immunotherapy discontinued and initiated empiric prednisone 40 mg daily on 11/20/2019.  · Patient with overall improvement in respiratory status, currently on room air with oxygen saturation around 90%.  As noted we will taper prednisone down to 10 mg daily.  She has been encouraged to call should she have any worsening of pulmonary symptoms including increased shortness of breath, cough or chest pain.    3. Prior hemorrhagic CVA:  · Occurred in 2008, residual left upper extremity weakness and left lower extremity weakness.   4. Anemia:  · Hemoglobin prior to admission was 13.1 on 5/16/2019  · Hemoglobin declined in the 10-11 range, related to malignancy, pneumonia  · Hemoglobin today 10.5.  7. Hypothyroidism:  · Labs 6/28/2019 with TSH 23.8, normal free T4 of 1.43  · Patient was started on levothyroxine 100 mcg daily  · Repeat thyroid function studies on 8/9/2019 with TSH suppressed at 0.065 and elevated free T4 of 2.06.  Decreased levothyroxine dose to 50 mcg daily.    · 11/8/2019 TSH 37.5, free T4 1.15, levothyroxine dose increased to 75 mcg daily.  · Recheck TSH, free T4 next week.  8. Depression and nausea/anorexia:  · Patient with depression related to her underlying medical illness as well as social situation with her son who is an alcoholic and lives in the home  · Patient has been treated with Depakote 250 mg every 12 hours for mood stabilization under the direction of her primary care physician  · Patient with significant depressive symptoms, currently continuing on Zoloft under the direction of her  primary care physician.  · Patient also with ongoing anorexia and nausea, initiated Zyprexa 2.5 mg nightly on 7/19/2019 with dramatic improvement in symptoms  · Patient seen in the supportive oncology clinic on 7/29/2019, tapered off Depakote.  · Patient has PRN Compazine and Zyprexa to use.  · Zyprexa dose titrated to 5 mg and subsequently on 9/20/2019 up to 7.5 mg nightly.  9. Hypokalemia:  · Potassium on 8/9/2019 profoundly low at 2.5  · Magnesium low normal at 1.7  · Prescribed 20 mEq IV potassium chloride   · Potassium today 4.4  10. Dizziness/lightheadedness with relative hypotension:  · Cortisol level normal on 8/30/2019 at 33.16  · Patient tapered off metoprolol by cardiology 9/13/2019  · Symptoms currently significantly improved.  11. GI prophylaxis:  · Prilosec 20 mg daily while remaining on steroids.      Plan:  1. Taper prednisone to 10 mg daily.  2. Continue stereotactic radiation to the left adrenal gland under the direction of Dr. Whalen with plans for 5 fractions, to be completed 12/26/2019.  3. Patient will return 12/26/2019 for NP follow-up in general review.  If she continues to do well we will decrease prednisone further to 5 mg daily at that time.  If so would plan for her to remain on 5 mg daily dosing until seen by Dr. Godfrey in follow-up.  4. Continue home physical therapy  5. In 1 week nurse practitioner visit with CBC, CMP, TSH, free T4.  If patient is clinically stable, taper prednisone to 5 mg daily.  Patient will remain on this dose.  6. MD visit in 3 weeks with CBC, CMP.  Consider further prednisone taper and we will discuss timing for further follow-up scans.  7. 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  Pending performance status and status of her disease we will consider possible future use of palliative single agent chemotherapy.

## 2019-12-20 NOTE — PROGRESS NOTES
Physician Weekly Management Note    Diagnosis:     Diagnosis Plan   1. 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)         RT Details:  Treatment #4/10     Notes on Treatment course, Films, Medical progress:  Reports some constipation which responded to stool softeners, denies abdominal pain nausea or vomiting or bladder issues.  Continue as planned.      Weekly Management:  Medication reviewed?   Yes  New medications given?   No  Problemlist reviewed?   Yes  Problem added?   No       Technical aspects reviewed:  Weekly OBI approved?   Yes  Weekly port films approved?   Yes  Change requests noted on port film?   No  Patient setup and plan reviewed?   Yes    Chart Reviewed:  Continue current treatment plan?   Yes  Treatment plan change requested?   No  CBC reviewed?   No  Concurrent Chemo?   No    Objective     Toxicities:   As above     Review of Systems   As above    Vitals:    12/20/19 1330   Pulse: 110   SpO2: 90%       Current Status 12/18/2019   ECOG score 2       Physical Exam  As above      Problem Summary List    Diagnosis:     Diagnosis Plan   1. 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   • Hyperlipidemia    • Left hemiplegia (CMS/HCC) 12/10/2015   • Lung cancer (CMS/HCC)    • Myocardial infarct (CMS/HCC) 1996   • Osteopenia    • Stroke syndrome 2008   • Thyroid cyst     right   • Thyroid lump 12/10/2015    Description: Biopsy 05/15 at [facility], [facility], [place]   • Transient cerebral ischemia     • Urge incontinence of urine          Past Surgical History:   Procedure Laterality Date   • BREAST BIOPSY     • BRONCHOSCOPY Bilateral 5/20/2019    Procedure: BRONCHOSCOPY WITH  BIOPSY AND BAL, WITH ENDOBRONCHIAL ULTRASOUND WITH FNA;  Surgeon: Chris Tirado MD;  Location: [facility] ENDOSCOPY;  Service: Pulmonary   • COLONOSCOPY      REC 07/2007, REC 02/2009, REC 12/2011, REC 01/14,  She says she cant do the prep because of poor mobility to get to .   • EYE SURGERY  1951   • OTHER SURGICAL HISTORY      Ventricular [place] holes for drainage of subdural hematoma   • TOTAL THYROIDECTOMY  08/2015    Dr. Ahmadi         Current Outpatient Medications on File Prior to Visit   Medication Sig Dispense Refill   • aspirin 81 MG chewable tablet Chew 81 mg Daily.     • atorvastatin (LIPITOR) 80 MG tablet Take 1 tablet by mouth Daily. Needs an appointment for further refills (Patient taking differently: Take 80 mg by mouth Every Night. Needs an appointment for further refills) 90 tablet 0   • budesonide (PULMICORT) 0.5 MG/2ML nebulizer solution Take 0.5 mg by nebulization Daily.     • formoterol (PERFOROMIST) 20 MCG/2ML nebulizer solution Take  by nebulization 2 (Two) Times a Day.     • guaiFENesin (ROBITUSSIN) 100 MG/5ML solution oral solution Take 20 mL by mouth Every 4 (Four) Hours. While awake 3600 mL 3   • ipratropium-albuterol (DUO-NEB) 0.5-2.5 mg/3 ml nebulizer Take 3 mL by nebulization 4 (Four) Times a Day. And every 4 hours as needed for chest congestion 480 mL 11   • levothyroxine (SYNTHROID, LEVOTHROID) 75 MCG tablet Take 1 tablet by mouth Daily. 30 tablet 0   • LORazepam (ATIVAN PO) Take 0.25 mg by mouth As Needed.     • OLANZapine (zyPREXA) 7.5 MG tablet Take 1 tablet by mouth Every Night. 30 tablet 2   • ondansetron (ZOFRAN) 4 MG tablet Take 1 tablet by mouth Every 6 (Six) Hours As Needed for Nausea or Vomiting. 60 tablet 2   • potassium chloride ER (K-TAB) 20 MEQ tablet controlled-release ER tablet Take 1 tablet  by mouth Daily. 30 tablet 2   • predniSONE (DELTASONE) 20 MG tablet Take 1 tablet by mouth Daily. 60 tablet 1   • predniSONE (DELTASONE) 5 MG tablet Take 1 tablet by mouth 2 (Two) Times a Day. Take as directed 60 tablet 1   • prochlorperazine (COMPAZINE) 10 MG tablet Take 1 tablet by mouth Every 6 (Six) Hours As Needed for Nausea or Vomiting. 30 tablet 1   • sertraline (ZOLOFT) 25 MG tablet Take 1 tablet by mouth Daily. 90 tablet 1   • sodium chloride 7 % nebulizer solution nebulizer solution Take 4 mL by nebulization 2 (Two) Times a Day. And may use additional 2 times as needed chest congestion 480 mL 6   • SSD 1 % cream As Needed.     • tolterodine (DETROL) 1 MG tablet      • YUPELRI 175 MCG/3ML solution        No current facility-administered medications on file prior to visit.        Allergies   Allergen Reactions   • Ace Inhibitors Cough     Other reaction(s): Cough        Primary care MD:    Timothy Taylor MD    Oncologist:   THEO Godfrey MD    Seen and approved by:  Miah Whalen MD  12/20/2019

## 2019-12-24 NOTE — PROGRESS NOTES
Subjective .     REASONS FOR FOLLOWUP:    1. Metastatic non-small cell lung cancer (adenocarcinoma):  · Patient with long-standing smoking history x40 years quit in 2008  · Underlying significant COPD with FEV1 1.4 (58%) on 11/20/2014  · CT 5/16/2019 with left upper lobe mass, partially cavitary measuring 2.3 x 1.5 cm.  Additional 8 mm similar appearing right upper lobe nodule.  Bibasilar consolidation, small bilateral pleural effusions, mediastinal lymphadenopathy up to 2.7 centimeters.  · Bronchoscopy 5/20/2019 with identification of left mainstem malignancy extending to the level of the jose, biopsy showed poorly differentiated adenocarcinoma of pulmonary origin. EBUS with FNA station 7 lymph node negative for malignant cells, only scattered lymphoid aggregates.  BAL left upper lobe negative for malignant cells.  Negative EGFR mutation, negative ALK/ROS 1 rearrangement. PDL1 95%.  · Patient was intubated with possible pneumonia, significant mucus plugging with underlying significant COPD.  Extubated on 5/28/2019.   · Staging evaluation performed during hospitalization with negative CT abdomen/pelvis, negative bone scan.  · MRI brain 5/29/2019 with evidence of metastatic disease, 3-4 enhancing lesions involving left occipital lobe 5 mm, left posterior parietal lobe 4 mm, left frontal lobe 4 mm, left parietal 3-4 mm with small amounts of surrounding edema.  Given the minimal extent of disease elected to observe with short interval MRI.  · Discussion regarding systemic therapy with single agent Keytruda due to PDL1 95%  · Subsequent disease progression on CT 6/8/2019 with increasing left upper lobe mass, mediastinal and hilar lymphadenopathy with occlusion of left mainstem bronchus at origin.  · Received palliative radiation therapy to left mainstem bronchus x10 fractions, completed 6/25/2019.  · Initiated systemic therapy with Keytruda 6/28/2019.  · Scans 8/6/2019 following 2 cycles Keytruda with decrease left  upper lobe primary lesion and left hilar/mediastinal lymphadenopathy.  Response primarily felt to be related to radiation therapy.  Continuation of immunotherapy.  · CT scans 9/17/2019 following 4 cycles Keytruda with further decrease in AP window lymph node, suspected evolving postradiation change in left lower lobe.  MRI brain 9/17/2019 with improvement in 3 small metastatic lesions.  Continuation of immunotherapy.  · CT scan 10/29/2019 following 6 cycles Keytruda with new/enlarging 2 cm left upper lobe lesion, stable mediastinal lymph nodes, increased air space opacities in the lungs bilaterally, new left adrenal nodule 1.3 cm.  MRI brain with stable to slightly improved findings 10/29/2019.  Cycle 7 Keytruda held 11/8/2019 with plans to pursue PET scan.  Unclear whether pulmonary findings represented infectious change versus pneumonitis from immunotherapy.  · PET scan 11/14/2019 with worsening areas of consolidation involving the lungs bilaterally, decrease in left upper lobe nodular opacification with minimal uptake, stable mildly hypermetabolic mediastinal and left hilar lymph nodes, intensely hypermetabolic left adrenal 1.7 cm lesion SUV 11.2.  With clinical improvement in pulmonary status, pulmonary changes suspected to represent pneumonitis from immunotherapy.  The only area of true progression in left adrenal, will pursue stereotactic radiation.  Patient will remain off immunotherapy, initiated prednisone 40 mg daily.  · CT chest 12/4/2019 with improvement in left upper and lower lobe consolidation, slight further progression left adrenal.  Prednisone tapered.  · Left adrenal stereotactic radiation to be administered 12/17-1/2/2020 (10 fractions) per Dr. Whalen.  2. 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  · Patient extubated 5/28/2019.  · Subsequent  occlusion of left mainstem bronchus from malignancy 6/8/2019.  Received palliative radiation completed 6/25/2019.  · Patient with decline in respiratory status in October 2019 requiring oxygen to be initiated.  Suspected related to immunotherapy induced pneumonitis.  3. Prior hemorrhagic CVA:  · Occurred in 2008, residual left upper extremity weakness and left lower extremity weakness.   4. Anemia:  · Hemoglobin prior to admission was 13.1 on 5/16/2019  · Hemoglobin declined in the 10-11 range, related to malignancy, pneumonia  5. Depression and nausea/anorexia and chronic fatigue:  · Significant improvement following addition of Zyprexa nightly, dose escalated to 5 mg.  6. Hypothyroidism:  · Labs 6/28/2019 with TSH 23.8, normal free T4 of 1.43  · Treated with levothyroxine 100 mcg daily  · Subsequent thyroid function studies with TSH 0.065 and free T4 elevated at 2.06, levothyroxine dose decreased 8/9/19 to 50 mcg daily.  · 11/8/2019 TSH 37.5, free T4 1.15, levothyroxine dose increased to 75 mcg daily.      HISTORY OF PRESENT ILLNESS:  The patient is a 74 y.o. year old female who is here for follow-up with the above-mentioned history.    History of Present Illness the patient returns today in follow-up for ongoing monitoring as we taper her prednisone.  For the last week she has been on 10 mg daily.  She is seen today in the company of her .  At this time she continues on room air during the day and only wearing oxygen at night.  Her oxygen saturation is staying around 90% on room air.  She has done well with steroid taper with no worsening of respiratory symptoms.  She denies any chest pain, shortness of breath or worsening cough.  She continues physical therapy in the home.    Her appetite remains good, bolstered by steroids.  Weight is stable.  She denies any new pain today.    She and her  deny other new concerns at this time.    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   • Hyperlipidemia    • Left hemiplegia (CMS/HCC) 12/10/2015   • Lung cancer (CMS/HCC)    • Myocardial infarct (CMS/HCC) 1996   • Osteopenia    • Stroke syndrome 2008   • Thyroid cyst     right   • Thyroid lump 12/10/2015    Description: Biopsy 05/15 at [facility], benign   • Transient cerebral ischemia    • Urge incontinence of urine      Past Surgical History:   Procedure Laterality Date   • BREAST BIOPSY     • BRONCHOSCOPY Bilateral 5/20/2019    Procedure: BRONCHOSCOPY WITH  BIOPSY AND BAL, WITH ENDOBRONCHIAL ULTRASOUND WITH FNA;  Surgeon: Chris Tirado MD;  Location: [facility] ENDOSCOPY;  Service: Pulmonary   • COLONOSCOPY      REC 07/2007, REC 02/2009, REC 12/2011, REC 01/14,  She says she cant do the prep because of poor mobility to get to .   • EYE SURGERY  1950   • OTHER SURGICAL HISTORY      Ventricular [place] for drainage of subdural hematoma   • TOTAL THYROIDECTOMY  08/2015    Dr. Ahmadi         ONCOLOGIC HISTORY:  (History from previous dates can be found in the separate document.)    Current Outpatient Medications on File Prior to Visit   Medication Sig Dispense Refill   • aspirin 81 MG chewable tablet Chew 81 mg Daily.     • atorvastatin (LIPITOR) 80 MG tablet Take 1 tablet by mouth Daily. Needs an appointment for further refills (Patient taking differently: Take 80 mg by mouth Every Night. Needs an appointment for further refills) 90 tablet 0   • budesonide (PULMICORT) 0.5 MG/2ML nebulizer solution Take 0.5 mg by nebulization Daily.     • formoterol (PERFOROMIST) 20 MCG/2ML nebulizer solution  Take  by nebulization 2 (Two) Times a Day.     • guaiFENesin (ROBITUSSIN) 100 MG/5ML solution oral solution Take 20 mL by mouth Every 4 (Four) Hours. While awake 3600 mL 3   • levothyroxine (SYNTHROID, LEVOTHROID) 75 MCG tablet Take 1 tablet by mouth Daily. 30 tablet 0   • LORazepam (ATIVAN PO) Take 0.25 mg by mouth As Needed.     • OLANZapine (zyPREXA) 7.5 MG tablet Take 1 tablet by mouth Every Night. 30 tablet 2   • ondansetron (ZOFRAN) 4 MG tablet Take 1 tablet by mouth Every 6 (Six) Hours As Needed for Nausea or Vomiting. 60 tablet 2   • potassium chloride ER (K-TAB) 20 MEQ tablet controlled-release ER tablet Take 1 tablet by mouth Daily. 30 tablet 2   • predniSONE (DELTASONE) 5 MG tablet Take 1 tablet by mouth 2 (Two) Times a Day. Take as directed (Patient taking differently: Take 10 mg by mouth Daily. Take as directed) 60 tablet 1   • sertraline (ZOLOFT) 25 MG tablet Take 1 tablet by mouth Daily. 90 tablet 1   • sodium chloride 7 % nebulizer solution nebulizer solution Take 4 mL by nebulization 2 (Two) Times a Day. And may use additional 2 times as needed chest congestion 480 mL 6   • SSD 1 % cream As Needed.     • tolterodine (DETROL) 1 MG tablet      • YUPELRI 175 MCG/3ML solution      • predniSONE (DELTASONE) 20 MG tablet Take 1 tablet by mouth Daily. 60 tablet 1   • [DISCONTINUED] ipratropium-albuterol (DUO-NEB) 0.5-2.5 mg/3 ml nebulizer Take 3 mL by nebulization 4 (Four) Times a Day. And every 4 hours as needed for chest congestion 480 mL 11   • [DISCONTINUED] prochlorperazine (COMPAZINE) 10 MG tablet Take 1 tablet by mouth Every 6 (Six) Hours As Needed for Nausea or Vomiting. 30 tablet 1     No current facility-administered medications on file prior to visit.        ALLERGIES:     Allergies   Allergen Reactions   • Ace Inhibitors Cough     Other reaction(s): Cough       Social History     Socioeconomic History   • Marital status:      Spouse name: Miah   • Number of children: Not on file   •  Years of education: College   • Highest education level: Not on file   Occupational History     Employer: RETIRED   Tobacco Use   • Smoking status: Former Smoker     Types: Cigarettes     Last attempt to quit: 2008     Years since quittin.9   • Smokeless tobacco: Never Used   • Tobacco comment: caffeine use-soda   Substance and Sexual Activity   • Alcohol use: Yes     Comment: rarely   • Drug use: No   • Sexual activity: Defer     Family History   Problem Relation Age of Onset   • Heart attack Mother         Acute   • Heart disease Mother    • Colon polyps Sister         Sigmoid colon   • Coronary artery disease Brother    • Diabetes Brother    • Heart disease Brother    • Alcohol abuse Son           Review of Systems   Constitutional: Positive for appetite change (improved) and fatigue. Negative for activity change, fever and unexpected weight change.   HENT: Negative for congestion, mouth sores, nosebleeds, sore throat and voice change.    Respiratory: Positive for shortness of breath (stable). Negative for cough and wheezing.    Cardiovascular: Negative for chest pain, palpitations and leg swelling.   Gastrointestinal: Negative for abdominal distention, abdominal pain, blood in stool, constipation, diarrhea, nausea and vomiting.   Endocrine: Negative for cold intolerance and heat intolerance.   Genitourinary: Negative for difficulty urinating, dysuria, frequency and hematuria.   Musculoskeletal: Negative for arthralgias, back pain, joint swelling and myalgias.   Skin: Negative for rash.   Neurological: Positive for weakness (improving). Negative for dizziness, syncope, light-headedness, numbness and headaches.   Hematological: Negative for adenopathy. Does not bruise/bleed easily.   Psychiatric/Behavioral: Negative for confusion, dysphoric mood and sleep disturbance. The patient is not nervous/anxious.          Objective    Vitals:    19 1431   BP: 93/63   Pulse: 110   Resp: 18   Temp: 97.4 °F (36.3  "°C)   SpO2: (!) 89%  Comment: room temp only has oxygen at night   Weight: 40.6 kg (89 lb 6.4 oz)  Comment: per patient unable to stand   Height: 162.6 cm (64.02\")   PainSc: 0-No pain       Current Status 12/26/2019   ECOG score 2       Physical Exam   Constitutional: She is oriented to person, place, and time. She appears well-developed and well-nourished.   Elderly female no distress seated in a wheelchair with oxygen in place   HENT:   Mouth/Throat: Oropharynx is clear and moist.   Eyes: Conjunctivae are normal.   Neck: No thyromegaly present.   Cardiovascular: Normal rate and regular rhythm. Exam reveals no gallop and no friction rub.   No murmur heard.  Pulmonary/Chest: Breath sounds normal. No respiratory distress. She has no wheezes.   Coarse breath sounds bilaterally with some scattered rhonchi. Non-labored.   Abdominal: Soft. Bowel sounds are normal. She exhibits no distension. There is no tenderness.   Musculoskeletal: She exhibits no edema.   Lymphadenopathy:        Head (right side): No submandibular adenopathy present.     She has no cervical adenopathy.     She has no axillary adenopathy.        Right: No inguinal and no supraclavicular adenopathy present.        Left: No inguinal and no supraclavicular adenopathy present.   Neurological: She is alert and oriented to person, place, and time. She displays normal reflexes. No cranial nerve deficit. She exhibits normal muscle tone.   Left hemiparesis from prior CVA   Skin: Skin is warm and dry. No rash noted.   Psychiatric: She has a normal mood and affect. Her behavior is normal.       RECENT LABS:  Results from last 7 days   Lab Units 12/26/19  1425   WBC 10*3/mm3 7.44   NEUTROS ABS 10*3/mm3 6.91   HEMOGLOBIN g/dL 10.5*   HEMATOCRIT % 34.6   PLATELETS 10*3/mm3 270                 Assessment/Plan     1. Metastatic non-small cell lung cancer (adenocarcinoma):  · Patient with long-standing smoking history x40 years quit in 2008  · Underlying significant " COPD with FEV1 1.4 (58%) on 11/20/2014  · CT 5/16/2019 with left upper lobe mass, partially cavitary measuring 2.3 x 1.5 cm.  Additional 8 mm similar appearing right upper lobe nodule.  Bibasilar consolidation, small bilateral pleural effusions, mediastinal lymphadenopathy up to 2.7 centimeters.  · Bronchoscopy 5/20/2019 with identification of left mainstem malignancy extending to the level of the jose, biopsy showed poorly differentiated adenocarcinoma of pulmonary origin. EBUS with FNA station 7 lymph node negative for malignant cells, only scattered lymphoid aggregates.  BAL left upper lobe negative for malignant cells.  Negative EGFR mutation, negative ALK/ROS 1 rearrangement. PDL1 95%.  · Patient was intubated with possible pneumonia, significant mucus plugging with underlying significant COPD.  Extubated on 5/28/2019.   · Staging evaluation performed during hospitalization with negative CT abdomen/pelvis, negative bone scan.  · MRI brain 5/29/2019 with evidence of metastatic disease, 3-4 enhancing lesions involving left occipital lobe 5 mm, left posterior parietal lobe 4 mm, left frontal lobe 4 mm, left parietal 3-4 mm with small amounts of surrounding edema.  Given the minimal extent of disease elected to observe with short interval MRI.  · Discussion regarding systemic therapy with single agent Keytruda due to PDL1 95%  · Subsequent disease progression on CT 6/8/2019 with increasing left upper lobe mass, mediastinal and hilar lymphadenopathy with occlusion of left mainstem bronchus at origin.  · Received palliative radiation therapy to left mainstem bronchus x10 fractions, completed 6/25/2019.  · Initiated systemic therapy with Keytruda 6/28/2019.  · CT scan following 2 cycles Keytruda and prior radiation from 8/6/2019 showed decrease in the left upper lobe primary lesion with essentially no residual discrete lung mass in that area.  Lymphadenopathy in the mediastinum/AP window and left hilum with  significant improvement and resolution of left mainstem obstruction.  No new evidence of disease.  The patient did receive radiation therapy to both the primary left upper lobe mass and left hilum/mediastinum accounting for most of the improvement.  MRI brain 8/6/2019 with 3 enhancing metastatic lesions, 2 of which had a more nodular appearance as opposed to previous ring-enhancing appearance with stable size.  These were in the left occipital and left frontal regions.  The third lesion in the left parietal region decreased in size from 4 mm down to 2 mm.  There were no new lesions identified.  She has not undergone any specific treatment for her brain metastases.  Continuation of immunotherapy with Keytruda.  · Following 4 cycles Keytruda, 9/17/2019 CT scan showed improvement in AP window lymph node from 2.7 x 1.9 down to 2.1 x 1.5 cm.  Progressive change in the left lower lobe around the pleura, with one area having a masslike appearance suspected secondary to radiation pneumonitis rather than malignancy.  MRI brain with small metastatic lesions decreased in size.  · CT scan 10/29/2019 following 6 cycles Keytruda with new/enlarging 2 cm left upper lobe lesion, stable mediastinal lymph nodes, increased air space opacities in the lungs bilaterally, new left adrenal nodule 1.3 cm.  MRI brain with stable to slightly improved findings 10/29/2019.  Cycle 7 Keytruda held 11/8/2019 with plans to pursue PET scan.  Unclear whether pulmonary findings represented infectious change versus pneumonitis from immunotherapy.  · PET scan 11/14/2019 with worsening areas of consolidation involving the lungs bilaterally, decrease in left upper lobe nodular opacification with minimal uptake, stable mildly hypermetabolic mediastinal and left hilar lymph nodes, intensely hypermetabolic left adrenal 1.7 cm lesion SUV 11.2.  With clinical improvement in pulmonary status, pulmonary changes suspected to represent pneumonitis from  immunotherapy.  The only area of true progression in left adrenal, will pursue stereotactic radiation.  Patient will remain off immunotherapy, initiated prednisone 40 mg daily.  · CT chest 12/4/2019 with improvement in left upper and lower lobe consolidation, slight further progression left adrenal.  Prednisone tapered.  · She began radiation to the solitary site of definitive progression involving the left adrenal gland yesterday.  She is receiving stereotactic radiation to the site now over 10 fractions (5 originally planned but now receiving 10 under the direction of Dr. Whalen), to be complete as of 1/2/2020.  For now she will remain off of systemic therapy for her malignancy.    · Today, 12/26/2019, she is doing well with no worsening of pulmonary symptoms but stable to improved.  She is managing most of the time on room air during the day and wearing oxygen just at night when sleeping or when exerting herself.  We will further drop her prednisone down to 5 mg daily beginning tomorrow morning. She will maintain this dose until she is reassessed by Dr. Godfrey in 2 weeks.  This plan was reviewed with the patient and her  today.    2. 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  · Patient extubated 5/28/2019.  · Subsequent occlusion of left mainstem bronchus from malignancy 6/8/2019.  Received palliative radiation completed 6/25/2019.  · Patient hospitalized while in [place] at [facility] in October 2019 due to suspected aspiration pneumonia.  Doubtful that pulmonary findings are related to immunotherapy due to improvement on antibiotics and productive cough.  · Patient with decline in respiratory status in October 2019 requiring oxygen to be initiated.  Suspected related to immunotherapy induced pneumonitis.  Immunotherapy  discontinued and initiated empiric prednisone 40 mg daily on 11/20/2019.  · Patient with overall improvement in respiratory status, currently on room air with oxygen saturation around 90%.  As noted we will taper prednisone down further to 5 mg daily.  She has been encouraged to call should she have any worsening of pulmonary symptoms including increased shortness of breath, cough or chest pain.    3. Prior hemorrhagic CVA:  · Occurred in 2008, residual left upper extremity weakness and left lower extremity weakness.   4. Anemia:  · Hemoglobin prior to admission was 13.1 on 5/16/2019  · Hemoglobin declined in the 10-11 range, related to malignancy, pneumonia  · Hemoglobin today 10.5.  7. Hypothyroidism:  · Labs 6/28/2019 with TSH 23.8, normal free T4 of 1.43  · Patient was started on levothyroxine 100 mcg daily  · Repeat thyroid function studies on 8/9/2019 with TSH suppressed at 0.065 and elevated free T4 of 2.06.  Decreased levothyroxine dose to 50 mcg daily.    · 11/8/2019 TSH 37.5, free T4 1.15, levothyroxine dose increased to 75 mcg daily.  · Repeat TSH, free T4 today pending.  8. Depression and nausea/anorexia:  · Patient with depression related to her underlying medical illness as well as social situation with her son who is an alcoholic and lives in the home  · Patient has been treated with Depakote 250 mg every 12 hours for mood stabilization under the direction of her primary care physician  · Patient with significant depressive symptoms, currently continuing on Zoloft under the direction of her primary care physician.  · Patient also with ongoing anorexia and nausea, initiated Zyprexa 2.5 mg nightly on 7/19/2019 with dramatic improvement in symptoms  · Patient seen in the supportive oncology clinic on 7/29/2019, tapered off Depakote.  · Patient has PRN Compazine and Zyprexa to use.  · Zyprexa dose titrated to 5 mg and subsequently on 9/20/2019 up to 7.5 mg nightly.  9. Hypokalemia:  · Potassium on  8/9/2019 profoundly low at 2.5  · Magnesium low normal at 1.7  · Prescribed 20 mEq IV potassium chloride   · Potassium today 4.4   10. Dizziness/lightheadedness with relative hypotension:  · Cortisol level normal on 8/30/2019 at 33.16  · Patient tapered off metoprolol by cardiology 9/13/2019  · Symptoms currently significantly improved.  11. GI prophylaxis:  · Prilosec 20 mg daily while remaining on steroids.      Plan:  1. Taper prednisone to 5 mg daily. Patient to remain on this dose until she sees Dr. Godfrey in follow-up in 2 weeks. We discussed she may benefit from staying on this minimal dose, as she has had significant improvement both from a respiratory status but also in her appetite and subsequently her weight.  2. Patient will complete stereotactic radiation to the left adrenal gland x10 fractions under the direction of Dr. Whalen as of 1/2/20.  3. Continue home physical therapy  4. MD visit in 2 weeks with CBC, CMP.  We will discuss timing for further follow-up scans.  5. 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  Pending performance status and status of her disease we will consider possible future use of palliative single agent chemotherapy.

## 2019-12-30 NOTE — PROGRESS NOTES
Physician Weekly Management Note    Diagnosis:     Diagnosis Plan   1. 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  Continue as planned.    Weekly Management:  Medication reviewed?   Yes  New medications given?   No  Problemlist reviewed?   Yes  Problem added?   No       Technical aspects reviewed:  Weekly OBI approved?   Yes  Weekly port films approved?   Yes  Change requests noted on port film?   No  Patient setup and plan reviewed?   Yes    Chart Reviewed:  Continue current treatment plan?   Yes  Treatment plan change requested?   No  CBC reviewed?   No  Concurrent Chemo?   No    Objective     Toxicities:   As above       Review of Systems As above      Vitals:    12/30/19 1320   BP: 103/68   Pulse: 115   SpO2: 90%         Current Status 12/26/2019   ECOG score 2       Physical Exam As above        Problem Summary List    Diagnosis:     Diagnosis Plan   1. 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   • Hyperlipidemia    • Left hemiplegia (CMS/HCC) 12/10/2015   • Lung cancer (CMS/HCC)    • Myocardial infarct (CMS/HCC) 1996   • Osteopenia    • Stroke syndrome 2008   • Thyroid cyst     right   • Thyroid lump 12/10/2015    Description: Biopsy 05/15 at [facility], benign   • Transient cerebral ischemia    • Urge  incontinence of urine            Past Surgical History:   Procedure Laterality Date   • BREAST BIOPSY     • BRONCHOSCOPY Bilateral 5/20/2019    Procedure: BRONCHOSCOPY WITH  BIOPSY AND BAL, WITH ENDOBRONCHIAL ULTRASOUND WITH FNA;  Surgeon: Chris Tirado MD;  Location: [facility] ENDOSCOPY;  Service: Pulmonary   • COLONOSCOPY      REC 07/2007, REC 02/2009, REC 12/2011, REC 01/14,  She says she cant do the prep because of poor mobility to get to .   • EYE SURGERY  1951   • OTHER SURGICAL HISTORY      Ventricular [place] holes for drainage of subdural hematoma   • TOTAL THYROIDECTOMY  08/2015    Dr. Ahmadi           Current Outpatient Medications on File Prior to Visit   Medication Sig Dispense Refill   • aspirin 81 MG chewable tablet Chew 81 mg Daily.     • atorvastatin (LIPITOR) 80 MG tablet Take 1 tablet by mouth Daily. Needs an appointment for further refills (Patient taking differently: Take 80 mg by mouth Every Night. Needs an appointment for further refills) 90 tablet 0   • budesonide (PULMICORT) 0.5 MG/2ML nebulizer solution Take 0.5 mg by nebulization Daily.     • formoterol (PERFOROMIST) 20 MCG/2ML nebulizer solution Take  by nebulization 2 (Two) Times a Day.     • guaiFENesin (ROBITUSSIN) 100 MG/5ML solution oral solution Take 20 mL by mouth Every 4 (Four) Hours. While awake 3600 mL 3   • levothyroxine (SYNTHROID, LEVOTHROID) 75 MCG tablet Take 1 tablet by mouth Daily. 30 tablet 0   • LORazepam (ATIVAN PO) Take 0.25 mg by mouth As Needed.     • OLANZapine (zyPREXA) 7.5 MG tablet Take 1 tablet by mouth Every Night. 30 tablet 2   • ondansetron (ZOFRAN) 4 MG tablet Take 1 tablet by mouth Every 6 (Six) Hours As Needed for Nausea or Vomiting. 60 tablet 2   • potassium chloride ER (K-TAB) 20 MEQ tablet controlled-release ER tablet Take 1 tablet by mouth Daily. 30 tablet 2   • predniSONE (DELTASONE) 20 MG tablet Take 1 tablet by mouth Daily. 60 tablet 1   • predniSONE (DELTASONE) 5 MG tablet Take 1 tablet by mouth 2  (Two) Times a Day. Take as directed (Patient taking differently: Take 10 mg by mouth Daily. Take as directed) 60 tablet 1   • sertraline (ZOLOFT) 25 MG tablet Take 1 tablet by mouth Daily. 90 tablet 1   • sodium chloride 7 % nebulizer solution nebulizer solution Take 4 mL by nebulization 2 (Two) Times a Day. And may use additional 2 times as needed chest congestion 480 mL 6   • SSD 1 % cream As Needed.     • tolterodine (DETROL) 1 MG tablet      • YUPELRI 175 MCG/3ML solution        No current facility-administered medications on file prior to visit.        Allergies   Allergen Reactions   • Ace Inhibitors Cough     Other reaction(s): Cough           Primary care MD:    Timothy Taylor MD    Oncologist:    THEO Godfrey MD    Seen and approved by:  Miah Whalen MD  12/30/2019

## 2020-01-01 ENCOUNTER — HOSPITAL ENCOUNTER (INPATIENT)
Facility: HOSPITAL | Age: 75
LOS: 4 days | Discharge: NURSING, REHAB OR LONG-TERM CARE | End: 2020-07-04
Attending: EMERGENCY MEDICINE | Admitting: INTERNAL MEDICINE

## 2020-01-01 ENCOUNTER — TELEPHONE (OUTPATIENT)
Dept: ONCOLOGY | Facility: CLINIC | Age: 75
End: 2020-01-01

## 2020-01-01 ENCOUNTER — APPOINTMENT (OUTPATIENT)
Dept: GENERAL RADIOLOGY | Facility: HOSPITAL | Age: 75
End: 2020-01-01

## 2020-01-01 ENCOUNTER — TELEPHONE (OUTPATIENT)
Dept: GENERAL RADIOLOGY | Facility: HOSPITAL | Age: 75
End: 2020-01-01

## 2020-01-01 ENCOUNTER — READMISSION MANAGEMENT (OUTPATIENT)
Dept: CALL CENTER | Facility: HOSPITAL | Age: 75
End: 2020-01-01

## 2020-01-01 ENCOUNTER — APPOINTMENT (OUTPATIENT)
Dept: CARDIOLOGY | Facility: HOSPITAL | Age: 75
End: 2020-01-01

## 2020-01-01 ENCOUNTER — APPOINTMENT (OUTPATIENT)
Dept: LAB | Facility: HOSPITAL | Age: 75
End: 2020-01-01

## 2020-01-01 ENCOUNTER — ANESTHESIA (OUTPATIENT)
Dept: GASTROENTEROLOGY | Facility: HOSPITAL | Age: 75
End: 2020-01-01

## 2020-01-01 ENCOUNTER — LAB (OUTPATIENT)
Dept: LAB | Facility: HOSPITAL | Age: 75
End: 2020-01-01

## 2020-01-01 ENCOUNTER — APPOINTMENT (OUTPATIENT)
Dept: CT IMAGING | Facility: HOSPITAL | Age: 75
End: 2020-01-01

## 2020-01-01 ENCOUNTER — TELEPHONE (OUTPATIENT)
Dept: ONCOLOGY | Facility: HOSPITAL | Age: 75
End: 2020-01-01

## 2020-01-01 ENCOUNTER — TELEMEDICINE (OUTPATIENT)
Dept: ONCOLOGY | Facility: CLINIC | Age: 75
End: 2020-01-01

## 2020-01-01 ENCOUNTER — APPOINTMENT (OUTPATIENT)
Dept: RADIATION ONCOLOGY | Facility: HOSPITAL | Age: 75
End: 2020-01-01

## 2020-01-01 ENCOUNTER — OFFICE VISIT (OUTPATIENT)
Dept: RADIATION ONCOLOGY | Facility: HOSPITAL | Age: 75
End: 2020-01-01

## 2020-01-01 ENCOUNTER — DOCUMENTATION (OUTPATIENT)
Dept: PHARMACY | Facility: HOSPITAL | Age: 75
End: 2020-01-01

## 2020-01-01 ENCOUNTER — PATIENT OUTREACH (OUTPATIENT)
Dept: CASE MANAGEMENT | Facility: OTHER | Age: 75
End: 2020-01-01

## 2020-01-01 ENCOUNTER — EPISODE CHANGES (OUTPATIENT)
Dept: CASE MANAGEMENT | Facility: OTHER | Age: 75
End: 2020-01-01

## 2020-01-01 ENCOUNTER — OFFICE VISIT (OUTPATIENT)
Dept: ONCOLOGY | Facility: CLINIC | Age: 75
End: 2020-01-01

## 2020-01-01 ENCOUNTER — HOSPITAL ENCOUNTER (EMERGENCY)
Facility: HOSPITAL | Age: 75
Discharge: HOME | End: 2020-04-30
Attending: EMERGENCY MEDICINE | Admitting: EMERGENCY MEDICINE

## 2020-01-01 ENCOUNTER — APPOINTMENT (OUTPATIENT)
Dept: PET IMAGING | Facility: HOSPITAL | Age: 75
End: 2020-01-01

## 2020-01-01 ENCOUNTER — APPOINTMENT (OUTPATIENT)
Dept: MRI IMAGING | Facility: HOSPITAL | Age: 75
End: 2020-01-01

## 2020-01-01 ENCOUNTER — HOSPITAL ENCOUNTER (OUTPATIENT)
Dept: MRI IMAGING | Facility: HOSPITAL | Age: 75
Discharge: HOME | End: 2020-01-27

## 2020-01-01 ENCOUNTER — HOSPITAL ENCOUNTER (OUTPATIENT)
Dept: CT IMAGING | Facility: HOSPITAL | Age: 75
Discharge: HOME | End: 2020-01-27
Admitting: RADIOLOGY

## 2020-01-01 ENCOUNTER — ANESTHESIA EVENT (OUTPATIENT)
Dept: GASTROENTEROLOGY | Facility: HOSPITAL | Age: 75
End: 2020-01-01

## 2020-01-01 ENCOUNTER — HOSPITAL ENCOUNTER (INPATIENT)
Facility: HOSPITAL | Age: 75
LOS: 20 days | Discharge: HOME HEALTH | End: 2020-03-04
Attending: EMERGENCY MEDICINE | Admitting: INTERNAL MEDICINE

## 2020-01-01 ENCOUNTER — DOCUMENTATION (OUTPATIENT)
Dept: RADIATION ONCOLOGY | Facility: HOSPITAL | Age: 75
End: 2020-01-01

## 2020-01-01 VITALS
HEIGHT: 64 IN | RESPIRATION RATE: 18 BRPM | HEART RATE: 81 BPM | OXYGEN SATURATION: 97 % | WEIGHT: 85 LBS | DIASTOLIC BLOOD PRESSURE: 66 MMHG | BODY MASS INDEX: 14.51 KG/M2 | TEMPERATURE: 97.2 F | SYSTOLIC BLOOD PRESSURE: 113 MMHG

## 2020-01-01 VITALS
DIASTOLIC BLOOD PRESSURE: 57 MMHG | TEMPERATURE: 98.5 F | OXYGEN SATURATION: 97 % | HEART RATE: 100 BPM | BODY MASS INDEX: 14.68 KG/M2 | SYSTOLIC BLOOD PRESSURE: 103 MMHG | HEIGHT: 64 IN | RESPIRATION RATE: 16 BRPM | WEIGHT: 86 LBS

## 2020-01-01 VITALS
RESPIRATION RATE: 16 BRPM | DIASTOLIC BLOOD PRESSURE: 65 MMHG | BODY MASS INDEX: 15.26 KG/M2 | HEART RATE: 103 BPM | SYSTOLIC BLOOD PRESSURE: 94 MMHG | HEIGHT: 64 IN | TEMPERATURE: 98 F | OXYGEN SATURATION: 88 % | WEIGHT: 89.4 LBS

## 2020-01-01 VITALS
RESPIRATION RATE: 18 BRPM | HEIGHT: 64 IN | HEART RATE: 94 BPM | SYSTOLIC BLOOD PRESSURE: 100 MMHG | WEIGHT: 86.4 LBS | TEMPERATURE: 97.7 F | BODY MASS INDEX: 14.75 KG/M2 | DIASTOLIC BLOOD PRESSURE: 60 MMHG | OXYGEN SATURATION: 95 %

## 2020-01-01 VITALS
HEART RATE: 113 BPM | SYSTOLIC BLOOD PRESSURE: 119 MMHG | RESPIRATION RATE: 16 BRPM | BODY MASS INDEX: 14.36 KG/M2 | TEMPERATURE: 98.4 F | OXYGEN SATURATION: 90 % | WEIGHT: 84.1 LBS | HEIGHT: 64 IN | DIASTOLIC BLOOD PRESSURE: 75 MMHG

## 2020-01-01 VITALS
SYSTOLIC BLOOD PRESSURE: 103 MMHG | HEIGHT: 64 IN | TEMPERATURE: 98.1 F | OXYGEN SATURATION: 87 % | DIASTOLIC BLOOD PRESSURE: 64 MMHG | HEART RATE: 112 BPM | RESPIRATION RATE: 16 BRPM | BODY MASS INDEX: 14.43 KG/M2

## 2020-01-01 VITALS — DIASTOLIC BLOOD PRESSURE: 60 MMHG | HEART RATE: 110 BPM | SYSTOLIC BLOOD PRESSURE: 91 MMHG | OXYGEN SATURATION: 90 %

## 2020-01-01 DIAGNOSIS — C79.72 MALIGNANT NEOPLASM METASTATIC TO LEFT ADRENAL GLAND (HCC): ICD-10-CM

## 2020-01-01 DIAGNOSIS — C34.92 ADENOCARCINOMA, LUNG, LEFT (HCC): Primary | ICD-10-CM

## 2020-01-01 DIAGNOSIS — C79.72 MALIGNANT NEOPLASM METASTATIC TO LEFT ADRENAL GLAND (HCC): Primary | ICD-10-CM

## 2020-01-01 DIAGNOSIS — E03.9 ACQUIRED HYPOTHYROIDISM: ICD-10-CM

## 2020-01-01 DIAGNOSIS — C34.92 ADENOCARCINOMA, LUNG, LEFT (HCC): ICD-10-CM

## 2020-01-01 DIAGNOSIS — R53.1 GENERALIZED WEAKNESS: ICD-10-CM

## 2020-01-01 DIAGNOSIS — J18.9 PNEUMONIA OF RIGHT LOWER LOBE DUE TO INFECTIOUS ORGANISM: Primary | ICD-10-CM

## 2020-01-01 DIAGNOSIS — N39.0 ACUTE UTI: Primary | ICD-10-CM

## 2020-01-01 DIAGNOSIS — C34.90 STAGE 4 MALIGNANT NEOPLASM OF LUNG, UNSPECIFIED LATERALITY (HCC): ICD-10-CM

## 2020-01-01 DIAGNOSIS — I26.99 PULMONARY EMBOLISM WITHOUT ACUTE COR PULMONALE, UNSPECIFIED CHRONICITY, UNSPECIFIED PULMONARY EMBOLISM TYPE (HCC): ICD-10-CM

## 2020-01-01 DIAGNOSIS — J96.21 ACUTE ON CHRONIC RESPIRATORY FAILURE WITH HYPOXEMIA (HCC): ICD-10-CM

## 2020-01-01 DIAGNOSIS — I50.43 ACUTE ON CHRONIC COMBINED SYSTOLIC AND DIASTOLIC CHF (CONGESTIVE HEART FAILURE) (HCC): ICD-10-CM

## 2020-01-01 DIAGNOSIS — J44.9 CHRONIC OBSTRUCTIVE PULMONARY DISEASE, UNSPECIFIED COPD TYPE (HCC): ICD-10-CM

## 2020-01-01 DIAGNOSIS — J44.1 CHRONIC OBSTRUCTIVE PULMONARY DISEASE WITH ACUTE EXACERBATION (HCC): ICD-10-CM

## 2020-01-01 DIAGNOSIS — C34.90 MALIGNANT NEOPLASM OF LUNG, UNSPECIFIED LATERALITY, UNSPECIFIED PART OF LUNG (HCC): ICD-10-CM

## 2020-01-01 DIAGNOSIS — R40.4 TRANSIENT ALTERATION OF AWARENESS: Primary | ICD-10-CM

## 2020-01-01 DIAGNOSIS — D64.9 NORMOCYTIC ANEMIA: ICD-10-CM

## 2020-01-01 LAB
ABO + RH BLD: NORMAL
ABO + RH BLD: NORMAL
ABO GROUP BLD: NORMAL
ALBUMIN SERPL-MCNC: 1.9 G/DL (ref 3.5–5.2)
ALBUMIN SERPL-MCNC: 2.1 G/DL (ref 3.5–5.2)
ALBUMIN SERPL-MCNC: 2.7 G/DL (ref 3.5–5.2)
ALBUMIN SERPL-MCNC: 2.7 G/DL (ref 3.5–5.2)
ALBUMIN SERPL-MCNC: 2.9 G/DL (ref 3.5–5.2)
ALBUMIN SERPL-MCNC: 2.9 G/DL (ref 3.5–5.2)
ALBUMIN SERPL-MCNC: 3 G/DL (ref 3.5–5.2)
ALBUMIN SERPL-MCNC: 3.2 G/DL (ref 3.5–5.2)
ALBUMIN SERPL-MCNC: 3.3 G/DL (ref 3.5–5.2)
ALBUMIN/GLOB SERPL: 0.5 G/DL
ALBUMIN/GLOB SERPL: 0.5 G/DL
ALBUMIN/GLOB SERPL: 0.6 G/DL
ALBUMIN/GLOB SERPL: 0.6 G/DL (ref 1.1–2.4)
ALBUMIN/GLOB SERPL: 0.6 G/DL (ref 1.1–2.4)
ALBUMIN/GLOB SERPL: 0.7 G/DL (ref 1.1–2.4)
ALBUMIN/GLOB SERPL: 0.8 G/DL
ALP SERPL-CCNC: 54 U/L (ref 39–117)
ALP SERPL-CCNC: 54 U/L (ref 39–117)
ALP SERPL-CCNC: 65 U/L (ref 39–117)
ALP SERPL-CCNC: 73 U/L (ref 38–116)
ALP SERPL-CCNC: 73 U/L (ref 39–117)
ALP SERPL-CCNC: 73 U/L (ref 39–117)
ALP SERPL-CCNC: 78 U/L (ref 39–117)
ALP SERPL-CCNC: 86 U/L (ref 38–116)
ALP SERPL-CCNC: 90 U/L (ref 38–116)
ALT SERPL W P-5'-P-CCNC: 11 U/L (ref 1–33)
ALT SERPL W P-5'-P-CCNC: 5 U/L (ref 0–33)
ALT SERPL W P-5'-P-CCNC: 5 U/L (ref 0–33)
ALT SERPL W P-5'-P-CCNC: 6 U/L (ref 1–33)
ALT SERPL W P-5'-P-CCNC: 7 U/L (ref 1–33)
ALT SERPL W P-5'-P-CCNC: 9 U/L (ref 0–33)
ALT SERPL W P-5'-P-CCNC: 9 U/L (ref 1–33)
ANION GAP SERPL CALCULATED.3IONS-SCNC: 10.4 MMOL/L (ref 5–15)
ANION GAP SERPL CALCULATED.3IONS-SCNC: 10.5 MMOL/L (ref 5–15)
ANION GAP SERPL CALCULATED.3IONS-SCNC: 10.7 MMOL/L (ref 5–15)
ANION GAP SERPL CALCULATED.3IONS-SCNC: 11 MMOL/L (ref 5–15)
ANION GAP SERPL CALCULATED.3IONS-SCNC: 11.3 MMOL/L (ref 5–15)
ANION GAP SERPL CALCULATED.3IONS-SCNC: 11.4 MMOL/L (ref 5–15)
ANION GAP SERPL CALCULATED.3IONS-SCNC: 11.6 MMOL/L (ref 5–15)
ANION GAP SERPL CALCULATED.3IONS-SCNC: 12 MMOL/L (ref 5–15)
ANION GAP SERPL CALCULATED.3IONS-SCNC: 12.4 MMOL/L (ref 5–15)
ANION GAP SERPL CALCULATED.3IONS-SCNC: 12.6 MMOL/L (ref 5–15)
ANION GAP SERPL CALCULATED.3IONS-SCNC: 12.8 MMOL/L (ref 5–15)
ANION GAP SERPL CALCULATED.3IONS-SCNC: 12.9 MMOL/L (ref 5–15)
ANION GAP SERPL CALCULATED.3IONS-SCNC: 13 MMOL/L (ref 5–15)
ANION GAP SERPL CALCULATED.3IONS-SCNC: 13.4 MMOL/L (ref 5–15)
ANION GAP SERPL CALCULATED.3IONS-SCNC: 13.8 MMOL/L (ref 5–15)
ANION GAP SERPL CALCULATED.3IONS-SCNC: 13.9 MMOL/L (ref 5–15)
ANION GAP SERPL CALCULATED.3IONS-SCNC: 14.2 MMOL/L (ref 5–15)
ANION GAP SERPL CALCULATED.3IONS-SCNC: 15.1 MMOL/L (ref 5–15)
ANION GAP SERPL CALCULATED.3IONS-SCNC: 5.9 MMOL/L (ref 5–15)
ANION GAP SERPL CALCULATED.3IONS-SCNC: 7.5 MMOL/L (ref 5–15)
ANION GAP SERPL CALCULATED.3IONS-SCNC: 7.8 MMOL/L (ref 5–15)
ANION GAP SERPL CALCULATED.3IONS-SCNC: 8.1 MMOL/L (ref 5–15)
ANION GAP SERPL CALCULATED.3IONS-SCNC: 8.3 MMOL/L (ref 5–15)
ANION GAP SERPL CALCULATED.3IONS-SCNC: 8.6 MMOL/L (ref 5–15)
ANION GAP SERPL CALCULATED.3IONS-SCNC: 8.8 MMOL/L (ref 5–15)
ANION GAP SERPL CALCULATED.3IONS-SCNC: 9.2 MMOL/L (ref 5–15)
ANION GAP SERPL CALCULATED.3IONS-SCNC: 9.3 MMOL/L (ref 5–15)
ANION GAP SERPL CALCULATED.3IONS-SCNC: 9.6 MMOL/L (ref 5–15)
APPEARANCE FLD: ABNORMAL
ARTERIAL PATENCY WRIST A: ABNORMAL
ARTERIAL PATENCY WRIST A: POSITIVE
ARTERIAL PATENCY WRIST A: POSITIVE
AST SERPL-CCNC: 10 U/L (ref 1–32)
AST SERPL-CCNC: 11 U/L (ref 1–32)
AST SERPL-CCNC: 12 U/L (ref 0–32)
AST SERPL-CCNC: 12 U/L (ref 0–32)
AST SERPL-CCNC: 13 U/L (ref 1–32)
AST SERPL-CCNC: 14 U/L (ref 0–32)
AST SERPL-CCNC: 15 U/L (ref 1–32)
AST SERPL-CCNC: 16 U/L (ref 1–32)
AST SERPL-CCNC: 9 U/L (ref 1–32)
ATMOSPHERIC PRESS: 744.3 MMHG
ATMOSPHERIC PRESS: 754.9 MMHG
ATMOSPHERIC PRESS: 755.2 MMHG
ATMOSPHERIC PRESS: 766.1 MMHG
B PARAPERT DNA SPEC QL NAA+PROBE: NOT DETECTED
B PARAPERT DNA SPEC QL NAA+PROBE: NOT DETECTED
B PERT DNA SPEC QL NAA+PROBE: NOT DETECTED
B PERT DNA SPEC QL NAA+PROBE: NOT DETECTED
BACTERIA SPEC AEROBE CULT: NORMAL
BACTERIA SPEC RESP CULT: NORMAL
BACTERIA UR QL AUTO: ABNORMAL /HPF
BACTERIA UR QL AUTO: ABNORMAL /HPF
BASE EXCESS BLDA CALC-SCNC: -1.6 MMOL/L (ref 0–2)
BASE EXCESS BLDA CALC-SCNC: 1.4 MMOL/L (ref 0–2)
BASE EXCESS BLDA CALC-SCNC: 1.5 MMOL/L (ref 0–2)
BASE EXCESS BLDV CALC-SCNC: 5.3 MMOL/L
BASOPHILS # BLD AUTO: 0.01 10*3/MM3 (ref 0–0.2)
BASOPHILS # BLD AUTO: 0.02 10*3/MM3 (ref 0–0.2)
BASOPHILS # BLD AUTO: 0.03 10*3/MM3 (ref 0–0.2)
BASOPHILS # BLD AUTO: 0.04 10*3/MM3 (ref 0–0.2)
BASOPHILS NFR BLD AUTO: 0.1 % (ref 0–1.5)
BASOPHILS NFR BLD AUTO: 0.2 % (ref 0–1.5)
BASOPHILS NFR BLD AUTO: 0.3 % (ref 0–1.5)
BASOPHILS NFR BLD AUTO: 0.4 % (ref 0–1.5)
BASOPHILS NFR BLD AUTO: 0.4 % (ref 0–1.5)
BASOPHILS NFR BLD AUTO: 0.5 % (ref 0–1.5)
BASOPHILS NFR BLD AUTO: 0.6 % (ref 0–1.5)
BDY SITE: ABNORMAL
BH BB BLOOD EXPIRATION DATE: NORMAL
BH BB BLOOD EXPIRATION DATE: NORMAL
BH BB BLOOD TYPE BARCODE: 7300
BH BB BLOOD TYPE BARCODE: 7300
BH BB DISPENSE STATUS: NORMAL
BH BB DISPENSE STATUS: NORMAL
BH BB PRODUCT CODE: NORMAL
BH BB PRODUCT CODE: NORMAL
BH BB UNIT NUMBER: NORMAL
BH BB UNIT NUMBER: NORMAL
BH CV ECHO MEAS - ACS: 1.9 CM
BH CV ECHO MEAS - AI DEC SLOPE: 351 CM/SEC^2
BH CV ECHO MEAS - AI MAX PG: 81.4 MMHG
BH CV ECHO MEAS - AI MAX VEL: 451 CM/SEC
BH CV ECHO MEAS - AI P1/2T: 376.3 MSEC
BH CV ECHO MEAS - AO MAX PG (FULL): 1.5 MMHG
BH CV ECHO MEAS - AO MAX PG: 3.1 MMHG
BH CV ECHO MEAS - AO MEAN PG (FULL): 1 MMHG
BH CV ECHO MEAS - AO MEAN PG: 2 MMHG
BH CV ECHO MEAS - AO ROOT AREA (BSA CORRECTED): 2.5
BH CV ECHO MEAS - AO ROOT AREA: 9.6 CM^2
BH CV ECHO MEAS - AO ROOT DIAM: 3.5 CM
BH CV ECHO MEAS - AO V2 MAX: 88.2 CM/SEC
BH CV ECHO MEAS - AO V2 MEAN: 58.7 CM/SEC
BH CV ECHO MEAS - AO V2 VTI: 13 CM
BH CV ECHO MEAS - ASC AORTA: 3 CM
BH CV ECHO MEAS - AVA(I,A): 2.6 CM^2
BH CV ECHO MEAS - AVA(I,D): 2.6 CM^2
BH CV ECHO MEAS - AVA(V,A): 2.5 CM^2
BH CV ECHO MEAS - AVA(V,D): 2.5 CM^2
BH CV ECHO MEAS - BSA(HAYCOCK): 1.3 M^2
BH CV ECHO MEAS - BSA: 1.4 M^2
BH CV ECHO MEAS - BZI_BMI: 15.4 KILOGRAMS/M^2
BH CV ECHO MEAS - BZI_METRIC_HEIGHT: 162.6 CM
BH CV ECHO MEAS - BZI_METRIC_WEIGHT: 40.8 KG
BH CV ECHO MEAS - EDV(CUBED): 140.6 ML
BH CV ECHO MEAS - EDV(MOD-SP2): 82 ML
BH CV ECHO MEAS - EDV(MOD-SP4): 96 ML
BH CV ECHO MEAS - EDV(TEICH): 129.5 ML
BH CV ECHO MEAS - EF(CUBED): 54.5 %
BH CV ECHO MEAS - EF(MOD-BP): 33 %
BH CV ECHO MEAS - EF(MOD-SP2): 32.9 %
BH CV ECHO MEAS - EF(MOD-SP4): 30.2 %
BH CV ECHO MEAS - EF(TEICH): 45.9 %
BH CV ECHO MEAS - ESV(CUBED): 64 ML
BH CV ECHO MEAS - ESV(MOD-SP2): 55 ML
BH CV ECHO MEAS - ESV(MOD-SP4): 67 ML
BH CV ECHO MEAS - ESV(TEICH): 70 ML
BH CV ECHO MEAS - FS: 23.1 %
BH CV ECHO MEAS - IVS/LVPW: 1
BH CV ECHO MEAS - IVSD: 1.1 CM
BH CV ECHO MEAS - LV DIASTOLIC VOL/BSA (35-75): 68.9 ML/M^2
BH CV ECHO MEAS - LV MASS(C)D: 220.8 GRAMS
BH CV ECHO MEAS - LV MASS(C)DI: 158.5 GRAMS/M^2
BH CV ECHO MEAS - LV MAX PG: 1.6 MMHG
BH CV ECHO MEAS - LV MEAN PG: 1 MMHG
BH CV ECHO MEAS - LV SYSTOLIC VOL/BSA (12-30): 48.1 ML/M^2
BH CV ECHO MEAS - LV V1 MAX: 63.1 CM/SEC
BH CV ECHO MEAS - LV V1 MEAN: 35.1 CM/SEC
BH CV ECHO MEAS - LV V1 VTI: 9.7 CM
BH CV ECHO MEAS - LVIDD: 5.2 CM
BH CV ECHO MEAS - LVIDS: 4 CM
BH CV ECHO MEAS - LVLD AP2: 6.4 CM
BH CV ECHO MEAS - LVLD AP4: 6.7 CM
BH CV ECHO MEAS - LVLS AP2: 5.9 CM
BH CV ECHO MEAS - LVLS AP4: 6 CM
BH CV ECHO MEAS - LVOT AREA (M): 3.5 CM^2
BH CV ECHO MEAS - LVOT AREA: 3.5 CM^2
BH CV ECHO MEAS - LVOT DIAM: 2.1 CM
BH CV ECHO MEAS - LVPWD: 1.1 CM
BH CV ECHO MEAS - MV DEC SLOPE: 951 CM/SEC^2
BH CV ECHO MEAS - MV DEC TIME: 132 SEC
BH CV ECHO MEAS - MV E MAX VEL: 87.7 CM/SEC
BH CV ECHO MEAS - MV MAX PG: 4.2 MMHG
BH CV ECHO MEAS - MV MEAN PG: 1 MMHG
BH CV ECHO MEAS - MV P1/2T MAX VEL: 103 CM/SEC
BH CV ECHO MEAS - MV P1/2T: 31.7 MSEC
BH CV ECHO MEAS - MV V2 MAX: 102 CM/SEC
BH CV ECHO MEAS - MV V2 MEAN: 49 CM/SEC
BH CV ECHO MEAS - MV V2 VTI: 16.6 CM
BH CV ECHO MEAS - MVA P1/2T LCG: 2.1 CM^2
BH CV ECHO MEAS - MVA(P1/2T): 6.9 CM^2
BH CV ECHO MEAS - MVA(VTI): 2 CM^2
BH CV ECHO MEAS - PA ACC TIME: 0.11 SEC
BH CV ECHO MEAS - PA MAX PG (FULL): 1.6 MMHG
BH CV ECHO MEAS - PA MAX PG: 2.6 MMHG
BH CV ECHO MEAS - PA PR(ACCEL): 31.3 MMHG
BH CV ECHO MEAS - PA V2 MAX: 80.7 CM/SEC
BH CV ECHO MEAS - RAP SYSTOLE: 3 MMHG
BH CV ECHO MEAS - RV MAX PG: 1 MMHG
BH CV ECHO MEAS - RV MEAN PG: 1 MMHG
BH CV ECHO MEAS - RV V1 MAX: 51.2 CM/SEC
BH CV ECHO MEAS - RV V1 MEAN: 39.7 CM/SEC
BH CV ECHO MEAS - RV V1 VTI: 9.2 CM
BH CV ECHO MEAS - SI(AO): 89.8 ML/M^2
BH CV ECHO MEAS - SI(CUBED): 55 ML/M^2
BH CV ECHO MEAS - SI(LVOT): 24.1 ML/M^2
BH CV ECHO MEAS - SI(MOD-SP2): 19.4 ML/M^2
BH CV ECHO MEAS - SI(MOD-SP4): 20.8 ML/M^2
BH CV ECHO MEAS - SI(TEICH): 42.7 ML/M^2
BH CV ECHO MEAS - SUP REN AO DIAM: 1.7 CM
BH CV ECHO MEAS - SV(AO): 125.1 ML
BH CV ECHO MEAS - SV(CUBED): 76.6 ML
BH CV ECHO MEAS - SV(LVOT): 33.5 ML
BH CV ECHO MEAS - SV(MOD-SP2): 27 ML
BH CV ECHO MEAS - SV(MOD-SP4): 29 ML
BH CV ECHO MEAS - SV(TEICH): 59.5 ML
BH CV ECHO MEAS - TAPSE (>1.6): 2 CM2
BH CV LOWER VASCULAR LEFT COMMON FEMORAL AUGMENT: NORMAL
BH CV LOWER VASCULAR LEFT COMMON FEMORAL COMPETENT: NORMAL
BH CV LOWER VASCULAR LEFT COMMON FEMORAL COMPRESS: NORMAL
BH CV LOWER VASCULAR LEFT COMMON FEMORAL PHASIC: NORMAL
BH CV LOWER VASCULAR LEFT COMMON FEMORAL SPONT: NORMAL
BH CV LOWER VASCULAR LEFT DISTAL FEMORAL COMPRESS: NORMAL
BH CV LOWER VASCULAR LEFT GASTRONEMIUS COMPRESS: NORMAL
BH CV LOWER VASCULAR LEFT GREATER SAPH AK COMPRESS: NORMAL
BH CV LOWER VASCULAR LEFT GREATER SAPH BK COMPRESS: NORMAL
BH CV LOWER VASCULAR LEFT MID FEMORAL AUGMENT: NORMAL
BH CV LOWER VASCULAR LEFT MID FEMORAL COMPETENT: NORMAL
BH CV LOWER VASCULAR LEFT MID FEMORAL COMPRESS: NORMAL
BH CV LOWER VASCULAR LEFT MID FEMORAL PHASIC: NORMAL
BH CV LOWER VASCULAR LEFT MID FEMORAL SPONT: NORMAL
BH CV LOWER VASCULAR LEFT PERONEAL COMPRESS: NORMAL
BH CV LOWER VASCULAR LEFT POPLITEAL AUGMENT: NORMAL
BH CV LOWER VASCULAR LEFT POPLITEAL COMPETENT: NORMAL
BH CV LOWER VASCULAR LEFT POPLITEAL COMPRESS: NORMAL
BH CV LOWER VASCULAR LEFT POPLITEAL PHASIC: NORMAL
BH CV LOWER VASCULAR LEFT POPLITEAL SPONT: NORMAL
BH CV LOWER VASCULAR LEFT POSTERIOR TIBIAL COMPRESS: NORMAL
BH CV LOWER VASCULAR LEFT PROFUNDA FEMORAL COMPRESS: NORMAL
BH CV LOWER VASCULAR LEFT PROXIMAL FEMORAL COMPRESS: NORMAL
BH CV LOWER VASCULAR LEFT SAPHENOFEMORAL JUNCTION COMPRESS: NORMAL
BH CV LOWER VASCULAR RIGHT COMMON FEMORAL AUGMENT: NORMAL
BH CV LOWER VASCULAR RIGHT COMMON FEMORAL COMPETENT: NORMAL
BH CV LOWER VASCULAR RIGHT COMMON FEMORAL COMPRESS: NORMAL
BH CV LOWER VASCULAR RIGHT COMMON FEMORAL PHASIC: NORMAL
BH CV LOWER VASCULAR RIGHT COMMON FEMORAL SPONT: NORMAL
BH CV LOWER VASCULAR RIGHT DISTAL FEMORAL COMPRESS: NORMAL
BH CV LOWER VASCULAR RIGHT GASTRONEMIUS COMPRESS: NORMAL
BH CV LOWER VASCULAR RIGHT GREATER SAPH AK COMPRESS: NORMAL
BH CV LOWER VASCULAR RIGHT GREATER SAPH BK COMPRESS: NORMAL
BH CV LOWER VASCULAR RIGHT MID FEMORAL AUGMENT: NORMAL
BH CV LOWER VASCULAR RIGHT MID FEMORAL COMPETENT: NORMAL
BH CV LOWER VASCULAR RIGHT MID FEMORAL COMPRESS: NORMAL
BH CV LOWER VASCULAR RIGHT MID FEMORAL PHASIC: NORMAL
BH CV LOWER VASCULAR RIGHT MID FEMORAL SPONT: NORMAL
BH CV LOWER VASCULAR RIGHT PERONEAL COMPRESS: NORMAL
BH CV LOWER VASCULAR RIGHT POPLITEAL AUGMENT: NORMAL
BH CV LOWER VASCULAR RIGHT POPLITEAL COMPETENT: NORMAL
BH CV LOWER VASCULAR RIGHT POPLITEAL COMPRESS: NORMAL
BH CV LOWER VASCULAR RIGHT POPLITEAL PHASIC: NORMAL
BH CV LOWER VASCULAR RIGHT POPLITEAL SPONT: NORMAL
BH CV LOWER VASCULAR RIGHT POSTERIOR TIBIAL COMPRESS: NORMAL
BH CV LOWER VASCULAR RIGHT PROFUNDA FEMORAL COMPRESS: NORMAL
BH CV LOWER VASCULAR RIGHT PROXIMAL FEMORAL COMPRESS: NORMAL
BH CV LOWER VASCULAR RIGHT SAPHENOFEMORAL JUNCTION COMPRESS: NORMAL
BH CV VAS BP RIGHT ARM: NORMAL MMHG
BH CV XLRA - RV BASE: 3.6 CM
BH CV XLRA - RV LENGTH: 6.1 CM
BH CV XLRA - RV MID: 3.9 CM
BH CV XLRA - TDI S': 15 CM/SEC
BILIRUB SERPL-MCNC: 0.2 MG/DL (ref 0.2–1.2)
BILIRUB SERPL-MCNC: 0.2 MG/DL (ref 0.2–1.2)
BILIRUB SERPL-MCNC: 0.3 MG/DL (ref 0.2–1.2)
BILIRUB SERPL-MCNC: 0.4 MG/DL (ref 0.2–1.2)
BILIRUB SERPL-MCNC: 0.5 MG/DL (ref 0.2–1.2)
BILIRUB SERPL-MCNC: 0.5 MG/DL (ref 0.2–1.2)
BILIRUB SERPL-MCNC: 0.6 MG/DL (ref 0.2–1.2)
BILIRUB UR QL STRIP: NEGATIVE
BLD GP AB SCN SERPL QL: NEGATIVE
BUN BLD-MCNC: 10 MG/DL (ref 6–20)
BUN BLD-MCNC: 11 MG/DL (ref 8–23)
BUN BLD-MCNC: 12 MG/DL (ref 8–23)
BUN BLD-MCNC: 13 MG/DL (ref 6–20)
BUN BLD-MCNC: 13 MG/DL (ref 8–23)
BUN BLD-MCNC: 15 MG/DL (ref 8–23)
BUN BLD-MCNC: 15 MG/DL (ref 8–23)
BUN BLD-MCNC: 16 MG/DL (ref 8–23)
BUN BLD-MCNC: 17 MG/DL (ref 8–23)
BUN BLD-MCNC: 19 MG/DL (ref 8–23)
BUN BLD-MCNC: 26 MG/DL (ref 8–23)
BUN BLD-MCNC: 29 MG/DL (ref 8–23)
BUN BLD-MCNC: 31 MG/DL (ref 8–23)
BUN BLD-MCNC: 32 MG/DL (ref 8–23)
BUN BLD-MCNC: 33 MG/DL (ref 8–23)
BUN BLD-MCNC: 7 MG/DL (ref 8–23)
BUN BLD-MCNC: 8 MG/DL (ref 6–20)
BUN BLD-MCNC: 8 MG/DL (ref 8–23)
BUN BLD-MCNC: 9 MG/DL (ref 8–23)
BUN BLD-MCNC: 9 MG/DL (ref 8–23)
BUN SERPL-MCNC: 12 MG/DL (ref 8–23)
BUN SERPL-MCNC: 9 MG/DL (ref 8–23)
BUN/CREAT SERPL: 11.3 (ref 7.3–30)
BUN/CREAT SERPL: 12.7 (ref 7–25)
BUN/CREAT SERPL: 14.3 (ref 7–25)
BUN/CREAT SERPL: 14.3 (ref 7–25)
BUN/CREAT SERPL: 15.2 (ref 7.3–30)
BUN/CREAT SERPL: 16.4 (ref 7–25)
BUN/CREAT SERPL: 16.7 (ref 7–25)
BUN/CREAT SERPL: 17.2 (ref 7–25)
BUN/CREAT SERPL: 17.3 (ref 7–25)
BUN/CREAT SERPL: 18.8 (ref 7–25)
BUN/CREAT SERPL: 19.4 (ref 7.3–30)
BUN/CREAT SERPL: 20 (ref 7–25)
BUN/CREAT SERPL: 20.4 (ref 7–25)
BUN/CREAT SERPL: 23.1 (ref 7–25)
BUN/CREAT SERPL: 23.1 (ref 7–25)
BUN/CREAT SERPL: 23.6 (ref 7–25)
BUN/CREAT SERPL: 23.9 (ref 7–25)
BUN/CREAT SERPL: 23.9 (ref 7–25)
BUN/CREAT SERPL: 24.1 (ref 7–25)
BUN/CREAT SERPL: 30.2 (ref 7–25)
BUN/CREAT SERPL: 32.8 (ref 7–25)
BUN/CREAT SERPL: 43.3 (ref 7–25)
BUN/CREAT SERPL: 43.9 (ref 7–25)
BUN/CREAT SERPL: 44.3 (ref 7–25)
BUN/CREAT SERPL: 46.8 (ref 7–25)
BUN/CREAT SERPL: 46.8 (ref 7–25)
BUN/CREAT SERPL: 47.1 (ref 7–25)
BUN/CREAT SERPL: 51.6 (ref 7–25)
C PNEUM DNA NPH QL NAA+NON-PROBE: NOT DETECTED
C PNEUM DNA NPH QL NAA+NON-PROBE: NOT DETECTED
CALCIUM SPEC-SCNC: 7.9 MG/DL (ref 8.6–10.5)
CALCIUM SPEC-SCNC: 8 MG/DL (ref 8.6–10.5)
CALCIUM SPEC-SCNC: 8.1 MG/DL (ref 8.6–10.5)
CALCIUM SPEC-SCNC: 8.3 MG/DL (ref 8.6–10.5)
CALCIUM SPEC-SCNC: 8.3 MG/DL (ref 8.6–10.5)
CALCIUM SPEC-SCNC: 8.4 MG/DL (ref 8.6–10.5)
CALCIUM SPEC-SCNC: 8.5 MG/DL (ref 8.6–10.5)
CALCIUM SPEC-SCNC: 8.5 MG/DL (ref 8.6–10.5)
CALCIUM SPEC-SCNC: 8.6 MG/DL (ref 8.6–10.5)
CALCIUM SPEC-SCNC: 8.7 MG/DL (ref 8.6–10.5)
CALCIUM SPEC-SCNC: 8.8 MG/DL (ref 8.5–10.2)
CALCIUM SPEC-SCNC: 8.9 MG/DL (ref 8.6–10.5)
CALCIUM SPEC-SCNC: 9 MG/DL (ref 8.5–10.2)
CALCIUM SPEC-SCNC: 9 MG/DL (ref 8.6–10.5)
CALCIUM SPEC-SCNC: 9.1 MG/DL (ref 8.5–10.2)
CALCIUM SPEC-SCNC: 9.1 MG/DL (ref 8.6–10.5)
CALCIUM SPEC-SCNC: 9.1 MG/DL (ref 8.6–10.5)
CALCIUM SPEC-SCNC: 9.2 MG/DL (ref 8.6–10.5)
CALCIUM SPEC-SCNC: 9.3 MG/DL (ref 8.6–10.5)
CHLORIDE SERPL-SCNC: 100 MMOL/L (ref 98–107)
CHLORIDE SERPL-SCNC: 101 MMOL/L (ref 98–107)
CHLORIDE SERPL-SCNC: 103 MMOL/L (ref 98–107)
CHLORIDE SERPL-SCNC: 104 MMOL/L (ref 98–107)
CHLORIDE SERPL-SCNC: 104 MMOL/L (ref 98–107)
CHLORIDE SERPL-SCNC: 105 MMOL/L (ref 98–107)
CHLORIDE SERPL-SCNC: 105 MMOL/L (ref 98–107)
CHLORIDE SERPL-SCNC: 106 MMOL/L (ref 98–107)
CHLORIDE SERPL-SCNC: 106 MMOL/L (ref 98–107)
CHLORIDE SERPL-SCNC: 108 MMOL/L (ref 98–107)
CHLORIDE SERPL-SCNC: 95 MMOL/L (ref 98–107)
CHLORIDE SERPL-SCNC: 96 MMOL/L (ref 98–107)
CHLORIDE SERPL-SCNC: 97 MMOL/L (ref 98–107)
CHLORIDE SERPL-SCNC: 98 MMOL/L (ref 98–107)
CHLORIDE SERPL-SCNC: 99 MMOL/L (ref 98–107)
CHLORIDE SERPL-SCNC: 99 MMOL/L (ref 98–107)
CLARITY UR: ABNORMAL
CLARITY UR: CLEAR
CLARITY UR: CLEAR
CO2 SERPL-SCNC: 17.5 MMOL/L (ref 22–29)
CO2 SERPL-SCNC: 19.6 MMOL/L (ref 22–29)
CO2 SERPL-SCNC: 20 MMOL/L (ref 22–29)
CO2 SERPL-SCNC: 20.8 MMOL/L (ref 22–29)
CO2 SERPL-SCNC: 20.9 MMOL/L (ref 22–29)
CO2 SERPL-SCNC: 22.4 MMOL/L (ref 22–29)
CO2 SERPL-SCNC: 22.8 MMOL/L (ref 22–29)
CO2 SERPL-SCNC: 23.2 MMOL/L (ref 22–29)
CO2 SERPL-SCNC: 23.2 MMOL/L (ref 22–29)
CO2 SERPL-SCNC: 24.1 MMOL/L (ref 22–29)
CO2 SERPL-SCNC: 24.2 MMOL/L (ref 22–29)
CO2 SERPL-SCNC: 24.4 MMOL/L (ref 22–29)
CO2 SERPL-SCNC: 24.6 MMOL/L (ref 22–29)
CO2 SERPL-SCNC: 24.7 MMOL/L (ref 22–29)
CO2 SERPL-SCNC: 24.7 MMOL/L (ref 22–29)
CO2 SERPL-SCNC: 24.9 MMOL/L (ref 22–29)
CO2 SERPL-SCNC: 25 MMOL/L (ref 22–29)
CO2 SERPL-SCNC: 25.2 MMOL/L (ref 22–29)
CO2 SERPL-SCNC: 25.3 MMOL/L (ref 22–29)
CO2 SERPL-SCNC: 25.4 MMOL/L (ref 22–29)
CO2 SERPL-SCNC: 25.7 MMOL/L (ref 22–29)
CO2 SERPL-SCNC: 26.1 MMOL/L (ref 22–29)
CO2 SERPL-SCNC: 27 MMOL/L (ref 22–29)
CO2 SERPL-SCNC: 27.1 MMOL/L (ref 22–29)
CO2 SERPL-SCNC: 28.4 MMOL/L (ref 22–29)
CO2 SERPL-SCNC: 28.5 MMOL/L (ref 22–29)
CO2 SERPL-SCNC: 28.6 MMOL/L (ref 22–29)
CO2 SERPL-SCNC: 28.6 MMOL/L (ref 22–29)
COLOR FLD: ABNORMAL
COLOR UR: YELLOW
CREAT BLD-MCNC: 0.46 MG/DL (ref 0.57–1)
CREAT BLD-MCNC: 0.46 MG/DL (ref 0.57–1)
CREAT BLD-MCNC: 0.48 MG/DL (ref 0.57–1)
CREAT BLD-MCNC: 0.52 MG/DL (ref 0.57–1)
CREAT BLD-MCNC: 0.53 MG/DL (ref 0.57–1)
CREAT BLD-MCNC: 0.54 MG/DL (ref 0.57–1)
CREAT BLD-MCNC: 0.54 MG/DL (ref 0.57–1)
CREAT BLD-MCNC: 0.55 MG/DL (ref 0.57–1)
CREAT BLD-MCNC: 0.56 MG/DL (ref 0.57–1)
CREAT BLD-MCNC: 0.58 MG/DL (ref 0.57–1)
CREAT BLD-MCNC: 0.6 MG/DL (ref 0.57–1)
CREAT BLD-MCNC: 0.62 MG/DL (ref 0.57–1)
CREAT BLD-MCNC: 0.62 MG/DL (ref 0.57–1)
CREAT BLD-MCNC: 0.63 MG/DL (ref 0.57–1)
CREAT BLD-MCNC: 0.64 MG/DL (ref 0.57–1)
CREAT BLD-MCNC: 0.65 MG/DL (ref 0.57–1)
CREAT BLD-MCNC: 0.66 MG/DL (ref 0.57–1)
CREAT BLD-MCNC: 0.66 MG/DL (ref 0.6–1.1)
CREAT BLD-MCNC: 0.67 MG/DL (ref 0.6–1.1)
CREAT BLD-MCNC: 0.68 MG/DL (ref 0.57–1)
CREAT BLD-MCNC: 0.7 MG/DL (ref 0.57–1)
CREAT BLD-MCNC: 0.71 MG/DL (ref 0.6–1.1)
CREAT BLD-MCNC: 0.72 MG/DL (ref 0.57–1)
CREAT BLD-MCNC: 0.87 MG/DL (ref 0.57–1)
CREAT BLDA-MCNC: 0.6 MG/DL (ref 0.6–1.3)
CREAT SERPL-MCNC: 0.52 MG/DL (ref 0.57–1)
CREAT SERPL-MCNC: 0.54 MG/DL (ref 0.57–1)
CREAT SERPL-MCNC: 0.55 MG/DL (ref 0.57–1)
CREAT SERPL-MCNC: 0.6 MG/DL (ref 0.57–1)
CYTO UR: NORMAL
D DIMER PPP FEU-MCNC: 8.39 MCGFEU/ML (ref 0–0.49)
D-LACTATE SERPL-SCNC: 0.7 MMOL/L (ref 0.5–2)
D-LACTATE SERPL-SCNC: 2.8 MMOL/L (ref 0.5–2)
DEPRECATED RDW RBC AUTO: 43.8 FL (ref 37–54)
DEPRECATED RDW RBC AUTO: 43.9 FL (ref 37–54)
DEPRECATED RDW RBC AUTO: 44 FL (ref 37–54)
DEPRECATED RDW RBC AUTO: 44.3 FL (ref 37–54)
DEPRECATED RDW RBC AUTO: 44.7 FL (ref 37–54)
DEPRECATED RDW RBC AUTO: 44.8 FL (ref 37–54)
DEPRECATED RDW RBC AUTO: 44.8 FL (ref 37–54)
DEPRECATED RDW RBC AUTO: 45 FL (ref 37–54)
DEPRECATED RDW RBC AUTO: 45.2 FL (ref 37–54)
DEPRECATED RDW RBC AUTO: 45.4 FL (ref 37–54)
DEPRECATED RDW RBC AUTO: 45.4 FL (ref 37–54)
DEPRECATED RDW RBC AUTO: 45.5 FL (ref 37–54)
DEPRECATED RDW RBC AUTO: 45.6 FL (ref 37–54)
DEPRECATED RDW RBC AUTO: 45.6 FL (ref 37–54)
DEPRECATED RDW RBC AUTO: 45.7 FL (ref 37–54)
DEPRECATED RDW RBC AUTO: 45.7 FL (ref 37–54)
DEPRECATED RDW RBC AUTO: 46 FL (ref 37–54)
DEPRECATED RDW RBC AUTO: 46 FL (ref 37–54)
DEPRECATED RDW RBC AUTO: 46.5 FL (ref 37–54)
DEPRECATED RDW RBC AUTO: 46.7 FL (ref 37–54)
DEPRECATED RDW RBC AUTO: 46.7 FL (ref 37–54)
DEPRECATED RDW RBC AUTO: 46.8 FL (ref 37–54)
DEPRECATED RDW RBC AUTO: 47.2 FL (ref 37–54)
DEPRECATED RDW RBC AUTO: 47.4 FL (ref 37–54)
DEPRECATED RDW RBC AUTO: 47.7 FL (ref 37–54)
DEPRECATED RDW RBC AUTO: 50.3 FL (ref 37–54)
DEPRECATED RDW RBC AUTO: 53.9 FL (ref 37–54)
DEPRECATED RDW RBC AUTO: 60.6 FL (ref 37–54)
DEPRECATED RDW RBC AUTO: 62.3 FL (ref 37–54)
EOSINOPHIL # BLD AUTO: 0 10*3/MM3 (ref 0–0.4)
EOSINOPHIL # BLD AUTO: 0 10*3/MM3 (ref 0–0.4)
EOSINOPHIL # BLD AUTO: 0.01 10*3/MM3 (ref 0–0.4)
EOSINOPHIL # BLD AUTO: 0.02 10*3/MM3 (ref 0–0.4)
EOSINOPHIL # BLD AUTO: 0.04 10*3/MM3 (ref 0–0.4)
EOSINOPHIL # BLD AUTO: 0.08 10*3/MM3 (ref 0–0.4)
EOSINOPHIL # BLD AUTO: 0.09 10*3/MM3 (ref 0–0.4)
EOSINOPHIL # BLD AUTO: 0.1 10*3/MM3 (ref 0–0.4)
EOSINOPHIL # BLD AUTO: 0.11 10*3/MM3 (ref 0–0.4)
EOSINOPHIL # BLD AUTO: 0.13 10*3/MM3 (ref 0–0.4)
EOSINOPHIL # BLD AUTO: 0.14 10*3/MM3 (ref 0–0.4)
EOSINOPHIL # BLD AUTO: 0.15 10*3/MM3 (ref 0–0.4)
EOSINOPHIL # BLD AUTO: 0.16 10*3/MM3 (ref 0–0.4)
EOSINOPHIL # BLD AUTO: 0.17 10*3/MM3 (ref 0–0.4)
EOSINOPHIL NFR BLD AUTO: 0 % (ref 0.3–6.2)
EOSINOPHIL NFR BLD AUTO: 0 % (ref 0.3–6.2)
EOSINOPHIL NFR BLD AUTO: 0.1 % (ref 0.3–6.2)
EOSINOPHIL NFR BLD AUTO: 0.2 % (ref 0.3–6.2)
EOSINOPHIL NFR BLD AUTO: 0.3 % (ref 0.3–6.2)
EOSINOPHIL NFR BLD AUTO: 1 % (ref 0.3–6.2)
EOSINOPHIL NFR BLD AUTO: 1.2 % (ref 0.3–6.2)
EOSINOPHIL NFR BLD AUTO: 1.4 % (ref 0.3–6.2)
EOSINOPHIL NFR BLD AUTO: 1.6 % (ref 0.3–6.2)
EOSINOPHIL NFR BLD AUTO: 1.7 % (ref 0.3–6.2)
EOSINOPHIL NFR BLD AUTO: 1.7 % (ref 0.3–6.2)
EOSINOPHIL NFR BLD AUTO: 2 % (ref 0.3–6.2)
EOSINOPHIL NFR BLD AUTO: 2.1 % (ref 0.3–6.2)
EOSINOPHIL NFR BLD AUTO: 2.2 % (ref 0.3–6.2)
EOSINOPHIL NFR BLD AUTO: 2.3 % (ref 0.3–6.2)
EOSINOPHIL NFR BLD AUTO: 2.3 % (ref 0.3–6.2)
EOSINOPHIL NFR BLD AUTO: 2.4 % (ref 0.3–6.2)
EOSINOPHIL NFR BLD AUTO: 2.4 % (ref 0.3–6.2)
ERYTHROCYTE [DISTWIDTH] IN BLOOD BY AUTOMATED COUNT: 14 % (ref 12.3–15.4)
ERYTHROCYTE [DISTWIDTH] IN BLOOD BY AUTOMATED COUNT: 15.1 % (ref 12.3–15.4)
ERYTHROCYTE [DISTWIDTH] IN BLOOD BY AUTOMATED COUNT: 15.2 % (ref 12.3–15.4)
ERYTHROCYTE [DISTWIDTH] IN BLOOD BY AUTOMATED COUNT: 15.3 % (ref 12.3–15.4)
ERYTHROCYTE [DISTWIDTH] IN BLOOD BY AUTOMATED COUNT: 15.4 % (ref 12.3–15.4)
ERYTHROCYTE [DISTWIDTH] IN BLOOD BY AUTOMATED COUNT: 15.5 % (ref 12.3–15.4)
ERYTHROCYTE [DISTWIDTH] IN BLOOD BY AUTOMATED COUNT: 15.6 % (ref 12.3–15.4)
ERYTHROCYTE [DISTWIDTH] IN BLOOD BY AUTOMATED COUNT: 15.6 % (ref 12.3–15.4)
ERYTHROCYTE [DISTWIDTH] IN BLOOD BY AUTOMATED COUNT: 15.7 % (ref 12.3–15.4)
ERYTHROCYTE [DISTWIDTH] IN BLOOD BY AUTOMATED COUNT: 15.8 % (ref 12.3–15.4)
ERYTHROCYTE [DISTWIDTH] IN BLOOD BY AUTOMATED COUNT: 15.9 % (ref 12.3–15.4)
ERYTHROCYTE [DISTWIDTH] IN BLOOD BY AUTOMATED COUNT: 17.2 % (ref 12.3–15.4)
ERYTHROCYTE [DISTWIDTH] IN BLOOD BY AUTOMATED COUNT: 18.3 % (ref 12.3–15.4)
ERYTHROCYTE [DISTWIDTH] IN BLOOD BY AUTOMATED COUNT: 19.1 % (ref 12.3–15.4)
FERRITIN SERPL-MCNC: 454.4 NG/ML (ref 13–150)
FLUAV H1 2009 PAND RNA NPH QL NAA+PROBE: NOT DETECTED
FLUAV H1 2009 PAND RNA NPH QL NAA+PROBE: NOT DETECTED
FLUAV H1 HA GENE NPH QL NAA+PROBE: NOT DETECTED
FLUAV H1 HA GENE NPH QL NAA+PROBE: NOT DETECTED
FLUAV H3 RNA NPH QL NAA+PROBE: NOT DETECTED
FLUAV H3 RNA NPH QL NAA+PROBE: NOT DETECTED
FLUAV SUBTYP SPEC NAA+PROBE: NOT DETECTED
FLUAV SUBTYP SPEC NAA+PROBE: NOT DETECTED
FLUBV RNA ISLT QL NAA+PROBE: NOT DETECTED
FLUBV RNA ISLT QL NAA+PROBE: NOT DETECTED
FOLATE SERPL-MCNC: 3.77 NG/ML (ref 4.78–24.2)
FUNGUS WND CULT: ABNORMAL
GAS FLOW AIRWAY: 15 LPM
GAS FLOW AIRWAY: 2 LPM
GFR SERPL CREATININE-BSD FRML MDRD: 102 ML/MIN/1.73
GFR SERPL CREATININE-BSD FRML MDRD: 106 ML/MIN/1.73
GFR SERPL CREATININE-BSD FRML MDRD: 108 ML/MIN/1.73
GFR SERPL CREATININE-BSD FRML MDRD: 108 ML/MIN/1.73
GFR SERPL CREATININE-BSD FRML MDRD: 110 ML/MIN/1.73
GFR SERPL CREATININE-BSD FRML MDRD: 111 ML/MIN/1.73
GFR SERPL CREATININE-BSD FRML MDRD: 111 ML/MIN/1.73
GFR SERPL CREATININE-BSD FRML MDRD: 113 ML/MIN/1.73
GFR SERPL CREATININE-BSD FRML MDRD: 115 ML/MIN/1.73
GFR SERPL CREATININE-BSD FRML MDRD: 116 ML/MIN/1.73
GFR SERPL CREATININE-BSD FRML MDRD: 127 ML/MIN/1.73
GFR SERPL CREATININE-BSD FRML MDRD: 133 ML/MIN/1.73
GFR SERPL CREATININE-BSD FRML MDRD: 133 ML/MIN/1.73
GFR SERPL CREATININE-BSD FRML MDRD: 64 ML/MIN/1.73
GFR SERPL CREATININE-BSD FRML MDRD: 79 ML/MIN/1.73
GFR SERPL CREATININE-BSD FRML MDRD: 81 ML/MIN/1.73
GFR SERPL CREATININE-BSD FRML MDRD: 82 ML/MIN/1.73
GFR SERPL CREATININE-BSD FRML MDRD: 85 ML/MIN/1.73
GFR SERPL CREATININE-BSD FRML MDRD: 86 ML/MIN/1.73
GFR SERPL CREATININE-BSD FRML MDRD: 88 ML/MIN/1.73
GFR SERPL CREATININE-BSD FRML MDRD: 88 ML/MIN/1.73
GFR SERPL CREATININE-BSD FRML MDRD: 89 ML/MIN/1.73
GFR SERPL CREATININE-BSD FRML MDRD: 91 ML/MIN/1.73
GFR SERPL CREATININE-BSD FRML MDRD: 93 ML/MIN/1.73
GFR SERPL CREATININE-BSD FRML MDRD: 94 ML/MIN/1.73
GFR SERPL CREATININE-BSD FRML MDRD: 94 ML/MIN/1.73
GFR SERPL CREATININE-BSD FRML MDRD: 98 ML/MIN/1.73
GFR SERPL CREATININE-BSD FRML MDRD: 98 ML/MIN/1.73
GLOBULIN UR ELPH-MCNC: 3.4 GM/DL
GLOBULIN UR ELPH-MCNC: 3.5 GM/DL
GLOBULIN UR ELPH-MCNC: 3.8 GM/DL
GLOBULIN UR ELPH-MCNC: 4 GM/DL
GLOBULIN UR ELPH-MCNC: 4.3 GM/DL
GLOBULIN UR ELPH-MCNC: 4.6 GM/DL (ref 1.8–3.5)
GLOBULIN UR ELPH-MCNC: 4.6 GM/DL (ref 1.8–3.5)
GLOBULIN UR ELPH-MCNC: 4.8 GM/DL
GLOBULIN UR ELPH-MCNC: 4.9 GM/DL (ref 1.8–3.5)
GLUCOSE BLD-MCNC: 102 MG/DL (ref 65–99)
GLUCOSE BLD-MCNC: 103 MG/DL (ref 65–99)
GLUCOSE BLD-MCNC: 105 MG/DL (ref 65–99)
GLUCOSE BLD-MCNC: 107 MG/DL (ref 65–99)
GLUCOSE BLD-MCNC: 110 MG/DL (ref 74–124)
GLUCOSE BLD-MCNC: 114 MG/DL (ref 65–99)
GLUCOSE BLD-MCNC: 115 MG/DL (ref 65–99)
GLUCOSE BLD-MCNC: 120 MG/DL (ref 65–99)
GLUCOSE BLD-MCNC: 127 MG/DL (ref 65–99)
GLUCOSE BLD-MCNC: 164 MG/DL (ref 74–124)
GLUCOSE BLD-MCNC: 187 MG/DL (ref 65–99)
GLUCOSE BLD-MCNC: 77 MG/DL (ref 65–99)
GLUCOSE BLD-MCNC: 81 MG/DL (ref 65–99)
GLUCOSE BLD-MCNC: 82 MG/DL (ref 65–99)
GLUCOSE BLD-MCNC: 85 MG/DL (ref 65–99)
GLUCOSE BLD-MCNC: 85 MG/DL (ref 65–99)
GLUCOSE BLD-MCNC: 87 MG/DL (ref 65–99)
GLUCOSE BLD-MCNC: 89 MG/DL (ref 65–99)
GLUCOSE BLD-MCNC: 89 MG/DL (ref 65–99)
GLUCOSE BLD-MCNC: 94 MG/DL (ref 65–99)
GLUCOSE BLD-MCNC: 95 MG/DL (ref 65–99)
GLUCOSE BLD-MCNC: 95 MG/DL (ref 65–99)
GLUCOSE BLD-MCNC: 96 MG/DL (ref 74–124)
GLUCOSE BLD-MCNC: 97 MG/DL (ref 65–99)
GLUCOSE SERPL-MCNC: 143 MG/DL (ref 65–99)
GLUCOSE SERPL-MCNC: 153 MG/DL (ref 65–99)
GLUCOSE SERPL-MCNC: 72 MG/DL (ref 65–99)
GLUCOSE SERPL-MCNC: 84 MG/DL (ref 65–99)
GLUCOSE UR STRIP-MCNC: NEGATIVE MG/DL
GRAM STN SPEC: NORMAL
GRAN CASTS URNS QL MICRO: ABNORMAL /LPF
HADV DNA SPEC NAA+PROBE: NOT DETECTED
HADV DNA SPEC NAA+PROBE: NOT DETECTED
HCO3 BLDA-SCNC: 22.4 MMOL/L (ref 22–28)
HCO3 BLDA-SCNC: 25 MMOL/L (ref 22–28)
HCO3 BLDA-SCNC: 25.6 MMOL/L (ref 22–28)
HCO3 BLDV-SCNC: 29.9 MMOL/L (ref 22–28)
HCOV 229E RNA SPEC QL NAA+PROBE: NOT DETECTED
HCOV 229E RNA SPEC QL NAA+PROBE: NOT DETECTED
HCOV HKU1 RNA SPEC QL NAA+PROBE: NOT DETECTED
HCOV HKU1 RNA SPEC QL NAA+PROBE: NOT DETECTED
HCOV NL63 RNA SPEC QL NAA+PROBE: NOT DETECTED
HCOV NL63 RNA SPEC QL NAA+PROBE: NOT DETECTED
HCOV OC43 RNA SPEC QL NAA+PROBE: NOT DETECTED
HCOV OC43 RNA SPEC QL NAA+PROBE: NOT DETECTED
HCT VFR BLD AUTO: 20.7 % (ref 34–46.6)
HCT VFR BLD AUTO: 21.8 % (ref 34–46.6)
HCT VFR BLD AUTO: 22.8 % (ref 34–46.6)
HCT VFR BLD AUTO: 22.8 % (ref 34–46.6)
HCT VFR BLD AUTO: 24.4 % (ref 34–46.6)
HCT VFR BLD AUTO: 24.7 % (ref 34–46.6)
HCT VFR BLD AUTO: 25.2 % (ref 34–46.6)
HCT VFR BLD AUTO: 26.8 % (ref 34–46.6)
HCT VFR BLD AUTO: 27.8 % (ref 34–46.6)
HCT VFR BLD AUTO: 28 % (ref 34–46.6)
HCT VFR BLD AUTO: 30.5 % (ref 34–46.6)
HCT VFR BLD AUTO: 31 % (ref 34–46.6)
HCT VFR BLD AUTO: 31.4 % (ref 34–46.6)
HCT VFR BLD AUTO: 31.7 % (ref 34–46.6)
HCT VFR BLD AUTO: 32 % (ref 34–46.6)
HCT VFR BLD AUTO: 32 % (ref 34–46.6)
HCT VFR BLD AUTO: 32.1 % (ref 34–46.6)
HCT VFR BLD AUTO: 32.2 % (ref 34–46.6)
HCT VFR BLD AUTO: 32.3 % (ref 34–46.6)
HCT VFR BLD AUTO: 32.4 % (ref 34–46.6)
HCT VFR BLD AUTO: 32.6 % (ref 34–46.6)
HCT VFR BLD AUTO: 32.7 % (ref 34–46.6)
HCT VFR BLD AUTO: 32.8 % (ref 34–46.6)
HCT VFR BLD AUTO: 33 % (ref 34–46.6)
HCT VFR BLD AUTO: 34 % (ref 34–46.6)
HCT VFR BLD AUTO: 34.2 % (ref 34–46.6)
HCT VFR BLD AUTO: 34.6 % (ref 34–46.6)
HCT VFR BLD AUTO: 42.6 % (ref 34–46.6)
HEMOCCULT STL QL: NEGATIVE
HGB BLD-MCNC: 10.1 G/DL (ref 12–15.9)
HGB BLD-MCNC: 10.2 G/DL (ref 12–15.9)
HGB BLD-MCNC: 10.3 G/DL (ref 12–15.9)
HGB BLD-MCNC: 10.3 G/DL (ref 12–15.9)
HGB BLD-MCNC: 10.4 G/DL (ref 12–15.9)
HGB BLD-MCNC: 10.7 G/DL (ref 12–15.9)
HGB BLD-MCNC: 10.8 G/DL (ref 12–15.9)
HGB BLD-MCNC: 11.2 G/DL (ref 12–15.9)
HGB BLD-MCNC: 13.9 G/DL (ref 12–15.9)
HGB BLD-MCNC: 6.7 G/DL (ref 12–15.9)
HGB BLD-MCNC: 6.7 G/DL (ref 12–15.9)
HGB BLD-MCNC: 7.2 G/DL (ref 12–15.9)
HGB BLD-MCNC: 7.4 G/DL (ref 12–15.9)
HGB BLD-MCNC: 7.6 G/DL (ref 12–15.9)
HGB BLD-MCNC: 8 G/DL (ref 12–15.9)
HGB BLD-MCNC: 8.1 G/DL (ref 12–15.9)
HGB BLD-MCNC: 8.5 G/DL (ref 12–15.9)
HGB BLD-MCNC: 8.9 G/DL (ref 12–15.9)
HGB BLD-MCNC: 8.9 G/DL (ref 12–15.9)
HGB BLD-MCNC: 9.7 G/DL (ref 12–15.9)
HGB BLD-MCNC: 9.7 G/DL (ref 12–15.9)
HGB BLD-MCNC: 9.8 G/DL (ref 12–15.9)
HGB BLD-MCNC: 9.8 G/DL (ref 12–15.9)
HGB BLD-MCNC: 9.9 G/DL (ref 12–15.9)
HGB UR QL STRIP.AUTO: ABNORMAL
HGB UR QL STRIP.AUTO: NEGATIVE
HGB UR QL STRIP.AUTO: NEGATIVE
HMPV RNA NPH QL NAA+NON-PROBE: NOT DETECTED
HMPV RNA NPH QL NAA+NON-PROBE: NOT DETECTED
HOLD SPECIMEN: NORMAL
HOROWITZ INDEX BLD+IHG-RTO: 50 %
HPIV1 RNA SPEC QL NAA+PROBE: NOT DETECTED
HPIV1 RNA SPEC QL NAA+PROBE: NOT DETECTED
HPIV2 RNA SPEC QL NAA+PROBE: NOT DETECTED
HPIV2 RNA SPEC QL NAA+PROBE: NOT DETECTED
HPIV3 RNA NPH QL NAA+PROBE: NOT DETECTED
HPIV3 RNA NPH QL NAA+PROBE: NOT DETECTED
HPIV4 P GENE NPH QL NAA+PROBE: NOT DETECTED
HPIV4 P GENE NPH QL NAA+PROBE: NOT DETECTED
HYALINE CASTS UR QL AUTO: ABNORMAL /LPF
HYALINE CASTS UR QL AUTO: ABNORMAL /LPF
IMM GRANULOCYTES # BLD AUTO: 0.04 10*3/MM3 (ref 0–0.05)
IMM GRANULOCYTES # BLD AUTO: 0.05 10*3/MM3 (ref 0–0.05)
IMM GRANULOCYTES # BLD AUTO: 0.07 10*3/MM3 (ref 0–0.05)
IMM GRANULOCYTES # BLD AUTO: 0.08 10*3/MM3 (ref 0–0.05)
IMM GRANULOCYTES # BLD AUTO: 0.08 10*3/MM3 (ref 0–0.05)
IMM GRANULOCYTES # BLD AUTO: 0.09 10*3/MM3 (ref 0–0.05)
IMM GRANULOCYTES # BLD AUTO: 0.09 10*3/MM3 (ref 0–0.05)
IMM GRANULOCYTES # BLD AUTO: 0.13 10*3/MM3 (ref 0–0.05)
IMM GRANULOCYTES # BLD AUTO: 0.15 10*3/MM3 (ref 0–0.05)
IMM GRANULOCYTES # BLD AUTO: 0.16 10*3/MM3 (ref 0–0.05)
IMM GRANULOCYTES # BLD AUTO: 0.17 10*3/MM3 (ref 0–0.05)
IMM GRANULOCYTES # BLD AUTO: 0.17 10*3/MM3 (ref 0–0.05)
IMM GRANULOCYTES # BLD AUTO: 0.22 10*3/MM3 (ref 0–0.05)
IMM GRANULOCYTES # BLD AUTO: 0.22 10*3/MM3 (ref 0–0.05)
IMM GRANULOCYTES # BLD AUTO: 0.29 10*3/MM3 (ref 0–0.05)
IMM GRANULOCYTES # BLD AUTO: 0.29 10*3/MM3 (ref 0–0.05)
IMM GRANULOCYTES # BLD AUTO: 0.32 10*3/MM3 (ref 0–0.05)
IMM GRANULOCYTES NFR BLD AUTO: 0.5 % (ref 0–0.5)
IMM GRANULOCYTES NFR BLD AUTO: 0.5 % (ref 0–0.5)
IMM GRANULOCYTES NFR BLD AUTO: 0.7 % (ref 0–0.5)
IMM GRANULOCYTES NFR BLD AUTO: 0.8 % (ref 0–0.5)
IMM GRANULOCYTES NFR BLD AUTO: 0.8 % (ref 0–0.5)
IMM GRANULOCYTES NFR BLD AUTO: 0.9 % (ref 0–0.5)
IMM GRANULOCYTES NFR BLD AUTO: 1.1 % (ref 0–0.5)
IMM GRANULOCYTES NFR BLD AUTO: 1.2 % (ref 0–0.5)
IMM GRANULOCYTES NFR BLD AUTO: 1.8 % (ref 0–0.5)
IMM GRANULOCYTES NFR BLD AUTO: 1.9 % (ref 0–0.5)
IMM GRANULOCYTES NFR BLD AUTO: 2.5 % (ref 0–0.5)
IMM GRANULOCYTES NFR BLD AUTO: 3.1 % (ref 0–0.5)
IMM GRANULOCYTES NFR BLD AUTO: 3.3 % (ref 0–0.5)
IMM GRANULOCYTES NFR BLD AUTO: 3.7 % (ref 0–0.5)
IMM GRANULOCYTES NFR BLD AUTO: 3.8 % (ref 0–0.5)
IMM GRANULOCYTES NFR BLD AUTO: 4.5 % (ref 0–0.5)
IRON 24H UR-MRATE: 21 MCG/DL (ref 37–145)
IRON SATN MFR SERPL: 9 % (ref 14–48)
KETONES UR QL STRIP: ABNORMAL
KETONES UR QL STRIP: NEGATIVE
KETONES UR QL STRIP: NEGATIVE
LAB AP CASE REPORT: NORMAL
LAB AP DIAGNOSIS COMMENT: NORMAL
LEFT ATRIUM VOLUME INDEX: 23 ML/M2
LEUKOCYTE ESTERASE UR QL STRIP.AUTO: ABNORMAL
LEUKOCYTE ESTERASE UR QL STRIP.AUTO: ABNORMAL
LEUKOCYTE ESTERASE UR QL STRIP.AUTO: NEGATIVE
LYMPHOCYTES # BLD AUTO: 0.31 10*3/MM3 (ref 0.7–3.1)
LYMPHOCYTES # BLD AUTO: 0.43 10*3/MM3 (ref 0.7–3.1)
LYMPHOCYTES # BLD AUTO: 0.64 10*3/MM3 (ref 0.7–3.1)
LYMPHOCYTES # BLD AUTO: 0.67 10*3/MM3 (ref 0.7–3.1)
LYMPHOCYTES # BLD AUTO: 0.69 10*3/MM3 (ref 0.7–3.1)
LYMPHOCYTES # BLD AUTO: 0.76 10*3/MM3 (ref 0.7–3.1)
LYMPHOCYTES # BLD AUTO: 0.93 10*3/MM3 (ref 0.7–3.1)
LYMPHOCYTES # BLD AUTO: 0.95 10*3/MM3 (ref 0.7–3.1)
LYMPHOCYTES # BLD AUTO: 0.97 10*3/MM3 (ref 0.7–3.1)
LYMPHOCYTES # BLD AUTO: 0.97 10*3/MM3 (ref 0.7–3.1)
LYMPHOCYTES # BLD AUTO: 0.99 10*3/MM3 (ref 0.7–3.1)
LYMPHOCYTES # BLD AUTO: 1.04 10*3/MM3 (ref 0.7–3.1)
LYMPHOCYTES # BLD AUTO: 1.06 10*3/MM3 (ref 0.7–3.1)
LYMPHOCYTES # BLD AUTO: 1.13 10*3/MM3 (ref 0.7–3.1)
LYMPHOCYTES # BLD AUTO: 1.14 10*3/MM3 (ref 0.7–3.1)
LYMPHOCYTES # BLD AUTO: 1.16 10*3/MM3 (ref 0.7–3.1)
LYMPHOCYTES # BLD AUTO: 1.16 10*3/MM3 (ref 0.7–3.1)
LYMPHOCYTES # BLD AUTO: 1.2 10*3/MM3 (ref 0.7–3.1)
LYMPHOCYTES # BLD AUTO: 1.25 10*3/MM3 (ref 0.7–3.1)
LYMPHOCYTES # BLD AUTO: 1.34 10*3/MM3 (ref 0.7–3.1)
LYMPHOCYTES NFR BLD AUTO: 10.1 % (ref 19.6–45.3)
LYMPHOCYTES NFR BLD AUTO: 10.5 % (ref 19.6–45.3)
LYMPHOCYTES NFR BLD AUTO: 13.2 % (ref 19.6–45.3)
LYMPHOCYTES NFR BLD AUTO: 13.6 % (ref 19.6–45.3)
LYMPHOCYTES NFR BLD AUTO: 13.7 % (ref 19.6–45.3)
LYMPHOCYTES NFR BLD AUTO: 14.1 % (ref 19.6–45.3)
LYMPHOCYTES NFR BLD AUTO: 14.5 % (ref 19.6–45.3)
LYMPHOCYTES NFR BLD AUTO: 15.2 % (ref 19.6–45.3)
LYMPHOCYTES NFR BLD AUTO: 15.7 % (ref 19.6–45.3)
LYMPHOCYTES NFR BLD AUTO: 15.8 % (ref 19.6–45.3)
LYMPHOCYTES NFR BLD AUTO: 16.3 % (ref 19.6–45.3)
LYMPHOCYTES NFR BLD AUTO: 17.2 % (ref 19.6–45.3)
LYMPHOCYTES NFR BLD AUTO: 17.9 % (ref 19.6–45.3)
LYMPHOCYTES NFR BLD AUTO: 18.5 % (ref 19.6–45.3)
LYMPHOCYTES NFR BLD AUTO: 3.8 % (ref 19.6–45.3)
LYMPHOCYTES NFR BLD AUTO: 5.2 % (ref 19.6–45.3)
LYMPHOCYTES NFR BLD AUTO: 7.1 % (ref 19.6–45.3)
LYMPHOCYTES NFR BLD AUTO: 7.2 % (ref 19.6–45.3)
LYMPHOCYTES NFR BLD AUTO: 8.8 % (ref 19.6–45.3)
LYMPHOCYTES NFR BLD AUTO: 9.8 % (ref 19.6–45.3)
LYMPHOCYTES NFR FLD MANUAL: 1 %
M PNEUMO IGG SER IA-ACNC: NOT DETECTED
M PNEUMO IGG SER IA-ACNC: NOT DETECTED
MAGNESIUM SERPL-MCNC: 1.4 MG/DL (ref 1.6–2.4)
MAGNESIUM SERPL-MCNC: 1.5 MG/DL (ref 1.6–2.4)
MAGNESIUM SERPL-MCNC: 2.3 MG/DL (ref 1.6–2.4)
MAGNESIUM SERPL-MCNC: 2.9 MG/DL (ref 1.6–2.4)
MAXIMAL PREDICTED HEART RATE: 147 BPM
MCH RBC QN AUTO: 25.3 PG (ref 26.6–33)
MCH RBC QN AUTO: 25.3 PG (ref 26.6–33)
MCH RBC QN AUTO: 25.4 PG (ref 26.6–33)
MCH RBC QN AUTO: 25.5 PG (ref 26.6–33)
MCH RBC QN AUTO: 25.7 PG (ref 26.6–33)
MCH RBC QN AUTO: 25.9 PG (ref 26.6–33)
MCH RBC QN AUTO: 26 PG (ref 26.6–33)
MCH RBC QN AUTO: 26.1 PG (ref 26.6–33)
MCH RBC QN AUTO: 26.1 PG (ref 26.6–33)
MCH RBC QN AUTO: 26.2 PG (ref 26.6–33)
MCH RBC QN AUTO: 26.3 PG (ref 26.6–33)
MCH RBC QN AUTO: 26.4 PG (ref 26.6–33)
MCH RBC QN AUTO: 26.5 PG (ref 26.6–33)
MCH RBC QN AUTO: 26.6 PG (ref 26.6–33)
MCH RBC QN AUTO: 26.7 PG (ref 26.6–33)
MCH RBC QN AUTO: 26.9 PG (ref 26.6–33)
MCH RBC QN AUTO: 27.8 PG (ref 26.6–33)
MCH RBC QN AUTO: 27.9 PG (ref 26.6–33)
MCH RBC QN AUTO: 28.3 PG (ref 26.6–33)
MCHC RBC AUTO-ENTMCNC: 29.5 G/DL (ref 31.5–35.7)
MCHC RBC AUTO-ENTMCNC: 30.1 G/DL (ref 31.5–35.7)
MCHC RBC AUTO-ENTMCNC: 30.7 G/DL (ref 31.5–35.7)
MCHC RBC AUTO-ENTMCNC: 30.8 G/DL (ref 31.5–35.7)
MCHC RBC AUTO-ENTMCNC: 30.9 G/DL (ref 31.5–35.7)
MCHC RBC AUTO-ENTMCNC: 31.3 G/DL (ref 31.5–35.7)
MCHC RBC AUTO-ENTMCNC: 31.3 G/DL (ref 31.5–35.7)
MCHC RBC AUTO-ENTMCNC: 31.5 G/DL (ref 31.5–35.7)
MCHC RBC AUTO-ENTMCNC: 31.6 G/DL (ref 31.5–35.7)
MCHC RBC AUTO-ENTMCNC: 31.6 G/DL (ref 31.5–35.7)
MCHC RBC AUTO-ENTMCNC: 31.7 G/DL (ref 31.5–35.7)
MCHC RBC AUTO-ENTMCNC: 31.8 G/DL (ref 31.5–35.7)
MCHC RBC AUTO-ENTMCNC: 31.9 G/DL (ref 31.5–35.7)
MCHC RBC AUTO-ENTMCNC: 32 G/DL (ref 31.5–35.7)
MCHC RBC AUTO-ENTMCNC: 32 G/DL (ref 31.5–35.7)
MCHC RBC AUTO-ENTMCNC: 32.1 G/DL (ref 31.5–35.7)
MCHC RBC AUTO-ENTMCNC: 32.4 G/DL (ref 31.5–35.7)
MCHC RBC AUTO-ENTMCNC: 32.5 G/DL (ref 31.5–35.7)
MCHC RBC AUTO-ENTMCNC: 32.5 G/DL (ref 31.5–35.7)
MCHC RBC AUTO-ENTMCNC: 32.6 G/DL (ref 31.5–35.7)
MCHC RBC AUTO-ENTMCNC: 32.8 G/DL (ref 31.5–35.7)
MCHC RBC AUTO-ENTMCNC: 32.9 G/DL (ref 31.5–35.7)
MCHC RBC AUTO-ENTMCNC: 32.9 G/DL (ref 31.5–35.7)
MCV RBC AUTO: 80 FL (ref 79–97)
MCV RBC AUTO: 80.2 FL (ref 79–97)
MCV RBC AUTO: 80.9 FL (ref 79–97)
MCV RBC AUTO: 80.9 FL (ref 79–97)
MCV RBC AUTO: 81.1 FL (ref 79–97)
MCV RBC AUTO: 81.3 FL (ref 79–97)
MCV RBC AUTO: 81.4 FL (ref 79–97)
MCV RBC AUTO: 81.5 FL (ref 79–97)
MCV RBC AUTO: 81.6 FL (ref 79–97)
MCV RBC AUTO: 82 FL (ref 79–97)
MCV RBC AUTO: 82.1 FL (ref 79–97)
MCV RBC AUTO: 82.1 FL (ref 79–97)
MCV RBC AUTO: 82.2 FL (ref 79–97)
MCV RBC AUTO: 82.3 FL (ref 79–97)
MCV RBC AUTO: 82.4 FL (ref 79–97)
MCV RBC AUTO: 82.5 FL (ref 79–97)
MCV RBC AUTO: 82.6 FL (ref 79–97)
MCV RBC AUTO: 83.2 FL (ref 79–97)
MCV RBC AUTO: 83.2 FL (ref 79–97)
MCV RBC AUTO: 83.3 FL (ref 79–97)
MCV RBC AUTO: 84 FL (ref 79–97)
MCV RBC AUTO: 84.8 FL (ref 79–97)
MCV RBC AUTO: 85.7 FL (ref 79–97)
MCV RBC AUTO: 85.9 FL (ref 79–97)
MCV RBC AUTO: 85.9 FL (ref 79–97)
MCV RBC AUTO: 86.6 FL (ref 79–97)
MCV RBC AUTO: 86.9 FL (ref 79–97)
MCV RBC AUTO: 89.6 FL (ref 79–97)
MCV RBC AUTO: 89.6 FL (ref 79–97)
MODALITY: ABNORMAL
MONOCYTES # BLD AUTO: 0.21 10*3/MM3 (ref 0.1–0.9)
MONOCYTES # BLD AUTO: 0.31 10*3/MM3 (ref 0.1–0.9)
MONOCYTES # BLD AUTO: 0.32 10*3/MM3 (ref 0.1–0.9)
MONOCYTES # BLD AUTO: 0.33 10*3/MM3 (ref 0.1–0.9)
MONOCYTES # BLD AUTO: 0.34 10*3/MM3 (ref 0.1–0.9)
MONOCYTES # BLD AUTO: 0.35 10*3/MM3 (ref 0.1–0.9)
MONOCYTES # BLD AUTO: 0.36 10*3/MM3 (ref 0.1–0.9)
MONOCYTES # BLD AUTO: 0.36 10*3/MM3 (ref 0.1–0.9)
MONOCYTES # BLD AUTO: 0.37 10*3/MM3 (ref 0.1–0.9)
MONOCYTES # BLD AUTO: 0.37 10*3/MM3 (ref 0.1–0.9)
MONOCYTES # BLD AUTO: 0.39 10*3/MM3 (ref 0.1–0.9)
MONOCYTES # BLD AUTO: 0.39 10*3/MM3 (ref 0.1–0.9)
MONOCYTES # BLD AUTO: 0.4 10*3/MM3 (ref 0.1–0.9)
MONOCYTES # BLD AUTO: 0.4 10*3/MM3 (ref 0.1–0.9)
MONOCYTES # BLD AUTO: 0.41 10*3/MM3 (ref 0.1–0.9)
MONOCYTES # BLD AUTO: 0.41 10*3/MM3 (ref 0.1–0.9)
MONOCYTES # BLD AUTO: 0.42 10*3/MM3 (ref 0.1–0.9)
MONOCYTES # BLD AUTO: 0.47 10*3/MM3 (ref 0.1–0.9)
MONOCYTES NFR BLD AUTO: 2.6 % (ref 5–12)
MONOCYTES NFR BLD AUTO: 3.2 % (ref 5–12)
MONOCYTES NFR BLD AUTO: 3.4 % (ref 5–12)
MONOCYTES NFR BLD AUTO: 3.5 % (ref 5–12)
MONOCYTES NFR BLD AUTO: 3.6 % (ref 5–12)
MONOCYTES NFR BLD AUTO: 4.2 % (ref 5–12)
MONOCYTES NFR BLD AUTO: 4.5 % (ref 5–12)
MONOCYTES NFR BLD AUTO: 4.7 % (ref 5–12)
MONOCYTES NFR BLD AUTO: 4.8 % (ref 5–12)
MONOCYTES NFR BLD AUTO: 5.2 % (ref 5–12)
MONOCYTES NFR BLD AUTO: 5.3 % (ref 5–12)
MONOCYTES NFR BLD AUTO: 5.3 % (ref 5–12)
MONOCYTES NFR BLD AUTO: 5.4 % (ref 5–12)
MONOCYTES NFR BLD AUTO: 5.4 % (ref 5–12)
MONOCYTES NFR BLD AUTO: 5.5 % (ref 5–12)
MONOCYTES NFR BLD AUTO: 5.5 % (ref 5–12)
MONOCYTES NFR BLD AUTO: 5.7 % (ref 5–12)
MONOCYTES NFR BLD AUTO: 5.8 % (ref 5–12)
MONOCYTES NFR BLD AUTO: 5.8 % (ref 5–12)
MONOCYTES NFR BLD AUTO: 5.9 % (ref 5–12)
MYCOBACTERIUM SPEC CULT: NORMAL
NEUTROPHILS # BLD AUTO: 10.07 10*3/MM3 (ref 1.7–7)
NEUTROPHILS # BLD AUTO: 4.55 10*3/MM3 (ref 1.7–7)
NEUTROPHILS # BLD AUTO: 4.75 10*3/MM3 (ref 1.7–7)
NEUTROPHILS # BLD AUTO: 4.86 10*3/MM3 (ref 1.7–7)
NEUTROPHILS # BLD AUTO: 5.04 10*3/MM3 (ref 1.7–7)
NEUTROPHILS # BLD AUTO: 5.09 10*3/MM3 (ref 1.7–7)
NEUTROPHILS # BLD AUTO: 5.15 10*3/MM3 (ref 1.7–7)
NEUTROPHILS # BLD AUTO: 5.27 10*3/MM3 (ref 1.7–7)
NEUTROPHILS # BLD AUTO: 5.41 10*3/MM3 (ref 1.7–7)
NEUTROPHILS # BLD AUTO: 5.54 10*3/MM3 (ref 1.7–7)
NEUTROPHILS # BLD AUTO: 5.55 10*3/MM3 (ref 1.7–7)
NEUTROPHILS # BLD AUTO: 5.59 10*3/MM3 (ref 1.7–7)
NEUTROPHILS # BLD AUTO: 5.85 10*3/MM3 (ref 1.7–7)
NEUTROPHILS # BLD AUTO: 6.12 10*3/MM3 (ref 1.7–7)
NEUTROPHILS # BLD AUTO: 6.17 10*3/MM3 (ref 1.7–7)
NEUTROPHILS # BLD AUTO: 7.45 10*3/MM3 (ref 1.7–7)
NEUTROPHILS # BLD AUTO: 7.6 10*3/MM3 (ref 1.7–7)
NEUTROPHILS # BLD AUTO: 8.26 10*3/MM3 (ref 1.7–7)
NEUTROPHILS # BLD AUTO: 9.16 10*3/MM3 (ref 1.7–7)
NEUTROPHILS NFR BLD AUTO: 7.94 10*3/MM3 (ref 1.7–7)
NEUTROPHILS NFR BLD AUTO: 70.2 % (ref 42.7–76)
NEUTROPHILS NFR BLD AUTO: 70.7 % (ref 42.7–76)
NEUTROPHILS NFR BLD AUTO: 71.6 % (ref 42.7–76)
NEUTROPHILS NFR BLD AUTO: 71.7 % (ref 42.7–76)
NEUTROPHILS NFR BLD AUTO: 72.2 % (ref 42.7–76)
NEUTROPHILS NFR BLD AUTO: 72.5 % (ref 42.7–76)
NEUTROPHILS NFR BLD AUTO: 74.4 % (ref 42.7–76)
NEUTROPHILS NFR BLD AUTO: 75.3 % (ref 42.7–76)
NEUTROPHILS NFR BLD AUTO: 76.1 % (ref 42.7–76)
NEUTROPHILS NFR BLD AUTO: 76.9 % (ref 42.7–76)
NEUTROPHILS NFR BLD AUTO: 78.2 % (ref 42.7–76)
NEUTROPHILS NFR BLD AUTO: 78.3 % (ref 42.7–76)
NEUTROPHILS NFR BLD AUTO: 81.8 % (ref 42.7–76)
NEUTROPHILS NFR BLD AUTO: 81.8 % (ref 42.7–76)
NEUTROPHILS NFR BLD AUTO: 85.2 % (ref 42.7–76)
NEUTROPHILS NFR BLD AUTO: 87.1 % (ref 42.7–76)
NEUTROPHILS NFR BLD AUTO: 87.5 % (ref 42.7–76)
NEUTROPHILS NFR BLD AUTO: 88.4 % (ref 42.7–76)
NEUTROPHILS NFR BLD AUTO: 89.3 % (ref 42.7–76)
NEUTROPHILS NFR BLD AUTO: 93 % (ref 42.7–76)
NEUTROPHILS NFR FLD MANUAL: 99 %
NIGHT BLUE STAIN TISS: NORMAL
NITRITE UR QL STRIP: NEGATIVE
NITRITE UR QL STRIP: NEGATIVE
NITRITE UR QL STRIP: POSITIVE
NRBC BLD AUTO-RTO: 0 /100 WBC (ref 0–0.2)
NRBC BLD AUTO-RTO: 0.1 /100 WBC (ref 0–0.2)
NT-PROBNP SERPL-MCNC: 1235 PG/ML (ref 5–900)
NT-PROBNP SERPL-MCNC: 1498 PG/ML (ref 5–900)
NT-PROBNP SERPL-MCNC: 1670 PG/ML (ref 5–900)
NT-PROBNP SERPL-MCNC: 2331 PG/ML (ref 5–900)
NT-PROBNP SERPL-MCNC: 3753 PG/ML (ref 5–900)
PATH REPORT.FINAL DX SPEC: NORMAL
PATH REPORT.GROSS SPEC: NORMAL
PCO2 BLDA: 33.4 MM HG (ref 35–45)
PCO2 BLDA: 35.6 MM HG (ref 35–45)
PCO2 BLDA: 37.2 MM HG (ref 35–45)
PCO2 BLDV: 42.7 MM HG (ref 41–51)
PH BLDA: 7.43 PH UNITS (ref 7.35–7.45)
PH BLDA: 7.45 PH UNITS (ref 7.35–7.45)
PH BLDA: 7.46 PH UNITS (ref 7.35–7.45)
PH BLDV: 7.45 PH UNITS (ref 7.31–7.41)
PH UR STRIP.AUTO: 5.5 [PH] (ref 5–8)
PH UR STRIP.AUTO: 6.5 [PH] (ref 5–8)
PH UR STRIP.AUTO: 7 [PH] (ref 5–8)
PLATELET # BLD AUTO: 251 10*3/MM3 (ref 140–450)
PLATELET # BLD AUTO: 253 10*3/MM3 (ref 140–450)
PLATELET # BLD AUTO: 270 10*3/MM3 (ref 140–450)
PLATELET # BLD AUTO: 271 10*3/MM3 (ref 140–450)
PLATELET # BLD AUTO: 272 10*3/MM3 (ref 140–450)
PLATELET # BLD AUTO: 273 10*3/MM3 (ref 140–450)
PLATELET # BLD AUTO: 283 10*3/MM3 (ref 140–450)
PLATELET # BLD AUTO: 285 10*3/MM3 (ref 140–450)
PLATELET # BLD AUTO: 285 10*3/MM3 (ref 140–450)
PLATELET # BLD AUTO: 292 10*3/MM3 (ref 140–450)
PLATELET # BLD AUTO: 297 10*3/MM3 (ref 140–450)
PLATELET # BLD AUTO: 299 10*3/MM3 (ref 140–450)
PLATELET # BLD AUTO: 303 10*3/MM3 (ref 140–450)
PLATELET # BLD AUTO: 309 10*3/MM3 (ref 140–450)
PLATELET # BLD AUTO: 310 10*3/MM3 (ref 140–450)
PLATELET # BLD AUTO: 313 10*3/MM3 (ref 140–450)
PLATELET # BLD AUTO: 322 10*3/MM3 (ref 140–450)
PLATELET # BLD AUTO: 322 10*3/MM3 (ref 140–450)
PLATELET # BLD AUTO: 323 10*3/MM3 (ref 140–450)
PLATELET # BLD AUTO: 347 10*3/MM3 (ref 140–450)
PLATELET # BLD AUTO: 352 10*3/MM3 (ref 140–450)
PLATELET # BLD AUTO: 358 10*3/MM3 (ref 140–450)
PLATELET # BLD AUTO: 358 10*3/MM3 (ref 140–450)
PLATELET # BLD AUTO: 363 10*3/MM3 (ref 140–450)
PLATELET # BLD AUTO: 392 10*3/MM3 (ref 140–450)
PLATELET # BLD AUTO: 393 10*3/MM3 (ref 140–450)
PLATELET # BLD AUTO: 409 10*3/MM3 (ref 140–450)
PLATELET # BLD AUTO: 413 10*3/MM3 (ref 140–450)
PLATELET # BLD AUTO: 451 10*3/MM3 (ref 140–450)
PMV BLD AUTO: 7.8 FL (ref 6–12)
PMV BLD AUTO: 8.2 FL (ref 6–12)
PMV BLD AUTO: 8.3 FL (ref 6–12)
PMV BLD AUTO: 8.7 FL (ref 6–12)
PMV BLD AUTO: 8.8 FL (ref 6–12)
PMV BLD AUTO: 8.9 FL (ref 6–12)
PMV BLD AUTO: 9 FL (ref 6–12)
PO2 BLDA: 117.5 MM HG (ref 80–100)
PO2 BLDA: 79.2 MM HG (ref 80–100)
PO2 BLDA: 97.6 MM HG (ref 80–100)
PO2 BLDV: 55.9 MM HG (ref 35–45)
POTASSIUM BLD-SCNC: 3.4 MMOL/L (ref 3.5–5.2)
POTASSIUM BLD-SCNC: 3.8 MMOL/L (ref 3.5–5.2)
POTASSIUM BLD-SCNC: 3.9 MMOL/L (ref 3.5–4.7)
POTASSIUM BLD-SCNC: 3.9 MMOL/L (ref 3.5–5.2)
POTASSIUM BLD-SCNC: 3.9 MMOL/L (ref 3.5–5.2)
POTASSIUM BLD-SCNC: 4 MMOL/L (ref 3.5–5.2)
POTASSIUM BLD-SCNC: 4 MMOL/L (ref 3.5–5.2)
POTASSIUM BLD-SCNC: 4.1 MMOL/L (ref 3.5–4.7)
POTASSIUM BLD-SCNC: 4.1 MMOL/L (ref 3.5–5.2)
POTASSIUM BLD-SCNC: 4.2 MMOL/L (ref 3.5–5.2)
POTASSIUM BLD-SCNC: 4.3 MMOL/L (ref 3.5–5.2)
POTASSIUM BLD-SCNC: 4.3 MMOL/L (ref 3.5–5.2)
POTASSIUM BLD-SCNC: 4.5 MMOL/L (ref 3.5–5.2)
POTASSIUM BLD-SCNC: 4.6 MMOL/L (ref 3.5–4.7)
POTASSIUM BLD-SCNC: 4.7 MMOL/L (ref 3.5–5.2)
POTASSIUM BLD-SCNC: 4.7 MMOL/L (ref 3.5–5.2)
POTASSIUM BLD-SCNC: 5.8 MMOL/L (ref 3.5–5.2)
POTASSIUM BLD-SCNC: 6.4 MMOL/L (ref 3.5–5.2)
POTASSIUM SERPL-SCNC: 3.2 MMOL/L (ref 3.5–5.2)
POTASSIUM SERPL-SCNC: 4 MMOL/L (ref 3.5–5.2)
POTASSIUM SERPL-SCNC: 4.9 MMOL/L (ref 3.5–5.2)
POTASSIUM SERPL-SCNC: 5.2 MMOL/L (ref 3.5–5.2)
POTASSIUM SERPL-SCNC: 5.2 MMOL/L (ref 3.5–5.2)
PREALB SERPL-MCNC: 14.1 MG/DL (ref 20–40)
PROCALCITONIN SERPL-MCNC: 0.07 NG/ML (ref 0.1–0.25)
PROCALCITONIN SERPL-MCNC: 0.14 NG/ML (ref 0.1–0.25)
PROCALCITONIN SERPL-MCNC: 0.15 NG/ML (ref 0.1–0.25)
PROCALCITONIN SERPL-MCNC: 0.17 NG/ML (ref 0.1–0.25)
PROT SERPL-MCNC: 5.7 G/DL (ref 6–8.5)
PROT SERPL-MCNC: 6.1 G/DL (ref 6–8.5)
PROT SERPL-MCNC: 6.1 G/DL (ref 6–8.5)
PROT SERPL-MCNC: 6.2 G/DL (ref 6–8.5)
PROT SERPL-MCNC: 7.5 G/DL (ref 6.3–8)
PROT SERPL-MCNC: 7.6 G/DL (ref 6–8.5)
PROT SERPL-MCNC: 7.8 G/DL (ref 6.3–8)
PROT SERPL-MCNC: 7.8 G/DL (ref 6.3–8)
PROT SERPL-MCNC: 7.8 G/DL (ref 6–8.5)
PROT UR QL STRIP: ABNORMAL
PROT UR QL STRIP: NEGATIVE
PROT UR QL STRIP: NEGATIVE
RBC # BLD AUTO: 2.56 10*6/MM3 (ref 3.77–5.28)
RBC # BLD AUTO: 2.78 10*6/MM3 (ref 3.77–5.28)
RBC # BLD AUTO: 2.81 10*6/MM3 (ref 3.77–5.28)
RBC # BLD AUTO: 3 10*6/MM3 (ref 3.77–5.28)
RBC # BLD AUTO: 3 10*6/MM3 (ref 3.77–5.28)
RBC # BLD AUTO: 3.05 10*6/MM3 (ref 3.77–5.28)
RBC # BLD AUTO: 3.34 10*6/MM3 (ref 3.77–5.28)
RBC # BLD AUTO: 3.35 10*6/MM3 (ref 3.77–5.28)
RBC # BLD AUTO: 3.49 10*6/MM3 (ref 3.77–5.28)
RBC # BLD AUTO: 3.64 10*6/MM3 (ref 3.77–5.28)
RBC # BLD AUTO: 3.7 10*6/MM3 (ref 3.77–5.28)
RBC # BLD AUTO: 3.7 10*6/MM3 (ref 3.77–5.28)
RBC # BLD AUTO: 3.72 10*6/MM3 (ref 3.77–5.28)
RBC # BLD AUTO: 3.73 10*6/MM3 (ref 3.77–5.28)
RBC # BLD AUTO: 3.74 10*6/MM3 (ref 3.77–5.28)
RBC # BLD AUTO: 3.81 10*6/MM3 (ref 3.77–5.28)
RBC # BLD AUTO: 3.82 10*6/MM3 (ref 3.77–5.28)
RBC # BLD AUTO: 3.86 10*6/MM3 (ref 3.77–5.28)
RBC # BLD AUTO: 3.87 10*6/MM3 (ref 3.77–5.28)
RBC # BLD AUTO: 3.88 10*6/MM3 (ref 3.77–5.28)
RBC # BLD AUTO: 3.91 10*6/MM3 (ref 3.77–5.28)
RBC # BLD AUTO: 3.93 10*6/MM3 (ref 3.77–5.28)
RBC # BLD AUTO: 3.94 10*6/MM3 (ref 3.77–5.28)
RBC # BLD AUTO: 3.96 10*6/MM3 (ref 3.77–5.28)
RBC # BLD AUTO: 3.97 10*6/MM3 (ref 3.77–5.28)
RBC # BLD AUTO: 4.01 10*6/MM3 (ref 3.77–5.28)
RBC # BLD AUTO: 4.02 10*6/MM3 (ref 3.77–5.28)
RBC # BLD AUTO: 4.23 10*6/MM3 (ref 3.77–5.28)
RBC # BLD AUTO: 4.92 10*6/MM3 (ref 3.77–5.28)
RBC # FLD AUTO: 210 /MM3
RBC # UR: ABNORMAL /HPF
RBC # UR: ABNORMAL /HPF
REF LAB TEST METHOD: ABNORMAL
REF LAB TEST METHOD: ABNORMAL
RH BLD: POSITIVE
RHINOVIRUS RNA SPEC NAA+PROBE: NOT DETECTED
RHINOVIRUS RNA SPEC NAA+PROBE: NOT DETECTED
RSV RNA NPH QL NAA+NON-PROBE: NOT DETECTED
RSV RNA NPH QL NAA+NON-PROBE: NOT DETECTED
SAO2 % BLDCOA: 89.9 % (ref 92–99)
SAO2 % BLDCOA: 96.2 % (ref 92–99)
SAO2 % BLDCOA: 98 % (ref 92–99)
SAO2 % BLDCOA: 98.8 % (ref 92–99)
SARS-COV-2 N GENE NPH QL NAA+PROBE: NOT DETECTED
SET MECH RESP RATE: 20
SODIUM BLD-SCNC: 133 MMOL/L (ref 136–145)
SODIUM BLD-SCNC: 134 MMOL/L (ref 136–145)
SODIUM BLD-SCNC: 135 MMOL/L (ref 136–145)
SODIUM BLD-SCNC: 136 MMOL/L (ref 136–145)
SODIUM BLD-SCNC: 137 MMOL/L (ref 136–145)
SODIUM BLD-SCNC: 137 MMOL/L (ref 136–145)
SODIUM BLD-SCNC: 138 MMOL/L (ref 134–145)
SODIUM BLD-SCNC: 138 MMOL/L (ref 134–145)
SODIUM BLD-SCNC: 138 MMOL/L (ref 136–145)
SODIUM BLD-SCNC: 138 MMOL/L (ref 136–145)
SODIUM BLD-SCNC: 141 MMOL/L (ref 134–145)
SODIUM SERPL-SCNC: 136 MMOL/L (ref 136–145)
SODIUM SERPL-SCNC: 137 MMOL/L (ref 136–145)
SODIUM SERPL-SCNC: 137 MMOL/L (ref 136–145)
SODIUM SERPL-SCNC: 139 MMOL/L (ref 136–145)
SP GR UR STRIP: 1.01 (ref 1–1.03)
SP GR UR STRIP: 1.01 (ref 1–1.03)
SP GR UR STRIP: 1.02 (ref 1–1.03)
SQUAMOUS #/AREA URNS HPF: ABNORMAL /HPF
SQUAMOUS #/AREA URNS HPF: ABNORMAL /HPF
STRESS TARGET HR: 125 BPM
T&S EXPIRATION DATE: NORMAL
T4 FREE SERPL-MCNC: 1.43 NG/DL (ref 0.93–1.7)
TIBC SERPL-MCNC: 223 MCG/DL (ref 249–505)
TOTAL RATE: 16 BREATHS/MINUTE
TOTAL RATE: 20 BREATHS/MINUTE
TOTAL RATE: 26 BREATHS/MINUTE
TOTAL RATE: 28 BREATHS/MINUTE
TRANSFERRIN SERPL-MCNC: 159 MG/DL (ref 200–360)
TROPONIN T SERPL-MCNC: 0.09 NG/ML (ref 0–0.03)
TROPONIN T SERPL-MCNC: 0.11 NG/ML (ref 0–0.03)
TROPONIN T SERPL-MCNC: <0.01 NG/ML (ref 0–0.03)
TSH SERPL DL<=0.05 MIU/L-ACNC: 5.08 UIU/ML (ref 0.27–4.2)
UNIT  ABO: NORMAL
UNIT  ABO: NORMAL
UNIT  RH: NORMAL
UNIT  RH: NORMAL
UROBILINOGEN UR QL STRIP: ABNORMAL
UROBILINOGEN UR QL STRIP: ABNORMAL
UROBILINOGEN UR QL STRIP: NORMAL
VIT B12 BLD-MCNC: 348 PG/ML (ref 211–946)
VT ON VENT VENT: 489 ML
WBC # BLD AUTO: 8.03 10*3/MM3 (ref 3.4–10.8)
WBC # BLD AUTO: 8.95 10*3/MM3 (ref 3.4–10.8)
WBC # BLD AUTO: 9 10*3/MM3 (ref 3.4–10.8)
WBC # BLD AUTO: 9.07 10*3/MM3 (ref 3.4–10.8)
WBC # FLD AUTO: ABNORMAL /MM3
WBC NRBC COR # BLD: 10.08 10*3/MM3 (ref 3.4–10.8)
WBC NRBC COR # BLD: 10.52 10*3/MM3 (ref 3.4–10.8)
WBC NRBC COR # BLD: 11.07 10*3/MM3 (ref 3.4–10.8)
WBC NRBC COR # BLD: 11.82 10*3/MM3 (ref 3.4–10.8)
WBC NRBC COR # BLD: 12.15 10*3/MM3 (ref 3.4–10.8)
WBC NRBC COR # BLD: 12.4 10*3/MM3 (ref 3.4–10.8)
WBC NRBC COR # BLD: 6.36 10*3/MM3 (ref 3.4–10.8)
WBC NRBC COR # BLD: 6.6 10*3/MM3 (ref 3.4–10.8)
WBC NRBC COR # BLD: 6.7 10*3/MM3 (ref 3.4–10.8)
WBC NRBC COR # BLD: 6.73 10*3/MM3 (ref 3.4–10.8)
WBC NRBC COR # BLD: 6.76 10*3/MM3 (ref 3.4–10.8)
WBC NRBC COR # BLD: 6.84 10*3/MM3 (ref 3.4–10.8)
WBC NRBC COR # BLD: 7 10*3/MM3 (ref 3.4–10.8)
WBC NRBC COR # BLD: 7.09 10*3/MM3 (ref 3.4–10.8)
WBC NRBC COR # BLD: 7.12 10*3/MM3 (ref 3.4–10.8)
WBC NRBC COR # BLD: 7.48 10*3/MM3 (ref 3.4–10.8)
WBC NRBC COR # BLD: 7.55 10*3/MM3 (ref 3.4–10.8)
WBC NRBC COR # BLD: 7.64 10*3/MM3 (ref 3.4–10.8)
WBC NRBC COR # BLD: 7.64 10*3/MM3 (ref 3.4–10.8)
WBC NRBC COR # BLD: 7.8 10*3/MM3 (ref 3.4–10.8)
WBC NRBC COR # BLD: 8.04 10*3/MM3 (ref 3.4–10.8)
WBC NRBC COR # BLD: 8.07 10*3/MM3 (ref 3.4–10.8)
WBC NRBC COR # BLD: 8.17 10*3/MM3 (ref 3.4–10.8)
WBC NRBC COR # BLD: 8.34 10*3/MM3 (ref 3.4–10.8)
WBC NRBC COR # BLD: 9.35 10*3/MM3 (ref 3.4–10.8)
WBC UR QL AUTO: ABNORMAL /HPF
WBC UR QL AUTO: ABNORMAL /HPF
WHOLE BLOOD HOLD SPECIMEN: NORMAL
WHOLE BLOOD HOLD SPECIMEN: NORMAL

## 2020-01-01 PROCEDURE — 94799 UNLISTED PULMONARY SVC/PX: CPT

## 2020-01-01 PROCEDURE — 84484 ASSAY OF TROPONIN QUANT: CPT | Performed by: EMERGENCY MEDICINE

## 2020-01-01 PROCEDURE — 25010000002 CEFTRIAXONE PER 250 MG: Performed by: INTERNAL MEDICINE

## 2020-01-01 PROCEDURE — 97110 THERAPEUTIC EXERCISES: CPT

## 2020-01-01 PROCEDURE — 80048 BASIC METABOLIC PNL TOTAL CA: CPT | Performed by: INTERNAL MEDICINE

## 2020-01-01 PROCEDURE — 36600 WITHDRAWAL OF ARTERIAL BLOOD: CPT

## 2020-01-01 PROCEDURE — 85025 COMPLETE CBC W/AUTO DIFF WBC: CPT | Performed by: INTERNAL MEDICINE

## 2020-01-01 PROCEDURE — 99222 1ST HOSP IP/OBS MODERATE 55: CPT | Performed by: INTERNAL MEDICINE

## 2020-01-01 PROCEDURE — 36415 COLL VENOUS BLD VENIPUNCTURE: CPT

## 2020-01-01 PROCEDURE — 84132 ASSAY OF SERUM POTASSIUM: CPT | Performed by: INTERNAL MEDICINE

## 2020-01-01 PROCEDURE — 99285 EMERGENCY DEPT VISIT HI MDM: CPT

## 2020-01-01 PROCEDURE — 93005 ELECTROCARDIOGRAM TRACING: CPT | Performed by: INTERNAL MEDICINE

## 2020-01-01 PROCEDURE — 70553 MRI BRAIN STEM W/O & W/DYE: CPT

## 2020-01-01 PROCEDURE — 82803 BLOOD GASES ANY COMBINATION: CPT

## 2020-01-01 PROCEDURE — 99232 SBSQ HOSP IP/OBS MODERATE 35: CPT | Performed by: NURSE PRACTITIONER

## 2020-01-01 PROCEDURE — 25010000002 ENOXAPARIN PER 10 MG: Performed by: EMERGENCY MEDICINE

## 2020-01-01 PROCEDURE — 93010 ELECTROCARDIOGRAM REPORT: CPT | Performed by: INTERNAL MEDICINE

## 2020-01-01 PROCEDURE — 85014 HEMATOCRIT: CPT | Performed by: INTERNAL MEDICINE

## 2020-01-01 PROCEDURE — 0B9J7ZX DRAINAGE OF LEFT LOWER LUNG LOBE, VIA NATURAL OR ARTIFICIAL OPENING, DIAGNOSTIC: ICD-10-PCS | Performed by: INTERNAL MEDICINE

## 2020-01-01 PROCEDURE — G0008 ADMIN INFLUENZA VIRUS VAC: HCPCS | Performed by: INTERNAL MEDICINE

## 2020-01-01 PROCEDURE — 99232 SBSQ HOSP IP/OBS MODERATE 35: CPT | Performed by: INTERNAL MEDICINE

## 2020-01-01 PROCEDURE — 25010000002 FUROSEMIDE PER 20 MG: Performed by: INTERNAL MEDICINE

## 2020-01-01 PROCEDURE — 97530 THERAPEUTIC ACTIVITIES: CPT

## 2020-01-01 PROCEDURE — 84466 ASSAY OF TRANSFERRIN: CPT | Performed by: INTERNAL MEDICINE

## 2020-01-01 PROCEDURE — 87070 CULTURE OTHR SPECIMN AEROBIC: CPT | Performed by: INTERNAL MEDICINE

## 2020-01-01 PROCEDURE — 93306 TTE W/DOPPLER COMPLETE: CPT | Performed by: INTERNAL MEDICINE

## 2020-01-01 PROCEDURE — 86850 RBC ANTIBODY SCREEN: CPT | Performed by: INTERNAL MEDICINE

## 2020-01-01 PROCEDURE — 99213 OFFICE O/P EST LOW 20 MIN: CPT | Performed by: NURSE PRACTITIONER

## 2020-01-01 PROCEDURE — 85025 COMPLETE CBC W/AUTO DIFF WBC: CPT | Performed by: EMERGENCY MEDICINE

## 2020-01-01 PROCEDURE — 87116 MYCOBACTERIA CULTURE: CPT | Performed by: INTERNAL MEDICINE

## 2020-01-01 PROCEDURE — 25010000002 ENOXAPARIN PER 10 MG: Performed by: INTERNAL MEDICINE

## 2020-01-01 PROCEDURE — 63710000001 PREDNISONE PER 5 MG: Performed by: INTERNAL MEDICINE

## 2020-01-01 PROCEDURE — 85025 COMPLETE CBC W/AUTO DIFF WBC: CPT

## 2020-01-01 PROCEDURE — 87071 CULTURE AEROBIC QUANT OTHER: CPT | Performed by: INTERNAL MEDICINE

## 2020-01-01 PROCEDURE — 97166 OT EVAL MOD COMPLEX 45 MIN: CPT

## 2020-01-01 PROCEDURE — 83880 ASSAY OF NATRIURETIC PEPTIDE: CPT | Performed by: EMERGENCY MEDICINE

## 2020-01-01 PROCEDURE — 87102 FUNGUS ISOLATION CULTURE: CPT | Performed by: INTERNAL MEDICINE

## 2020-01-01 PROCEDURE — 0100U HC BIOFIRE FILMARRAY RESP PANEL 2: CPT | Performed by: PHYSICIAN ASSISTANT

## 2020-01-01 PROCEDURE — 71045 X-RAY EXAM CHEST 1 VIEW: CPT

## 2020-01-01 PROCEDURE — 70450 CT HEAD/BRAIN W/O DYE: CPT

## 2020-01-01 PROCEDURE — 25010000002 IOPAMIDOL 61 % SOLUTION: Performed by: RADIOLOGY

## 2020-01-01 PROCEDURE — 80053 COMPREHEN METABOLIC PANEL: CPT

## 2020-01-01 PROCEDURE — 94760 N-INVAS EAR/PLS OXIMETRY 1: CPT

## 2020-01-01 PROCEDURE — 84134 ASSAY OF PREALBUMIN: CPT | Performed by: INTERNAL MEDICINE

## 2020-01-01 PROCEDURE — 87040 BLOOD CULTURE FOR BACTERIA: CPT

## 2020-01-01 PROCEDURE — 85027 COMPLETE CBC AUTOMATED: CPT | Performed by: INTERNAL MEDICINE

## 2020-01-01 PROCEDURE — 25010000002 MAGNESIUM SULFATE 2 GM/50ML SOLUTION: Performed by: INTERNAL MEDICINE

## 2020-01-01 PROCEDURE — 94669 MECHANICAL CHEST WALL OSCILL: CPT

## 2020-01-01 PROCEDURE — 88112 CYTOPATH CELL ENHANCE TECH: CPT | Performed by: INTERNAL MEDICINE

## 2020-01-01 PROCEDURE — 94640 AIRWAY INHALATION TREATMENT: CPT

## 2020-01-01 PROCEDURE — P9612 CATHETERIZE FOR URINE SPEC: HCPCS

## 2020-01-01 PROCEDURE — 83880 ASSAY OF NATRIURETIC PEPTIDE: CPT | Performed by: INTERNAL MEDICINE

## 2020-01-01 PROCEDURE — 99441 PR PHYS/QHP TELEPHONE EVALUATION 5-10 MIN: CPT | Performed by: INTERNAL MEDICINE

## 2020-01-01 PROCEDURE — 99214 OFFICE O/P EST MOD 30 MIN: CPT | Performed by: INTERNAL MEDICINE

## 2020-01-01 PROCEDURE — 86901 BLOOD TYPING SEROLOGIC RH(D): CPT | Performed by: INTERNAL MEDICINE

## 2020-01-01 PROCEDURE — 83540 ASSAY OF IRON: CPT | Performed by: INTERNAL MEDICINE

## 2020-01-01 PROCEDURE — 25010000002 IOPAMIDOL 61 % SOLUTION: Performed by: EMERGENCY MEDICINE

## 2020-01-01 PROCEDURE — 85018 HEMOGLOBIN: CPT | Performed by: INTERNAL MEDICINE

## 2020-01-01 PROCEDURE — 87040 BLOOD CULTURE FOR BACTERIA: CPT | Performed by: EMERGENCY MEDICINE

## 2020-01-01 PROCEDURE — 36430 TRANSFUSION BLD/BLD COMPNT: CPT

## 2020-01-01 PROCEDURE — 84443 ASSAY THYROID STIM HORMONE: CPT | Performed by: INTERNAL MEDICINE

## 2020-01-01 PROCEDURE — P9016 RBC LEUKOCYTES REDUCED: HCPCS

## 2020-01-01 PROCEDURE — 82746 ASSAY OF FOLIC ACID SERUM: CPT | Performed by: INTERNAL MEDICINE

## 2020-01-01 PROCEDURE — 25010000003 CEFTRIAXONE PER 250 MG: Performed by: EMERGENCY MEDICINE

## 2020-01-01 PROCEDURE — 84145 PROCALCITONIN (PCT): CPT | Performed by: EMERGENCY MEDICINE

## 2020-01-01 PROCEDURE — 77412 RADIATION TX DELIVERY LVL 3: CPT | Performed by: RADIOLOGY

## 2020-01-01 PROCEDURE — 87205 SMEAR GRAM STAIN: CPT | Performed by: INTERNAL MEDICINE

## 2020-01-01 PROCEDURE — 81001 URINALYSIS AUTO W/SCOPE: CPT | Performed by: PHYSICIAN ASSISTANT

## 2020-01-01 PROCEDURE — 84484 ASSAY OF TROPONIN QUANT: CPT | Performed by: PHYSICIAN ASSISTANT

## 2020-01-01 PROCEDURE — 71275 CT ANGIOGRAPHY CHEST: CPT

## 2020-01-01 PROCEDURE — 71260 CT THORAX DX C+: CPT

## 2020-01-01 PROCEDURE — 83880 ASSAY OF NATRIURETIC PEPTIDE: CPT | Performed by: PHYSICIAN ASSISTANT

## 2020-01-01 PROCEDURE — 84439 ASSAY OF FREE THYROXINE: CPT | Performed by: INTERNAL MEDICINE

## 2020-01-01 PROCEDURE — 80053 COMPREHEN METABOLIC PANEL: CPT | Performed by: EMERGENCY MEDICINE

## 2020-01-01 PROCEDURE — 99231 SBSQ HOSP IP/OBS SF/LOW 25: CPT | Performed by: INTERNAL MEDICINE

## 2020-01-01 PROCEDURE — 94644 CONT INHLJ TX 1ST HOUR: CPT

## 2020-01-01 PROCEDURE — 93005 ELECTROCARDIOGRAM TRACING: CPT

## 2020-01-01 PROCEDURE — 82565 ASSAY OF CREATININE: CPT

## 2020-01-01 PROCEDURE — 83735 ASSAY OF MAGNESIUM: CPT | Performed by: INTERNAL MEDICINE

## 2020-01-01 PROCEDURE — 02HV33Z INSERTION OF INFUSION DEVICE INTO SUPERIOR VENA CAVA, PERCUTANEOUS APPROACH: ICD-10-PCS | Performed by: INTERNAL MEDICINE

## 2020-01-01 PROCEDURE — 93005 ELECTROCARDIOGRAM TRACING: CPT | Performed by: EMERGENCY MEDICINE

## 2020-01-01 PROCEDURE — 0 IOPAMIDOL PER 1 ML: Performed by: EMERGENCY MEDICINE

## 2020-01-01 PROCEDURE — 84145 PROCALCITONIN (PCT): CPT | Performed by: INTERNAL MEDICINE

## 2020-01-01 PROCEDURE — 77387 GUIDANCE FOR RADJ TX DLVR: CPT | Performed by: RADIOLOGY

## 2020-01-01 PROCEDURE — 86923 COMPATIBILITY TEST ELECTRIC: CPT

## 2020-01-01 PROCEDURE — 97535 SELF CARE MNGMENT TRAINING: CPT

## 2020-01-01 PROCEDURE — 93970 EXTREMITY STUDY: CPT

## 2020-01-01 PROCEDURE — 99231 SBSQ HOSP IP/OBS SF/LOW 25: CPT | Performed by: NURSE PRACTITIONER

## 2020-01-01 PROCEDURE — 82607 VITAMIN B-12: CPT | Performed by: INTERNAL MEDICINE

## 2020-01-01 PROCEDURE — 74177 CT ABD & PELVIS W/CONTRAST: CPT

## 2020-01-01 PROCEDURE — 86900 BLOOD TYPING SEROLOGIC ABO: CPT

## 2020-01-01 PROCEDURE — 0BCB8ZZ EXTIRPATION OF MATTER FROM LEFT LOWER LOBE BRONCHUS, VIA NATURAL OR ARTIFICIAL OPENING ENDOSCOPIC: ICD-10-PCS | Performed by: INTERNAL MEDICINE

## 2020-01-01 PROCEDURE — 99233 SBSQ HOSP IP/OBS HIGH 50: CPT | Performed by: INTERNAL MEDICINE

## 2020-01-01 PROCEDURE — 81003 URINALYSIS AUTO W/O SCOPE: CPT | Performed by: EMERGENCY MEDICINE

## 2020-01-01 PROCEDURE — A9577 INJ MULTIHANCE: HCPCS | Performed by: INTERNAL MEDICINE

## 2020-01-01 PROCEDURE — 0100U HC BIOFIRE FILMARRAY RESP PANEL 2: CPT | Performed by: EMERGENCY MEDICINE

## 2020-01-01 PROCEDURE — 25010000002 PROPOFOL 10 MG/ML EMULSION: Performed by: ANESTHESIOLOGY

## 2020-01-01 PROCEDURE — 90686 IIV4 VACC NO PRSV 0.5 ML IM: CPT | Performed by: INTERNAL MEDICINE

## 2020-01-01 PROCEDURE — 82272 OCCULT BLD FECES 1-3 TESTS: CPT | Performed by: INTERNAL MEDICINE

## 2020-01-01 PROCEDURE — 81001 URINALYSIS AUTO W/SCOPE: CPT | Performed by: EMERGENCY MEDICINE

## 2020-01-01 PROCEDURE — 84484 ASSAY OF TROPONIN QUANT: CPT | Performed by: INTERNAL MEDICINE

## 2020-01-01 PROCEDURE — 86900 BLOOD TYPING SEROLOGIC ABO: CPT | Performed by: INTERNAL MEDICINE

## 2020-01-01 PROCEDURE — 0 GADOBENATE DIMEGLUMINE 529 MG/ML SOLUTION: Performed by: INTERNAL MEDICINE

## 2020-01-01 PROCEDURE — 93306 TTE W/DOPPLER COMPLETE: CPT

## 2020-01-01 PROCEDURE — C1751 CATH, INF, PER/CENT/MIDLINE: HCPCS

## 2020-01-01 PROCEDURE — 0B9M8ZZ DRAINAGE OF BILATERAL LUNGS, VIA NATURAL OR ARTIFICIAL OPENING ENDOSCOPIC: ICD-10-PCS | Performed by: INTERNAL MEDICINE

## 2020-01-01 PROCEDURE — 99223 1ST HOSP IP/OBS HIGH 75: CPT | Performed by: INTERNAL MEDICINE

## 2020-01-01 PROCEDURE — 99215 OFFICE O/P EST HI 40 MIN: CPT | Performed by: INTERNAL MEDICINE

## 2020-01-01 PROCEDURE — 85379 FIBRIN DEGRADATION QUANT: CPT | Performed by: PHYSICIAN ASSISTANT

## 2020-01-01 PROCEDURE — 0 DIATRIZOATE MEGLUMINE & SODIUM PER 1 ML: Performed by: RADIOLOGY

## 2020-01-01 PROCEDURE — 77014 CHG CT GUIDANCE RADIATION THERAPY FLDS PLACEMENT: CPT | Performed by: RADIOLOGY

## 2020-01-01 PROCEDURE — 82728 ASSAY OF FERRITIN: CPT | Performed by: INTERNAL MEDICINE

## 2020-01-01 PROCEDURE — 88305 TISSUE EXAM BY PATHOLOGIST: CPT | Performed by: INTERNAL MEDICINE

## 2020-01-01 PROCEDURE — 87206 SMEAR FLUORESCENT/ACID STAI: CPT | Performed by: INTERNAL MEDICINE

## 2020-01-01 PROCEDURE — 80053 COMPREHEN METABOLIC PANEL: CPT | Performed by: INTERNAL MEDICINE

## 2020-01-01 PROCEDURE — 25010000002 PHENYLEPHRINE PER 1 ML: Performed by: ANESTHESIOLOGY

## 2020-01-01 PROCEDURE — 89051 BODY FLUID CELL COUNT: CPT | Performed by: INTERNAL MEDICINE

## 2020-01-01 PROCEDURE — 87635 SARS-COV-2 COVID-19 AMP PRB: CPT | Performed by: EMERGENCY MEDICINE

## 2020-01-01 PROCEDURE — 97162 PT EVAL MOD COMPLEX 30 MIN: CPT

## 2020-01-01 PROCEDURE — 94660 CPAP INITIATION&MGMT: CPT

## 2020-01-01 PROCEDURE — 25010000002 CEFTRIAXONE PER 250 MG: Performed by: EMERGENCY MEDICINE

## 2020-01-01 PROCEDURE — 99024 POSTOP FOLLOW-UP VISIT: CPT | Performed by: RADIOLOGY

## 2020-01-01 PROCEDURE — 71046 X-RAY EXAM CHEST 2 VIEWS: CPT

## 2020-01-01 PROCEDURE — 83605 ASSAY OF LACTIC ACID: CPT | Performed by: EMERGENCY MEDICINE

## 2020-01-01 PROCEDURE — 25010000002 INFLUENZA VAC SPLIT QUAD 0.5 ML SUSPENSION PREFILLED SYRINGE: Performed by: INTERNAL MEDICINE

## 2020-01-01 RX ORDER — PROPOFOL 10 MG/ML
VIAL (ML) INTRAVENOUS AS NEEDED
Status: DISCONTINUED | OUTPATIENT
Start: 2020-01-01 | End: 2020-01-01 | Stop reason: SURG

## 2020-01-01 RX ORDER — IPRATROPIUM BROMIDE AND ALBUTEROL SULFATE 2.5; .5 MG/3ML; MG/3ML
3 SOLUTION RESPIRATORY (INHALATION) ONCE
Status: COMPLETED | OUTPATIENT
Start: 2020-01-01 | End: 2020-01-01

## 2020-01-01 RX ORDER — PANTOPRAZOLE SODIUM 40 MG/1
40 TABLET, DELAYED RELEASE ORAL DAILY
Qty: 30 TABLET | Refills: 0 | Status: SHIPPED | OUTPATIENT
Start: 2020-01-01 | End: 2020-01-01

## 2020-01-01 RX ORDER — FUROSEMIDE 20 MG/1
20 TABLET ORAL
Status: DISCONTINUED | OUTPATIENT
Start: 2020-01-01 | End: 2020-01-01

## 2020-01-01 RX ORDER — FOLIC ACID 1 MG/1
1 TABLET ORAL DAILY
Qty: 30 TABLET | Refills: 5 | Status: SHIPPED | OUTPATIENT
Start: 2020-01-01 | End: 2020-01-01 | Stop reason: SDUPTHER

## 2020-01-01 RX ORDER — LEVOTHYROXINE SODIUM 0.07 MG/1
75 TABLET ORAL
Status: DISCONTINUED | OUTPATIENT
Start: 2020-01-01 | End: 2020-01-01 | Stop reason: HOSPADM

## 2020-01-01 RX ORDER — POTASSIUM CHLORIDE 7.45 MG/ML
10 INJECTION INTRAVENOUS
Status: DISCONTINUED | OUTPATIENT
Start: 2020-01-01 | End: 2020-01-01 | Stop reason: HOSPADM

## 2020-01-01 RX ORDER — PREDNISONE 10 MG/1
10 TABLET ORAL DAILY
Status: DISCONTINUED | OUTPATIENT
Start: 2020-01-01 | End: 2020-01-01 | Stop reason: HOSPADM

## 2020-01-01 RX ORDER — CEFTRIAXONE SODIUM 1 G/50ML
1 INJECTION, SOLUTION INTRAVENOUS EVERY 24 HOURS
Status: COMPLETED | OUTPATIENT
Start: 2020-01-01 | End: 2020-01-01

## 2020-01-01 RX ORDER — SODIUM CHLORIDE 0.9 % (FLUSH) 0.9 %
10 SYRINGE (ML) INJECTION EVERY 12 HOURS SCHEDULED
Status: DISCONTINUED | OUTPATIENT
Start: 2020-01-01 | End: 2020-01-01 | Stop reason: HOSPADM

## 2020-01-01 RX ORDER — OLANZAPINE 7.5 MG/1
7.5 TABLET ORAL NIGHTLY
Status: DISCONTINUED | OUTPATIENT
Start: 2020-01-01 | End: 2020-01-01

## 2020-01-01 RX ORDER — SODIUM CHLORIDE 0.9 % (FLUSH) 0.9 %
10 SYRINGE (ML) INJECTION AS NEEDED
Status: DISCONTINUED | OUTPATIENT
Start: 2020-01-01 | End: 2020-01-01 | Stop reason: HOSPADM

## 2020-01-01 RX ORDER — ACETYLCYSTEINE 200 MG/ML
4 SOLUTION ORAL; RESPIRATORY (INHALATION)
Status: DISCONTINUED | OUTPATIENT
Start: 2020-01-01 | End: 2020-01-01

## 2020-01-01 RX ORDER — CARVEDILOL 3.12 MG/1
3.12 TABLET ORAL DAILY
Status: DISCONTINUED | OUTPATIENT
Start: 2020-01-01 | End: 2020-01-01

## 2020-01-01 RX ORDER — FOLIC ACID 1 MG/1
1 TABLET ORAL DAILY
Status: DISCONTINUED | OUTPATIENT
Start: 2020-01-01 | End: 2020-01-01 | Stop reason: HOSPADM

## 2020-01-01 RX ORDER — SODIUM CHLORIDE 9 MG/ML
75 INJECTION, SOLUTION INTRAVENOUS CONTINUOUS
Status: DISCONTINUED | OUTPATIENT
Start: 2020-01-01 | End: 2020-01-01

## 2020-01-01 RX ORDER — MAGNESIUM SULFATE HEPTAHYDRATE 40 MG/ML
2 INJECTION, SOLUTION INTRAVENOUS AS NEEDED
Status: DISCONTINUED | OUTPATIENT
Start: 2020-01-01 | End: 2020-01-01 | Stop reason: HOSPADM

## 2020-01-01 RX ORDER — BISACODYL 10 MG
10 SUPPOSITORY, RECTAL RECTAL ONCE
Status: DISCONTINUED | OUTPATIENT
Start: 2020-01-01 | End: 2020-01-01 | Stop reason: HOSPADM

## 2020-01-01 RX ORDER — ALBUTEROL SULFATE 2.5 MG/3ML
2.5 SOLUTION RESPIRATORY (INHALATION) EVERY 6 HOURS PRN
Status: DISCONTINUED | OUTPATIENT
Start: 2020-01-01 | End: 2020-01-01 | Stop reason: HOSPADM

## 2020-01-01 RX ORDER — SODIUM CHLORIDE FOR INHALATION 7 %
4 VIAL, NEBULIZER (ML) INHALATION
Status: DISCONTINUED | OUTPATIENT
Start: 2020-01-01 | End: 2020-01-01

## 2020-01-01 RX ORDER — LIDOCAINE HYDROCHLORIDE 10 MG/ML
INJECTION, SOLUTION EPIDURAL; INFILTRATION; INTRACAUDAL; PERINEURAL AS NEEDED
Status: DISCONTINUED | OUTPATIENT
Start: 2020-01-01 | End: 2020-01-01 | Stop reason: HOSPADM

## 2020-01-01 RX ORDER — FOLIC ACID 1 MG/1
1 TABLET ORAL DAILY
Qty: 90 TABLET | Refills: 1 | Status: SHIPPED | OUTPATIENT
Start: 2020-01-01 | End: 2020-01-01 | Stop reason: SDUPTHER

## 2020-01-01 RX ORDER — ACETAMINOPHEN 325 MG/1
650 TABLET ORAL EVERY 6 HOURS PRN
Status: DISCONTINUED | OUTPATIENT
Start: 2020-01-01 | End: 2020-01-01 | Stop reason: HOSPADM

## 2020-01-01 RX ORDER — CARVEDILOL 3.12 MG/1
3.12 TABLET ORAL 2 TIMES DAILY WITH MEALS
Status: DISCONTINUED | OUTPATIENT
Start: 2020-01-01 | End: 2020-01-01

## 2020-01-01 RX ORDER — ASPIRIN 81 MG/1
81 TABLET, CHEWABLE ORAL DAILY
Status: DISCONTINUED | OUTPATIENT
Start: 2020-01-01 | End: 2020-01-01 | Stop reason: HOSPADM

## 2020-01-01 RX ORDER — OLANZAPINE 2.5 MG/1
2.5 TABLET ORAL NIGHTLY
Status: DISCONTINUED | OUTPATIENT
Start: 2020-01-01 | End: 2020-01-01 | Stop reason: HOSPADM

## 2020-01-01 RX ORDER — PROPOFOL 10 MG/ML
VIAL (ML) INTRAVENOUS CONTINUOUS PRN
Status: DISCONTINUED | OUTPATIENT
Start: 2020-01-01 | End: 2020-01-01 | Stop reason: SURG

## 2020-01-01 RX ORDER — FUROSEMIDE 10 MG/ML
20 INJECTION INTRAMUSCULAR; INTRAVENOUS ONCE
Status: COMPLETED | OUTPATIENT
Start: 2020-01-01 | End: 2020-01-01

## 2020-01-01 RX ORDER — MIRTAZAPINE 15 MG/1
15 TABLET, FILM COATED ORAL NIGHTLY
Qty: 30 TABLET | Refills: 3 | Status: SHIPPED | OUTPATIENT
Start: 2020-01-01

## 2020-01-01 RX ORDER — IPRATROPIUM BROMIDE AND ALBUTEROL SULFATE 2.5; .5 MG/3ML; MG/3ML
3 SOLUTION RESPIRATORY (INHALATION)
Qty: 360 ML | Refills: 2 | Status: SHIPPED | OUTPATIENT
Start: 2020-01-01

## 2020-01-01 RX ORDER — SERTRALINE HYDROCHLORIDE 25 MG/1
25 TABLET, FILM COATED ORAL DAILY
Qty: 90 TABLET | Refills: 3 | Status: SHIPPED | OUTPATIENT
Start: 2020-01-01 | End: 2020-01-01 | Stop reason: HOSPADM

## 2020-01-01 RX ORDER — POTASSIUM CHLORIDE 1.5 G/1.77G
40 POWDER, FOR SOLUTION ORAL DAILY
Status: DISCONTINUED | OUTPATIENT
Start: 2020-01-01 | End: 2020-01-01

## 2020-01-01 RX ORDER — BUDESONIDE 0.5 MG/2ML
0.5 INHALANT ORAL
Status: DISCONTINUED | OUTPATIENT
Start: 2020-01-01 | End: 2020-01-01 | Stop reason: HOSPADM

## 2020-01-01 RX ORDER — FUROSEMIDE 10 MG/ML
60 INJECTION INTRAMUSCULAR; INTRAVENOUS ONCE
Status: COMPLETED | OUTPATIENT
Start: 2020-01-01 | End: 2020-01-01

## 2020-01-01 RX ORDER — GUAIFENESIN 600 MG/1
1200 TABLET, EXTENDED RELEASE ORAL EVERY 12 HOURS SCHEDULED
Status: DISCONTINUED | OUTPATIENT
Start: 2020-01-01 | End: 2020-01-01 | Stop reason: HOSPADM

## 2020-01-01 RX ORDER — POLYETHYLENE GLYCOL 3350 17 G/17G
17 POWDER, FOR SOLUTION ORAL DAILY
Status: DISCONTINUED | OUTPATIENT
Start: 2020-01-01 | End: 2020-01-01 | Stop reason: HOSPADM

## 2020-01-01 RX ORDER — ASPIRIN 325 MG
325 TABLET ORAL ONCE
Status: COMPLETED | OUTPATIENT
Start: 2020-01-01 | End: 2020-01-01

## 2020-01-01 RX ORDER — PANTOPRAZOLE SODIUM 40 MG/1
40 TABLET, DELAYED RELEASE ORAL
Status: DISCONTINUED | OUTPATIENT
Start: 2020-01-01 | End: 2020-01-01 | Stop reason: HOSPADM

## 2020-01-01 RX ORDER — POTASSIUM CHLORIDE 1.5 G/1.77G
40 POWDER, FOR SOLUTION ORAL AS NEEDED
Status: DISCONTINUED | OUTPATIENT
Start: 2020-01-01 | End: 2020-01-01 | Stop reason: HOSPADM

## 2020-01-01 RX ORDER — LEVOTHYROXINE SODIUM 0.07 MG/1
TABLET ORAL
Qty: 90 TABLET | Refills: 3 | Status: SHIPPED | OUTPATIENT
Start: 2020-01-01

## 2020-01-01 RX ORDER — IPRATROPIUM BROMIDE AND ALBUTEROL SULFATE 2.5; .5 MG/3ML; MG/3ML
3 SOLUTION RESPIRATORY (INHALATION)
Status: DISCONTINUED | OUTPATIENT
Start: 2020-01-01 | End: 2020-01-01 | Stop reason: HOSPADM

## 2020-01-01 RX ORDER — TOLTERODINE TARTRATE 1 MG/1
1 TABLET, EXTENDED RELEASE ORAL 2 TIMES DAILY
COMMUNITY
End: 2020-01-01 | Stop reason: SDUPTHER

## 2020-01-01 RX ORDER — IPRATROPIUM BROMIDE AND ALBUTEROL SULFATE 2.5; .5 MG/3ML; MG/3ML
3 SOLUTION RESPIRATORY (INHALATION)
Status: DISCONTINUED | OUTPATIENT
Start: 2020-01-01 | End: 2020-01-01

## 2020-01-01 RX ORDER — SODIUM CHLORIDE 9 MG/ML
30 INJECTION, SOLUTION INTRAVENOUS CONTINUOUS PRN
Status: DISCONTINUED | OUTPATIENT
Start: 2020-01-01 | End: 2020-01-01 | Stop reason: HOSPADM

## 2020-01-01 RX ORDER — SODIUM CHLORIDE 9 MG/ML
100 INJECTION, SOLUTION INTRAVENOUS CONTINUOUS
Status: DISCONTINUED | OUTPATIENT
Start: 2020-01-01 | End: 2020-01-01

## 2020-01-01 RX ORDER — LEVOTHYROXINE SODIUM 0.07 MG/1
75 TABLET ORAL DAILY
Qty: 90 TABLET | Refills: 1 | Status: SHIPPED | OUTPATIENT
Start: 2020-01-01 | End: 2020-01-01

## 2020-01-01 RX ORDER — BUDESONIDE 0.5 MG/2ML
0.5 INHALANT ORAL
Status: DISCONTINUED | OUTPATIENT
Start: 2020-01-01 | End: 2020-01-01

## 2020-01-01 RX ORDER — POTASSIUM CHLORIDE 750 MG/1
40 CAPSULE, EXTENDED RELEASE ORAL AS NEEDED
Status: DISCONTINUED | OUTPATIENT
Start: 2020-01-01 | End: 2020-01-01 | Stop reason: HOSPADM

## 2020-01-01 RX ORDER — BUDESONIDE 0.5 MG/2ML
0.5 INHALANT ORAL
COMMUNITY

## 2020-01-01 RX ORDER — MIRTAZAPINE 15 MG/1
15 TABLET, FILM COATED ORAL NIGHTLY
Status: DISCONTINUED | OUTPATIENT
Start: 2020-01-01 | End: 2020-01-01 | Stop reason: HOSPADM

## 2020-01-01 RX ORDER — LIDOCAINE HYDROCHLORIDE 20 MG/ML
INJECTION, SOLUTION INFILTRATION; PERINEURAL AS NEEDED
Status: DISCONTINUED | OUTPATIENT
Start: 2020-01-01 | End: 2020-01-01 | Stop reason: SURG

## 2020-01-01 RX ORDER — PREDNISONE 10 MG/1
10 TABLET ORAL 2 TIMES DAILY WITH MEALS
Status: DISCONTINUED | OUTPATIENT
Start: 2020-01-01 | End: 2020-01-01 | Stop reason: HOSPADM

## 2020-01-01 RX ORDER — FOLIC ACID 1 MG/1
1 TABLET ORAL DAILY
Qty: 90 TABLET | Refills: 2 | Status: SHIPPED | OUTPATIENT
Start: 2020-01-01 | End: 2020-01-01 | Stop reason: SDUPTHER

## 2020-01-01 RX ORDER — LOSARTAN POTASSIUM 25 MG/1
12.5 TABLET ORAL
Status: DISCONTINUED | OUTPATIENT
Start: 2020-01-01 | End: 2020-01-01

## 2020-01-01 RX ORDER — OLANZAPINE 5 MG/1
5 TABLET ORAL NIGHTLY
Status: DISCONTINUED | OUTPATIENT
Start: 2020-01-01 | End: 2020-01-01

## 2020-01-01 RX ORDER — PREDNISONE 10 MG/1
10 TABLET ORAL 2 TIMES DAILY WITH MEALS
Qty: 60 TABLET | Refills: 6 | Status: SHIPPED | OUTPATIENT
Start: 2020-01-01

## 2020-01-01 RX ORDER — LEVOTHYROXINE SODIUM 0.07 MG/1
75 TABLET ORAL DAILY
Status: DISCONTINUED | OUTPATIENT
Start: 2020-01-01 | End: 2020-01-01 | Stop reason: HOSPADM

## 2020-01-01 RX ORDER — SODIUM CHLORIDE 0.9 % (FLUSH) 0.9 %
20 SYRINGE (ML) INJECTION AS NEEDED
Status: DISCONTINUED | OUTPATIENT
Start: 2020-01-01 | End: 2020-01-01 | Stop reason: HOSPADM

## 2020-01-01 RX ORDER — CEFTRIAXONE SODIUM 1 G/50ML
1 INJECTION, SOLUTION INTRAVENOUS ONCE
Status: COMPLETED | OUTPATIENT
Start: 2020-01-01 | End: 2020-01-01

## 2020-01-01 RX ORDER — CASTOR OIL AND BALSAM, PERU 788; 87 MG/G; MG/G
OINTMENT TOPICAL EVERY 12 HOURS SCHEDULED
Status: DISCONTINUED | OUTPATIENT
Start: 2020-01-01 | End: 2020-01-01 | Stop reason: HOSPADM

## 2020-01-01 RX ORDER — ARFORMOTEROL TARTRATE 15 UG/2ML
15 SOLUTION RESPIRATORY (INHALATION)
Status: DISCONTINUED | OUTPATIENT
Start: 2020-01-01 | End: 2020-01-01

## 2020-01-01 RX ORDER — CEFTRIAXONE SODIUM 1 G/50ML
1 INJECTION, SOLUTION INTRAVENOUS EVERY 24 HOURS
Status: DISCONTINUED | OUTPATIENT
Start: 2020-01-01 | End: 2020-01-01

## 2020-01-01 RX ORDER — OLANZAPINE 7.5 MG/1
TABLET ORAL
Qty: 90 TABLET | Refills: 3 | OUTPATIENT
Start: 2020-01-01

## 2020-01-01 RX ORDER — MORPHINE SULFATE 2 MG/ML
1 INJECTION, SOLUTION INTRAMUSCULAR; INTRAVENOUS EVERY 12 HOURS PRN
Status: DISCONTINUED | OUTPATIENT
Start: 2020-01-01 | End: 2020-01-01

## 2020-01-01 RX ORDER — OLANZAPINE 7.5 MG/1
7.5 TABLET ORAL NIGHTLY
Qty: 90 TABLET | Refills: 0 | Status: SHIPPED | OUTPATIENT
Start: 2020-01-01 | End: 2020-01-01 | Stop reason: HOSPADM

## 2020-01-01 RX ORDER — IPRATROPIUM BROMIDE AND ALBUTEROL SULFATE 2.5; .5 MG/3ML; MG/3ML
3 SOLUTION RESPIRATORY (INHALATION) EVERY 4 HOURS PRN
Status: DISCONTINUED | OUTPATIENT
Start: 2020-01-01 | End: 2020-01-01 | Stop reason: HOSPADM

## 2020-01-01 RX ORDER — FUROSEMIDE 20 MG/1
20 TABLET ORAL DAILY
Status: DISCONTINUED | OUTPATIENT
Start: 2020-01-01 | End: 2020-01-01

## 2020-01-01 RX ORDER — DOXYCYCLINE 100 MG/1
100 CAPSULE ORAL EVERY 12 HOURS SCHEDULED
Status: DISCONTINUED | OUTPATIENT
Start: 2020-01-01 | End: 2020-01-01

## 2020-01-01 RX ORDER — PETROLATUM 420 MG/G
OINTMENT TOPICAL EVERY 12 HOURS SCHEDULED
Status: DISCONTINUED | OUTPATIENT
Start: 2020-01-01 | End: 2020-01-01 | Stop reason: HOSPADM

## 2020-01-01 RX ORDER — SERTRALINE HYDROCHLORIDE 25 MG/1
25 TABLET, FILM COATED ORAL DAILY
Status: DISCONTINUED | OUTPATIENT
Start: 2020-01-01 | End: 2020-01-01 | Stop reason: HOSPADM

## 2020-01-01 RX ORDER — ALBUTEROL SULFATE 2.5 MG/3ML
2.5 SOLUTION RESPIRATORY (INHALATION)
Status: COMPLETED | OUTPATIENT
Start: 2020-01-01 | End: 2020-01-01

## 2020-01-01 RX ORDER — FOLIC ACID 1 MG/1
1 TABLET ORAL DAILY
Qty: 90 TABLET | Refills: 3 | Status: SHIPPED | OUTPATIENT
Start: 2020-01-01

## 2020-01-01 RX ORDER — TOLTERODINE TARTRATE 1 MG/1
1 TABLET, EXTENDED RELEASE ORAL 2 TIMES DAILY
Qty: 180 TABLET | Refills: 3 | Status: SHIPPED | OUTPATIENT
Start: 2020-01-01

## 2020-01-01 RX ORDER — PREDNISONE 1 MG/1
10 TABLET ORAL DAILY
COMMUNITY
End: 2020-01-01 | Stop reason: HOSPADM

## 2020-01-01 RX ORDER — ATORVASTATIN CALCIUM 80 MG/1
80 TABLET, FILM COATED ORAL DAILY
Status: DISCONTINUED | OUTPATIENT
Start: 2020-01-01 | End: 2020-01-01 | Stop reason: HOSPADM

## 2020-01-01 RX ORDER — OXYBUTYNIN CHLORIDE 5 MG/1
5 TABLET, EXTENDED RELEASE ORAL DAILY
Status: DISCONTINUED | OUTPATIENT
Start: 2020-01-01 | End: 2020-01-01 | Stop reason: HOSPADM

## 2020-01-01 RX ORDER — OLANZAPINE 2.5 MG/1
2.5 TABLET ORAL NIGHTLY
Qty: 30 TABLET | Refills: 3 | Status: SHIPPED | OUTPATIENT
Start: 2020-01-01

## 2020-01-01 RX ORDER — CARVEDILOL 3.12 MG/1
3.12 TABLET ORAL NIGHTLY
Status: DISCONTINUED | OUTPATIENT
Start: 2020-01-01 | End: 2020-01-01

## 2020-01-01 RX ORDER — LIDOCAINE HYDROCHLORIDE 20 MG/ML
INJECTION, SOLUTION EPIDURAL; INFILTRATION; INTRACAUDAL; PERINEURAL AS NEEDED
Status: DISCONTINUED | OUTPATIENT
Start: 2020-01-01 | End: 2020-01-01 | Stop reason: HOSPADM

## 2020-01-01 RX ORDER — SERTRALINE HYDROCHLORIDE 25 MG/1
25 TABLET, FILM COATED ORAL DAILY
Status: DISCONTINUED | OUTPATIENT
Start: 2020-01-01 | End: 2020-01-01

## 2020-01-01 RX ORDER — LEVOTHYROXINE SODIUM 0.07 MG/1
TABLET ORAL
Qty: 30 TABLET | Refills: 0 | Status: SHIPPED | OUTPATIENT
Start: 2020-01-01 | End: 2020-01-01

## 2020-01-01 RX ORDER — ATORVASTATIN CALCIUM 80 MG/1
80 TABLET, FILM COATED ORAL DAILY
Status: DISCONTINUED | OUTPATIENT
Start: 2020-01-01 | End: 2020-01-01

## 2020-01-01 RX ORDER — FUROSEMIDE 20 MG/1
10 TABLET ORAL DAILY
Status: DISCONTINUED | OUTPATIENT
Start: 2020-01-01 | End: 2020-01-01

## 2020-01-01 RX ORDER — DOXYCYCLINE 100 MG/1
100 CAPSULE ORAL EVERY 12 HOURS SCHEDULED
Status: COMPLETED | OUTPATIENT
Start: 2020-01-01 | End: 2020-01-01

## 2020-01-01 RX ORDER — MAGNESIUM SULFATE HEPTAHYDRATE 40 MG/ML
4 INJECTION, SOLUTION INTRAVENOUS AS NEEDED
Status: DISCONTINUED | OUTPATIENT
Start: 2020-01-01 | End: 2020-01-01 | Stop reason: HOSPADM

## 2020-01-01 RX ORDER — ACETAMINOPHEN 325 MG/1
325 TABLET ORAL ONCE
Status: COMPLETED | OUTPATIENT
Start: 2020-01-01 | End: 2020-01-01

## 2020-01-01 RX ORDER — CEFTRIAXONE SODIUM 2 G/50ML
2 INJECTION, SOLUTION INTRAVENOUS ONCE
Status: COMPLETED | OUTPATIENT
Start: 2020-01-01 | End: 2020-01-01

## 2020-01-01 RX ADMIN — ASPIRIN 81 MG: 81 TABLET, CHEWABLE ORAL at 08:39

## 2020-01-01 RX ADMIN — PREDNISONE 10 MG: 10 TABLET ORAL at 10:54

## 2020-01-01 RX ADMIN — FOLIC ACID 1 MG: 1 TABLET ORAL at 08:40

## 2020-01-01 RX ADMIN — NYSTATIN 500000 UNITS: 100000 SUSPENSION ORAL at 17:56

## 2020-01-01 RX ADMIN — IPRATROPIUM BROMIDE AND ALBUTEROL SULFATE 3 ML: 2.5; .5 SOLUTION RESPIRATORY (INHALATION) at 07:52

## 2020-01-01 RX ADMIN — SODIUM CHLORIDE, PRESERVATIVE FREE 10 ML: 5 INJECTION INTRAVENOUS at 08:57

## 2020-01-01 RX ADMIN — OLANZAPINE 2.5 MG: 2.5 TABLET, FILM COATED ORAL at 20:35

## 2020-01-01 RX ADMIN — BUDESONIDE 0.5 MG: 0.5 INHALANT RESPIRATORY (INHALATION) at 07:17

## 2020-01-01 RX ADMIN — IPRATROPIUM BROMIDE AND ALBUTEROL SULFATE 3 ML: 2.5; .5 SOLUTION RESPIRATORY (INHALATION) at 05:46

## 2020-01-01 RX ADMIN — PREDNISONE 10 MG: 10 TABLET ORAL at 08:12

## 2020-01-01 RX ADMIN — PREDNISONE 10 MG: 10 TABLET ORAL at 10:05

## 2020-01-01 RX ADMIN — CASTOR OIL AND BALSAM, PERU 5 G: 788; 87 OINTMENT TOPICAL at 20:53

## 2020-01-01 RX ADMIN — PETROLATUM 100 APPLICATION: 420 OINTMENT TOPICAL at 08:40

## 2020-01-01 RX ADMIN — BUDESONIDE 0.5 MG: 0.5 INHALANT RESPIRATORY (INHALATION) at 07:46

## 2020-01-01 RX ADMIN — ASPIRIN 81 MG: 81 TABLET, CHEWABLE ORAL at 09:31

## 2020-01-01 RX ADMIN — CASTOR OIL AND BALSAM, PERU 5 G: 788; 87 OINTMENT TOPICAL at 20:29

## 2020-01-01 RX ADMIN — BUDESONIDE 0.5 MG: 0.5 INHALANT RESPIRATORY (INHALATION) at 07:56

## 2020-01-01 RX ADMIN — SODIUM CHLORIDE, PRESERVATIVE FREE 10 ML: 5 INJECTION INTRAVENOUS at 21:08

## 2020-01-01 RX ADMIN — ACETYLCYSTEINE 4 ML: 200 SOLUTION ORAL; RESPIRATORY (INHALATION) at 15:26

## 2020-01-01 RX ADMIN — PETROLATUM 100 APPLICATION: 420 OINTMENT TOPICAL at 20:16

## 2020-01-01 RX ADMIN — POLYETHYLENE GLYCOL 3350 17 G: 17 POWDER, FOR SOLUTION ORAL at 08:26

## 2020-01-01 RX ADMIN — IPRATROPIUM BROMIDE AND ALBUTEROL SULFATE 3 ML: 2.5; .5 SOLUTION RESPIRATORY (INHALATION) at 20:37

## 2020-01-01 RX ADMIN — NYSTATIN 500000 UNITS: 100000 SUSPENSION ORAL at 18:27

## 2020-01-01 RX ADMIN — PETROLATUM 100 APPLICATION: 420 OINTMENT TOPICAL at 20:53

## 2020-01-01 RX ADMIN — PANTOPRAZOLE SODIUM 40 MG: 40 TABLET, DELAYED RELEASE ORAL at 06:04

## 2020-01-01 RX ADMIN — PETROLATUM 100 APPLICATION: 420 OINTMENT TOPICAL at 21:14

## 2020-01-01 RX ADMIN — PANTOPRAZOLE SODIUM 40 MG: 40 TABLET, DELAYED RELEASE ORAL at 06:05

## 2020-01-01 RX ADMIN — IPRATROPIUM BROMIDE AND ALBUTEROL SULFATE 3 ML: 2.5; .5 SOLUTION RESPIRATORY (INHALATION) at 19:44

## 2020-01-01 RX ADMIN — SODIUM CHLORIDE, PRESERVATIVE FREE 10 ML: 5 INJECTION INTRAVENOUS at 08:56

## 2020-01-01 RX ADMIN — POLYETHYLENE GLYCOL 3350 17 G: 17 POWDER, FOR SOLUTION ORAL at 10:37

## 2020-01-01 RX ADMIN — ENOXAPARIN SODIUM 40 MG: 40 INJECTION SUBCUTANEOUS at 18:16

## 2020-01-01 RX ADMIN — SODIUM CHLORIDE, PRESERVATIVE FREE 10 ML: 5 INJECTION INTRAVENOUS at 21:55

## 2020-01-01 RX ADMIN — PANTOPRAZOLE SODIUM 40 MG: 40 TABLET, DELAYED RELEASE ORAL at 06:03

## 2020-01-01 RX ADMIN — PANTOPRAZOLE SODIUM 40 MG: 40 TABLET, DELAYED RELEASE ORAL at 06:54

## 2020-01-01 RX ADMIN — BUDESONIDE 0.5 MG: 0.5 INHALANT RESPIRATORY (INHALATION) at 19:13

## 2020-01-01 RX ADMIN — DOXYCYCLINE 100 MG: 100 INJECTION, POWDER, LYOPHILIZED, FOR SOLUTION INTRAVENOUS at 13:40

## 2020-01-01 RX ADMIN — CASTOR OIL AND BALSAM, PERU 5 G: 788; 87 OINTMENT TOPICAL at 20:41

## 2020-01-01 RX ADMIN — GUAIFENESIN 1200 MG: 600 TABLET, EXTENDED RELEASE ORAL at 20:52

## 2020-01-01 RX ADMIN — IPRATROPIUM BROMIDE AND ALBUTEROL SULFATE 3 ML: 2.5; .5 SOLUTION RESPIRATORY (INHALATION) at 11:34

## 2020-01-01 RX ADMIN — IPRATROPIUM BROMIDE AND ALBUTEROL SULFATE 3 ML: 2.5; .5 SOLUTION RESPIRATORY (INHALATION) at 16:43

## 2020-01-01 RX ADMIN — PREDNISONE 10 MG: 10 TABLET ORAL at 08:46

## 2020-01-01 RX ADMIN — IPRATROPIUM BROMIDE AND ALBUTEROL SULFATE 3 ML: 2.5; .5 SOLUTION RESPIRATORY (INHALATION) at 19:16

## 2020-01-01 RX ADMIN — BUDESONIDE 0.5 MG: 0.5 INHALANT RESPIRATORY (INHALATION) at 20:58

## 2020-01-01 RX ADMIN — PETROLATUM 100 APPLICATION: 420 OINTMENT TOPICAL at 08:12

## 2020-01-01 RX ADMIN — APIXABAN 10 MG: 5 TABLET, FILM COATED ORAL at 08:31

## 2020-01-01 RX ADMIN — OLANZAPINE 2.5 MG: 2.5 TABLET, FILM COATED ORAL at 20:41

## 2020-01-01 RX ADMIN — BUDESONIDE 0.5 MG: 0.5 INHALANT RESPIRATORY (INHALATION) at 08:24

## 2020-01-01 RX ADMIN — FUROSEMIDE 20 MG: 20 TABLET ORAL at 12:15

## 2020-01-01 RX ADMIN — CEFTRIAXONE SODIUM 1 G: 1 INJECTION, SOLUTION INTRAVENOUS at 12:17

## 2020-01-01 RX ADMIN — OLANZAPINE 2.5 MG: 2.5 TABLET, FILM COATED ORAL at 20:26

## 2020-01-01 RX ADMIN — DOXYCYCLINE 100 MG: 100 CAPSULE ORAL at 10:34

## 2020-01-01 RX ADMIN — SODIUM CHLORIDE, PRESERVATIVE FREE 10 ML: 5 INJECTION INTRAVENOUS at 20:48

## 2020-01-01 RX ADMIN — MAGNESIUM SULFATE 2 G: 2 INJECTION INTRAVENOUS at 02:44

## 2020-01-01 RX ADMIN — SERTRALINE 25 MG: 25 TABLET, FILM COATED ORAL at 09:20

## 2020-01-01 RX ADMIN — ACETYLCYSTEINE 4 ML: 200 SOLUTION ORAL; RESPIRATORY (INHALATION) at 16:44

## 2020-01-01 RX ADMIN — BUDESONIDE 0.5 MG: 0.5 INHALANT RESPIRATORY (INHALATION) at 20:37

## 2020-01-01 RX ADMIN — SERTRALINE 25 MG: 25 TABLET, FILM COATED ORAL at 08:55

## 2020-01-01 RX ADMIN — BUDESONIDE 0.5 MG: 0.5 INHALANT RESPIRATORY (INHALATION) at 06:49

## 2020-01-01 RX ADMIN — NYSTATIN 500000 UNITS: 100000 SUSPENSION ORAL at 20:58

## 2020-01-01 RX ADMIN — BUDESONIDE 0.5 MG: 0.5 INHALANT RESPIRATORY (INHALATION) at 08:00

## 2020-01-01 RX ADMIN — BUDESONIDE 0.5 MG: 0.5 INHALANT RESPIRATORY (INHALATION) at 20:34

## 2020-01-01 RX ADMIN — CEFTRIAXONE SODIUM 1 G: 1 INJECTION, SOLUTION INTRAVENOUS at 22:55

## 2020-01-01 RX ADMIN — CASTOR OIL AND BALSAM, PERU 5 G: 788; 87 OINTMENT TOPICAL at 09:57

## 2020-01-01 RX ADMIN — FOLIC ACID 1 MG: 1 TABLET ORAL at 09:27

## 2020-01-01 RX ADMIN — LEVOTHYROXINE SODIUM 75 MCG: 75 TABLET ORAL at 09:32

## 2020-01-01 RX ADMIN — FOLIC ACID 1 MG: 1 TABLET ORAL at 09:10

## 2020-01-01 RX ADMIN — POLYETHYLENE GLYCOL 3350 17 G: 17 POWDER, FOR SOLUTION ORAL at 09:30

## 2020-01-01 RX ADMIN — OXYBUTYNIN CHLORIDE 5 MG: 5 TABLET, EXTENDED RELEASE ORAL at 08:46

## 2020-01-01 RX ADMIN — SODIUM CHLORIDE 75 ML/HR: 9 INJECTION, SOLUTION INTRAVENOUS at 10:33

## 2020-01-01 RX ADMIN — OLANZAPINE 2.5 MG: 2.5 TABLET ORAL at 21:25

## 2020-01-01 RX ADMIN — FUROSEMIDE 20 MG: 10 INJECTION, SOLUTION INTRAMUSCULAR; INTRAVENOUS at 12:48

## 2020-01-01 RX ADMIN — NYSTATIN 500000 UNITS: 100000 SUSPENSION ORAL at 12:03

## 2020-01-01 RX ADMIN — SODIUM CHLORIDE 500 ML: 9 INJECTION, SOLUTION INTRAVENOUS at 14:01

## 2020-01-01 RX ADMIN — SODIUM CHLORIDE, PRESERVATIVE FREE 10 ML: 5 INJECTION INTRAVENOUS at 08:41

## 2020-01-01 RX ADMIN — PETROLATUM 100 APPLICATION: 420 OINTMENT TOPICAL at 08:27

## 2020-01-01 RX ADMIN — LEVOTHYROXINE SODIUM 75 MCG: 75 TABLET ORAL at 06:34

## 2020-01-01 RX ADMIN — ACETAMINOPHEN 325 MG: 325 TABLET, FILM COATED ORAL at 00:58

## 2020-01-01 RX ADMIN — IPRATROPIUM BROMIDE AND ALBUTEROL SULFATE 3 ML: 2.5; .5 SOLUTION RESPIRATORY (INHALATION) at 21:24

## 2020-01-01 RX ADMIN — PREDNISONE 10 MG: 10 TABLET ORAL at 09:32

## 2020-01-01 RX ADMIN — PETROLATUM 100 APPLICATION: 420 OINTMENT TOPICAL at 10:35

## 2020-01-01 RX ADMIN — ENOXAPARIN SODIUM 30 MG: 30 INJECTION SUBCUTANEOUS at 16:49

## 2020-01-01 RX ADMIN — GUAIFENESIN 1200 MG: 600 TABLET, EXTENDED RELEASE ORAL at 09:15

## 2020-01-01 RX ADMIN — CASTOR OIL AND BALSAM, PERU 5 G: 788; 87 OINTMENT TOPICAL at 20:12

## 2020-01-01 RX ADMIN — LEVOTHYROXINE SODIUM 75 MCG: 75 TABLET ORAL at 05:53

## 2020-01-01 RX ADMIN — LOSARTAN POTASSIUM 12.5 MG: 25 TABLET, FILM COATED ORAL at 12:48

## 2020-01-01 RX ADMIN — CEFTRIAXONE SODIUM 1 G: 1 INJECTION, SOLUTION INTRAVENOUS at 13:07

## 2020-01-01 RX ADMIN — CASTOR OIL AND BALSAM, PERU 5 G: 788; 87 OINTMENT TOPICAL at 21:34

## 2020-01-01 RX ADMIN — CASTOR OIL AND BALSAM, PERU 5 G: 788; 87 OINTMENT TOPICAL at 20:09

## 2020-01-01 RX ADMIN — NYSTATIN 500000 UNITS: 100000 SUSPENSION ORAL at 08:46

## 2020-01-01 RX ADMIN — BUDESONIDE 0.5 MG: 0.5 INHALANT RESPIRATORY (INHALATION) at 21:24

## 2020-01-01 RX ADMIN — NYSTATIN 500000 UNITS: 100000 SUSPENSION ORAL at 09:57

## 2020-01-01 RX ADMIN — NYSTATIN 500000 UNITS: 100000 SUSPENSION ORAL at 08:12

## 2020-01-01 RX ADMIN — CASTOR OIL AND BALSAM, PERU: 788; 87 OINTMENT TOPICAL at 09:10

## 2020-01-01 RX ADMIN — IPRATROPIUM BROMIDE AND ALBUTEROL SULFATE 3 ML: 2.5; .5 SOLUTION RESPIRATORY (INHALATION) at 20:53

## 2020-01-01 RX ADMIN — BUDESONIDE 0.5 MG: 0.5 INHALANT RESPIRATORY (INHALATION) at 08:02

## 2020-01-01 RX ADMIN — ENOXAPARIN SODIUM 40 MG: 40 INJECTION SUBCUTANEOUS at 15:07

## 2020-01-01 RX ADMIN — ATORVASTATIN CALCIUM 80 MG: 80 TABLET, FILM COATED ORAL at 20:10

## 2020-01-01 RX ADMIN — PETROLATUM 100 APPLICATION: 420 OINTMENT TOPICAL at 08:41

## 2020-01-01 RX ADMIN — DOXYCYCLINE 100 MG: 100 CAPSULE ORAL at 20:58

## 2020-01-01 RX ADMIN — OXYBUTYNIN CHLORIDE 5 MG: 5 TABLET, EXTENDED RELEASE ORAL at 09:57

## 2020-01-01 RX ADMIN — SODIUM CHLORIDE 75 ML/HR: 9 INJECTION, SOLUTION INTRAVENOUS at 00:58

## 2020-01-01 RX ADMIN — BUDESONIDE 0.5 MG: 0.5 INHALANT RESPIRATORY (INHALATION) at 20:53

## 2020-01-01 RX ADMIN — PREDNISONE 10 MG: 10 TABLET ORAL at 14:59

## 2020-01-01 RX ADMIN — NYSTATIN 500000 UNITS: 100000 SUSPENSION ORAL at 14:55

## 2020-01-01 RX ADMIN — PREDNISONE 10 MG: 10 TABLET ORAL at 10:12

## 2020-01-01 RX ADMIN — PETROLATUM 100 APPLICATION: 420 OINTMENT TOPICAL at 21:34

## 2020-01-01 RX ADMIN — PREDNISONE 10 MG: 10 TABLET ORAL at 18:09

## 2020-01-01 RX ADMIN — PETROLATUM 100 APPLICATION: 420 OINTMENT TOPICAL at 08:46

## 2020-01-01 RX ADMIN — IPRATROPIUM BROMIDE AND ALBUTEROL SULFATE 3 ML: 2.5; .5 SOLUTION RESPIRATORY (INHALATION) at 07:15

## 2020-01-01 RX ADMIN — IPRATROPIUM BROMIDE AND ALBUTEROL SULFATE 3 ML: 2.5; .5 SOLUTION RESPIRATORY (INHALATION) at 11:01

## 2020-01-01 RX ADMIN — PREDNISONE 10 MG: 10 TABLET ORAL at 08:41

## 2020-01-01 RX ADMIN — GUAIFENESIN 1200 MG: 600 TABLET, EXTENDED RELEASE ORAL at 10:12

## 2020-01-01 RX ADMIN — OXYBUTYNIN CHLORIDE 5 MG: 5 TABLET, EXTENDED RELEASE ORAL at 08:39

## 2020-01-01 RX ADMIN — PANTOPRAZOLE SODIUM 40 MG: 40 TABLET, DELAYED RELEASE ORAL at 06:34

## 2020-01-01 RX ADMIN — MAGNESIUM SULFATE 2 G: 2 INJECTION INTRAVENOUS at 23:30

## 2020-01-01 RX ADMIN — SODIUM CHLORIDE, PRESERVATIVE FREE 10 ML: 5 INJECTION INTRAVENOUS at 20:58

## 2020-01-01 RX ADMIN — NYSTATIN 500000 UNITS: 100000 SUSPENSION ORAL at 17:21

## 2020-01-01 RX ADMIN — SODIUM CHLORIDE, PRESERVATIVE FREE 10 ML: 5 INJECTION INTRAVENOUS at 09:25

## 2020-01-01 RX ADMIN — PREDNISONE 10 MG: 10 TABLET ORAL at 04:10

## 2020-01-01 RX ADMIN — ATORVASTATIN CALCIUM 80 MG: 80 TABLET, FILM COATED ORAL at 21:25

## 2020-01-01 RX ADMIN — IPRATROPIUM BROMIDE AND ALBUTEROL SULFATE 3 ML: 2.5; .5 SOLUTION RESPIRATORY (INHALATION) at 11:13

## 2020-01-01 RX ADMIN — BUDESONIDE 0.5 MG: 0.5 INHALANT RESPIRATORY (INHALATION) at 09:13

## 2020-01-01 RX ADMIN — ASPIRIN 81 MG: 81 TABLET, CHEWABLE ORAL at 08:26

## 2020-01-01 RX ADMIN — CASTOR OIL AND BALSAM, PERU 5 G: 788; 87 OINTMENT TOPICAL at 10:33

## 2020-01-01 RX ADMIN — OXYBUTYNIN CHLORIDE 5 MG: 5 TABLET, EXTENDED RELEASE ORAL at 10:11

## 2020-01-01 RX ADMIN — SODIUM CHLORIDE, PRESERVATIVE FREE 10 ML: 5 INJECTION INTRAVENOUS at 10:19

## 2020-01-01 RX ADMIN — IPRATROPIUM BROMIDE AND ALBUTEROL SULFATE 3 ML: 2.5; .5 SOLUTION RESPIRATORY (INHALATION) at 14:56

## 2020-01-01 RX ADMIN — BUDESONIDE 0.5 MG: 0.5 INHALANT RESPIRATORY (INHALATION) at 07:23

## 2020-01-01 RX ADMIN — ENOXAPARIN SODIUM 40 MG: 40 INJECTION SUBCUTANEOUS at 12:31

## 2020-01-01 RX ADMIN — CASTOR OIL AND BALSAM, PERU 10 G: 788; 87 OINTMENT TOPICAL at 08:26

## 2020-01-01 RX ADMIN — ACETYLCYSTEINE 4 ML: 200 SOLUTION ORAL; RESPIRATORY (INHALATION) at 20:37

## 2020-01-01 RX ADMIN — CASTOR OIL AND BALSAM, PERU 5 G: 788; 87 OINTMENT TOPICAL at 20:27

## 2020-01-01 RX ADMIN — DOXYCYCLINE 100 MG: 100 INJECTION, POWDER, LYOPHILIZED, FOR SOLUTION INTRAVENOUS at 02:05

## 2020-01-01 RX ADMIN — IPRATROPIUM BROMIDE AND ALBUTEROL SULFATE 3 ML: 2.5; .5 SOLUTION RESPIRATORY (INHALATION) at 10:05

## 2020-01-01 RX ADMIN — LEVOTHYROXINE SODIUM 75 MCG: 75 TABLET ORAL at 10:55

## 2020-01-01 RX ADMIN — PETROLATUM 100 APPLICATION: 420 OINTMENT TOPICAL at 20:28

## 2020-01-01 RX ADMIN — IPRATROPIUM BROMIDE AND ALBUTEROL SULFATE 3 ML: 2.5; .5 SOLUTION RESPIRATORY (INHALATION) at 03:18

## 2020-01-01 RX ADMIN — IPRATROPIUM BROMIDE AND ALBUTEROL SULFATE 3 ML: 2.5; .5 SOLUTION RESPIRATORY (INHALATION) at 16:24

## 2020-01-01 RX ADMIN — SODIUM CHLORIDE, PRESERVATIVE FREE 10 ML: 5 INJECTION INTRAVENOUS at 20:53

## 2020-01-01 RX ADMIN — SERTRALINE 25 MG: 25 TABLET, FILM COATED ORAL at 10:05

## 2020-01-01 RX ADMIN — NYSTATIN 500000 UNITS: 100000 SUSPENSION ORAL at 20:46

## 2020-01-01 RX ADMIN — CEFTRIAXONE SODIUM 1 G: 1 INJECTION, SOLUTION INTRAVENOUS at 13:33

## 2020-01-01 RX ADMIN — ACETYLCYSTEINE 4 ML: 200 SOLUTION ORAL; RESPIRATORY (INHALATION) at 19:18

## 2020-01-01 RX ADMIN — IPRATROPIUM BROMIDE AND ALBUTEROL SULFATE 3 ML: 2.5; .5 SOLUTION RESPIRATORY (INHALATION) at 15:47

## 2020-01-01 RX ADMIN — ACETYLCYSTEINE 4 ML: 200 SOLUTION ORAL; RESPIRATORY (INHALATION) at 07:41

## 2020-01-01 RX ADMIN — ASPIRIN 81 MG: 81 TABLET, CHEWABLE ORAL at 10:54

## 2020-01-01 RX ADMIN — NYSTATIN 500000 UNITS: 100000 SUSPENSION ORAL at 18:24

## 2020-01-01 RX ADMIN — ACETYLCYSTEINE 4 ML: 200 SOLUTION ORAL; RESPIRATORY (INHALATION) at 15:23

## 2020-01-01 RX ADMIN — OLANZAPINE 2.5 MG: 2.5 TABLET ORAL at 04:10

## 2020-01-01 RX ADMIN — OXYBUTYNIN CHLORIDE 5 MG: 5 TABLET, EXTENDED RELEASE ORAL at 10:34

## 2020-01-01 RX ADMIN — OLANZAPINE 2.5 MG: 2.5 TABLET, FILM COATED ORAL at 21:08

## 2020-01-01 RX ADMIN — CASTOR OIL AND BALSAM, PERU 5 G: 788; 87 OINTMENT TOPICAL at 08:52

## 2020-01-01 RX ADMIN — NYSTATIN 500000 UNITS: 100000 SUSPENSION ORAL at 18:15

## 2020-01-01 RX ADMIN — NYSTATIN 500000 UNITS: 100000 SUSPENSION ORAL at 20:41

## 2020-01-01 RX ADMIN — LEVOTHYROXINE SODIUM 75 MCG: 75 TABLET ORAL at 06:15

## 2020-01-01 RX ADMIN — POTASSIUM CHLORIDE 40 MEQ: 10 CAPSULE, COATED, EXTENDED RELEASE ORAL at 06:52

## 2020-01-01 RX ADMIN — LEVOTHYROXINE SODIUM 75 MCG: 75 TABLET ORAL at 06:05

## 2020-01-01 RX ADMIN — SODIUM CHLORIDE, PRESERVATIVE FREE 10 ML: 5 INJECTION INTRAVENOUS at 08:13

## 2020-01-01 RX ADMIN — IPRATROPIUM BROMIDE AND ALBUTEROL SULFATE 3 ML: 2.5; .5 SOLUTION RESPIRATORY (INHALATION) at 19:23

## 2020-01-01 RX ADMIN — NYSTATIN 500000 UNITS: 100000 SUSPENSION ORAL at 08:41

## 2020-01-01 RX ADMIN — PREDNISONE 10 MG: 10 TABLET ORAL at 08:44

## 2020-01-01 RX ADMIN — DOXYCYCLINE 100 MG: 100 CAPSULE ORAL at 20:12

## 2020-01-01 RX ADMIN — IPRATROPIUM BROMIDE AND ALBUTEROL SULFATE 3 ML: 2.5; .5 SOLUTION RESPIRATORY (INHALATION) at 20:34

## 2020-01-01 RX ADMIN — CASTOR OIL AND BALSAM, PERU 5 G: 788; 87 OINTMENT TOPICAL at 10:12

## 2020-01-01 RX ADMIN — SERTRALINE 25 MG: 25 TABLET, FILM COATED ORAL at 08:46

## 2020-01-01 RX ADMIN — CASTOR OIL AND BALSAM, PERU 5 G: 788; 87 OINTMENT TOPICAL at 21:14

## 2020-01-01 RX ADMIN — NYSTATIN 500000 UNITS: 100000 SUSPENSION ORAL at 20:26

## 2020-01-01 RX ADMIN — SODIUM CHLORIDE, PRESERVATIVE FREE 10 ML: 5 INJECTION INTRAVENOUS at 20:56

## 2020-01-01 RX ADMIN — ENOXAPARIN SODIUM 30 MG: 30 INJECTION SUBCUTANEOUS at 17:53

## 2020-01-01 RX ADMIN — NYSTATIN 500000 UNITS: 100000 SUSPENSION ORAL at 10:13

## 2020-01-01 RX ADMIN — NYSTATIN 500000 UNITS: 100000 SUSPENSION ORAL at 12:23

## 2020-01-01 RX ADMIN — PANTOPRAZOLE SODIUM 40 MG: 40 TABLET, DELAYED RELEASE ORAL at 05:51

## 2020-01-01 RX ADMIN — ARFORMOTEROL TARTRATE 15 MCG: 15 SOLUTION RESPIRATORY (INHALATION) at 09:13

## 2020-01-01 RX ADMIN — PETROLATUM 100 APPLICATION: 420 OINTMENT TOPICAL at 08:35

## 2020-01-01 RX ADMIN — OLANZAPINE 2.5 MG: 2.5 TABLET ORAL at 21:14

## 2020-01-01 RX ADMIN — FOLIC ACID 1 MG: 1 TABLET ORAL at 08:56

## 2020-01-01 RX ADMIN — OLANZAPINE 2.5 MG: 2.5 TABLET, FILM COATED ORAL at 20:53

## 2020-01-01 RX ADMIN — ALBUTEROL SULFATE 2.5 MG: 2.5 SOLUTION RESPIRATORY (INHALATION) at 15:08

## 2020-01-01 RX ADMIN — IPRATROPIUM BROMIDE AND ALBUTEROL SULFATE 3 ML: 2.5; .5 SOLUTION RESPIRATORY (INHALATION) at 07:56

## 2020-01-01 RX ADMIN — OXYBUTYNIN CHLORIDE 5 MG: 5 TABLET, EXTENDED RELEASE ORAL at 09:27

## 2020-01-01 RX ADMIN — BUDESONIDE 0.5 MG: 0.5 INHALANT RESPIRATORY (INHALATION) at 08:19

## 2020-01-01 RX ADMIN — GUAIFENESIN 1200 MG: 600 TABLET, EXTENDED RELEASE ORAL at 20:17

## 2020-01-01 RX ADMIN — BUDESONIDE 0.5 MG: 0.5 INHALANT RESPIRATORY (INHALATION) at 19:44

## 2020-01-01 RX ADMIN — CASTOR OIL AND BALSAM, PERU 10 G: 788; 87 OINTMENT TOPICAL at 08:40

## 2020-01-01 RX ADMIN — BUDESONIDE 0.5 MG: 0.5 INHALANT RESPIRATORY (INHALATION) at 07:04

## 2020-01-01 RX ADMIN — CASTOR OIL AND BALSAM, PERU 5 G: 788; 87 OINTMENT TOPICAL at 08:41

## 2020-01-01 RX ADMIN — PETROLATUM 100 APPLICATION: 420 OINTMENT TOPICAL at 20:29

## 2020-01-01 RX ADMIN — ENOXAPARIN SODIUM 40 MG: 40 INJECTION SUBCUTANEOUS at 22:55

## 2020-01-01 RX ADMIN — PETROLATUM 100 APPLICATION: 420 OINTMENT TOPICAL at 20:17

## 2020-01-01 RX ADMIN — BUDESONIDE 0.5 MG: 0.5 INHALANT RESPIRATORY (INHALATION) at 22:02

## 2020-01-01 RX ADMIN — FOLIC ACID 1 MG: 1 TABLET ORAL at 08:44

## 2020-01-01 RX ADMIN — SODIUM CHLORIDE, PRESERVATIVE FREE 10 ML: 5 INJECTION INTRAVENOUS at 09:00

## 2020-01-01 RX ADMIN — LEVOTHYROXINE SODIUM 75 MCG: 75 TABLET ORAL at 05:51

## 2020-01-01 RX ADMIN — PREDNISONE 10 MG: 10 TABLET ORAL at 09:15

## 2020-01-01 RX ADMIN — LOSARTAN POTASSIUM 12.5 MG: 25 TABLET, FILM COATED ORAL at 09:00

## 2020-01-01 RX ADMIN — NYSTATIN 500000 UNITS: 100000 SUSPENSION ORAL at 13:07

## 2020-01-01 RX ADMIN — IPRATROPIUM BROMIDE AND ALBUTEROL SULFATE 3 ML: 2.5; .5 SOLUTION RESPIRATORY (INHALATION) at 07:16

## 2020-01-01 RX ADMIN — OLANZAPINE 5 MG: 5 TABLET ORAL at 20:16

## 2020-01-01 RX ADMIN — ACETYLCYSTEINE 4 ML: 200 SOLUTION ORAL; RESPIRATORY (INHALATION) at 19:44

## 2020-01-01 RX ADMIN — GUAIFENESIN 1200 MG: 600 TABLET, EXTENDED RELEASE ORAL at 21:08

## 2020-01-01 RX ADMIN — LOSARTAN POTASSIUM 12.5 MG: 25 TABLET, FILM COATED ORAL at 08:17

## 2020-01-01 RX ADMIN — CASTOR OIL AND BALSAM, PERU 5 G: 788; 87 OINTMENT TOPICAL at 20:47

## 2020-01-01 RX ADMIN — POLYETHYLENE GLYCOL 3350 17 G: 17 POWDER, FOR SOLUTION ORAL at 09:15

## 2020-01-01 RX ADMIN — OLANZAPINE 2.5 MG: 2.5 TABLET ORAL at 21:12

## 2020-01-01 RX ADMIN — IPRATROPIUM BROMIDE AND ALBUTEROL SULFATE 3 ML: 2.5; .5 SOLUTION RESPIRATORY (INHALATION) at 15:35

## 2020-01-01 RX ADMIN — NYSTATIN 500000 UNITS: 100000 SUSPENSION ORAL at 20:53

## 2020-01-01 RX ADMIN — APIXABAN 10 MG: 5 TABLET, FILM COATED ORAL at 10:54

## 2020-01-01 RX ADMIN — IPRATROPIUM BROMIDE AND ALBUTEROL SULFATE 3 ML: 2.5; .5 SOLUTION RESPIRATORY (INHALATION) at 12:14

## 2020-01-01 RX ADMIN — CASTOR OIL AND BALSAM, PERU 5 G: 788; 87 OINTMENT TOPICAL at 08:12

## 2020-01-01 RX ADMIN — IPRATROPIUM BROMIDE AND ALBUTEROL SULFATE 3 ML: 2.5; .5 SOLUTION RESPIRATORY (INHALATION) at 06:49

## 2020-01-01 RX ADMIN — SODIUM CHLORIDE, PRESERVATIVE FREE 10 ML: 5 INJECTION INTRAVENOUS at 09:58

## 2020-01-01 RX ADMIN — ASPIRIN 81 MG: 81 TABLET, CHEWABLE ORAL at 10:11

## 2020-01-01 RX ADMIN — PANTOPRAZOLE SODIUM 40 MG: 40 TABLET, DELAYED RELEASE ORAL at 06:15

## 2020-01-01 RX ADMIN — ASPIRIN 81 MG: 81 TABLET, CHEWABLE ORAL at 08:56

## 2020-01-01 RX ADMIN — BUDESONIDE 0.5 MG: 0.5 INHALANT RESPIRATORY (INHALATION) at 19:19

## 2020-01-01 RX ADMIN — BUDESONIDE 0.5 MG: 0.5 INHALANT RESPIRATORY (INHALATION) at 19:49

## 2020-01-01 RX ADMIN — ACETYLCYSTEINE 4 ML: 200 SOLUTION ORAL; RESPIRATORY (INHALATION) at 19:51

## 2020-01-01 RX ADMIN — IPRATROPIUM BROMIDE AND ALBUTEROL SULFATE 3 ML: 2.5; .5 SOLUTION RESPIRATORY (INHALATION) at 15:31

## 2020-01-01 RX ADMIN — IPRATROPIUM BROMIDE AND ALBUTEROL SULFATE 3 ML: 2.5; .5 SOLUTION RESPIRATORY (INHALATION) at 20:58

## 2020-01-01 RX ADMIN — BUDESONIDE 0.5 MG: 0.5 INHALANT RESPIRATORY (INHALATION) at 06:52

## 2020-01-01 RX ADMIN — POLYETHYLENE GLYCOL 3350 17 G: 17 POWDER, FOR SOLUTION ORAL at 08:40

## 2020-01-01 RX ADMIN — LIDOCAINE HYDROCHLORIDE 50 MG: 20 INJECTION, SOLUTION INFILTRATION; PERINEURAL at 11:07

## 2020-01-01 RX ADMIN — FUROSEMIDE 20 MG: 20 TABLET ORAL at 18:15

## 2020-01-01 RX ADMIN — SODIUM CHLORIDE, PRESERVATIVE FREE 10 ML: 5 INJECTION INTRAVENOUS at 20:36

## 2020-01-01 RX ADMIN — IPRATROPIUM BROMIDE AND ALBUTEROL SULFATE 3 ML: 2.5; .5 SOLUTION RESPIRATORY (INHALATION) at 21:37

## 2020-01-01 RX ADMIN — BUDESONIDE 0.5 MG: 0.5 INHALANT RESPIRATORY (INHALATION) at 07:53

## 2020-01-01 RX ADMIN — FUROSEMIDE 20 MG: 20 TABLET ORAL at 08:56

## 2020-01-01 RX ADMIN — POLYETHYLENE GLYCOL 3350 17 G: 17 POWDER, FOR SOLUTION ORAL at 08:12

## 2020-01-01 RX ADMIN — IPRATROPIUM BROMIDE AND ALBUTEROL SULFATE 3 ML: 2.5; .5 SOLUTION RESPIRATORY (INHALATION) at 08:23

## 2020-01-01 RX ADMIN — ACETYLCYSTEINE 4 ML: 200 SOLUTION ORAL; RESPIRATORY (INHALATION) at 21:30

## 2020-01-01 RX ADMIN — BUDESONIDE 0.5 MG: 0.5 INHALANT RESPIRATORY (INHALATION) at 19:16

## 2020-01-01 RX ADMIN — OXYBUTYNIN CHLORIDE 5 MG: 5 TABLET, EXTENDED RELEASE ORAL at 08:12

## 2020-01-01 RX ADMIN — POLYETHYLENE GLYCOL 3350 17 G: 17 POWDER, FOR SOLUTION ORAL at 20:11

## 2020-01-01 RX ADMIN — NYSTATIN 500000 UNITS: 100000 SUSPENSION ORAL at 08:17

## 2020-01-01 RX ADMIN — MIRTAZAPINE 15 MG: 15 TABLET, FILM COATED ORAL at 21:25

## 2020-01-01 RX ADMIN — ACETAMINOPHEN 650 MG: 325 TABLET, FILM COATED ORAL at 11:05

## 2020-01-01 RX ADMIN — IPRATROPIUM BROMIDE AND ALBUTEROL SULFATE 3 ML: 2.5; .5 SOLUTION RESPIRATORY (INHALATION) at 11:09

## 2020-01-01 RX ADMIN — ASPIRIN 81 MG: 81 TABLET, CHEWABLE ORAL at 10:06

## 2020-01-01 RX ADMIN — ATORVASTATIN CALCIUM 80 MG: 80 TABLET, FILM COATED ORAL at 21:14

## 2020-01-01 RX ADMIN — ACETYLCYSTEINE 4 ML: 200 SOLUTION ORAL; RESPIRATORY (INHALATION) at 19:13

## 2020-01-01 RX ADMIN — LOSARTAN POTASSIUM 12.5 MG: 25 TABLET, FILM COATED ORAL at 12:03

## 2020-01-01 RX ADMIN — SERTRALINE 25 MG: 25 TABLET, FILM COATED ORAL at 08:12

## 2020-01-01 RX ADMIN — CARVEDILOL 3.12 MG: 3.12 TABLET, FILM COATED ORAL at 20:17

## 2020-01-01 RX ADMIN — CARVEDILOL 3.12 MG: 3.12 TABLET, FILM COATED ORAL at 12:48

## 2020-01-01 RX ADMIN — NYSTATIN 500000 UNITS: 100000 SUSPENSION ORAL at 20:54

## 2020-01-01 RX ADMIN — PETROLATUM 100 APPLICATION: 420 OINTMENT TOPICAL at 20:41

## 2020-01-01 RX ADMIN — IPRATROPIUM BROMIDE AND ALBUTEROL SULFATE 3 ML: 2.5; .5 SOLUTION RESPIRATORY (INHALATION) at 07:00

## 2020-01-01 RX ADMIN — ASPIRIN 81 MG: 81 TABLET, CHEWABLE ORAL at 13:33

## 2020-01-01 RX ADMIN — MIRTAZAPINE 15 MG: 15 TABLET, FILM COATED ORAL at 21:14

## 2020-01-01 RX ADMIN — OXYBUTYNIN CHLORIDE 5 MG: 5 TABLET, EXTENDED RELEASE ORAL at 08:44

## 2020-01-01 RX ADMIN — IPRATROPIUM BROMIDE AND ALBUTEROL SULFATE 3 ML: 2.5; .5 SOLUTION RESPIRATORY (INHALATION) at 12:21

## 2020-01-01 RX ADMIN — MAGNESIUM SULFATE 2 G: 2 INJECTION INTRAVENOUS at 11:18

## 2020-01-01 RX ADMIN — NYSTATIN 500000 UNITS: 100000 SUSPENSION ORAL at 08:56

## 2020-01-01 RX ADMIN — SODIUM CHLORIDE, PRESERVATIVE FREE 10 ML: 5 INJECTION INTRAVENOUS at 09:24

## 2020-01-01 RX ADMIN — NYSTATIN 500000 UNITS: 100000 SUSPENSION ORAL at 09:33

## 2020-01-01 RX ADMIN — SODIUM CHLORIDE, PRESERVATIVE FREE 10 ML: 5 INJECTION INTRAVENOUS at 20:23

## 2020-01-01 RX ADMIN — FOLIC ACID 1 MG: 1 TABLET ORAL at 09:15

## 2020-01-01 RX ADMIN — SODIUM CHLORIDE, PRESERVATIVE FREE 10 ML: 5 INJECTION INTRAVENOUS at 08:42

## 2020-01-01 RX ADMIN — ENOXAPARIN SODIUM 30 MG: 30 INJECTION SUBCUTANEOUS at 17:50

## 2020-01-01 RX ADMIN — PETROLATUM 100 APPLICATION: 420 OINTMENT TOPICAL at 20:52

## 2020-01-01 RX ADMIN — ACETYLCYSTEINE 4 ML: 200 SOLUTION ORAL; RESPIRATORY (INHALATION) at 11:00

## 2020-01-01 RX ADMIN — NYSTATIN 500000 UNITS: 100000 SUSPENSION ORAL at 13:16

## 2020-01-01 RX ADMIN — GUAIFENESIN 1200 MG: 600 TABLET, EXTENDED RELEASE ORAL at 20:35

## 2020-01-01 RX ADMIN — SERTRALINE 25 MG: 25 TABLET, FILM COATED ORAL at 08:44

## 2020-01-01 RX ADMIN — SERTRALINE 25 MG: 25 TABLET, FILM COATED ORAL at 09:56

## 2020-01-01 RX ADMIN — IPRATROPIUM BROMIDE AND ALBUTEROL SULFATE 3 ML: 2.5; .5 SOLUTION RESPIRATORY (INHALATION) at 07:08

## 2020-01-01 RX ADMIN — PETROLATUM 100 APPLICATION: 420 OINTMENT TOPICAL at 10:14

## 2020-01-01 RX ADMIN — CASTOR OIL AND BALSAM, PERU 5 G: 788; 87 OINTMENT TOPICAL at 09:15

## 2020-01-01 RX ADMIN — ARFORMOTEROL TARTRATE 15 MCG: 15 SOLUTION RESPIRATORY (INHALATION) at 07:23

## 2020-01-01 RX ADMIN — FUROSEMIDE 20 MG: 20 TABLET ORAL at 09:15

## 2020-01-01 RX ADMIN — NYSTATIN 500000 UNITS: 100000 SUSPENSION ORAL at 17:05

## 2020-01-01 RX ADMIN — SERTRALINE 25 MG: 25 TABLET, FILM COATED ORAL at 09:30

## 2020-01-01 RX ADMIN — ARFORMOTEROL TARTRATE 15 MCG: 15 SOLUTION RESPIRATORY (INHALATION) at 20:03

## 2020-01-01 RX ADMIN — NYSTATIN 500000 UNITS: 100000 SUSPENSION ORAL at 12:46

## 2020-01-01 RX ADMIN — GUAIFENESIN 1200 MG: 600 TABLET, EXTENDED RELEASE ORAL at 20:53

## 2020-01-01 RX ADMIN — ASPIRIN 81 MG: 81 TABLET, CHEWABLE ORAL at 09:10

## 2020-01-01 RX ADMIN — IPRATROPIUM BROMIDE AND ALBUTEROL SULFATE 3 ML: 2.5; .5 SOLUTION RESPIRATORY (INHALATION) at 11:23

## 2020-01-01 RX ADMIN — BUDESONIDE 0.5 MG: 0.5 INHALANT RESPIRATORY (INHALATION) at 06:51

## 2020-01-01 RX ADMIN — BUDESONIDE 0.5 MG: 0.5 INHALANT RESPIRATORY (INHALATION) at 07:43

## 2020-01-01 RX ADMIN — GUAIFENESIN 1200 MG: 600 TABLET, EXTENDED RELEASE ORAL at 08:26

## 2020-01-01 RX ADMIN — POLYETHYLENE GLYCOL 3350 17 G: 17 POWDER, FOR SOLUTION ORAL at 09:31

## 2020-01-01 RX ADMIN — PETROLATUM 100 APPLICATION: 420 OINTMENT TOPICAL at 20:58

## 2020-01-01 RX ADMIN — IPRATROPIUM BROMIDE AND ALBUTEROL SULFATE 3 ML: 2.5; .5 SOLUTION RESPIRATORY (INHALATION) at 07:36

## 2020-01-01 RX ADMIN — PETROLATUM 100 APPLICATION: 420 OINTMENT TOPICAL at 20:10

## 2020-01-01 RX ADMIN — ENOXAPARIN SODIUM 30 MG: 30 INJECTION SUBCUTANEOUS at 16:11

## 2020-01-01 RX ADMIN — SODIUM CHLORIDE, PRESERVATIVE FREE 10 ML: 5 INJECTION INTRAVENOUS at 08:47

## 2020-01-01 RX ADMIN — CEFTRIAXONE SODIUM 2 G: 2 INJECTION, SOLUTION INTRAVENOUS at 13:11

## 2020-01-01 RX ADMIN — ASPIRIN 81 MG: 81 TABLET, CHEWABLE ORAL at 08:41

## 2020-01-01 RX ADMIN — PANTOPRAZOLE SODIUM 40 MG: 40 TABLET, DELAYED RELEASE ORAL at 05:25

## 2020-01-01 RX ADMIN — CEFTRIAXONE SODIUM 1 G: 1 INJECTION, SOLUTION INTRAVENOUS at 12:46

## 2020-01-01 RX ADMIN — OXYBUTYNIN CHLORIDE 5 MG: 5 TABLET, EXTENDED RELEASE ORAL at 08:41

## 2020-01-01 RX ADMIN — LEVOTHYROXINE SODIUM 75 MCG: 75 TABLET ORAL at 06:03

## 2020-01-01 RX ADMIN — GUAIFENESIN 1200 MG: 600 TABLET, EXTENDED RELEASE ORAL at 20:47

## 2020-01-01 RX ADMIN — OXYBUTYNIN CHLORIDE 5 MG: 5 TABLET, EXTENDED RELEASE ORAL at 08:17

## 2020-01-01 RX ADMIN — PETROLATUM 100 APPLICATION: 420 OINTMENT TOPICAL at 08:52

## 2020-01-01 RX ADMIN — PROPOFOL 120 MCG/KG/MIN: 10 INJECTION, EMULSION INTRAVENOUS at 11:07

## 2020-01-01 RX ADMIN — SODIUM CHLORIDE, PRESERVATIVE FREE 10 ML: 5 INJECTION INTRAVENOUS at 20:35

## 2020-01-01 RX ADMIN — SODIUM CHLORIDE, PRESERVATIVE FREE 10 ML: 5 INJECTION INTRAVENOUS at 08:55

## 2020-01-01 RX ADMIN — IPRATROPIUM BROMIDE AND ALBUTEROL SULFATE 3 ML: 2.5; .5 SOLUTION RESPIRATORY (INHALATION) at 15:09

## 2020-01-01 RX ADMIN — PREDNISONE 10 MG: 10 TABLET ORAL at 08:17

## 2020-01-01 RX ADMIN — IOPAMIDOL 95 ML: 755 INJECTION, SOLUTION INTRAVENOUS at 13:33

## 2020-01-01 RX ADMIN — SERTRALINE 25 MG: 25 TABLET, FILM COATED ORAL at 08:56

## 2020-01-01 RX ADMIN — CEFTRIAXONE SODIUM 1 G: 1 INJECTION, SOLUTION INTRAVENOUS at 14:01

## 2020-01-01 RX ADMIN — DOXYCYCLINE 100 MG: 100 CAPSULE ORAL at 21:51

## 2020-01-01 RX ADMIN — IPRATROPIUM BROMIDE AND ALBUTEROL SULFATE 3 ML: 2.5; .5 SOLUTION RESPIRATORY (INHALATION) at 15:24

## 2020-01-01 RX ADMIN — BUDESONIDE 0.5 MG: 0.5 INHALANT RESPIRATORY (INHALATION) at 07:58

## 2020-01-01 RX ADMIN — IPRATROPIUM BROMIDE AND ALBUTEROL SULFATE 3 ML: 2.5; .5 SOLUTION RESPIRATORY (INHALATION) at 07:43

## 2020-01-01 RX ADMIN — DOXYCYCLINE 100 MG: 100 CAPSULE ORAL at 20:29

## 2020-01-01 RX ADMIN — SERTRALINE 25 MG: 25 TABLET, FILM COATED ORAL at 14:54

## 2020-01-01 RX ADMIN — DOXYCYCLINE 100 MG: 100 CAPSULE ORAL at 14:54

## 2020-01-01 RX ADMIN — FOLIC ACID 1 MG: 1 TABLET ORAL at 09:31

## 2020-01-01 RX ADMIN — PETROLATUM 100 APPLICATION: 420 OINTMENT TOPICAL at 09:33

## 2020-01-01 RX ADMIN — CASTOR OIL AND BALSAM, PERU 5 G: 788; 87 OINTMENT TOPICAL at 10:11

## 2020-01-01 RX ADMIN — ACETYLCYSTEINE 4 ML: 200 SOLUTION ORAL; RESPIRATORY (INHALATION) at 23:22

## 2020-01-01 RX ADMIN — LEVOTHYROXINE SODIUM 75 MCG: 75 TABLET ORAL at 06:17

## 2020-01-01 RX ADMIN — IPRATROPIUM BROMIDE AND ALBUTEROL SULFATE 3 ML: 2.5; .5 SOLUTION RESPIRATORY (INHALATION) at 08:24

## 2020-01-01 RX ADMIN — ASPIRIN 81 MG: 81 TABLET, CHEWABLE ORAL at 08:55

## 2020-01-01 RX ADMIN — SODIUM CHLORIDE, PRESERVATIVE FREE 10 ML: 5 INJECTION INTRAVENOUS at 20:27

## 2020-01-01 RX ADMIN — POTASSIUM CHLORIDE 40 MEQ: 1.5 POWDER, FOR SOLUTION ORAL at 20:10

## 2020-01-01 RX ADMIN — ASPIRIN 81 MG: 81 TABLET, CHEWABLE ORAL at 09:20

## 2020-01-01 RX ADMIN — ATORVASTATIN CALCIUM 80 MG: 80 TABLET, FILM COATED ORAL at 20:26

## 2020-01-01 RX ADMIN — SERTRALINE 25 MG: 25 TABLET, FILM COATED ORAL at 09:27

## 2020-01-01 RX ADMIN — POTASSIUM CHLORIDE 40 MEQ: 10 CAPSULE, COATED, EXTENDED RELEASE ORAL at 11:38

## 2020-01-01 RX ADMIN — SODIUM CHLORIDE, PRESERVATIVE FREE 10 ML: 5 INJECTION INTRAVENOUS at 20:28

## 2020-01-01 RX ADMIN — GUAIFENESIN 1200 MG: 600 TABLET, EXTENDED RELEASE ORAL at 10:06

## 2020-01-01 RX ADMIN — CASTOR OIL AND BALSAM, PERU 15 G: 788; 87 OINTMENT TOPICAL at 08:56

## 2020-01-01 RX ADMIN — ASPIRIN 81 MG: 81 TABLET, CHEWABLE ORAL at 08:46

## 2020-01-01 RX ADMIN — SERTRALINE 25 MG: 25 TABLET, FILM COATED ORAL at 09:32

## 2020-01-01 RX ADMIN — PETROLATUM 100 APPLICATION: 420 OINTMENT TOPICAL at 08:55

## 2020-01-01 RX ADMIN — BUDESONIDE 0.5 MG: 0.5 INHALANT RESPIRATORY (INHALATION) at 08:31

## 2020-01-01 RX ADMIN — OXYBUTYNIN CHLORIDE 5 MG: 5 TABLET, EXTENDED RELEASE ORAL at 10:12

## 2020-01-01 RX ADMIN — CEFTRIAXONE SODIUM 1 G: 1 INJECTION, SOLUTION INTRAVENOUS at 23:05

## 2020-01-01 RX ADMIN — APIXABAN 10 MG: 5 TABLET, FILM COATED ORAL at 21:14

## 2020-01-01 RX ADMIN — FOLIC ACID 1 MG: 1 TABLET ORAL at 08:46

## 2020-01-01 RX ADMIN — NYSTATIN 500000 UNITS: 100000 SUSPENSION ORAL at 12:47

## 2020-01-01 RX ADMIN — SODIUM CHLORIDE, PRESERVATIVE FREE 10 ML: 5 INJECTION INTRAVENOUS at 10:11

## 2020-01-01 RX ADMIN — PETROLATUM 100 APPLICATION: 420 OINTMENT TOPICAL at 09:15

## 2020-01-01 RX ADMIN — NYSTATIN 500000 UNITS: 100000 SUSPENSION ORAL at 20:17

## 2020-01-01 RX ADMIN — PETROLATUM 100 APPLICATION: 420 OINTMENT TOPICAL at 08:57

## 2020-01-01 RX ADMIN — SERTRALINE 25 MG: 25 TABLET, FILM COATED ORAL at 09:15

## 2020-01-01 RX ADMIN — OXYBUTYNIN CHLORIDE 5 MG: 5 TABLET, EXTENDED RELEASE ORAL at 09:32

## 2020-01-01 RX ADMIN — LEVOTHYROXINE SODIUM 75 MCG: 75 TABLET ORAL at 14:55

## 2020-01-01 RX ADMIN — PETROLATUM 100 APPLICATION: 420 OINTMENT TOPICAL at 10:54

## 2020-01-01 RX ADMIN — ATORVASTATIN CALCIUM 80 MG: 80 TABLET, FILM COATED ORAL at 21:51

## 2020-01-01 RX ADMIN — SODIUM CHLORIDE, PRESERVATIVE FREE 10 ML: 5 INJECTION INTRAVENOUS at 08:12

## 2020-01-01 RX ADMIN — DOXYCYCLINE 100 MG: 100 CAPSULE ORAL at 20:26

## 2020-01-01 RX ADMIN — PETROLATUM 100 APPLICATION: 420 OINTMENT TOPICAL at 09:45

## 2020-01-01 RX ADMIN — FOLIC ACID 1 MG: 1 TABLET ORAL at 14:54

## 2020-01-01 RX ADMIN — IPRATROPIUM BROMIDE AND ALBUTEROL SULFATE 3 ML: 2.5; .5 SOLUTION RESPIRATORY (INHALATION) at 15:45

## 2020-01-01 RX ADMIN — PETROLATUM 100 APPLICATION: 420 OINTMENT TOPICAL at 21:51

## 2020-01-01 RX ADMIN — PETROLATUM 100 APPLICATION: 420 OINTMENT TOPICAL at 21:12

## 2020-01-01 RX ADMIN — ENOXAPARIN SODIUM 30 MG: 30 INJECTION SUBCUTANEOUS at 18:27

## 2020-01-01 RX ADMIN — PREDNISONE 10 MG: 10 TABLET ORAL at 09:10

## 2020-01-01 RX ADMIN — PREDNISONE 10 MG: 10 TABLET ORAL at 18:33

## 2020-01-01 RX ADMIN — SODIUM CHLORIDE, PRESERVATIVE FREE 10 ML: 5 INJECTION INTRAVENOUS at 21:00

## 2020-01-01 RX ADMIN — FOLIC ACID 1 MG: 1 TABLET ORAL at 08:26

## 2020-01-01 RX ADMIN — SODIUM CHLORIDE, PRESERVATIVE FREE 10 ML: 5 INJECTION INTRAVENOUS at 21:14

## 2020-01-01 RX ADMIN — SODIUM CHLORIDE, PRESERVATIVE FREE 10 ML: 5 INJECTION INTRAVENOUS at 10:18

## 2020-01-01 RX ADMIN — OXYBUTYNIN CHLORIDE 5 MG: 5 TABLET, EXTENDED RELEASE ORAL at 08:26

## 2020-01-01 RX ADMIN — CASTOR OIL AND BALSAM, PERU 5 G: 788; 87 OINTMENT TOPICAL at 20:39

## 2020-01-01 RX ADMIN — ATORVASTATIN CALCIUM 80 MG: 80 TABLET, FILM COATED ORAL at 10:34

## 2020-01-01 RX ADMIN — SODIUM CHLORIDE, PRESERVATIVE FREE 10 ML: 5 INJECTION INTRAVENOUS at 08:17

## 2020-01-01 RX ADMIN — NYSTATIN 500000 UNITS: 100000 SUSPENSION ORAL at 12:15

## 2020-01-01 RX ADMIN — POLYETHYLENE GLYCOL 3350 17 G: 17 POWDER, FOR SOLUTION ORAL at 08:41

## 2020-01-01 RX ADMIN — ACETYLCYSTEINE 4 ML: 200 SOLUTION ORAL; RESPIRATORY (INHALATION) at 11:16

## 2020-01-01 RX ADMIN — FOLIC ACID 1 MG: 1 TABLET ORAL at 10:12

## 2020-01-01 RX ADMIN — SERTRALINE 25 MG: 25 TABLET, FILM COATED ORAL at 08:39

## 2020-01-01 RX ADMIN — ENOXAPARIN SODIUM 30 MG: 30 INJECTION SUBCUTANEOUS at 17:21

## 2020-01-01 RX ADMIN — NYSTATIN 500000 UNITS: 100000 SUSPENSION ORAL at 18:46

## 2020-01-01 RX ADMIN — LEVOTHYROXINE SODIUM 75 MCG: 75 TABLET ORAL at 09:27

## 2020-01-01 RX ADMIN — ASPIRIN 81 MG: 81 TABLET, CHEWABLE ORAL at 08:44

## 2020-01-01 RX ADMIN — OLANZAPINE 2.5 MG: 2.5 TABLET, FILM COATED ORAL at 20:46

## 2020-01-01 RX ADMIN — OXYBUTYNIN CHLORIDE 5 MG: 5 TABLET, EXTENDED RELEASE ORAL at 10:05

## 2020-01-01 RX ADMIN — ACETYLCYSTEINE 4 ML: 200 SOLUTION ORAL; RESPIRATORY (INHALATION) at 07:53

## 2020-01-01 RX ADMIN — FUROSEMIDE 20 MG: 20 TABLET ORAL at 10:12

## 2020-01-01 RX ADMIN — MIRTAZAPINE 15 MG: 15 TABLET, FILM COATED ORAL at 21:12

## 2020-01-01 RX ADMIN — MAGNESIUM SULFATE 2 G: 2 INJECTION INTRAVENOUS at 02:05

## 2020-01-01 RX ADMIN — POLYETHYLENE GLYCOL 3350 17 G: 17 POWDER, FOR SOLUTION ORAL at 08:18

## 2020-01-01 RX ADMIN — ASPIRIN 325 MG: 325 TABLET ORAL at 13:11

## 2020-01-01 RX ADMIN — PETROLATUM 100 APPLICATION: 420 OINTMENT TOPICAL at 20:12

## 2020-01-01 RX ADMIN — IPRATROPIUM BROMIDE AND ALBUTEROL SULFATE 3 ML: 2.5; .5 SOLUTION RESPIRATORY (INHALATION) at 11:16

## 2020-01-01 RX ADMIN — IPRATROPIUM BROMIDE AND ALBUTEROL SULFATE 3 ML: 2.5; .5 SOLUTION RESPIRATORY (INHALATION) at 11:31

## 2020-01-01 RX ADMIN — GUAIFENESIN 1200 MG: 600 TABLET, EXTENDED RELEASE ORAL at 20:41

## 2020-01-01 RX ADMIN — PREDNISONE 10 MG: 10 TABLET ORAL at 08:55

## 2020-01-01 RX ADMIN — SODIUM CHLORIDE 500 ML: 9 INJECTION, SOLUTION INTRAVENOUS at 22:11

## 2020-01-01 RX ADMIN — PREDNISONE 10 MG: 10 TABLET ORAL at 10:34

## 2020-01-01 RX ADMIN — CASTOR OIL AND BALSAM, PERU 5 G: 788; 87 OINTMENT TOPICAL at 09:32

## 2020-01-01 RX ADMIN — LEVOTHYROXINE SODIUM 75 MCG: 75 TABLET ORAL at 06:54

## 2020-01-01 RX ADMIN — LOSARTAN POTASSIUM 12.5 MG: 25 TABLET, FILM COATED ORAL at 08:26

## 2020-01-01 RX ADMIN — OXYBUTYNIN CHLORIDE 5 MG: 5 TABLET, EXTENDED RELEASE ORAL at 09:30

## 2020-01-01 RX ADMIN — ENOXAPARIN SODIUM 30 MG: 30 INJECTION SUBCUTANEOUS at 17:08

## 2020-01-01 RX ADMIN — ACETYLCYSTEINE 4 ML: 200 SOLUTION ORAL; RESPIRATORY (INHALATION) at 05:46

## 2020-01-01 RX ADMIN — NYSTATIN 500000 UNITS: 100000 SUSPENSION ORAL at 20:16

## 2020-01-01 RX ADMIN — PREDNISONE 10 MG: 10 TABLET ORAL at 17:53

## 2020-01-01 RX ADMIN — SODIUM CHLORIDE, PRESERVATIVE FREE 10 ML: 5 INJECTION INTRAVENOUS at 21:33

## 2020-01-01 RX ADMIN — BUDESONIDE 0.5 MG: 0.5 INHALANT RESPIRATORY (INHALATION) at 07:36

## 2020-01-01 RX ADMIN — BUDESONIDE 0.5 MG: 0.5 INHALANT RESPIRATORY (INHALATION) at 21:57

## 2020-01-01 RX ADMIN — SODIUM CHLORIDE 75 ML/HR: 9 INJECTION, SOLUTION INTRAVENOUS at 19:47

## 2020-01-01 RX ADMIN — PETROLATUM 100 APPLICATION: 420 OINTMENT TOPICAL at 08:59

## 2020-01-01 RX ADMIN — NYSTATIN 500000 UNITS: 100000 SUSPENSION ORAL at 17:34

## 2020-01-01 RX ADMIN — PREDNISONE 10 MG: 10 TABLET ORAL at 09:56

## 2020-01-01 RX ADMIN — LEVOTHYROXINE SODIUM 75 MCG: 75 TABLET ORAL at 05:40

## 2020-01-01 RX ADMIN — PREDNISONE 10 MG: 10 TABLET ORAL at 08:31

## 2020-01-01 RX ADMIN — DIATRIZOATE MEGLUMINE AND DIATRIZOATE SODIUM 30 ML: 600; 100 SOLUTION ORAL; RECTAL at 08:17

## 2020-01-01 RX ADMIN — IPRATROPIUM BROMIDE AND ALBUTEROL SULFATE 3 ML: 2.5; .5 SOLUTION RESPIRATORY (INHALATION) at 08:19

## 2020-01-01 RX ADMIN — BUDESONIDE 0.5 MG: 0.5 INHALANT RESPIRATORY (INHALATION) at 19:31

## 2020-01-01 RX ADMIN — SODIUM CHLORIDE, PRESERVATIVE FREE 10 ML: 5 INJECTION INTRAVENOUS at 09:34

## 2020-01-01 RX ADMIN — GUAIFENESIN 1200 MG: 600 TABLET, EXTENDED RELEASE ORAL at 15:04

## 2020-01-01 RX ADMIN — ACETYLCYSTEINE 4 ML: 200 SOLUTION ORAL; RESPIRATORY (INHALATION) at 07:15

## 2020-01-01 RX ADMIN — ACETYLCYSTEINE 4 ML: 200 SOLUTION ORAL; RESPIRATORY (INHALATION) at 16:06

## 2020-01-01 RX ADMIN — ENOXAPARIN SODIUM 30 MG: 30 INJECTION SUBCUTANEOUS at 17:02

## 2020-01-01 RX ADMIN — LEVOTHYROXINE SODIUM 75 MCG: 75 TABLET ORAL at 09:31

## 2020-01-01 RX ADMIN — IPRATROPIUM BROMIDE AND ALBUTEROL SULFATE 3 ML: 2.5; .5 SOLUTION RESPIRATORY (INHALATION) at 16:05

## 2020-01-01 RX ADMIN — IPRATROPIUM BROMIDE AND ALBUTEROL SULFATE 3 ML: 2.5; .5 SOLUTION RESPIRATORY (INHALATION) at 19:13

## 2020-01-01 RX ADMIN — PREDNISONE 10 MG: 10 TABLET ORAL at 09:27

## 2020-01-01 RX ADMIN — NYSTATIN 500000 UNITS: 100000 SUSPENSION ORAL at 10:04

## 2020-01-01 RX ADMIN — DOXYCYCLINE 100 MG: 100 CAPSULE ORAL at 10:11

## 2020-01-01 RX ADMIN — IPRATROPIUM BROMIDE AND ALBUTEROL SULFATE 3 ML: 2.5; .5 SOLUTION RESPIRATORY (INHALATION) at 15:26

## 2020-01-01 RX ADMIN — SERTRALINE 25 MG: 25 TABLET, FILM COATED ORAL at 10:34

## 2020-01-01 RX ADMIN — IPRATROPIUM BROMIDE AND ALBUTEROL SULFATE 3 ML: 2.5; .5 SOLUTION RESPIRATORY (INHALATION) at 06:50

## 2020-01-01 RX ADMIN — BUDESONIDE 0.5 MG: 0.5 INHALANT RESPIRATORY (INHALATION) at 11:00

## 2020-01-01 RX ADMIN — FOLIC ACID 1 MG: 1 TABLET ORAL at 08:41

## 2020-01-01 RX ADMIN — ENOXAPARIN SODIUM 30 MG: 30 INJECTION SUBCUTANEOUS at 18:15

## 2020-01-01 RX ADMIN — CASTOR OIL AND BALSAM, PERU 5 G: 788; 87 OINTMENT TOPICAL at 08:46

## 2020-01-01 RX ADMIN — NYSTATIN 500000 UNITS: 100000 SUSPENSION ORAL at 17:22

## 2020-01-01 RX ADMIN — ENOXAPARIN SODIUM 40 MG: 40 INJECTION SUBCUTANEOUS at 23:29

## 2020-01-01 RX ADMIN — SODIUM CHLORIDE, PRESERVATIVE FREE 10 ML: 5 INJECTION INTRAVENOUS at 21:15

## 2020-01-01 RX ADMIN — IPRATROPIUM BROMIDE AND ALBUTEROL SULFATE 3 ML: 2.5; .5 SOLUTION RESPIRATORY (INHALATION) at 19:18

## 2020-01-01 RX ADMIN — MAGNESIUM SULFATE 2 G: 2 INJECTION INTRAVENOUS at 06:31

## 2020-01-01 RX ADMIN — SODIUM CHLORIDE, PRESERVATIVE FREE 10 ML: 5 INJECTION INTRAVENOUS at 08:27

## 2020-01-01 RX ADMIN — SODIUM CHLORIDE 75 ML/HR: 9 INJECTION, SOLUTION INTRAVENOUS at 04:25

## 2020-01-01 RX ADMIN — LEVOTHYROXINE SODIUM 75 MCG: 75 TABLET ORAL at 06:10

## 2020-01-01 RX ADMIN — CARVEDILOL 3.12 MG: 3.12 TABLET, FILM COATED ORAL at 09:16

## 2020-01-01 RX ADMIN — BUDESONIDE 0.5 MG: 0.5 INHALANT RESPIRATORY (INHALATION) at 07:10

## 2020-01-01 RX ADMIN — SODIUM CHLORIDE, PRESERVATIVE FREE 10 ML: 5 INJECTION INTRAVENOUS at 20:41

## 2020-01-01 RX ADMIN — ENOXAPARIN SODIUM 30 MG: 30 INJECTION SUBCUTANEOUS at 17:39

## 2020-01-01 RX ADMIN — PANTOPRAZOLE SODIUM 40 MG: 40 TABLET, DELAYED RELEASE ORAL at 06:31

## 2020-01-01 RX ADMIN — IPRATROPIUM BROMIDE AND ALBUTEROL SULFATE 3 ML: 2.5; .5 SOLUTION RESPIRATORY (INHALATION) at 15:55

## 2020-01-01 RX ADMIN — ACETYLCYSTEINE 4 ML: 200 SOLUTION ORAL; RESPIRATORY (INHALATION) at 08:20

## 2020-01-01 RX ADMIN — SODIUM CHLORIDE, PRESERVATIVE FREE 10 ML: 5 INJECTION INTRAVENOUS at 09:33

## 2020-01-01 RX ADMIN — PANTOPRAZOLE SODIUM 40 MG: 40 TABLET, DELAYED RELEASE ORAL at 08:13

## 2020-01-01 RX ADMIN — LEVOTHYROXINE SODIUM 75 MCG: 75 TABLET ORAL at 09:10

## 2020-01-01 RX ADMIN — POLYETHYLENE GLYCOL 3350 17 G: 17 POWDER, FOR SOLUTION ORAL at 09:18

## 2020-01-01 RX ADMIN — NYSTATIN 500000 UNITS: 100000 SUSPENSION ORAL at 08:26

## 2020-01-01 RX ADMIN — IPRATROPIUM BROMIDE AND ALBUTEROL SULFATE 3 ML: 2.5; .5 SOLUTION RESPIRATORY (INHALATION) at 13:01

## 2020-01-01 RX ADMIN — ATORVASTATIN CALCIUM 80 MG: 80 TABLET, FILM COATED ORAL at 20:29

## 2020-01-01 RX ADMIN — CARVEDILOL 3.12 MG: 3.12 TABLET, FILM COATED ORAL at 20:38

## 2020-01-01 RX ADMIN — ASPIRIN 81 MG: 81 TABLET, CHEWABLE ORAL at 08:12

## 2020-01-01 RX ADMIN — SODIUM CHLORIDE, PRESERVATIVE FREE 10 ML: 5 INJECTION INTRAVENOUS at 08:18

## 2020-01-01 RX ADMIN — NYSTATIN 500000 UNITS: 100000 SUSPENSION ORAL at 21:33

## 2020-01-01 RX ADMIN — ATORVASTATIN CALCIUM 80 MG: 80 TABLET, FILM COATED ORAL at 20:16

## 2020-01-01 RX ADMIN — NYSTATIN 500000 UNITS: 100000 SUSPENSION ORAL at 21:14

## 2020-01-01 RX ADMIN — ENOXAPARIN SODIUM 30 MG: 30 INJECTION SUBCUTANEOUS at 17:05

## 2020-01-01 RX ADMIN — SODIUM CHLORIDE, PRESERVATIVE FREE 10 ML: 5 INJECTION INTRAVENOUS at 20:17

## 2020-01-01 RX ADMIN — PANTOPRAZOLE SODIUM 40 MG: 40 TABLET, DELAYED RELEASE ORAL at 05:53

## 2020-01-01 RX ADMIN — POTASSIUM CHLORIDE 40 MEQ: 750 CAPSULE, EXTENDED RELEASE ORAL at 06:31

## 2020-01-01 RX ADMIN — GUAIFENESIN 1200 MG: 600 TABLET, EXTENDED RELEASE ORAL at 08:41

## 2020-01-01 RX ADMIN — PREDNISONE 10 MG: 10 TABLET ORAL at 08:58

## 2020-01-01 RX ADMIN — OLANZAPINE 5 MG: 5 TABLET ORAL at 21:51

## 2020-01-01 RX ADMIN — IPRATROPIUM BROMIDE AND ALBUTEROL SULFATE 3 ML: 2.5; .5 SOLUTION RESPIRATORY (INHALATION) at 11:27

## 2020-01-01 RX ADMIN — PETROLATUM 100 APPLICATION: 420 OINTMENT TOPICAL at 10:15

## 2020-01-01 RX ADMIN — BUDESONIDE 0.5 MG: 0.5 INHALANT RESPIRATORY (INHALATION) at 21:37

## 2020-01-01 RX ADMIN — SERTRALINE 25 MG: 25 TABLET, FILM COATED ORAL at 08:17

## 2020-01-01 RX ADMIN — POLYETHYLENE GLYCOL 3350 17 G: 17 POWDER, FOR SOLUTION ORAL at 08:46

## 2020-01-01 RX ADMIN — PANTOPRAZOLE SODIUM 40 MG: 40 TABLET, DELAYED RELEASE ORAL at 05:12

## 2020-01-01 RX ADMIN — FOLIC ACID 1 MG: 1 TABLET ORAL at 10:06

## 2020-01-01 RX ADMIN — IPRATROPIUM BROMIDE AND ALBUTEROL SULFATE 3 ML: 2.5; .5 SOLUTION RESPIRATORY (INHALATION) at 07:41

## 2020-01-01 RX ADMIN — OLANZAPINE 7.5 MG: 7.5 TABLET ORAL at 02:03

## 2020-01-01 RX ADMIN — ACETYLCYSTEINE 4 ML: 200 SOLUTION ORAL; RESPIRATORY (INHALATION) at 11:32

## 2020-01-01 RX ADMIN — POLYETHYLENE GLYCOL 3350 17 G: 17 POWDER, FOR SOLUTION ORAL at 09:29

## 2020-01-01 RX ADMIN — GADOBENATE DIMEGLUMINE 7 ML: 529 INJECTION, SOLUTION INTRAVENOUS at 10:16

## 2020-01-01 RX ADMIN — ASPIRIN 81 MG: 81 TABLET, CHEWABLE ORAL at 09:15

## 2020-01-01 RX ADMIN — NYSTATIN 500000 UNITS: 100000 SUSPENSION ORAL at 09:15

## 2020-01-01 RX ADMIN — FUROSEMIDE 20 MG: 20 TABLET ORAL at 08:26

## 2020-01-01 RX ADMIN — IPRATROPIUM BROMIDE AND ALBUTEROL SULFATE 3 ML: 2.5; .5 SOLUTION RESPIRATORY (INHALATION) at 21:17

## 2020-01-01 RX ADMIN — CASTOR OIL AND BALSAM, PERU 10 G: 788; 87 OINTMENT TOPICAL at 08:17

## 2020-01-01 RX ADMIN — FOLIC ACID 1 MG: 1 TABLET ORAL at 09:56

## 2020-01-01 RX ADMIN — BUDESONIDE 0.5 MG: 0.5 INHALANT RESPIRATORY (INHALATION) at 09:02

## 2020-01-01 RX ADMIN — IPRATROPIUM BROMIDE AND ALBUTEROL SULFATE 3 ML: 2.5; .5 SOLUTION RESPIRATORY (INHALATION) at 15:56

## 2020-01-01 RX ADMIN — OXYBUTYNIN CHLORIDE 5 MG: 5 TABLET, EXTENDED RELEASE ORAL at 08:55

## 2020-01-01 RX ADMIN — NYSTATIN 500000 UNITS: 100000 SUSPENSION ORAL at 12:25

## 2020-01-01 RX ADMIN — PREDNISONE 10 MG: 10 TABLET ORAL at 09:30

## 2020-01-01 RX ADMIN — IOPAMIDOL 95 ML: 612 INJECTION, SOLUTION INTRAVENOUS at 22:17

## 2020-01-01 RX ADMIN — FOLIC ACID 1 MG: 1 TABLET ORAL at 10:34

## 2020-01-01 RX ADMIN — GUAIFENESIN 1200 MG: 600 TABLET, EXTENDED RELEASE ORAL at 08:17

## 2020-01-01 RX ADMIN — CEFTRIAXONE SODIUM 1 G: 1 INJECTION, SOLUTION INTRAVENOUS at 13:57

## 2020-01-01 RX ADMIN — IPRATROPIUM BROMIDE AND ALBUTEROL SULFATE 3 ML: 2.5; .5 SOLUTION RESPIRATORY (INHALATION) at 10:42

## 2020-01-01 RX ADMIN — OLANZAPINE 5 MG: 5 TABLET ORAL at 20:55

## 2020-01-01 RX ADMIN — POLYETHYLENE GLYCOL 3350 17 G: 17 POWDER, FOR SOLUTION ORAL at 08:55

## 2020-01-01 RX ADMIN — LEVOTHYROXINE SODIUM 75 MCG: 75 TABLET ORAL at 06:31

## 2020-01-01 RX ADMIN — NYSTATIN 500000 UNITS: 100000 SUSPENSION ORAL at 20:35

## 2020-01-01 RX ADMIN — CEFTRIAXONE SODIUM 1 G: 1 INJECTION, SOLUTION INTRAVENOUS at 14:21

## 2020-01-01 RX ADMIN — CASTOR OIL AND BALSAM, PERU 5 G: 788; 87 OINTMENT TOPICAL at 20:26

## 2020-01-01 RX ADMIN — NYSTATIN 500000 UNITS: 100000 SUSPENSION ORAL at 17:50

## 2020-01-01 RX ADMIN — PETROLATUM 100 APPLICATION: 420 OINTMENT TOPICAL at 20:26

## 2020-01-01 RX ADMIN — IPRATROPIUM BROMIDE AND ALBUTEROL SULFATE 3 ML: 2.5; .5 SOLUTION RESPIRATORY (INHALATION) at 11:06

## 2020-01-01 RX ADMIN — INFLUENZA A VIRUS A/BRISBANE/02/2018 IVR-190 (H1N1) ANTIGEN (PROPIOLACTONE INACTIVATED), INFLUENZA A VIRUS A/KANSAS/14/2017 X-327 (H3N2) ANTIGEN (PROPIOLACTONE INACTIVATED), INFLUENZA B VIRUS B/MARYLAND/15/2016 ANTIGEN (PROPIOLACTONE INACTIVATED), INFLUENZA B VIRUS B/PHUKET/3073/2013 BVR-1B ANTIGEN (PROPIOLACTONE INACTIVATED) 0.5 ML: 15; 15; 15; 15 INJECTION, SUSPENSION INTRAMUSCULAR at 13:33

## 2020-01-01 RX ADMIN — OXYBUTYNIN CHLORIDE 5 MG: 5 TABLET, EXTENDED RELEASE ORAL at 09:15

## 2020-01-01 RX ADMIN — NYSTATIN 500000 UNITS: 100000 SUSPENSION ORAL at 12:05

## 2020-01-01 RX ADMIN — ARFORMOTEROL TARTRATE 15 MCG: 15 SOLUTION RESPIRATORY (INHALATION) at 20:53

## 2020-01-01 RX ADMIN — CASTOR OIL AND BALSAM, PERU 5 G: 788; 87 OINTMENT TOPICAL at 20:36

## 2020-01-01 RX ADMIN — NYSTATIN 500000 UNITS: 100000 SUSPENSION ORAL at 08:40

## 2020-01-01 RX ADMIN — IPRATROPIUM BROMIDE AND ALBUTEROL SULFATE 3 ML: 2.5; .5 SOLUTION RESPIRATORY (INHALATION) at 16:33

## 2020-01-01 RX ADMIN — SERTRALINE 25 MG: 25 TABLET, FILM COATED ORAL at 10:20

## 2020-01-01 RX ADMIN — SODIUM CHLORIDE 100 ML/HR: 9 INJECTION, SOLUTION INTRAVENOUS at 05:48

## 2020-01-01 RX ADMIN — POTASSIUM CHLORIDE 40 MEQ: 750 CAPSULE, EXTENDED RELEASE ORAL at 02:05

## 2020-01-01 RX ADMIN — CASTOR OIL AND BALSAM, PERU: 788; 87 OINTMENT TOPICAL at 21:08

## 2020-01-01 RX ADMIN — POLYETHYLENE GLYCOL 3350 17 G: 17 POWDER, FOR SOLUTION ORAL at 10:11

## 2020-01-01 RX ADMIN — DOXYCYCLINE 100 MG: 100 CAPSULE ORAL at 20:10

## 2020-01-01 RX ADMIN — CASTOR OIL AND BALSAM, PERU 5 G: 788; 87 OINTMENT TOPICAL at 21:51

## 2020-01-01 RX ADMIN — ENOXAPARIN SODIUM 40 MG: 40 INJECTION SUBCUTANEOUS at 11:38

## 2020-01-01 RX ADMIN — GUAIFENESIN 1200 MG: 600 TABLET, EXTENDED RELEASE ORAL at 08:56

## 2020-01-01 RX ADMIN — IPRATROPIUM BROMIDE AND ALBUTEROL SULFATE 3 ML: 2.5; .5 SOLUTION RESPIRATORY (INHALATION) at 11:04

## 2020-01-01 RX ADMIN — ACETYLCYSTEINE 4 ML: 200 SOLUTION ORAL; RESPIRATORY (INHALATION) at 08:00

## 2020-01-01 RX ADMIN — OLANZAPINE 5 MG: 5 TABLET ORAL at 21:14

## 2020-01-01 RX ADMIN — LEVOTHYROXINE SODIUM 75 MCG: 75 TABLET ORAL at 06:52

## 2020-01-01 RX ADMIN — OLANZAPINE 7.5 MG: 7.5 TABLET ORAL at 20:26

## 2020-01-01 RX ADMIN — OXYBUTYNIN CHLORIDE 5 MG: 5 TABLET, EXTENDED RELEASE ORAL at 14:54

## 2020-01-01 RX ADMIN — NYSTATIN 500000 UNITS: 100000 SUSPENSION ORAL at 12:48

## 2020-01-01 RX ADMIN — SODIUM CHLORIDE 1000 ML: 9 INJECTION, SOLUTION INTRAVENOUS at 13:14

## 2020-01-01 RX ADMIN — SODIUM CHLORIDE, PRESERVATIVE FREE 10 ML: 5 INJECTION INTRAVENOUS at 20:16

## 2020-01-01 RX ADMIN — IPRATROPIUM BROMIDE AND ALBUTEROL SULFATE 3 ML: 2.5; .5 SOLUTION RESPIRATORY (INHALATION) at 21:30

## 2020-01-01 RX ADMIN — CASTOR OIL AND BALSAM, PERU 5 G: 788; 87 OINTMENT TOPICAL at 20:17

## 2020-01-01 RX ADMIN — IPRATROPIUM BROMIDE AND ALBUTEROL SULFATE 3 ML: 2.5; .5 SOLUTION RESPIRATORY (INHALATION) at 15:32

## 2020-01-01 RX ADMIN — IPRATROPIUM BROMIDE AND ALBUTEROL SULFATE 3 ML: 2.5; .5 SOLUTION RESPIRATORY (INHALATION) at 08:00

## 2020-01-01 RX ADMIN — BUDESONIDE 0.5 MG: 0.5 INHALANT RESPIRATORY (INHALATION) at 21:17

## 2020-01-01 RX ADMIN — PREDNISONE 10 MG: 10 TABLET ORAL at 08:26

## 2020-01-01 RX ADMIN — IOPAMIDOL 90 ML: 612 INJECTION, SOLUTION INTRAVENOUS at 09:31

## 2020-01-01 RX ADMIN — SERTRALINE 25 MG: 25 TABLET, FILM COATED ORAL at 08:26

## 2020-01-01 RX ADMIN — PHENYLEPHRINE HYDROCHLORIDE 200 MCG: 10 INJECTION INTRAVENOUS at 11:13

## 2020-01-01 RX ADMIN — ATORVASTATIN CALCIUM 80 MG: 80 TABLET, FILM COATED ORAL at 20:55

## 2020-01-01 RX ADMIN — BUDESONIDE 0.5 MG: 0.5 INHALANT RESPIRATORY (INHALATION) at 19:23

## 2020-01-01 RX ADMIN — OLANZAPINE 2.5 MG: 2.5 TABLET, FILM COATED ORAL at 20:52

## 2020-01-01 RX ADMIN — SODIUM CHLORIDE 100 ML/HR: 9 INJECTION, SOLUTION INTRAVENOUS at 15:05

## 2020-01-01 RX ADMIN — IPRATROPIUM BROMIDE AND ALBUTEROL SULFATE 3 ML: 2.5; .5 SOLUTION RESPIRATORY (INHALATION) at 16:37

## 2020-01-01 RX ADMIN — ENOXAPARIN SODIUM 30 MG: 30 INJECTION SUBCUTANEOUS at 16:21

## 2020-01-01 RX ADMIN — FOLIC ACID 1 MG: 1 TABLET ORAL at 09:32

## 2020-01-01 RX ADMIN — SODIUM CHLORIDE, PRESERVATIVE FREE 10 ML: 5 INJECTION INTRAVENOUS at 12:48

## 2020-01-01 RX ADMIN — IPRATROPIUM BROMIDE AND ALBUTEROL SULFATE 3 ML: 2.5; .5 SOLUTION RESPIRATORY (INHALATION) at 16:12

## 2020-01-01 RX ADMIN — FOLIC ACID 1 MG: 1 TABLET ORAL at 08:17

## 2020-01-01 RX ADMIN — PETROLATUM 100 APPLICATION: 420 OINTMENT TOPICAL at 20:39

## 2020-01-01 RX ADMIN — IPRATROPIUM BROMIDE AND ALBUTEROL SULFATE 3 ML: 2.5; .5 SOLUTION RESPIRATORY (INHALATION) at 12:39

## 2020-01-01 RX ADMIN — IPRATROPIUM BROMIDE AND ALBUTEROL SULFATE 3 ML: 2.5; .5 SOLUTION RESPIRATORY (INHALATION) at 06:52

## 2020-01-01 RX ADMIN — FOLIC ACID 1 MG: 1 TABLET ORAL at 08:55

## 2020-01-01 RX ADMIN — ACETYLCYSTEINE 4 ML: 200 SOLUTION ORAL; RESPIRATORY (INHALATION) at 15:33

## 2020-01-01 RX ADMIN — PREDNISONE 10 MG: 10 TABLET ORAL at 08:56

## 2020-01-01 RX ADMIN — IPRATROPIUM BROMIDE AND ALBUTEROL SULFATE 3 ML: 2.5; .5 SOLUTION RESPIRATORY (INHALATION) at 20:16

## 2020-01-01 RX ADMIN — FUROSEMIDE 60 MG: 10 INJECTION, SOLUTION INTRAMUSCULAR; INTRAVENOUS at 08:55

## 2020-01-01 RX ADMIN — ACETAMINOPHEN 650 MG: 325 TABLET, FILM COATED ORAL at 20:26

## 2020-01-01 RX ADMIN — IPRATROPIUM BROMIDE AND ALBUTEROL SULFATE 3 ML: 2.5; .5 SOLUTION RESPIRATORY (INHALATION) at 11:07

## 2020-01-01 RX ADMIN — NYSTATIN 500000 UNITS: 100000 SUSPENSION ORAL at 17:39

## 2020-01-01 RX ADMIN — PETROLATUM 100 APPLICATION: 420 OINTMENT TOPICAL at 09:57

## 2020-01-01 RX ADMIN — SODIUM CHLORIDE 30 ML/HR: 9 INJECTION, SOLUTION INTRAVENOUS at 10:51

## 2020-01-01 RX ADMIN — ASPIRIN 81 MG: 81 TABLET, CHEWABLE ORAL at 08:31

## 2020-01-01 RX ADMIN — CASTOR OIL AND BALSAM, PERU 5 G: 788; 87 OINTMENT TOPICAL at 20:16

## 2020-01-01 RX ADMIN — ASPIRIN 81 MG: 81 TABLET, CHEWABLE ORAL at 09:57

## 2020-01-01 RX ADMIN — OLANZAPINE 2.5 MG: 2.5 TABLET, FILM COATED ORAL at 20:38

## 2020-01-01 RX ADMIN — ASPIRIN 81 MG: 81 TABLET, CHEWABLE ORAL at 10:12

## 2020-01-01 RX ADMIN — CASTOR OIL AND BALSAM, PERU 5 G: 788; 87 OINTMENT TOPICAL at 09:24

## 2020-01-01 RX ADMIN — CASTOR OIL AND BALSAM, PERU 5 G: 788; 87 OINTMENT TOPICAL at 20:58

## 2020-01-01 RX ADMIN — OLANZAPINE 7.5 MG: 7.5 TABLET ORAL at 20:10

## 2020-01-01 RX ADMIN — CASTOR OIL AND BALSAM, PERU 5 G: 788; 87 OINTMENT TOPICAL at 10:14

## 2020-01-01 RX ADMIN — OLANZAPINE 2.5 MG: 2.5 TABLET, FILM COATED ORAL at 21:33

## 2020-01-01 RX ADMIN — IPRATROPIUM BROMIDE AND ALBUTEROL SULFATE 3 ML: 2.5; .5 SOLUTION RESPIRATORY (INHALATION) at 07:17

## 2020-01-01 RX ADMIN — NYSTATIN 500000 UNITS: 100000 SUSPENSION ORAL at 09:29

## 2020-01-01 RX ADMIN — PETROLATUM 100 APPLICATION: 420 OINTMENT TOPICAL at 21:25

## 2020-01-01 RX ADMIN — SERTRALINE 25 MG: 25 TABLET, FILM COATED ORAL at 08:41

## 2020-01-01 RX ADMIN — ATORVASTATIN CALCIUM 80 MG: 80 TABLET, FILM COATED ORAL at 21:27

## 2020-01-01 RX ADMIN — NYSTATIN 500000 UNITS: 100000 SUSPENSION ORAL at 17:52

## 2020-01-01 RX ADMIN — DOXYCYCLINE 100 MG: 100 CAPSULE ORAL at 09:32

## 2020-01-01 RX ADMIN — ASPIRIN 81 MG: 81 TABLET, CHEWABLE ORAL at 08:58

## 2020-01-01 RX ADMIN — NYSTATIN 500000 UNITS: 100000 SUSPENSION ORAL at 12:12

## 2020-01-01 RX ADMIN — PETROLATUM 100 APPLICATION: 420 OINTMENT TOPICAL at 09:24

## 2020-01-01 RX ADMIN — ENOXAPARIN SODIUM 30 MG: 30 INJECTION SUBCUTANEOUS at 18:24

## 2020-01-01 RX ADMIN — ASPIRIN 81 MG: 81 TABLET, CHEWABLE ORAL at 09:27

## 2020-01-01 RX ADMIN — ASPIRIN 81 MG: 81 TABLET, CHEWABLE ORAL at 08:17

## 2020-01-01 RX ADMIN — SERTRALINE 25 MG: 25 TABLET, FILM COATED ORAL at 10:12

## 2020-01-01 RX ADMIN — NYSTATIN 500000 UNITS: 100000 SUSPENSION ORAL at 20:37

## 2020-01-01 RX ADMIN — PETROLATUM 100 APPLICATION: 420 OINTMENT TOPICAL at 08:17

## 2020-01-01 RX ADMIN — GUAIFENESIN 1200 MG: 600 TABLET, EXTENDED RELEASE ORAL at 20:38

## 2020-01-01 RX ADMIN — ACETYLCYSTEINE 4 ML: 200 SOLUTION ORAL; RESPIRATORY (INHALATION) at 07:43

## 2020-01-01 RX ADMIN — BUDESONIDE 0.5 MG: 0.5 INHALANT RESPIRATORY (INHALATION) at 20:16

## 2020-01-01 RX ADMIN — BUDESONIDE 0.5 MG: 0.5 INHALANT RESPIRATORY (INHALATION) at 07:16

## 2020-01-01 RX ADMIN — ACETYLCYSTEINE 4 ML: 200 SOLUTION ORAL; RESPIRATORY (INHALATION) at 16:16

## 2020-01-01 RX ADMIN — DOXYCYCLINE 100 MG: 100 CAPSULE ORAL at 09:10

## 2020-01-01 RX ADMIN — IPRATROPIUM BROMIDE AND ALBUTEROL SULFATE 3 ML: 2.5; .5 SOLUTION RESPIRATORY (INHALATION) at 11:15

## 2020-01-01 RX ADMIN — CASTOR OIL AND BALSAM, PERU 5 G: 788; 87 OINTMENT TOPICAL at 08:55

## 2020-01-01 RX ADMIN — OLANZAPINE 7.5 MG: 7.5 TABLET ORAL at 20:29

## 2020-01-01 RX ADMIN — OLANZAPINE 2.5 MG: 2.5 TABLET, FILM COATED ORAL at 20:17

## 2020-01-01 RX ADMIN — NYSTATIN 500000 UNITS: 100000 SUSPENSION ORAL at 12:44

## 2020-01-01 RX ADMIN — LEVOTHYROXINE SODIUM 75 MCG: 75 TABLET ORAL at 08:44

## 2020-01-01 RX ADMIN — GUAIFENESIN 1200 MG: 600 TABLET, EXTENDED RELEASE ORAL at 08:40

## 2020-01-01 RX ADMIN — LEVOTHYROXINE SODIUM 75 MCG: 75 TABLET ORAL at 05:25

## 2020-01-01 RX ADMIN — MAGNESIUM SULFATE 2 G: 2 INJECTION INTRAVENOUS at 04:20

## 2020-01-01 RX ADMIN — POLYETHYLENE GLYCOL 3350 17 G: 17 POWDER, FOR SOLUTION ORAL at 14:53

## 2020-01-01 RX ADMIN — ALBUTEROL SULFATE 2.5 MG: 2.5 SOLUTION RESPIRATORY (INHALATION) at 15:07

## 2020-01-01 RX ADMIN — OXYBUTYNIN CHLORIDE 5 MG: 5 TABLET, EXTENDED RELEASE ORAL at 08:56

## 2020-01-01 RX ADMIN — FOLIC ACID 1 MG: 1 TABLET ORAL at 08:12

## 2020-01-01 RX ADMIN — ARFORMOTEROL TARTRATE 15 MCG: 15 SOLUTION RESPIRATORY (INHALATION) at 09:02

## 2020-01-01 RX ADMIN — GUAIFENESIN 1200 MG: 600 TABLET, EXTENDED RELEASE ORAL at 09:56

## 2020-01-01 RX ADMIN — IPRATROPIUM BROMIDE AND ALBUTEROL SULFATE 3 ML: 2.5; .5 SOLUTION RESPIRATORY (INHALATION) at 22:02

## 2020-01-01 RX ADMIN — PHENYLEPHRINE HYDROCHLORIDE 200 MCG: 10 INJECTION INTRAVENOUS at 11:18

## 2020-01-01 RX ADMIN — ENOXAPARIN SODIUM 40 MG: 40 INJECTION SUBCUTANEOUS at 23:05

## 2020-01-01 RX ADMIN — IPRATROPIUM BROMIDE AND ALBUTEROL SULFATE 3 ML: 2.5; .5 SOLUTION RESPIRATORY (INHALATION) at 07:46

## 2020-01-01 RX ADMIN — CASTOR OIL AND BALSAM, PERU 5 G: 788; 87 OINTMENT TOPICAL at 20:52

## 2020-01-01 RX ADMIN — CASTOR OIL AND BALSAM, PERU 5 G: 788; 87 OINTMENT TOPICAL at 09:31

## 2020-01-01 RX ADMIN — IPRATROPIUM BROMIDE AND ALBUTEROL SULFATE 3 ML: 2.5; .5 SOLUTION RESPIRATORY (INHALATION) at 23:23

## 2020-01-01 RX ADMIN — SODIUM CHLORIDE, PRESERVATIVE FREE 10 ML: 5 INJECTION INTRAVENOUS at 20:47

## 2020-01-01 RX ADMIN — PREDNISONE 10 MG: 10 TABLET ORAL at 08:40

## 2020-01-01 RX ADMIN — IPRATROPIUM BROMIDE AND ALBUTEROL SULFATE 3 ML: 2.5; .5 SOLUTION RESPIRATORY (INHALATION) at 19:31

## 2020-01-01 RX ADMIN — ACETYLCYSTEINE 4 ML: 200 SOLUTION ORAL; RESPIRATORY (INHALATION) at 08:25

## 2020-01-01 RX ADMIN — OLANZAPINE 7.5 MG: 7.5 TABLET ORAL at 20:12

## 2020-01-01 RX ADMIN — SODIUM CHLORIDE, PRESERVATIVE FREE 10 ML: 5 INJECTION INTRAVENOUS at 21:34

## 2020-01-01 RX ADMIN — LEVOTHYROXINE SODIUM 75 MCG: 75 TABLET ORAL at 06:08

## 2020-01-01 RX ADMIN — IPRATROPIUM BROMIDE AND ALBUTEROL SULFATE 3 ML: 2.5; .5 SOLUTION RESPIRATORY (INHALATION) at 19:49

## 2020-01-01 RX ADMIN — LEVOTHYROXINE SODIUM 75 MCG: 75 TABLET ORAL at 06:04

## 2020-01-01 RX ADMIN — OXYBUTYNIN CHLORIDE 5 MG: 5 TABLET, EXTENDED RELEASE ORAL at 09:10

## 2020-01-01 RX ADMIN — SODIUM CHLORIDE, PRESERVATIVE FREE 10 ML: 5 INJECTION INTRAVENOUS at 20:38

## 2020-01-01 RX ADMIN — POLYETHYLENE GLYCOL 3350 17 G: 17 POWDER, FOR SOLUTION ORAL at 08:56

## 2020-01-01 RX ADMIN — CEFTRIAXONE SODIUM 1 G: 1 INJECTION, SOLUTION INTRAVENOUS at 23:29

## 2020-01-01 RX ADMIN — IPRATROPIUM BROMIDE AND ALBUTEROL SULFATE 3 ML: 2.5; .5 SOLUTION RESPIRATORY (INHALATION) at 15:43

## 2020-01-01 RX ADMIN — SERTRALINE 25 MG: 25 TABLET, FILM COATED ORAL at 09:10

## 2020-01-01 RX ADMIN — NYSTATIN 500000 UNITS: 100000 SUSPENSION ORAL at 21:08

## 2020-01-01 RX ADMIN — ENOXAPARIN SODIUM 30 MG: 30 INJECTION SUBCUTANEOUS at 18:01

## 2020-01-01 RX ADMIN — PROPOFOL 100 MG: 10 INJECTION, EMULSION INTRAVENOUS at 11:07

## 2020-01-03 NOTE — PROGRESS NOTES
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  Below, please find the specifics of the course of treatment delivered:    Radiation Details:    Dates of treatment:  12/17/2019 - 1/2/2020.  Treatment Site - L ADRENAL  Treatment Intent - Curative  Total Dose in cGy - 3000  Number of Treatments - 10  Dose per fraction - 300 cGy per fraction  Fractions per day - 1 fx/day  Fractions per week - 5 fx/week  Treatment Type - 3D  Energy - 18 MVP  Normalization - Volumetric Normalization-- see below  Imaging/Field Verification - IGRT using CBCT daily  Additional comments: - 100% covers 90% PTV  CBCT to GTV/PTV watch cord      Tolerance and Toxicities: she tolerated the treatments well with the expected fatigue , requiring no treatment breaks. she completed the treatments in 16 elapsed days.    Follow-up Plans:  I have asked the patient to return to see me in approximately 5 weeks with CT of abdomen/pelvis .  I have also made a referral to our Survivorship [facility].

## 2020-01-08 PROBLEM — D64.9 NORMOCYTIC ANEMIA: Status: ACTIVE | Noted: 2020-01-01

## 2020-01-08 NOTE — PROGRESS NOTES
Subjective .     REASONS FOR FOLLOWUP:    1. Metastatic non-small cell lung cancer (adenocarcinoma):  · Patient with long-standing smoking history x40 years quit in 2008  · Underlying significant COPD with FEV1 1.4 (58%) on 11/20/2014  · CT 5/16/2019 with left upper lobe mass, partially cavitary measuring 2.3 x 1.5 cm.  Additional 8 mm similar appearing right upper lobe nodule.  Bibasilar consolidation, small bilateral pleural effusions, mediastinal lymphadenopathy up to 2.7 centimeters.  · Bronchoscopy 5/20/2019 with identification of left mainstem malignancy extending to the level of the jose, biopsy showed poorly differentiated adenocarcinoma of pulmonary origin. EBUS with FNA station 7 lymph node negative for malignant cells, only scattered lymphoid aggregates.  BAL left upper lobe negative for malignant cells.  Negative EGFR mutation, negative ALK/ROS 1 rearrangement. PDL1 95%.  · Patient was intubated with possible pneumonia, significant mucus plugging with underlying significant COPD.  Extubated on 5/28/2019.   · Staging evaluation performed during hospitalization with negative CT abdomen/pelvis, negative bone scan.  · MRI brain 5/29/2019 with evidence of metastatic disease, 3-4 enhancing lesions involving left occipital lobe 5 mm, left posterior parietal lobe 4 mm, left frontal lobe 4 mm, left parietal 3-4 mm with small amounts of surrounding edema.  Given the minimal extent of disease elected to observe with short interval MRI.  · Discussion regarding systemic therapy with single agent Keytruda due to PDL1 95%  · Subsequent disease progression on CT 6/8/2019 with increasing left upper lobe mass, mediastinal and hilar lymphadenopathy with occlusion of left mainstem bronchus at origin.  · Received palliative radiation therapy to left mainstem bronchus x10 fractions, completed 6/25/2019.  · Initiated systemic therapy with Keytruda 6/28/2019.  · Scans 8/6/2019 following 2 cycles Keytruda with decrease left  upper lobe primary lesion and left hilar/mediastinal lymphadenopathy.  Response primarily felt to be related to radiation therapy.  Continuation of immunotherapy.  · CT scans 9/17/2019 following 4 cycles Keytruda with further decrease in AP window lymph node, suspected evolving postradiation change in left lower lobe.  MRI brain 9/17/2019 with improvement in 3 small metastatic lesions.  Continuation of immunotherapy.  · CT scan 10/29/2019 following 6 cycles Keytruda with new/enlarging 2 cm left upper lobe lesion, stable mediastinal lymph nodes, increased air space opacities in the lungs bilaterally, new left adrenal nodule 1.3 cm.  MRI brain with stable to slightly improved findings 10/29/2019.  Cycle 7 Keytruda held 11/8/2019 with plans to pursue PET scan.  Unclear whether pulmonary findings represented infectious change versus pneumonitis from immunotherapy.  · PET scan 11/14/2019 with worsening areas of consolidation involving the lungs bilaterally, decrease in left upper lobe nodular opacification with minimal uptake, stable mildly hypermetabolic mediastinal and left hilar lymph nodes, intensely hypermetabolic left adrenal 1.7 cm lesion SUV 11.2.  With clinical improvement in pulmonary status, pulmonary changes suspected to represent pneumonitis from immunotherapy.  The only area of true progression in left adrenal, will pursue stereotactic radiation.  Patient will remain off immunotherapy, initiated prednisone 40 mg daily.  · CT chest 12/4/2019 with improvement in left upper and lower lobe consolidation, slight further progression left adrenal.  Prednisone tapered.  · Left adrenal radiation administered 12/17-1/2/2020.  · Currently continuing on course of observation regarding malignancy.  2. 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  · Patient extubated  5/28/2019.  · Subsequent occlusion of left mainstem bronchus from malignancy 6/8/2019.  Received palliative radiation completed 6/25/2019.  · Patient with decline in respiratory status in October 2019 requiring oxygen to be initiated.  Suspected related to immunotherapy induced pneumonitis.  Responded to steroids with improvement.  3. Prior hemorrhagic CVA:  · Occurred in 2008, residual left upper extremity weakness and left lower extremity weakness.   4. Anemia:  · Hemoglobin prior to admission was 13.1 on 5/16/2019  · Hemoglobin declined in the 10-11 range, related to malignancy, pneumonia  · Subsequent anemia evaluation 1/8/2019 with iron 21, ferritin 454, iron saturation 9%, TIBC 233, folate 3.77, B12 348.  Felt to be consistent with anemia of chronic disease and folate deficiency.  Initiated oral folic acid 1 mg daily.  5. Depression and nausea/anorexia and chronic fatigue:  · Significant improvement following addition of Zyprexa nightly, dose escalated to 7.5 mg.  6. Hypothyroidism:  · Labs 6/28/2019 with TSH 23.8, normal free T4 of 1.43  · Treated with levothyroxine 100 mcg daily  · Subsequent thyroid function studies with TSH 0.065 and free T4 elevated at 2.06, levothyroxine dose decreased 8/9/19 to 50 mcg daily.  · 11/8/2019 TSH 37.5, free T4 1.15, levothyroxine dose increased to 75 mcg daily.      HISTORY OF PRESENT ILLNESS:  The patient is a 74 y.o. year old female who is here for follow-up with the above-mentioned history.    History of Present Illness the patient returns today in follow-up with laboratory studies and CT chest to review, continuing on prednisone, currently receiving 20 mg daily (dose tapered on 12/4/2019).  The patient notes that she has improved since the last visit.  Her appetite has increased on steroids and her weight has been stable, currently 88 pounds.  She has mild intermittent cough, has tapered her oxygen down to 1 L via nasal cannula at night only, maintaining oxygen  saturation during the daytime in the 88-93% range off oxygen.  Patient remains in a wheelchair today as she does chronically related to her prior CVA.  She reports however that her mobility has improved continuing on physical therapy 2 times per week at home.  She has no new complaints.    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   • Hyperlipidemia    • Left hemiplegia (CMS/HCC) 12/10/2015   • Lung cancer (CMS/HCC)    • Myocardial infarct (CMS/HCC) 1996   • Osteopenia    • Stroke syndrome 2008   • Thyroid cyst     right   • Thyroid lump 12/10/2015    Description: Biopsy 05/15 at [facility], benign   • Transient cerebral ischemia    • Urge incontinence of urine      Past Surgical History:   Procedure Laterality Date   • BREAST BIOPSY     • BRONCHOSCOPY Bilateral 5/20/2019    Procedure: BRONCHOSCOPY WITH  BIOPSY AND BAL, WITH ENDOBRONCHIAL ULTRASOUND WITH FNA;  Surgeon: Chris Tirado MD;  Location: [facility] ENDOSCOPY;  Service: Pulmonary   • COLONOSCOPY      REC 07/2007, REC 02/2009, REC 12/2011, REC 01/14,  She says she cant do the prep because of poor mobility to get to .   • EYE SURGERY  1951   • OTHER SURGICAL HISTORY      Ventricular [place] holes for drainage of subdural hematoma   • TOTAL THYROIDECTOMY  08/2015    Dr. Ahmadi         ONCOLOGIC HISTORY:  (History from previous dates can be found in the separate document.)    Current Outpatient Medications on File Prior to Visit   Medication Sig Dispense Refill   • aspirin 81 MG chewable tablet Chew 81 mg Daily.     • atorvastatin (LIPITOR) 80  MG tablet Take 1 tablet by mouth Daily. Needs an appointment for further refills (Patient taking differently: Take 80 mg by mouth Every Night. Needs an appointment for further refills) 90 tablet 0   • budesonide (PULMICORT) 0.5 MG/2ML nebulizer solution Take 0.5 mg by nebulization Daily.     • formoterol (PERFOROMIST) 20 MCG/2ML nebulizer solution Take  by nebulization 2 (Two) Times a Day.     • guaiFENesin (ROBITUSSIN) 100 MG/5ML solution oral solution Take 20 mL by mouth Every 4 (Four) Hours. While awake 3600 mL 3   • levothyroxine (SYNTHROID, LEVOTHROID) 75 MCG tablet Take 1 tablet by mouth Daily. 30 tablet 0   • LORazepam (ATIVAN PO) Take 0.25 mg by mouth As Needed.     • OLANZapine (zyPREXA) 7.5 MG tablet Take 1 tablet by mouth Every Night. 30 tablet 2   • ondansetron (ZOFRAN) 4 MG tablet Take 1 tablet by mouth Every 6 (Six) Hours As Needed for Nausea or Vomiting. 60 tablet 2   • potassium chloride ER (K-TAB) 20 MEQ tablet controlled-release ER tablet Take 1 tablet by mouth Daily. 30 tablet 2   • predniSONE (DELTASONE) 20 MG tablet Take 1 tablet by mouth Daily. 60 tablet 1   • predniSONE (DELTASONE) 5 MG tablet Take 1 tablet by mouth 2 (Two) Times a Day. Take as directed (Patient taking differently: Take 10 mg by mouth Daily. Take as directed) 60 tablet 1   • sertraline (ZOLOFT) 25 MG tablet Take 1 tablet by mouth Daily. 90 tablet 1   • sodium chloride 7 % nebulizer solution nebulizer solution Take 4 mL by nebulization 2 (Two) Times a Day. And may use additional 2 times as needed chest congestion 480 mL 6   • SSD 1 % cream As Needed.     • tolterodine (DETROL) 1 MG tablet      • YUPELRI 175 MCG/3ML solution        No current facility-administered medications on file prior to visit.        ALLERGIES:     Allergies   Allergen Reactions   • Ace Inhibitors Cough     Other reaction(s): Cough       Social History     Socioeconomic History   • Marital status:      Spouse name: Miah   • Number of children: Not  on file   • Years of education: College   • Highest education level: Not on file   Occupational History     Employer: RETIRED   Tobacco Use   • Smoking status: Former Smoker     Types: Cigarettes     Last attempt to quit: 2008     Years since quittin.0   • Smokeless tobacco: Never Used   • Tobacco comment: caffeine use-soda   Substance and Sexual Activity   • Alcohol use: Yes     Comment: rarely   • Drug use: No   • Sexual activity: Defer     Family History   Problem Relation Age of Onset   • Heart attack Mother         Acute   • Heart disease Mother    • Colon polyps Sister         Sigmoid colon   • Coronary artery disease Brother    • Diabetes Brother    • Heart disease Brother    • Alcohol abuse Son               Review of Systems   Constitutional: Positive for appetite change and fatigue. Negative for activity change, fever and unexpected weight change.   HENT: Negative for congestion, mouth sores, nosebleeds, sore throat and voice change.    Respiratory: Positive for cough and shortness of breath. Negative for wheezing.    Cardiovascular: Negative for chest pain, palpitations and leg swelling.   Gastrointestinal: Negative for abdominal distention, abdominal pain, blood in stool, constipation, diarrhea, nausea and vomiting.   Endocrine: Negative for cold intolerance and heat intolerance.   Genitourinary: Negative for difficulty urinating, dysuria, frequency and hematuria.   Musculoskeletal: Negative for arthralgias, back pain, joint swelling and myalgias.   Skin: Negative for rash.   Neurological: Positive for weakness. Negative for dizziness, syncope, light-headedness, numbness and headaches.   Hematological: Negative for adenopathy. Does not bruise/bleed easily.   Psychiatric/Behavioral: Negative for confusion, dysphoric mood and sleep disturbance. The patient is not nervous/anxious.          Objective      There were no vitals filed for this visit.   Current Status 2019   ECOG score 2   Pain  0/10    Physical Exam   Constitutional: She is oriented to person, place, and time. She appears well-developed and well-nourished.   Elderly female no distress seated in a wheelchair with oxygen in place   HENT:   Mouth/Throat: Oropharynx is clear and moist.   Eyes: Conjunctivae are normal.   Neck: No thyromegaly present.   Cardiovascular: Normal rate and regular rhythm. Exam reveals no gallop and no friction rub.   No murmur heard.  Pulmonary/Chest: Breath sounds normal. No respiratory distress. She has no wheezes.   Scattered bilateral rhonchi, improved from prior exam   Abdominal: Soft. Bowel sounds are normal. She exhibits no distension. There is no tenderness.   Musculoskeletal: She exhibits no edema.   Lymphadenopathy:        Head (right side): No submandibular adenopathy present.     She has no cervical adenopathy.     She has no axillary adenopathy.        Right: No inguinal and no supraclavicular adenopathy present.        Left: No inguinal and no supraclavicular adenopathy present.   Neurological: She is alert and oriented to person, place, and time. She displays normal reflexes. No cranial nerve deficit. She exhibits normal muscle tone.   Left hemiparesis from prior CVA   Skin: Skin is warm and dry. No rash noted.   Psychiatric: She has a normal mood and affect. Her behavior is normal.       RECENT LABS:  Hematology WBC   Date Value Ref Range Status   12/26/2019 7.44 3.40 - 10.80 10*3/mm3 Final   10/31/2019 15.71 (H) 4.5 - 11 x10E3/uL Final     RBC   Date Value Ref Range Status   12/26/2019 4.04 3.77 - 5.28 10*6/mm3 Final   10/31/2019 4.13 (L) 4.20 - 5.40 x10E6/uL Final     Hemoglobin   Date Value Ref Range Status   12/26/2019 10.5 (L) 12.0 - 15.9 g/dL Final   10/31/2019 11.1 (L) 12.0 - 16.0 g/dL Final     Hematocrit   Date Value Ref Range Status   12/26/2019 34.6 34.0 - 46.6 % Final   10/31/2019 36.7 (L) 37.0 - 47.0 % Final     Platelets   Date Value Ref Range Status   12/26/2019 270 140 - 450 10*3/mm3  Final     External Platelets   Date Value Ref Range Status   10/31/2019 348 140 - 440 thou/cu mm Final        Lab Results   Component Value Date    GLUCOSE 106 12/26/2019    BUN 9 12/26/2019    CREATININE 0.71 12/26/2019    EGFRIFNONA 81 12/26/2019    EGFRIFAFRI 74 08/11/2016    BCR 12.7 12/26/2019    K 4.4 12/26/2019    CO2 24.7 12/26/2019    CALCIUM 8.5 12/26/2019    PROTENTOTREF 7.4 08/11/2016    ALBUMIN 3.00 (L) 12/26/2019    LABIL2 0.5 11/01/2019    AST 13 12/26/2019    ALT 6 12/26/2019     CT chest 12/4/2019 reviewed as outlined below, I did personally review CT images today.    Assessment/Plan     1. Metastatic non-small cell lung cancer (adenocarcinoma):  · Patient with long-standing smoking history x40 years quit in 2008  · Underlying significant COPD with FEV1 1.4 (58%) on 11/20/2014  · CT 5/16/2019 with left upper lobe mass, partially cavitary measuring 2.3 x 1.5 cm.  Additional 8 mm similar appearing right upper lobe nodule.  Bibasilar consolidation, small bilateral pleural effusions, mediastinal lymphadenopathy up to 2.7 centimeters.  · Bronchoscopy 5/20/2019 with identification of left mainstem malignancy extending to the level of the jose, biopsy showed poorly differentiated adenocarcinoma of pulmonary origin. EBUS with FNA station 7 lymph node negative for malignant cells, only scattered lymphoid aggregates.  BAL left upper lobe negative for malignant cells.  Negative EGFR mutation, negative ALK/ROS 1 rearrangement. PDL1 95%.  · Patient was intubated with possible pneumonia, significant mucus plugging with underlying significant COPD.  Extubated on 5/28/2019.   · Staging evaluation performed during hospitalization with negative CT abdomen/pelvis, negative bone scan.  · MRI brain 5/29/2019 with evidence of metastatic disease, 3-4 enhancing lesions involving left occipital lobe 5 mm, left posterior parietal lobe 4 mm, left frontal lobe 4 mm, left parietal 3-4 mm with small amounts of surrounding  edema.  Given the minimal extent of disease elected to observe with short interval MRI.  · Discussion regarding systemic therapy with single agent Keytruda due to PDL1 95%  · Subsequent disease progression on CT 6/8/2019 with increasing left upper lobe mass, mediastinal and hilar lymphadenopathy with occlusion of left mainstem bronchus at origin.  · Received palliative radiation therapy to left mainstem bronchus x10 fractions, completed 6/25/2019.  · Initiated systemic therapy with Keytruda 6/28/2019.  · CT scan following 2 cycles Keytruda and prior radiation from 8/6/2019 showed decrease in the left upper lobe primary lesion with essentially no residual discrete lung mass in that area.  Lymphadenopathy in the mediastinum/AP window and left hilum with significant improvement and resolution of left mainstem obstruction.  No new evidence of disease.  The patient did receive radiation therapy to both the primary left upper lobe mass and left hilum/mediastinum accounting for most of the improvement.  MRI brain 8/6/2019 with 3 enhancing metastatic lesions, 2 of which had a more nodular appearance as opposed to previous ring-enhancing appearance with stable size.  These were in the left occipital and left frontal regions.  The third lesion in the left parietal region decreased in size from 4 mm down to 2 mm.  There were no new lesions identified.  She has not undergone any specific treatment for her brain metastases.  Continuation of immunotherapy with Keytruda.  · Following 4 cycles Keytruda, 9/17/2019 CT scan showed improvement in AP window lymph node from 2.7 x 1.9 down to 2.1 x 1.5 cm.  Progressive change in the left lower lobe around the pleura, with one area having a masslike appearance suspected secondary to radiation pneumonitis rather than malignancy.  MRI brain with small metastatic lesions decreased in size.  · CT scan 10/29/2019 following 6 cycles Keytruda with new/enlarging 2 cm left upper lobe lesion, stable  mediastinal lymph nodes, increased air space opacities in the lungs bilaterally, new left adrenal nodule 1.3 cm.  MRI brain with stable to slightly improved findings 10/29/2019.  Cycle 7 Keytruda held 11/8/2019 with plans to pursue PET scan.  Unclear whether pulmonary findings represented infectious change versus pneumonitis from immunotherapy.  · PET scan 11/14/2019 with worsening areas of consolidation involving the lungs bilaterally, decrease in left upper lobe nodular opacification with minimal uptake, stable mildly hypermetabolic mediastinal and left hilar lymph nodes, intensely hypermetabolic left adrenal 1.7 cm lesion SUV 11.2.  With clinical improvement in pulmonary status, pulmonary changes suspected to represent pneumonitis from immunotherapy.  The only area of true progression in left adrenal, will pursue stereotactic radiation.  Patient will remain off immunotherapy, initiated prednisone 40 mg daily.  · CT chest 12/4/2019 with improvement in left upper and lower lobe consolidation, slight further progression left adrenal.  Prednisone tapered.  · Left adrenal stereotactic radiation to be administered 12/17-12/26/2019 (5 fractions).  The patient returns today in follow-up with CT chest to review from 12/4/2019, continuing on prednisone 20 mg daily (tapered on 12/4/2019 from 40 mg daily).  · In the interval, the patient has improved.  Her appetite has increased and her weight is stable at 88 pounds.  Her respiratory status has improved and she has tapered her oxygen down to 1 L via nasal cannula at night only.  She is maintaining oxygen saturation during the daytime off of oxygen at 88-93%.  She is gaining some strength, participating with physical therapy at home 2 days/week.  We reviewed her CT findings from 12/4/2019 and a short interval.  This shows some improvement with decrease in consolidation in the left upper lobe at site of the primary lesion, decrease in consolidation in the left lower lobe with  increased aeration.  There is no change in the right lower lobe consolidation.  There was no change in paratracheal lymph node.  The adrenal gland did increase slightly further in size up to 1.6 cm on the left.  We discussed that it is unclear how much of the findings represent radiation pneumonitis, improving inflammation from previous pneumonia, or improving immunotherapy induced pneumonitis due to steroids.  We will continue to taper prednisone and will decrease her dose from 20 mg down to 15 mg daily today.  She will proceed on with radiation to the solitary site of definitive progression involving the left adrenal gland.  She will be receiving stereotactic radiation to the site over 5 fractions from 12/17 through 12/26/2019.  For now she will remain off of systemic therapy for her malignancy.  She will return in 1 week and again in 2 weeks for nurse practitioner visit and if she is remaining stable clinically we will continue to taper prednisone in 1 week down to 10 mg daily and in 2 weeks down to 5 mg daily (I sent the new prescription today for 5 mg tablets) and she will remain on the 5 mg dose until I see her in 4 weeks for clinical reassessment.  We will discuss at that time interval for further CT monitoring.  We will continue to consider in the future pending further CT results whether she may be a candidate for further systemic palliative therapy with single agent chemo.  This was all reviewed with the patient, her , and her son today, questions answered to their satisfaction.  2. 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  · Patient extubated 5/28/2019.  · Subsequent occlusion of left mainstem bronchus from malignancy 6/8/2019.  Received palliative radiation completed 6/25/2019.  · Patient hospitalized while in [place] at [facility]  [place] in October 2019 due to suspected aspiration pneumonia.  Doubtful that pulmonary findings are related to immunotherapy due to improvement on antibiotics and productive cough.  · Patient with decline in respiratory status in October 2019 requiring oxygen to be initiated.  Suspected related to immunotherapy induced pneumonitis.  Immunotherapy discontinued and initiated empiric prednisone 40 mg daily on 11/20/2019.  · Patient with overall improvement in respiratory status, currently on oxygen 1 L nasal cannula at night only with improvement in cough.  Prednisone tapered to 20 mg daily on 12/4/2019.  We will taper prednisone today down to 15 mg daily.  As above, patient remains clinically stable in 1 week we will taper down to 10 mg daily and in 2 weeks taper down to 5 mg daily and remain on this dose until I see the patient in 4 weeks for follow-up.  3. Prior hemorrhagic CVA:  · Occurred in 2008, residual left upper extremity weakness and left lower extremity weakness.   4. Anemia:  · Hemoglobin prior to admission was 13.1 on 5/16/2019  · Hemoglobin declined in the 10-11 range, related to malignancy, pneumonia  · Hemoglobin today 11.2  7. Hypothyroidism:  · Labs 6/28/2019 with TSH 23.8, normal free T4 of 1.43  · Patient was started on levothyroxine 100 mcg daily  · Repeat thyroid function studies on 8/9/2019 with TSH suppressed at 0.065 and elevated free T4 of 2.06.  Decreased levothyroxine dose to 50 mcg daily.    · 11/8/2019 TSH 37.5, free T4 1.15, levothyroxine dose increased to 75 mcg daily.  · Recheck TSH, free T4 in 2 weeks.  8. Depression and nausea/anorexia:  · Patient with depression related to her underlying medical illness as well as social situation with her son who is an alcoholic and lives in the home  · Patient has been treated with Depakote 250 mg every 12 hours for mood stabilization under the direction of her primary care physician  · Patient with significant depressive symptoms, currently  continuing on Zoloft under the direction of her primary care physician.  · Patient also with ongoing anorexia and nausea, initiated Zyprexa 2.5 mg nightly on 7/19/2019 with dramatic improvement in symptoms  · Patient seen in the supportive oncology clinic on 7/29/2019, tapered off Depakote.  · Patient has PRN Compazine and Zyprexa to use.  · Zyprexa dose titrated to 5 mg and subsequently on 9/20/2019 up to 7.5 mg nightly.  9. Hypokalemia:  · Potassium on 8/9/2019 profoundly low at 2.5  · Magnesium low normal at 1.7  · Prescribed 20 mEq IV potassium chloride   · Potassium today 4.3  10. Dizziness/lightheadedness with relative hypotension:  · Cortisol level normal on 8/30/2019 at 33.16  · Patient tapered off metoprolol by cardiology 9/13/2019  · Symptoms currently significantly improved.  11. GI prophylaxis:  · Prilosec 20 mg daily while remaining on steroids.      Plan:  1. Taper prednisone to 15 mg daily (prescription sent for 5 mg tablets today).  2. Begin stereotactic radiation to left adrenal with Dr. Whalen on 12/17/2019 with plan for 5 fractions to be completed on 12/26/2019.  3. 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  Pending performance status and status of her disease we will consider possible future use of palliative single agent chemotherapy.  4. Continue home physical therapy  5. In 1 week nurse practitioner visit with CBC, CMP.  If patient is clinically stable, taper prednisone to 10 mg daily  6. In 2 weeks nurse practitioner visit with CBC, CMP, TSH, free T4.  Patient is clinically stable, taper prednisone to 5 mg daily.  Patient will remain on this dose.  7. MD visit in 4 weeks with CBC, CMP.  Consider further prednisone taper and we will discuss timing for further follow-up scans.

## 2020-01-09 NOTE — TELEPHONE ENCOUNTER
Left message for patient informing her that she is folate deficient and that Dr. Godfrey has sent a prescription to her pharmacy for 1 mg of folic acid to be taken daily.

## 2020-01-09 NOTE — PROGRESS NOTES
Subjective .     REASONS FOR FOLLOWUP:    1. Metastatic non-small cell lung cancer (adenocarcinoma):  · Patient with long-standing smoking history x40 years quit in 2008  · Underlying significant COPD with FEV1 1.4 (58%) on 11/20/2014  · CT 5/16/2019 with left upper lobe mass, partially cavitary measuring 2.3 x 1.5 cm.  Additional 8 mm similar appearing right upper lobe nodule.  Bibasilar consolidation, small bilateral pleural effusions, mediastinal lymphadenopathy up to 2.7 centimeters.  · Bronchoscopy 5/20/2019 with identification of left mainstem malignancy extending to the level of the jose, biopsy showed poorly differentiated adenocarcinoma of pulmonary origin. EBUS with FNA station 7 lymph node negative for malignant cells, only scattered lymphoid aggregates.  BAL left upper lobe negative for malignant cells.  Negative EGFR mutation, negative ALK/ROS 1 rearrangement. PDL1 95%.  · Patient was intubated with possible pneumonia, significant mucus plugging with underlying significant COPD.  Extubated on 5/28/2019.   · Staging evaluation performed during hospitalization with negative CT abdomen/pelvis, negative bone scan.  · MRI brain 5/29/2019 with evidence of metastatic disease, 3-4 enhancing lesions involving left occipital lobe 5 mm, left posterior parietal lobe 4 mm, left frontal lobe 4 mm, left parietal 3-4 mm with small amounts of surrounding edema.  Given the minimal extent of disease elected to observe with short interval MRI.  · Discussion regarding systemic therapy with single agent Keytruda due to PDL1 95%  · Subsequent disease progression on CT 6/8/2019 with increasing left upper lobe mass, mediastinal and hilar lymphadenopathy with occlusion of left mainstem bronchus at origin.  · Received palliative radiation therapy to left mainstem bronchus x10 fractions, completed 6/25/2019.  · Initiated systemic therapy with Keytruda 6/28/2019.  · Scans 8/6/2019 following 2 cycles Keytruda with decrease left  upper lobe primary lesion and left hilar/mediastinal lymphadenopathy.  Response primarily felt to be related to radiation therapy.  Continuation of immunotherapy.  · CT scans 9/17/2019 following 4 cycles Keytruda with further decrease in AP window lymph node, suspected evolving postradiation change in left lower lobe.  MRI brain 9/17/2019 with improvement in 3 small metastatic lesions.  Continuation of immunotherapy.  · CT scan 10/29/2019 following 6 cycles Keytruda with new/enlarging 2 cm left upper lobe lesion, stable mediastinal lymph nodes, increased air space opacities in the lungs bilaterally, new left adrenal nodule 1.3 cm.  MRI brain with stable to slightly improved findings 10/29/2019.  Cycle 7 Keytruda held 11/8/2019 with plans to pursue PET scan.  Unclear whether pulmonary findings represented infectious change versus pneumonitis from immunotherapy.  · PET scan 11/14/2019 with worsening areas of consolidation involving the lungs bilaterally, decrease in left upper lobe nodular opacification with minimal uptake, stable mildly hypermetabolic mediastinal and left hilar lymph nodes, intensely hypermetabolic left adrenal 1.7 cm lesion SUV 11.2.  With clinical improvement in pulmonary status, pulmonary changes suspected to represent pneumonitis from immunotherapy.  The only area of true progression in left adrenal, will pursue stereotactic radiation.  Patient will remain off immunotherapy, initiated prednisone 40 mg daily.  · CT chest 12/4/2019 with improvement in left upper and lower lobe consolidation, slight further progression left adrenal.  Prednisone tapered.  · Left adrenal radiation completed 1/2/2020  · Patient currently remains on course of observation in regards to underlying malignancy.  2. 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  · Patient extubated  5/28/2019.  · Subsequent occlusion of left mainstem bronchus from malignancy 6/8/2019.  Received palliative radiation completed 6/25/2019.  · Patient with decline in respiratory status in October 2019 requiring oxygen to be initiated.  Suspected related to immunotherapy induced pneumonitis.  Response to steroids with significant improvement in respiratory status.  3. Prior hemorrhagic CVA:  · Occurred in 2008, residual left upper extremity weakness and left lower extremity weakness.   4. Anemia:  · Hemoglobin prior to admission was 13.1 on 5/16/2019  · Hemoglobin declined in the 10-11 range, related to malignancy, pneumonia  5. Depression and nausea/anorexia and chronic fatigue:  · Significant improvement following addition of Zyprexa nightly, dose escalated to 7.5 mg.  6. Hypothyroidism:  · Labs 6/28/2019 with TSH 23.8, normal free T4 of 1.43  · Treated with levothyroxine 100 mcg daily  · Subsequent thyroid function studies with TSH 0.065 and free T4 elevated at 2.06, levothyroxine dose decreased 8/9/19 to 50 mcg daily.  · 11/8/2019 TSH 37.5, free T4 1.15, levothyroxine dose increased to 75 mcg daily.      HISTORY OF PRESENT ILLNESS:  The patient is a 74 y.o. year old female who is here for follow-up with the above-mentioned history.    History of Present Illness patient returns today in follow-up with laboratory studies to review.  In the interval she did continue tapering prednisone down to 5 mg daily on 12/26/2019 and has remained on this dose.  She reports that her respiratory status has improved overall since being on steroids and has not worsened on her steroid taper.  She is using oxygen at night only 1 L nasal cannula.  She does note occasional desaturation with activity during the day, for the most part maintains oxygen saturation in the 89-91% range.  She reports occasional chronic cough.  She is maintaining a stable appetite and weight however continues only at 89 pounds.  She does note some mild fatigue  since radiation which was completed on 1/2/2020 to the left adrenal gland.    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   • Hyperlipidemia    • Left hemiplegia (CMS/HCC) 12/10/2015   • Lung cancer (CMS/HCC)    • Myocardial infarct (CMS/HCC) 1996   • Osteopenia    • Stroke syndrome 2008   • Thyroid cyst     right   • Thyroid lump 12/10/2015    Description: Biopsy 05/15 at [facility], benign   • Transient cerebral ischemia    • Urge incontinence of urine      Past Surgical History:   Procedure Laterality Date   • BREAST BIOPSY     • BRONCHOSCOPY Bilateral 5/20/2019    Procedure: BRONCHOSCOPY WITH  BIOPSY AND BAL, WITH ENDOBRONCHIAL ULTRASOUND WITH FNA;  Surgeon: Chris Tirado MD;  Location: [facility] ENDOSCOPY;  Service: Pulmonary   • COLONOSCOPY      REC 07/2007, REC 02/2009, REC 12/2011, REC 01/14,  She says she cant do the prep because of poor mobility to get to .   • EYE SURGERY  1950   • OTHER SURGICAL HISTORY      Ventricular [place] for drainage of subdural hematoma   • TOTAL THYROIDECTOMY  08/2015    Dr. Ahmadi         ONCOLOGIC HISTORY:  (History from previous dates can be found in the separate document.)    Current Outpatient Medications on File Prior to Visit   Medication Sig Dispense Refill   • aspirin 81 MG chewable tablet Chew 81 mg Daily.     • atorvastatin (LIPITOR) 80 MG tablet Take 1 tablet by mouth Daily. Needs an appointment for further refills (Patient taking differently: Take 80 mg by mouth Every Night. Needs an appointment for further refills) 90 tablet 0   • budesonide  (PULMICORT) 0.5 MG/2ML nebulizer solution Take 0.5 mg by nebulization Daily.     • formoterol (PERFOROMIST) 20 MCG/2ML nebulizer solution Take  by nebulization 2 (Two) Times a Day.     • guaiFENesin (ROBITUSSIN) 100 MG/5ML solution oral solution Take 20 mL by mouth Every 4 (Four) Hours. While awake 3600 mL 3   • levothyroxine (SYNTHROID, LEVOTHROID) 75 MCG tablet Take 1 tablet by mouth Daily. 30 tablet 0   • LORazepam (ATIVAN PO) Take 0.25 mg by mouth As Needed.     • OLANZapine (zyPREXA) 7.5 MG tablet Take 1 tablet by mouth Every Night. 30 tablet 2   • ondansetron (ZOFRAN) 4 MG tablet Take 1 tablet by mouth Every 6 (Six) Hours As Needed for Nausea or Vomiting. 60 tablet 2   • potassium chloride ER (K-TAB) 20 MEQ tablet controlled-release ER tablet Take 1 tablet by mouth Daily. 30 tablet 2   • predniSONE (DELTASONE) 5 MG tablet Take 1 tablet by mouth 2 (Two) Times a Day. Take as directed (Patient taking differently: Take 10 mg by mouth Daily. Take as directed) 60 tablet 1   • sertraline (ZOLOFT) 25 MG tablet Take 1 tablet by mouth Daily. 90 tablet 1   • sodium chloride 7 % nebulizer solution nebulizer solution Take 4 mL by nebulization 2 (Two) Times a Day. And may use additional 2 times as needed chest congestion 480 mL 6   • SSD 1 % cream As Needed.     • tolterodine (DETROL) 1 MG tablet      • YUPELRI 175 MCG/3ML solution      • predniSONE (DELTASONE) 20 MG tablet Take 1 tablet by mouth Daily. (Patient taking differently: Take 5 mg by mouth Daily.) 60 tablet 1     No current facility-administered medications on file prior to visit.        ALLERGIES:     Allergies   Allergen Reactions   • Ace Inhibitors Cough     Other reaction(s): Cough       Social History     Socioeconomic History   • Marital status:      Spouse name: Miah   • Number of children: Not on file   • Years of education: College   • Highest education level: Not on file   Occupational History     Employer: RETIRED   Tobacco Use   • Smoking  status: Former Smoker     Types: Cigarettes     Last attempt to quit:      Years since quittin.0   • Smokeless tobacco: Never Used   • Tobacco comment: caffeine use-soda   Substance and Sexual Activity   • Alcohol use: Yes     Comment: rarely   • Drug use: No   • Sexual activity: Defer     Family History   Problem Relation Age of Onset   • Heart attack Mother         Acute   • Heart disease Mother    • Colon polyps Sister         Sigmoid colon   • Coronary artery disease Brother    • Diabetes Brother    • Heart disease Brother    • Alcohol abuse Son               Review of Systems   Constitutional: Positive for fatigue. Negative for activity change, appetite change, fever and unexpected weight change.   HENT: Negative for congestion, mouth sores, nosebleeds, sore throat and voice change.    Respiratory: Positive for cough and shortness of breath. Negative for wheezing.    Cardiovascular: Negative for chest pain, palpitations and leg swelling.   Gastrointestinal: Negative for abdominal distention, abdominal pain, blood in stool, constipation, diarrhea, nausea and vomiting.   Endocrine: Negative for cold intolerance and heat intolerance.   Genitourinary: Negative for difficulty urinating, dysuria, frequency and hematuria.   Musculoskeletal: Negative for arthralgias, back pain, joint swelling and myalgias.   Skin: Negative for rash.   Neurological: Positive for weakness. Negative for dizziness, syncope, light-headedness, numbness and headaches.   Hematological: Negative for adenopathy. Does not bruise/bleed easily.   Psychiatric/Behavioral: Negative for confusion, dysphoric mood and sleep disturbance. The patient is not nervous/anxious.          Objective      Vitals:    20 1145   BP: 94/65   Pulse: 103   Resp: 16   Temp: 98 °F (36.7 °C)   SpO2: (!) 88%      Current Status 2020   ECOG score 3   Pain 0/10    Physical Exam   Constitutional: She is oriented to person, place, and time. She appears  well-developed and well-nourished.   Elderly female no distress seated in a wheelchair   HENT:   Mouth/Throat: Oropharynx is clear and moist.   Eyes: Conjunctivae are normal.   Neck: No thyromegaly present.   Cardiovascular: Normal rate and regular rhythm. Exam reveals no gallop and no friction rub.   No murmur heard.  Pulmonary/Chest: Breath sounds normal. No respiratory distress. She has no wheezes.   Minimal scattered rhonchi on exam, improved from prior   Abdominal: Soft. Bowel sounds are normal. She exhibits no distension. There is no tenderness.   Musculoskeletal: She exhibits no edema.   Lymphadenopathy:        Head (right side): No submandibular adenopathy present.     She has no cervical adenopathy.     She has no axillary adenopathy.        Right: No inguinal and no supraclavicular adenopathy present.        Left: No inguinal and no supraclavicular adenopathy present.   Neurological: She is alert and oriented to person, place, and time. She displays normal reflexes. No cranial nerve deficit. She exhibits normal muscle tone.   Left hemiparesis from prior CVA   Skin: Skin is warm and dry. No rash noted.   Psychiatric: She has a normal mood and affect. Her behavior is normal.       RECENT LABS:  Hematology WBC   Date Value Ref Range Status   01/08/2020 8.34 3.40 - 10.80 10*3/mm3 Final   10/31/2019 15.71 (H) 4.5 - 11 x10E3/uL Final     RBC   Date Value Ref Range Status   01/08/2020 3.81 3.77 - 5.28 10*6/mm3 Final   10/31/2019 4.13 (L) 4.20 - 5.40 x10E6/uL Final     Hemoglobin   Date Value Ref Range Status   01/08/2020 9.9 (L) 12.0 - 15.9 g/dL Final   10/31/2019 11.1 (L) 12.0 - 16.0 g/dL Final     Hematocrit   Date Value Ref Range Status   01/08/2020 32.3 (L) 34.0 - 46.6 % Final   10/31/2019 36.7 (L) 37.0 - 47.0 % Final     Platelets   Date Value Ref Range Status   01/08/2020 251 140 - 450 10*3/mm3 Final     External Platelets   Date Value Ref Range Status   10/31/2019 348 140 - 440 thou/cu mm Final        Lab  Results   Component Value Date    GLUCOSE 110 01/08/2020    BUN 8 01/08/2020    CREATININE 0.71 01/08/2020    EGFRIFNONA 81 01/08/2020    EGFRIFAFRI 74 08/11/2016    BCR 11.3 01/08/2020    K 4.6 01/08/2020    CO2 25.0 01/08/2020    CALCIUM 8.8 01/08/2020    PROTENTOTREF 7.4 08/11/2016    ALBUMIN 2.90 (L) 01/08/2020    LABIL2 0.5 11/01/2019    AST 12 01/08/2020    ALT 5 01/08/2020         Assessment/Plan     1. Metastatic non-small cell lung cancer (adenocarcinoma):  · Patient with long-standing smoking history x40 years quit in 2008  · Underlying significant COPD with FEV1 1.4 (58%) on 11/20/2014  · CT 5/16/2019 with left upper lobe mass, partially cavitary measuring 2.3 x 1.5 cm.  Additional 8 mm similar appearing right upper lobe nodule.  Bibasilar consolidation, small bilateral pleural effusions, mediastinal lymphadenopathy up to 2.7 centimeters.  · Bronchoscopy 5/20/2019 with identification of left mainstem malignancy extending to the level of the jose, biopsy showed poorly differentiated adenocarcinoma of pulmonary origin. EBUS with FNA station 7 lymph node negative for malignant cells, only scattered lymphoid aggregates.  BAL left upper lobe negative for malignant cells.  Negative EGFR mutation, negative ALK/ROS 1 rearrangement. PDL1 95%.  · Patient was intubated with possible pneumonia, significant mucus plugging with underlying significant COPD.  Extubated on 5/28/2019.   · Staging evaluation performed during hospitalization with negative CT abdomen/pelvis, negative bone scan.  · MRI brain 5/29/2019 with evidence of metastatic disease, 3-4 enhancing lesions involving left occipital lobe 5 mm, left posterior parietal lobe 4 mm, left frontal lobe 4 mm, left parietal 3-4 mm with small amounts of surrounding edema.  Given the minimal extent of disease elected to observe with short interval MRI.  · Discussion regarding systemic therapy with single agent Keytruda due to PDL1 95%  · Subsequent disease progression  on CT 6/8/2019 with increasing left upper lobe mass, mediastinal and hilar lymphadenopathy with occlusion of left mainstem bronchus at origin.  · Received palliative radiation therapy to left mainstem bronchus x10 fractions, completed 6/25/2019.  · Initiated systemic therapy with Keytruda 6/28/2019.  · CT scan following 2 cycles Keytruda and prior radiation from 8/6/2019 showed decrease in the left upper lobe primary lesion with essentially no residual discrete lung mass in that area.  Lymphadenopathy in the mediastinum/AP window and left hilum with significant improvement and resolution of left mainstem obstruction.  No new evidence of disease.  The patient did receive radiation therapy to both the primary left upper lobe mass and left hilum/mediastinum accounting for most of the improvement.  MRI brain 8/6/2019 with 3 enhancing metastatic lesions, 2 of which had a more nodular appearance as opposed to previous ring-enhancing appearance with stable size.  These were in the left occipital and left frontal regions.  The third lesion in the left parietal region decreased in size from 4 mm down to 2 mm.  There were no new lesions identified.  She has not undergone any specific treatment for her brain metastases.  Continuation of immunotherapy with Keytruda.  · Following 4 cycles Keytruda, 9/17/2019 CT scan showed improvement in AP window lymph node from 2.7 x 1.9 down to 2.1 x 1.5 cm.  Progressive change in the left lower lobe around the pleura, with one area having a masslike appearance suspected secondary to radiation pneumonitis rather than malignancy.  MRI brain with small metastatic lesions decreased in size.  · CT scan 10/29/2019 following 6 cycles Keytruda with new/enlarging 2 cm left upper lobe lesion, stable mediastinal lymph nodes, increased air space opacities in the lungs bilaterally, new left adrenal nodule 1.3 cm.  MRI brain with stable to slightly improved findings 10/29/2019.  Cycle 7 Keytruda held  11/8/2019 with plans to pursue PET scan.  Unclear whether pulmonary findings represented infectious change versus pneumonitis from immunotherapy.  · PET scan 11/14/2019 with worsening areas of consolidation involving the lungs bilaterally, decrease in left upper lobe nodular opacification with minimal uptake, stable mildly hypermetabolic mediastinal and left hilar lymph nodes, intensely hypermetabolic left adrenal 1.7 cm lesion SUV 11.2.  With clinical improvement in pulmonary status, pulmonary changes suspected to represent pneumonitis from immunotherapy.  The only area of true progression in left adrenal, will pursue stereotactic radiation.  Patient will remain off immunotherapy, initiated prednisone 40 mg daily with subsequent taper.  · CT chest 12/4/2019 with improvement in left upper and lower lobe consolidation, slight further progression left adrenal.  Prednisone tapered.  · Left adrenal radiation completed 1/2/2020  · Patient currently remains on course of observation in regards to underlying malignancy.  · The patient returns today in clinical follow-up.  She has mild fatigue related to her recent radiation therapy that was completed on 1/2/2022 the left adrenal gland.  Overall she has done quite well with gradual improvement in her respiratory status.  She is now off of oxygen at this point.  Symptoms have not returned with gradual prednisone taper, currently receiving 5 mg daily.  Her appetite and weight have remained stable on this dose as well.  I am inclined to continue on low-dose prednisone for now mainly in regards to her appetite and weight.  We can discuss possible prednisone taper again in the future.  For now she is remaining off of systemic therapy for her malignancy.  She cannot return to immunotherapy due to immunotherapy induced pneumonitis.  We discussed repeating CT scans and MRI brain prior to her return visit here in 1 month.  2. 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  · Patient extubated 5/28/2019.  · Subsequent occlusion of left mainstem bronchus from malignancy 6/8/2019.  Received palliative radiation completed 6/25/2019.  · Patient hospitalized while in [place] at [facility] in October 2019 due to suspected aspiration pneumonia.  Doubtful that pulmonary findings are related to immunotherapy due to improvement on antibiotics and productive cough.  · Patient with decline in respiratory status in October 2019 requiring oxygen to be initiated.  Suspected related to immunotherapy induced pneumonitis.  Immunotherapy discontinued and initiated empiric prednisone 40 mg daily on 11/20/2019.  · Patient with overall improvement in respiratory status, currently on oxygen 1 L nasal cannula at night only with improvement in cough.    · As noted above, patient has tapered prednisone down to 5 mg daily on 12/26/2019.  Her respiratory status remains stable, overall improved since initiating steroids.  For now I am inclined to continue on prednisone at 5 mg daily, more in relation to her appetite and weight than respiratory issues.  3. Prior hemorrhagic CVA:  · Occurred in 2008, residual left upper extremity weakness and left lower extremity weakness.   4. Anemia:  · Hemoglobin prior to admission was 13.1 on 5/16/2019  · Hemoglobin declined in the 10-11 range, related to malignancy, pneumonia  · Hemoglobin has progressively declined down to 9.9 currently.  We did obtain additional labs today with iron 21, ferritin 454, iron saturation 9%, TIBC 223, B12 348, folate 3.77.  Appears that patient has anemia of chronic disease as well as anemia related to folate deficiency.  We will begin oral folic acid 1 mg daily.  7. Hypothyroidism:  · Labs 6/28/2019 with TSH 23.8, normal free T4 of 1.43  · Patient was started on levothyroxine 100 mcg  daily  · Repeat thyroid function studies on 8/9/2019 with TSH suppressed at 0.065 and elevated free T4 of 2.06.  Decreased levothyroxine dose to 50 mcg daily.    · 11/8/2019 TSH 37.5, free T4 1.15, levothyroxine dose increased to 75 mcg daily.  · Most recent labs on 12/26/2019 with TSH 12.1, free T4 1.31.  We will recheck in 4 weeks when she returns.  8. Depression and nausea/anorexia:  · Patient with depression related to her underlying medical illness as well as social situation with her son who is an alcoholic and lives in the home  · Patient has been treated with Depakote 250 mg every 12 hours for mood stabilization under the direction of her primary care physician  · Patient with significant depressive symptoms, currently continuing on Zoloft under the direction of her primary care physician.  · Patient also with ongoing anorexia and nausea, initiated Zyprexa 2.5 mg nightly on 7/19/2019 with dramatic improvement in symptoms  · Patient seen in the supportive oncology clinic on 7/29/2019, tapered off Depakote.  · Patient has PRN Compazine and Zyprexa to use.  · Zyprexa dose titrated to 5 mg and subsequently on 9/20/2019 up to 7.5 mg nightly.  9. Hypokalemia:  · Potassium on 8/9/2019 profoundly low at 2.5  · Magnesium low normal at 1.7  · Prescribed 20 mEq IV potassium chloride   · Potassium today 4.6  10. Dizziness/lightheadedness with relative hypotension:  · Cortisol level normal on 8/30/2019 at 33.16  · Patient tapered off metoprolol by cardiology 9/13/2019  · Symptoms currently significantly improved.  11. GI prophylaxis:  · Prilosec 20 mg daily while remaining on steroids.      Plan:  1. 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  Pending performance status and status of her disease we will consider possible future use of palliative single agent chemotherapy.  2. Prescription sent to  begin folic acid 1 mg daily  3. Continue prednisone at 5 mg daily  4. Continue Zyprexa 7.5 mg nightly  5. Continue levothyroxine 75 mcg daily  6. In 3 weeks CT chest abdomen pelvis and MRI brain  7. In 4 weeks MD visit with CBC, CMP, TSH, free T4

## 2020-01-23 NOTE — PROGRESS NOTES
Subjective .     REASONS FOR FOLLOWUP:    1. Metastatic non-small cell lung cancer (adenocarcinoma):  · Patient with long-standing smoking history x40 years quit in 2008  · Underlying significant COPD with FEV1 1.4 (58%) on 11/20/2014  · CT 5/16/2019 with left upper lobe mass, partially cavitary measuring 2.3 x 1.5 cm.  Additional 8 mm similar appearing right upper lobe nodule.  Bibasilar consolidation, small bilateral pleural effusions, mediastinal lymphadenopathy up to 2.7 centimeters.  · Bronchoscopy 5/20/2019 with identification of left mainstem malignancy extending to the level of the jose, biopsy showed poorly differentiated adenocarcinoma of pulmonary origin. EBUS with FNA station 7 lymph node negative for malignant cells, only scattered lymphoid aggregates.  BAL left upper lobe negative for malignant cells.  Negative EGFR mutation, negative ALK/ROS 1 rearrangement. PDL1 95%.  · Patient was intubated with possible pneumonia, significant mucus plugging with underlying significant COPD.  Extubated on 5/28/2019.   · Staging evaluation performed during hospitalization with negative CT abdomen/pelvis, negative bone scan.  · MRI brain 5/29/2019 with evidence of metastatic disease, 3-4 enhancing lesions involving left occipital lobe 5 mm, left posterior parietal lobe 4 mm, left frontal lobe 4 mm, left parietal 3-4 mm with small amounts of surrounding edema.  Given the minimal extent of disease elected to observe with short interval MRI.  · Discussion regarding systemic therapy with single agent Keytruda due to PDL1 95%  · Subsequent disease progression on CT 6/8/2019 with increasing left upper lobe mass, mediastinal and hilar lymphadenopathy with occlusion of left mainstem bronchus at origin.  · Received palliative radiation therapy to left mainstem bronchus x10 fractions, completed 6/25/2019.  · Initiated systemic therapy with Keytruda 6/28/2019.  · Scans 8/6/2019 following 2 cycles Keytruda with decrease left  upper lobe primary lesion and left hilar/mediastinal lymphadenopathy.  Response primarily felt to be related to radiation therapy.  Continuation of immunotherapy.  · CT scans 9/17/2019 following 4 cycles Keytruda with further decrease in AP window lymph node, suspected evolving postradiation change in left lower lobe.  MRI brain 9/17/2019 with improvement in 3 small metastatic lesions.  Continuation of immunotherapy.  · CT scan 10/29/2019 following 6 cycles Keytruda with new/enlarging 2 cm left upper lobe lesion, stable mediastinal lymph nodes, increased air space opacities in the lungs bilaterally, new left adrenal nodule 1.3 cm.  MRI brain with stable to slightly improved findings 10/29/2019.  Cycle 7 Keytruda held 11/8/2019 with plans to pursue PET scan.  Unclear whether pulmonary findings represented infectious change versus pneumonitis from immunotherapy.  · PET scan 11/14/2019 with worsening areas of consolidation involving the lungs bilaterally, decrease in left upper lobe nodular opacification with minimal uptake, stable mildly hypermetabolic mediastinal and left hilar lymph nodes, intensely hypermetabolic left adrenal 1.7 cm lesion SUV 11.2.  With clinical improvement in pulmonary status, pulmonary changes suspected to represent pneumonitis from immunotherapy.  The only area of true progression in left adrenal, will pursue stereotactic radiation.  Patient will remain off immunotherapy, initiated prednisone 40 mg daily.  · CT chest 12/4/2019 with improvement in left upper and lower lobe consolidation, slight further progression left adrenal.  Prednisone tapered.  · Left adrenal radiation completed 1/2/2020  · Patient currently remains on course of observation in regards to underlying malignancy.  2. 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  · Patient extubated  5/28/2019.  · Subsequent occlusion of left mainstem bronchus from malignancy 6/8/2019.  Received palliative radiation completed 6/25/2019.  · Patient with decline in respiratory status in October 2019 requiring oxygen to be initiated.  Suspected related to immunotherapy induced pneumonitis.  Response to steroids with significant improvement in respiratory status.  3. Prior hemorrhagic CVA:  · Occurred in 2008, residual left upper extremity weakness and left lower extremity weakness.   4. Anemia:  · Hemoglobin prior to admission was 13.1 on 5/16/2019  · Hemoglobin declined in the 10-11 range, related to malignancy, pneumonia  5. Depression and nausea/anorexia and chronic fatigue:  · Significant improvement following addition of Zyprexa nightly, dose escalated to 7.5 mg.  6. Hypothyroidism:  · Labs 6/28/2019 with TSH 23.8, normal free T4 of 1.43  · Treated with levothyroxine 100 mcg daily  · Subsequent thyroid function studies with TSH 0.065 and free T4 elevated at 2.06, levothyroxine dose decreased 8/9/19 to 50 mcg daily.  · 11/8/2019 TSH 37.5, free T4 1.15, levothyroxine dose increased to 75 mcg daily.      HISTORY OF PRESENT ILLNESS:  The patient is a 74 y.o. year old female who is here for follow-up with the above-mentioned history.    History of Present Illness patient returns today in follow-up continuing on prednisone 5 mg daily as well as olanzapine 7.5 mg nightly.  Despite this, she has lost approximately 5 pounds in the past month, currently weighing 85 pounds.  She sleeps the majority of the day per her 's report and only eats 1-2 meals.  She is not using any supplemental drinks such as boost or Ensure recently.  She does not like the taste of these she reports however does like [place] Instant Breakfast.  I have encouraged her to mix this with whole milk.    She denies worsening shortness of breath or new cough.  She denies trouble with her bowels or nausea.  She has not had any recent  fevers.    The patient is using a wheelchair for transportation today.  From our discussion and her  assumes the majority of her care.    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 ENDOSCOPY;  Service: Pulmonary   • COLONOSCOPY      REC 07/2007, REC 02/2009, REC 12/2011, REC 01/14,  She says she cant do the prep because of poor mobility to get to .   • EYE SURGERY  1950   • OTHER SURGICAL HISTORY      Ventricular [place] for drainage of subdural hematoma   • TOTAL THYROIDECTOMY  08/2015    Dr. Ahmadi         ONCOLOGIC HISTORY:  (History from previous dates can be found in the separate document.)    Current Outpatient Medications on File Prior to Visit   Medication Sig Dispense Refill   • aspirin 81 MG chewable tablet Chew 81 mg Daily.     • atorvastatin (LIPITOR) 80 MG tablet Take 1 tablet by mouth Daily. Needs an appointment for further refills (Patient taking differently: Take 80 mg by mouth Every Night.  Needs an appointment for further refills) 90 tablet 0   • budesonide (PULMICORT) 0.5 MG/2ML nebulizer solution Take 0.5 mg by nebulization Daily.     • formoterol (PERFOROMIST) 20 MCG/2ML nebulizer solution Take  by nebulization 2 (Two) Times a Day.     • guaiFENesin (ROBITUSSIN) 100 MG/5ML solution oral solution Take 20 mL by mouth Every 4 (Four) Hours. While awake 3600 mL 3   • levothyroxine (SYNTHROID, LEVOTHROID) 75 MCG tablet Take 1 tablet by mouth Daily. 30 tablet 0   • LORazepam (ATIVAN PO) Take 0.25 mg by mouth As Needed.     • OLANZapine (zyPREXA) 7.5 MG tablet Take 1 tablet by mouth Every Night. 90 tablet 0   • ondansetron (ZOFRAN) 4 MG tablet Take 1 tablet by mouth Every 6 (Six) Hours As Needed for Nausea or Vomiting. 60 tablet 2   • potassium chloride ER (K-TAB) 20 MEQ tablet controlled-release ER tablet Take 1 tablet by mouth Daily. 30 tablet 2   • predniSONE (DELTASONE) 5 MG tablet Take 1 tablet by mouth 2 (Two) Times a Day. Take as directed (Patient taking differently: Take 10 mg by mouth Daily. Take as directed) 60 tablet 1   • sertraline (ZOLOFT) 25 MG tablet Take 1 tablet by mouth Daily. 90 tablet 1   • sodium chloride 7 % nebulizer solution nebulizer solution Take 4 mL by nebulization 2 (Two) Times a Day. And may use additional 2 times as needed chest congestion 480 mL 6   • SSD 1 % cream As Needed.     • tolterodine (DETROL) 1 MG tablet      • YUPELRI 175 MCG/3ML solution      • [DISCONTINUED] predniSONE (DELTASONE) 20 MG tablet Take 1 tablet by mouth Daily. (Patient taking differently: Take 5 mg by mouth Daily.) 60 tablet 1     No current facility-administered medications on file prior to visit.        ALLERGIES:     Allergies   Allergen Reactions   • Ace Inhibitors Cough     Other reaction(s): Cough       Social History     Socioeconomic History   • Marital status:      Spouse name: Miah   • Number of children: Not on file   • Years of education: College   • Highest education level: Not  on file   Occupational History     Employer: RETIRED   Tobacco Use   • Smoking status: Former Smoker     Types: Cigarettes     Last attempt to quit: 2008     Years since quittin.0   • Smokeless tobacco: Never Used   • Tobacco comment: caffeine use-soda   Substance and Sexual Activity   • Alcohol use: Yes     Comment: rarely   • Drug use: No   • Sexual activity: Defer     Family History   Problem Relation Age of Onset   • Heart attack Mother         Acute   • Heart disease Mother    • Colon polyps Sister         Sigmoid colon   • Coronary artery disease Brother    • Diabetes Brother    • Heart disease Brother    • Alcohol abuse Son               Review of Systems   Constitutional: Positive for appetite change, fatigue and fever. Negative for activity change and unexpected weight change.   HENT: Negative for congestion, mouth sores, nosebleeds, sore throat and voice change.    Respiratory: Positive for cough and shortness of breath. Negative for wheezing.    Cardiovascular: Negative for chest pain, palpitations and leg swelling.   Gastrointestinal: Negative for abdominal distention, abdominal pain, blood in stool, constipation, diarrhea, nausea and vomiting.   Endocrine: Negative for cold intolerance and heat intolerance.   Genitourinary: Negative for difficulty urinating, dysuria, frequency and hematuria.   Musculoskeletal: Negative for arthralgias, back pain, joint swelling and myalgias.   Skin: Negative for rash.   Neurological: Positive for weakness. Negative for dizziness, syncope, light-headedness, numbness and headaches.   Hematological: Negative for adenopathy. Does not bruise/bleed easily.   Psychiatric/Behavioral: Positive for sleep disturbance (sleeps majority of day). Negative for confusion and dysphoric mood. The patient is not nervous/anxious.          Objective      Vitals:    20 1215   BP: 119/75   Pulse: 113   Resp: 16   Temp: 98.4 °F (36.9 °C)   SpO2: 90%      Current Status 2020    ECOG score 1     Pain Score    01/23/20 1215   PainSc: 0-No pain         Physical Exam   Constitutional: She is oriented to person, place, and time. She appears well-developed and well-nourished.   Elderly female no distress seated in a wheelchair   HENT:   Mouth/Throat: Oropharynx is clear and moist.   Eyes: Conjunctivae are normal.   Neck: No thyromegaly present.   Cardiovascular: Normal rate and regular rhythm. Exam reveals no gallop and no friction rub.   No murmur heard.  Pulmonary/Chest: Breath sounds normal. No respiratory distress. She has no wheezes.   Minimal scattered rhonchi on exam, improved from prior   Abdominal: Soft. Bowel sounds are normal. She exhibits no distension. There is no tenderness.   Musculoskeletal: She exhibits no edema.   Lymphadenopathy:     She has no cervical adenopathy.        Right: No supraclavicular adenopathy present.        Left: No supraclavicular adenopathy present.   Neurological: She is alert and oriented to person, place, and time.   Left hemiparesis from prior CVA   Skin: Skin is warm and dry. No rash noted.   Psychiatric: She has a normal mood and affect. Her behavior is normal.       RECENT LABS:  Hematology WBC   Date Value Ref Range Status   01/23/2020 10.52 3.40 - 10.80 10*3/mm3 Final   10/31/2019 15.71 (H) 4.5 - 11 x10E3/uL Final     RBC   Date Value Ref Range Status   01/23/2020 3.86 3.77 - 5.28 10*6/mm3 Final   10/31/2019 4.13 (L) 4.20 - 5.40 x10E6/uL Final     Hemoglobin   Date Value Ref Range Status   01/23/2020 10.2 (L) 12.0 - 15.9 g/dL Final   10/31/2019 11.1 (L) 12.0 - 16.0 g/dL Final     Hematocrit   Date Value Ref Range Status   01/23/2020 34.6 34.0 - 46.6 % Final   10/31/2019 36.7 (L) 37.0 - 47.0 % Final     Platelets   Date Value Ref Range Status   01/23/2020 270 140 - 450 10*3/mm3 Final     External Platelets   Date Value Ref Range Status   10/31/2019 348 140 - 440 thou/cu mm Final        Lab Results   Component Value Date    GLUCOSE 96 01/23/2020     BUN 10 01/23/2020    CREATININE 0.66 01/23/2020    EGFRIFNONA 88 01/23/2020    EGFRIFAFRI 74 08/11/2016    BCR 15.2 01/23/2020    K 3.9 01/23/2020    CO2 22.8 01/23/2020    CALCIUM 9.0 01/23/2020    PROTENTOTREF 7.4 08/11/2016    ALBUMIN 2.90 (L) 01/23/2020    LABIL2 0.5 11/01/2019    AST 14 01/23/2020    ALT 5 01/23/2020         Assessment/Plan     1. Metastatic non-small cell lung cancer (adenocarcinoma):  · Patient with long-standing smoking history x40 years quit in 2008  · Underlying significant COPD with FEV1 1.4 (58%) on 11/20/2014  · CT 5/16/2019 with left upper lobe mass, partially cavitary measuring 2.3 x 1.5 cm.  Additional 8 mm similar appearing right upper lobe nodule.  Bibasilar consolidation, small bilateral pleural effusions, mediastinal lymphadenopathy up to 2.7 centimeters.  · Bronchoscopy 5/20/2019 with identification of left mainstem malignancy extending to the level of the jose, biopsy showed poorly differentiated adenocarcinoma of pulmonary origin. EBUS with FNA station 7 lymph node negative for malignant cells, only scattered lymphoid aggregates.  BAL left upper lobe negative for malignant cells.  Negative EGFR mutation, negative ALK/ROS 1 rearrangement. PDL1 95%.  · Patient was intubated with possible pneumonia, significant mucus plugging with underlying significant COPD.  Extubated on 5/28/2019.   · Staging evaluation performed during hospitalization with negative CT abdomen/pelvis, negative bone scan.  · MRI brain 5/29/2019 with evidence of metastatic disease, 3-4 enhancing lesions involving left occipital lobe 5 mm, left posterior parietal lobe 4 mm, left frontal lobe 4 mm, left parietal 3-4 mm with small amounts of surrounding edema.  Given the minimal extent of disease elected to observe with short interval MRI.  · Discussion regarding systemic therapy with single agent Keytruda due to PDL1 95%  · Subsequent disease progression on CT 6/8/2019 with increasing left upper lobe mass,  mediastinal and hilar lymphadenopathy with occlusion of left mainstem bronchus at origin.  · Received palliative radiation therapy to left mainstem bronchus x10 fractions, completed 6/25/2019.  · Initiated systemic therapy with Keytruda 6/28/2019.  · CT scan following 2 cycles Keytruda and prior radiation from 8/6/2019 showed decrease in the left upper lobe primary lesion with essentially no residual discrete lung mass in that area.  Lymphadenopathy in the mediastinum/AP window and left hilum with significant improvement and resolution of left mainstem obstruction.  No new evidence of disease.  The patient did receive radiation therapy to both the primary left upper lobe mass and left hilum/mediastinum accounting for most of the improvement.  MRI brain 8/6/2019 with 3 enhancing metastatic lesions, 2 of which had a more nodular appearance as opposed to previous ring-enhancing appearance with stable size.  These were in the left occipital and left frontal regions.  The third lesion in the left parietal region decreased in size from 4 mm down to 2 mm.  There were no new lesions identified.  She has not undergone any specific treatment for her brain metastases.  Continuation of immunotherapy with Keytruda.  · Following 4 cycles Keytruda, 9/17/2019 CT scan showed improvement in AP window lymph node from 2.7 x 1.9 down to 2.1 x 1.5 cm.  Progressive change in the left lower lobe around the pleura, with one area having a masslike appearance suspected secondary to radiation pneumonitis rather than malignancy.  MRI brain with small metastatic lesions decreased in size.  · CT scan 10/29/2019 following 6 cycles Keytruda with new/enlarging 2 cm left upper lobe lesion, stable mediastinal lymph nodes, increased air space opacities in the lungs bilaterally, new left adrenal nodule 1.3 cm.  MRI brain with stable to slightly improved findings 10/29/2019.  Cycle 7 Keytruda held 11/8/2019 with plans to pursue PET scan.  Unclear whether  pulmonary findings represented infectious change versus pneumonitis from immunotherapy.  · PET scan 11/14/2019 with worsening areas of consolidation involving the lungs bilaterally, decrease in left upper lobe nodular opacification with minimal uptake, stable mildly hypermetabolic mediastinal and left hilar lymph nodes, intensely hypermetabolic left adrenal 1.7 cm lesion SUV 11.2.  With clinical improvement in pulmonary status, pulmonary changes suspected to represent pneumonitis from immunotherapy.  The only area of true progression in left adrenal, will pursue stereotactic radiation.  Patient will remain off immunotherapy, initiated prednisone 40 mg daily with subsequent taper.  · CT chest 12/4/2019 with improvement in left upper and lower lobe consolidation, slight further progression left adrenal.  Prednisone tapered.  · Left adrenal radiation completed 1/2/2020  · Patient currently remains on course of observation in regards to underlying malignancy.  · The patient returns as reviewed by Dr. Godfrey on 1/8/2029 20.  She has mild fatigue related to her recent radiation therapy that was completed on 1/2/2022 the left adrenal gland.  Overall she has done quite well with gradual improvement in her respiratory status.  She is now off of oxygen at this point.  Symptoms have not returned with gradual prednisone taper, currently receiving 5 mg daily.  Her appetite and weight have remained stable on this dose as well.  I am inclined to continue on low-dose prednisone for now mainly in regards to her appetite and weight.  We can discuss possible prednisone taper again in the future.    · Patient returned on 1/23/2020 for review.  Unfortunately she is sleeping more.  Her  states her appetite has decreased, in line with weaning her prednisone.  We will increase her prednisone back up to 10 mg daily.  The patient was encouraged to drink [place] Instant Breakfast every day, mixed with whole milk.  2. 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  · Patient extubated 5/28/2019.  · Subsequent occlusion of left mainstem bronchus from malignancy 6/8/2019.  Received palliative radiation completed 6/25/2019.  · Patient hospitalized while in [place] at [facility] in October 2019 due to suspected aspiration pneumonia.  Doubtful that pulmonary findings are related to immunotherapy due to improvement on antibiotics and productive cough.  · Patient with decline in respiratory status in October 2019 requiring oxygen to be initiated.  Suspected related to immunotherapy induced pneumonitis.  Immunotherapy discontinued and initiated empiric prednisone 40 mg daily on 11/20/2019.  · Patient with overall improvement in respiratory status, currently on oxygen 1 L nasal cannula at night only with improvement in cough.    · As noted above, patient has tapered prednisone down to 5 mg daily on 12/26/2019.  Her respiratory status remains stable, overall improved since initiating steroids.  For now I am inclined to continue on prednisone at 5 mg daily, more in relation to her appetite and weight than respiratory issues.  · Patient is seen on 1/23/2020 with a.  Declining weight, though stable respiratory status.  Prednisone increased as above.  3. Prior hemorrhagic CVA:  · Occurred in 2008, residual left upper extremity weakness and left lower extremity weakness.   4. Anemia:  · Hemoglobin prior to admission was 13.1 on 5/16/2019  · Hemoglobin declined in the 10-11 range, related to malignancy, pneumonia  · Appears that patient has anemia of chronic disease as well as anemia related to folate deficiency.  We will begin oral folic acid 1 mg daily.  · Hemoglobin slightly improved to 10.2 today.  7. Hypothyroidism:  · Labs 6/28/2019 with TSH 23.8, normal free T4 of 1.43  · Patient  was started on levothyroxine 100 mcg daily  · Repeat thyroid function studies on 8/9/2019 with TSH suppressed at 0.065 and elevated free T4 of 2.06.  Decreased levothyroxine dose to 50 mcg daily.   · 11/8/2019 TSH 37.5, free T4 1.15, levothyroxine dose increased to 75 mcg daily.  · Most recent labs on 12/26/2019 with TSH 12.1, free T4 1.31.    8. Depression and nausea/anorexia:  · Patient with depression related to her underlying medical illness as well as social situation with her son who is an alcoholic and lives in the home  · Patient has been treated with Depakote 250 mg every 12 hours for mood stabilization under the direction of her primary care physician  · Patient with significant depressive symptoms, currently continuing on Zoloft under the direction of her primary care physician.  · Patient also with ongoing anorexia and nausea, initiated Zyprexa 2.5 mg nightly on 7/19/2019 with dramatic improvement in symptoms  · Patient seen in the supportive oncology clinic on 7/29/2019, tapered off Depakote.  · Patient has PRN Compazine and Zyprexa to use.  · Zyprexa dose titrated to 5 mg and subsequently on 9/20/2019 up to 7.5 mg nightly.  9. Hypokalemia:  · Potassium on 8/9/2019 profoundly low at 2.5  · Magnesium low normal at 1.7  · Prescribed 20 mEq IV potassium chloride   · Potassium today 3.9  10. Dizziness/lightheadedness with relative hypotension:  · Cortisol level normal on 8/30/2019 at 33.16  · Patient tapered off metoprolol by cardiology 9/13/2019  · Symptoms currently significantly improved.  11. GI prophylaxis:  · Prilosec 20 mg daily while remaining on steroids.      Plan:  1. 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  Pending performance status and status of her disease we will consider possible future use of palliative single agent chemotherapy.  2. Continue folic acid 1 mg  daily  3. Increase prednisone to 10 mg daily  4. [place] Instant Breakfast daily, mixed with whole milk  5. Continue Zyprexa 7.5 mg nightly  6. Continue levothyroxine 75 mcg daily  7. In 1 weeks CT chest abdomen pelvis and MRI brain  8. In 2 weeks MD visit with CBC, CMP, TSH, free T4

## 2020-01-24 NOTE — TELEPHONE ENCOUNTER
----- Message from SOPHIA Kennedy sent at 1/24/2020  7:39 AM EST -----  Regarding: increase pred  Please call and let know I talked with Dr Godfrey and we do want the patient to increase her prednisone up to 10mg every AM.    Thanks  Informed . Verbalized understanding.

## 2020-02-04 NOTE — PROGRESS NOTES
Subjective          Diagnosis Plan   1. Adenocarcinoma, lung, left (CMS/HCC)            HPI    Ms. Moura is seen back in follow-up today 5 weeks after completing a palliative course of radiation therapy to the left adrenal and 7 months after completing a palliative course of radiation therapy to the left lung/left mainstem bronchus.  Her tumor primary is an adenocarcinoma of the left lung which on imaging is been found to be metastatic to the adrenal gland as well as 3 small brain metastases between 3 and 5 mm in size, which we elected to follow based on their small volume. She comes now 5 weeks after completing palliative course of radiation therapy to the adrenal and it has decreased in size from 1.7 mm to 8 mm.  MRI of the brain describes resolution of the 3 small untreated suspected areas of metastases.  There is no longer demonstrated a nodule at the site of the treated left upper lobe tumor and the upper lobe remains open.  There is a small left-sided pleural effusion.    There is however described  a new finding within the superior segment of the right lower lobe there is a new subpleural masslike area of consolidation devoid of air bronchograms measuring 4.1 x 2 x 4 cm.  Ms. Moura notes no new pulmonary symptoms denies new abdominal pain nausea or vomiting, and is overall stable with nothing new on review of systems.  She also has an appointment to see Dr. Godfrey at the [facility] group upstairs just after our meeting.                         Objective     Family History   Problem Relation Age of Onset   • Heart attack Mother         Acute   • Heart disease Mother    • Colon polyps Sister         Sigmoid colon   • Coronary artery disease Brother    • Diabetes Brother    • Heart disease Brother    • Alcohol abuse Son          Past Medical History:   Diagnosis Date   • Benign essential hypertension    • CAD (coronary artery disease)    • Cancer (CMS/HCC) 05/2019    Left lung   • Carotid artery stenosis    •  Cerebellar artery occlusion 06/2008    causing left arm and leg paresis   • CKD (chronic kidney disease), stage III (CMS/HCC)    • COPD (chronic obstructive pulmonary disease) (CMS/HCC)    • Gastroesophageal reflux disease 12/10/2015   • 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, benign   • Transient cerebral ischemia    • Urge incontinence of urine          Past Surgical History:   Procedure Laterality Date   • BREAST BIOPSY     • BRONCHOSCOPY Bilateral 5/20/2019    Procedure: BRONCHOSCOPY WITH  BIOPSY AND BAL, WITH ENDOBRONCHIAL ULTRASOUND WITH FNA;  Surgeon: Chris Tirado MD;  Location: [facility];  Service: Pulmonary   • COLONOSCOPY      REC 07/2007, REC 02/2009, REC 12/2011, REC 01/14,  She says she cant do the prep because of poor mobility to get to .   • EYE SURGERY  1950   • OTHER SURGICAL HISTORY      Ventricular lake holes for drainage of subdural hematoma   • TOTAL THYROIDECTOMY  08/2015    Dr. Ahmadi         Review of Systems - Oncology       Physical Exam Awake, alert, spontaneous, in no apparent distress    Assessment/Plan   Adenocarcinoma lung, with response in the left lung, and left adrenal, resolution of untreated 3 small brain mets and new masslike consolidation in the posterior medial right lower lobe.  She will be seeing Dr. Godfrey the [facility] a little later today, we have not given her appointment to see us back but of course would be available to offer additional therapy to the right lung if recommended by the [facility] group.      Miah Whalen MD      Cc:  No ref. provider found

## 2020-02-04 NOTE — PROGRESS NOTES
Subjective .     REASONS FOR FOLLOWUP:    1. Metastatic non-small cell lung cancer (adenocarcinoma):  · Patient with long-standing smoking history x40 years quit in 2008  · Underlying significant COPD with FEV1 1.4 (58%) on 11/20/2014  · CT 5/16/2019 with left upper lobe mass, partially cavitary measuring 2.3 x 1.5 cm.  Additional 8 mm similar appearing right upper lobe nodule.  Bibasilar consolidation, small bilateral pleural effusions, mediastinal lymphadenopathy up to 2.7 centimeters.  · Bronchoscopy 5/20/2019 with identification of left mainstem malignancy extending to the level of the jose, biopsy showed poorly differentiated adenocarcinoma of pulmonary origin. EBUS with FNA station 7 lymph node negative for malignant cells, only scattered lymphoid aggregates.  BAL left upper lobe negative for malignant cells.  Negative EGFR mutation, negative ALK/ROS 1 rearrangement. PDL1 95%.  · Patient was intubated with possible pneumonia, significant mucus plugging with underlying significant COPD.  Extubated on 5/28/2019.   · Staging evaluation performed during hospitalization with negative CT abdomen/pelvis, negative bone scan.  · MRI brain 5/29/2019 with evidence of metastatic disease, 3-4 enhancing lesions involving left occipital lobe 5 mm, left posterior parietal lobe 4 mm, left frontal lobe 4 mm, left parietal 3-4 mm with small amounts of surrounding edema.  Given the minimal extent of disease elected to observe with short interval MRI.  · Discussion regarding systemic therapy with single agent Keytruda due to PDL1 95%  · Subsequent disease progression on CT 6/8/2019 with increasing left upper lobe mass, mediastinal and hilar lymphadenopathy with occlusion of left mainstem bronchus at origin.  · Received palliative radiation therapy to left mainstem bronchus x10 fractions, completed 6/25/2019.  · Initiated systemic therapy with Keytruda 6/28/2019.  · Scans 8/6/2019 following 2 cycles Keytruda with decrease left  upper lobe primary lesion and left hilar/mediastinal lymphadenopathy.  Response primarily felt to be related to radiation therapy.  Continuation of immunotherapy.  · CT scans 9/17/2019 following 4 cycles Keytruda with further decrease in AP window lymph node, suspected evolving postradiation change in left lower lobe.  MRI brain 9/17/2019 with improvement in 3 small metastatic lesions.  Continuation of immunotherapy.  · CT scan 10/29/2019 following 6 cycles Keytruda with new/enlarging 2 cm left upper lobe lesion, stable mediastinal lymph nodes, increased air space opacities in the lungs bilaterally, new left adrenal nodule 1.3 cm.  MRI brain with stable to slightly improved findings 10/29/2019.  Cycle 7 Keytruda held 11/8/2019 with plans to pursue PET scan.  Unclear whether pulmonary findings represented infectious change versus pneumonitis from immunotherapy.  · PET scan 11/14/2019 with worsening areas of consolidation involving the lungs bilaterally, decrease in left upper lobe nodular opacification with minimal uptake, stable mildly hypermetabolic mediastinal and left hilar lymph nodes, intensely hypermetabolic left adrenal 1.7 cm lesion SUV 11.2.  With clinical improvement in pulmonary status, pulmonary changes suspected to represent pneumonitis from immunotherapy.  The only area of true progression in left adrenal, will pursue stereotactic radiation.  Patient will remain off immunotherapy, initiated prednisone 40 mg daily.  · CT chest 12/4/2019 with improvement in left upper and lower lobe consolidation, slight further progression left adrenal.  Prednisone tapered.  · Left adrenal radiation completed 1/2/2020  · Patient currently remains on course of observation in regards to underlying malignancy.  2. 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  · Patient extubated  5/28/2019.  · Subsequent occlusion of left mainstem bronchus from malignancy 6/8/2019.  Received palliative radiation completed 6/25/2019.  · Patient with decline in respiratory status in October 2019 requiring oxygen to be initiated.  Suspected related to immunotherapy induced pneumonitis.  Response to steroids with significant improvement in respiratory status.  3. Prior hemorrhagic CVA:  · Occurred in 2008, residual left upper extremity weakness and left lower extremity weakness.   4. Anemia:  · Hemoglobin prior to admission was 13.1 on 5/16/2019  · Hemoglobin declined in the 10-11 range, related to malignancy, pneumonia  5. Depression and nausea/anorexia and chronic fatigue:  · Significant improvement following addition of Zyprexa nightly, dose escalated to 7.5 mg.  6. Hypothyroidism:  · Labs 6/28/2019 with TSH 23.8, normal free T4 of 1.43  · Treated with levothyroxine 100 mcg daily  · Subsequent thyroid function studies with TSH 0.065 and free T4 elevated at 2.06, levothyroxine dose decreased 8/9/19 to 50 mcg daily.  · 11/8/2019 TSH 37.5, free T4 1.15, levothyroxine dose increased to 75 mcg daily.      HISTORY OF PRESENT ILLNESS:  The patient is a 74 y.o. year old female who is here for follow-up with the above-mentioned history.    History of Present Illness patient returns today in follow-up with laboratory studies, MRI brain, and CT scans to review.  In the interval she did develop decline in her weight by 5 pounds and on 1/24/2020 we did increase her prednisone back to 10 mg daily.  She feels that this did help somewhat with her appetite.  We could not obtain a weight today as she was too weak to stand.  The patient does continue on oxygen at night only at home 2 L nasal cannula.  She reports that during the daytime she stays for the most part in the 88-90 range.  She does decline with significant activity.  In the office today, she dropped into the 70% range and did report some degree of dyspnea and  somnolence until her saturation recovered with supplemental oxygen.  She reports a mild chronic cough, occasionally productive however this has not changed much recently.  She does use nebulizer treatments at home which do help.  She notes pending most of her day in the chair, spends less than half her day in bed.  She has limitations in her activity level due to her prior CVA.      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   • Hyperlipidemia    • Left hemiplegia (CMS/HCC) 12/10/2015   • Lung cancer (CMS/HCC)    • Myocardial infarct (CMS/HCC) 1996   • Osteopenia    • Stroke syndrome 2008   • Thyroid cyst     right   • Thyroid lump 12/10/2015    Description: Biopsy 05/15 at [facility], benign   • Transient cerebral ischemia    • Urge incontinence of urine      Past Surgical History:   Procedure Laterality Date   • BREAST BIOPSY     • BRONCHOSCOPY Bilateral 5/20/2019    Procedure: BRONCHOSCOPY WITH  BIOPSY AND BAL, WITH ENDOBRONCHIAL ULTRASOUND WITH FNA;  Surgeon: Chris Tirado MD;  Location: [facility] ENDOSCOPY;  Service: Pulmonary   • COLONOSCOPY      REC 07/2007, REC 02/2009, REC 12/2011, REC 01/14,  She says she cant do the prep because of poor mobility to get to .   • EYE SURGERY  1951   • OTHER SURGICAL HISTORY      Ventricular [place] for drainage of subdural hematoma   • TOTAL THYROIDECTOMY  08/2015    Dr. Ahmadi         ONCOLOGIC HISTORY:  (History from previous dates can be found in the separate document.)    Current Outpatient Medications on File Prior to Visit   Medication Sig  Dispense Refill   • aspirin 81 MG chewable tablet Chew 81 mg Daily.     • atorvastatin (LIPITOR) 80 MG tablet Take 1 tablet by mouth Daily. Needs an appointment for further refills (Patient taking differently: Take 80 mg by mouth Every Night. Needs an appointment for further refills) 90 tablet 0   • budesonide (PULMICORT) 0.5 MG/2ML nebulizer solution Take 0.5 mg by nebulization Daily.     • folic acid (FOLVITE) 1 MG tablet Take 1 tablet by mouth Daily. 90 tablet 2   • formoterol (PERFOROMIST) 20 MCG/2ML nebulizer solution Take  by nebulization 2 (Two) Times a Day.     • guaiFENesin (ROBITUSSIN) 100 MG/5ML solution oral solution Take 20 mL by mouth Every 4 (Four) Hours. While awake 3600 mL 3   • levothyroxine (SYNTHROID, LEVOTHROID) 75 MCG tablet Take 1 tablet by mouth Daily. 30 tablet 0   • LORazepam (ATIVAN PO) Take 0.25 mg by mouth As Needed.     • OLANZapine (zyPREXA) 7.5 MG tablet Take 1 tablet by mouth Every Night. 90 tablet 0   • ondansetron (ZOFRAN) 4 MG tablet Take 1 tablet by mouth Every 6 (Six) Hours As Needed for Nausea or Vomiting. 60 tablet 2   • potassium chloride ER (K-TAB) 20 MEQ tablet controlled-release ER tablet Take 1 tablet by mouth Daily. 30 tablet 2   • predniSONE (DELTASONE) 5 MG tablet Take 1 tablet by mouth 2 (Two) Times a Day. Take as directed (Patient taking differently: Take 10 mg by mouth Daily. Take as directed) 60 tablet 1   • sertraline (ZOLOFT) 25 MG tablet Take 1 tablet by mouth Daily. 90 tablet 1   • sodium chloride 7 % nebulizer solution nebulizer solution Take 4 mL by nebulization 2 (Two) Times a Day. And may use additional 2 times as needed chest congestion 480 mL 6   • SSD 1 % cream As Needed.     • tolterodine (DETROL) 1 MG tablet      • YUPELRI 175 MCG/3ML solution        No current facility-administered medications on file prior to visit.        ALLERGIES:     Allergies   Allergen Reactions   • Ace Inhibitors Cough     Other reaction(s): Cough       Social History      Socioeconomic History   • Marital status:      Spouse name: Miah   • Number of children: Not on file   • Years of education: College   • Highest education level: Not on file   Occupational History     Employer: RETIRED   Tobacco Use   • Smoking status: Former Smoker     Types: Cigarettes     Last attempt to quit: 2008     Years since quittin.1   • Smokeless tobacco: Never Used   • Tobacco comment: caffeine use-soda   Substance and Sexual Activity   • Alcohol use: Yes     Comment: rarely   • Drug use: No   • Sexual activity: Defer     Family History   Problem Relation Age of Onset   • Heart attack Mother         Acute   • Heart disease Mother    • Colon polyps Sister         Sigmoid colon   • Coronary artery disease Brother    • Diabetes Brother    • Heart disease Brother    • Alcohol abuse Son               Review of Systems   Constitutional: Positive for fatigue. Negative for activity change, appetite change, fever and unexpected weight change.   HENT: Negative for congestion, mouth sores, nosebleeds, sore throat and voice change.    Respiratory: Positive for cough and shortness of breath. Negative for wheezing.    Cardiovascular: Negative for chest pain, palpitations and leg swelling.   Gastrointestinal: Negative for abdominal distention, abdominal pain, blood in stool, constipation, diarrhea, nausea and vomiting.   Endocrine: Negative for cold intolerance and heat intolerance.   Genitourinary: Negative for difficulty urinating, dysuria, frequency and hematuria.   Musculoskeletal: Negative for arthralgias, back pain, joint swelling and myalgias.   Skin: Negative for rash.   Neurological: Positive for weakness. Negative for dizziness, syncope, light-headedness, numbness and headaches.   Hematological: Negative for adenopathy. Does not bruise/bleed easily.   Psychiatric/Behavioral: Negative for confusion, dysphoric mood and sleep disturbance. The patient is not nervous/anxious.          Objective       Vitals:    02/04/20 1354   BP: 103/64   Pulse: 112   Resp: 16   Temp: 98.1 °F (36.7 °C)   SpO2: (!) 87%      Current Status 2/4/2020   ECOG score 3   Pain 0/10    Physical Exam   Constitutional: She is oriented to person, place, and time. She appears well-developed and well-nourished.   Elderly female no distress seated in a wheelchair   HENT:   Mouth/Throat: Oropharynx is clear and moist.   Eyes: Conjunctivae are normal.   Neck: No thyromegaly present.   Cardiovascular: Normal rate and regular rhythm. Exam reveals no gallop and no friction rub.   No murmur heard.  Pulmonary/Chest: Breath sounds normal. No respiratory distress. She has no wheezes.   Minimal scattered rhonchi    Abdominal: Soft. Bowel sounds are normal. She exhibits no distension. There is no tenderness.   Musculoskeletal: She exhibits no edema.   Lymphadenopathy:        Head (right side): No submandibular adenopathy present.     She has no cervical adenopathy.     She has no axillary adenopathy.        Right: No inguinal and no supraclavicular adenopathy present.        Left: No inguinal and no supraclavicular adenopathy present.   Neurological: She is alert and oriented to person, place, and time. She displays normal reflexes. No cranial nerve deficit. She exhibits normal muscle tone.   Left hemiparesis from prior CVA   Skin: Skin is warm and dry. No rash noted.   Psychiatric: She has a normal mood and affect. Her behavior is normal.       RECENT LABS:  Hematology WBC   Date Value Ref Range Status   02/04/2020 8.17 3.40 - 10.80 10*3/mm3 Final   10/31/2019 15.71 (H) 4.5 - 11 x10E3/uL Final     RBC   Date Value Ref Range Status   02/04/2020 3.64 (L) 3.77 - 5.28 10*6/mm3 Final   10/31/2019 4.13 (L) 4.20 - 5.40 x10E6/uL Final     Hemoglobin   Date Value Ref Range Status   02/04/2020 9.8 (L) 12.0 - 15.9 g/dL Final   10/31/2019 11.1 (L) 12.0 - 16.0 g/dL Final     Hematocrit   Date Value Ref Range Status   02/04/2020 32.6 (L) 34.0 - 46.6 % Final    10/31/2019 36.7 (L) 37.0 - 47.0 % Final     Platelets   Date Value Ref Range Status   02/04/2020 413 140 - 450 10*3/mm3 Final     External Platelets   Date Value Ref Range Status   10/31/2019 348 140 - 440 thou/cu mm Final        Lab Results   Component Value Date    GLUCOSE 164 (H) 02/04/2020    BUN 13 02/04/2020    CREATININE 0.67 02/04/2020    EGFRIFNONA 86 02/04/2020    EGFRIFAFRI 74 08/11/2016    BCR 19.4 02/04/2020    K 4.1 02/04/2020    CO2 23.2 02/04/2020    CALCIUM 9.1 02/04/2020    PROTENTOTREF 7.4 08/11/2016    ALBUMIN 3.20 (L) 02/04/2020    LABIL2 0.5 11/01/2019    AST 12 02/04/2020    ALT 9 02/04/2020     CT chest abdomen pelvis and MRI brain reviewed as outlined below.  I did personally review CT images today.    Assessment/Plan     1. Metastatic non-small cell lung cancer (adenocarcinoma):  · Patient with long-standing smoking history x40 years quit in 2008  · Underlying significant COPD with FEV1 1.4 (58%) on 11/20/2014  · CT 5/16/2019 with left upper lobe mass, partially cavitary measuring 2.3 x 1.5 cm.  Additional 8 mm similar appearing right upper lobe nodule.  Bibasilar consolidation, small bilateral pleural effusions, mediastinal lymphadenopathy up to 2.7 centimeters.  · Bronchoscopy 5/20/2019 with identification of left mainstem malignancy extending to the level of the jose, biopsy showed poorly differentiated adenocarcinoma of pulmonary origin. EBUS with FNA station 7 lymph node negative for malignant cells, only scattered lymphoid aggregates.  BAL left upper lobe negative for malignant cells.  Negative EGFR mutation, negative ALK/ROS 1 rearrangement. PDL1 95%.  · Patient was intubated with possible pneumonia, significant mucus plugging with underlying significant COPD.  Extubated on 5/28/2019.   · Staging evaluation performed during hospitalization with negative CT abdomen/pelvis, negative bone scan.  · MRI brain 5/29/2019 with evidence of metastatic disease, 3-4 enhancing lesions  involving left occipital lobe 5 mm, left posterior parietal lobe 4 mm, left frontal lobe 4 mm, left parietal 3-4 mm with small amounts of surrounding edema.  Given the minimal extent of disease elected to observe with short interval MRI.  · Discussion regarding systemic therapy with single agent Keytruda due to PDL1 95%  · Subsequent disease progression on CT 6/8/2019 with increasing left upper lobe mass, mediastinal and hilar lymphadenopathy with occlusion of left mainstem bronchus at origin.  · Received palliative radiation therapy to left mainstem bronchus x10 fractions, completed 6/25/2019.  · Initiated systemic therapy with Keytruda 6/28/2019.  · CT scan following 2 cycles Keytruda and prior radiation from 8/6/2019 showed decrease in the left upper lobe primary lesion with essentially no residual discrete lung mass in that area.  Lymphadenopathy in the mediastinum/AP window and left hilum with significant improvement and resolution of left mainstem obstruction.  No new evidence of disease.  The patient did receive radiation therapy to both the primary left upper lobe mass and left hilum/mediastinum accounting for most of the improvement.  MRI brain 8/6/2019 with 3 enhancing metastatic lesions, 2 of which had a more nodular appearance as opposed to previous ring-enhancing appearance with stable size.  These were in the left occipital and left frontal regions.  The third lesion in the left parietal region decreased in size from 4 mm down to 2 mm.  There were no new lesions identified.  She has not undergone any specific treatment for her brain metastases.  Continuation of immunotherapy with Keytruda.  · Following 4 cycles Keytruda, 9/17/2019 CT scan showed improvement in AP window lymph node from 2.7 x 1.9 down to 2.1 x 1.5 cm.  Progressive change in the left lower lobe around the pleura, with one area having a masslike appearance suspected secondary to radiation pneumonitis rather than malignancy.  MRI brain  with small metastatic lesions decreased in size.  · CT scan 10/29/2019 following 6 cycles Keytruda with new/enlarging 2 cm left upper lobe lesion, stable mediastinal lymph nodes, increased air space opacities in the lungs bilaterally, new left adrenal nodule 1.3 cm.  MRI brain with stable to slightly improved findings 10/29/2019.  Cycle 7 Keytruda held 11/8/2019 with plans to pursue PET scan.  Unclear whether pulmonary findings represented infectious change versus pneumonitis from immunotherapy.  · PET scan 11/14/2019 with worsening areas of consolidation involving the lungs bilaterally, decrease in left upper lobe nodular opacification with minimal uptake, stable mildly hypermetabolic mediastinal and left hilar lymph nodes, intensely hypermetabolic left adrenal 1.7 cm lesion SUV 11.2.  With clinical improvement in pulmonary status, pulmonary changes suspected to represent pneumonitis from immunotherapy.  The only area of true progression in left adrenal, will pursue stereotactic radiation.  Patient will remain off immunotherapy, initiated prednisone 40 mg daily with subsequent taper.  · CT chest 12/4/2019 with improvement in left upper and lower lobe consolidation, slight further progression left adrenal.  Prednisone tapered.  · Left adrenal radiation completed 1/2/2020  · Patient currently remains on course of observation in regards to underlying malignancy.  · The patient returns today in follow-up with MRI brain and CT scans to review from 1/27/2020.  The patient does continue with intermittent respiratory issues with hypoxia requiring supplemental oxygen which she only wears at night.  Today, her sats have been in the 70s after coming into the office, did recover on 2 L O2 nasal cannula into the low 90% range.  Her appetite has improved slightly on prednisone 10 mg daily.  She continues with ECOG performance status of 2.  She notes stable mild cough, no fevers.  We did review her scans with MRI showing  resolution of the 3 small residual areas of enhancement in the left cerebral hemisphere and no new areas of metastatic disease identified.  On CT chest 1/27/2020, evidence of radiation fibrosis at the site of previous treatment in the left upper lobe, no evidence of previous nodular density seen in that location.  There was a new small left pleural effusion.  There was bronchial thickening and mucus plugging in bilateral lower lobes with consolidation/atelectasis left greater than right.  There were stable mediastinal lymph nodes.  There was a new masslike area of subpleural consolidation right lower lobe measuring 4.1 x 2 x 4 cm of unclear etiology.  I discussed the findings on CT with the patient today.  The area in question in the right lower lobe may be related to atelectasis versus progressive malignancy.  She is a poor candidate for CT-guided biopsy with high risk for pneumothorax and has little pulmonary reserve to tolerate this.  It is likely not accessible via bronchoscopy given its peripheral location.  We discussed conservative management with short interval follow-up CT and the patient agrees.  With the possibility of progressive disease, we did discuss the option for any further systemic therapy.  This would require the use of chemotherapy.  The patient unfortunately is a poor candidate for palliative chemotherapy given her performance status and comorbidities at this point.  We discussed pursuing a palliative/supportive approach however she would like to know the status of her disease.  Therefore repeating her scan is reasonable and we will perform this prior to her return visit here in 6 weeks.  If she were to decline clinically or show more definitive evidence of progressive disease we will consider transitioning to hospice care at home.  We will discuss this further when she returns with repeat scan.  2. COPD/chronic hypoxemic respiratory failure with subsequent suspected immunotherapy induced  pneumonitis:  · Patient required intubation in May 2019 due to severe mucous plugging following bronchoscopy.  · Patient extubated 5/28/2019.  · Subsequent occlusion of left mainstem bronchus from malignancy 6/8/2019.  Received palliative radiation completed 6/25/2019.  · Patient hospitalized while in [place] at [facility] in October 2019 due to suspected aspiration pneumonia.  Doubtful that pulmonary findings are related to immunotherapy due to improvement on antibiotics and productive cough.  · Patient with decline in respiratory status in October 2019 requiring oxygen to be initiated.  Suspected related to immunotherapy induced pneumonitis.  Immunotherapy discontinued and initiated empiric prednisone 40 mg daily on 11/20/2019.  · Patient with overall improvement in respiratory status, currently on oxygen 1 L nasal cannula at night only with improvement in cough.    · The patient tapered prednisone down to 5 mg daily on 12/26/2019.  She developed declining weight and appetite with increase in prednisone back to 10 mg daily on 1/24/2020.  · Patient returns today continue on prednisone 10 mg daily.  She reports that her respiratory symptoms are stable with mild chronic cough casually productive and reports that her dyspnea is stable.  She does continue to use nebulizers at home.  She uses oxygen only at night.  She notes that her saturation at home is generally in the 88-91% range.  Coming into the office today however she did decline in the 70s and was symptomatic.  She did recover with supplemental oxygen in the office into the low 90% range.  I have suggested that she wear her oxygen continuously at home at this point.  She did agree.  3. Prior hemorrhagic CVA:  · Occurred in 2008, residual left upper extremity weakness and left lower extremity weakness.   4. Anemia:  · Hemoglobin prior to admission was 13.1 on 5/16/2019  · Hemoglobin declined in the 10-11 range, related to malignancy,  pneumonia  · Hemoglobin on 1/8/2020 declined down to 9.9.  Additional labs performed with iron 21, ferritin 454, iron saturation 9%, TIBC 223, B12 348, folate 3.77.  Appears that patient has anemia of chronic disease as well as anemia related to folate deficiency.  Initiated oral folic acid 1 mg daily.  · Hemoglobin today stable at 9.8  7. Hypothyroidism:  · Labs 6/28/2019 with TSH 23.8, normal free T4 of 1.43  · Patient was started on levothyroxine 100 mcg daily  · Repeat thyroid function studies on 8/9/2019 with TSH suppressed at 0.065 and elevated free T4 of 2.06.  Decreased levothyroxine dose to 50 mcg daily.    · 11/8/2019 TSH 37.5, free T4 1.15, levothyroxine dose increased to 75 mcg daily.  · Thyroid function studies today with TSH 5.08, free T4 1.43  8. Depression and nausea/anorexia:  · Patient with depression related to her underlying medical illness as well as social situation with her son who is an alcoholic and lives in the home  · Patient has been treated with Depakote 250 mg every 12 hours for mood stabilization under the direction of her primary care physician  · Patient with significant depressive symptoms, currently continuing on Zoloft under the direction of her primary care physician.  · Patient also with ongoing anorexia and nausea, initiated Zyprexa 2.5 mg nightly on 7/19/2019 with dramatic improvement in symptoms  · Patient seen in the supportive oncology clinic on 7/29/2019, tapered off Depakote.  · Patient has PRN Compazine and Zyprexa to use.  · Zyprexa dose titrated to 5 mg and subsequently on 9/20/2019 up to 7.5 mg nightly.  9. Hypokalemia:  · Potassium on 8/9/2019 profoundly low at 2.5  · Magnesium low normal at 1.7  · Prescribed 20 mEq IV potassium chloride   · Potassium today 4.1  10. Dizziness/lightheadedness with relative hypotension:  · Cortisol level normal on 8/30/2019 at 33.16  · Patient tapered off metoprolol by cardiology 9/13/2019  · Symptoms improved.  11. GI  prophylaxis:  · Prilosec 20 mg daily while remaining on steroids.      Plan:  1. 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  With compromised performance status and comorbidities, patient felt to be poor candidate for further active therapy for her disease, plan to pursue palliative/supportive care approach.  2. The patient will wear oxygen at home continuously  3. Continue folic acid 1 mg daily  4. Continue prednisone at 10 mg daily  5. Continue Zyprexa 7.5 mg nightly  6. Continue levothyroxine 75 mcg daily  7. In 3 weeks nurse practitioner visit with CBC, CMP  8. In 5 weeks CT chest abdomen and pelvis  9. In 6 weeks MD visit with CBC, CMP

## 2020-02-13 PROBLEM — J18.9 PNEUMONIA OF RIGHT LOWER LOBE DUE TO INFECTIOUS ORGANISM: Status: ACTIVE | Noted: 2020-01-01

## 2020-02-19 NOTE — ANESTHESIA PREPROCEDURE EVALUATION
Anesthesia Evaluation     Patient summary reviewed and Nursing notes reviewed   no history of anesthetic complications:               Airway   Mallampati: II  TM distance: >3 FB  Neck ROM: full  no difficulty expected  Dental - normal exam   (+) poor dentition    Pulmonary    (+) pneumonia stable , lung cancer, COPD moderate, rhonchi,   (-) shortness of breath, sleep apnea, wheezes  Cardiovascular - normal exam  Exercise tolerance: good (4-7 METS)    Rhythm: regular  Rate: normal    (+) hypertension, past MI , CAD, hyperlipidemia,  carotid artery disease  (-) angina, CHF, orthopnea, PND, HOLLAND, PVD      Neuro/Psych  (+) CVA (left hemiparesis) residual symptoms,     (-) seizures, neuromuscular disease, TIA, dizziness/light headedness, weakness, numbness  GI/Hepatic/Renal/Endo    (+)  GERD,  renal disease CRI,   (-) liver disease, diabetes    Musculoskeletal (-) negative ROS    Abdominal  - normal exam   Substance History - negative use  (-) alcohol use, drug use     OB/GYN negative ob/gyn ROS         Other      history of cancer active                    Anesthesia Plan    ASA 4     general     intravenous induction     Anesthetic plan, all risks, benefits, and alternatives have been provided, discussed and informed consent has been obtained with: patient.

## 2020-02-19 NOTE — ANESTHESIA POSTPROCEDURE EVALUATION
"Patient: Anaid Moura    Procedure Summary     Date:  02/19/20 Room / Location:   [facility]    Anesthesia Start:  1103 Anesthesia Stop:  1137    Procedure:  BRONCHOSCOPY WITH WASHINGS AND BAL (N/A Bronchus) Diagnosis:       Pneumonia of right lower lobe due to infectious organism (CMS/HCC)      (Pneumonia of right lower lobe due to infectious organism (CMS/HCC) [J18.1])    Surgeon:  Ronnie Pruett MD Provider:  David Baez MD    Anesthesia Type:  general ASA Status:  4          Anesthesia Type: general    Vitals  Vitals Value Taken Time   BP 97/65 2/19/2020 12:15 PM   Temp     Pulse 81 2/19/2020 12:15 PM   Resp 18 2/19/2020 12:15 PM   SpO2 94 % 2/19/2020 12:15 PM           Post Anesthesia Care and Evaluation      Comments: Patient discharged before being evaluated by an Anesthesiologist. No apparent complications per the record.  This case was not medically directed. I am completing this chart for medical records purposes; I personally have no medical involvement with this patient.    BP 95/56 (BP Location: Right arm, Patient Position: Lying)   Pulse 81   Temp 36.4 °C (97.5 °F) (Oral)   Resp 18   Ht 162.6 cm (64.02\")   Wt 40.8 kg (90 lb)   SpO2 94%   BMI 15.44 kg/m²           "

## 2020-02-24 NOTE — TELEPHONE ENCOUNTER
----- Message from Solis Godfrey Jr., MD sent at 2/22/2020  7:43 PM EST -----  Please cancel appt next week- patient still in hospital. Reschedule Dr Godfrey visit with cbc,cmp in 3-6 weeks.

## 2020-02-29 PROBLEM — I50.43 ACUTE ON CHRONIC COMBINED SYSTOLIC AND DIASTOLIC CHF (CONGESTIVE HEART FAILURE) (HCC): Status: ACTIVE | Noted: 2020-01-01

## 2020-03-01 NOTE — PROGRESS NOTES
DAILY PROGRESS NOTE  [facility], [facility]        Patient Identification:  Name: Anaid Moura  Age: 74 y.o.  Sex: female  :  1945  MRN: [id number]           Primary Care Physician: Timothy Taylor MD    Subjective:    Interval History:    Blood pressure was low this morning, 80s/50s.  Received losartan as well as Lasix this morning.  Receiving Coreg at nighttime.  Patient is feeling dizzy  Blood pressure has been over 100 yesterday.    Saturation dropped to 90% on 2 L.  Sodium 135, creatinine 0.62, hemoglobin 10.4, stable.      ROS:     No nausea, vomiting, diarrhea, chest pain.    Objective:    Scheduled Meds:    aspirin 81 mg Oral Daily   bisacodyl 10 mg Rectal Once   budesonide 0.5 mg Nebulization BID - RT   carvedilol 3.125 mg Oral Nightly   castor oil-balsam peru  Topical Q12H   enoxaparin 30 mg Subcutaneous Q24H   folic acid 1 mg Oral Daily   furosemide 20 mg Oral Daily   guaiFENesin 1,200 mg Oral Q12H   ipratropium-albuterol 3 mL Nebulization 4x Daily - RT   levothyroxine 75 mcg Oral Daily   losartan 12.5 mg Oral Q24H   nystatin 5 mL Oral 4x Daily   OLANZapine 2.5 mg Oral Nightly   oxybutynin XL 5 mg Oral Daily   pantoprazole 40 mg Oral Q AM   Petrolatum  Topical Q12H   polyethylene glycol 17 g Oral Daily   predniSONE 10 mg Oral Daily   sertraline 25 mg Oral Daily   sodium chloride 10 mL Intravenous Q12H   sodium chloride 10 mL Intravenous Q12H       Continuous Infusions:    sodium chloride 30 mL/hr Last Rate: Stopped (20 1214)       Intake/Output:    Intake/Output Summary (Last 24 hours) at 3/1/2020 1151  Last data filed at 3/1/2020 0500  Gross per 24 hour   Intake 675 ml   Output 1 ml   Net 674 ml         Exam:    tMax 24 hrs: Temp (24hrs), Av.7 °F (36.5 °C), Min:97.4 °F (36.3 °C), Max:97.9 °F (36.6 °C)    Vitals Ranges:   Temp:  [97.4 °F (36.3 °C)-97.9 °F (36.6 °C)] 97.6 °F (36.4 °C)  Heart Rate:  [] 87  Resp:  [16-20] 20  BP:  "()/(48-84) 106/76    /76   Pulse 87   Temp 97.6 °F (36.4 °C) (Oral)   Resp 20   Ht 162.6 cm (64.02\")   Wt 38.8 kg (85 lb 9.6 oz)   SpO2 96%   BMI 14.69 kg/m²     General: Awake, oriented x3, on 2 L of oxygen.   Neck: Supple, symmetrical, trachea midline, no adenopathy;              thyroid:  no enlargement/tenderness/nodules;              no carotid bruit or JVD  Cardiovascular: Regular rate and rhythm. Exam reveals no gallop and no friction rub. No murmur heard  Pulmonary: Decreased breath sounds bilaterally, but better aeration.  Few rhonchi bilaterally.  No wheezing, no rales.    Abdominal: Soft. Soft, non-tender, bowel sounds active all four quadrants,     no masses, no hepatomegaly, no splenomegaly.   Extremities: Normal, atraumatic, no cyanosis or edema  Neurological: Somnolent, arousable, no new focal sensory or motor deficit.    Skin: Skin color, texture, turgor normal, no rashes or lesions      Data Review:    Results from last 7 days   Lab Units 03/01/20  0654 02/29/20  0603 02/28/20  0606   WBC 10*3/mm3 6.73 7.80 7.64   HEMOGLOBIN g/dL 10.4* 10.4* 10.1*   HEMATOCRIT % 32.7* 33.0* 32.0*   PLATELETS 10*3/mm3 363 392 358       Results from last 7 days   Lab Units 03/01/20  0654 02/29/20  0603 02/28/20  1011   SODIUM mmol/L 135* 136 136   POTASSIUM mmol/L 3.9 4.5 4.2   CHLORIDE mmol/L 97* 97* 100   CO2 mmol/L 27.0 26.1 24.4   BUN mg/dL 29* 33* 29*   CREATININE mg/dL 0.62 0.64 0.66   CALCIUM mg/dL 9.1 8.7 8.3*   GLUCOSE mg/dL 85 87 127*                 Lab Results   Lab Value Date/Time    TROPONINT <0.010 02/22/2020 0542    TROPONINT <0.010 02/21/2020 1411    TROPONINT 0.109 (C) 02/13/2020 1429    TROPONINT 0.091 (C) 02/13/2020 1145    TROPONINT 0.02 10/31/2019 1837    TROPONINT <0.010 06/06/2019 2129    TROPONINT <0.010 05/27/2019 0735    TROPONINT <0.010 05/16/2019 1918       Microbiology Results (last 10 days)     ** No results found for the last 240 hours. **           Imaging Results " (Last 7 Days)     Procedure Component Value Units Date/Time    XR Chest PA & Lateral [[id number]] Collected:  02/26/20 1147     Updated:  02/26/20 1154    Narrative:       Chest radiograph     HISTORY:Dyspnea     TECHNIQUE: Two PA and lateral radiographs     COMPARISON:Multiple chest radiographs dating back to 06/13/2019 and CTA  chest 02/13/2020       Impression:       FINDINGS AND IMPRESSION:  Right PICC tip terminates over the superior cavoatrial junction.     Multifocal areas of irregular pulmonary opacification are present within  the perihilar aspects the bilateral mid lungs as well as the left lung  base corresponding with areas of consolidation best seen on recent CTA  chest and likely representing multifocal pneumonia. Findings appear to  have improved within the right midlung and left base since 02/23/2020  when accounting for differences in technique. No pneumothorax is seen.  The heart is mildly enlarged. Small pleural effusions are present  posteriorly, as before.     This report was finalized on 2/26/2020 11:51 AM by Dr. Ephraim Gonzales M.D.           Echo:    Interpretation Summary     · Calculated EF = 33.0%  · Left ventricular systolic function is moderately decreased.  · The left ventricular cavity is moderately dilated.  · The following left ventricular wall segments are hypokinetic: mid anterior, apical anterior, basal anterolateral, basal inferolateral, mid inferolateral, apical inferior, mid inferior, apical septal, basal inferoseptal, mid inferoseptal, apex hypokinetic, mid anteroseptal, basal anterior, basal inferior and basal inferoseptal. The following left ventricular wall segments are akinetic: mid anterolateral and apical lateral.  · Left ventricular diastolic dysfunction (grade III) consistent with reversible restrictive pattern.  · Mild aortic valve regurgitation is present.  · There is a moderate size left pleural effusion.         Assessment:        Pneumonia of right lower lobe due  to infectious organism (CMS/HCC)    Benign essential hypertension    Chronic kidney disease, stage III (moderate) (CMS/HCC)    Chronic coronary artery disease    Acute on chronic respiratory failure with hypoxemia (CMS/HCC)    Adenocarcinoma, lung, left (CMS/HCC)    Acute on chronic combined systolic and diastolic CHF (congestive heart failure) (CMS/HCC)  Hyponatremia  Anemia  Mucus plugging  Hypokalemia  Elevated troponin  Metastatic adenocarcinoma of lung,   Chronic systolic and diastolic CHF  CM  Hypothyroidism  Depression    Plan:      Will discontinue losartan, hold Coreg, will probably restart Coreg in the morning.  Continue Lasix daily, monitor blood pressure  We will recheck chest x-ray  Continue other medications  Continue current pulmonary management  Up to chair, continue working with PT  Monitor and correct electrolytes   DVT/stress ulcer prophylaxis  Labs in am  Not ready to go home yet      Marquis Gonsales MD

## 2020-03-01 NOTE — PLAN OF CARE
Problem: Patient Care Overview  Goal: Plan of Care Review  Outcome: Ongoing (interventions implemented as appropriate)     Hypotension this shift with slight dizziness. Medication adjustments. Possible d/c tomorrow. Will continue to monitor.

## 2020-03-01 NOTE — PROGRESS NOTES
LOS: 16 days   Patient Care Team:  Timothy Taylor MD as PCP - General (Internal Medicine)  Miah Whalen MD as Consulting Physician (Radiation Oncology)  Timothy Taylor MD as Referring Physician (Internal Medicine)  Solis Godfrey Jr., MD as Consulting Physician (Hematology and Oncology)    Subjective     Whole she is feeling pretty good today breathing is doing reasonably well coughing just a small amount of mucus up she is doing her OPEP device.  She and her  are hoping that she can go home tomorrow.    Review of Systems:         Objective     Vital Signs  Vital Sign Min/Max for last 24 hours  Temp  Min: 97.1 °F (36.2 °C)  Max: 97.9 °F (36.6 °C)   BP  Min: 74/52  Max: 111/84   Pulse  Min: 67  Max: 106   Resp  Min: 16  Max: 20   SpO2  Min: 93 %  Max: 100 %   Flow (L/min)  Min: 2  Max: 2   Weight  Min: 38.8 kg (85 lb 9.6 oz)  Max: 38.8 kg (85 lb 9.6 oz)        Ventilator/Non-Invasive Ventilation Settings (From admission, onward)     Start     Ordered    02/23/20 1041  NIPPV (CPAP or BIPAP)  Until Discontinued,   Status:  Canceled     Question Answer Comment   Indication: Acute Respiratory Failure    Type: AVAPS/PC/PS    NIPPV Mask Interface: Per Patient Preference    Backup Rate 20    Target VT (mL) 350    EPAP/PEEP (cm H2O) 6    Min Pressure (cm H2O) 10    Max Pressure (cm H2O) 20    Titrate for SPO2 90%        02/23/20 1042                             Body mass index is 14.69 kg/m².  I/O last 3 completed shifts:  In: 1080 [P.O.:1080]  Out: 1000 [Urine:1000]  No intake/output data recorded.        Physical Exam:  General Appearance: Developed thin cachectic white female she is resting in bed in no apparent distress on 3 L nasal cannula O2  Eyes: Conjunctiva clear and anicteric  ENT: Mucous membranes are moist no erythema no exudates  Neck: No lymphadenopathy no jugular venous distention trachea midline  Lungs: She has a few coarse inspiratory and expiratory wheezes scattered bilaterally and a few  rhonchi.  Nonlabored symmetric expansion  Cardiac: Regular rate rhythm no murmur  Abdomen: Soft no palpable hepatosplenomegaly  : Not examined  Musculoskeletal: Very little muscle mass  Skin: No jaundice no petechiae skin is warm and dry  Neuro: Alert oriented cooperative following commands  Extremities/P Vascular: No clubbing no cyanosis no edema palpable radial dorsalis pedis pulses  MSE: Seems to be in reasonably good spirits today       Labs:  Results from last 7 days   Lab Units 02/29/20  0603 02/28/20  1011 02/27/20  0554 02/25/20  0614 02/24/20  0445 02/23/20  0520   GLUCOSE mg/dL 87 127* 82 94 97 81   SODIUM mmol/L 136 136 137 134* 134* 135*   POTASSIUM mmol/L 4.5 4.2 4.0 4.3 4.1 3.9   CO2 mmol/L 26.1 24.4 28.6 25.7 28.5 28.4   CHLORIDE mmol/L 97* 100 95* 97* 98 97*   ANION GAP mmol/L 12.9 11.6 13.4 11.3 7.5 9.6   CREATININE mg/dL 0.64 0.66 0.70 0.58 0.53* 0.65   BUN mg/dL 33* 29* 31* 19 16 15   BUN / CREAT RATIO  51.6* 43.9* 44.3* 32.8* 30.2* 23.1   CALCIUM mg/dL 8.7 8.3* 9.3 8.9 8.1* 8.5*   EGFR IF NONAFRICN AM mL/min/1.73 91 88 82 102 113 89     Estimated Creatinine Clearance: 38.4 mL/min (by C-G formula based on SCr of 0.64 mg/dL).      Results from last 7 days   Lab Units 02/29/20  0603 02/28/20  0606 02/27/20  0554 02/26/20  0558 02/25/20  0614 02/24/20 0445 02/23/20  0520   WBC 10*3/mm3 7.80 7.64 7.12 6.76 7.00 7.09 6.70   RBC 10*6/mm3 4.02 3.88 4.23 3.97 3.87 3.94 3.91   HEMOGLOBIN g/dL 10.4* 10.1* 10.7* 10.2* 10.3* 10.2* 10.2*   HEMATOCRIT % 33.0* 32.0* 34.2 32.4* 32.2* 32.4* 32.1*   MCV fL 82.1 82.5 80.9 81.6 83.2 82.2 82.1   MCH pg 25.9* 26.0* 25.3* 25.7* 26.6 25.9* 26.1*   MCHC g/dL 31.5 31.6 31.3* 31.5 32.0 31.5 31.8   RDW % 15.2 15.3 15.3 15.3 15.2 15.3 15.2   RDW-SD fl 46.0 45.6 44.0 46.0 45.7 46.7 45.6   MPV fL 8.8 8.9 8.9 9.0 9.0 9.0 8.8   PLATELETS 10*3/mm3 392 358 409 358 322 273 310   NEUTROPHIL % % 71.7 72.5 70.7 70.2 75.3 78.3* 76.9*   LYMPHOCYTE % % 17.2* 15.7* 16.3* 18.5* 13.6*  13.7* 14.5*   MONOCYTES % % 4.7* 5.4 5.5 5.8 5.7 4.5* 5.5   EOSINOPHIL % % 2.2 2.1 2.4 2.4 2.0 1.4 1.6   BASOPHIL % % 0.5 0.5 0.6 0.6 0.3 0.3 0.3   IMM GRAN % % 3.7* 3.8* 4.5* 2.5* 3.1* 1.8* 1.2*   NEUTROS ABS 10*3/mm3 5.59 5.54 5.04 4.75 5.27 5.55 5.15   LYMPHS ABS 10*3/mm3 1.34 1.20 1.16 1.25 0.95 0.97 0.97   MONOS ABS 10*3/mm3 0.37 0.41 0.39 0.39 0.40 0.32 0.37   EOS ABS 10*3/mm3 0.17 0.16 0.17 0.16 0.14 0.10 0.11   BASOS ABS 10*3/mm3 0.04 0.04 0.04 0.04 0.02 0.02 0.02   IMMATURE GRANS (ABS) 10*3/mm3 0.29* 0.29* 0.32* 0.17* 0.22* 0.13* 0.08*   NRBC /100 WBC 0.0 0.0 0.0 0.0 0.0 0.0 0.0             Results from last 7 days   Lab Units 02/23/20  0520   PROBNP pg/mL 2,331.0*         Results from last 7 days   Lab Units 02/23/20  0520   PROCALCITONIN ng/mL 0.15         Microbiology Results (last 10 days)     ** No results found for the last 240 hours. **                aspirin 81 mg Oral Daily   bisacodyl 10 mg Rectal Once   budesonide 0.5 mg Nebulization BID - RT   carvedilol 3.125 mg Oral Nightly   castor oil-balsam peru  Topical Q12H   enoxaparin 30 mg Subcutaneous Q24H   folic acid 1 mg Oral Daily   furosemide 20 mg Oral Daily   guaiFENesin 1,200 mg Oral Q12H   ipratropium-albuterol 3 mL Nebulization 4x Daily - RT   levothyroxine 75 mcg Oral Daily   losartan 12.5 mg Oral Q24H   nystatin 5 mL Oral 4x Daily   OLANZapine 2.5 mg Oral Nightly   oxybutynin XL 5 mg Oral Daily   pantoprazole 40 mg Oral Q AM   Petrolatum  Topical Q12H   polyethylene glycol 17 g Oral Daily   predniSONE 10 mg Oral Daily   sertraline 25 mg Oral Daily   sodium chloride 10 mL Intravenous Q12H   sodium chloride 10 mL Intravenous Q12H       sodium chloride 30 mL/hr Last Rate: Stopped (02/19/20 1214)       Diagnostics:  Xr Chest 1 View    Result Date: 2/23/2020  ONE VIEW PORTABLE CHEST AT 10:18 AM  HISTORY: Shortness of breath.  FINDINGS: There is extensive pneumonia in the left perihilar region and in the right upper lobe extending lateral to the  right hilum and these areas show no significant change when compared to the study of 2 days ago. There is also some persistent atelectasis and pneumonia at the left base that is unchanged. The patient has severe underlying COPD with hyperinflation. The heart remains mildly enlarged. A right-sided PICC line ends in the SVC.  CONCLUSION: Extensive areas of bilateral pneumonia unchanged from 2 days ago. Severe underlying pulmonary emphysema.  This report was finalized on 2/23/2020 10:39 AM by Dr. Eleno Mahajan M.D.      Xr Chest 1 View    Result Date: 2/21/2020  XR CHEST 1 VW-  Clinical: Increasing hypoxia  COMPARISON 2/18/2020  FINDINGS: Interval placement of a PICC line, position is satisfactory. There are bilateral areas of consolidation again demonstrated with no significant interval improvement. No new area of consolidation has developed. There is haziness along the costophrenic sulcus on the left, likely related to small pleural effusion. The cardiomediastinal silhouette is stable. The remainder of examination is unremarkable.  This report was finalized on 2/21/2020 7:12 AM by Dr. Abimael Mitchell M.D.      Xr Chest 1 View    Result Date: 2/18/2020  CLINICAL HISTORY: 74-year-old female for follow-up of pneumonia.  PORTABLE AP SEMIERECT CHEST DATED 02/18/2020 AT 0541 HOURS  FINDINGS: When compared to the most recent available prior chest radiographs dated 02/15/2020 at 1300 hours and 1302 hours, there is persistent dense collapse or consolidation and possible adjacent pleural fluid at the lower left chest and there is some persistent patchy perihilar opacity in the left lung. There is interval development of extensive patchy to dense opacity in the mid to upper right chest on the current exam with some increased haziness in the lower right lung zone. Lung markings in both lungs remain diffusely coarse. Right costophrenic angle remains relatively sharp. Mild cardiomegaly and aortic calcifications are present as  before. Vascular calcifications overlie the lung apices but no definite pneumothorax is seen. No acute change in bony thorax is seen. Monitoring lead wires and oxygen cannula tubing are present.  CONCLUSION: Increased pulmonary infiltrates in the right lung and persistent patchy to dense collapse or consolidations in the left lung as described above.  This report was finalized on 2/18/2020 8:51 AM by Dr. Miah Marsh M.D.      Xr Chest 1 View    Result Date: 2/15/2020  XR CHEST 1 VW-  HISTORY: Female who is 74 years-old,  cough, pneumonia  TECHNIQUE: Frontal view of the chest  COMPARISON: 02/13/2020  FINDINGS: The heart is mildly enlarged. Aorta is calcified, tortuous. Pulmonary vasculature is unremarkable. Persistent bilateral lower lung opacities without significant change, continued follow-up recommended. Small left pleural effusion is suggested. No pneumothorax. No acute osseous process.      Persistent bilateral lower lung opacities, continued follow-up recommended.  This report was finalized on 2/15/2020 2:36 PM by Dr. Ortega Woodard M.D.      Xr Chest 1 View    Result Date: 2/13/2020  XR CHEST 1 VW-  HISTORY: Female who is 74 years-old,  short of breath, lung cancer  TECHNIQUE: Frontal view of the chest  COMPARISON:  image from CT from 01/27/2020  FINDINGS: Heart size is borderline. Aorta is tortuous, calcified. Pulmonary vasculature is unremarkable. Bilateral perihilar and lower lung opacities appear increased on the right, infectious, inflammatory, neoplastic etiologies can be considered. Mild prominence of interstitial markings. Minimal left pleural effusion is apparent. No pneumothorax. No acute osseous process.      Interval worsening of pulmonary opacities, continued follow-up recommended.  This report was finalized on 2/13/2020 12:41 PM by Dr. Ortega Woodard M.D.      Ct Angiogram Chest    Result Date: 2/13/2020  CT ANGIOGRAM CHEST-  Radiation dose reduction techniques were utilized,  including automated exposure control and exposure modulation based on body size.  CLINICAL: Shortness of breath, evaluate for pulmonary embolus. History of lung carcinoma.  CT scan of the chest with intravenous contrast, pulmonary embolus protocol to include CT angiogram and three-dimensional reconstruction.  COMPARISON: 01/27/2020  FINDINGS: Thin axial and CT angio reconstructed images fail to demonstrate pulmonary embolus.  There is cardiac enlargement. Diameter of the thoracic aorta is normal. There is atherosclerotic plaque formation. Extensive bullae formation compatible with emphysema. There is stable right apical pleural thickening and parenchymal scarring.  Again demonstrated at the site of treated left upper lobe tumor there is a linear area of scarring/atelectasis along the major fissure.  The left-sided pleural effusion has diminished in size within the interim. The left lower lobe consolidation has diminished within the interim. There is left lower lobe bronchial wall thickening and mucus plugging demonstrated. There is a small area of residual peripheral consolidation.  The subpleural mass-like area of consolidation arising within the superior segment of the right lower lobe seen on the previous examination and is now encompassed by a larger area of consolidation devoid of air bronchograms. Superimposed pneumonia or partial collapse is suspected given the degree of change since 1/27/2020. There is fairly extensive mucus plugging within the right lower lobe. Along the right lower lobe costophrenic sulcus, there is an area of consolidation or atelectasis devoid of air bronchograms which is more pronounced compared to the previous examination. Breast parenchyma is stable in appearance, no axillary adenopathy. Stable mediastinal lymph nodes.  CONCLUSION: 1. No pulmonary embolus is demonstrated. 2. Cardiomegaly. 3. Left-sided pleural effusion diminished in size within the interim. 4. Left lower lobe  consolidation has diminished within the interim. 5. Enlarging area of consolidation superior segment right lower lobe and right lower lobe costophrenic sulcus, likely related to pneumonia or partial collapse. There is extensive mucus plugging within the right lower lobe. Advise repeat CT after treatment.  Findings of this report called to Lauren Wilkinson in the emergency room at the time of completion, 2:20 PM.       This report was finalized on 2/13/2020 3:23 PM by Dr. Abimael Mitchell M.D.      Xr Chest Pa & Lateral    Result Date: 2/26/2020  Chest radiograph  HISTORY:Dyspnea  TECHNIQUE: Two PA and lateral radiographs  COMPARISON:Multiple chest radiographs dating back to 06/13/2019 and CTA chest 02/13/2020      FINDINGS AND IMPRESSION: Right PICC tip terminates over the superior cavoatrial junction.  Multifocal areas of irregular pulmonary opacification are present within the perihilar aspects the bilateral mid lungs as well as the left lung base corresponding with areas of consolidation best seen on recent CTA chest and likely representing multifocal pneumonia. Findings appear to have improved within the right midlung and left base since 02/23/2020 when accounting for differences in technique. No pneumothorax is seen. The heart is mildly enlarged. Small pleural effusions are present posteriorly, as before.  This report was finalized on 2/26/2020 11:51 AM by Dr. Ephraim Gonzales M.D.      Results for orders placed during the hospital encounter of 02/13/20   Adult Transthoracic Echo Complete W/ Cont if Necessary Per Protocol    Narrative · Calculated EF = 33.0%  · Left ventricular systolic function is moderately decreased.  · The left ventricular cavity is moderately dilated.  · The following left ventricular wall segments are hypokinetic: mid   anterior, apical anterior, basal anterolateral, basal inferolateral, mid   inferolateral, apical inferior, mid inferior, apical septal, basal   inferoseptal, mid inferoseptal,  apex hypokinetic, mid anteroseptal, basal   anterior, basal inferior and basal inferoseptal. The following left   ventricular wall segments are akinetic: mid anterolateral and apical   lateral.  · Left ventricular diastolic dysfunction (grade III) consistent with   reversible restrictive pattern.  · Mild aortic valve regurgitation is present.  · There is a moderate size left pleural effusion.              Active Hospital Problems    Diagnosis  POA   • **Pneumonia of right lower lobe due to infectious organism (CMS/HCC) [J18.1]  Yes   • Acute on chronic combined systolic and diastolic CHF (congestive heart failure) (CMS/HCC) [I50.43]  Yes   • Acute on chronic respiratory failure with hypoxemia (CMS/HCC) [J96.21]  Yes   • Adenocarcinoma, lung, left (CMS/HCC) [C34.92]  Yes   • Benign essential hypertension [I10]  Yes   • Chronic coronary artery disease [I25.10]  Yes   • Chronic kidney disease, stage III (moderate) (CMS/HCC) [N18.3]  Yes      Resolved Hospital Problems   No resolved problems to display.         Assessment/Plan     1. Pneumonia and mucous plugging.  Pleated antibiotics continue pulmonary hygiene  2. Acute  hypoxemic respiratory failure chronic nocturnal hypoxemia  3. Acute on chronic systolic CHF  4. Hyponatremia  5. Metastatic adenocarcinoma of the lung followed by CBC group palliative treatment only  6. Chronic kidney disease stage III  7. Coronary artery disease  8. Cardiomyopathy  9. Left hemiplegia from old hemorrhagic stroke  10. Anemia  11. 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  Apparently this was first noticed in November 2019  12. Severe protein calorie malnutrition    Plan for disposition: From a pulmonary standpoint she can go home on her current medication regimen with the OPEP she should follow-up with Dr. Francis in the office in the next week or 2    Arthur Wilkerson MD  02/29/20  8:24 PM    Time:

## 2020-03-01 NOTE — PROGRESS NOTES
LOS: 17 days   Patient Care Team:  Timothy Taylor MD as PCP - General (Internal Medicine)  Miah Whalen MD as Consulting Physician (Radiation Oncology)  Timothy Taylor MD as Referring Physician (Internal Medicine)  Solis Godfrey Jr., MD as Consulting Physician (Hematology and Oncology)    Subjective   Patient little hypotensive this morning so her medicines have been adjusted.  She reports her breathing is doing pretty well today really not coughing does not feel short of breath on 2 L O2 her  says likewise her breathing is about as good as it is been in a good while      Review of Systems:         Objective     Vital Signs  Vital Sign Min/Max for last 24 hours  Temp  Min: 97.4 °F (36.3 °C)  Max: 97.7 °F (36.5 °C)   BP  Min: 86/60  Max: 113/48   Pulse  Min: 65  Max: 100   Resp  Min: 16  Max: 20   SpO2  Min: 91 %  Max: 100 %   Flow (L/min)  Min: 2  Max: 2   No data recorded        Ventilator/Non-Invasive Ventilation Settings (From admission, onward)     Start     Ordered    02/23/20 1041  NIPPV (CPAP or BIPAP)  Until Discontinued,   Status:  Canceled     Question Answer Comment   Indication: Acute Respiratory Failure    Type: AVAPS/PC/PS    NIPPV Mask Interface: Per Patient Preference    Backup Rate 20    Target VT (mL) 350    EPAP/PEEP (cm H2O) 6    Min Pressure (cm H2O) 10    Max Pressure (cm H2O) 20    Titrate for SPO2 90%        02/23/20 1042                             Body mass index is 14.69 kg/m².  I/O last 3 completed shifts:  In: 915 [P.O.:915]  Out: 201 [Urine:201]  No intake/output data recorded.        Physical Exam:  General Appearance: Developed thin cachectic white female she is resting in bed in no apparent distress on 2 L nasal cannula O2 oxygen saturations of 95%  Eyes: Conjunctiva clear and anicteric  ENT: Mucous membranes are moist no erythema no exudates  Neck: No lymphadenopathy no jugular venous distention trachea midline  Lungs: She has a few coarse inspiratory and  expiratory wheezes but otherwise clear, nonlabored symmetric expansion  Cardiac: Regular rate rhythm no murmur  Abdomen: Soft no palpable hepatosplenomegaly  : Not examined  Musculoskeletal: Very little muscle mass  Skin: No jaundice no petechiae skin is warm and dry  Neuro: Alert oriented cooperative following commands  Extremities/P Vascular: No clubbing no cyanosis no edema palpable radial dorsalis pedis pulses  MSE: Seems to be in reasonably good spirits today       Labs:  Results from last 7 days   Lab Units 03/01/20  0654 02/29/20  0603 02/28/20  1011 02/27/20  0554 02/25/20  0614 02/24/20  0445   GLUCOSE mg/dL 85 87 127* 82 94 97   SODIUM mmol/L 135* 136 136 137 134* 134*   POTASSIUM mmol/L 3.9 4.5 4.2 4.0 4.3 4.1   CO2 mmol/L 27.0 26.1 24.4 28.6 25.7 28.5   CHLORIDE mmol/L 97* 97* 100 95* 97* 98   ANION GAP mmol/L 11.0 12.9 11.6 13.4 11.3 7.5   CREATININE mg/dL 0.62 0.64 0.66 0.70 0.58 0.53*   BUN mg/dL 29* 33* 29* 31* 19 16   BUN / CREAT RATIO  46.8* 51.6* 43.9* 44.3* 32.8* 30.2*   CALCIUM mg/dL 9.1 8.7 8.3* 9.3 8.9 8.1*   EGFR IF NONAFRICN AM mL/min/1.73 94 91 88 82 102 113     Estimated Creatinine Clearance: 38.4 mL/min (by C-G formula based on SCr of 0.62 mg/dL).      Results from last 7 days   Lab Units 03/01/20  0654 02/29/20  0603 02/28/20  0606 02/27/20  0554 02/26/20  0558 02/25/20  0614 02/24/20  0445   WBC 10*3/mm3 6.73 7.80 7.64 7.12 6.76 7.00 7.09   RBC 10*6/mm3 4.01 4.02 3.88 4.23 3.97 3.87 3.94   HEMOGLOBIN g/dL 10.4* 10.4* 10.1* 10.7* 10.2* 10.3* 10.2*   HEMATOCRIT % 32.7* 33.0* 32.0* 34.2 32.4* 32.2* 32.4*   MCV fL 81.5 82.1 82.5 80.9 81.6 83.2 82.2   MCH pg 25.9* 25.9* 26.0* 25.3* 25.7* 26.6 25.9*   MCHC g/dL 31.8 31.5 31.6 31.3* 31.5 32.0 31.5   RDW % 15.1 15.2 15.3 15.3 15.3 15.2 15.3   RDW-SD fl 44.7 46.0 45.6 44.0 46.0 45.7 46.7   MPV fL 8.7 8.8 8.9 8.9 9.0 9.0 9.0   PLATELETS 10*3/mm3 363 392 358 409 358 322 273   NEUTROPHIL % % 72.2 71.7 72.5 70.7 70.2 75.3 78.3*   LYMPHOCYTE % %  15.8* 17.2* 15.7* 16.3* 18.5* 13.6* 13.7*   MONOCYTES % % 5.9 4.7* 5.4 5.5 5.8 5.7 4.5*   EOSINOPHIL % % 2.2 2.2 2.1 2.4 2.4 2.0 1.4   BASOPHIL % % 0.6 0.5 0.5 0.6 0.6 0.3 0.3   IMM GRAN % % 3.3* 3.7* 3.8* 4.5* 2.5* 3.1* 1.8*   NEUTROS ABS 10*3/mm3 4.86 5.59 5.54 5.04 4.75 5.27 5.55   LYMPHS ABS 10*3/mm3 1.06 1.34 1.20 1.16 1.25 0.95 0.97   MONOS ABS 10*3/mm3 0.40 0.37 0.41 0.39 0.39 0.40 0.32   EOS ABS 10*3/mm3 0.15 0.17 0.16 0.17 0.16 0.14 0.10   BASOS ABS 10*3/mm3 0.04 0.04 0.04 0.04 0.04 0.02 0.02   IMMATURE GRANS (ABS) 10*3/mm3 0.22* 0.29* 0.29* 0.32* 0.17* 0.22* 0.13*   NRBC /100 WBC 0.0 0.0 0.0 0.0 0.0 0.0 0.0                             Microbiology Results (last 10 days)     ** No results found for the last 240 hours. **                aspirin 81 mg Oral Daily   bisacodyl 10 mg Rectal Once   budesonide 0.5 mg Nebulization BID - RT   [START ON 3/2/2020] carvedilol 3.125 mg Oral Daily   castor oil-balsam peru  Topical Q12H   enoxaparin 30 mg Subcutaneous Q24H   folic acid 1 mg Oral Daily   furosemide 20 mg Oral Daily   guaiFENesin 1,200 mg Oral Q12H   ipratropium-albuterol 3 mL Nebulization 4x Daily - RT   levothyroxine 75 mcg Oral Daily   nystatin 5 mL Oral 4x Daily   OLANZapine 2.5 mg Oral Nightly   oxybutynin XL 5 mg Oral Daily   pantoprazole 40 mg Oral Q AM   Petrolatum  Topical Q12H   polyethylene glycol 17 g Oral Daily   predniSONE 10 mg Oral Daily   sertraline 25 mg Oral Daily   sodium chloride 10 mL Intravenous Q12H   sodium chloride 10 mL Intravenous Q12H       sodium chloride 30 mL/hr Last Rate: Stopped (02/19/20 1214)       Diagnostics:  Xr Chest 1 View    Result Date: 2/23/2020  ONE VIEW PORTABLE CHEST AT 10:18 AM  HISTORY: Shortness of breath.  FINDINGS: There is extensive pneumonia in the left perihilar region and in the right upper lobe extending lateral to the right hilum and these areas show no significant change when compared to the study of 2 days ago. There is also some persistent  atelectasis and pneumonia at the left base that is unchanged. The patient has severe underlying COPD with hyperinflation. The heart remains mildly enlarged. A right-sided PICC line ends in the SVC.  CONCLUSION: Extensive areas of bilateral pneumonia unchanged from 2 days ago. Severe underlying pulmonary emphysema.  This report was finalized on 2/23/2020 10:39 AM by Dr. Eleno Mahajan M.D.      Xr Chest 1 View    Result Date: 2/21/2020  XR CHEST 1 VW-  Clinical: Increasing hypoxia  COMPARISON 2/18/2020  FINDINGS: Interval placement of a PICC line, position is satisfactory. There are bilateral areas of consolidation again demonstrated with no significant interval improvement. No new area of consolidation has developed. There is haziness along the costophrenic sulcus on the left, likely related to small pleural effusion. The cardiomediastinal silhouette is stable. The remainder of examination is unremarkable.  This report was finalized on 2/21/2020 7:12 AM by Dr. Abimael Mitchell M.D.      Xr Chest 1 View    Result Date: 2/18/2020  CLINICAL HISTORY: 74-year-old female for follow-up of pneumonia.  PORTABLE AP SEMIERECT CHEST DATED 02/18/2020 AT 0541 HOURS  FINDINGS: When compared to the most recent available prior chest radiographs dated 02/15/2020 at 1300 hours and 1302 hours, there is persistent dense collapse or consolidation and possible adjacent pleural fluid at the lower left chest and there is some persistent patchy perihilar opacity in the left lung. There is interval development of extensive patchy to dense opacity in the mid to upper right chest on the current exam with some increased haziness in the lower right lung zone. Lung markings in both lungs remain diffusely coarse. Right costophrenic angle remains relatively sharp. Mild cardiomegaly and aortic calcifications are present as before. Vascular calcifications overlie the lung apices but no definite pneumothorax is seen. No acute change in bony thorax is  seen. Monitoring lead wires and oxygen cannula tubing are present.  CONCLUSION: Increased pulmonary infiltrates in the right lung and persistent patchy to dense collapse or consolidations in the left lung as described above.  This report was finalized on 2/18/2020 8:51 AM by Dr. Miah Marsh M.D.      Xr Chest 1 View    Result Date: 2/15/2020  XR CHEST 1 VW-  HISTORY: Female who is 74 years-old,  cough, pneumonia  TECHNIQUE: Frontal view of the chest  COMPARISON: 02/13/2020  FINDINGS: The heart is mildly enlarged. Aorta is calcified, tortuous. Pulmonary vasculature is unremarkable. Persistent bilateral lower lung opacities without significant change, continued follow-up recommended. Small left pleural effusion is suggested. No pneumothorax. No acute osseous process.      Persistent bilateral lower lung opacities, continued follow-up recommended.  This report was finalized on 2/15/2020 2:36 PM by Dr. Ortega Woodard M.D.      Xr Chest 1 View    Result Date: 2/13/2020  XR CHEST 1 VW-  HISTORY: Female who is 74 years-old,  short of breath, lung cancer  TECHNIQUE: Frontal view of the chest  COMPARISON:  image from CT from 01/27/2020  FINDINGS: Heart size is borderline. Aorta is tortuous, calcified. Pulmonary vasculature is unremarkable. Bilateral perihilar and lower lung opacities appear increased on the right, infectious, inflammatory, neoplastic etiologies can be considered. Mild prominence of interstitial markings. Minimal left pleural effusion is apparent. No pneumothorax. No acute osseous process.      Interval worsening of pulmonary opacities, continued follow-up recommended.  This report was finalized on 2/13/2020 12:41 PM by Dr. Ortega Woodard M.D.      Ct Angiogram Chest    Result Date: 2/13/2020  CT ANGIOGRAM CHEST-  Radiation dose reduction techniques were utilized, including automated exposure control and exposure modulation based on body size.  CLINICAL: Shortness of breath, evaluate for  pulmonary embolus. History of lung carcinoma.  CT scan of the chest with intravenous contrast, pulmonary embolus protocol to include CT angiogram and three-dimensional reconstruction.  COMPARISON: 01/27/2020  FINDINGS: Thin axial and CT angio reconstructed images fail to demonstrate pulmonary embolus.  There is cardiac enlargement. Diameter of the thoracic aorta is normal. There is atherosclerotic plaque formation. Extensive bullae formation compatible with emphysema. There is stable right apical pleural thickening and parenchymal scarring.  Again demonstrated at the site of treated left upper lobe tumor there is a linear area of scarring/atelectasis along the major fissure.  The left-sided pleural effusion has diminished in size within the interim. The left lower lobe consolidation has diminished within the interim. There is left lower lobe bronchial wall thickening and mucus plugging demonstrated. There is a small area of residual peripheral consolidation.  The subpleural mass-like area of consolidation arising within the superior segment of the right lower lobe seen on the previous examination and is now encompassed by a larger area of consolidation devoid of air bronchograms. Superimposed pneumonia or partial collapse is suspected given the degree of change since 1/27/2020. There is fairly extensive mucus plugging within the right lower lobe. Along the right lower lobe costophrenic sulcus, there is an area of consolidation or atelectasis devoid of air bronchograms which is more pronounced compared to the previous examination. Breast parenchyma is stable in appearance, no axillary adenopathy. Stable mediastinal lymph nodes.  CONCLUSION: 1. No pulmonary embolus is demonstrated. 2. Cardiomegaly. 3. Left-sided pleural effusion diminished in size within the interim. 4. Left lower lobe consolidation has diminished within the interim. 5. Enlarging area of consolidation superior segment right lower lobe and right lower  lobe costophrenic sulcus, likely related to pneumonia or partial collapse. There is extensive mucus plugging within the right lower lobe. Advise repeat CT after treatment.  Findings of this report called to Lauren Wilkinson in the emergency room at the time of completion, 2:20 PM.       This report was finalized on 2/13/2020 3:23 PM by Dr. Abimael Mitchell M.D.      Xr Chest Pa & Lateral    Result Date: 2/26/2020  Chest radiograph  HISTORY:Dyspnea  TECHNIQUE: Two PA and lateral radiographs  COMPARISON:Multiple chest radiographs dating back to 06/13/2019 and CTA chest 02/13/2020      FINDINGS AND IMPRESSION: Right PICC tip terminates over the superior cavoatrial junction.  Multifocal areas of irregular pulmonary opacification are present within the perihilar aspects the bilateral mid lungs as well as the left lung base corresponding with areas of consolidation best seen on recent CTA chest and likely representing multifocal pneumonia. Findings appear to have improved within the right midlung and left base since 02/23/2020 when accounting for differences in technique. No pneumothorax is seen. The heart is mildly enlarged. Small pleural effusions are present posteriorly, as before.  This report was finalized on 2/26/2020 11:51 AM by Dr. Ephraim Gonzales M.D.      Results for orders placed during the hospital encounter of 02/13/20   Adult Transthoracic Echo Complete W/ Cont if Necessary Per Protocol    Narrative · Calculated EF = 33.0%  · Left ventricular systolic function is moderately decreased.  · The left ventricular cavity is moderately dilated.  · The following left ventricular wall segments are hypokinetic: mid   anterior, apical anterior, basal anterolateral, basal inferolateral, mid   inferolateral, apical inferior, mid inferior, apical septal, basal   inferoseptal, mid inferoseptal, apex hypokinetic, mid anteroseptal, basal   anterior, basal inferior and basal inferoseptal. The following left   ventricular wall  segments are akinetic: mid anterolateral and apical   lateral.  · Left ventricular diastolic dysfunction (grade III) consistent with   reversible restrictive pattern.  · Mild aortic valve regurgitation is present.  · There is a moderate size left pleural effusion.              Active Hospital Problems    Diagnosis  POA   • **Pneumonia of right lower lobe due to infectious organism (CMS/HCC) [J18.1]  Yes   • Acute on chronic combined systolic and diastolic CHF (congestive heart failure) (CMS/HCC) [I50.43]  Yes   • Acute on chronic respiratory failure with hypoxemia (CMS/HCC) [J96.21]  Yes   • Adenocarcinoma, lung, left (CMS/HCC) [C34.92]  Yes   • Benign essential hypertension [I10]  Yes   • Chronic coronary artery disease [I25.10]  Yes   • Chronic kidney disease, stage III (moderate) (CMS/HCC) [N18.3]  Yes      Resolved Hospital Problems   No resolved problems to display.     Chest x-ray I personally reviewed and I do not think there is any change in today's x-ray from the 1 on the 26th.    Assessment/Plan     1. Pneumonia and mucous plugging.  Completed antibiotics continue pulmonary hygiene  2. Acute  hypoxemic respiratory failure chronic nocturnal hypoxemia doing well I would plan on her going home with O2  3. Acute on chronic systolic CHF  4. Hyponatremia  5. Metastatic adenocarcinoma of the lung followed by CBC group palliative treatment only  6. Chronic kidney disease stage III  7. Coronary artery disease  8. Cardiomyopathy  9. Left hemiplegia from old hemorrhagic stroke  10. Anemia  11. 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  Apparently this was first noticed in November 2019  12. Severe protein calorie malnutrition    Plan for disposition: From a pulmonary standpoint she can go home on her current medication regimen with the OPEP she should follow-up with Dr. Francis in the office in the next week or 2    Arthur Wilkerson MD  03/01/20  1:46 PM    Time:

## 2020-03-01 NOTE — NURSING NOTE
Spoke to Ladan with [facility] regarding hypotension and slight dizziness. States she will discuss with Dr. Meyer.

## 2020-03-01 NOTE — PLAN OF CARE
Problem: Patient Care Overview  Goal: Plan of Care Review  Outcome: Ongoing (interventions implemented as appropriate)  Flowsheets (Taken 3/1/2020 9598)  Progress: improving  Plan of Care Reviewed With: patient; spouse  Outcome Summary: VSS. BP dropped to 86/60, pt was asymtpomatic. BP came back up to 106/59. No c/o pain. Refusing some turns. Family at bedside. Continue to monitor.

## 2020-03-01 NOTE — NURSING NOTE
Spoke with Ladan with [facility]. Dr. Meyer aware of hypotension and med changes today. States to continue to monitor and he will see her in the morning.

## 2020-03-02 NOTE — PROGRESS NOTES
DAILY PROGRESS NOTE  [facility], [facility]        Patient Identification:  Name: Anaid Moura  Age: 74 y.o.  Sex: female  :  1945  MRN: [id number]           Primary Care Physician: Timothy Taylor MD    Subjective:    Interval History:    Unfortunately, blood pressure dropped down to 80s over 40s after giving a dose of Coreg this morning.  Yesterday it dropped the same way after giving losartan.  She is feeling okay otherwise  Saturation 1 L is 97%.  Sodium 138, potassium 4.2, creatinine 0.68.      ROS:     No nausea, vomiting, diarrhea, chest pain.    Objective:    Scheduled Meds:    aspirin 81 mg Oral Daily   bisacodyl 10 mg Rectal Once   budesonide 0.5 mg Nebulization BID - RT   carvedilol 3.125 mg Oral Daily   castor oil-balsam peru  Topical Q12H   enoxaparin 30 mg Subcutaneous Q24H   folic acid 1 mg Oral Daily   [START ON 3/3/2020] furosemide 10 mg Oral Daily   guaiFENesin 1,200 mg Oral Q12H   ipratropium-albuterol 3 mL Nebulization 4x Daily - RT   levothyroxine 75 mcg Oral Daily   nystatin 5 mL Oral 4x Daily   OLANZapine 2.5 mg Oral Nightly   oxybutynin XL 5 mg Oral Daily   pantoprazole 40 mg Oral Q AM   Petrolatum  Topical Q12H   polyethylene glycol 17 g Oral Daily   predniSONE 10 mg Oral Daily   sertraline 25 mg Oral Daily   sodium chloride 10 mL Intravenous Q12H   sodium chloride 10 mL Intravenous Q12H       Continuous Infusions:    sodium chloride 30 mL/hr Last Rate: Stopped (20 1214)       Intake/Output:    Intake/Output Summary (Last 24 hours) at 3/2/2020 1429  Last data filed at 3/2/2020 1349  Gross per 24 hour   Intake 600 ml   Output 200 ml   Net 400 ml         Exam:    tMax 24 hrs: Temp (24hrs), Av.5 °F (36.4 °C), Min:97.2 °F (36.2 °C), Max:97.8 °F (36.6 °C)    Vitals Ranges:   Temp:  [97.2 °F (36.2 °C)-97.8 °F (36.6 °C)] 97.2 °F (36.2 °C)  Heart Rate:  [] 94  Resp:  [18-20] 20  BP: ()/(47-76) 80/60    BP (!) 80/60 (BP Location:  "Right leg, Patient Position: Lying)   Pulse 94   Temp 97.2 °F (36.2 °C) (Oral)   Resp 20   Ht 162.6 cm (64.02\")   Wt 39 kg (86 lb)   SpO2 93%   BMI 14.75 kg/m²     General: Awake, oriented x3, on 2 L of oxygen.   Neck: Supple, symmetrical, trachea midline, no adenopathy;              thyroid:  no enlargement/tenderness/nodules;              no carotid bruit or JVD  Cardiovascular: Regular rate and rhythm. Exam reveals no gallop and no friction rub. No murmur heard  Pulmonary: Decreased breath sounds bilaterally, but better aeration.  Few rhonchi bilaterally.  No wheezing, no rales.    Abdominal: Soft. Soft, non-tender, bowel sounds active all four quadrants,     no masses, no hepatomegaly, no splenomegaly.   Extremities: Normal, atraumatic, no cyanosis or edema  Neurological: Somnolent, arousable, no new focal sensory or motor deficit.    Skin: Skin color, texture, turgor normal, no rashes or lesions      Data Review:    Results from last 7 days   Lab Units 03/02/20  0604 03/01/20  0654 02/29/20  0603   WBC 10*3/mm3 8.04 6.73 7.80   HEMOGLOBIN g/dL 10.2* 10.4* 10.4*   HEMATOCRIT % 32.0* 32.7* 33.0*   PLATELETS 10*3/mm3 393 363 392       Results from last 7 days   Lab Units 03/02/20  0604 03/01/20  0654 02/29/20  0603   SODIUM mmol/L 138 135* 136   POTASSIUM mmol/L 4.2 3.9 4.5   CHLORIDE mmol/L 97* 97* 97*   CO2 mmol/L 27.1 27.0 26.1   BUN mg/dL 32* 29* 33*   CREATININE mg/dL 0.68 0.62 0.64   CALCIUM mg/dL 9.1 9.1 8.7   GLUCOSE mg/dL 95 85 87                 Lab Results   Lab Value Date/Time    TROPONINT <0.010 02/22/2020 0542    TROPONINT <0.010 02/21/2020 1411    TROPONINT 0.109 (C) 02/13/2020 1429    TROPONINT 0.091 (C) 02/13/2020 1145    TROPONINT 0.02 10/31/2019 1837    TROPONINT <0.010 06/06/2019 2129    TROPONINT <0.010 05/27/2019 0735    TROPONINT <0.010 05/16/2019 1918       Microbiology Results (last 10 days)     ** No results found for the last 240 hours. **           Imaging Results (Last 7 Days)  "    Procedure Component Value Units Date/Time    XR Chest 1 View [[id number]] Collected:  03/01/20 1632     Updated:  03/01/20 1640    Narrative:       Portable chest radiograph     HISTORY:Shortness of breath, lung cancer     TECHNIQUE: Single AP portable radiograph of the chest     COMPARISON:Multiple chest radiographs dating back to 02/15/2020 and CTA  chest 02/13/2020 and chest CT 01/27/2020       Impression:       FINDINGS AND IMPRESSION:  Right PICC terminates near the superior cavoatrial junction.     The asymmetric pulmonary opacification within the left base and left  midlung is grossly unchanged since 02/26/2020. Masslike opacification  along the right hilum with extension of pulmonary opacification linearly  along the right minor fissure is present, as before, with improved  aeration since 02/26/2020. Findings at least in part likely represent  multifocal pneumonia given its appearance on recent CT. No pneumothorax  or pleural effusion is seen. Please note that the patient's known lung  cancer is not well evaluated with plain film radiographs and please  refer to recent chest CTs for further information and to determine  appropriate follow-up.     This report was finalized on 3/1/2020 4:36 PM by Dr. Ephraim Gonzales M.D.       XR Chest PA & Lateral [[id number]] Collected:  02/26/20 1147     Updated:  02/26/20 1154    Narrative:       Chest radiograph     HISTORY:Dyspnea     TECHNIQUE: Two PA and lateral radiographs     COMPARISON:Multiple chest radiographs dating back to 06/13/2019 and CTA  chest 02/13/2020       Impression:       FINDINGS AND IMPRESSION:  Right PICC tip terminates over the superior cavoatrial junction.     Multifocal areas of irregular pulmonary opacification are present within  the perihilar aspects the bilateral mid lungs as well as the left lung  base corresponding with areas of consolidation best seen on recent CTA  chest and likely representing multifocal pneumonia. Findings appear  to  have improved within the right midlung and left base since 02/23/2020  when accounting for differences in technique. No pneumothorax is seen.  The heart is mildly enlarged. Small pleural effusions are present  posteriorly, as before.     This report was finalized on 2/26/2020 11:51 AM by Dr. Ephraim Gonzales M.D.           Echo:    Interpretation Summary     · Calculated EF = 33.0%  · Left ventricular systolic function is moderately decreased.  · The left ventricular cavity is moderately dilated.  · The following left ventricular wall segments are hypokinetic: mid anterior, apical anterior, basal anterolateral, basal inferolateral, mid inferolateral, apical inferior, mid inferior, apical septal, basal inferoseptal, mid inferoseptal, apex hypokinetic, mid anteroseptal, basal anterior, basal inferior and basal inferoseptal. The following left ventricular wall segments are akinetic: mid anterolateral and apical lateral.  · Left ventricular diastolic dysfunction (grade III) consistent with reversible restrictive pattern.  · Mild aortic valve regurgitation is present.  · There is a moderate size left pleural effusion.         Assessment:        Pneumonia of right lower lobe due to infectious organism (CMS/HCC)    Benign essential hypertension    Chronic kidney disease, stage III (moderate) (CMS/HCC)    Chronic coronary artery disease    Acute on chronic respiratory failure with hypoxemia (CMS/HCC)    Adenocarcinoma, lung, left (CMS/HCC)    Acute on chronic combined systolic and diastolic CHF (congestive heart failure) (CMS/HCC)  Hyponatremia  Anemia  Mucus plugging  Hypokalemia  Elevated troponin  Metastatic adenocarcinoma of lung,   Chronic systolic and diastolic CHF  CM  Hypothyroidism  Depression    Plan:      Unfortunately even a low doses of medications to help with her cardiomyopathy are causing significant hypotension, will stop them.   will decrease Lasix to 10 mg daily as well.  Continue other  medications  Continue current pulmonary management  Up to chair, continue working with PT  Monitor and correct electrolytes   DVT/stress ulcer prophylaxis  Labs in am  Hopefully DC soon     Marquis Gonsales MD

## 2020-03-02 NOTE — PLAN OF CARE
Problem: Patient Care Overview  Goal: Plan of Care Review  Outcome: Ongoing (interventions implemented as appropriate)  Flowsheets (Taken 3/2/2020 6450)  Progress: improving  Plan of Care Reviewed With: patient; spouse  Outcome Summary: VSS. BP increasing throughout shift. No complaints. Family at bedside. Continue to monitor.

## 2020-03-02 NOTE — PROGRESS NOTES
"    Patient Name: Anaid Moura  :1945  74 y.o.      Patient Care Team:  Timothy Taylor MD as PCP - General (Internal Medicine)  Miah Whalen MD as Consulting Physician (Radiation Oncology)  Timothy Taylor MD as Referring Physician (Internal Medicine)  Solis Godfrey Jr., MD as Consulting Physician (Hematology and Oncology)    Chief Complaint: follow up heart failure    Interval History: low BP. No really having symptoms. She worked with physical therapy and did not have any light headed or dizziness. She has a congested cough. No fever.        Objective   Vital Signs  Temp:  [97.2 °F (36.2 °C)-97.8 °F (36.6 °C)] 97.2 °F (36.2 °C)  Heart Rate:  [] 94  Resp:  [18-20] 20  BP: ()/(54-76) 80/60    Intake/Output Summary (Last 24 hours) at 3/2/2020 1435  Last data filed at 3/2/2020 1349  Gross per 24 hour   Intake 600 ml   Output 200 ml   Net 400 ml     Flowsheet Rows      First Filed Value   Admission Height  162.6 cm (64\") Documented at 2020 1142   Admission Weight  39.1 kg (86 lb 3.2 oz) Documented at 2020 1142          Physical Exam:   General Appearance:    Alert, cooperative, in no acute distress   Lungs:     Clear to auscultation.  Normal respiratory effort and rate.      Heart:    Regular rhythm and normal rate, normal S1 and S2, no murmurs, gallops or rubs.     Chest Wall:    No abnormalities observed   Abdomen:     Soft, nontender, positive bowel sounds.     Extremities:   no cyanosis, clubbing or edema.  No marked joint deformities.  Adequate musculoskeletal strength.       Results Review:    Results from last 7 days   Lab Units 20  0604   SODIUM mmol/L 138   POTASSIUM mmol/L 4.2   CHLORIDE mmol/L 97*   CO2 mmol/L 27.1   BUN mg/dL 32*   CREATININE mg/dL 0.68   GLUCOSE mg/dL 95   CALCIUM mg/dL 9.1         Results from last 7 days   Lab Units 20  0604   WBC 10*3/mm3 8.04   HEMOGLOBIN g/dL 10.2*   HEMATOCRIT % 32.0*   PLATELETS 10*3/mm3 393                         "   Medication Review:     aspirin 81 mg Oral Daily   bisacodyl 10 mg Rectal Once   budesonide 0.5 mg Nebulization BID - RT   carvedilol 3.125 mg Oral Daily   castor oil-balsam peru  Topical Q12H   enoxaparin 30 mg Subcutaneous Q24H   folic acid 1 mg Oral Daily   [START ON 3/3/2020] furosemide 10 mg Oral Daily   guaiFENesin 1,200 mg Oral Q12H   ipratropium-albuterol 3 mL Nebulization 4x Daily - RT   levothyroxine 75 mcg Oral Daily   nystatin 5 mL Oral 4x Daily   OLANZapine 2.5 mg Oral Nightly   oxybutynin XL 5 mg Oral Daily   pantoprazole 40 mg Oral Q AM   Petrolatum  Topical Q12H   polyethylene glycol 17 g Oral Daily   predniSONE 10 mg Oral Daily   sertraline 25 mg Oral Daily   sodium chloride 10 mL Intravenous Q12H   sodium chloride 10 mL Intravenous Q12H          sodium chloride 30 mL/hr Last Rate: Stopped (02/19/20 1214)       Assessment/Plan   1. Acute on chronic hypoxic respiratory failure - multifactorial. Pneumonia, heart failure, lung cancer, severe lung disease. She will likely go home with oxygen.   2. Pneumonia with mucous plugging  3. Acute on chronic systolic heart failure- unfortunately hypotension limits guideline directed medical therapy. She is on very minimal carvedilol. I'll go ahead and stop it at this point. She is also on very minimal lasix. I think her volume status is ok. I will stop this as well and monitor.   4. Coronary artery disease - known chronically occluded RCA and moderate LAD disease. On ASA. No statin therapy. Stopping beta blocker as above. She denies angina.   5. Lung cancer, metastatic-follows with Dr. Godfrey.  On pembrolizumab since 6/2019.  Overall poor prognosis.  6. History of severe pneumonitis    Hypotension. Her discharge was held up due to this. I will stop beta blocker and lasix. She was on very minimal doses. Otherwise, I think cardiac status is relatively stable. She is not really symptomatic from hypotension so I will hold off on any volume resuscitation.     Reanna ARMIJO  SOPHIA Green  [facility]  03/02/20  2:35 PM

## 2020-03-02 NOTE — THERAPY TREATMENT NOTE
Acute Care - Physical Therapy Treatment Note  [facility]     Patient Name: Anaid Moura  : 1945  MRN: [id number]  Today's Date: 3/2/2020             Admit Date: 2020    Visit Dx:    ICD-10-CM ICD-9-CM   1. Pneumonia of right lower lobe due to infectious organism (CMS/HCC) J18.1 486   2. Stage 4 malignant neoplasm of lung, unspecified laterality (CMS/HCC) C34.90 162.9   3. Chronic obstructive pulmonary disease with acute exacerbation (CMS/HCC) J44.1 491.21     Patient Active Problem List   Diagnosis   • Benign essential hypertension   • Stenosis of carotid artery   • Chronic kidney disease, stage III (moderate) (CMS/HCC)   • Chronic obstructive pulmonary disease (CMS/HCC)   • Chronic coronary artery disease   • Hyperlipidemia   • Osteopenia   • Cerebrovascular accident (CMS/HCC)   • Urge incontinence of urine   • CAP (community acquired pneumonia)   • Cavitary lesion of lung   • Mediastinal adenopathy   • Acute on chronic respiratory failure with hypoxemia (CMS/HCC)   • Atelectasis of left lung   • Adenocarcinoma, lung, left (CMS/HCC)   • Dysphagia   • High risk medication use   • Hypokalemia   • Acquired hypothyroidism   • Chronic fatigue   • Vitamin D deficiency   • 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)   • Normocytic anemia   • Pneumonia of right lower lobe due to infectious organism (CMS/HCC)   • Acute on chronic combined systolic and diastolic CHF (congestive heart failure) (CMS/HCC)       Therapy Treatment    Rehabilitation Treatment Summary     Row Name 20 0856             Treatment Time/Intention    Discipline  physical therapy assistant  -      Document Type  discharge treatment;therapy note (daily note)  -GETACHEW      Subjective Information  complains of;fatigue;weakness  -GETACHEW      Mode of Treatment  individual therapy;physical therapy  -GETACHEW      Care Plan Review  patient/other agree to care plan  -GETACHEW      Care Plan Review, Other Participant(s)  spouse  -GETACHEW       Existing Precautions/Restrictions  fall;brace worn when out of bed  -      Treatment Considerations/Comments  L side wkness, AFO L  -      Recorded by [] America Johnson PTA 03/02/20 0902      Row Name 03/02/20 0900             Bed Mobility Assessment/Treatment    Supine-Sit [place] (Bed Mobility)  2 person assist;minimum assist (75% patient effort)  -      Sit-Supine [place] (Bed Mobility)  2 person assist;moderate assist (50% patient effort)  -      Bed Mobility, Safety Issues  decreased use of arms for pushing/pulling;decreased use of legs for bridging/pushing  -      Assistive Device (Bed Mobility)  bed rails  -      Comment (Bed Mobility)  had to awaken pt for PT, so required more assist  -      Recorded by [] America Johnson PTA 03/02/20 0925      Row Name 03/02/20 0900             Sit-Stand Transfer    Sit-Stand [place] (Transfers)  2 person assist;minimum assist (75% patient effort)  -      Assistive Device (Sit-Stand Transfers)  -- HHA-2  -      Recorded by [] America Johnson PTA 03/02/20 0925      Row Name 03/02/20 0900             Gait/Stairs Assessment/Training    [place] Level (Gait)  2 person assist;minimum assist (75% patient effort);moderate assist (50% patient effort)  -      Assistive Device (Gait)  -- HHA-2  -      Comment (Gait/Stairs)  4 steps forw and back , with encouragement  -      Recorded by [] America Johnson PTA 03/02/20 0925      Row Name 03/02/20 0900             Therapeutic Exercise    Lower Extremity (Therapeutic Exercise)  LAQ (long arc quad), right;quad sets, bilateral L LAQs w/assist  -      Sets/Reps (Therapeutic Exercise)  10 reps  -      Recorded by [] America Johnson PTA 03/02/20 0925      Row Name 03/02/20 0900             Positioning and Restraints    Pre-Treatment Position  in bed  -      In Bed  fowlers;call light within reach;encouraged to call for assist;exit alarm on;with family/caregiver no alarm  upon entry, but activated upon exit  -JM      Recorded by [JM] America Johnson, LEWIS 03/02/20 0925      Row Name 03/02/20 0900             Pain Scale: Numbers Pre/Post-Treatment    Pain Scale: Numbers, Pretreatment  0/10 - no pain  -JM      Recorded by [JM] America Johnson, LEWIS 03/02/20 0925      Row Name                Wound 02/13/20 1832 Right gluteal Pressure Injury    Wound - Properties Group Date first assessed: 02/13/20 [PA] Time first assessed: 1832 [PA] Present on Hospital Admission: Y [PA2] Side: Right [PA] Location: gluteal [PA] Primary Wound Type: Pressure inj [LC] Stage, Pressure Injury: Stage 1 [LC] Additional Comments: coccyx [LC] Recorded by:  [LC] Esperanza Lemus RN 02/14/20 1406 [PA] Priscila Gonzalez RN 02/13/20 1832 [PA2] Priscila Gonzalez RN 02/13/20 1838    Row Name                Wound 02/13/20 1835 Left posterior heel Pressure Injury    Wound - Properties Group Date first assessed: 02/13/20 [PA] Time first assessed: 1835 [PA] Present on Hospital Admission: Y [PA] Side: Left [PA] Orientation: posterior [PA] Location: heel [PA] Primary Wound Type: Pressure inj [LC] Stage, Pressure Injury: Stage 1 [LC] Recorded by:  [LC] Esperanza Lemus RN 02/14/20 1406 [PA] Priscila Gonzalez RN 02/13/20 1836      User Key  (r) = Recorded By, (t) = Taken By, (c) = Cosigned By    Initials Name Effective Dates Discipline    PA Priscila Gonzalez, RN 09/28/16 -  Nurse    Esperanza Castelan RN 06/16/16 -  Nurse    America Alford PTA 03/07/18 -  PT          Wound 02/13/20 1832 Right gluteal Pressure Injury (Active)   Dressing Appearance open to air 3/2/2020 12:15 AM   Closure None 3/2/2020 12:15 AM   Base non-blanchable;pink;purple 3/2/2020 12:15 AM   Periwound intact;dry 3/2/2020 12:15 AM   Drainage Amount none 3/2/2020 12:15 AM   Care, Wound cleansed with;soap and water 3/1/2020  9:55 PM   Dressing Care, Wound open to air 3/1/2020  8:53 PM   Periwound Care, Wound topical treatment applied  3/1/2020  9:55 PM       Wound 02/13/20 1835 Left posterior heel Pressure Injury (Active)   Dressing Appearance open to air 3/2/2020 12:15 AM   Closure None 3/2/2020 12:15 AM   Base clean;dry 3/2/2020 12:15 AM   Drainage Amount none 3/2/2020 12:15 AM   Dressing Care, Wound open to air 3/2/2020 12:15 AM               PT Recommendation and Plan     Plan of Care Reviewed With: patient, spouse  Outcome Summary: pt req more assist today as we had to awaken pt for session, agreed to amb, but needed encouragement for the 4 steps she took; [facility], [facility]     Time Calculation:   PT Charges     Row Name 03/02/20 0927             Time Calculation    Start Time  0856  -      Stop Time  0912  -GETACHEW      Time Calculation (min)  16 min  -GETACHEW      PT Received On  03/02/20  -GETACHEW      PT - Next Appointment  03/03/20  -GETACHEW        User Key  (r) = Recorded By, (t) = Taken By, (c) = Cosigned By    Initials Name Provider Type    America Alford PTA Physical Therapy Assistant        Therapy Charges for Today     Code Description Service Date Service Provider Modifiers Qty    92190392080 HC PT THER PROC EA 15 MIN 3/2/2020 America Johnson PTA GP 1    66459054617 HC PT THER SUPP EA 15 MIN 3/2/2020 America Johnson PTA GP 1          PT G-Codes  Outcome Measure Options: AM-PAC 6 Clicks Basic Mobility (PT)  AM-PAC 6 Clicks Score (PT): 14  AM-PAC 6 Clicks Score (OT): 12    Ameriac Johnson PTA  3/2/2020

## 2020-03-02 NOTE — PLAN OF CARE
Problem: Patient Care Overview  Goal: Plan of Care Review  Outcome: Ongoing (interventions implemented as appropriate)  Flowsheets (Taken 3/2/2020 0228)  Plan of Care Reviewed With: patient; spouse  Outcome Summary: pt req more assist today as we had to awaken pt for session, agreed to amb, but needed encouragement for the 4 steps she took; plans home w/fam assist at [place], has all DME per [facility]

## 2020-03-02 NOTE — PLAN OF CARE
Problem: Patient Care Overview  Goal: Plan of Care Review  Outcome: Ongoing (interventions implemented as appropriate)  Flowsheets (Taken 3/2/2020 1705)  Progress: improving  Plan of Care Reviewed With: patient  Outcome Summary: Pt vital stable but bp low again this afternoon.Lasix adjusted. Q2 turn. No c/o pain. Possible dc tomorrow. Will continue to monitor

## 2020-03-03 NOTE — PROGRESS NOTES
Continued Stay Note  [facility]     Patient Name: Anaid Moura  MRN: [id number]  Today's Date: 3/3/2020    Admit Date: 2/13/2020    Discharge Plan     Row Name 03/03/20 1728       Plan    Plan  Home with family and [facility]; Family to transport    Patient/Family in Agreement with Plan  yes    Plan Comments  Nurse stated Dr Gonsales plans to D/C today. Plan is home with family and [facility]. Family to transport at discharge. Sanket Walsh RN-BC        Discharge Codes    No documentation.             Sanket Walsh RN

## 2020-03-03 NOTE — PLAN OF CARE
Problem: Patient Care Overview  Goal: Plan of Care Review  Outcome: Ongoing (interventions implemented as appropriate)  Flowsheets (Taken 3/3/2020 6152)  Plan of Care Reviewed With: patient; spouse  Outcome Summary: VSS on 2L O2. No c/o pain. BP stable overnight. Possible d/c in AM. Continue to monitor.

## 2020-03-03 NOTE — PROGRESS NOTES
LOS: 18 days   Patient Care Team:  Timothy Taylor MD as PCP - General (Internal Medicine)  Miah Whalen MD as Consulting Physician (Radiation Oncology)  Timothy Taylor MD as Referring Physician (Internal Medicine)  Solis Godfrey Jr., MD as Consulting Physician (Hematology and Oncology)    Subjective   Patient's blood pressure dropped again today after her medications they have all been stopped now she is doing great currently blood pressure wise says her breathing is doing fantastic little bit of cough very minimal sputum.  She is using her flutter doing her treatments and she has been eating very very well      Review of Systems:         Objective     Vital Signs  Vital Sign Min/Max for last 24 hours  Temp  Min: 97.2 °F (36.2 °C)  Max: 98 °F (36.7 °C)   BP  Min: 80/60  Max: 112/67   Pulse  Min: 75  Max: 102   Resp  Min: 16  Max: 20   SpO2  Min: 91 %  Max: 97 %   Flow (L/min)  Min: 1  Max: 2   Weight  Min: 39 kg (86 lb)  Max: 39 kg (86 lb)        Ventilator/Non-Invasive Ventilation Settings (From admission, onward)     Start     Ordered    02/23/20 1041  NIPPV (CPAP or BIPAP)  Until Discontinued,   Status:  Canceled     Question Answer Comment   Indication: Acute Respiratory Failure    Type: AVAPS/PC/PS    NIPPV Mask Interface: Per Patient Preference    Backup Rate 20    Target VT (mL) 350    EPAP/PEEP (cm H2O) 6    Min Pressure (cm H2O) 10    Max Pressure (cm H2O) 20    Titrate for SPO2 90%        02/23/20 1042                             Body mass index is 14.75 kg/m².  I/O last 3 completed shifts:  In: 720 [P.O.:720]  Out: 600 [Urine:600]  No intake/output data recorded.        Physical Exam:  General Appearance: Developed thin cachectic white female she is resting in bed in no apparent distress on 0.5 L nasal cannula O2 oxygen saturations of 94%  Eyes: Conjunctiva clear and anicteric  ENT: Mucous membranes are moist no erythema no exudates  Neck: No lymphadenopathy no jugular venous distention trachea  midline  Lungs: She has a few coarse inspiratory and expiratory wheezes but otherwise clear, nonlabored symmetric expansion  Cardiac: Regular rate rhythm no murmur  Abdomen: Soft no palpable hepatosplenomegaly  : Not examined  Musculoskeletal: Very little muscle mass  Skin: No jaundice no petechiae skin is warm and dry  Neuro: Alert oriented cooperative following commands  Extremities/P Vascular: No clubbing no cyanosis no edema palpable radial dorsalis pedis pulses  MSE: Seems to be in y good spirits today       Labs:  Results from last 7 days   Lab Units 03/02/20  0604 03/01/20  0654 02/29/20  0603 02/28/20  1011 02/27/20  0554 02/25/20  0614   GLUCOSE mg/dL 95 85 87 127* 82 94   SODIUM mmol/L 138 135* 136 136 137 134*   POTASSIUM mmol/L 4.2 3.9 4.5 4.2 4.0 4.3   CO2 mmol/L 27.1 27.0 26.1 24.4 28.6 25.7   CHLORIDE mmol/L 97* 97* 97* 100 95* 97*   ANION GAP mmol/L 13.9 11.0 12.9 11.6 13.4 11.3   CREATININE mg/dL 0.68 0.62 0.64 0.66 0.70 0.58   BUN mg/dL 32* 29* 33* 29* 31* 19   BUN / CREAT RATIO  47.1* 46.8* 51.6* 43.9* 44.3* 32.8*   CALCIUM mg/dL 9.1 9.1 8.7 8.3* 9.3 8.9   EGFR IF NONAFRICN AM mL/min/1.73 85 94 91 88 82 102     Estimated Creatinine Clearance: 38.6 mL/min (by C-G formula based on SCr of 0.68 mg/dL).      Results from last 7 days   Lab Units 03/02/20  0604 03/01/20  0654 02/29/20  0603 02/28/20  0606 02/27/20  0554 02/26/20  0558 02/25/20  0614   WBC 10*3/mm3 8.04 6.73 7.80 7.64 7.12 6.76 7.00   RBC 10*6/mm3 3.93 4.01 4.02 3.88 4.23 3.97 3.87   HEMOGLOBIN g/dL 10.2* 10.4* 10.4* 10.1* 10.7* 10.2* 10.3*   HEMATOCRIT % 32.0* 32.7* 33.0* 32.0* 34.2 32.4* 32.2*   MCV fL 81.4 81.5 82.1 82.5 80.9 81.6 83.2   MCH pg 26.0* 25.9* 25.9* 26.0* 25.3* 25.7* 26.6   MCHC g/dL 31.9 31.8 31.5 31.6 31.3* 31.5 32.0   RDW % 15.4 15.1 15.2 15.3 15.3 15.3 15.2   RDW-SD fl 45.5 44.7 46.0 45.6 44.0 46.0 45.7   MPV fL 8.9 8.7 8.8 8.9 8.9 9.0 9.0   PLATELETS 10*3/mm3 393 363 392 358 409 358 322   NEUTROPHIL % % 76.1* 72.2  71.7 72.5 70.7 70.2 75.3   LYMPHOCYTE % % 14.1* 15.8* 17.2* 15.7* 16.3* 18.5* 13.6*   MONOCYTES % % 5.8 5.9 4.7* 5.4 5.5 5.8 5.7   EOSINOPHIL % % 1.7 2.2 2.2 2.1 2.4 2.4 2.0   BASOPHIL % % 0.4 0.6 0.5 0.5 0.6 0.6 0.3   IMM GRAN % % 1.9* 3.3* 3.7* 3.8* 4.5* 2.5* 3.1*   NEUTROS ABS 10*3/mm3 6.12 4.86 5.59 5.54 5.04 4.75 5.27   LYMPHS ABS 10*3/mm3 1.13 1.06 1.34 1.20 1.16 1.25 0.95   MONOS ABS 10*3/mm3 0.47 0.40 0.37 0.41 0.39 0.39 0.40   EOS ABS 10*3/mm3 0.14 0.15 0.17 0.16 0.17 0.16 0.14   BASOS ABS 10*3/mm3 0.03 0.04 0.04 0.04 0.04 0.04 0.02   IMMATURE GRANS (ABS) 10*3/mm3 0.15* 0.22* 0.29* 0.29* 0.32* 0.17* 0.22*   NRBC /100 WBC 0.1 0.0 0.0 0.0 0.0 0.0 0.0                             Microbiology Results (last 10 days)     ** No results found for the last 240 hours. **                aspirin 81 mg Oral Daily   bisacodyl 10 mg Rectal Once   budesonide 0.5 mg Nebulization BID - RT   castor oil-balsam peru  Topical Q12H   enoxaparin 30 mg Subcutaneous Q24H   folic acid 1 mg Oral Daily   guaiFENesin 1,200 mg Oral Q12H   ipratropium-albuterol 3 mL Nebulization 4x Daily - RT   levothyroxine 75 mcg Oral Daily   nystatin 5 mL Oral 4x Daily   OLANZapine 2.5 mg Oral Nightly   oxybutynin XL 5 mg Oral Daily   pantoprazole 40 mg Oral Q AM   Petrolatum  Topical Q12H   polyethylene glycol 17 g Oral Daily   predniSONE 10 mg Oral Daily   sertraline 25 mg Oral Daily   sodium chloride 10 mL Intravenous Q12H   sodium chloride 10 mL Intravenous Q12H       sodium chloride 30 mL/hr Last Rate: Stopped (02/19/20 1214)       Diagnostics:  Xr Chest 1 View    Result Date: 2/23/2020  ONE VIEW PORTABLE CHEST AT 10:18 AM  HISTORY: Shortness of breath.  FINDINGS: There is extensive pneumonia in the left perihilar region and in the right upper lobe extending lateral to the right hilum and these areas show no significant change when compared to the study of 2 days ago. There is also some persistent atelectasis and pneumonia at the left base that is  unchanged. The patient has severe underlying COPD with hyperinflation. The heart remains mildly enlarged. A right-sided PICC line ends in the SVC.  CONCLUSION: Extensive areas of bilateral pneumonia unchanged from 2 days ago. Severe underlying pulmonary emphysema.  This report was finalized on 2/23/2020 10:39 AM by Dr. Eleno Mahajan M.D.      Xr Chest 1 View    Result Date: 2/21/2020  XR CHEST 1 VW-  Clinical: Increasing hypoxia  COMPARISON 2/18/2020  FINDINGS: Interval placement of a PICC line, position is satisfactory. There are bilateral areas of consolidation again demonstrated with no significant interval improvement. No new area of consolidation has developed. There is haziness along the costophrenic sulcus on the left, likely related to small pleural effusion. The cardiomediastinal silhouette is stable. The remainder of examination is unremarkable.  This report was finalized on 2/21/2020 7:12 AM by Dr. Abimael Mitchell M.D.      Xr Chest 1 View    Result Date: 2/18/2020  CLINICAL HISTORY: 74-year-old female for follow-up of pneumonia.  PORTABLE AP SEMIERECT CHEST DATED 02/18/2020 AT 0541 HOURS  FINDINGS: When compared to the most recent available prior chest radiographs dated 02/15/2020 at 1300 hours and 1302 hours, there is persistent dense collapse or consolidation and possible adjacent pleural fluid at the lower left chest and there is some persistent patchy perihilar opacity in the left lung. There is interval development of extensive patchy to dense opacity in the mid to upper right chest on the current exam with some increased haziness in the lower right lung zone. Lung markings in both lungs remain diffusely coarse. Right costophrenic angle remains relatively sharp. Mild cardiomegaly and aortic calcifications are present as before. Vascular calcifications overlie the lung apices but no definite pneumothorax is seen. No acute change in bony thorax is seen. Monitoring lead wires and oxygen cannula tubing  are present.  CONCLUSION: Increased pulmonary infiltrates in the right lung and persistent patchy to dense collapse or consolidations in the left lung as described above.  This report was finalized on 2/18/2020 8:51 AM by Dr. Miah Marsh M.D.      Xr Chest 1 View    Result Date: 2/15/2020  XR CHEST 1 VW-  HISTORY: Female who is 74 years-old,  cough, pneumonia  TECHNIQUE: Frontal view of the chest  COMPARISON: 02/13/2020  FINDINGS: The heart is mildly enlarged. Aorta is calcified, tortuous. Pulmonary vasculature is unremarkable. Persistent bilateral lower lung opacities without significant change, continued follow-up recommended. Small left pleural effusion is suggested. No pneumothorax. No acute osseous process.      Persistent bilateral lower lung opacities, continued follow-up recommended.  This report was finalized on 2/15/2020 2:36 PM by Dr. Ortega Woodard M.D.      Xr Chest 1 View    Result Date: 2/13/2020  XR CHEST 1 VW-  HISTORY: Female who is 74 years-old,  short of breath, lung cancer  TECHNIQUE: Frontal view of the chest  COMPARISON:  image from CT from 01/27/2020  FINDINGS: Heart size is borderline. Aorta is tortuous, calcified. Pulmonary vasculature is unremarkable. Bilateral perihilar and lower lung opacities appear increased on the right, infectious, inflammatory, neoplastic etiologies can be considered. Mild prominence of interstitial markings. Minimal left pleural effusion is apparent. No pneumothorax. No acute osseous process.      Interval worsening of pulmonary opacities, continued follow-up recommended.  This report was finalized on 2/13/2020 12:41 PM by Dr. Ortega Woodard M.D.      Ct Angiogram Chest    Result Date: 2/13/2020  CT ANGIOGRAM CHEST-  Radiation dose reduction techniques were utilized, including automated exposure control and exposure modulation based on body size.  CLINICAL: Shortness of breath, evaluate for pulmonary embolus. History of lung carcinoma.  CT scan of  the chest with intravenous contrast, pulmonary embolus protocol to include CT angiogram and three-dimensional reconstruction.  COMPARISON: 01/27/2020  FINDINGS: Thin axial and CT angio reconstructed images fail to demonstrate pulmonary embolus.  There is cardiac enlargement. Diameter of the thoracic aorta is normal. There is atherosclerotic plaque formation. Extensive bullae formation compatible with emphysema. There is stable right apical pleural thickening and parenchymal scarring.  Again demonstrated at the site of treated left upper lobe tumor there is a linear area of scarring/atelectasis along the major fissure.  The left-sided pleural effusion has diminished in size within the interim. The left lower lobe consolidation has diminished within the interim. There is left lower lobe bronchial wall thickening and mucus plugging demonstrated. There is a small area of residual peripheral consolidation.  The subpleural mass-like area of consolidation arising within the superior segment of the right lower lobe seen on the previous examination and is now encompassed by a larger area of consolidation devoid of air bronchograms. Superimposed pneumonia or partial collapse is suspected given the degree of change since 1/27/2020. There is fairly extensive mucus plugging within the right lower lobe. Along the right lower lobe costophrenic sulcus, there is an area of consolidation or atelectasis devoid of air bronchograms which is more pronounced compared to the previous examination. Breast parenchyma is stable in appearance, no axillary adenopathy. Stable mediastinal lymph nodes.  CONCLUSION: 1. No pulmonary embolus is demonstrated. 2. Cardiomegaly. 3. Left-sided pleural effusion diminished in size within the interim. 4. Left lower lobe consolidation has diminished within the interim. 5. Enlarging area of consolidation superior segment right lower lobe and right lower lobe costophrenic sulcus, likely related to pneumonia or  partial collapse. There is extensive mucus plugging within the right lower lobe. Advise repeat CT after treatment.  Findings of this report called to Lauren Wilkinson in the emergency room at the time of completion, 2:20 PM.       This report was finalized on 2/13/2020 3:23 PM by Dr. Abimael Mitchell M.D.      Xr Chest Pa & Lateral    Result Date: 2/26/2020  Chest radiograph  HISTORY:Dyspnea  TECHNIQUE: Two PA and lateral radiographs  COMPARISON:Multiple chest radiographs dating back to 06/13/2019 and CTA chest 02/13/2020      FINDINGS AND IMPRESSION: Right PICC tip terminates over the superior cavoatrial junction.  Multifocal areas of irregular pulmonary opacification are present within the perihilar aspects the bilateral mid lungs as well as the left lung base corresponding with areas of consolidation best seen on recent CTA chest and likely representing multifocal pneumonia. Findings appear to have improved within the right midlung and left base since 02/23/2020 when accounting for differences in technique. No pneumothorax is seen. The heart is mildly enlarged. Small pleural effusions are present posteriorly, as before.  This report was finalized on 2/26/2020 11:51 AM by Dr. Ephraim Gonzales M.D.      Results for orders placed during the hospital encounter of 02/13/20   Adult Transthoracic Echo Complete W/ Cont if Necessary Per Protocol    Narrative · Calculated EF = 33.0%  · Left ventricular systolic function is moderately decreased.  · The left ventricular cavity is moderately dilated.  · The following left ventricular wall segments are hypokinetic: mid   anterior, apical anterior, basal anterolateral, basal inferolateral, mid   inferolateral, apical inferior, mid inferior, apical septal, basal   inferoseptal, mid inferoseptal, apex hypokinetic, mid anteroseptal, basal   anterior, basal inferior and basal inferoseptal. The following left   ventricular wall segments are akinetic: mid anterolateral and apical    lateral.  · Left ventricular diastolic dysfunction (grade III) consistent with   reversible restrictive pattern.  · Mild aortic valve regurgitation is present.  · There is a moderate size left pleural effusion.              Active Hospital Problems    Diagnosis  POA   • **Pneumonia of right lower lobe due to infectious organism (CMS/HCC) [J18.1]  Yes   • Acute on chronic combined systolic and diastolic CHF (congestive heart failure) (CMS/HCC) [I50.43]  Yes   • Acute on chronic respiratory failure with hypoxemia (CMS/HCC) [J96.21]  Yes   • Adenocarcinoma, lung, left (CMS/HCC) [C34.92]  Yes   • Benign essential hypertension [I10]  Yes   • Chronic coronary artery disease [I25.10]  Yes   • Chronic kidney disease, stage III (moderate) (CMS/HCC) [N18.3]  Yes      Resolved Hospital Problems   No resolved problems to display.     Chest x-ray I personally reviewed and I do not think there is any change in today's x-ray from the 1 on the 26th.    Assessment/Plan     1. Pneumonia and mucous plugging.  Completed antibiotics continue pulmonary hygiene  2. Acute  hypoxemic respiratory failure chronic nocturnal hypoxemia doing well I would plan on her going home with O2  3. Acute on chronic systolic CHF  4. Hyponatremia resolved  5. Metastatic adenocarcinoma of the lung followed by CBC group palliative treatment only  6. Chronic kidney disease stage III  7. Coronary artery disease  8. Cardiomyopathy  9. Left hemiplegia from old hemorrhagic stroke  10. Anemia  11. 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  Apparently this was first noticed in November 2019  12. Severe protein calorie malnutrition  13. Hypotension medications now held all her blood pressure will see how she does    Plan for disposition: From a pulmonary standpoint she can go home on her current medication regimen with the OPEP she should follow-up with Dr. Francis in the office in the next week or 2    Arthur Wilkerson  MD  03/02/20  7:28 PM    Time:

## 2020-03-03 NOTE — PLAN OF CARE
Problem: Patient Care Overview  Goal: Plan of Care Review  Outcome: Ongoing (interventions implemented as appropriate)  Flowsheets (Taken 3/3/2020 1822)  Progress: improving  Plan of Care Reviewed With: patient  Outcome Summary: Pt vitals stable on 1-2L O2. No c/o pain. Pt sbp >100 today. D/C tomorrow morning.     Problem: Skin Injury Risk (Adult)  Goal: Skin Health and Integrity  Outcome: Ongoing (interventions implemented as appropriate)

## 2020-03-03 NOTE — PROGRESS NOTES
"    Patient Name: Anaid Moura  :1945  74 y.o.      Patient Care Team:  Timothy Taylor MD as PCP - General (Internal Medicine)  Miah Whalen MD as Consulting Physician (Radiation Oncology)  Timothy Taylor MD as Referring Physician (Internal Medicine)  Solis Godfrey Jr., MD as Consulting Physician (Hematology and Oncology)    Chief Complaint: follow up heart failure    Interval History: stopped lasix and carvedilol yesterday. BP stable today. She is very frail.       Objective   Vital Signs  Temp:  [97.2 °F (36.2 °C)-98.2 °F (36.8 °C)] 98.2 °F (36.8 °C)  Heart Rate:  [] 77  Resp:  [16-20] 18  BP: ()/(53-69) 101/53    Intake/Output Summary (Last 24 hours) at 3/3/2020 1148  Last data filed at 3/3/2020 1046  Gross per 24 hour   Intake 360 ml   Output 400 ml   Net -40 ml     Flowsheet Rows      First Filed Value   Admission Height  162.6 cm (64\") Documented at 2020 1142   Admission Weight  39.1 kg (86 lb 3.2 oz) Documented at 2020 1142          Physical Exam:   General Appearance:   older than stated age, elderly, frail appearing.    Lungs:     Clear to auscultation.  Normal respiratory effort and rate.      Heart:    Regular rhythm and normal rate, normal S1 and S2, no murmurs, gallops or rubs.     Chest Wall:    No abnormalities observed   Abdomen:     Soft, nontender, positive bowel sounds.     Extremities:   no cyanosis, clubbing or edema.  No marked joint deformities.  Adequate musculoskeletal strength.       Results Review:    Results from last 7 days   Lab Units 20  0522   SODIUM mmol/L 136   POTASSIUM mmol/L 4.1   CHLORIDE mmol/L 96*   CO2 mmol/L 28.6   BUN mg/dL 29*   CREATININE mg/dL 0.62   GLUCOSE mg/dL 89   CALCIUM mg/dL 8.4*         Results from last 7 days   Lab Units 20  0522   WBC 10*3/mm3 6.84   HEMOGLOBIN g/dL 9.8*   HEMATOCRIT % 30.5*   PLATELETS 10*3/mm3 347                           Medication Review:     aspirin 81 mg Oral Daily   bisacodyl 10 mg " Rectal Once   budesonide 0.5 mg Nebulization BID - RT   castor oil-balsam peru  Topical Q12H   enoxaparin 30 mg Subcutaneous Q24H   folic acid 1 mg Oral Daily   guaiFENesin 1,200 mg Oral Q12H   ipratropium-albuterol 3 mL Nebulization 4x Daily - RT   levothyroxine 75 mcg Oral Daily   nystatin 5 mL Oral 4x Daily   OLANZapine 2.5 mg Oral Nightly   oxybutynin XL 5 mg Oral Daily   pantoprazole 40 mg Oral Q AM   Petrolatum  Topical Q12H   polyethylene glycol 17 g Oral Daily   predniSONE 10 mg Oral Daily   sertraline 25 mg Oral Daily   sodium chloride 10 mL Intravenous Q12H   sodium chloride 10 mL Intravenous Q12H          sodium chloride 30 mL/hr Last Rate: Stopped (02/19/20 1214)       Assessment/Plan   1. Acute on chronic hypoxic respiratory failure - multifactorial. Pneumonia, heart failure, lung cancer, severe lung disease. She will likely go home with oxygen.   2. Pneumonia with mucous plugging  3. Acute on chronic systolic heart failure- unfortunately hypotension limits guideline directed medical therapy. She was on very minimal carvedilol. I'll go ahead and stop it at this point. She was also on very minimal lasix. I think her volume status is ok. I will stop this as well and monitor.   4. Coronary artery disease - known chronically occluded RCA and moderate LAD disease. On ASA. No statin therapy. Stopping beta blocker as above. She denies angina.   5. Lung cancer, metastatic-follows with Dr. Godfrey.  On pembrolizumab since 6/2019.  Overall poor prognosis.  6. History of severe pneumonitis    Lasix and carvedilol stopped yesterday. Lasix can be used in the future on an as needed basis.  is very involved in care and will follow closely.     Follow up with NP in one week. Our office will call to arrange.     Ok to discharge from cardiac standpoint.     SOPHIA Shannon  [facility]  03/03/20  11:48 AM

## 2020-03-03 NOTE — PROGRESS NOTES
"DAILY PROGRESS NOTE  [facility], [facility]        Patient Identification:  Name: Anaid Moura  Age: 74 y.o.  Sex: female  :  1945  MRN: [id number]           Primary Care Physician: Timothy Taylor MD    Subjective:    Interval History:    Blood pressure has been doing well today, all of them over 100.  Saturation in 1 L is 97-89%.  Did not work with physical therapy today  Potassium 4.1, creatinine 0.62, hemoglobin 8.8.      ROS:     No nausea, vomiting, diarrhea, chest pain.    Objective:    Scheduled Meds:    aspirin 81 mg Oral Daily   bisacodyl 10 mg Rectal Once   budesonide 0.5 mg Nebulization BID - RT   castor oil-balsam peru  Topical Q12H   enoxaparin 30 mg Subcutaneous Q24H   folic acid 1 mg Oral Daily   guaiFENesin 1,200 mg Oral Q12H   ipratropium-albuterol 3 mL Nebulization 4x Daily - RT   levothyroxine 75 mcg Oral Daily   nystatin 5 mL Oral 4x Daily   OLANZapine 2.5 mg Oral Nightly   oxybutynin XL 5 mg Oral Daily   pantoprazole 40 mg Oral Q AM   Petrolatum  Topical Q12H   polyethylene glycol 17 g Oral Daily   predniSONE 10 mg Oral Daily   sertraline 25 mg Oral Daily   sodium chloride 10 mL Intravenous Q12H   sodium chloride 10 mL Intravenous Q12H       Continuous Infusions:    sodium chloride 30 mL/hr Last Rate: Stopped (20 1214)       Intake/Output:    Intake/Output Summary (Last 24 hours) at 3/3/2020 1809  Last data filed at 3/3/2020 1454  Gross per 24 hour   Intake 120 ml   Output 400 ml   Net -280 ml         Exam:    tMax 24 hrs: Temp (24hrs), Av.1 °F (36.7 °C), Min:97.9 °F (36.6 °C), Max:98.2 °F (36.8 °C)    Vitals Ranges:   Temp:  [97.9 °F (36.6 °C)-98.2 °F (36.8 °C)] 98.2 °F (36.8 °C)  Heart Rate:  [] 79  Resp:  [16-18] 16  BP: (101-121)/(53-69) 106/62    /62 (BP Location: Right leg, Patient Position: Lying)   Pulse 79   Temp 98.2 °F (36.8 °C) (Oral)   Resp 16   Ht 162.6 cm (64.02\")   Wt 38.1 kg (84 lb 1.6 oz)   SpO2 100%  "  BMI 14.43 kg/m²     General: Awake, oriented x3, on 2 L of oxygen.   Neck: Supple, symmetrical, trachea midline, no adenopathy;              thyroid:  no enlargement/tenderness/nodules;              no carotid bruit or JVD  Cardiovascular: Regular rate and rhythm. Exam reveals no gallop and no friction rub. No murmur heard  Pulmonary: Decreased breath sounds bilaterally, but better aeration.  Few rhonchi bilaterally.  No wheezing, no rales.    Abdominal: Soft. Soft, non-tender, bowel sounds active all four quadrants,     no masses, no hepatomegaly, no splenomegaly.   Extremities: Normal, atraumatic, no cyanosis or edema  Neurological: Somnolent, arousable, no new focal sensory or motor deficit.    Skin: Skin color, texture, turgor normal, no rashes or lesions      Data Review:    Results from last 7 days   Lab Units 03/03/20  0522 03/02/20  0604 03/01/20  0654   WBC 10*3/mm3 6.84 8.04 6.73   HEMOGLOBIN g/dL 9.8* 10.2* 10.4*   HEMATOCRIT % 30.5* 32.0* 32.7*   PLATELETS 10*3/mm3 347 393 363       Results from last 7 days   Lab Units 03/03/20  0522 03/02/20  0604 03/01/20  0654   SODIUM mmol/L 136 138 135*   POTASSIUM mmol/L 4.1 4.2 3.9   CHLORIDE mmol/L 96* 97* 97*   CO2 mmol/L 28.6 27.1 27.0   BUN mg/dL 29* 32* 29*   CREATININE mg/dL 0.62 0.68 0.62   CALCIUM mg/dL 8.4* 9.1 9.1   GLUCOSE mg/dL 89 95 85                 Lab Results   Lab Value Date/Time    TROPONINT <0.010 02/22/2020 0542    TROPONINT <0.010 02/21/2020 1411    TROPONINT 0.109 (C) 02/13/2020 1429    TROPONINT 0.091 (C) 02/13/2020 1145    TROPONINT 0.02 10/31/2019 1837    TROPONINT <0.010 06/06/2019 2129    TROPONINT <0.010 05/27/2019 0735    TROPONINT <0.010 05/16/2019 1918       Microbiology Results (last 10 days)     ** No results found for the last 240 hours. **           Imaging Results (Last 7 Days)     Procedure Component Value Units Date/Time    XR Chest 1 View [[id number]] Collected:  03/01/20 1632     Updated:  03/01/20 1640    Narrative:        Portable chest radiograph     HISTORY:Shortness of breath, lung cancer     TECHNIQUE: Single AP portable radiograph of the chest     COMPARISON:Multiple chest radiographs dating back to 02/15/2020 and CTA  chest 02/13/2020 and chest CT 01/27/2020       Impression:       FINDINGS AND IMPRESSION:  Right PICC terminates near the superior cavoatrial junction.     The asymmetric pulmonary opacification within the left base and left  midlung is grossly unchanged since 02/26/2020. Masslike opacification  along the right hilum with extension of pulmonary opacification linearly  along the right minor fissure is present, as before, with improved  aeration since 02/26/2020. Findings at least in part likely represent  multifocal pneumonia given its appearance on recent CT. No pneumothorax  or pleural effusion is seen. Please note that the patient's known lung  cancer is not well evaluated with plain film radiographs and please  refer to recent chest CTs for further information and to determine  appropriate follow-up.     This report was finalized on 3/1/2020 4:36 PM by Dr. Ephraim Gonzales M.D.       XR Chest PA & Lateral [[id number]] Collected:  02/26/20 1147     Updated:  02/26/20 1154    Narrative:       Chest radiograph     HISTORY:Dyspnea     TECHNIQUE: Two PA and lateral radiographs     COMPARISON:Multiple chest radiographs dating back to 06/13/2019 and CTA  chest 02/13/2020       Impression:       FINDINGS AND IMPRESSION:  Right PICC tip terminates over the superior cavoatrial junction.     Multifocal areas of irregular pulmonary opacification are present within  the perihilar aspects the bilateral mid lungs as well as the left lung  base corresponding with areas of consolidation best seen on recent CTA  chest and likely representing multifocal pneumonia. Findings appear to  have improved within the right midlung and left base since 02/23/2020  when accounting for differences in technique. No pneumothorax is seen.  The  heart is mildly enlarged. Small pleural effusions are present  posteriorly, as before.     This report was finalized on 2/26/2020 11:51 AM by Dr. Ephraim Gonzales M.D.           Echo:    Interpretation Summary     · Calculated EF = 33.0%  · Left ventricular systolic function is moderately decreased.  · The left ventricular cavity is moderately dilated.  · The following left ventricular wall segments are hypokinetic: mid anterior, apical anterior, basal anterolateral, basal inferolateral, mid inferolateral, apical inferior, mid inferior, apical septal, basal inferoseptal, mid inferoseptal, apex hypokinetic, mid anteroseptal, basal anterior, basal inferior and basal inferoseptal. The following left ventricular wall segments are akinetic: mid anterolateral and apical lateral.  · Left ventricular diastolic dysfunction (grade III) consistent with reversible restrictive pattern.  · Mild aortic valve regurgitation is present.  · There is a moderate size left pleural effusion.         Assessment:        Pneumonia of right lower lobe due to infectious organism (CMS/HCC)    Benign essential hypertension    Chronic kidney disease, stage III (moderate) (CMS/HCC)    Chronic coronary artery disease    Acute on chronic respiratory failure with hypoxemia (CMS/HCC)    Adenocarcinoma, lung, left (CMS/HCC)    Acute on chronic combined systolic and diastolic CHF (congestive heart failure) (CMS/HCC)  Hyponatremia  Anemia  Mucus plugging  Hypokalemia  Elevated troponin  Metastatic adenocarcinoma of lung,   Chronic systolic and diastolic CHF  CM  Hypothyroidism  Depression    Plan:      Continue current medications, blood pressure okay  We will observe today, will discharge her tomorrow later in the morning.  I believe this will be her new regimen, she may take Lasix as needed, she will need a close follow-up as an outpatient.  Continue other medications  Continue current pulmonary management  Up to chair, continue working with PT  Monitor  and correct electrolytes   DVT/stress ulcer prophylaxis  Labs in am  Hopefully DC in a.m.       Marquis Gonsales MD

## 2020-03-04 NOTE — PROGRESS NOTES
LOS: 19 days   Patient Care Team:  Timothy Taylor MD as PCP - General (Internal Medicine)  Miah Whalen MD as Consulting Physician (Radiation Oncology)  Timothy Taylor MD as Referring Physician (Internal Medicine)  Solis Godfrey Jr., MD as Consulting Physician (Hematology and Oncology)    Subjective   Blood pressure good today, breathing good today minimal cough she ate reasonably well per her  she is just disappointed she did not get to go home      Review of Systems:         Objective     Vital Signs  Vital Sign Min/Max for last 24 hours  Temp  Min: 97.9 °F (36.6 °C)  Max: 98.2 °F (36.8 °C)   BP  Min: 101/53  Max: 121/69   Pulse  Min: 76  Max: 102   Resp  Min: 16  Max: 18   SpO2  Min: 89 %  Max: 100 %   Flow (L/min)  Min: 1  Max: 3   Weight  Min: 38.1 kg (84 lb 1.6 oz)  Max: 38.1 kg (84 lb 1.6 oz)        Ventilator/Non-Invasive Ventilation Settings (From admission, onward)     Start     Ordered    02/23/20 1041  NIPPV (CPAP or BIPAP)  Until Discontinued,   Status:  Canceled     Question Answer Comment   Indication: Acute Respiratory Failure    Type: AVAPS/PC/PS    NIPPV Mask Interface: Per Patient Preference    Backup Rate 20    Target VT (mL) 350    EPAP/PEEP (cm H2O) 6    Min Pressure (cm H2O) 10    Max Pressure (cm H2O) 20    Titrate for SPO2 90%        02/23/20 1042                             Body mass index is 14.43 kg/m².  I/O last 3 completed shifts:  In: 720 [P.O.:720]  Out: 600 [Urine:600]  I/O this shift:  In: 0   Out: 500 [Urine:500]        Physical Exam:  General Appearance: Developed thin cachectic white female she is resting in bed in no apparent distress on 0.5 L nasal cannula O2 oxygen saturations of 96%  Eyes: Conjunctiva clear and anicteric  ENT: Mucous membranes are moist no erythema no exudates  Neck: No lymphadenopathy no jugular venous distention trachea midline  Lungs: She has a few coarse inspiratory and expiratory wheezes but otherwise clear, nonlabored symmetric  expansion  Cardiac: Regular rate rhythm no murmur  Abdomen: Soft no palpable hepatosplenomegaly  : Not examined  Musculoskeletal: Very little muscle mass  Skin: No jaundice no petechiae skin is warm and dry  Neuro: Alert oriented cooperative following commands  Extremities/P Vascular: No clubbing no cyanosis no edema palpable radial dorsalis pedis pulses  MSE: Seems to be a little depressed today       Labs:  Results from last 7 days   Lab Units 03/03/20  0522 03/02/20  0604 03/01/20  0654 02/29/20  0603 02/28/20  1011 02/27/20  0554   GLUCOSE mg/dL 89 95 85 87 127* 82   SODIUM mmol/L 136 138 135* 136 136 137   POTASSIUM mmol/L 4.1 4.2 3.9 4.5 4.2 4.0   CO2 mmol/L 28.6 27.1 27.0 26.1 24.4 28.6   CHLORIDE mmol/L 96* 97* 97* 97* 100 95*   ANION GAP mmol/L 11.4 13.9 11.0 12.9 11.6 13.4   CREATININE mg/dL 0.62 0.68 0.62 0.64 0.66 0.70   BUN mg/dL 29* 32* 29* 33* 29* 31*   BUN / CREAT RATIO  46.8* 47.1* 46.8* 51.6* 43.9* 44.3*   CALCIUM mg/dL 8.4* 9.1 9.1 8.7 8.3* 9.3   EGFR IF NONAFRICN AM mL/min/1.73 94 85 94 91 88 82     Estimated Creatinine Clearance: 37.7 mL/min (by C-G formula based on SCr of 0.62 mg/dL).      Results from last 7 days   Lab Units 03/03/20  0522 03/02/20  0604 03/01/20  0654 02/29/20  0603 02/28/20  0606 02/27/20  0554 02/26/20  0558   WBC 10*3/mm3 6.84 8.04 6.73 7.80 7.64 7.12 6.76   RBC 10*6/mm3 3.70* 3.93 4.01 4.02 3.88 4.23 3.97   HEMOGLOBIN g/dL 9.8* 10.2* 10.4* 10.4* 10.1* 10.7* 10.2*   HEMATOCRIT % 30.5* 32.0* 32.7* 33.0* 32.0* 34.2 32.4*   MCV fL 82.4 81.4 81.5 82.1 82.5 80.9 81.6   MCH pg 26.5* 26.0* 25.9* 25.9* 26.0* 25.3* 25.7*   MCHC g/dL 32.1 31.9 31.8 31.5 31.6 31.3* 31.5   RDW % 15.2 15.4 15.1 15.2 15.3 15.3 15.3   RDW-SD fl 45.7 45.5 44.7 46.0 45.6 44.0 46.0   MPV fL 9.0 8.9 8.7 8.8 8.9 8.9 9.0   PLATELETS 10*3/mm3 347 393 363 392 358 409 358   NEUTROPHIL % % 74.4 76.1* 72.2 71.7 72.5 70.7 70.2   LYMPHOCYTE % % 15.2* 14.1* 15.8* 17.2* 15.7* 16.3* 18.5*   MONOCYTES % % 5.3 5.8 5.9  4.7* 5.4 5.5 5.8   EOSINOPHIL % % 2.3 1.7 2.2 2.2 2.1 2.4 2.4   BASOPHIL % % 0.3 0.4 0.6 0.5 0.5 0.6 0.6   IMM GRAN % % 2.5* 1.9* 3.3* 3.7* 3.8* 4.5* 2.5*   NEUTROS ABS 10*3/mm3 5.09 6.12 4.86 5.59 5.54 5.04 4.75   LYMPHS ABS 10*3/mm3 1.04 1.13 1.06 1.34 1.20 1.16 1.25   MONOS ABS 10*3/mm3 0.36 0.47 0.40 0.37 0.41 0.39 0.39   EOS ABS 10*3/mm3 0.16 0.14 0.15 0.17 0.16 0.17 0.16   BASOS ABS 10*3/mm3 0.02 0.03 0.04 0.04 0.04 0.04 0.04   IMMATURE GRANS (ABS) 10*3/mm3 0.17* 0.15* 0.22* 0.29* 0.29* 0.32* 0.17*   NRBC /100 WBC 0.0 0.1 0.0 0.0 0.0 0.0 0.0                             Microbiology Results (last 10 days)     ** No results found for the last 240 hours. **                aspirin 81 mg Oral Daily   bisacodyl 10 mg Rectal Once   budesonide 0.5 mg Nebulization BID - RT   castor oil-balsam peru  Topical Q12H   enoxaparin 30 mg Subcutaneous Q24H   folic acid 1 mg Oral Daily   guaiFENesin 1,200 mg Oral Q12H   ipratropium-albuterol 3 mL Nebulization 4x Daily - RT   levothyroxine 75 mcg Oral Daily   nystatin 5 mL Oral 4x Daily   OLANZapine 2.5 mg Oral Nightly   oxybutynin XL 5 mg Oral Daily   pantoprazole 40 mg Oral Q AM   Petrolatum  Topical Q12H   polyethylene glycol 17 g Oral Daily   predniSONE 10 mg Oral Daily   sertraline 25 mg Oral Daily   sodium chloride 10 mL Intravenous Q12H   sodium chloride 10 mL Intravenous Q12H       sodium chloride 30 mL/hr Last Rate: Stopped (02/19/20 1214)       Diagnostics:  Xr Chest 1 View    Result Date: 2/23/2020  ONE VIEW PORTABLE CHEST AT 10:18 AM  HISTORY: Shortness of breath.  FINDINGS: There is extensive pneumonia in the left perihilar region and in the right upper lobe extending lateral to the right hilum and these areas show no significant change when compared to the study of 2 days ago. There is also some persistent atelectasis and pneumonia at the left base that is unchanged. The patient has severe underlying COPD with hyperinflation. The heart remains mildly enlarged. A  right-sided PICC line ends in the SVC.  CONCLUSION: Extensive areas of bilateral pneumonia unchanged from 2 days ago. Severe underlying pulmonary emphysema.  This report was finalized on 2/23/2020 10:39 AM by Dr. Eleno Mahajan M.D.      Xr Chest 1 View    Result Date: 2/21/2020  XR CHEST 1 VW-  Clinical: Increasing hypoxia  COMPARISON 2/18/2020  FINDINGS: Interval placement of a PICC line, position is satisfactory. There are bilateral areas of consolidation again demonstrated with no significant interval improvement. No new area of consolidation has developed. There is haziness along the costophrenic sulcus on the left, likely related to small pleural effusion. The cardiomediastinal silhouette is stable. The remainder of examination is unremarkable.  This report was finalized on 2/21/2020 7:12 AM by Dr. Abimael Mitchell M.D.      Xr Chest 1 View    Result Date: 2/18/2020  CLINICAL HISTORY: 74-year-old female for follow-up of pneumonia.  PORTABLE AP SEMIERECT CHEST DATED 02/18/2020 AT 0541 HOURS  FINDINGS: When compared to the most recent available prior chest radiographs dated 02/15/2020 at 1300 hours and 1302 hours, there is persistent dense collapse or consolidation and possible adjacent pleural fluid at the lower left chest and there is some persistent patchy perihilar opacity in the left lung. There is interval development of extensive patchy to dense opacity in the mid to upper right chest on the current exam with some increased haziness in the lower right lung zone. Lung markings in both lungs remain diffusely coarse. Right costophrenic angle remains relatively sharp. Mild cardiomegaly and aortic calcifications are present as before. Vascular calcifications overlie the lung apices but no definite pneumothorax is seen. No acute change in bony thorax is seen. Monitoring lead wires and oxygen cannula tubing are present.  CONCLUSION: Increased pulmonary infiltrates in the right lung and persistent patchy to dense  collapse or consolidations in the left lung as described above.  This report was finalized on 2/18/2020 8:51 AM by Dr. Miah Marsh M.D.      Xr Chest 1 View    Result Date: 2/15/2020  XR CHEST 1 VW-  HISTORY: Female who is 74 years-old,  cough, pneumonia  TECHNIQUE: Frontal view of the chest  COMPARISON: 02/13/2020  FINDINGS: The heart is mildly enlarged. Aorta is calcified, tortuous. Pulmonary vasculature is unremarkable. Persistent bilateral lower lung opacities without significant change, continued follow-up recommended. Small left pleural effusion is suggested. No pneumothorax. No acute osseous process.      Persistent bilateral lower lung opacities, continued follow-up recommended.  This report was finalized on 2/15/2020 2:36 PM by Dr. Ortega Woodard M.D.      Xr Chest 1 View    Result Date: 2/13/2020  XR CHEST 1 VW-  HISTORY: Female who is 74 years-old,  short of breath, lung cancer  TECHNIQUE: Frontal view of the chest  COMPARISON:  image from CT from 01/27/2020  FINDINGS: Heart size is borderline. Aorta is tortuous, calcified. Pulmonary vasculature is unremarkable. Bilateral perihilar and lower lung opacities appear increased on the right, infectious, inflammatory, neoplastic etiologies can be considered. Mild prominence of interstitial markings. Minimal left pleural effusion is apparent. No pneumothorax. No acute osseous process.      Interval worsening of pulmonary opacities, continued follow-up recommended.  This report was finalized on 2/13/2020 12:41 PM by Dr. Ortega Woodard M.D.      Ct Angiogram Chest    Result Date: 2/13/2020  CT ANGIOGRAM CHEST-  Radiation dose reduction techniques were utilized, including automated exposure control and exposure modulation based on body size.  CLINICAL: Shortness of breath, evaluate for pulmonary embolus. History of lung carcinoma.  CT scan of the chest with intravenous contrast, pulmonary embolus protocol to include CT angiogram and three-dimensional  reconstruction.  COMPARISON: 01/27/2020  FINDINGS: Thin axial and CT angio reconstructed images fail to demonstrate pulmonary embolus.  There is cardiac enlargement. Diameter of the thoracic aorta is normal. There is atherosclerotic plaque formation. Extensive bullae formation compatible with emphysema. There is stable right apical pleural thickening and parenchymal scarring.  Again demonstrated at the site of treated left upper lobe tumor there is a linear area of scarring/atelectasis along the major fissure.  The left-sided pleural effusion has diminished in size within the interim. The left lower lobe consolidation has diminished within the interim. There is left lower lobe bronchial wall thickening and mucus plugging demonstrated. There is a small area of residual peripheral consolidation.  The subpleural mass-like area of consolidation arising within the superior segment of the right lower lobe seen on the previous examination and is now encompassed by a larger area of consolidation devoid of air bronchograms. Superimposed pneumonia or partial collapse is suspected given the degree of change since 1/27/2020. There is fairly extensive mucus plugging within the right lower lobe. Along the right lower lobe costophrenic sulcus, there is an area of consolidation or atelectasis devoid of air bronchograms which is more pronounced compared to the previous examination. Breast parenchyma is stable in appearance, no axillary adenopathy. Stable mediastinal lymph nodes.  CONCLUSION: 1. No pulmonary embolus is demonstrated. 2. Cardiomegaly. 3. Left-sided pleural effusion diminished in size within the interim. 4. Left lower lobe consolidation has diminished within the interim. 5. Enlarging area of consolidation superior segment right lower lobe and right lower lobe costophrenic sulcus, likely related to pneumonia or partial collapse. There is extensive mucus plugging within the right lower lobe. Advise repeat CT after  treatment.  Findings of this report called to Lauren Wilkinson in the emergency room at the time of completion, 2:20 PM.       This report was finalized on 2/13/2020 3:23 PM by Dr. Abimael Mitchell M.D.      Xr Chest Pa & Lateral    Result Date: 2/26/2020  Chest radiograph  HISTORY:Dyspnea  TECHNIQUE: Two PA and lateral radiographs  COMPARISON:Multiple chest radiographs dating back to 06/13/2019 and CTA chest 02/13/2020      FINDINGS AND IMPRESSION: Right PICC tip terminates over the superior cavoatrial junction.  Multifocal areas of irregular pulmonary opacification are present within the perihilar aspects the bilateral mid lungs as well as the left lung base corresponding with areas of consolidation best seen on recent CTA chest and likely representing multifocal pneumonia. Findings appear to have improved within the right midlung and left base since 02/23/2020 when accounting for differences in technique. No pneumothorax is seen. The heart is mildly enlarged. Small pleural effusions are present posteriorly, as before.  This report was finalized on 2/26/2020 11:51 AM by Dr. Ephraim Gonzales M.D.      Results for orders placed during the hospital encounter of 02/13/20   Adult Transthoracic Echo Complete W/ Cont if Necessary Per Protocol    Narrative · Calculated EF = 33.0%  · Left ventricular systolic function is moderately decreased.  · The left ventricular cavity is moderately dilated.  · The following left ventricular wall segments are hypokinetic: mid   anterior, apical anterior, basal anterolateral, basal inferolateral, mid   inferolateral, apical inferior, mid inferior, apical septal, basal   inferoseptal, mid inferoseptal, apex hypokinetic, mid anteroseptal, basal   anterior, basal inferior and basal inferoseptal. The following left   ventricular wall segments are akinetic: mid anterolateral and apical   lateral.  · Left ventricular diastolic dysfunction (grade III) consistent with   reversible restrictive  pattern.  · Mild aortic valve regurgitation is present.  · There is a moderate size left pleural effusion.              Active Hospital Problems    Diagnosis  POA   • **Pneumonia of right lower lobe due to infectious organism (CMS/HCC) [J18.1]  Yes   • Acute on chronic combined systolic and diastolic CHF (congestive heart failure) (CMS/HCC) [I50.43]  Yes   • Acute on chronic respiratory failure with hypoxemia (CMS/HCC) [J96.21]  Yes   • Adenocarcinoma, lung, left (CMS/HCC) [C34.92]  Yes   • Benign essential hypertension [I10]  Yes   • Chronic coronary artery disease [I25.10]  Yes   • Chronic kidney disease, stage III (moderate) (CMS/HCC) [N18.3]  Yes      Resolved Hospital Problems   No resolved problems to display.         Assessment/Plan     1. Pneumonia and mucous plugging.  Completed antibiotics continue pulmonary hygiene  2. Acute  hypoxemic respiratory failure chronic nocturnal hypoxemia doing well I would plan on her going home with O2  3. Acute on chronic systolic CHF  4. Hyponatremia resolved  5. Metastatic adenocarcinoma of the lung followed by CBC group palliative treatment only  6. Chronic kidney disease stage III  7. Coronary artery disease  8. Cardiomyopathy  9. Left hemiplegia from old hemorrhagic stroke  10. Anemia  11. 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  Apparently this was first noticed in November 2019  12. Severe protein calorie malnutrition  13. Hypotension medications now held all her blood pressure will see how she does    Plan for disposition: From a pulmonary standpoint she can go home on her current medication regimen with the OPEP she should follow-up with Dr. Francis in the office in the next week or 2.  I will see JOSÉ MIGUEL Wilkerson MD  03/03/20  7:00 PM    Time:

## 2020-03-04 NOTE — PLAN OF CARE
Problem: Patient Care Overview  Goal: Plan of Care Review  Outcome: Ongoing (interventions implemented as appropriate)  Flowsheets (Taken 3/4/2020 7462)  Plan of Care Reviewed With: patient; spouse  Outcome Summary: VSS on 1L n/c. No c/o pain or n/v. BP remained stable overnight. Plan to d/c this AM.

## 2020-03-04 NOTE — DISCHARGE SUMMARY
PHYSICIAN DISCHARGE SUMMARY  [facility], [facility]    Patient Identification:    Name: Anaid Moura  Age: 74 y.o.  Sex: female  :  1945  MRN: [id number]    Primary Care Physician: Timothy Taylor MD    Admit date: 2020    Discharge date and time:3/4/2020    Discharged Condition: fair    Discharge Diagnoses:    Pneumonia of right lower lobe due to infectious organism (CMS/HCC)    Benign essential hypertension    Chronic kidney disease, stage III (moderate) (CMS/HCC)    Chronic coronary artery disease    Acute on chronic respiratory failure with hypoxemia (CMS/HCC)    Adenocarcinoma, lung, left (CMS/HCC)    Acute on chronic combined systolic and diastolic CHF (congestive heart failure) (CMS/HCC)         Hospital Course: Anaid Moura  is a 84-year-old female, patient of the clinic.  Patient has history of hypertension, chronic kidney disease, COPD, stroke, lung cancer, coronary artery disease.  Patient was doing okay, patient has developed general malaise, altered mental status, decreased p.o. intake as well as chest tightness and shortness of breath.  Patient was brought to the emergency room, in the ER patient was found to have: Hypoxia, sodium 133, lactate 2.8, WBC 11.82, CT scan of the chest showed no PE, left lower lobe consolidatio and right lower lobe consolidation.  Also shows extensive mucus plugging in the right lower lobe.  Patient was admitted for further management.    Upon admission patient was placed on IV fluids, she was dehydrated.  Patient was placed on IV antibiotics for probably' pneumonia.  Electrolytes were corrected accordingly. Patient worked with physical therapy during hospitalization.  Patient respiratory status deteriorated by  mucus, x-ray looks worse, pulmonary was consulted for possible bronchoscopy.  Her hemoglobin also dropped to 6.7 and she received 1 unit of PRBC the same day.  At some point, she was treated  also for mucous plug, with mini nebs, hypertonic sodium,etc. patient improved by February 28, her blood pressure improved, oxygenation was 95% on 2 L nasal cannula.  At some point she did not eat well, blood pressure dropped to 80s over 40s, medications for her were adjusted, it was felt that patient was not able to take Coreg, losartan so this was discontinued.  Today patient is much better, saturation 1 L is 97-99%.  She is stable, she is eating better, she is okay to be discharged home.    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   • Hyperlipidemia    • Left hemiplegia (CMS/HCC) 12/10/2015   • Lung cancer (CMS/HCC)    • Myocardial infarct (CMS/[facility]) 1996   • Osteopenia    • Stroke syndrome 2008   • Thyroid cyst     right   • Thyroid lump 12/10/2015    Description: Biopsy 05/15 at [facility], benign   • Transient cerebral ischemia    • Urge incontinence of urine      PSHX:   Past Surgical History:   Procedure Laterality Date   • BREAST BIOPSY     • BRONCHOSCOPY Bilateral 5/20/2019    Procedure: BRONCHOSCOPY WITH  BIOPSY AND BAL, WITH ENDOBRONCHIAL ULTRASOUND WITH FNA;  Surgeon: Chris Tirado MD;  Location: [facility] ENDOSCOPY;  Service: Pulmonary   • BRONCHOSCOPY N/A 2/19/2020    Procedure: BRONCHOSCOPY WITH WASHINGS AND BAL;  Surgeon: Ronnie Pruett MD;  Location: [facility] ENDOSCOPY;  Service: Pulmonary;  Laterality: N/A;  PRE OP - PNEUMONIA  POST OP - SAME   • COLONOSCOPY      REC 07/2007, REC 02/2009, REC 12/2011, REC 01/14,  She says she cant do the prep because of poor mobility to get  "to BR.   • EYE SURGERY  1950   • OTHER SURGICAL HISTORY      Ventricular [place] holes for drainage of subdural hematoma   • TOTAL THYROIDECTOMY  08/2015    Dr. Ahmadi           Consults:     Consults     Date and Time Order Name Status Description    2/25/2020 0141 Inpatient Cardiology Consult Completed     2/18/2020 1336 Hematology & Oncology Inpatient Consult Completed     2/16/2020 1352 Inpatient Pulmonology Consult Completed     2/13/2020 1421 Family Medicine Consult Completed           Discharge Exam:    /60 (BP Location: Right leg, Patient Position: Lying)   Pulse 94   Temp 97.7 °F (36.5 °C) (Oral)   Resp 18   Ht 162.6 cm (64.02\")   Wt 39.2 kg (86 lb 6.4 oz)   SpO2 95%   BMI 14.82 kg/m²     General: Awake, oriented x3, on 2 L of oxygen.   Neck: Supple, symmetrical, trachea midline, no adenopathy;              thyroid:  no enlargement/tenderness/nodules;              no carotid bruit or JVD  Cardiovascular: Regular rate and rhythm. Exam reveals no gallop and no friction rub. No murmur heard  Pulmonary: Decreased breath sounds bilaterally, but better aeration.  Few rhonchi bilaterally.  No wheezing, no rales.    Abdominal: Soft. Soft, non-tender, bowel sounds active all four quadrants,     no masses, no hepatomegaly, no splenomegaly.   Extremities: Normal, atraumatic, no cyanosis or edema  Neurological: Somnolent, arousable, no new focal sensory or motor deficit.    Skin: Skin color, texture, turgor normal, no rashes or lesions       Data Review:        Results from last 7 days   Lab Units 03/04/20 0529 03/03/20 0522 03/02/20  0604   WBC 10*3/mm3 6.36 6.84 8.04   HEMOGLOBIN g/dL 9.7* 9.8* 10.2*   HEMATOCRIT % 31.0* 30.5* 32.0*   PLATELETS 10*3/mm3 352 347 393       Results from last 7 days   Lab Units 03/04/20  0529 03/03/20  0522 03/02/20  0604   SODIUM mmol/L 137 136 138   POTASSIUM mmol/L 4.1 4.1 4.2   CHLORIDE mmol/L 99 96* 97*   CO2 mmol/L 25.2 28.6 27.1   BUN mg/dL 26* 29* 32*   CREATININE " mg/dL 0.60 0.62 0.68   CALCIUM mg/dL 8.7 8.4* 9.1   GLUCOSE mg/dL 77 89 95           Lab Results   Lab Value Date/Time    TROPONINT <0.010 02/22/2020 0542    TROPONINT <0.010 02/21/2020 1411    TROPONINT 0.109 (C) 02/13/2020 1429    TROPONINT 0.091 (C) 02/13/2020 1145    TROPONINT 0.02 10/31/2019 1837    TROPONINT <0.010 06/06/2019 2129    TROPONINT <0.010 05/27/2019 0735    TROPONINT <0.010 05/16/2019 1918       Microbiology Results (last 10 days)     ** No results found for the last 240 hours. **           Imaging Results (All)     Procedure Component Value Units Date/Time    XR Chest 1 View [[id number]] Collected:  03/01/20 1632     Updated:  03/01/20 1640    Narrative:       Portable chest radiograph     HISTORY:Shortness of breath, lung cancer     TECHNIQUE: Single AP portable radiograph of the chest     COMPARISON:Multiple chest radiographs dating back to 02/15/2020 and CTA  chest 02/13/2020 and chest CT 01/27/2020       Impression:       FINDINGS AND IMPRESSION:  Right PICC terminates near the superior cavoatrial junction.     The asymmetric pulmonary opacification within the left base and left  midlung is grossly unchanged since 02/26/2020. Masslike opacification  along the right hilum with extension of pulmonary opacification linearly  along the right minor fissure is present, as before, with improved  aeration since 02/26/2020. Findings at least in part likely represent  multifocal pneumonia given its appearance on recent CT. No pneumothorax  or pleural effusion is seen. Please note that the patient's known lung  cancer is not well evaluated with plain film radiographs and please  refer to recent chest CTs for further information and to determine  appropriate follow-up.     This report was finalized on 3/1/2020 4:36 PM by Dr. Ephraim Gonzales M.D.       XR Chest PA & Lateral [[id number]] Collected:  02/26/20 1147     Updated:  02/26/20 1154    Narrative:       Chest radiograph     HISTORY:Dyspnea      TECHNIQUE: Two PA and lateral radiographs     COMPARISON:Multiple chest radiographs dating back to 06/13/2019 and CTA  chest 02/13/2020       Impression:       FINDINGS AND IMPRESSION:  Right PICC tip terminates over the superior cavoatrial junction.     Multifocal areas of irregular pulmonary opacification are present within  the perihilar aspects the bilateral mid lungs as well as the left lung  base corresponding with areas of consolidation best seen on recent CTA  chest and likely representing multifocal pneumonia. Findings appear to  have improved within the right midlung and left base since 02/23/2020  when accounting for differences in technique. No pneumothorax is seen.  The heart is mildly enlarged. Small pleural effusions are present  posteriorly, as before.     This report was finalized on 2/26/2020 11:51 AM by Dr. Ephraim Gonzales M.D.       XR Chest 1 View [[id number]] Collected:  02/23/20 1036     Updated:  02/23/20 1043    Narrative:       ONE VIEW PORTABLE CHEST AT 10:18 AM     HISTORY: Shortness of breath.     FINDINGS: There is extensive pneumonia in the left perihilar region and  in the right upper lobe extending lateral to the right hilum and these  areas show no significant change when compared to the study of 2 days  ago. There is also some persistent atelectasis and pneumonia at the left  base that is unchanged. The patient has severe underlying COPD with  hyperinflation. The heart remains mildly enlarged. A right-sided PICC  line ends in the SVC.     CONCLUSION: Extensive areas of bilateral pneumonia unchanged from 2 days  ago. Severe underlying pulmonary emphysema.     This report was finalized on 2/23/2020 10:39 AM by Dr. Eleno Mahajan M.D.       XR Chest 1 View [[id number]] Collected:  02/21/20 0710     Updated:  02/21/20 0715    Narrative:       XR CHEST 1 VW-     Clinical: Increasing hypoxia     COMPARISON 2/18/2020     FINDINGS: Interval placement of a PICC line, position is  satisfactory.  There are bilateral areas of consolidation again demonstrated with no  significant interval improvement. No new area of consolidation has  developed. There is haziness along the costophrenic sulcus on the left,  likely related to small pleural effusion. The cardiomediastinal  silhouette is stable. The remainder of examination is unremarkable.     This report was finalized on 2/21/2020 7:12 AM by Dr. Abimael Mitchell M.D.       XR Chest 1 View [[id number]] Collected:  02/18/20 0809     Updated:  02/18/20 0854    Narrative:       CLINICAL HISTORY: 74-year-old female for follow-up of pneumonia.     PORTABLE AP SEMIERECT CHEST DATED 02/18/2020 AT 0541 HOURS     FINDINGS: When compared to the most recent available prior chest  radiographs dated 02/15/2020 at 1300 hours and 1302 hours, there is  persistent dense collapse or consolidation and possible adjacent pleural  fluid at the lower left chest and there is some persistent patchy  perihilar opacity in the left lung. There is interval development of  extensive patchy to dense opacity in the mid to upper right chest on the  current exam with some increased haziness in the lower right lung zone.  Lung markings in both lungs remain diffusely coarse. Right costophrenic  angle remains relatively sharp. Mild cardiomegaly and aortic  calcifications are present as before. Vascular calcifications overlie  the lung apices but no definite pneumothorax is seen. No acute change in  bony thorax is seen. Monitoring lead wires and oxygen cannula tubing are  present.     CONCLUSION: Increased pulmonary infiltrates in the right lung and  persistent patchy to dense collapse or consolidations in the left lung  as described above.     This report was finalized on 2/18/2020 8:51 AM by Dr. Miah Marsh M.D.       XR Chest 1 View [[id number]] Collected:  02/15/20 1433     Updated:  02/15/20 1439    Narrative:       XR CHEST 1 VW-     HISTORY: Female who is 74 years-old,  cough,  pneumonia     TECHNIQUE: Frontal view of the chest     COMPARISON: 02/13/2020     FINDINGS: The heart is mildly enlarged. Aorta is calcified, tortuous.  Pulmonary vasculature is unremarkable. Persistent bilateral lower lung  opacities without significant change, continued follow-up recommended.  Small left pleural effusion is suggested. No pneumothorax. No acute  osseous process.       Impression:       Persistent bilateral lower lung opacities, continued  follow-up recommended.     This report was finalized on 2/15/2020 2:36 PM by Dr. Ortega Woodard M.D.       CT Angiogram Chest [[id number]] Collected:  02/13/20 1445     Updated:  02/13/20 1526    Narrative:       CT ANGIOGRAM CHEST-     Radiation dose reduction techniques were utilized, including automated  exposure control and exposure modulation based on body size.     CLINICAL: Shortness of breath, evaluate for pulmonary embolus. History  of lung carcinoma.     CT scan of the chest with intravenous contrast, pulmonary embolus  protocol to include CT angiogram and three-dimensional reconstruction.     COMPARISON: 01/27/2020     FINDINGS: Thin axial and CT angio reconstructed images fail to  demonstrate pulmonary embolus.     There is cardiac enlargement. Diameter of the thoracic aorta is normal.  There is atherosclerotic plaque formation. Extensive bullae formation  compatible with emphysema. There is stable right apical pleural  thickening and parenchymal scarring.     Again demonstrated at the site of treated left upper lobe tumor there is  a linear area of scarring/atelectasis along the major fissure.     The left-sided pleural effusion has diminished in size within the  interim. The left lower lobe consolidation has diminished within the  interim. There is left lower lobe bronchial wall thickening and mucus  plugging demonstrated. There is a small area of residual peripheral  consolidation.     The subpleural mass-like area of consolidation arising  within the  superior segment of the right lower lobe seen on the previous  examination and is now encompassed by a larger area of consolidation  devoid of air bronchograms. Superimposed pneumonia or partial collapse  is suspected given the degree of change since 1/27/2020. There is fairly  extensive mucus plugging within the right lower lobe. Along the right  lower lobe costophrenic sulcus, there is an area of consolidation or  atelectasis devoid of air bronchograms which is more pronounced compared  to the previous examination.  Breast parenchyma is stable in appearance, no axillary adenopathy.  Stable mediastinal lymph nodes.     CONCLUSION:  1. No pulmonary embolus is demonstrated.  2. Cardiomegaly.  3. Left-sided pleural effusion diminished in size within the interim.  4. Left lower lobe consolidation has diminished within the interim.  5. Enlarging area of consolidation superior segment right lower lobe and  right lower lobe costophrenic sulcus, likely related to pneumonia or  partial collapse. There is extensive mucus plugging within the right  lower lobe. Advise repeat CT after treatment.     Findings of this report called to Lauren Wilkinson in the emergency room  at the time of completion, 2:20 PM.                    This report was finalized on 2/13/2020 3:23 PM by Dr. Abimael Mitchell M.D.       XR Chest 1 View [[id number]] Collected:  02/13/20 1236     Updated:  02/13/20 1244    Narrative:       XR CHEST 1 VW-     HISTORY: Female who is 74 years-old,  short of breath, lung cancer     TECHNIQUE: Frontal view of the chest     COMPARISON:  image from CT from 01/27/2020     FINDINGS: Heart size is borderline. Aorta is tortuous, calcified.  Pulmonary vasculature is unremarkable. Bilateral perihilar and lower  lung opacities appear increased on the right, infectious, inflammatory,  neoplastic etiologies can be considered. Mild prominence of interstitial  markings. Minimal left pleural effusion is apparent. No  pneumothorax. No  acute osseous process.       Impression:       Interval worsening of pulmonary opacities, continued  follow-up recommended.     This report was finalized on 2/13/2020 12:41 PM by Dr. Ortega Woodard M.D.               Disposition:    Home    Patient Instructions:        Discharge Medications      ASK your doctor about these medications      Instructions Start Date   aspirin 81 MG chewable tablet   81 mg, Oral, Daily      atorvastatin 80 MG tablet  Commonly known as:  LIPITOR   80 mg, Oral, Daily, Needs an appointment for further refills      budesonide 0.5 MG/2ML nebulizer solution  Commonly known as:  PULMICORT  Ask about: Which instructions should I use?   0.5 mg, Nebulization, 2 Times Daily - RT      DETROL 1 MG tablet  Generic drug:  tolterodine  Ask about: Which instructions should I use?   1 mg, Oral, 2 Times Daily      folic acid 1 MG tablet  Commonly known as:  FOLVITE   1 mg, Oral, Daily      levothyroxine 75 MCG tablet  Commonly known as:  SYNTHROID, LEVOTHROID   75 mcg, Oral, Daily      OLANZapine 7.5 MG tablet  Commonly known as:  zyPREXA   7.5 mg, Oral, Nightly      PERFOROMIST 20 MCG/2ML nebulizer solution  Generic drug:  formoterol   Nebulization, 2 Times Daily - RT      potassium chloride ER 20 MEQ tablet controlled-release ER tablet  Commonly known as:  K-TAB   20 mEq, Oral, Daily      predniSONE 5 MG tablet  Commonly known as:  DELTASONE  Ask about: Which instructions should I use?   5 mg, Oral, Daily, Tapering-took last 10 mg tablet on day of admission       revefenacin 175 MCG/3ML nebulizer solution  Commonly known as:  YUPELRI  Ask about: Which instructions should I use?   175 mcg, Nebulization, Daily - RT      sertraline 25 MG tablet  Commonly known as:  ZOLOFT   25 mg, Oral, Daily      sodium chloride 7 % nebulizer solution nebulizer solution   4 mL, Nebulization, 2 Times Daily - RT, And may use additional 2 times as needed chest congestion      SSD 1 % cream  Generic  drug:  silver sulfadiazine   As Needed      TUSSIN MUCUS+CHEST CONGESTION 100 MG/5ML liquid  Generic drug:  guaifenesin   400 mg, Oral, Every 4 Hours, While awake             Discharge Order (From admission, onward)    None           Contact information for follow-up providers     Timothy Taylor MD Follow up.    Specialties:  Internal Medicine, Hospitalist  Contact information:  [address] [place] [address]  [phone]             [facility]: PROSTHETICS & ORTHOTICS .    Contact information:  [address]  [place] [address]  [phone]           [facility]; Prosthetics & Orthotics. Schedule an appointment as soon as possible for a visit.    Why:  Call [facility] to setup fitting for AFO Brace after discharge. Ask for appoinment be setup with Cesar.   [phone]  Contact information:  [address] [place] [address]  Tel:[phone]           Fabiana Francis MD Follow up in 1 week(s).    Specialties:  Pulmonary Disease, Sleep Medicine  Contact information:  [address]  [place] [address]  [phone]             Marquis Gonsales MD Follow up.    Specialties:  Internal Medicine, Hospitalist  Contact information:  [address] [address]  [phone]                   Contact information for after-discharge care     Home Medical Care     [facility] .    Service:  Home Health Services  Contact information:  [address] [facility] [address]  [facility] [address] [phone]                             Total time spent discharging patient including evaluation,post hospitalization follow up,  medication and post hospitalization instructions and education total time exceeds 30 minutes.    Signed:  Marquis Gonsales MD  3/4/2020  1:23 PM

## 2020-03-05 NOTE — PROGRESS NOTES
Case Management Discharge Note      Final Note: Home with family and [facility]; Family to transport    Provided Post Acute Provider List?: Refused  Refused Provider List Comment: Pt states she has used 's for her orthotics in the past and wants to use again.     Destination      No service has been selected for the patient.      Durable Medical Equipment      No service has been selected for the patient.      Dialysis/Infusion      No service has been selected for the patient.      Home Medical Care - Selection Complete      Service Provider Request Status Selected Services Address Phone Number Fax Number    [facility] Selected [facility] Health Services [address] [address] [phone]      Therapy      No service has been selected for the patient.      Community Resources      No service has been selected for the patient.        Transportation Services  Private: Car    Final Discharge Disposition Code: 06 - home with home health care

## 2020-03-05 NOTE — OUTREACH NOTE
Prep Survey      Responses   [facility] facility patient discharged from?  [place]   Is LACE score < 7 ?  No   Eligibility  Readm Mgmt   Discharge diagnosis  Systolic and diastolic dysfunction/ Abnormal ECHO, pneumonia   Does the patient have one of the following disease processes/diagnoses(primary or secondary)?  COPD/Pneumonia   Does the patient have Home health ordered?  Yes   What is the Home health agency?   [facility]   Is there a DME ordered?  Yes   What DME was ordered?  AFO ankle brace, - hangers   Prep survey completed?  Yes          Carrol Lorenzo RN

## 2020-03-06 NOTE — OUTREACH NOTE
COPD/PN Week 1 Survey      Responses   [facility] patient discharged from?  [place]   Does the patient have one of the following disease processes/diagnoses(primary or secondary)?  COPD/Pneumonia   Is there a successful TCM telephone encounter documented?  No   Was the primary reason for admission:  Pneumonia   Week 1 attempt successful?  Yes   Call start time  1518   Call end time  1522   Discharge diagnosis  Systolic and diastolic dysfunction/ Abnormal ECHO, pneumonia   Person spoke with today (if not patient) and relationship  Michael-spouse   Meds reviewed with patient/caregiver?  Yes   Is the patient having any side effects they believe may be caused by any medication additions or changes?  No   Does the patient have all medications ordered at discharge?  Yes   Is the patient taking all medications as directed (includes completed medication regime)?  Yes   Comments regarding appointments  All appts have been made except PCP appt.   Does the patient have a primary care provider?   Yes   Has the patient kept scheduled appointments due by today?  N/A   Comments  Appt for new AFO ankle brace - hangers on 3/17/20   What is the Home health agency?   [facility]   Has home health visited the patient within 72 hours of discharge?  Yes   Psychosocial issues?  No   Did the patient receive a copy of their discharge instructions?  Yes   Nursing interventions  Reviewed instructions with patient   What is the patient's perception of their health status since discharge?  Improving   Nursing Interventions  Nurse provided patient education   Are the patient's immunizations up to date?   Yes   If the patient is a current smoker, are they able to teach back resources for cessation?  -- [Nonsmoker]   Is the patient/caregiver able to teach back the hierarchy of who to call/visit for symptoms/problems? PCP, Specialist, Home health nurse, Urgent Care, ED, 911  Yes   Is the patient/caregiver able to teach back signs and symptoms of  worsening condition:  Fever/chills, Shortness of breath, Chest pain   Week 1 call completed?  Yes          Krystle Boykin RN

## 2020-03-09 NOTE — TELEPHONE ENCOUNTER
----- Message from Yanni Gavin sent at 3/9/2020  6:58 AM EDT -----  Regarding: FWD: Cancel CT   Please see below and cancel these scans for tomorrow and let pt know.  Thanks.      ----- Message -----  From: Evangelina Siu APRN  Sent: 3/6/2020   4:17 PM EDT  To: Yanni Gavin  Subject: RE: CT                                           Per Dr. Godfrey we can cancel all scans for now.    ----- Message -----  From: Yanni Gavin  Sent: 3/6/2020   7:04 AM EST  To: [facility] [place]  Subject: CT                                               Pt is coming in Tuesday for a CT CAP.  She was in the hospital and had a CT Chest on 2/13/20.  Should we just proceed with AP?  Thanks.

## 2020-03-14 NOTE — OUTREACH NOTE
COPD/PN Week 2 Survey      Responses   [facility] patient discharged from?  [place]   Does the patient have one of the following disease processes/diagnoses(primary or secondary)?  COPD/Pneumonia   Was the primary reason for admission:  Pneumonia   Week 2 attempt successful?  Yes   Call start time  1548   Call end time  1550   Discharge diagnosis  Systolic and diastolic dysfunction/ Abnormal ECHO, pneumonia   Is patient permission given to speak with other caregiver?  Yes   Person spoke with today (if not patient) and relationship  Michael-spouse   Meds reviewed with patient/caregiver?  Yes   Is the patient having any side effects they believe may be caused by any medication additions or changes?  No   Does the patient have all medications ordered at discharge?  Yes   Is the patient taking all medications as directed (includes completed medication regime)?  Yes   Does the patient have a primary care provider?   Yes   Has the patient kept scheduled appointments due by today?  Yes   What is the Home health agency?   [facility]   Has home health visited the patient within 72 hours of discharge?  Yes   What DME was ordered?  AFO ankle brace, - hangers   Psychosocial issues?  No   Did the patient receive a copy of their discharge instructions?  Yes   Nursing interventions  Reviewed instructions with patient   What is the patient's perception of their health status since discharge?  Improving   Nursing Interventions  Nurse provided patient education   Are the patient's immunizations up to date?   Yes   Nursing interventions  Educated on importance of maintaining up to date immunizations as advised by provider   Is the patient/caregiver able to teach back the hierarchy of who to call/visit for symptoms/problems? PCP, Specialist, Home health nurse, Urgent Care, ED, 911  Yes   Additional teach back comments  Doing better with appetite,  states she did not have PNA.  She is working with PT w/ HH.   Is the patient/caregiver able  to teach back signs and symptoms of worsening condition:  Fever/chills, Shortness of breath, Chest pain   Is the patient/caregiver able to teach back importance of completing antibiotic course of treatment?  Yes   Week 2 call completed?  Yes          Virginia Austin RN

## 2020-03-23 NOTE — OUTREACH NOTE
COPD/PN Week 3 Survey      Responses   [facility] patient discharged from?  [place]   Does the patient have one of the following disease processes/diagnoses(primary or secondary)?  COPD/Pneumonia   Was the primary reason for admission:  Pneumonia   Week 3 attempt successful?  No   Unsuccessful attempts  Attempt 1          Nano Phelps RN

## 2020-03-27 NOTE — OUTREACH NOTE
COPD/PN Week 3 Survey      Responses   [facility] patient discharged from?  [place]   Does the patient have one of the following disease processes/diagnoses(primary or secondary)?  COPD/Pneumonia   Was the primary reason for admission:  Pneumonia   Week 3 attempt successful?  No   Unsuccessful attempts  Attempt 2          Selina Lugo RN

## 2020-04-23 NOTE — TELEPHONE ENCOUNTER
Solis Godfrey Jr., MD    Pt called and needs RX refills for: levothyroxine &  folic acid    Thanks

## 2020-05-04 NOTE — OUTREACH NOTE
Call placed to pt following ED visit 4/30 answered by pt . Very brief call, states pt is doing well. Denied any needs or follow up. Mr Thomas notified that if they  have any questions you can call [facility] 24/7 nurse call center [phone]  . Also directed to the back of insurance card for case management benefits provided by her insurance company. 24/7 care managers, community health workers, health coaches, transportation. He verbalizes understanding of above information and was not interested in any further calls.

## 2020-05-13 NOTE — TELEPHONE ENCOUNTER
Patient appointment was canceled on 5/1 because patient was in the hospital.  Does she need to get rescheduled with him now?  He says that it has been a while since she has seen Dr. Godfrey    [phone]

## 2020-05-14 NOTE — TELEPHONE ENCOUNTER
----- Message from Solis Godfrey Jr., MD sent at 5/14/2020 12:58 AM EDT -----  Regarding: RE: Please see note from SNEHA..  Could do video visit in next 2-4, she should NOT come in- is high risk  ----- Message -----  From: Margaux Granados  Sent: 5/13/2020  10:14 AM EDT  To: Solis Godfrey Jr., MD  Subject: Please see note from SNEHA..                       Patient appointment was canceled on 5/1 because patient was in the hospital.  Does she need to get rescheduled with him now?  He says that it has been a while since she has seen Dr. Godfrey     [phone]    I did not see any orders for any appts from hospital?  Please let me know.  Thank you Rashmi

## 2020-06-05 NOTE — PROGRESS NOTES
Subjective .     REASONS FOR FOLLOWUP:    1. Metastatic non-small cell lung cancer (adenocarcinoma):  · Patient with long-standing smoking history x40 years quit in 2008  · Underlying significant COPD with FEV1 1.4 (58%) on 11/20/2014  · CT 5/16/2019 with left upper lobe mass, partially cavitary measuring 2.3 x 1.5 cm.  Additional 8 mm similar appearing right upper lobe nodule.  Bibasilar consolidation, small bilateral pleural effusions, mediastinal lymphadenopathy up to 2.7 centimeters.  · Bronchoscopy 5/20/2019 with identification of left mainstem malignancy extending to the level of the jose, biopsy showed poorly differentiated adenocarcinoma of pulmonary origin. EBUS with FNA station 7 lymph node negative for malignant cells, only scattered lymphoid aggregates.  BAL left upper lobe negative for malignant cells.  Negative EGFR mutation, negative ALK/ROS 1 rearrangement. PDL1 95%.  · Patient was intubated with possible pneumonia, significant mucus plugging with underlying significant COPD.  Extubated on 5/28/2019.   · Staging evaluation performed during hospitalization with negative CT abdomen/pelvis, negative bone scan.  · MRI brain 5/29/2019 with evidence of metastatic disease, 3-4 enhancing lesions involving left occipital lobe 5 mm, left posterior parietal lobe 4 mm, left frontal lobe 4 mm, left parietal 3-4 mm with small amounts of surrounding edema.  Given the minimal extent of disease elected to observe with short interval MRI.  · Discussion regarding systemic therapy with single agent Keytruda due to PDL1 95%  · Subsequent disease progression on CT 6/8/2019 with increasing left upper lobe mass, mediastinal and hilar lymphadenopathy with occlusion of left mainstem bronchus at origin.  · Received palliative radiation therapy to left mainstem bronchus x10 fractions, completed 6/25/2019.  · Initiated systemic therapy with Keytruda 6/28/2019.  · Scans 8/6/2019 following 2 cycles Keytruda with decrease left  upper lobe primary lesion and left hilar/mediastinal lymphadenopathy.  Response primarily felt to be related to radiation therapy.  Continuation of immunotherapy.  · CT scans 9/17/2019 following 4 cycles Keytruda with further decrease in AP window lymph node, suspected evolving postradiation change in left lower lobe.  MRI brain 9/17/2019 with improvement in 3 small metastatic lesions.  Continuation of immunotherapy.  · CT scan 10/29/2019 following 6 cycles Keytruda with new/enlarging 2 cm left upper lobe lesion, stable mediastinal lymph nodes, increased air space opacities in the lungs bilaterally, new left adrenal nodule 1.3 cm.  MRI brain with stable to slightly improved findings 10/29/2019.  Cycle 7 Keytruda held 11/8/2019 with plans to pursue PET scan.  Unclear whether pulmonary findings represented infectious change versus pneumonitis from immunotherapy.  · PET scan 11/14/2019 with worsening areas of consolidation involving the lungs bilaterally, decrease in left upper lobe nodular opacification with minimal uptake, stable mildly hypermetabolic mediastinal and left hilar lymph nodes, intensely hypermetabolic left adrenal 1.7 cm lesion SUV 11.2.  With clinical improvement in pulmonary status, pulmonary changes suspected to represent pneumonitis from immunotherapy.  The only area of true progression in left adrenal, will pursue stereotactic radiation.  Patient will remain off immunotherapy, initiated prednisone 40 mg daily.  · CT chest 12/4/2019 with improvement in left upper and lower lobe consolidation, slight further progression left adrenal.  Prednisone tapered.  · Left adrenal radiation completed 1/2/2020  · Patient currently remains on course of observation in regards to underlying malignancy.  2. 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  · Patient extubated  5/28/2019.  · Subsequent occlusion of left mainstem bronchus from malignancy 6/8/2019.  Received palliative radiation completed 6/25/2019.  · Patient with decline in respiratory status in October 2019 requiring oxygen to be initiated.  Suspected related to immunotherapy induced pneumonitis.  Response to steroids with significant improvement in respiratory status.  3. Prior hemorrhagic CVA:  · Occurred in 2008, residual left upper extremity weakness and left lower extremity weakness.   4. Anemia:  · Hemoglobin prior to admission was 13.1 on 5/16/2019  · Hemoglobin declined in the 10-11 range, related to malignancy, pneumonia  5. Depression and nausea/anorexia and chronic fatigue:  · Significant improvement following addition of Zyprexa nightly, dose escalated to 7.5 mg.  6. Hypothyroidism:  · Labs 6/28/2019 with TSH 23.8, normal free T4 of 1.43  · Treated with levothyroxine 100 mcg daily  · Subsequent thyroid function studies with TSH 0.065 and free T4 elevated at 2.06, levothyroxine dose decreased 8/9/19 to 50 mcg daily.  · 11/8/2019 TSH 37.5, free T4 1.15, levothyroxine dose increased to 75 mcg daily.      HISTORY OF PRESENT ILLNESS:  The patient is a 75 y.o. year old female who is here for follow-up with the above-mentioned history.    History of Present Illness patient returns today in follow-up with laboratory studies, MRI brain, and CT scans to review.  In the interval she did develop decline in her weight by 5 pounds and on 1/24/2020 we did increase her prednisone back to 10 mg daily.  She feels that this did help somewhat with her appetite.  We could not obtain a weight today as she was too weak to stand.  The patient does continue on oxygen at night only at home 2 L nasal cannula.  She reports that during the daytime she stays for the most part in the 88-90 range.  She does decline with significant activity.  In the office today, she dropped into the 70% range and did report some degree of dyspnea and  somnolence until her saturation recovered with supplemental oxygen.  She reports a mild chronic cough, occasionally productive however this has not changed much recently.  She does use nebulizer treatments at home which do help.  She notes pending most of her day in the chair, spends less than half her day in bed.  She has limitations in her activity level due to her prior CVA.      Past Medical History:   Diagnosis Date   • Acute on chronic combined systolic and diastolic CHF (congestive heart failure) (CMS/HCC)    • Acute on chronic respiratory failure with hypoxemia (CMS/HCC)    • Benign essential hypertension    • Cachexia (CMS/HCC)    • CAD (coronary artery disease)    • Cancer (CMS/HCC) 05/2019    Left lung   • Carotid artery stenosis    • Cerebellar artery occlusion 06/2008    causing left arm and leg paresis   • CKD (chronic kidney disease), stage III (CMS/HCC)    • COPD (chronic obstructive pulmonary disease) (CMS/HCC)    • Gastroesophageal reflux disease 12/10/2015   • 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   • Hyperlipidemia    • Left hemiplegia (CMS/HCC) 12/10/2015   • Lung cancer (CMS/HCC)    • Myocardial infarct (CMS/HCC) 1996   • Osteopenia    • Pneumonia of right lower lobe due to infectious organism (CMS/HCC)    • Stroke syndrome 2008   • Thyroid cyst     right   • Thyroid lump 12/10/2015    Description: Biopsy 05/15 at [facility], benign   • Transient cerebral ischemia    • Urge incontinence of urine      Past Surgical History:   Procedure Laterality Date   • BREAST BIOPSY     • BRONCHOSCOPY Bilateral 5/20/2019    Procedure: BRONCHOSCOPY WITH  BIOPSY AND BAL, WITH ENDOBRONCHIAL ULTRASOUND WITH FNA;  Surgeon: Chris Tirado MD;  Location: [facility] ENDOSCOPY;  Service: Pulmonary   • BRONCHOSCOPY N/A 2/19/2020    Procedure: BRONCHOSCOPY WITH WASHINGS AND BAL;  Surgeon: Ronnie Pruett MD;  Location: [facility] ENDOSCOPY;  Service: Pulmonary;  Laterality: N/A;  PRE OP - PNEUMONIA  POST OP - SAME   •  COLONOSCOPY      REC 07/2007, REC 02/2009, REC 12/2011, REC 01/14,  She says she cant do the prep because of poor mobility to get to .   • EYE SURGERY  1950   • OTHER SURGICAL HISTORY      Ventricular [place] for drainage of subdural hematoma   • TOTAL THYROIDECTOMY  08/2015    Dr. Ahmadi         ONCOLOGIC HISTORY:  (History from previous dates can be found in the separate document.)    Current Outpatient Medications on File Prior to Visit   Medication Sig Dispense Refill   • aspirin 81 MG chewable tablet Chew 81 mg Daily.     • atorvastatin (LIPITOR) 80 MG tablet Take 1 tablet by mouth Daily. Needs an appointment for further refills (Patient taking differently: Take 80 mg by mouth Every Night. Needs an appointment for further refills) 90 tablet 0   • budesonide (PULMICORT) 0.5 MG/2ML nebulizer solution Take 0.5 mg by nebulization 2 (Two) Times a Day.     • folic acid (FOLVITE) 1 MG tablet Take 1 tablet by mouth Daily. 90 tablet 1   • guaiFENesin (ROBITUSSIN) 100 MG/5ML solution oral solution Take 20 mL by mouth Every 4 (Four) Hours. While awake (Patient taking differently: Take 400 mg by mouth Every 4 (Four) Hours As Needed (Cough). While awake) 3600 mL 3   • ipratropium-albuterol (DUO-NEB) 0.5-2.5 mg/3 ml nebulizer Take 3 mL by nebulization 4 (Four) Times a Day. 360 mL 2   • levothyroxine (SYNTHROID, LEVOTHROID) 75 MCG tablet TAKE ONE TABLET BY MOUTH DAILY 30 tablet 0   • OLANZapine (zyPREXA) 2.5 MG tablet Take 1 tablet by mouth Every Night. 30 tablet 3   • pantoprazole (PROTONIX) 40 MG EC tablet Take 1 tablet by mouth Daily. 30 tablet 0   • predniSONE (DELTASONE) 5 MG tablet Take 5 mg by mouth Daily. Tapering-took last 10 mg tablet on day of admission     • revefenacin (YUPELRI) 175 MCG/3ML nebulizer solution Take 175 mcg by nebulization Daily.     • sertraline (ZOLOFT) 25 MG tablet Take 1 tablet by mouth Daily. 90 tablet 1   • sodium chloride 7 % nebulizer solution nebulizer solution Take 4 mL by  nebulization 2 (Two) Times a Day. And may use additional 2 times as needed chest congestion 480 mL 6   • tolterodine (DETROL) 1 MG tablet Take 1 mg by mouth 2 (Two) Times a Day.       No current facility-administered medications on file prior to visit.        ALLERGIES:     Allergies   Allergen Reactions   • Ace Inhibitors Cough     Other reaction(s): Cough       Social History     Socioeconomic History   • Marital status:      Spouse name: Miah   • Number of children: Not on file   • Years of education: College   • Highest education level: Not on file   Occupational History     Employer: RETIRED   Tobacco Use   • Smoking status: Former Smoker     Types: Cigarettes     Last attempt to quit:      Years since quittin.4   • Smokeless tobacco: Never Used   • Tobacco comment: caffeine use-soda   Substance and Sexual Activity   • Alcohol use: Yes     Comment: rarely   • Drug use: No   • Sexual activity: Defer     Family History   Problem Relation Age of Onset   • Heart attack Mother         Acute   • Heart disease Mother    • Colon polyps Sister         Sigmoid colon   • Coronary artery disease Brother    • Diabetes Brother    • Heart disease Brother    • Alcohol abuse Son               Review of Systems   Constitutional: Positive for fatigue. Negative for activity change, appetite change, fever and unexpected weight change.   HENT: Negative for congestion, mouth sores, nosebleeds, sore throat and voice change.    Respiratory: Positive for cough and shortness of breath. Negative for wheezing.    Cardiovascular: Negative for chest pain, palpitations and leg swelling.   Gastrointestinal: Negative for abdominal distention, abdominal pain, blood in stool, constipation, diarrhea, nausea and vomiting.   Endocrine: Negative for cold intolerance and heat intolerance.   Genitourinary: Negative for difficulty urinating, dysuria, frequency and hematuria.   Musculoskeletal: Negative for arthralgias, back pain, joint  swelling and myalgias.   Skin: Negative for rash.   Neurological: Positive for weakness. Negative for dizziness, syncope, light-headedness, numbness and headaches.   Hematological: Negative for adenopathy. Does not bruise/bleed easily.   Psychiatric/Behavioral: Negative for confusion, dysphoric mood and sleep disturbance. The patient is not nervous/anxious.          Objective      There were no vitals filed for this visit.   Current Status 2/4/2020   ECOG score 3   Pain 0/10    Physical Exam   Constitutional: She is oriented to person, place, and time. She appears well-developed and well-nourished.   Elderly female no distress seated in a wheelchair   HENT:   Mouth/Throat: Oropharynx is clear and moist.   Eyes: Conjunctivae are normal.   Neck: No thyromegaly present.   Cardiovascular: Normal rate and regular rhythm. Exam reveals no gallop and no friction rub.   No murmur heard.  Pulmonary/Chest: Breath sounds normal. No respiratory distress. She has no wheezes.   Minimal scattered rhonchi    Abdominal: Soft. Bowel sounds are normal. She exhibits no distension. There is no tenderness.   Musculoskeletal: She exhibits no edema.   Lymphadenopathy:        Head (right side): No submandibular adenopathy present.     She has no cervical adenopathy.     She has no axillary adenopathy.        Right: No inguinal and no supraclavicular adenopathy present.        Left: No inguinal and no supraclavicular adenopathy present.   Neurological: She is alert and oriented to person, place, and time. She displays normal reflexes. No cranial nerve deficit. She exhibits normal muscle tone.   Left hemiparesis from prior CVA   Skin: Skin is warm and dry. No rash noted.   Psychiatric: She has a normal mood and affect. Her behavior is normal.       RECENT LABS:  Hematology WBC   Date Value Ref Range Status   04/30/2020 7.48 3.40 - 10.80 10*3/mm3 Final   10/31/2019 15.71 (H) 4.5 - 11 x10E3/uL Final     RBC   Date Value Ref Range Status    04/30/2020 4.92 3.77 - 5.28 10*6/mm3 Final   10/31/2019 4.13 (L) 4.20 - 5.40 x10E6/uL Final     Hemoglobin   Date Value Ref Range Status   04/30/2020 13.9 12.0 - 15.9 g/dL Final   10/31/2019 11.1 (L) 12.0 - 16.0 g/dL Final     Hematocrit   Date Value Ref Range Status   04/30/2020 42.6 34.0 - 46.6 % Final   10/31/2019 36.7 (L) 37.0 - 47.0 % Final     Platelets   Date Value Ref Range Status   04/30/2020 253 140 - 450 10*3/mm3 Final     External Platelets   Date Value Ref Range Status   10/31/2019 348 140 - 440 thou/cu mm Final        Lab Results   Component Value Date    GLUCOSE 102 (H) 04/30/2020    BUN 17 04/30/2020    CREATININE 0.72 04/30/2020    EGFRIFNONA 79 04/30/2020    EGFRIFAFRI 74 08/11/2016    BCR 23.6 04/30/2020    K 4.2 04/30/2020    CO2 24.6 04/30/2020    CALCIUM 9.2 04/30/2020    PROTENTOTREF 7.4 08/11/2016    ALBUMIN 3.30 (L) 04/30/2020    LABIL2 0.5 11/01/2019    AST 16 04/30/2020    ALT 9 04/30/2020     CT chest abdomen pelvis and MRI brain reviewed as outlined below.  I did personally review CT images today.    Assessment/Plan     1. Metastatic non-small cell lung cancer (adenocarcinoma):  · Patient with long-standing smoking history x40 years quit in 2008  · Underlying significant COPD with FEV1 1.4 (58%) on 11/20/2014  · CT 5/16/2019 with left upper lobe mass, partially cavitary measuring 2.3 x 1.5 cm.  Additional 8 mm similar appearing right upper lobe nodule.  Bibasilar consolidation, small bilateral pleural effusions, mediastinal lymphadenopathy up to 2.7 centimeters.  · Bronchoscopy 5/20/2019 with identification of left mainstem malignancy extending to the level of the jose, biopsy showed poorly differentiated adenocarcinoma of pulmonary origin. EBUS with FNA station 7 lymph node negative for malignant cells, only scattered lymphoid aggregates.  BAL left upper lobe negative for malignant cells.  Negative EGFR mutation, negative ALK/ROS 1 rearrangement. PDL1 95%.  · Patient was intubated  with possible pneumonia, significant mucus plugging with underlying significant COPD.  Extubated on 5/28/2019.   · Staging evaluation performed during hospitalization with negative CT abdomen/pelvis, negative bone scan.  · MRI brain 5/29/2019 with evidence of metastatic disease, 3-4 enhancing lesions involving left occipital lobe 5 mm, left posterior parietal lobe 4 mm, left frontal lobe 4 mm, left parietal 3-4 mm with small amounts of surrounding edema.  Given the minimal extent of disease elected to observe with short interval MRI.  · Discussion regarding systemic therapy with single agent Keytruda due to PDL1 95%  · Subsequent disease progression on CT 6/8/2019 with increasing left upper lobe mass, mediastinal and hilar lymphadenopathy with occlusion of left mainstem bronchus at origin.  · Received palliative radiation therapy to left mainstem bronchus x10 fractions, completed 6/25/2019.  · Initiated systemic therapy with Keytruda 6/28/2019.  · CT scan following 2 cycles Keytruda and prior radiation from 8/6/2019 showed decrease in the left upper lobe primary lesion with essentially no residual discrete lung mass in that area.  Lymphadenopathy in the mediastinum/AP window and left hilum with significant improvement and resolution of left mainstem obstruction.  No new evidence of disease.  The patient did receive radiation therapy to both the primary left upper lobe mass and left hilum/mediastinum accounting for most of the improvement.  MRI brain 8/6/2019 with 3 enhancing metastatic lesions, 2 of which had a more nodular appearance as opposed to previous ring-enhancing appearance with stable size.  These were in the left occipital and left frontal regions.  The third lesion in the left parietal region decreased in size from 4 mm down to 2 mm.  There were no new lesions identified.  She has not undergone any specific treatment for her brain metastases.  Continuation of immunotherapy with Keytruda.  · Following 4  cycles Keytruda, 9/17/2019 CT scan showed improvement in AP window lymph node from 2.7 x 1.9 down to 2.1 x 1.5 cm.  Progressive change in the left lower lobe around the pleura, with one area having a masslike appearance suspected secondary to radiation pneumonitis rather than malignancy.  MRI brain with small metastatic lesions decreased in size.  · CT scan 10/29/2019 following 6 cycles Keytruda with new/enlarging 2 cm left upper lobe lesion, stable mediastinal lymph nodes, increased air space opacities in the lungs bilaterally, new left adrenal nodule 1.3 cm.  MRI brain with stable to slightly improved findings 10/29/2019.  Cycle 7 Keytruda held 11/8/2019 with plans to pursue PET scan.  Unclear whether pulmonary findings represented infectious change versus pneumonitis from immunotherapy.  · PET scan 11/14/2019 with worsening areas of consolidation involving the lungs bilaterally, decrease in left upper lobe nodular opacification with minimal uptake, stable mildly hypermetabolic mediastinal and left hilar lymph nodes, intensely hypermetabolic left adrenal 1.7 cm lesion SUV 11.2.  With clinical improvement in pulmonary status, pulmonary changes suspected to represent pneumonitis from immunotherapy.  The only area of true progression in left adrenal, will pursue stereotactic radiation.  Patient will remain off immunotherapy, initiated prednisone 40 mg daily with subsequent taper.  · CT chest 12/4/2019 with improvement in left upper and lower lobe consolidation, slight further progression left adrenal.  Prednisone tapered.  · Left adrenal radiation completed 1/2/2020  · Patient currently remains on course of observation in regards to underlying malignancy.  · The patient returns today in follow-up with MRI brain and CT scans to review from 1/27/2020.  The patient does continue with intermittent respiratory issues with hypoxia requiring supplemental oxygen which she only wears at night.  Today, her sats have been in  the 70s after coming into the office, did recover on 2 L O2 nasal cannula into the low 90% range.  Her appetite has improved slightly on prednisone 10 mg daily.  She continues with ECOG performance status of 2.  She notes stable mild cough, no fevers.  We did review her scans with MRI showing resolution of the 3 small residual areas of enhancement in the left cerebral hemisphere and no new areas of metastatic disease identified.  On CT chest 1/27/2020, evidence of radiation fibrosis at the site of previous treatment in the left upper lobe, no evidence of previous nodular density seen in that location.  There was a new small left pleural effusion.  There was bronchial thickening and mucus plugging in bilateral lower lobes with consolidation/atelectasis left greater than right.  There were stable mediastinal lymph nodes.  There was a new masslike area of subpleural consolidation right lower lobe measuring 4.1 x 2 x 4 cm of unclear etiology.  I discussed the findings on CT with the patient today.  The area in question in the right lower lobe may be related to atelectasis versus progressive malignancy.  She is a poor candidate for CT-guided biopsy with high risk for pneumothorax and has little pulmonary reserve to tolerate this.  It is likely not accessible via bronchoscopy given its peripheral location.  We discussed conservative management with short interval follow-up CT and the patient agrees.  With the possibility of progressive disease, we did discuss the option for any further systemic therapy.  This would require the use of chemotherapy.  The patient unfortunately is a poor candidate for palliative chemotherapy given her performance status and comorbidities at this point.  We discussed pursuing a palliative/supportive approach however she would like to know the status of her disease.  Therefore repeating her scan is reasonable and we will perform this prior to her return visit here in 6 weeks.  If she were to  decline clinically or show more definitive evidence of progressive disease we will consider transitioning to hospice care at home.  We will discuss this further when she returns with repeat scan.  2. 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  · Patient extubated 5/28/2019.  · Subsequent occlusion of left mainstem bronchus from malignancy 6/8/2019.  Received palliative radiation completed 6/25/2019.  · Patient hospitalized while in [place] at [facility] in October 2019 due to suspected aspiration pneumonia.  Doubtful that pulmonary findings are related to immunotherapy due to improvement on antibiotics and productive cough.  · Patient with decline in respiratory status in October 2019 requiring oxygen to be initiated.  Suspected related to immunotherapy induced pneumonitis.  Immunotherapy discontinued and initiated empiric prednisone 40 mg daily on 11/20/2019.  · Patient with overall improvement in respiratory status, currently on oxygen 1 L nasal cannula at night only with improvement in cough.    · The patient tapered prednisone down to 5 mg daily on 12/26/2019.  She developed declining weight and appetite with increase in prednisone back to 10 mg daily on 1/24/2020.  · Patient returns today continue on prednisone 10 mg daily.  She reports that her respiratory symptoms are stable with mild chronic cough casually productive and reports that her dyspnea is stable.  She does continue to use nebulizers at home.  She uses oxygen only at night.  She notes that her saturation at home is generally in the 88-91% range.  Coming into the office today however she did decline in the 70s and was symptomatic.  She did recover with supplemental oxygen in the office into the low 90% range.  I have suggested that she wear her oxygen continuously at home at  this point.  She did agree.  3. Prior hemorrhagic CVA:  · Occurred in 2008, residual left upper extremity weakness and left lower extremity weakness.   4. Anemia:  · Hemoglobin prior to admission was 13.1 on 5/16/2019  · Hemoglobin declined in the 10-11 range, related to malignancy, pneumonia  · Hemoglobin on 1/8/2020 declined down to 9.9.  Additional labs performed with iron 21, ferritin 454, iron saturation 9%, TIBC 223, B12 348, folate 3.77.  Appears that patient has anemia of chronic disease as well as anemia related to folate deficiency.  Initiated oral folic acid 1 mg daily.  · Hemoglobin today stable at 9.8  7. Hypothyroidism:  · Labs 6/28/2019 with TSH 23.8, normal free T4 of 1.43  · Patient was started on levothyroxine 100 mcg daily  · Repeat thyroid function studies on 8/9/2019 with TSH suppressed at 0.065 and elevated free T4 of 2.06.  Decreased levothyroxine dose to 50 mcg daily.    · 11/8/2019 TSH 37.5, free T4 1.15, levothyroxine dose increased to 75 mcg daily.  · Thyroid function studies today with TSH 5.08, free T4 1.43  8. Depression and nausea/anorexia:  · Patient with depression related to her underlying medical illness as well as social situation with her son who is an alcoholic and lives in the home  · Patient has been treated with Depakote 250 mg every 12 hours for mood stabilization under the direction of her primary care physician  · Patient with significant depressive symptoms, currently continuing on Zoloft under the direction of her primary care physician.  · Patient also with ongoing anorexia and nausea, initiated Zyprexa 2.5 mg nightly on 7/19/2019 with dramatic improvement in symptoms  · Patient seen in the supportive oncology clinic on 7/29/2019, tapered off Depakote.  · Patient has PRN Compazine and Zyprexa to use.  · Zyprexa dose titrated to 5 mg and subsequently on 9/20/2019 up to 7.5 mg nightly.  9. Hypokalemia:  · Potassium on 8/9/2019 profoundly low at 2.5  · Magnesium low normal  at 1.7  · Prescribed 20 mEq IV potassium chloride   · Potassium today 4.1  10. Dizziness/lightheadedness with relative hypotension:  · Cortisol level normal on 8/30/2019 at 33.16  · Patient tapered off metoprolol by cardiology 9/13/2019  · Symptoms improved.  11. GI prophylaxis:  · Prilosec 20 mg daily while remaining on steroids.      Plan:  1. 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  With compromised performance status and comorbidities, patient felt to be poor candidate for further active therapy for her disease, plan to pursue palliative/supportive care approach.  2. The patient will wear oxygen at home continuously  3. Continue folic acid 1 mg daily  4. Continue prednisone at 10 mg daily  5. Continue Zyprexa 7.5 mg nightly  6. Continue levothyroxine 75 mcg daily  7. In 3 weeks nurse practitioner visit with CBC, CMP  8. In 5 weeks CT chest abdomen and pelvis  9. In 6 weeks MD visit with CBC, CMP

## 2020-06-09 NOTE — PROGRESS NOTES
Subjective .     REASONS FOR FOLLOWUP:    1. Metastatic non-small cell lung cancer (adenocarcinoma):  · Patient with long-standing smoking history x40 years quit in 2008  · Underlying significant COPD with FEV1 1.4 (58%) on 11/20/2014  · CT 5/16/2019 with left upper lobe mass, partially cavitary measuring 2.3 x 1.5 cm.  Additional 8 mm similar appearing right upper lobe nodule.  Bibasilar consolidation, small bilateral pleural effusions, mediastinal lymphadenopathy up to 2.7 centimeters.  · Bronchoscopy 5/20/2019 with identification of left mainstem malignancy extending to the level of the jose, biopsy showed poorly differentiated adenocarcinoma of pulmonary origin. EBUS with FNA station 7 lymph node negative for malignant cells, only scattered lymphoid aggregates.  BAL left upper lobe negative for malignant cells.  Negative EGFR mutation, negative ALK/ROS 1 rearrangement. PDL1 95%.  · Patient was intubated with possible pneumonia, significant mucus plugging with underlying significant COPD.  Extubated on 5/28/2019.   · Staging evaluation performed during hospitalization with negative CT abdomen/pelvis, negative bone scan.  · MRI brain 5/29/2019 with evidence of metastatic disease, 3-4 enhancing lesions involving left occipital lobe 5 mm, left posterior parietal lobe 4 mm, left frontal lobe 4 mm, left parietal 3-4 mm with small amounts of surrounding edema.  Given the minimal extent of disease elected to observe with short interval MRI.  · Discussion regarding systemic therapy with single agent Keytruda due to PDL1 95%  · Subsequent disease progression on CT 6/8/2019 with increasing left upper lobe mass, mediastinal and hilar lymphadenopathy with occlusion of left mainstem bronchus at origin.  · Received palliative radiation therapy to left mainstem bronchus x10 fractions, completed 6/25/2019.  · Initiated systemic therapy with Keytruda 6/28/2019.  · Scans 8/6/2019 following 2 cycles Keytruda with decrease left  upper lobe primary lesion and left hilar/mediastinal lymphadenopathy.  Response primarily felt to be related to radiation therapy.  Continuation of immunotherapy.  · CT scans 9/17/2019 following 4 cycles Keytruda with further decrease in AP window lymph node, suspected evolving postradiation change in left lower lobe.  MRI brain 9/17/2019 with improvement in 3 small metastatic lesions.  Continuation of immunotherapy.  · CT scan 10/29/2019 following 6 cycles Keytruda with new/enlarging 2 cm left upper lobe lesion, stable mediastinal lymph nodes, increased air space opacities in the lungs bilaterally, new left adrenal nodule 1.3 cm.  MRI brain with stable to slightly improved findings 10/29/2019.  Cycle 7 Keytruda held 11/8/2019 with plans to pursue PET scan.  Unclear whether pulmonary findings represented infectious change versus pneumonitis from immunotherapy.  · PET scan 11/14/2019 with worsening areas of consolidation involving the lungs bilaterally, decrease in left upper lobe nodular opacification with minimal uptake, stable mildly hypermetabolic mediastinal and left hilar lymph nodes, intensely hypermetabolic left adrenal 1.7 cm lesion SUV 11.2.  With clinical improvement in pulmonary status, pulmonary changes suspected to represent pneumonitis from immunotherapy.  The only area of true progression in left adrenal, will pursue stereotactic radiation.  Patient will remain off immunotherapy, initiated prednisone 40 mg daily.  · CT chest 12/4/2019 with improvement in left upper and lower lobe consolidation, slight further progression left adrenal.  Prednisone tapered.  · Left adrenal radiation completed 1/2/2020  · Patient currently remains on course of observation in regards to underlying malignancy.  2. 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  · Patient extubated  5/28/2019.  · Subsequent occlusion of left mainstem bronchus from malignancy 6/8/2019.  Received palliative radiation completed 6/25/2019.  · Patient with decline in respiratory status in October 2019 requiring oxygen to be initiated.  Suspected related to immunotherapy induced pneumonitis.  Response to steroids with significant improvement in respiratory status.  3. Prior hemorrhagic CVA:  · Occurred in 2008, residual left upper extremity weakness and left lower extremity weakness.   4. Anemia:  · Hemoglobin prior to admission was 13.1 on 5/16/2019  · Hemoglobin declined in the 10-11 range, related to malignancy, pneumonia  · Progressive anemia during hospitalization 2/13-3/4/2020 requiring transfusion support.  5. Depression and nausea/anorexia and chronic fatigue:  · Significant improvement following addition of Zyprexa nightly, dose escalated to 7.5 mg.  6. Hypothyroidism:  · Labs 6/28/2019 with TSH 23.8, normal free T4 of 1.43  · Treated with levothyroxine 100 mcg daily  · Subsequent thyroid function studies with TSH 0.065 and free T4 elevated at 2.06, levothyroxine dose decreased 8/9/19 to 50 mcg daily.  · 11/8/2019 TSH 37.5, free T4 1.15, levothyroxine dose increased to 75 mcg daily.      HISTORY OF PRESENT ILLNESS:  The patient is a 75 y.o. year old female who is here for follow-up with the above-mentioned history.    History of Present Illness the patient is seen today in delayed follow-up following recent hospitalization via video visit due to the viral pandemic.  The patient had a prolonged hospital stay from 2/13 until 3/4/2020 related to pneumonia and acute on chronic hypoxic respiratory failure.  She underwent multiple evaluations during the hospitalization including CT scan of the chest 2/13/2020 showing evidence of pneumonia but no evidence to support progressive malignancy.  She also underwent bronchoscopy on 2/19/2020 which showed no evidence of malignancy on bronchial washings.  She eventually  "improved after antibiotic therapy.  She also developed worsening anemia that required transfusion support during her hospitalization and was felt to be related to chronic disease.  The patient was able to return home.  We had discussed in the hospital that she was not a candidate for further treatment of her malignancy even if recurrence/progression was identified and we have elected to maintain on a course of observation awaiting potential improvement from a functional and respiratory standpoint.    Today, the patient reports that she has improved considerably and is now back to her baseline level of functioning at home.  She does have significant limitations related to her prior CVA in regards to her mobility.  Her respiratory status has improved.  She is on continuous oxygen and does use nebulizer treatments frequently.  She does note an increase in fatigue.  She reports that her appetite has remained stable.  Although she does not weigh at home, there has been no obvious degree of weight loss.  She does note some issues with vivid dreams and takes some time to \"wake up\" in the morning.  She does note an increase in depressive symptoms and apparently has been off of Zoloft since she was discharged.  She has been continuing on prednisone 5 mg daily and is wondering whether increasing this to 10 mg would help with her fatigue.  She has also been off of folic acid.  She has continued on Zyprexa and her levothyroxine.    Past Medical History:   Diagnosis Date   • Acute on chronic combined systolic and diastolic CHF (congestive heart failure) (CMS/HCC)    • Acute on chronic respiratory failure with hypoxemia (CMS/HCC)    • Benign essential hypertension    • Cachexia (CMS/HCC)    • CAD (coronary artery disease)    • Cancer (CMS/HCC) 05/2019    Left lung   • Carotid artery stenosis    • Cerebellar artery occlusion 06/2008    causing left arm and leg paresis   • CKD (chronic kidney disease), stage III (CMS/HCC)    • COPD " (chronic obstructive pulmonary disease) (CMS/HCC)    • Gastroesophageal reflux disease 12/10/2015   • 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   • Hyperlipidemia    • Left hemiplegia (CMS/HCC) 12/10/2015   • Lung cancer (CMS/HCC)    • Myocardial infarct (CMS/HCC) 1996   • Osteopenia    • Pneumonia of right lower lobe due to infectious organism (CMS/HCC)    • Stroke syndrome 2008   • Thyroid cyst     right   • Thyroid lump 12/10/2015    Description: Biopsy 05/15 at [facility], benign   • Transient cerebral ischemia    • Urge incontinence of urine      Past Surgical History:   Procedure Laterality Date   • BREAST BIOPSY     • BRONCHOSCOPY Bilateral 5/20/2019    Procedure: BRONCHOSCOPY WITH  BIOPSY AND BAL, WITH ENDOBRONCHIAL ULTRASOUND WITH FNA;  Surgeon: Chris Tirado MD;  [facility];  Service: Pulmonary   • BRONCHOSCOPY N/A 2/19/2020    Procedure: BRONCHOSCOPY WITH WASHINGS AND BAL;  Surgeon: Ronnie Pruett MD;  Location:  [facility];  Service: Pulmonary;  Laterality: N/A;  PRE OP - PNEUMONIA  POST OP - SAME   • COLONOSCOPY      REC 07/2007, REC 02/2009, REC 12/2011, REC 01/14,  She says she cant do the prep because of poor mobility to get to .   • EYE SURGERY  1950   • OTHER SURGICAL HISTORY      Ventricular [place] holes for drainage of subdural hematoma   • TOTAL THYROIDECTOMY  08/2015    Dr. Ahmadi         ONCOLOGIC HISTORY:  (History from previous dates can be found in the separate document.)    Current Outpatient Medications on File Prior to Visit   Medication Sig Dispense Refill   • [DISCONTINUED] folic acid (FOLVITE) 1 MG tablet Take 1 tablet by mouth Daily. 90 tablet 1   • [DISCONTINUED] sertraline (ZOLOFT) 25 MG tablet Take 1 tablet by mouth Daily. 90 tablet 1   • [DISCONTINUED] tolterodine (DETROL) 1 MG tablet Take 1 mg by mouth 2 (Two) Times a Day.     • aspirin 81 MG chewable tablet Chew 81 mg Daily.     • atorvastatin (LIPITOR) 80 MG tablet Take 1 tablet by mouth Daily. Needs  an appointment for further refills (Patient taking differently: Take 80 mg by mouth Every Night. Needs an appointment for further refills) 90 tablet 0   • budesonide (PULMICORT) 0.5 MG/2ML nebulizer solution Take 0.5 mg by nebulization 2 (Two) Times a Day.     • guaiFENesin (ROBITUSSIN) 100 MG/5ML solution oral solution Take 20 mL by mouth Every 4 (Four) Hours. While awake (Patient taking differently: Take 400 mg by mouth Every 4 (Four) Hours As Needed (Cough). While awake) 3600 mL 3   • ipratropium-albuterol (DUO-NEB) 0.5-2.5 mg/3 ml nebulizer Take 3 mL by nebulization 4 (Four) Times a Day. 360 mL 2   • levothyroxine (SYNTHROID, LEVOTHROID) 75 MCG tablet TAKE ONE TABLET BY MOUTH DAILY 30 tablet 0   • OLANZapine (zyPREXA) 2.5 MG tablet Take 1 tablet by mouth Every Night. 30 tablet 3   • predniSONE (DELTASONE) 5 MG tablet Take 5 mg by mouth Daily. Tapering-took last 10 mg tablet on day of admission     • [DISCONTINUED] pantoprazole (PROTONIX) 40 MG EC tablet Take 1 tablet by mouth Daily. 30 tablet 0   • [DISCONTINUED] revefenacin (YUPELRI) 175 MCG/3ML nebulizer solution Take 175 mcg by nebulization Daily.     • [DISCONTINUED] sodium chloride 7 % nebulizer solution nebulizer solution Take 4 mL by nebulization 2 (Two) Times a Day. And may use additional 2 times as needed chest congestion 480 mL 6     No current facility-administered medications on file prior to visit.        ALLERGIES:     Allergies   Allergen Reactions   • Ace Inhibitors Cough     Other reaction(s): Cough       Social History     Socioeconomic History   • Marital status:      Spouse name: iMah   • Number of children: Not on file   • Years of education: College   • Highest education level: Not on file   Occupational History     Employer: RETIRED   Tobacco Use   • Smoking status: Former Smoker     Types: Cigarettes     Last attempt to quit:      Years since quittin.4   • Smokeless tobacco: Never Used   • Tobacco comment: caffeine  use-soda   Substance and Sexual Activity   • Alcohol use: Yes     Comment: rarely   • Drug use: No   • Sexual activity: Defer     Family History   Problem Relation Age of Onset   • Heart attack Mother         Acute   • Heart disease Mother    • Colon polyps Sister         Sigmoid colon   • Coronary artery disease Brother    • Diabetes Brother    • Heart disease Brother    • Alcohol abuse Son               Review of Systems   Constitutional: Positive for fatigue. Negative for activity change, appetite change, fever and unexpected weight change.   HENT: Negative for congestion, mouth sores, nosebleeds, sore throat and voice change.    Respiratory: Positive for cough and shortness of breath. Negative for wheezing.    Cardiovascular: Negative for chest pain, palpitations and leg swelling.   Gastrointestinal: Negative for abdominal distention, abdominal pain, blood in stool, constipation, diarrhea, nausea and vomiting.   Endocrine: Negative for cold intolerance and heat intolerance.   Genitourinary: Negative for difficulty urinating, dysuria, frequency and hematuria.   Musculoskeletal: Negative for arthralgias, back pain, joint swelling and myalgias.   Skin: Negative for rash.   Neurological: Positive for weakness. Negative for dizziness, syncope, light-headedness, numbness and headaches.   Hematological: Negative for adenopathy. Does not bruise/bleed easily.   Psychiatric/Behavioral: Positive for dysphoric mood. Negative for confusion and sleep disturbance. The patient is not nervous/anxious.          Objective      There were no vitals filed for this visit.   Current Status 2/4/2020   ECOG score 3   Pain 0/10    Physical Exam   Constitutional: She is oriented to person, place, and time.   Neurological: She is alert and oriented to person, place, and time.   Psychiatric: She has a normal mood and affect. Her behavior is normal. Judgment and thought content normal.   The patient was evaluated via video visit today and  therefore full physical exam was not performed.    RECENT LABS:  Hematology WBC   Date Value Ref Range Status   04/30/2020 7.48 3.40 - 10.80 10*3/mm3 Final   10/31/2019 15.71 (H) 4.5 - 11 x10E3/uL Final     RBC   Date Value Ref Range Status   04/30/2020 4.92 3.77 - 5.28 10*6/mm3 Final   10/31/2019 4.13 (L) 4.20 - 5.40 x10E6/uL Final     Hemoglobin   Date Value Ref Range Status   04/30/2020 13.9 12.0 - 15.9 g/dL Final   10/31/2019 11.1 (L) 12.0 - 16.0 g/dL Final     Hematocrit   Date Value Ref Range Status   04/30/2020 42.6 34.0 - 46.6 % Final   10/31/2019 36.7 (L) 37.0 - 47.0 % Final     Platelets   Date Value Ref Range Status   04/30/2020 253 140 - 450 10*3/mm3 Final     External Platelets   Date Value Ref Range Status   10/31/2019 348 140 - 440 thou/cu mm Final        Lab Results   Component Value Date    GLUCOSE 102 (H) 04/30/2020    BUN 17 04/30/2020    CREATININE 0.72 04/30/2020    EGFRIFNONA 79 04/30/2020    EGFRIFAFRI 74 08/11/2016    BCR 23.6 04/30/2020    K 4.2 04/30/2020    CO2 24.6 04/30/2020    CALCIUM 9.2 04/30/2020    PROTENTOTREF 7.4 08/11/2016    ALBUMIN 3.30 (L) 04/30/2020    LABIL2 0.5 11/01/2019    AST 16 04/30/2020    ALT 9 04/30/2020     I did review results from recent hospitalization including discharge summary, progress notes, scan results, laboratory studies as outlined above and below.    Assessment/Plan     1. Metastatic non-small cell lung cancer (adenocarcinoma):  · Patient with long-standing smoking history x40 years quit in 2008  · Underlying significant COPD with FEV1 1.4 (58%) on 11/20/2014  · CT 5/16/2019 with left upper lobe mass, partially cavitary measuring 2.3 x 1.5 cm.  Additional 8 mm similar appearing right upper lobe nodule.  Bibasilar consolidation, small bilateral pleural effusions, mediastinal lymphadenopathy up to 2.7 centimeters.  · Bronchoscopy 5/20/2019 with identification of left mainstem malignancy extending to the level of the jose, biopsy showed poorly  differentiated adenocarcinoma of pulmonary origin. EBUS with FNA station 7 lymph node negative for malignant cells, only scattered lymphoid aggregates.  BAL left upper lobe negative for malignant cells.  Negative EGFR mutation, negative ALK/ROS 1 rearrangement. PDL1 95%.  · Patient was intubated with possible pneumonia, significant mucus plugging with underlying significant COPD.  Extubated on 5/28/2019.   · Staging evaluation performed during hospitalization with negative CT abdomen/pelvis, negative bone scan.  · MRI brain 5/29/2019 with evidence of metastatic disease, 3-4 enhancing lesions involving left occipital lobe 5 mm, left posterior parietal lobe 4 mm, left frontal lobe 4 mm, left parietal 3-4 mm with small amounts of surrounding edema.  Given the minimal extent of disease elected to observe with short interval MRI.  · Discussion regarding systemic therapy with single agent Keytruda due to PDL1 95%  · Subsequent disease progression on CT 6/8/2019 with increasing left upper lobe mass, mediastinal and hilar lymphadenopathy with occlusion of left mainstem bronchus at origin.  · Received palliative radiation therapy to left mainstem bronchus x10 fractions, completed 6/25/2019.  · Initiated systemic therapy with Keytruda 6/28/2019.  · CT scan following 2 cycles Keytruda and prior radiation from 8/6/2019 showed decrease in the left upper lobe primary lesion with essentially no residual discrete lung mass in that area.  Lymphadenopathy in the mediastinum/AP window and left hilum with significant improvement and resolution of left mainstem obstruction.  No new evidence of disease.  The patient did receive radiation therapy to both the primary left upper lobe mass and left hilum/mediastinum accounting for most of the improvement.  MRI brain 8/6/2019 with 3 enhancing metastatic lesions, 2 of which had a more nodular appearance as opposed to previous ring-enhancing appearance with stable size.  These were in the left  occipital and left frontal regions.  The third lesion in the left parietal region decreased in size from 4 mm down to 2 mm.  There were no new lesions identified.  She has not undergone any specific treatment for her brain metastases.  Continuation of immunotherapy with Keytruda.  · Following 4 cycles Keytruda, 9/17/2019 CT scan showed improvement in AP window lymph node from 2.7 x 1.9 down to 2.1 x 1.5 cm.  Progressive change in the left lower lobe around the pleura, with one area having a masslike appearance suspected secondary to radiation pneumonitis rather than malignancy.  MRI brain with small metastatic lesions decreased in size.  · CT scan 10/29/2019 following 6 cycles Keytruda with new/enlarging 2 cm left upper lobe lesion, stable mediastinal lymph nodes, increased air space opacities in the lungs bilaterally, new left adrenal nodule 1.3 cm.  MRI brain with stable to slightly improved findings 10/29/2019.  Cycle 7 Keytruda held 11/8/2019 with plans to pursue PET scan.  Unclear whether pulmonary findings represented infectious change versus pneumonitis from immunotherapy.  · PET scan 11/14/2019 with worsening areas of consolidation involving the lungs bilaterally, decrease in left upper lobe nodular opacification with minimal uptake, stable mildly hypermetabolic mediastinal and left hilar lymph nodes, intensely hypermetabolic left adrenal 1.7 cm lesion SUV 11.2.  With clinical improvement in pulmonary status, pulmonary changes suspected to represent pneumonitis from immunotherapy.  The only area of true progression in left adrenal, will pursue stereotactic radiation.  Patient will remain off immunotherapy, initiated prednisone 40 mg daily with subsequent taper.  · CT chest 12/4/2019 with improvement in left upper and lower lobe consolidation, slight further progression left adrenal.  Prednisone tapered.  · Left adrenal radiation completed 1/2/2020  · MRI brain and CT scan 1/27/2020 reviewed. MRI  showed resolution of the 3 small residual areas of enhancement in the left cerebral hemisphere and no new areas of metastatic disease identified.  CT chest 1/27/2020 showed evidence of radiation fibrosis at the site of previous treatment in the left upper lobe, no evidence of previous nodular density seen in that location.  There was a new small left pleural effusion.  There was bronchial thickening and mucus plugging in bilateral lower lobes with consolidation/atelectasis left greater than right.  There were stable mediastinal lymph nodes.  There was a new masslike area of subpleural consolidation right lower lobe measuring 4.1 x 2 x 4 cm of unclear etiology which may be related to atelectasis versus progressive malignancy.  She is a poor candidate for CT-guided biopsy with high risk for pneumothorax and has little pulmonary reserve to tolerate this.  It is likely not accessible via bronchoscopy given its peripheral location.  We discussed conservative management with short interval follow-up CT. With the possibility of progressive disease, we did discuss the option for any further systemic therapy.  This would require the use of chemotherapy.  The patient unfortunately is a poor candidate for palliative chemotherapy given her performance status and comorbidities at this point.  We discussed pursuing a palliative/supportive approach however she would like to know the status of her disease.  Therefore repeating scans for prognostic information is reasonable.  If she were to decline clinically or show more definitive evidence of progressive disease we would consider transitioning to hospice care.    · The patient was admitted 2/13-3/4/2020 with pneumonia, CT chest 2/13/2020 with decrease in left pleural effusion, decrease in left lower lobe consolidation, increased consolidation right lower lobe with evidence of mucous plugging. Patient underwent bronchoscopy 2/19/2028 with finding of copious secretions, cultures  negative.  BAL specimen with negative cytology.  · Patient currently remains on course of observation in regards to underlying malignancy.  · The patient was able to return home from her hospitalization in March and has improved considerably over time by her report.  She is seen today via video visit due to the viral pandemic and as she is at very high risk for complications from viral infection we have elected not to bring her into the office at this time.  Clinically she is doing well from a respiratory standpoint, reports stable appetite and weight.  She does have worsening fatigue and is requesting that we consider increasing her prednisone from 5 up to 10 mg daily.  This seems reasonable.  She has also been off her Zoloft and we will resume this at 25 mg daily as this may be a contributing factor.  We did discuss a nurse practitioner video assessment in 1 month and I will see her back in the office in 2 months.  Just before this we will pursue CT chest abdomen pelvis and MRI brain and we will recheck thyroid function studies on the day she returns.  If the patient declines in the interval, she would be a candidate for palliative/supportive care with home hospice involvement.  2. 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  · Patient extubated 5/28/2019.  · Subsequent occlusion of left mainstem bronchus from malignancy 6/8/2019.  Received palliative radiation completed 6/25/2019.  · Patient hospitalized while in [place] at [facility] in October 2019 due to suspected aspiration pneumonia.  Doubtful that pulmonary findings are related to immunotherapy due to improvement on antibiotics and productive cough.  · Patient with decline in respiratory status in October 2019 requiring oxygen to be initiated.  Suspected related to immunotherapy  induced pneumonitis.  Immunotherapy discontinued and initiated empiric prednisone 40 mg daily on 11/20/2019.  · Patient with overall improvement in respiratory status, currently on oxygen 1 L nasal cannula at night only with improvement in cough.    · The patient tapered prednisone down to 5 mg daily on 12/26/2019.  She developed declining weight and appetite with increase in prednisone back to 10 mg daily on 1/24/2020.  · Prednisone was tapered following hospitalization in March to 5 mg daily  · Patient notes that her respiratory status is stable however her fatigue is worsened and for that reason we are increasing her prednisone from 5 up to 10 mg daily today.  3. Prior hemorrhagic CVA:  · Occurred in 2008, residual left upper extremity weakness and left lower extremity weakness.   4. Anemia:  · Hemoglobin prior to admission was 13.1 on 5/16/2019  · Hemoglobin declined in the 10-11 range, related to malignancy, pneumonia  · Hemoglobin on 1/8/2020 declined down to 9.9.  Additional labs performed with iron 21, ferritin 454, iron saturation 9%, TIBC 223, B12 348, folate 3.77.  Appears that patient has anemia of chronic disease as well as anemia related to folate deficiency.  Initiated oral folic acid 1 mg daily.  · Hemoglobin declined during hospitalization 2/13-3/4/2020 and required transfusion support  · Most recent hemoglobin on 4/30/2020 had improved significantly to 13.9.  7. Hypothyroidism:  · Labs 6/28/2019 with TSH 23.8, normal free T4 of 1.43  · Patient was started on levothyroxine 100 mcg daily  · Repeat thyroid function studies on 8/9/2019 with TSH suppressed at 0.065 and elevated free T4 of 2.06.  Decreased levothyroxine dose to 50 mcg daily.    · 11/8/2019 TSH 37.5, free T4 1.15, levothyroxine dose increased to 75 mcg daily.  · Thyroid function studies on 2/4/2020 with TSH 5.08, free T4 1.43  · We will recheck thyroid function studies when patient returns in 2 months.  For now she will continue on  levothyroxine at 75 mcg daily.  8. Depression and nausea/anorexia:  · Patient with depression related to her underlying medical illness as well as social situation with her son who is an alcoholic and lives in the home  · Patient has been treated with Depakote 250 mg every 12 hours for mood stabilization under the direction of her primary care physician  · Patient with significant depressive symptoms, currently continuing on Zoloft under the direction of her primary care physician.  · Patient also with ongoing anorexia and nausea, initiated Zyprexa 2.5 mg nightly on 7/19/2019 with dramatic improvement in symptoms  · Patient seen in the supportive oncology clinic on 7/29/2019, tapered off Depakote.  · Patient has PRN Compazine and Zyprexa to use.  · Zyprexa dose is currently 2.5 mg nightly  · The patient did stop Zoloft apparently after her hospitalization in March 2020.  She feels that her depressive symptoms have increased and we will go ahead and resume Zoloft at 25 mg daily.  9. Hypokalemia:  · Potassium on 8/9/2019 profoundly low at 2.5  · Magnesium low normal at 1.7  · Prescribed 20 mEq IV potassium chloride   · Potassium last checked on 4/30/2020 at 4.2  10. Dizziness/lightheadedness with relative hypotension:  · Cortisol level normal on 8/30/2019 at 33.16  · Patient tapered off metoprolol by cardiology 9/13/2019  · Symptoms improved.  11. GI prophylaxis:  · Prilosec 20 mg daily while remaining on steroids.      Plan:  1. The patient continues on a course of observation in regards to her metastatic lung cancer.   2. Increase prednisone dose from 5 up to 10 mg daily due to ongoing fatigue.  3. Resume Zoloft 25 mg daily, prescription sent today  4. Resume folic acid 1 mg daily  5. Continue Zyprexa 2.5 mg nightly  6. Continue levothyroxine 75 mcg daily  7. In 1 month nurse practitioner telemedicine visit  8. In 2 months MD visit with CBC, CMP, TSH, free T4.  1 week prior she will undergo CT chest abdomen pelvis  and MRI brain.

## 2020-06-30 PROBLEM — I26.99 PULMONARY EMBOLISM (HCC): Status: ACTIVE | Noted: 2020-01-01

## 2020-06-30 NOTE — TELEPHONE ENCOUNTER
[facility]  CALLED TO REQUEST AN APPT SOONER THAN HER 7/6/20 APPT.  SHE HAS NOT BEEN FEELING WELL. NO ENERGY, NOT EATING, COUGH IS DIFFERENT THAN BEFORE, SLEEPS A LOT. TALKING A LOT IN HER SLEEP.    PLEASE GIVE [facility] A CALL [phone].

## 2020-06-30 NOTE — TELEPHONE ENCOUNTER
----- Message from Sussy Jones RN sent at 6/30/2020  2:57 PM EDT -----  Regarding: needs labs and to see NP tomorrow please.  Pt needs labs and to see NP tomorrow.  Pt not feeling well at home.  Weak, not eating or drinking, confused.

## 2020-06-30 NOTE — TELEPHONE ENCOUNTER
Pt's spouse calling stating that pt is weak, not eating or drinking much, weak cough, no fever, and sleeps a lot.  States she is confused at times when awake.  Wanting to know if we can see her sooner than July 6.  Labs ordered and message sent to scheduling to have pt put on tomorrow to see NP.  Explained to pt's spouse that scheduling will call him to make apt for tomorrow to see NP.  Spouse v/u.

## 2020-07-01 NOTE — NURSING NOTE
Pt up to floor, AxOx4, stable, will monitor. Dr. Gonsales aware of pt, spoke with him on the phone.    7-1-2020

## 2020-07-01 NOTE — CONSULTS
[facility] GROUP INITIAL INPATIENT CONSULTATION NOTE    REASON FOR CONSULTATION: Pulmonary emboli, progression of non-small cell lung cancer.    HISTORY OF PRESENT ILLNESS:  Anaid Moura is a 75 y.o. female who we are asked to see today in consultation for her history of non-small cell lung carcinoma.      She last been seen by telemedicine conference June 8 by Dr. Godfrey.  She had a prolonged hospital stay from February 13 into March 4 related to to pneumonia and acute on chronic hypoxic respiratory failure.  She underwent a series of studies showing evidence of pneumonia but no evidence to support progressive malignancy.  She underwent bronchoscopy for 19th that was negative and eventually improved antibiotic therapy.  She was not felt a candidate for additional treatment even if progression was documented and observation was planned.  She was reviewed by telemedicine visit June 8, 2020 having improved per her general baseline function as well as her respiratory status, stable appetite.  Her medications were adjusted to include prednisone 10 mg daily for her fatigue, restart of folic acid, Zyprexa and levothyroxine.       The patient, unfortunately, presented with worsening symptoms with weakness, lethargy, shortness of breath particular upon exertion.  This led to a follow-up CT of chest June 30 demonstrating a filling defect left pulmonary artery with extension to its bifurcation, narrowing left pulmonary artery and pulmonary arterial branch concerning for adherent thrombus.  This is approximately at the site of the prior lung cancer with tumoral extension into the pulmonary artery not excluded though a mass is not present.  There is persistent bibasilar consolidation, increasing consolidation left lung base and new right-sided pulmonary nodules as well as a new development of left adrenal mass that could be associated with metastatic disease.  The patient was admitted for anticoagulation.  Distal  studies include an H&H of 10.4 and 32.4, white count of 9070,, CMP with creatinine of 0.52, acute potassium 3.2, albumin 2.0, normal transaminases, normal troponin, BNP of 1670, procalcitonin of 0.07, normal respiratory panel, normal COVID-19 testing.  Patient currently maintained on Lovenox at 40 mg 12 hours, Rocephin and bronchodilators as well as Pulmicort.     The patient is visited just after she returns from Doppler examinations lower extremities with results pending.  She is apprised of the results on scans and wishes to discuss this with Dr. Godfrey and consider any additional therapy that might be available.  We have also discussed the possibility of hospice care as well.      Past Medical   Past Medical History:   Diagnosis Date   • Acute on chronic combined systolic and diastolic CHF (congestive heart failure) (CMS/HCC)    • Acute on chronic respiratory failure with hypoxemia (CMS/HCC)    • Benign essential hypertension    • Cachexia (CMS/[facility])    • CAD (coronary artery disease)    • Cancer (CMS/HCC) 05/2019    Left lung   • Carotid artery stenosis    • Cerebellar artery occlusion 06/2008    causing left arm and leg paresis   • CKD (chronic kidney disease), stage III (CMS/HCC)    • COPD (chronic obstructive pulmonary disease) (CMS/HCC)    • Gastroesophageal reflux disease 12/10/2015   • 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   • Hyperlipidemia    • Left hemiplegia (CMS/HCC) 12/10/2015   • Lung cancer (CMS/[facility])    • Myocardial infarct (CMS/[facility]) 1996   • Osteopenia    • Pneumonia of right lower lobe due to infectious organism    • Stroke syndrome 2008   • Thyroid cyst     right   • Thyroid lump 12/10/2015    Description: Biopsy 05/15 at [facility], benign   • Transient cerebral ischemia    • Urge incontinence of urine      Social History   Social History     Socioeconomic History   • Marital status:      Spouse name: Miah   • Number of children: Not on file   • Years of education: College   •  Highest education level: Not on file   Occupational History     Employer: RETIRED   Tobacco Use   • Smoking status: Former Smoker     Types: Cigarettes     Last attempt to quit: 2008     Years since quittin.5   • Smokeless tobacco: Never Used   • Tobacco comment: caffeine use-soda   Substance and Sexual Activity   • Alcohol use: Yes     Comment: rarely   • Drug use: No   • Sexual activity: Defer     Family History  Family History   Problem Relation Age of Onset   • Heart attack Mother         Acute   • Heart disease Mother    • Colon polyps Sister         Sigmoid colon   • Coronary artery disease Brother    • Diabetes Brother    • Heart disease Brother    • Alcohol abuse Son      Medications    Current Facility-Administered Medications:   •  albuterol (PROVENTIL) nebulizer solution 0.083% 2.5 mg/3mL, 2.5 mg, Nebulization, Q6H PRN, Marquis Gonsales MD  •  aspirin chewable tablet 81 mg, 81 mg, Oral, Daily, Marquis Gonsales MD, 81 mg at 20 0920  •  atorvastatin (LIPITOR) tablet 80 mg, 80 mg, Oral, Daily, Marquis Gonsales MD  •  budesonide (PULMICORT) nebulizer solution 0.5 mg, 0.5 mg, Nebulization, BID - RT, Marquis Gonsales MD, 0.5 mg at 20 0736  •  cefTRIAXone (ROCEPHIN) IVPB 1 g, 1 g, Intravenous, Q24H, Marquis Gonsales MD  •  enoxaparin (LOVENOX) syringe 40 mg, 40 mg, Subcutaneous, Q12H, Marquis Gonsales MD, 40 mg at 20 1138  •  guaiFENesin (ROBITUSSIN) 100 MG/5ML oral solution 400 mg, 400 mg, Oral, Q4H PRN, Marquis Gonsales MD  •  ipratropium-albuterol (DUO-NEB) nebulizer solution 3 mL, 3 mL, Nebulization, 4x Daily - RT, Marquis Gonsales MD, 3 mL at 20 0736  •  ipratropium-albuterol (DUO-NEB) nebulizer solution 3 mL, 3 mL, Nebulization, 4x Daily - RT, Marquis Gonsales MD, 3 mL at 20 1109  •  levothyroxine (SYNTHROID, LEVOTHROID) tablet 75 mcg, 75 mcg, Oral, Q AM, Marquis Gonsales MD, 75 mcg at 20 0652  •  mirtazapine (REMERON) tablet 15 mg, 15 mg, Oral, Nightly,  Carroll Hope MD  •  OLANZapine (zyPREXA) tablet 2.5 mg, 2.5 mg, Oral, Nightly, Marquis Gonsales MD, 2.5 mg at 07/01/20 0410  •  Petrolatum ointment, , Topical, Q12H, Marquis Gonsales MD  •  potassium chloride (MICRO-K) CR capsule 40 mEq, 40 mEq, Oral, PRN, 40 mEq at 07/01/20 1138 **OR** potassium chloride (KLOR-CON) packet 40 mEq, 40 mEq, Oral, PRN **OR** potassium chloride 10 mEq in 100 mL IVPB, 10 mEq, Intravenous, Q1H PRN, Marquis Gonsales MD  •  predniSONE (DELTASONE) tablet 10 mg, 10 mg, Oral, BID With Meals, Marquis Gonsales MD, 10 mg at 07/01/20 0410  •  [COMPLETED] Insert peripheral IV, , , Once **AND** sodium chloride 0.9 % flush 10 mL, 10 mL, Intravenous, PRN, Marquis Gonsales MD  Allergies  Allergies   Allergen Reactions   • Ace Inhibitors Cough     Other reaction(s): Cough     Review of Systems  Constitutional: Positive for fatigue. Negative for activity change, appetite change, fever and unexpected weight change.   HENT: Negative for congestion, mouth sores, nosebleeds, sore throat and voice change.    Respiratory: Positive for cough and shortness of breath. Negative for wheezing.    Cardiovascular: Negative for chest pain, palpitations and leg swelling.   Gastrointestinal: Negative for abdominal distention, abdominal pain, blood in stool, constipation, diarrhea, nausea and vomiting.   Endocrine: Negative for cold intolerance and heat intolerance.   Genitourinary: Negative for difficulty urinating, dysuria, frequency and hematuria.   Musculoskeletal: Negative for arthralgias, back pain, joint swelling and myalgias.   Skin: Negative for rash.   Neurological: Positive for weakness. Negative for dizziness, syncope, light-headedness, numbness and headaches.   Hematological: Negative for adenopathy. Does not bruise/bleed easily.   Psychiatric/Behavioral: Positive for dysphoric mood. Negative for confusion and sleep disturbance. The patient is not nervous/anxious.      Objective:     Vitals:     07/01/20 1300   BP: 147/86   Pulse: 96   Resp: 18   Temp:    SpO2: 96%   Physical examination;  GEN:   appears ill, very thin, AxOx3  HEENT: PERRL, EOMI, no icterus, mmm, no jvd, trachea midline, neck supple  CHEST: decreased ae bilat, no wheezes, + crackles, no use of accessory muscles  CV: RRR, no m/g/r  ABD: soft, nt, nd +bs, no hepatosplenomegaly  EXT: no c/c/e  SKIN: no rashes, no xanthomas, nl turgor, warm, dry  LYMPH: no palpable cervical or supraclavicular lymphadenopathy  NEURO: CN 2-12 intact and symmetric bilaterally  PSYCH: nl affect, nl orientation, nl judgement, nl mood  MSK: some kyphosis, 5/5 strenght on the right, left side is paralyzed     DIAGNOSTIC DATA:  Results from last 7 days   Lab Units 07/01/20  0833 06/30/20 2024   WBC 10*3/mm3 8.95 9.07   HEMOGLOBIN g/dL 11.2* 10.4*   HEMATOCRIT % 34.0 32.4*   PLATELETS 10*3/mm3 292 285     Results from last 7 days   Lab Units 07/01/20  0833 06/30/20 2024   SODIUM mmol/L 139 136   POTASSIUM mmol/L 4.0 3.2*   CHLORIDE mmol/L 106 106   CO2 mmol/L 20.0* 24.1   BUN mg/dL 9 9   CREATININE mg/dL 0.54* 0.52*   CALCIUM mg/dL 8.3* 8.4*   BILIRUBIN mg/dL  --  0.4   ALK PHOS U/L  --  54   ALT (SGPT) U/L  --  7   AST (SGOT) U/L  --  9   GLUCOSE mg/dL 72 84       IMAGING: CTA of chest June 32,020  IMPRESSION:     1. Peripheral filling defect identified within the left pulmonary  artery, with extension to its bifurcation, and with narrowing of the  left pulmonary artery and pulmonary arterial branch the left lower lobe.  Its appearance is concerning for adherent thrombus. This is near the  site of the patient's prior lung cancer, and tumoral extension into the  pulmonary artery is not excluded, although a discrete mass within the  mediastinum is not seen.  2. Persistent bibasilar consolidation. Aeration at the right lung base  has improved, although there is increasing consolidation at the left  lung base. Pneumonia is not excluded.  3. New right-sided pulmonary  nodules, as well as interval enlargement of  the left adrenal mass, worrisome for progressive metastatic disease.             Assessment/Plan   Assessment/Plan:   This is a 75 y.o. female with:  1. Metastatic non-small cell lung cancer (adenocarcinoma):  · Patient with long-standing smoking history x40 years quit in 2008  · Underlying significant COPD with FEV1 1.4 (58%) on 11/20/2014  · CT 5/16/2019 with left upper lobe mass, partially cavitary measuring 2.3 x 1.5 cm.  Additional 8 mm similar appearing right upper lobe nodule.  Bibasilar consolidation, small bilateral pleural effusions, mediastinal lymphadenopathy up to 2.7 centimeters.  · Bronchoscopy 5/20/2019 with identification of left mainstem malignancy extending to the level of the jose, biopsy showed poorly differentiated adenocarcinoma of pulmonary origin. EBUS with FNA station 7 lymph node negative for malignant cells, only scattered lymphoid aggregates.  BAL left upper lobe negative for malignant cells.  Negative EGFR mutation, negative ALK/ROS 1 rearrangement. PDL1 95%.  · Patient was intubated with possible pneumonia, significant mucus plugging with underlying significant COPD.  Extubated on 5/28/2019.   · Staging evaluation performed during hospitalization with negative CT abdomen/pelvis, negative bone scan.  · MRI brain 5/29/2019 with evidence of metastatic disease, 3-4 enhancing lesions involving left occipital lobe 5 mm, left posterior parietal lobe 4 mm, left frontal lobe 4 mm, left parietal 3-4 mm with small amounts of surrounding edema.  Given the minimal extent of disease elected to observe with short interval MRI.  · Discussion regarding systemic therapy with single agent Keytruda due to PDL1 95%  · Subsequent disease progression on CT 6/8/2019 with increasing left upper lobe mass, mediastinal and hilar lymphadenopathy with occlusion of left mainstem bronchus at origin.  · Received palliative radiation therapy to left mainstem bronchus x10  fractions, completed 6/25/2019.  · Initiated systemic therapy with Keytruda 6/28/2019.  · CT scan following 2 cycles Keytruda and prior radiation from 8/6/2019 showed decrease in the left upper lobe primary lesion with essentially no residual discrete lung mass in that area.  Lymphadenopathy in the mediastinum/AP window and left hilum with significant improvement and resolution of left mainstem obstruction.  No new evidence of disease.  The patient did receive radiation therapy to both the primary left upper lobe mass and left hilum/mediastinum accounting for most of the improvement.  MRI brain 8/6/2019 with 3 enhancing metastatic lesions, 2 of which had a more nodular appearance as opposed to previous ring-enhancing appearance with stable size.  These were in the left occipital and left frontal regions.  The third lesion in the left parietal region decreased in size from 4 mm down to 2 mm.  There were no new lesions identified.  She has not undergone any specific treatment for her brain metastases.  Continuation of immunotherapy with Keytruda.  · Following 4 cycles Keytruda, 9/17/2019 CT scan showed improvement in AP window lymph node from 2.7 x 1.9 down to 2.1 x 1.5 cm.  Progressive change in the left lower lobe around the pleura, with one area having a masslike appearance suspected secondary to radiation pneumonitis rather than malignancy.  MRI brain with small metastatic lesions decreased in size.  · CT scan 10/29/2019 following 6 cycles Keytruda with new/enlarging 2 cm left upper lobe lesion, stable mediastinal lymph nodes, increased air space opacities in the lungs bilaterally, new left adrenal nodule 1.3 cm.  MRI brain with stable to slightly improved findings 10/29/2019.  Cycle 7 Keytruda held 11/8/2019 with plans to pursue PET scan.  Unclear whether pulmonary findings represented infectious change versus pneumonitis from immunotherapy.  · PET scan 11/14/2019 with worsening areas of consolidation involving  the lungs bilaterally, decrease in left upper lobe nodular opacification with minimal uptake, stable mildly hypermetabolic mediastinal and left hilar lymph nodes, intensely hypermetabolic left adrenal 1.7 cm lesion SUV 11.2.  With clinical improvement in pulmonary status, pulmonary changes suspected to represent pneumonitis from immunotherapy.  The only area of true progression in left adrenal, will pursue stereotactic radiation.  Patient will remain off immunotherapy, initiated prednisone 40 mg daily with subsequent taper.  · CT chest 12/4/2019 with improvement in left upper and lower lobe consolidation, slight further progression left adrenal.  Prednisone tapered.  · Left adrenal radiation completed 1/2/2020  · MRI brain and CT scan 1/27/2020 reviewed. MRI showed resolution of the 3 small residual areas of enhancement in the left cerebral hemisphere and no new areas of metastatic disease identified.  CT chest 1/27/2020 showed evidence of radiation fibrosis at the site of previous treatment in the left upper lobe, no evidence of previous nodular density seen in that location.  There was a new small left pleural effusion.  There was bronchial thickening and mucus plugging in bilateral lower lobes with consolidation/atelectasis left greater than right.  There were stable mediastinal lymph nodes.  There was a new masslike area of subpleural consolidation right lower lobe measuring 4.1 x 2 x 4 cm of unclear etiology which may be related to atelectasis versus progressive malignancy.  She is a poor candidate for CT-guided biopsy with high risk for pneumothorax and has little pulmonary reserve to tolerate this.  It is likely not accessible via bronchoscopy given its peripheral location.  We discussed conservative management with short interval follow-up CT. With the possibility of progressive disease, we did discuss the option for any further systemic therapy.  This would require the use of chemotherapy.  The patient  unfortunately is a poor candidate for palliative chemotherapy given her performance status and comorbidities at this point.  We discussed pursuing a palliative/supportive approach however she would like to know the status of her disease.  Therefore repeating scans for prognostic information is reasonable.  If she were to decline clinically or show more definitive evidence of progressive disease we would consider transitioning to hospice care.    · The patient was admitted 2/13-3/4/2020 with pneumonia, CT chest 2/13/2020 with decrease in left pleural effusion, decrease in left lower lobe consolidation, increased consolidation right lower lobe with evidence of mucous plugging. Patient underwent bronchoscopy 2/19/2028 with finding of copious secretions, cultures negative.  BAL specimen with negative cytology.  · Patient currently remains on course of observation in regards to underlying malignancy.  · The patient was able to return home from her hospitalization in March and has improved considerably over time by her report.  She is seen today via video visit due to the viral pandemic and as she is at very high risk for complications from viral infection we have elected not to bring her into the office at this time.  Clinically she is doing well from a respiratory standpoint, reports stable appetite and weight.  She does have worsening fatigue and is requesting that we consider increasing her prednisone from 5 up to 10 mg daily.  This seems reasonable.  She has also been off her Zoloft and we will resume this at 25 mg daily as this may be a contributing factor.  We did discuss a nurse practitioner video assessment in 1 month and I will see her back in the office in 2 months.  Just before this we will pursue CT chest abdomen pelvis and MRI brain and we will recheck thyroid function studies on the day she returns.  If the patient declines in the interval, she would be a candidate for palliative/supportive care with home  hospice involvement.  · Patient admitted July 1, 2020 with general progression of symptoms, weakness, shortness of breath, scans with evidence of pulmonary emboli and new right-sided pulmonary nodules, interval enlargement left adrenal mass.  · Discussed findings with the patient and  and they question whether there is additional possibility of treatment for her and we have also reviewed possibility of hospice.    2.  Pulmonary emboli  · Currently Lovenox 40 mg subcu every 12 hours        3. 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  · Patient extubated 5/28/2019.  · Subsequent occlusion of left mainstem bronchus from malignancy 6/8/2019.  Received palliative radiation completed 6/25/2019.  · Patient hospitalized while in [place] at [facility] in October 2019 due to suspected aspiration pneumonia.  Doubtful that pulmonary findings are related to immunotherapy due to improvement on antibiotics and productive cough.  · Patient with decline in respiratory status in October 2019 requiring oxygen to be initiated.  Suspected related to immunotherapy induced pneumonitis.  Immunotherapy discontinued and initiated empiric prednisone 40 mg daily on 11/20/2019.  · Patient with overall improvement in respiratory status, currently on oxygen 1 L nasal cannula at night only with improvement in cough.    · The patient tapered prednisone down to 5 mg daily on 12/26/2019.  She developed declining weight and appetite with increase in prednisone back to 10 mg daily on 1/24/2020.  · Prednisone was tapered following hospitalization in March to 5 mg daily  · This was increased on subsequent office visit June 8, 2020.  The patient remains on 10 mg p.o. twice daily after admission July 1, 2020  4. Prior hemorrhagic CVA:  · Occurred in 2008, residual left  upper extremity weakness and left lower extremity weakness.   5. Anemia:  · Hemoglobin prior to admission was 13.1 on 5/16/2019  · Hemoglobin declined in the 10-11 range, related to malignancy, pneumonia  · Hemoglobin on 1/8/2020 declined down to 9.9.  Additional labs performed with iron 21, ferritin 454, iron saturation 9%, TIBC 223, B12 348, folate 3.77.  Appears that patient has anemia of chronic disease as well as anemia related to folate deficiency.  Initiated oral folic acid 1 mg daily.  · Hemoglobin declined during hospitalization 2/13-3/4/2020 and required transfusion support  · Follow-up CBC July 1, 2020 with H&H of 11.2 and 34.0.  6. Hypothyroidism:  · Labs 6/28/2019 with TSH 23.8, normal free T4 of 1.43  · Patient was started on levothyroxine 100 mcg daily  · Repeat thyroid function studies on 8/9/2019 with TSH suppressed at 0.065 and elevated free T4 of 2.06.  Decreased levothyroxine dose to 50 mcg daily.    · 11/8/2019 TSH 37.5, free T4 1.15, levothyroxine dose increased to 75 mcg daily.  · Thyroid function studies on 2/4/2020 with TSH 5.08, free T4 1.43  · We will recheck thyroid function studies when patient returns in 2 months.  For now she will continue on levothyroxine at 75 mcg daily.  7. Depression and nausea/anorexia:  · Patient with depression related to her underlying medical illness as well as social situation with her son who is an alcoholic and lives in the home  · Patient has been treated with Depakote 250 mg every 12 hours for mood stabilization under the direction of her primary care physician  · Patient with significant depressive symptoms, currently continuing on Zoloft under the direction of her primary care physician.  · Patient also with ongoing anorexia and nausea, initiated Zyprexa 2.5 mg nightly on 7/19/2019 with dramatic improvement in symptoms  · Patient seen in the supportive oncology clinic on 7/29/2019, tapered off Depakote.  · Patient has PRN Compazine and Zyprexa to  use.  · Zyprexa dose is currently 2.5 mg nightly  · Zoloft restarted at 25 mg daily June 8, 2020  8. Hypokalemia:  · Potassium on 8/9/2019 profoundly low at 2.5  · Magnesium low normal at 1.7  · Prescribed 20 mEq IV potassium chloride   · Stable July 1, 2020                       Arben Franks MD

## 2020-07-01 NOTE — NURSING NOTE
CWOCN consult for pressure injuries.  Patient with coccyx pressure injury stage 1 measuring 4x4 cm.  Optifoam in place to protect.    Left heel with dry skin tinted with betadine.  Flaked a lot of dead skin away and underlying skin is currently blanchable.  Would recommend moisturizer and continue off loading.  Right heel slow to uziel at this assessment. Optifoam placed to protect and off loaded.  Patient is slim with prominent bony areas.  Will order a low air loss mattress.  Patient and daughter agreeable to this plan.

## 2020-07-01 NOTE — ED NOTES
Attempted to call floor and give report, will call back in 5-10 min      Marialuisa Gutierrez RN  07/01/20 0127

## 2020-07-01 NOTE — PLAN OF CARE
Problem: Patient Care Overview  Goal: Plan of Care Review  Flowsheets  Taken 7/1/2020 1558 by Yamileth Alcantara, RN  Progress: no change  Taken 7/1/2020 0255 by Arthur Malave, RN  Plan of Care Reviewed With: patient  Note:   VSS, on 2L O2, denies pain/soa. Turns Q2, purewick in place, placed on low air-loss mattress. Consulted hem/onc for lung CA. Duplex LE done. Will closely monitor.     Problem: Patient Care Overview  Goal: Plan of Care Review  Flowsheets  Taken 7/1/2020 1558 by Yamileth Alcantara, RN  Progress: no change  Taken 7/1/2020 0255 by Arthur Malave, RN  Plan of Care Reviewed With: patient  Note:   VSS, on 2L O2, denies pain/soa. Turns Q2, purewick in place, placed on low air-loss mattress. Consulted hem/onc for lung CA. Duplex LE done. Will closely monitor.

## 2020-07-01 NOTE — PROGRESS NOTES
Malnutrition Severity Assessment    Patient Name:  Anaid Moura  YOB: 1945  MRN: [id number]  Admit Date:  6/30/2020    Patient meets criteria for : Moderate (non-severe) Malnutrition      Malnutrition Severity Assessment  Malnutrition Type: Chronic Disease - Related Malnutrition     Malnutrition Type (last 8 hours)      Malnutrition Severity Assessment     Row Name 07/01/20 1401       Malnutrition Severity Assessment    Malnutrition Type  Chronic Disease - Related Malnutrition    Row Name 07/01/20 1401       Insufficient Energy Intake     Insufficient Energy Intake   <75% of est. energy requirement for > or equal to 3 months    Row Name 07/01/20 1401       Unintentional Weight Loss     Unintentional Weight Loss   -- BMI-14.7, 70%IBW    Row Name 07/01/20 1401       Muscle Loss    Loss of Muscle Mass Findings  Severe    [place]  Severe - deep hollowing/scooping, lack of muscle to touch, facial bones well defined    Clavicle Bone Region  Severe - protruding prominent bone    Acromion Bone Region  Severe - squared shoulders, bones, and acromion process protrusion prominent    Row Name 07/01/20 1401       Fat Loss    Subcutaneous Fat Loss Findings  Moderate    Orbital Region   Moderate -  somewhat hollowness, slightly dark circles    Row Name 07/01/20 1401       Criteria Met (Must meet criteria for severity in at least 2 of these categories: M Wasting, Fat Loss, Fluid, Secondary Signs, Wt. Status, Intake)    Patient meets criteria for   Moderate (non-severe) Malnutrition          Electronically signed by:  Lizzy Lechuga RD  07/01/20 14:05

## 2020-07-01 NOTE — ED PROVIDER NOTES
EMERGENCY DEPARTMENT ENCOUNTER    Room Number:  N439/1  Date of encounter:  7/1/2020  PCP: Marquis Gonsales MD  Historian: Patient and       HPI:  Chief Complaint: Short of breath week  A complete HPI/ROS/PMH/PSH/SH/FH are unobtainable due to: Poor historian    Context: Anaid Moura is a 75 y.o. female who presents to the ED c/o shortness of breath and generalized weakness for the last 3 to 4 days.  The weakness came first and the shortness of breath really started abruptly today.  She is had no cough, no fever, no chest pain, no leg swelling, but she just felt short of breath at all times.  Her fatigue is moderate in severity, she has decreased appetite, weight loss, and just overall less energy.    From review of the records I see the patient has history of lung cancer.  Lung cancer was treated temporarily with Keytruda but had to be stopped, she is also had radiation treatments, and currently is not on any kind of chemotherapy or radiation treatments.  She is under the care of the [facility] doctors, and sees little pulmonary.  She wears oxygen at night every night, and sometimes during the day and she says she is been wearing around-the-clock today.    Patient wore surgical mask and I wore full COVID-19 appropriate, enhanced PPE    PAST MEDICAL HISTORY  Active Ambulatory Problems     Diagnosis Date Noted   • Benign essential hypertension 12/10/2015   • Stenosis of carotid artery 12/10/2015   • Chronic kidney disease, stage III (moderate) (CMS/HCC) 12/10/2015   • Chronic obstructive pulmonary disease (CMS/formerly [facility]) 12/10/2015   • Chronic coronary artery disease 12/10/2015   • Hyperlipidemia 12/10/2015   • Osteopenia 12/10/2015   • Cerebrovascular accident (CMS/HCC) 12/10/2015   • Urge incontinence of urine 12/10/2015   • CAP (community acquired pneumonia) 05/16/2019   • Cavitary lesion of lung 05/17/2019   • Mediastinal adenopathy 05/17/2019   • Acute on chronic respiratory failure with hypoxemia (CMS/formerly [facility])  05/27/2019   • Atelectasis of left lung 05/27/2019   • Adenocarcinoma, lung, left (CMS/HCC) 05/27/2019   • Dysphagia 06/07/2019   • High risk medication use    • Hypokalemia 08/09/2019   • Acquired hypothyroidism 08/09/2019   • Chronic fatigue 08/30/2019   • Vitamin D deficiency 09/24/2018   • 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) 12/04/2019   • Normocytic anemia 01/08/2020   • Pneumonia of right lower lobe due to infectious organism 02/13/2020   • Acute on chronic combined systolic and diastolic CHF (congestive heart failure) (CMS/HCC) 02/29/2020     Resolved Ambulatory Problems     Diagnosis Date Noted   • Cyst of thyroid 12/10/2015   • Gastroesophageal reflux disease 12/10/2015   • 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   • Left hemiplegia (CMS/HCC) 12/10/2015   • Thyroid lump 12/10/2015   • COPD exacerbation (CMS/HCC) 05/27/2019     Past Medical History:   Diagnosis Date   • Cachexia (CMS/HCC)    • CAD (coronary artery disease)    • Cancer (CMS/HCC) 05/2019   • Carotid artery stenosis    • Cerebellar artery occlusion 06/2008   • CKD (chronic kidney disease), stage III (CMS/HCC)    • COPD (chronic obstructive pulmonary disease) (CMS/HCC)    • Lung cancer (CMS/HCC)    • Myocardial infarct (CMS/HCC) 1996   • Stroke syndrome 2008   • Thyroid cyst    • Transient cerebral ischemia          PAST SURGICAL HISTORY  Past Surgical History:   Procedure Laterality Date   • BREAST BIOPSY     • BRONCHOSCOPY Bilateral 5/20/2019    Procedure: BRONCHOSCOPY WITH  BIOPSY AND BAL, WITH ENDOBRONCHIAL ULTRASOUND WITH FNA;  Surgeon: Chris Tirado MD;  Location: [facility] ENDOSCOPY;  Service: Pulmonary   • BRONCHOSCOPY N/A 2/19/2020    Procedure: BRONCHOSCOPY WITH WASHINGS AND BAL;  Surgeon: Ronnie Pruett MD;  Location: [facility] ENDOSCOPY;  Service: Pulmonary;  Laterality: N/A;  PRE OP - PNEUMONIA  POST OP - SAME   • COLONOSCOPY      REC 07/2007, REC 02/2009, REC 12/2011, REC 01/14,  She says she cant  do the prep because of poor mobility to get to BR.   • EYE SURGERY     • OTHER SURGICAL HISTORY      Ventricular lake holes for drainage of subdural hematoma   • TOTAL THYROIDECTOMY  2015    Dr. Ahmadi         FAMILY HISTORY  Family History   Problem Relation Age of Onset   • Heart attack Mother         Acute   • Heart disease Mother    • Colon polyps Sister         Sigmoid colon   • Coronary artery disease Brother    • Diabetes Brother    • Heart disease Brother    • Alcohol abuse Son          SOCIAL HISTORY  Social History     Socioeconomic History   • Marital status:      Spouse name: Miah   • Number of children: Not on file   • Years of education: College   • Highest education level: Not on file   Occupational History     Employer: RETIRED   Tobacco Use   • Smoking status: Former Smoker     Types: Cigarettes     Last attempt to quit:      Years since quittin.5   • Smokeless tobacco: Never Used   • Tobacco comment: caffeine use-soda   Substance and Sexual Activity   • Alcohol use: Yes     Comment: rarely   • Drug use: No   • Sexual activity: Defer         ALLERGIES  Ace inhibitors        REVIEW OF SYSTEMS  Review of Systems   Limited because she is a vague historian      PHYSICAL EXAM    I have reviewed the triage vital signs and nursing notes.    ED Triage Vitals [20 1835]   Temp Heart Rate Resp BP SpO2   98.2 °F (36.8 °C) 90 24 116/76 98 %      Temp src Heart Rate Source Patient Position BP Location FiO2 (%)   Tympanic -- -- -- --       Physical Exam  GENERAL: Awake and alert, nontoxic appearing, emaciated and chronically ill-appearing  HENT: nares patent, no JVD  EYES: no scleral icterus  CV: regular rhythm, regular rate  RESPIRATORY: Mild tachypnea, no extremis, breath sounds are diminished in the bases particularly in a few coarse breath sounds throughout  ABDOMEN: soft, nontender palpation, bowel sounds present mUSCULOSKELETAL: no deformity, no calf tenderness pedal edema,  distal pulses intact  NEURO: alert, moves all extremities, follows commands  SKIN: warm, dry        LAB RESULTS  Recent Results (from the past 24 hour(s))   Comprehensive Metabolic Panel    Collection Time: 06/30/20  8:24 PM   Result Value Ref Range    Glucose 84 65 - 99 mg/dL    BUN 9 8 - 23 mg/dL    Creatinine 0.52 (L) 0.57 - 1.00 mg/dL    Sodium 136 136 - 145 mmol/L    Potassium 3.2 (L) 3.5 - 5.2 mmol/L    Chloride 106 98 - 107 mmol/L    CO2 24.1 22.0 - 29.0 mmol/L    Calcium 8.4 (L) 8.6 - 10.5 mg/dL    Total Protein 6.2 6.0 - 8.5 g/dL    Albumin 2.70 (L) 3.50 - 5.20 g/dL    ALT (SGPT) 7 1 - 33 U/L    AST (SGOT) 9 1 - 32 U/L    Alkaline Phosphatase 54 39 - 117 U/L    Total Bilirubin 0.4 0.2 - 1.2 mg/dL    eGFR Non African Amer 115 >60 mL/min/1.73    Globulin 3.5 gm/dL    A/G Ratio 0.8 g/dL    BUN/Creatinine Ratio 17.3 7.0 - 25.0    Anion Gap 5.9 5.0 - 15.0 mmol/L   CBC Auto Differential    Collection Time: 06/30/20  8:24 PM   Result Value Ref Range    WBC 9.07 3.40 - 10.80 10*3/mm3    RBC 3.73 (L) 3.77 - 5.28 10*6/mm3    Hemoglobin 10.4 (L) 12.0 - 15.9 g/dL    Hematocrit 32.4 (L) 34.0 - 46.6 %    MCV 86.9 79.0 - 97.0 fL    MCH 27.9 26.6 - 33.0 pg    MCHC 32.1 31.5 - 35.7 g/dL    RDW 14.0 12.3 - 15.4 %    RDW-SD 44.8 37.0 - 54.0 fl    MPV 8.7 6.0 - 12.0 fL    Platelets 285 140 - 450 10*3/mm3    Neutrophil % 87.5 (H) 42.7 - 76.0 %    Lymphocyte % 7.1 (L) 19.6 - 45.3 %    Monocyte % 3.4 (L) 5.0 - 12.0 %    Eosinophil % 1.0 0.3 - 6.2 %    Basophil % 0.2 0.0 - 1.5 %    Immature Grans % 0.8 (H) 0.0 - 0.5 %    Neutrophils, Absolute 7.94 (H) 1.70 - 7.00 10*3/mm3    Lymphocytes, Absolute 0.64 (L) 0.70 - 3.10 10*3/mm3    Monocytes, Absolute 0.31 0.10 - 0.90 10*3/mm3    Eosinophils, Absolute 0.09 0.00 - 0.40 10*3/mm3    Basophils, Absolute 0.02 0.00 - 0.20 10*3/mm3    Immature Grans, Absolute 0.07 (H) 0.00 - 0.05 10*3/mm3    nRBC 0.0 0.0 - 0.2 /100 WBC   Troponin    Collection Time: 06/30/20  8:24 PM   Result Value Ref  Range    Troponin T <0.010 0.000 - 0.030 ng/mL   BNP    Collection Time: 06/30/20  8:24 PM   Result Value Ref Range    proBNP 1,670.0 (H) 5.0 - 900.0 pg/mL   Procalcitonin    Collection Time: 06/30/20  8:24 PM   Result Value Ref Range    Procalcitonin 0.07 (L) 0.10 - 0.25 ng/mL   Urinalysis With Microscopic If Indicated (No Culture) - Urine, Clean Catch    Collection Time: 06/30/20  8:34 PM   Result Value Ref Range    Color, UA Yellow Yellow, Straw    Appearance, UA Clear Clear    pH, UA 6.5 5.0 - 8.0    Specific Gravity, UA 1.014 1.005 - 1.030    Glucose, UA Negative Negative    Ketones, UA Negative Negative    Bilirubin, UA Negative Negative    Blood, UA Small (1+) (A) Negative    Protein, UA Negative Negative    Leuk Esterase, UA Trace (A) Negative    Nitrite, UA Positive (A) Negative    Urobilinogen, UA 0.2 E.U./dL 0.2 - 1.0 E.U./dL   Urinalysis, Microscopic Only - Urine, Clean Catch    Collection Time: 06/30/20  8:34 PM   Result Value Ref Range    RBC, UA 3-5 (A) None Seen, 0-2 /HPF    WBC, UA 3-5 (A) None Seen, 0-2 /HPF    Bacteria, UA 4+ (A) None Seen /HPF    Squamous Epithelial Cells, UA 0-2 None Seen, 0-2 /HPF    Hyaline Casts, UA None Seen None Seen /LPF    Methodology Automated Microscopy    Respiratory Panel, PCR - Swab, Nasopharynx    Collection Time: 06/30/20 11:33 PM   Result Value Ref Range    ADENOVIRUS, PCR Not Detected Not Detected    Coronavirus 229E Not Detected Not Detected    Coronavirus HKU1 Not Detected Not Detected    Coronavirus NL63 Not Detected Not Detected    Coronavirus OC43 Not Detected Not Detected    Human Metapneumovirus Not Detected Not Detected    Human Rhinovirus/Enterovirus Not Detected Not Detected    Influenza B PCR Not Detected Not Detected    Parainfluenza Virus 1 Not Detected Not Detected    Parainfluenza Virus 2 Not Detected Not Detected    Parainfluenza Virus 3 Not Detected Not Detected    Parainfluenza Virus 4 Not Detected Not Detected    Bordetella pertussis pcr Not  Detected Not Detected    Influenza A H1 2009 PCR Not Detected Not Detected    Chlamydophila pneumoniae PCR Not Detected Not Detected    Mycoplasma pneumo by PCR Not Detected Not Detected    Influenza A PCR Not Detected Not Detected    Influenza A H3 Not Detected Not Detected    Influenza A H1 Not Detected Not Detected    RSV, PCR Not Detected Not Detected    Bordetella parapertussis PCR Not Detected Not Detected   COVID-19,[facility], NP SWAB IN TRANSPORT MEDIA 8-12 HR TAT - Swab, Nasopharynx    Collection Time: 06/30/20 11:33 PM   Result Value Ref Range    COVID19 Not Detected Not Detected - Ref. Range   CBC (No Diff)    Collection Time: 07/01/20  8:33 AM   Result Value Ref Range    WBC 8.95 3.40 - 10.80 10*3/mm3    RBC 3.96 3.77 - 5.28 10*6/mm3    Hemoglobin 11.2 (L) 12.0 - 15.9 g/dL    Hematocrit 34.0 34.0 - 46.6 %    MCV 85.9 79.0 - 97.0 fL    MCH 28.3 26.6 - 33.0 pg    MCHC 32.9 31.5 - 35.7 g/dL    RDW 14.0 12.3 - 15.4 %    RDW-SD 43.8 37.0 - 54.0 fl    MPV 9.0 6.0 - 12.0 fL    Platelets 292 140 - 450 10*3/mm3   Basic Metabolic Panel    Collection Time: 07/01/20  8:33 AM   Result Value Ref Range    Glucose 72 65 - 99 mg/dL    BUN 9 8 - 23 mg/dL    Creatinine 0.54 (L) 0.57 - 1.00 mg/dL    Sodium 139 136 - 145 mmol/L    Potassium 4.0 3.5 - 5.2 mmol/L    Chloride 106 98 - 107 mmol/L    CO2 20.0 (L) 22.0 - 29.0 mmol/L    Calcium 8.3 (L) 8.6 - 10.5 mg/dL    eGFR Non African Amer 110 >60 mL/min/1.73    BUN/Creatinine Ratio 16.7 7.0 - 25.0    Anion Gap 13.0 5.0 - 15.0 mmol/L   Duplex Venous Lower Extremity - Bilateral CAR    Collection Time: 07/01/20  3:02 PM   Result Value Ref Range    Right Common Femoral Spont Y     Right Common Femoral Phasic Y     Right Common Femoral Augment Y     Right Common Femoral Competent Y     Right Common Femoral Compress C     Right Saphenofemoral Junction Compress C     Right Profunda Femoral Compress C     Right Proximal Femoral Compress C     Right Mid Femoral Spont Y     Right  Mid Femoral Phasic Y     Right Mid Femoral Augment Y     Right Mid Femoral Competent Y     Right Mid Femoral Compress C     Right Distal Femoral Compress C     Right Popliteal Spont Y     Right Popliteal Phasic Y     Right Popliteal Augment Y     Right Popliteal Competent Y     Right Popliteal Compress C     Right Posterior Tibial Compress C     Right Peroneal Compress C     Right GastronemiusSoleal Compress C     Right Greater Saph AK Compress C     Right Greater Saph BK Compress C     Left Common Femoral Spont Y     Left Common Femoral Phasic Y     Left Common Femoral Augment Y     Left Common Femoral Competent Y     Left Common Femoral Compress C     Left Saphenofemoral Junction Compress C     Left Profunda Femoral Compress C     Left Proximal Femoral Compress C     Left Mid Femoral Spont Y     Left Mid Femoral Phasic Y     Left Mid Femoral Augment Y     Left Mid Femoral Competent Y     Left Mid Femoral Compress C     Left Distal Femoral Compress C     Left Popliteal Spont Y     Left Popliteal Phasic Y     Left Popliteal Augment Y     Left Popliteal Competent Y     Left Popliteal Compress C     Left Posterior Tibial Compress C     Left Peroneal Compress C     Left GastronemiusSoleal Compress C     Left Greater Saph AK Compress C     Left Greater Saph BK Compress C        Ordered the above labs and independently reviewed the results.        RADIOLOGY  Xr Chest 1 View    Result Date: 6/30/2020  PORTABLE RADIOGRAPHIC VIEW OF THE CHEST  CLINICAL HISTORY: Weakness.  FINDINGS: Frontal projection of the chest is compared to prior study of 04/30/2020. The lungs are hyperinflated compatible with COPD. There is prominence of the left hilum which is unchanged when compared to the prior study. This is the site of chronic scarring in the location of a previously treated lung cancer. There are new airspace opacities within both lower lobes, left greater than right, which could be representative of areas of pneumonia. Please  correlate with clinical data. Additionally, there is a suggestion of a left pleural effusion. The cardiomediastinal silhouette is enlarged and appears larger in size when compared to the previous examination of 04/30/2020. This could represent intrinsic cardiac enlargement or a pericardial effusion. The osseous structures are unremarkable. If further assessment is indicated, one could obtain a CT scan of the chest. These findings were discussed with Dr. Plata on 06/30/2020 at approximately 8:40 PM.  This report was finalized on 6/30/2020 8:53 PM by Dr. Michael Santiago M.D.      Ct Angiogram Chest    Result Date: 6/30/2020  CT ANGIOGRAM OF THE CHEST  HISTORY: Weakness of air. Possible pneumonia.  COMPARISON: 02/13/2020  TECHNIQUE: Axial CT imaging was obtained through the thorax. IV contrast was administered. Three-D reformatted images were obtained.  FINDINGS: The thoracic aorta is normal in caliber. Examination was not optimized for the aorta, but no obvious dissection is seen. There is calcification of the aorta. There is a peripheral filling defect seen within the left pulmonary artery, extending to its bifurcation, and with narrowing of both the left pulmonary artery, and the pulmonary arterial branch to the left lower lobe.. It is new when compared to prior examination, and is worrisome for adherent thrombus, although the distal vessels appear widely patent. This is near the site of the patient's prior treated lung cancer, and potentially could reflect some tumor extension into the left pulmonary artery, although a discrete mass within the mediastinum is not seen. Advanced background emphysematous changes are seen. Since prior examination, the patient has developed scattered micronodules within the right lung. The single largest is identified within the right upper lobe, and measures 7 mm in size. Interval development of these nodules is concerning for metastatic disease, and the patient has a left adrenal  nodule, which has increased significantly when compared to prior exam, now measuring 3.2 x 2.9 cm. Previously, it measured 2.3 x 1.2 cm. There is reflux of contrast material into the inferior vena cava, which can be seen in the setting of right-sided heart failure. Trace pericardial fluid is stable. Dense consolidation is noted at the lung bases bilaterally. This actually appears improved on the right, but has worsened on the left. These may reflect areas of mucus plugging, with associated atelectasis, but pneumonia is obviously not excluded. There is a trace right pleural effusion, and small left pleural effusion, which is increased when compared to the prior study. No acute osseous abnormalities are seen. The heart is enlarged. Left kidney is atrophic.       1. Peripheral filling defect identified within the left pulmonary artery, with extension to its bifurcation, and with narrowing of the left pulmonary artery and pulmonary arterial branch the left lower lobe. Its appearance is concerning for adherent thrombus. This is near the site of the patient's prior lung cancer, and tumoral extension into the pulmonary artery is not excluded, although a discrete mass within the mediastinum is not seen. 2. Persistent bibasilar consolidation. Aeration at the right lung base has improved, although there is increasing consolidation at the left lung base. Pneumonia is not excluded. 3. New right-sided pulmonary nodules, as well as interval enlargement of the left adrenal mass, worrisome for progressive metastatic disease.  FINDINGS were relayed to Dr. Plata in the emergency department at 10:52 PM.  Radiation dose reduction techniques were utilized, including automated exposure control and exposure modulation based on body size.  This report was finalized on 6/30/2020 11:03 PM by Dr. Fadia Townsend M.D.        I ordered the above noted radiological studies. Reviewed by me and discussed with radiologist.  See dictation for  official radiology interpretation.      PROCEDURES    Procedures      MEDICATIONS GIVEN IN ER    Medications   sodium chloride 0.9 % flush 10 mL (has no administration in time range)   enoxaparin (LOVENOX) syringe 40 mg (40 mg Subcutaneous Given 7/1/20 1138)   cefTRIAXone (ROCEPHIN) IVPB 1 g (has no administration in time range)   predniSONE (DELTASONE) tablet 10 mg (10 mg Oral Given 7/1/20 1809)   ipratropium-albuterol (DUO-NEB) nebulizer solution 3 mL (3 mL Nebulization Not Given 7/1/20 1541)   albuterol (PROVENTIL) nebulizer solution 0.083% 2.5 mg/3mL (has no administration in time range)   levothyroxine (SYNTHROID, LEVOTHROID) tablet 75 mcg (75 mcg Oral Given 7/1/20 0652)   OLANZapine (zyPREXA) tablet 2.5 mg (2.5 mg Oral Given 7/1/20 0410)   potassium chloride (MICRO-K) CR capsule 40 mEq (40 mEq Oral Given 7/1/20 1138)     Or   potassium chloride (KLOR-CON) packet 40 mEq ( Oral Not Given:  See Alt 7/1/20 1138)     Or   potassium chloride 10 mEq in 100 mL IVPB ( Intravenous Not Given:  See Alt 7/1/20 1138)   aspirin chewable tablet 81 mg (81 mg Oral Given 7/1/20 0920)   atorvastatin (LIPITOR) tablet 80 mg (has no administration in time range)   budesonide (PULMICORT) nebulizer solution 0.5 mg (0.5 mg Nebulization Given 7/1/20 0736)   guaiFENesin (ROBITUSSIN) 100 MG/5ML oral solution 400 mg (has no administration in time range)   ipratropium-albuterol (DUO-NEB) nebulizer solution 3 mL (3 mL Nebulization Given 7/1/20 1535)   Petrolatum ointment ( Topical Not Given 7/1/20 1230)   mirtazapine (REMERON) tablet 15 mg (has no administration in time range)   iopamidol (ISOVUE-300) 61 % injection 100 mL (95 mL Intravenous Given by Other 6/30/20 2217)   enoxaparin (LOVENOX) syringe 40 mg (40 mg Subcutaneous Given 6/30/20 2329)   cefTRIAXone (ROCEPHIN) IVPB 1 g (1 g Intravenous New Bag 6/30/20 2329)         PROGRESS, DATA ANALYSIS, CONSULTS, AND MEDICAL DECISION MAKING    All labs have been independently reviewed by me.   All radiology studies have been reviewed by me and discussed with radiologist dictating the report.   EKG's independently viewed and interpreted by me.  Discussion below represents my analysis of pertinent findings related to patient's condition, differential diagnosis, treatment plan and final disposition.        ED Course as of Jul 01 1914   Wed Jul 01, 2020 1912 Normal white blood cell count and mild anemia    [DP]   1912 Potassium 3.2 but otherwise chemistry unremarkable    [DP]   1912 Opponent negative and proBNP slightly elevated when adjusted for age    [DP]   1912 Urinalysis shows 4+ bacteria and nitrite positive    [DP]   1912 Respiratory viral panel and COVID-19 swab negative    [DP]   1912 Chest x-ray shows some cardiomegaly and infiltrates in the bases which could be CHF or pneumonia    [DP]   1912 CT scan of the chest shows what appears to be an intramural, adherent thrombus in the left pulmonary artery.  There is no significant right heart strain and no other embolic filling defects.  There is also some bibasilar infiltrate which could be malignancy versus pneumonia versus atelectasis    [DP]   1913 I gave the patient a dose of Lovenox for the thrombus    [DP]   1913 I discussed case with Dr. Tirado who agrees with course of care and agrees to consult on the patient    [DP]   1913 Spoke with Dr. Gonsales who agrees to admit the patient to a telemetry bed for further evaluation and management.    [DP]      ED Course User Index  [DP] Terell Plata MD               AS OF 19:14 VITALS:    BP - 147/86  HR - 95  TEMP - 97.5 °F (36.4 °C) (Oral)  O2 SATS - 97%        DIAGNOSIS  Final diagnoses:   Acute UTI   Generalized weakness   Pulmonary embolism without acute cor pulmonale, unspecified chronicity, unspecified pulmonary embolism type (CMS/HCC)   Malignant neoplasm of lung, unspecified laterality, unspecified part of lung (CMS/HCC)         DISPOSITION  Admit           Terell Plata MD  07/01/20  1914

## 2020-07-01 NOTE — PROGRESS NOTES
Discharge Planning Assessment  [facility]     Patient Name: Anaid Moura  MRN: [id number]  Today's Date: 7/1/2020    Admit Date: 6/30/2020    Discharge Needs Assessment     Row Name 07/01/20 1115       Living Environment    Lives With  spouse    Name(s) of Who Lives With Patient  Miah Moura [phone]    Current Living Arrangements  home/apartment/condo    Potentially Unsafe Housing Conditions  other (see comments) no concerns    Primary Care Provided by  self    Provides Primary Care For  no one, unable/limited ability to care for self    Family Caregiver if Needed  child(phuong), adult;spouse    Family Caregiver Names  Miah Moura [phone]; Miah Moura JrDena [phone]; Lacey Toth [phone]    Quality of Family Relationships  helpful;involved;supportive       Resource/Environmental Concerns    Resource/Environmental Concerns  home accessibility    Home Accessibility Concerns  stairs to access bedroom or bathroom    Transportation Concerns  car, none       Transition Planning    Patient/Family Anticipates Transition to  home with help/services    Patient/Family Anticipated Services at Transition  home health care    Transportation Anticipated  family or friend will provide       Discharge Needs Assessment    Readmission Within the Last 30 Days  no previous admission in last 30 days    Concerns to be Addressed  discharge planning    Equipment Currently Used at Home  nebulizer;oxygen;commode;cane, quad;wheelchair, motorized;wheelchair    Anticipated Changes Related to Illness  none    Equipment Needed After Discharge  none    Outpatient/Agency/Support Group Needs  homecare agency    Discharge Facility/Level of Care Needs  home with home health    Provided Post Acute Provider List?  N/A    N/A Provider List Comment  Patient declined and stated she wants to use [facility] again        Discharge Plan     Row Name 07/01/20 1115       Plan    Plan  Home with [facility], PT/OT consults  pending    Provided Post Acute Provider Quality & Resource List?  N/A    N/A Quality & Resource List Comment  Patient declined ratings at this time    Patient/Family in Agreement with Plan  yes    Plan Comments  CCP met with patient and her daughter Lacey Toth [phone] at bedside. Daughter remained present with patient's permission. CCP role explained and discharge planning discussed. Face sheet verified and IMM noted. Patient's PCP is Dr. Marquis Gonsales. Patient lives with her spouse Miah Moura [phone] in a multi-level home with no steps at the entrance. Patient uses a wheelchair manual and motorized, nebulizer, commode, and quad cane at home and has 1-2L PRN and nocturnal O2 supplied through [employer]. Patient requires assistance with ADLs. Patient has used [facility] in the past, has been to [facility], and denies past subacute rehab history. Patient and daughter adamantly refused SNF referrals at this time and stated they would like [facility] again. CCP placed referral in [facility]. Patient's pharmacy is [facility] and patient is enrolled in meds to beds. Patient denies difficulty affording or remembering to take her medications. Patient's family will transport at discharge. PT/OT consults are pending. CCP will follow for pending PT and OT consults and follow for any additional needs. Radha Toro [facility]        Destination      Coordination has not been started for this encounter.      Durable Medical Equipment      Coordination has not been started for this encounter.      Dialysis/Infusion      Coordination has not been started for this encounter.      Home Medical Care      Service Provider Request Status Selected Services Address Phone Number Fax Number    [facility] Pending - Request Sent N/A [id number] [address] [phone]      Therapy      Coordination has not been started for this encounter.       Community Resources      Coordination has not been started for this encounter.          Demographic Summary     Row Name 07/01/20 1115       General Information    Admission Type  inpatient    Arrived From  emergency department    Required Notices Provided  Important Message from Medicare    Referral Source  admission list    Reason for Consult  discharge planning    Preferred Language  English     Used During This Interaction  no        Functional Status     Row Name 07/01/20 1115       Functional Status    Usual Activity Tolerance  moderate    Current Activity Tolerance  poor       Functional Status, IADL    Medications  assistive equipment and person    Meal Preparation  assistive equipment and person    Housekeeping  assistive equipment and person    Laundry  assistive equipment and person    Shopping  assistive equipment and person       Mental Status    General Appearance WDL  WDL       Mental Status Summary    Recent Changes in Mental Status/Cognitive Functioning  no changes        Psychosocial    No documentation.       Abuse/Neglect    No documentation.       Legal    No documentation.       Substance Abuse    No documentation.       Patient Forms    No documentation.           STEPHEN Siddiqi

## 2020-07-01 NOTE — PLAN OF CARE
Pt new to unit this shift. Pt krista, Vilma, q2 turn, presented with stage 1 pressure injury on coccyx and left heel. Dr. Gonsales aware of pt. Vitals stable, heel boots, accumax, 2L NC, purewick in place, legs elevated. Denies soa, just weak/fatigued.     Problem: Patient Care Overview  Goal: Plan of Care Review  Outcome: Ongoing (interventions implemented as appropriate)  Goal: Individualization and Mutuality  Outcome: Ongoing (interventions implemented as appropriate)  Goal: Discharge Needs Assessment  Outcome: Ongoing (interventions implemented as appropriate)  Goal: Interprofessional Rounds/Family Conf  Outcome: Ongoing (interventions implemented as appropriate)     Problem: Activity Intolerance (Adult)  Goal: Identify Related Risk Factors and Signs and Symptoms  Outcome: Ongoing (interventions implemented as appropriate)  Goal: Activity Tolerance  Outcome: Ongoing (interventions implemented as appropriate)  Goal: Effective Energy Conservation Techniques  Outcome: Ongoing (interventions implemented as appropriate)     Problem: Infection, Risk/Actual (Adult)  Goal: Identify Related Risk Factors and Signs and Symptoms  Outcome: Ongoing (interventions implemented as appropriate)  Goal: Infection Prevention/Resolution  Outcome: Ongoing (interventions implemented as appropriate)     Problem: Fall Risk (Adult)  Goal: Identify Related Risk Factors and Signs and Symptoms  Outcome: Ongoing (interventions implemented as appropriate)  Goal: Absence of Fall  Outcome: Ongoing (interventions implemented as appropriate)     Problem: Wound (Includes Pressure Injury) (Adult)  Goal: Signs and Symptoms of Listed Potential Problems Will be Absent, Minimized or Managed (Wound)  Outcome: Ongoing (interventions implemented as appropriate)     Problem: Skin Injury Risk (Adult)  Goal: Identify Related Risk Factors and Signs and Symptoms  Outcome: Ongoing (interventions implemented as appropriate)  Goal: Skin Health and  Integrity  Outcome: Ongoing (interventions implemented as appropriate)

## 2020-07-01 NOTE — CONSULTS
[facility]    Reason for consult: lung cancer, possible PE    HPI:  Mrs. Moura is a 76yo WF who follows with Dr. Francis in our office.  She has metastatic adenocarcinoma of the lung, copd, chronic systolic chf and history of pneumonitis due to immunotherapy.  She was last admitted to the hospital back in march of this year.  She has not been on any sort of chemo or immunotherapy in sometime.  She has had gradual onset of generalized weakness, lethargy and overall just feeling poorly for several weeks it is worsening.  She has some associated shortness of breath, no chest pain, no change in chronic cough. No orthopnea or increased le edema.  She has worsening of her shortness of breath on exertion.      Past Medical History:   Diagnosis Date   • Acute on chronic combined systolic and diastolic CHF (congestive heart failure) (CMS/HCC)    • Acute on chronic respiratory failure with hypoxemia (CMS/HCC)    • Benign essential hypertension    • Cachexia (CMS/HCC)    • CAD (coronary artery disease)    • Cancer (CMS/HCC) 05/2019    Left lung   • Carotid artery stenosis    • Cerebellar artery occlusion 06/2008    causing left arm and leg paresis   • CKD (chronic kidney disease), stage III (CMS/HCC)    • COPD (chronic obstructive pulmonary disease) (CMS/HCC)    • Gastroesophageal reflux disease 12/10/2015   • 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   • Hyperlipidemia    • Left hemiplegia (CMS/HCC) 12/10/2015   • Lung cancer (CMS/HCC)    • Myocardial infarct (CMS/HCC) 1996   • Osteopenia    • Pneumonia of right lower lobe due to infectious organism    • Stroke syndrome 2008   • Thyroid cyst     right   • Thyroid lump 12/10/2015    Description: Biopsy 05/15 at [facility], benign   • Transient cerebral ischemia    • Urge incontinence of urine      Social History     Socioeconomic History   • Marital status:      Spouse name: Miah   • Number of children: Not on file   • Years of education: College   •  Highest education level: Not on file   Occupational History     Employer: RETIRED   Tobacco Use   • Smoking status: Former Smoker     Types: Cigarettes     Last attempt to quit:      Years since quittin.5   • Smokeless tobacco: Never Used   • Tobacco comment: caffeine use-soda   Substance and Sexual Activity   • Alcohol use: Yes     Comment: rarely   • Drug use: No   • Sexual activity: Defer     Family History   Problem Relation Age of Onset   • Heart attack Mother         Acute   • Heart disease Mother    • Colon polyps Sister         Sigmoid colon   • Coronary artery disease Brother    • Diabetes Brother    • Heart disease Brother    • Alcohol abuse Son      MEDS: reviewed as per chart  ALL: ace inhibitors  ROS: decreased appettite 12 point negative except as in HPI for any acute changes.  She does have chronic left sided paralysis due to an old stroke    Vital Sign Min/Max for last 24 hours  Temp  Min: 98.2 °F (36.8 °C)  Max: 98.2 °F (36.8 °C)   BP  Min: 116/76  Max: 141/72   Pulse  Min: 72  Max: 95   Resp  Min: 20  Max: 24   SpO2  Min: 91 %  Max: 98 %   Flow (L/min)  Min: 2  Max: 2   Weight  Min: 39 kg (86 lb)  Max: 39 kg (86 lb)     GEN:   appears ill, very thin, AxOx3  HEENT: PERRL, EOMI, no icterus, mmm, no jvd, trachea midline, neck supple  CHEST: decreased ae bilat, no wheezes, + crackles, no use of accessory muscles  CV: RRR, no m/g/r  ABD: soft, nt, nd +bs, no hepatosplenomegaly  EXT: no c/c/e  SKIN: no rashes, no xanthomas, nl turgor, warm, dry  LYMPH: no palpable cervical or supraclavicular lymphadenopathy  NEURO: CN 2-12 intact and symmetric bilaterally  PSYCH: nl affect, nl orientation, nl judgement, nl mood  MSK: some kyphosis, 5/5 strenght on the right, left side is paralyzed    Labs: : reviewed:  U/a: wbc 3-5; bact 4+ and nitratie and candy +  Na 136  k 3.2  Bicarb 24  Cr 0.52  Wbc 9.07 (87% neutrophils)  hgb 10.4  plts 285    Ct chest: reviewed: bilateral infiltrates more in the lower  lobes, possibly small effusion on the left.  Extensive emphysema.  Possible intramural thrombus in the left pulmonary artery vs. Tumor.      A/P:  1. Failure to thrive in Adult -- suspect this is mainly due to progression of her cancer overall.  2. Possible pulmonary embolism -- would agree with lovenox at this point, therapeutic dose for her is very low anyways.  3. Bilateral pulmonary infiltrates -- check infectious work up and procal -- suspect this is largely advancement of her malignancy  4.chronic systolic chf -- she does not appear volume overloaded  5. Anemia  6. Metastatic lung cancer -- It seems her treatment options are limited.   7. COPD -- no acute exacerbation -- continue bronchodilators  8. Chronic steroid depedence -- increase slightly for stress  9. Hypokalemia  10. Malnutrition    Discussed with  at bedside    Patient of Dr. Francis.

## 2020-07-01 NOTE — CONSULTS
"Adult Nutrition  Assessment/PES    Patient Name:  Anaid CHAWLA Frame  YOB: 1945  MRN: [id number]  Admit Date:  6/30/2020    Assessment Date:  7/1/2020  Nutrition assessment triggered by low BMI-14.7.   EMR reviewed. Spoke with patient and spouse. Met lung ca. Malnourished. FTT.  Provided nutrition therapy. No appetite. Drinks carnation instant breakfast-ordered TID.  Went over food preferences. RD to continue to follow.   Reason for Assessment     Row Name 07/01/20 5750          Reason for Assessment    Reason For Assessment  identified at risk by screening criteria;nurse/nurse practitioner consult     Diagnosis  PE, FTT; met lung ca, COPD, CHF     Identified At Risk by Screening Criteria  MST SCORE 2+;BMI;large or nonhealing wound, burn or pressure injury low BMI-14.7; coccyx pressure injury stage 1           Anthropometrics     Row Name 07/01/20 0784          Anthropometrics    Height  162.6 cm (64.02\")        Admit Weight    Admit Weight  39 kg (85 lb 15.7 oz)        Ideal Body Weight (IBW)    Ideal Body Weight (IBW) (kg)  55.04     % of Ideal Body Weight Assessment  70-79%: moderate deficit 70.9%        Usual Body Weight (UBW)    Weight Loss Time Frame  20# over the past year        Body Mass Index (BMI)    BMI Assessment  BMI less than 16: protein-energy malnutrition grade III BMI-14.7         Labs/Tests/Procedures/Meds     Row Name 07/01/20 1400          Labs/Procedures/Meds    Lab Results Reviewed  reviewed, pertinent        Diagnostic Tests/Procedures    Diagnostic Test/Procedure Reviewed  reviewed, pertinent        Medications    Pertinent Medications Reviewed  reviewed, pertinent     Pertinent Medications Comments  prednisone         Physical Findings     Row Name 07/01/20 1400          Physical Findings    Overall Physical Appearance  loss of muscle mass;loss of subcutaneous fat;on oxygen therapy B=13         Estimated/Assessed Needs     Row Name 07/01/20 1400 07/01/20 0126       Calculation " "Measurements    Weight Used For Calculations  39 kg (85 lb 15.7 oz)      Height    162.6 cm (64.02\")       Estimated/Assessed Needs    Additional Documentation  KCAL/KG (Group);Protein Requirements (Group);Fluid Requirements (Group)         KCAL/KG    KCAL/KG  30 Kcal/Kg (kcal);35 Kcal/Kg (kcal)      30 Kcal/Kg (kcal)  1170      35 Kcal/Kg (kcal)  1365         Protein Requirements    Weight Used For Protein Calculations  39 kg (85 lb 15.7 oz)      Est Protein Requirement Amount (gms/kg)  1.2 gm protein      Estimated Protein Requirements (gms/day)  46.8         Fluid Requirements    Estimated Fluid Requirements (mL/day)  1200      RDA Method (mL)  1200          Nutrition Prescription Ordered     Row Name 07/01/20 1401          Nutrition Prescription PO    Current PO Diet  Regular             Malnutrition Severity Assessment     Row Name 07/01/20 1401          Malnutrition Severity Assessment    Malnutrition Type  Chronic Disease - Related Malnutrition        Insufficient Energy Intake     Insufficient Energy Intake   <75% of est. energy requirement for > or equal to 3 months        Unintentional Weight Loss     Unintentional Weight Loss   -- BMI-14.7, 70%IBW        Muscle Loss    Loss of Muscle Mass Findings  Severe     Confucianism Region  Severe - deep hollowing/scooping, lack of muscle to touch, facial bones well defined     Clavicle Bone Region  Severe - protruding prominent bone     Acromion Bone Region  Severe - squared shoulders, bones, and acromion process protrusion prominent        Fat Loss    Subcutaneous Fat Loss Findings  Moderate     Orbital Region   Moderate -  somewhat hollowness, slightly dark circles        Criteria Met (Must meet criteria for severity in at least 2 of these categories: M Wasting, Fat Loss, Fluid, Secondary Signs, Wt. Status, Intake)    Patient meets criteria for   Moderate (non-severe) Malnutrition           Problem/Interventions:  Problem 1     Row Name 07/01/20 1401          Nutrition " Diagnoses Problem 1    Problem 1  Malnutrition     Etiology (related to)  Medical Diagnosis     Oncology  Lung cancer     Signs/Symptoms (evidenced by)  Report of Mnimal PO Intake;% IBW;BMI     BMI  Less than 16     Percent (%) IBW  70 %               Intervention Goal     Row Name 07/01/20 1402          Intervention Goal    General  Maintain nutrition;Reduce/improve symptoms     PO  Tolerate PO;Increase intake     Weight  Appropriate weight gain         Nutrition Intervention     Row Name 07/01/20 1402          Nutrition Intervention    RD/Tech Action  Care plan reviewd;Follow Tx progress;Interview for preference;Encourage intake;Recommend/ordered;Supplement provided     Recommended/Ordered  Supplement         Nutrition Prescription     Row Name 07/01/20 1402          Nutrition Prescription PO    PO Prescription  Begin/change supplement     Supplement  [place] Breakfast Essentials     Supplement Frequency  3 times a day     New PO Prescription Ordered?  Yes         Education/Evaluation     Row Name 07/01/20 1403          Education    Education  Will Instruct as appropriate        Monitor/Evaluation    Monitor  Per protocol           Electronically signed by:  Lizzy Lechuga RD  07/01/20 14:03

## 2020-07-01 NOTE — H&P
HISTORY AND PHYSICAL   [facility], [facility]      2020    Patient Identification:    Name: Anaid Mouar  Age: 75 y.o.  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  She also has history of lung cancer on the right side, positive pneumonia, as well as atelectasis, this improved after antibiotics and radiation and chemotherapy which was known about a year ago.  She did okay after that until her last hospitalization which was this past February, she was hospitalized for pneumonia, at that time there were no indications of progression of her cancer.  She was discharged home improved.  She was doing okay although still very weak until a few weeks ago when she developed it generalized weakness, lethargy, feeling poorly, worsening shortness of breath that prompted her to come to the emergency room.  In the ER, patient was found to have: Potassium 3.2, creatinine 0.52, procalcitonin 0.07, hemoglobin 10.4, WBC 9.07, urine with 3-5 WBC, respiratory panel negative CT angiogram of the chest showed peripheral filling defect identified within the left pulmonary artery, with extension to its bifurcation, and with narrowing of the left pulmonary artery and pulmonary artery branch of the left lower lobe.  Also, it showed worsening consolidation in the left lung base; also it showed new right-sided pulmonary nodules as well as interval enlargement of left  adrenal mass.  Patient was admitted for further management    Past Medical History:  Past Medical History:   Diagnosis Date   • Acute on chronic combined systolic and diastolic CHF (congestive heart failure) (CMS/HCC)    • Acute on chronic respiratory failure with hypoxemia (CMS/HCC)    • Benign essential hypertension    • Cachexia (CMS/HCC)    • CAD (coronary artery disease)    • Cancer (CMS/HCC) 05/2019    Left lung   • Carotid artery stenosis    • Cerebellar artery occlusion 06/2008    causing left arm and leg paresis   • CKD (chronic kidney disease), stage III (CMS/HCC)    • COPD (chronic obstructive pulmonary disease) (CMS/HCC)    • Gastroesophageal reflux disease 12/10/2015   • 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   • Hyperlipidemia    • Left hemiplegia (CMS/HCC) 12/10/2015   • Lung cancer (CMS/HCC)    • Myocardial infarct (CMS/HCC) 1996   • Osteopenia    • Pneumonia of right lower lobe due to infectious organism    • Stroke syndrome 2008   • Thyroid cyst     right   • Thyroid lump 12/10/2015    Description: Biopsy 05/15 at [facility], benign   • Transient cerebral ischemia    • Urge incontinence of urine      Past Surgical History:  Past Surgical History:   Procedure Laterality Date   • BREAST BIOPSY     • BRONCHOSCOPY Bilateral 5/20/2019    Procedure: BRONCHOSCOPY WITH  BIOPSY AND BAL, WITH ENDOBRONCHIAL ULTRASOUND WITH FNA;  Surgeon: Chris Tirado MD;  Location: [facility] ENDOSCOPY;  Service: Pulmonary   • BRONCHOSCOPY N/A 2/19/2020    Procedure: BRONCHOSCOPY WITH WASHINGS AND BAL;  Surgeon: Ronnie Pruett MD;  Location: [facility] ENDOSCOPY;  Service: Pulmonary;  Laterality: N/A;  PRE OP - PNEUMONIA  POST OP - SAME   • COLONOSCOPY      REC 07/2007, REC 02/2009, REC 12/2011, REC 01/14,  She says she cant do the prep because of poor mobility to get to .   • EYE SURGERY  1950   • OTHER SURGICAL HISTORY      Ventricular [place] for drainage of subdural hematoma   • TOTAL THYROIDECTOMY  08/2015      [place]      Home Meds:  Medications Prior to Admission   Medication Sig Dispense Refill Last Dose   • aspirin 81 MG chewable tablet Chew 81 mg Daily.   2/12/2020 at Unknown time   • atorvastatin (LIPITOR) 80 MG tablet Take 1 tablet by mouth Daily. Needs an appointment for further refills (Patient taking differently: Take 80 mg by mouth Every Night. Needs an appointment for further refills) 90 tablet 0 2/12/2020 at Unknown time   • budesonide (PULMICORT) 0.5 MG/2ML nebulizer solution Take 0.5 mg by nebulization 2 (Two) Times a Day.      • folic acid (FOLVITE) 1 MG tablet Take 1 tablet by mouth Daily. 90 tablet 3    • guaiFENesin (ROBITUSSIN) 100 MG/5ML solution oral solution Take 20 mL by mouth Every 4 (Four) Hours. While awake (Patient taking differently: Take 400 mg by mouth Every 4 (Four) Hours As Needed (Cough). While awake) 3600 mL 3 Patient Taking Differently at Unknown time   • ipratropium-albuterol (DUO-NEB) 0.5-2.5 mg/3 ml nebulizer Take 3 mL by nebulization 4 (Four) Times a Day. 360 mL 2    • levothyroxine (SYNTHROID, LEVOTHROID) 75 MCG tablet TAKE ONE TABLET BY MOUTH DAILY 30 tablet 0    • OLANZapine (zyPREXA) 2.5 MG tablet Take 1 tablet by mouth Every Night. 30 tablet 3    • predniSONE (DELTASONE) 5 MG tablet Take 10 mg by mouth Daily. Tapering-took last 10 mg tablet on day of admission       • sertraline (Zoloft) 25 MG tablet Take 1 tablet by mouth Daily. 90 tablet 3    • tolterodine (Detrol) 1 MG tablet Take 1 tablet by mouth 2 (Two) Times a Day. (Patient taking differently: Take 1 mg by mouth 2 (Two) Times a Day. Two in the morning, two at night) 180 tablet 3        Allergies:  Allergies   Allergen Reactions   • Ace Inhibitors Cough     Other reaction(s): Cough     Immunizations:  Immunization History   Administered Date(s) Administered   • FLUARIX/FLUZONE/AFLURIA/FLULAVAL QUAD 02/15/2020   • Influenza TIV (IM) 09/25/2015   • Pneumococcal, Unspecified 11/20/2014   • Td 07/15/2007     Social History:  "  Social History     Social History Narrative   • Not on file     Social History     Tobacco Use   • Smoking status: Former Smoker     Types: Cigarettes     Last attempt to quit:      Years since quittin.5   • Smokeless tobacco: Never Used   • Tobacco comment: caffeine use-soda   Substance Use Topics   • Alcohol use: Yes     Comment: rarely     Family History:  Family History   Problem Relation Age of Onset   • Heart attack Mother         Acute   • Heart disease Mother    • Colon polyps Sister         Sigmoid colon   • Coronary artery disease Brother    • Diabetes Brother    • Heart disease Brother    • Alcohol abuse Son         Review of Systems  See history of present illness and past medical history.    Patient denies headache, change in vision, change in hearing, change in taste, changes in weight, changes in appetite, focal weakness, numbness, or paresthesia.    Patient denies chest pain, palpitations, sinus pressure, rhinorrhea, epistaxis, hemoptysis, nausea, vomiting,hematemesis, diarrhea, constipation or hematchezia.    Denies cold or heat intolerance, polydipsia, polyuria, polyphagia.   Denies hematuria, pyuria, dysuria, hesitancy, frequency or urgency.   Denies missing any routine medications.   Remainder of ROS is negative.    Objective:    Exam:    T MAX 24 hrs: Temp (24hrs), Av.6 °F (36.4 °C), Min:97.1 °F (36.2 °C), Max:98.2 °F (36.8 °C)    Vitals Ranges:   Temp:  [97.1 °F (36.2 °C)-98.2 °F (36.8 °C)] 97.5 °F (36.4 °C)  Heart Rate:  [72-96] 95  Resp:  [16-24] 18  BP: (105-147)/(57-86) 147/86    /86 (BP Location: Right arm, Patient Position: Lying)   Pulse 95   Temp 97.5 °F (36.4 °C) (Oral)   Resp 18   Ht 162.6 cm (64.02\")   Wt 39 kg (86 lb)   SpO2 97%   BMI 14.75 kg/m²     General: Alert, oriented x 3. Cooperative, no distress while on oxygen. Appears stated age.  Looks cachectic.  HEENT:    Head: Normocephalic, without obvious abnormality, atraumatic  Eyes: EOM are normal. " Pupils are equal  Oropharynx: Mucosa and tongue dry  Neck: Supple, symmetrical, trachea midline, no adenopathy;              thyroid:  no enlargement/tenderness/nodules;              no carotid bruit or JVD  Cardiovascular: Normal rate, regular rhythm and intact distal pulses.              Exam reveals no gallop and no friction rub. No murmur heard  Chest wall: No tenderness or deformity  Pulmonary: Decreased breath sounds bilaterally, rhonchi bibasilarly, more on the right side.  No wheezing, no rales in auscultation.    Abdominal: Soft, nontender, bowel sounds active all four quadrants,     no masses, no hepatomegaly, no splenomegaly.   Extremities: Normal, atraumatic, no cyanosis or edema  Pulses: 2 + symmetric all extremities  Neurological: Patient is alert and oriented to person, place, and time.                 CNII-XII intact, normal sensation intact throughout  Skin: Skin color, texture, normal. Turgor is decreased. No rashes or lesions      Data Review:    Results from last 7 days   Lab Units 07/01/20  0833 06/30/20 2024   WBC 10*3/mm3 8.95 9.07   HEMOGLOBIN g/dL 11.2* 10.4*   HEMATOCRIT % 34.0 32.4*   PLATELETS 10*3/mm3 292 285       Results from last 7 days   Lab Units 07/01/20  0833 06/30/20 2024   SODIUM mmol/L 139 136   POTASSIUM mmol/L 4.0 3.2*   CHLORIDE mmol/L 106 106   CO2 mmol/L 20.0* 24.1   BUN mg/dL 9 9   CREATININE mg/dL 0.54* 0.52*   CALCIUM mg/dL 8.3* 8.4*   BILIRUBIN mg/dL  --  0.4   ALK PHOS U/L  --  54   ALT (SGPT) U/L  --  7   AST (SGOT) U/L  --  9   GLUCOSE mg/dL 72 84                 Lab Results   Lab Value Date/Time    TROPONINT <0.010 06/30/2020 2024    TROPONINT <0.010 04/30/2020 1127    TROPONINT <0.010 02/22/2020 0542    TROPONINT <0.010 02/21/2020 1411    TROPONINT 0.109 (C) 02/13/2020 1429    TROPONINT 0.091 (C) 02/13/2020 1145    TROPONINT 0.02 10/31/2019 1837    TROPONINT <0.010 06/06/2019 2129    TROPONINT <0.010 05/27/2019 0735    TROPONINT <0.010 05/16/2019 1917        Brief Urine Lab Results  (Last result in the past 365 days)      Color   Clarity   Blood   Leuk Est   Nitrite   Protein   CREAT   Urine HCG        06/30/20 2034 Yellow Clear Small (1+) Trace Positive Negative                Imaging Results (All)     Procedure Component Value Units Date/Time    CT Angiogram Chest [[id number]] Collected:  06/30/20 2239     Updated:  06/30/20 2306    Narrative:       CT ANGIOGRAM OF THE CHEST     HISTORY: Weakness of air. Possible pneumonia.     COMPARISON: 02/13/2020     TECHNIQUE: Axial CT imaging was obtained through the thorax. IV contrast  was administered. Three-D reformatted images were obtained.     FINDINGS:  The thoracic aorta is normal in caliber. Examination was not optimized  for the aorta, but no obvious dissection is seen. There is calcification  of the aorta. There is a peripheral filling defect seen within the left  pulmonary artery, extending to its bifurcation, and with narrowing of  both the left pulmonary artery, and the pulmonary arterial branch to the  left lower lobe.. It is new when compared to prior examination, and is  worrisome for adherent thrombus, although the distal vessels appear  widely patent. This is near the site of the patient's prior treated lung  cancer, and potentially could reflect some tumor extension into the left  pulmonary artery, although a discrete mass within the mediastinum is not  seen. Advanced background emphysematous changes are seen. Since prior  examination, the patient has developed scattered micronodules within the  right lung. The single largest is identified within the right upper  lobe, and measures 7 mm in size. Interval development of these nodules  is concerning for metastatic disease, and the patient has a left adrenal  nodule, which has increased significantly when compared to prior exam,  now measuring 3.2 x 2.9 cm. Previously, it measured 2.3 x 1.2 cm. There  is reflux of contrast material into the inferior vena  cava, which can be  seen in the setting of right-sided heart failure. Trace pericardial  fluid is stable. Dense consolidation is noted at the lung bases  bilaterally. This actually appears improved on the right, but has  worsened on the left. These may reflect areas of mucus plugging, with  associated atelectasis, but pneumonia is obviously not excluded. There  is a trace right pleural effusion, and small left pleural effusion,  which is increased when compared to the prior study. No acute osseous  abnormalities are seen. The heart is enlarged. Left kidney is atrophic.       Impression:          1. Peripheral filling defect identified within the left pulmonary  artery, with extension to its bifurcation, and with narrowing of the  left pulmonary artery and pulmonary arterial branch the left lower lobe.  Its appearance is concerning for adherent thrombus. This is near the  site of the patient's prior lung cancer, and tumoral extension into the  pulmonary artery is not excluded, although a discrete mass within the  mediastinum is not seen.  2. Persistent bibasilar consolidation. Aeration at the right lung base  has improved, although there is increasing consolidation at the left  lung base. Pneumonia is not excluded.  3. New right-sided pulmonary nodules, as well as interval enlargement of  the left adrenal mass, worrisome for progressive metastatic disease.     FINDINGS were relayed to Dr. Plata in the emergency department at 10:52  PM.     Radiation dose reduction techniques were utilized, including automated  exposure control and exposure modulation based on body size.     This report was finalized on 6/30/2020 11:03 PM by Dr. Fadia Townsend M.D.       XR Chest 1 View [[id number]] Collected:  06/30/20 2048     Updated:  06/30/20 2056    Narrative:       PORTABLE RADIOGRAPHIC VIEW OF THE CHEST     CLINICAL HISTORY: Weakness.     FINDINGS: Frontal projection of the chest is compared to prior study of  04/30/2020.  The lungs are hyperinflated compatible with COPD. There is  prominence of the left hilum which is unchanged when compared to the  prior study. This is the site of chronic scarring in the location of a  previously treated lung cancer. There are new airspace opacities within  both lower lobes, left greater than right, which could be representative  of areas of pneumonia. Please correlate with clinical data.  Additionally, there is a suggestion of a left pleural effusion. The  cardiomediastinal silhouette is enlarged and appears larger in size when  compared to the previous examination of 04/30/2020. This could represent  intrinsic cardiac enlargement or a pericardial effusion. The osseous  structures are unremarkable. If further assessment is indicated, one  could obtain a CT scan of the chest. These findings were discussed with  Dr. Plata on 06/30/2020 at approximately 8:40 PM.     This report was finalized on 6/30/2020 8:53 PM by Dr. Michael Santiago M.D.             Assessment:      Pulmonary embolism (CMS/HCC)    Adenocarcinoma, lung, left (CMS/HCC)    Hypokalemia    Pneumonia of right lower lobe due to infectious organism    Chronic obstructive pulmonary disease (CMS/HCC)  Malnourishment        Plan:    Inpatient admission  Lovenox therapeutic dose  IV antibiotics for CAP  Pulmonary as well as hematology consult.  Will check Doppler ultrasound of LE report  Monitor and correct electrolytes, replace potassium  Encourage patient intake  COVID-19 test came back negative, patient okay to be out of isolation.  Monitor mental status, at times, per , she is hallucinating.  May need to check MRI of the brain to rule out recurrence of metastasis.  Home medications  DVT/stress ulcer prophylaxis  Labs in         Marquis Gonsales MD  7/1/2020  16:10

## 2020-07-01 NOTE — TELEPHONE ENCOUNTER
Pt's  (Michael Moura) called to inform us the pt is in the hospital and couldn't make today's appts, 7.1.20.    Pt may be able to do MyChart visit on 7.6.20. Pt was admitted for a blood clot and bladder infection    Phone #:  [phone]

## 2020-07-01 NOTE — DISCHARGE PLACEMENT REQUEST
"Wilfrid Moura (75 y.o. Female)     Date of Birth Social Security Number Address Home Phone MRN    1945  [id number]  [place] [id number] [phone] [id number]    [facility] Mar[facility] Status          [place]        Admission Date Admission Type Admitting Provider Attending Provider Department, Room/Bed    6/30/20 Emergency Marquis Gonsales MD [place], Marlon R, MD [address], [address]    Discharge Date Discharge Disposition Discharge Destination                       Attending Provider:  Marquis Gonsales MD    Allergies:  Ace Inhibitors    Isolation:  None   Infection:  None   Code Status:  Prior    Ht:  162.6 cm (64\")   Wt:  39 kg (86 lb)    Admission Cmt:  None   Principal Problem:  None                Active Insurance as of 6/30/2020     Primary Coverage     Payor Plan Insurance Group Employer/Plan Group    [facility] MEDICARE REPLACEMENT [facility] MEDICARE REPLACEMENT [id number]     Payor Plan Address Payor Plan Phone Number Payor Plan Fax Number Effective Dates    [address] [address]   1/1/2016 - None Entered    [facility] [id number]       Subscriber Name Subscriber Birth Date Member ID       WILFRID MOURA 1945 [id number]                 Emergency Contacts      (Rel.) Home Phone Work Phone Mobile Phone    Maurilio Moura (Spouse) [phone] -- [phone]    MAURILIO MOURA JR (Son) [phone] -- --    Lacey Toth (Daughter) [phone] -- --            "

## 2020-07-01 NOTE — PROGRESS NOTES
"                                              LOS: 1 day   Patient Care Team:  Marquis Gonsales MD as PCP - General (Internal Medicine)  Miah Whalen MD as Consulting Physician (Radiation Oncology)  Timothy Taylor MD as Referring Physician (Internal Medicine)  Solis Godfrey Jr., MD as Consulting Physician (Hematology and Oncology)    Chief Complaint:  F/up respiratory failure, PE/tumor emboli, abnormal CT chest and medical problems listed below    Subjective   Interval History  I reviewed the admission note, progress notes, PMH, PSH, Family hx, social history, imagings and prior records on this admission, summarized the finding in my note and formulated a transition of care plan.     Has cough with whitish/yellowish phlegm but stated this is chronic.  Per her daughter, the cough appears to be slightly worse than baseline.  She is currently on 2 L oxygen and she is 2 L oxygen at home.  She denies chest pain.  She is pretty sedentary at baseline      REVIEW OF SYSTEMS:   CARDIOVASCULAR: No chest pain, chest pressure or chest discomfort. No palpitations or edema. .   GASTROINTESTINAL: No anorexia, nausea, vomiting or diarrhea. No abdominal pain or blood.  She has low appetite.  HEMATOLOGIC: No bleeding or bruising.     Ventilator/Non-Invasive Ventilation Settings (From admission, onward)    None                Physical Exam:     Vital Signs  Temp:  [97.1 °F (36.2 °C)-98.2 °F (36.8 °C)] 97.5 °F (36.4 °C)  Heart Rate:  [72-95] 90  Resp:  [16-24] 18  BP: (105-141)/(57-84) 105/57    Intake/Output Summary (Last 24 hours) at 7/1/2020 1229  Last data filed at 7/1/2020 1143  Gross per 24 hour   Intake 300 ml   Output 650 ml   Net -350 ml     Flowsheet Rows      First Filed Value   Admission Height  162.6 cm (64\") Documented at 06/30/2020 2326   Admission Weight  39 kg (86 lb) Documented at 06/30/2020 2314          General Appearance:   Alert, cooperative, in no acute distress   ENMT:  Mallampati score 3, moist mucous " membrane   Eyes:  Pupils equal and reactive to light. EOMI   Neck:   Trachea midline. No thyromegaly.   Lungs:    Diminished breath sounds at the bases with mild crackles.  No wheezing.  Nonlabored breathing    Heart:   Regular rhythm and normal rate, normal S1 and S2, no         murmur   Skin:   No rash but ecchymosis in arms   Abdomen:    soft. No tenderness. No HSM.   Neuro:  Conscious, alert, oriented x3. Strength 5/5 in upper and lower  ext   Extremities:  No cyanosis, clubbing or edema.  Warm extremities and well-perfused          Results Review:        Results from last 7 days   Lab Units 07/01/20  0833 06/30/20 2024   SODIUM mmol/L 139 136   POTASSIUM mmol/L 4.0 3.2*   CHLORIDE mmol/L 106 106   CO2 mmol/L 20.0* 24.1   BUN mg/dL 9 9   CREATININE mg/dL 0.54* 0.52*   GLUCOSE mg/dL 72 84   CALCIUM mg/dL 8.3* 8.4*     Results from last 7 days   Lab Units 06/30/20 2024   TROPONIN T ng/mL <0.010     Results from last 7 days   Lab Units 07/01/20  0833 06/30/20 2024   WBC 10*3/mm3 8.95 9.07   HEMOGLOBIN g/dL 11.2* 10.4*   HEMATOCRIT % 34.0 32.4*   PLATELETS 10*3/mm3 292 285         Results from last 7 days   Lab Units 06/30/20 2024   PROBNP pg/mL 1,670.0*       I reviewed the patient's new clinical results.        Medication Review:     aspirin 81 mg Oral Daily   atorvastatin 80 mg Oral Daily   budesonide 0.5 mg Nebulization BID - RT   cefTRIAXone 1 g Intravenous Q24H   enoxaparin 40 mg Subcutaneous Q12H   ipratropium-albuterol 3 mL Nebulization 4x Daily - RT   ipratropium-albuterol 3 mL Nebulization 4x Daily - RT   levothyroxine 75 mcg Oral Q AM   OLANZapine 2.5 mg Oral Nightly   Petrolatum  Topical Q12H   predniSONE 10 mg Oral BID With Meals   sertraline 25 mg Oral Daily            Diagnostic imaging:  I personally and independently reviewed the following images:  CT chest 6/30/2020 compared to 2/13/2020:  Bilateral lower lobe consolidation and bilateral lower lobe bronchiectasis which has slightly improved  since last exam.  New right upper lobe pulmonary nodule      Assessment    1. COPD/emphysema, no exacerbation  2. Left lower lobe pulmonary embolism versus tumor emboli  3. Chronic hypoxic respiratory failure, on oxygen 2 L/min  4. Bilateral bronchiectasis  5. Bilateral lower lobe consolidations, chronic  6. New right upper lobe pulmonary nodule, 7 mm  7. Left pleural effusion, mild  8. Enlargement of left adrenal nodule concerning for worsening metastasis  9. Chronic systolic and diastolic CHF, EF 33%  10. Chronic steroid therapy on prednisone 10 mg daily  11. Decreased appetite  12. Metastatic adenocarcinoma of the lung, previously treated with immunotherapy which was held for concerns of pneumonitis    All problems new to me      Plan     · Oxygen by nasal cannula  · Check Doppler of the lower extremities.  Continue Lovenox 1 francesco per KG twice daily for now.  · I switched her from Zoloft to Remeron due to help with appetite.  This can be further addressed by oncology  · Continue DuoNeb  · Check sputum culture.  If negative then consider discontinuation of antibiotics since she does not have fever, leukocytosis and her procalcitonin is normal.  · Consult Dr. Godfrey to address issues and concerns of worsening of progression of malignancy stability of pulmonary tumor emboli      Carroll Hope MD  07/01/20  12:29            This note was dictated utilizing Dragon dictation

## 2020-07-02 NOTE — PLAN OF CARE
Problem: Patient Care Overview  Goal: Plan of Care Review  Outcome: Ongoing (interventions implemented as appropriate)  VSS. Ao4. Purewick in place. q2 turn. Potassium 5.2. On RA. SR. Patient slept through night. Will ctm  Goal: Individualization and Mutuality  Outcome: Ongoing (interventions implemented as appropriate)  Goal: Discharge Needs Assessment  Outcome: Ongoing (interventions implemented as appropriate)  Goal: Interprofessional Rounds/Family Conf  Outcome: Ongoing (interventions implemented as appropriate)     Problem: Activity Intolerance (Adult)  Goal: Identify Related Risk Factors and Signs and Symptoms  Outcome: Ongoing (interventions implemented as appropriate)  Goal: Activity Tolerance  Outcome: Ongoing (interventions implemented as appropriate)  Goal: Effective Energy Conservation Techniques  Outcome: Ongoing (interventions implemented as appropriate)     Problem: Infection, Risk/Actual (Adult)  Goal: Identify Related Risk Factors and Signs and Symptoms  Outcome: Ongoing (interventions implemented as appropriate)  Goal: Infection Prevention/Resolution  Outcome: Ongoing (interventions implemented as appropriate)     Problem: Fall Risk (Adult)  Goal: Identify Related Risk Factors and Signs and Symptoms  Outcome: Ongoing (interventions implemented as appropriate)  Goal: Absence of Fall  Outcome: Ongoing (interventions implemented as appropriate)     Problem: Wound (Includes Pressure Injury) (Adult)  Goal: Signs and Symptoms of Listed Potential Problems Will be Absent, Minimized or Managed (Wound)  Outcome: Ongoing (interventions implemented as appropriate)     Problem: Skin Injury Risk (Adult)  Goal: Identify Related Risk Factors and Signs and Symptoms  Outcome: Ongoing (interventions implemented as appropriate)  Goal: Skin Health and Integrity  Outcome: Ongoing (interventions implemented as appropriate)     Problem: VTE, DVT and PE (Adult)  Goal: Signs and Symptoms of Listed Potential Problems Will  be Absent, Minimized or Managed (VTE, DVT and PE)  Outcome: Ongoing (interventions implemented as appropriate)

## 2020-07-02 NOTE — PLAN OF CARE
Problem: Patient Care Overview  Goal: Plan of Care Review  Outcome: Ongoing (interventions implemented as appropriate)  Flowsheets  Taken 7/2/2020 1722 by Yamileth Alcantara, RN  Progress: no change  Outcome Summary: Pt slept most of morning (says this is normal for her). VSS, on 2L (was 89-90% on RA). Productive cough. [facility] consulted to meet w/family (approved multiple visitors per Risk Mgmt) in a.m. Will closely monitor.  Taken 7/2/2020 0355 by Selena Marx, RN  Plan of Care Reviewed With: patient

## 2020-07-02 NOTE — PROGRESS NOTES
[facility] INPATIENT PROGRESS NOTE  Subjective Patient more comfortable, discussed hospice care with she and her     CC:  Pulmonary emboli, progression of non-small cell lung cancer.    Interval history:       Anaid Moura is a 75 y.o. female who we are asked to see July 1, 2020 in consultation for her history of non-small cell lung carcinoma.      She last been seen by telemedicine conference June 8 by Dr. Godfrey.  She had a prolonged hospital stay from February 13 into March 4 related to to pneumonia and acute on chronic hypoxic respiratory failure.  She underwent a series of studies showing evidence of pneumonia but no evidence to support progressive malignancy.  She underwent bronchoscopy for 19th that was negative and eventually improved antibiotic therapy.  She was not felt a candidate for additional treatment even if progression was documented and observation was planned.  She was reviewed by telemedicine visit June 8, 2020 having improved per her general baseline function as well as her respiratory status, stable appetite.  Her medications were adjusted to include prednisone 10 mg daily for her fatigue, restart of folic acid, Zyprexa and levothyroxine.       The patient, unfortunately, presented with worsening symptoms with weakness, lethargy, shortness of breath particular upon exertion.  This led to a follow-up CT of chest June 30 demonstrating a filling defect left pulmonary artery with extension to its bifurcation, narrowing left pulmonary artery and pulmonary arterial branch concerning for adherent thrombus.  This is approximately at the site of the prior lung cancer with tumoral extension into the pulmonary artery not excluded though a mass is not present.  There is persistent bibasilar consolidation, increasing consolidation left lung base and new right-sided pulmonary nodules as well as a new development of left adrenal mass that could be associated with metastatic  disease.  The patient was admitted for anticoagulation.  Distal studies include an H&H of 10.4 and 32.4, white count of 9070,, CMP with creatinine of 0.52, acute potassium 3.2, albumin 2.0, normal transaminases, normal troponin, BNP of 1670, procalcitonin of 0.07, normal respiratory panel, normal COVID-19 testing.  Patient currently maintained on Lovenox at 40 mg 12 hours, Rocephin and bronchodilators as well as Pulmicort.     The patient is visited just after she returns from Doppler examinations lower extremities with results pending.  She is apprised of the results on scans and wishes to discuss this with Dr. Godfrey and consider any additional therapy that might be available.  We have also discussed the possibility of hospice care as well.     Patient circumstances were discussed with Dr. Godfrey who indicates there are no additional therapies available patients malignancy.  I discussed these issues with the patient and her  and offered palliative care.  Hospice would be appropriate and they agree to meet with them.      .  Medications:  The current medication list was reviewed in the EMR.    Allergies:    Allergies   Allergen Reactions   • Ace Inhibitors Cough     Other reaction(s): Cough       Objective      Vitals:    07/02/20 1408   BP: 110/57   Pulse: 89   Resp: 20   Temp: 97.6 °F (36.4 °C)   SpO2: 96%        Physical exam:   GEN:   appears ill, very thin, AxOx3  HEENT: PERRL, EOMI, no icterus, mmm, no jvd, trachea midline, neck supple  CHEST: decreased ae bilat, no wheezes, + crackles, no use of accessory muscles  CV: RRR, no m/g/r  ABD: soft, nt, nd +bs, no hepatosplenomegaly  EXT: no c/c/e  SKIN: no rashes, no xanthomas, nl turgor, warm, dry  LYMPH: no palpable cervical or supraclavicular lymphadenopathy  NEURO: CN 2-12 intact and symmetric bilaterally  PSYCH: nl affect, nl orientation, nl judgement, nl mood  MSK: some kyphosis, 5/5 strenght on the right, left side is paralyzed      RECENT  LABS:    Results from last 7 days   Lab Units 07/02/20  0433 07/01/20  0833 06/30/20 2024   WBC 10*3/mm3 9.00 8.95 9.07   HEMOGLOBIN g/dL 10.8* 11.2* 10.4*   HEMATOCRIT % 32.8* 34.0 32.4*   PLATELETS 10*3/mm3 313 292 285     Results from last 7 days   Lab Units 07/02/20  0433 07/01/20  2152 07/01/20  0833 06/30/20 2024   SODIUM mmol/L 137  --  139 136   POTASSIUM mmol/L 5.2 5.2 4.0 3.2*   CHLORIDE mmol/L 105  --  106 106   CO2 mmol/L 24.2  --  20.0* 24.1   BUN mg/dL 9  --  9 9   CREATININE mg/dL 0.55*  --  0.54* 0.52*   CALCIUM mg/dL 8.6  --  8.3* 8.4*   BILIRUBIN mg/dL 0.3  --   --  0.4   ALK PHOS U/L 54  --   --  54   ALT (SGPT) U/L 6  --   --  7   AST (SGOT) U/L 10  --   --  9   GLUCOSE mg/dL 143*  --  72 84           Assessment/Plan   This is a 75 y.o. female with:  1. Metastatic non-small cell lung cancer (adenocarcinoma):  · Patient with long-standing smoking history x40 years quit in 2008  · Underlying significant COPD with FEV1 1.4 (58%) on 11/20/2014  · CT 5/16/2019 with left upper lobe mass, partially cavitary measuring 2.3 x 1.5 cm.  Additional 8 mm similar appearing right upper lobe nodule.  Bibasilar consolidation, small bilateral pleural effusions, mediastinal lymphadenopathy up to 2.7 centimeters.  · Bronchoscopy 5/20/2019 with identification of left mainstem malignancy extending to the level of the jose, biopsy showed poorly differentiated adenocarcinoma of pulmonary origin. EBUS with FNA station 7 lymph node negative for malignant cells, only scattered lymphoid aggregates.  BAL left upper lobe negative for malignant cells.  Negative EGFR mutation, negative ALK/ROS 1 rearrangement. PDL1 95%.  · Patient was intubated with possible pneumonia, significant mucus plugging with underlying significant COPD.  Extubated on 5/28/2019.   · Staging evaluation performed during hospitalization with negative CT abdomen/pelvis, negative bone scan.  · MRI brain 5/29/2019 with evidence of metastatic disease, 3-4  enhancing lesions involving left occipital lobe 5 mm, left posterior parietal lobe 4 mm, left frontal lobe 4 mm, left parietal 3-4 mm with small amounts of surrounding edema.  Given the minimal extent of disease elected to observe with short interval MRI.  · Discussion regarding systemic therapy with single agent Keytruda due to PDL1 95%  · Subsequent disease progression on CT 6/8/2019 with increasing left upper lobe mass, mediastinal and hilar lymphadenopathy with occlusion of left mainstem bronchus at origin.  · Received palliative radiation therapy to left mainstem bronchus x10 fractions, completed 6/25/2019.  · Initiated systemic therapy with Keytruda 6/28/2019.  · CT scan following 2 cycles Keytruda and prior radiation from 8/6/2019 showed decrease in the left upper lobe primary lesion with essentially no residual discrete lung mass in that area.  Lymphadenopathy in the mediastinum/AP window and left hilum with significant improvement and resolution of left mainstem obstruction.  No new evidence of disease.  The patient did receive radiation therapy to both the primary left upper lobe mass and left hilum/mediastinum accounting for most of the improvement.  MRI brain 8/6/2019 with 3 enhancing metastatic lesions, 2 of which had a more nodular appearance as opposed to previous ring-enhancing appearance with stable size.  These were in the left occipital and left frontal regions.  The third lesion in the left parietal region decreased in size from 4 mm down to 2 mm.  There were no new lesions identified.  She has not undergone any specific treatment for her brain metastases.  Continuation of immunotherapy with Keytruda.  · Following 4 cycles Keytruda, 9/17/2019 CT scan showed improvement in AP window lymph node from 2.7 x 1.9 down to 2.1 x 1.5 cm.  Progressive change in the left lower lobe around the pleura, with one area having a masslike appearance suspected secondary to radiation pneumonitis rather than  malignancy.  MRI brain with small metastatic lesions decreased in size.  · CT scan 10/29/2019 following 6 cycles Keytruda with new/enlarging 2 cm left upper lobe lesion, stable mediastinal lymph nodes, increased air space opacities in the lungs bilaterally, new left adrenal nodule 1.3 cm.  MRI brain with stable to slightly improved findings 10/29/2019.  Cycle 7 Keytruda held 11/8/2019 with plans to pursue PET scan.  Unclear whether pulmonary findings represented infectious change versus pneumonitis from immunotherapy.  · PET scan 11/14/2019 with worsening areas of consolidation involving the lungs bilaterally, decrease in left upper lobe nodular opacification with minimal uptake, stable mildly hypermetabolic mediastinal and left hilar lymph nodes, intensely hypermetabolic left adrenal 1.7 cm lesion SUV 11.2.  With clinical improvement in pulmonary status, pulmonary changes suspected to represent pneumonitis from immunotherapy.  The only area of true progression in left adrenal, will pursue stereotactic radiation.  Patient will remain off immunotherapy, initiated prednisone 40 mg daily with subsequent taper.  · CT chest 12/4/2019 with improvement in left upper and lower lobe consolidation, slight further progression left adrenal.  Prednisone tapered.  · Left adrenal radiation completed 1/2/2020  · MRI brain and CT scan 1/27/2020 reviewed. MRI showed resolution of the 3 small residual areas of enhancement in the left cerebral hemisphere and no new areas of metastatic disease identified.  CT chest 1/27/2020 showed evidence of radiation fibrosis at the site of previous treatment in the left upper lobe, no evidence of previous nodular density seen in that location.  There was a new small left pleural effusion.  There was bronchial thickening and mucus plugging in bilateral lower lobes with consolidation/atelectasis left greater than right.  There were stable mediastinal lymph nodes.  There was a new masslike area of  subpleural consolidation right lower lobe measuring 4.1 x 2 x 4 cm of unclear etiology which may be related to atelectasis versus progressive malignancy.  She is a poor candidate for CT-guided biopsy with high risk for pneumothorax and has little pulmonary reserve to tolerate this.  It is likely not accessible via bronchoscopy given its peripheral location.  We discussed conservative management with short interval follow-up CT. With the possibility of progressive disease, we did discuss the option for any further systemic therapy.  This would require the use of chemotherapy.  The patient unfortunately is a poor candidate for palliative chemotherapy given her performance status and comorbidities at this point.  We discussed pursuing a palliative/supportive approach however she would like to know the status of her disease.  Therefore repeating scans for prognostic information is reasonable.  If she were to decline clinically or show more definitive evidence of progressive disease we would consider transitioning to hospice care.    · The patient was admitted 2/13-3/4/2020 with pneumonia, CT chest 2/13/2020 with decrease in left pleural effusion, decrease in left lower lobe consolidation, increased consolidation right lower lobe with evidence of mucous plugging. Patient underwent bronchoscopy 2/19/2028 with finding of copious secretions, cultures negative.  BAL specimen with negative cytology.  · Patient currently remains on course of observation in regards to underlying malignancy.  · The patient was able to return home from her hospitalization in March and has improved considerably over time by her report.  She is seen today via video visit due to the viral pandemic and as she is at very high risk for complications from viral infection we have elected not to bring her into the office at this time.  Clinically she is doing well from a respiratory standpoint, reports stable appetite and weight.  She does have worsening  fatigue and is requesting that we consider increasing her prednisone from 5 up to 10 mg daily.  This seems reasonable.  She has also been off her Zoloft and we will resume this at 25 mg daily as this may be a contributing factor.  We did discuss a nurse practitioner video assessment in 1 month and I will see her back in the office in 2 months.  Just before this we will pursue CT chest abdomen pelvis and MRI brain and we will recheck thyroid function studies on the day she returns.  If the patient declines in the interval, she would be a candidate for palliative/supportive care with home hospice involvement.  · Patient admitted July 1, 2020 with general progression of symptoms, weakness, shortness of breath, scans with evidence of pulmonary emboli and new right-sided pulmonary nodules, interval enlargement left adrenal mass.  · Discussed findings with the patient and  and they question whether there is additional possibility of treatment for her and we have also reviewed possibility of hospice.  · Patient agreed with hospice care July 2, 2020, consult initiated     2.  Pulmonary emboli  · Currently Lovenox 40 mg subcu every 12 hours.  Plan to switch to Eliquis twice daily           3. 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  · Patient extubated 5/28/2019.  · Subsequent occlusion of left mainstem bronchus from malignancy 6/8/2019.  Received palliative radiation completed 6/25/2019.  · Patient hospitalized while in [place] at [facility] in October 2019 due to suspected aspiration pneumonia.  Doubtful that pulmonary findings are related to immunotherapy due to improvement on antibiotics and productive cough.  · Patient with decline in respiratory status in October 2019 requiring oxygen to be initiated.  Suspected related to  immunotherapy induced pneumonitis.  Immunotherapy discontinued and initiated empiric prednisone 40 mg daily on 11/20/2019.  · Patient with overall improvement in respiratory status, currently on oxygen 1 L nasal cannula at night only with improvement in cough.    · The patient tapered prednisone down to 5 mg daily on 12/26/2019.  She developed declining weight and appetite with increase in prednisone back to 10 mg daily on 1/24/2020.  · Prednisone was tapered following hospitalization in March to 5 mg daily  · This was increased on subsequent office visit June 8, 2020.  The patient remains on 10 mg p.o. twice daily after admission July 1, 2020  4. Prior hemorrhagic CVA:  · Occurred in 2008, residual left upper extremity weakness and left lower extremity weakness.   5. Anemia:  · Hemoglobin prior to admission was 13.1 on 5/16/2019  · Hemoglobin declined in the 10-11 range, related to malignancy, pneumonia  · Hemoglobin on 1/8/2020 declined down to 9.9.  Additional labs performed with iron 21, ferritin 454, iron saturation 9%, TIBC 223, B12 348, folate 3.77.  Appears that patient has anemia of chronic disease as well as anemia related to folate deficiency.  Initiated oral folic acid 1 mg daily.  · Hemoglobin declined during hospitalization 2/13-3/4/2020 and required transfusion support  · Follow-up CBC stable July 2 with H&H of 10.8 and 32.8   6. Hypothyroidism:  · Labs 6/28/2019 with TSH 23.8, normal free T4 of 1.43  · Patient was started on levothyroxine 100 mcg daily  · Repeat thyroid function studies on 8/9/2019 with TSH suppressed at 0.065 and elevated free T4 of 2.06.  Decreased levothyroxine dose to 50 mcg daily.    · 11/8/2019 TSH 37.5, free T4 1.15, levothyroxine dose increased to 75 mcg daily.  · Thyroid function studies on 2/4/2020 with TSH 5.08, free T4 1.43  · We will recheck thyroid function studies when patient returns in 2 months.  For now she will continue on levothyroxine at 75 mcg daily.  7.  Depression and nausea/anorexia:  · Patient with depression related to her underlying medical illness as well as social situation with her son who is an alcoholic and lives in the home  · Patient has been treated with Depakote 250 mg every 12 hours for mood stabilization under the direction of her primary care physician  · Patient with significant depressive symptoms, currently continuing on Zoloft under the direction of her primary care physician.  · Patient also with ongoing anorexia and nausea, initiated Zyprexa 2.5 mg nightly on 7/19/2019 with dramatic improvement in symptoms  · Patient seen in the supportive oncology clinic on 7/29/2019, tapered off Depakote.  · Patient has PRN Compazine and Zyprexa to use.  · Zyprexa dose is currently 2.5 mg nightly  · Zoloft restarted at 25 mg daily June 8, 2020                Arben Franks MD  7/2/2020  14:42

## 2020-07-03 NOTE — NURSING NOTE
I met with Mrs Moura, her  Michael and their 3 children to provide a explanation of [facility] services with the focus on home support. We discussed pt's diagnosis, her treatment course, and goals of care moving forward. She explained that no further treatment options are available and she plans to move forward with [facility] care after discharge. She already has oxygen in the home and declines needing a hospital bed at this time. For that reason, we can schedule to meet and admit pt in her home following hospital discharge. Please feel free to call our office for any questions or changes in pt condition.     *Please notify our office at discharge so we will know to follow up with patient*    Thank You,  Cherry Brandt RN  [facility] Admissions Coordinator  [phone]

## 2020-07-03 NOTE — PROGRESS NOTES
[facility] INPATIENT PROGRESS NOTE  Subjective Patient more comfortable, met with hospice this a.m.    CC:  Pulmonary emboli, progression of non-small cell lung cancer.    Interval history:       Anaid Moura is a 75 y.o. female who we are asked to see July 1, 2020 in consultation for her history of non-small cell lung carcinoma.      She last been seen by telemedicine conference June 8 by Dr. Godfrey.  She had a prolonged hospital stay from February 13 into March 4 related to to pneumonia and acute on chronic hypoxic respiratory failure.  She underwent a series of studies showing evidence of pneumonia but no evidence to support progressive malignancy.  She underwent bronchoscopy for 19th that was negative and eventually improved antibiotic therapy.  She was not felt a candidate for additional treatment even if progression was documented and observation was planned.  She was reviewed by telemedicine visit June 8, 2020 having improved per her general baseline function as well as her respiratory status, stable appetite.  Her medications were adjusted to include prednisone 10 mg daily for her fatigue, restart of folic acid, Zyprexa and levothyroxine.       The patient, unfortunately, presented with worsening symptoms with weakness, lethargy, shortness of breath particular upon exertion.  This led to a follow-up CT of chest June 30 demonstrating a filling defect left pulmonary artery with extension to its bifurcation, narrowing left pulmonary artery and pulmonary arterial branch concerning for adherent thrombus.  This is approximately at the site of the prior lung cancer with tumoral extension into the pulmonary artery not excluded though a mass is not present.  There is persistent bibasilar consolidation, increasing consolidation left lung base and new right-sided pulmonary nodules as well as a new development of left adrenal mass that could be associated with metastatic disease.  The patient was  admitted for anticoagulation.  Distal studies include an H&H of 10.4 and 32.4, white count of 9070,, CMP with creatinine of 0.52, acute potassium 3.2, albumin 2.0, normal transaminases, normal troponin, BNP of 1670, procalcitonin of 0.07, normal respiratory panel, normal COVID-19 testing.  Patient currently maintained on Lovenox at 40 mg 12 hours, Rocephin and bronchodilators as well as Pulmicort.     The patient is visited just after she returns from Doppler examinations lower extremities with results pending.  She is apprised of the results on scans and wishes to discuss this with Dr. Godfrey and consider any additional therapy that might be available.  We have also discussed the possibility of hospice care as well.     Patient circumstances were discussed with Dr. Godfrey who indicates there are no additional therapies available patients malignancy.  I discussed these issues with the patient and her  and offered palliative care.  Hospice would be appropriate and they agree to meet with them.      The patient is next seen July 3, 2020 with plans for hospice at discharge.      .  Medications:  The current medication list was reviewed in the EMR.    Allergies:    Allergies   Allergen Reactions   • Ace Inhibitors Cough     Other reaction(s): Cough       Objective      Vitals:    07/03/20 0754   BP: 106/63   Pulse: 102   Resp: 16   Temp: 98 °F (36.7 °C)   SpO2: 90%        Physical exam:   GEN:   appears ill, very thin, AxOx3  HEENT: PERRL, EOMI, no icterus, mmm, no jvd, trachea midline, neck supple  CHEST: decreased ae bilat, no wheezes, + crackles, no use of accessory muscles  CV: RRR, no m/g/r  ABD: soft, nt, nd +bs, no hepatosplenomegaly  EXT: no c/c/e  SKIN: no rashes, no xanthomas, nl turgor, warm, dry  LYMPH: no palpable cervical or supraclavicular lymphadenopathy  NEURO: CN 2-12 intact and symmetric bilaterally  PSYCH: nl affect, nl orientation, nl judgement, nl mood  MSK: some kyphosis, 5/5 strenght on the  right, left side is paralyzed      RECENT LABS:    Results from last 7 days   Lab Units 07/03/20  0430 07/02/20  0433 07/01/20  0833   WBC 10*3/mm3 8.03 9.00 8.95   HEMOGLOBIN g/dL 10.3* 10.8* 11.2*   HEMATOCRIT % 31.7* 32.8* 34.0   PLATELETS 10*3/mm3 323 313 292     Results from last 7 days   Lab Units 07/03/20  0430 07/02/20  0433 07/01/20  2152 07/01/20  0833 06/30/20 2024   SODIUM mmol/L 137 137  --  139 136   POTASSIUM mmol/L 4.9 5.2 5.2 4.0 3.2*   CHLORIDE mmol/L 103 105  --  106 106   CO2 mmol/L 25.4 24.2  --  20.0* 24.1   BUN mg/dL 12 9  --  9 9   CREATININE mg/dL 0.60 0.55*  --  0.54* 0.52*   CALCIUM mg/dL 9.0 8.6  --  8.3* 8.4*   BILIRUBIN mg/dL  --  0.3  --   --  0.4   ALK PHOS U/L  --  54  --   --  54   ALT (SGPT) U/L  --  6  --   --  7   AST (SGOT) U/L  --  10  --   --  9   GLUCOSE mg/dL 153* 143*  --  72 84           Assessment/Plan   This is a 75 y.o. female with:  1. Metastatic non-small cell lung cancer (adenocarcinoma):  · Patient with long-standing smoking history x40 years quit in 2008  · Underlying significant COPD with FEV1 1.4 (58%) on 11/20/2014  · CT 5/16/2019 with left upper lobe mass, partially cavitary measuring 2.3 x 1.5 cm.  Additional 8 mm similar appearing right upper lobe nodule.  Bibasilar consolidation, small bilateral pleural effusions, mediastinal lymphadenopathy up to 2.7 centimeters.  · Bronchoscopy 5/20/2019 with identification of left mainstem malignancy extending to the level of the jose, biopsy showed poorly differentiated adenocarcinoma of pulmonary origin. EBUS with FNA station 7 lymph node negative for malignant cells, only scattered lymphoid aggregates.  BAL left upper lobe negative for malignant cells.  Negative EGFR mutation, negative ALK/ROS 1 rearrangement. PDL1 95%.  · Patient was intubated with possible pneumonia, significant mucus plugging with underlying significant COPD.  Extubated on 5/28/2019.   · Staging evaluation performed during hospitalization with  negative CT abdomen/pelvis, negative bone scan.  · MRI brain 5/29/2019 with evidence of metastatic disease, 3-4 enhancing lesions involving left occipital lobe 5 mm, left posterior parietal lobe 4 mm, left frontal lobe 4 mm, left parietal 3-4 mm with small amounts of surrounding edema.  Given the minimal extent of disease elected to observe with short interval MRI.  · Discussion regarding systemic therapy with single agent Keytruda due to PDL1 95%  · Subsequent disease progression on CT 6/8/2019 with increasing left upper lobe mass, mediastinal and hilar lymphadenopathy with occlusion of left mainstem bronchus at origin.  · Received palliative radiation therapy to left mainstem bronchus x10 fractions, completed 6/25/2019.  · Initiated systemic therapy with Keytruda 6/28/2019.  · CT scan following 2 cycles Keytruda and prior radiation from 8/6/2019 showed decrease in the left upper lobe primary lesion with essentially no residual discrete lung mass in that area.  Lymphadenopathy in the mediastinum/AP window and left hilum with significant improvement and resolution of left mainstem obstruction.  No new evidence of disease.  The patient did receive radiation therapy to both the primary left upper lobe mass and left hilum/mediastinum accounting for most of the improvement.  MRI brain 8/6/2019 with 3 enhancing metastatic lesions, 2 of which had a more nodular appearance as opposed to previous ring-enhancing appearance with stable size.  These were in the left occipital and left frontal regions.  The third lesion in the left parietal region decreased in size from 4 mm down to 2 mm.  There were no new lesions identified.  She has not undergone any specific treatment for her brain metastases.  Continuation of immunotherapy with Keytruda.  · Following 4 cycles Keytruda, 9/17/2019 CT scan showed improvement in AP window lymph node from 2.7 x 1.9 down to 2.1 x 1.5 cm.  Progressive change in the left lower lobe around the  pleura, with one area having a masslike appearance suspected secondary to radiation pneumonitis rather than malignancy.  MRI brain with small metastatic lesions decreased in size.  · CT scan 10/29/2019 following 6 cycles Keytruda with new/enlarging 2 cm left upper lobe lesion, stable mediastinal lymph nodes, increased air space opacities in the lungs bilaterally, new left adrenal nodule 1.3 cm.  MRI brain with stable to slightly improved findings 10/29/2019.  Cycle 7 Keytruda held 11/8/2019 with plans to pursue PET scan.  Unclear whether pulmonary findings represented infectious change versus pneumonitis from immunotherapy.  · PET scan 11/14/2019 with worsening areas of consolidation involving the lungs bilaterally, decrease in left upper lobe nodular opacification with minimal uptake, stable mildly hypermetabolic mediastinal and left hilar lymph nodes, intensely hypermetabolic left adrenal 1.7 cm lesion SUV 11.2.  With clinical improvement in pulmonary status, pulmonary changes suspected to represent pneumonitis from immunotherapy.  The only area of true progression in left adrenal, will pursue stereotactic radiation.  Patient will remain off immunotherapy, initiated prednisone 40 mg daily with subsequent taper.  · CT chest 12/4/2019 with improvement in left upper and lower lobe consolidation, slight further progression left adrenal.  Prednisone tapered.  · Left adrenal radiation completed 1/2/2020  · MRI brain and CT scan 1/27/2020 reviewed. MRI showed resolution of the 3 small residual areas of enhancement in the left cerebral hemisphere and no new areas of metastatic disease identified.  CT chest 1/27/2020 showed evidence of radiation fibrosis at the site of previous treatment in the left upper lobe, no evidence of previous nodular density seen in that location.  There was a new small left pleural effusion.  There was bronchial thickening and mucus plugging in bilateral lower lobes with consolidation/atelectasis  left greater than right.  There were stable mediastinal lymph nodes.  There was a new masslike area of subpleural consolidation right lower lobe measuring 4.1 x 2 x 4 cm of unclear etiology which may be related to atelectasis versus progressive malignancy.  She is a poor candidate for CT-guided biopsy with high risk for pneumothorax and has little pulmonary reserve to tolerate this.  It is likely not accessible via bronchoscopy given its peripheral location.  We discussed conservative management with short interval follow-up CT. With the possibility of progressive disease, we did discuss the option for any further systemic therapy.  This would require the use of chemotherapy.  The patient unfortunately is a poor candidate for palliative chemotherapy given her performance status and comorbidities at this point.  We discussed pursuing a palliative/supportive approach however she would like to know the status of her disease.  Therefore repeating scans for prognostic information is reasonable.  If she were to decline clinically or show more definitive evidence of progressive disease we would consider transitioning to hospice care.    · The patient was admitted 2/13-3/4/2020 with pneumonia, CT chest 2/13/2020 with decrease in left pleural effusion, decrease in left lower lobe consolidation, increased consolidation right lower lobe with evidence of mucous plugging. Patient underwent bronchoscopy 2/19/2028 with finding of copious secretions, cultures negative.  BAL specimen with negative cytology.  · Patient currently remains on course of observation in regards to underlying malignancy.  · The patient was able to return home from her hospitalization in March and has improved considerably over time by her report.  She is seen today via video visit due to the viral pandemic and as she is at very high risk for complications from viral infection we have elected not to bring her into the office at this time.  Clinically she is  doing well from a respiratory standpoint, reports stable appetite and weight.  She does have worsening fatigue and is requesting that we consider increasing her prednisone from 5 up to 10 mg daily.  This seems reasonable.  She has also been off her Zoloft and we will resume this at 25 mg daily as this may be a contributing factor.  We did discuss a nurse practitioner video assessment in 1 month and I will see her back in the office in 2 months.  Just before this we will pursue CT chest abdomen pelvis and MRI brain and we will recheck thyroid function studies on the day she returns.  If the patient declines in the interval, she would be a candidate for palliative/supportive care with home hospice involvement.  · Patient admitted July 1, 2020 with general progression of symptoms, weakness, shortness of breath, scans with evidence of pulmonary emboli and new right-sided pulmonary nodules, interval enlargement left adrenal mass.  · Discussed findings with the patient and  and they question whether there is additional possibility of treatment for her and we have also reviewed possibility of hospice.  · Patient agreed with hospice care July 2, 2020, consult initiated  · Patient seen July 3, 2020, hospice plan to discharge.     2.  Pulmonary emboli  · Switched to Eliquis twice daily           3. 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  · Patient extubated 5/28/2019.  · Subsequent occlusion of left mainstem bronchus from malignancy 6/8/2019.  Received palliative radiation completed 6/25/2019.  · Patient hospitalized while in [place] at [facility] in October 2019 due to suspected aspiration pneumonia.  Doubtful that pulmonary findings are related to immunotherapy due to improvement on antibiotics and productive cough.  · Patient with  decline in respiratory status in October 2019 requiring oxygen to be initiated.  Suspected related to immunotherapy induced pneumonitis.  Immunotherapy discontinued and initiated empiric prednisone 40 mg daily on 11/20/2019.  · Patient with overall improvement in respiratory status, currently on oxygen 1 L nasal cannula at night only with improvement in cough.    · The patient tapered prednisone down to 5 mg daily on 12/26/2019.  She developed declining weight and appetite with increase in prednisone back to 10 mg daily on 1/24/2020.  · Prednisone was tapered following hospitalization in March to 5 mg daily  · This was increased on subsequent office visit June 8, 2020.  The patient remains on 10 mg p.o. twice daily after admission July 1, 2020  4. Prior hemorrhagic CVA:  · Occurred in 2008, residual left upper extremity weakness and left lower extremity weakness.   5. Anemia:  · Hemoglobin prior to admission was 13.1 on 5/16/2019  · Hemoglobin declined in the 10-11 range, related to malignancy, pneumonia  · Hemoglobin on 1/8/2020 declined down to 9.9.  Additional labs performed with iron 21, ferritin 454, iron saturation 9%, TIBC 223, B12 348, folate 3.77.  Appears that patient has anemia of chronic disease as well as anemia related to folate deficiency.  Initiated oral folic acid 1 mg daily.  · Hemoglobin declined during hospitalization 2/13-3/4/2020 and required transfusion support  · Follow-up CBC stable  · 3010.3 and 31.7  6. Hypothyroidism:  · Labs 6/28/2019 with TSH 23.8, normal free T4 of 1.43  · Patient was started on levothyroxine 100 mcg daily  · Repeat thyroid function studies on 8/9/2019 with TSH suppressed at 0.065 and elevated free T4 of 2.06.  Decreased levothyroxine dose to 50 mcg daily.    · 11/8/2019 TSH 37.5, free T4 1.15, levothyroxine dose increased to 75 mcg daily.  · Thyroid function studies on 2/4/2020 with TSH 5.08, free T4 1.43  · We will recheck thyroid function studies when patient  returns in 2 months.  For now she will continue on levothyroxine at 75 mcg daily.  7. Depression and nausea/anorexia:  · Patient with depression related to her underlying medical illness as well as social situation with her son who is an alcoholic and lives in the home  · Patient has been treated with Depakote 250 mg every 12 hours for mood stabilization under the direction of her primary care physician  · Patient with significant depressive symptoms, currently continuing on Zoloft under the direction of her primary care physician.  · Patient also with ongoing anorexia and nausea, initiated Zyprexa 2.5 mg nightly on 7/19/2019 with dramatic improvement in symptoms  · Patient seen in the supportive oncology clinic on 7/29/2019, tapered off Depakote.  · Patient has PRN Compazine and Zyprexa to use.  · Zyprexa dose is currently 2.5 mg nightly  · Zoloft restarted at 25 mg daily June 8, 2020                Arben Franks MD  7/3/2020  12:18

## 2020-07-03 NOTE — PROGRESS NOTES
DAILY PROGRESS NOTE  [facility], [facility]    7/3/2020    Patient Identification:  Name: Anaid Moura  Age: 75 y.o.  Sex: female  :  1945  MRN: [id number]           Primary Care Physician: Marquis Gonsales MD    Subjective:    Interval History:    Patient is CT scan reviewed.  Appreciate oncologist input.  Family and patient accepted the situation.  To have a meeting with hospice tomorrow morning.  On Lovenox for pulmonary embolism, to switch to Eliquis.  Prednisone increased to 10 mg twice a day  Placed on Remeron for appetite stimulant, however, per  patient was sleepy today.  Sodium 137, creatinine 0.55, hemoglobin 10.8,  Doppler ultrasound of the lower extremity was normal.    ROS:     No nausea, vomiting.  No diarrhea, no constipation.  Still eating a little.    Objective:    Scheduled Meds:    apixaban 10 mg Oral Q12H   Followed by      [START ON 7/10/2020] apixaban 5 mg Oral Q12H   aspirin 81 mg Oral Daily   atorvastatin 80 mg Oral Daily   budesonide 0.5 mg Nebulization BID - RT   ipratropium-albuterol 3 mL Nebulization 4x Daily - RT   levothyroxine 75 mcg Oral Q AM   mirtazapine 15 mg Oral Nightly   OLANZapine 2.5 mg Oral Nightly   Petrolatum  Topical Q12H   predniSONE 10 mg Oral BID With Meals       Continuous Infusions:       PRN Meds:  •  albuterol  •  guaiFENesin  •  potassium chloride **OR** potassium chloride **OR** potassium chloride  •  [COMPLETED] Insert peripheral IV **AND** sodium chloride    Intake/Output:    Intake/Output Summary (Last 24 hours) at 7/3/2020 1636  Last data filed at 7/3/2020 1437  Gross per 24 hour   Intake 716 ml   Output 1425 ml   Net -709 ml         Exam:    tMax 24 hrs: Temp (24hrs), Av.8 °F (36.6 °C), Min:97.5 °F (36.4 °C), Max:98.1 °F (36.7 °C)    Vitals Ranges:   Temp:  [97.5 °F (36.4 °C)-98.1 °F (36.7 °C)] 97.6 °F (36.4 °C)  Heart Rate:  [] 98  Resp:  [16-18] 16  BP: (93137)/(50-87) 93/50    BP 93/50  "(BP Location: Right arm, Patient Position: Lying)   Pulse 98   Temp 97.6 °F (36.4 °C) (Oral)   Resp 16   Ht 162.6 cm (64.02\")   Wt 39 kg (86 lb)   SpO2 95%   BMI 14.75 kg/m²     General: Alert, oriented x 3. Cooperative, no distress while on oxygen. Appears stated age.  Looks cachectic.  Neck: Supple, symmetrical, trachea midline, no adenopathy;              thyroid:  no enlargement/tenderness/nodules;              no carotid bruit or JVD  Cardiovascular: Normal rate, regular rhythm and intact distal pulses.              Exam reveals no gallop and no friction rub. No murmur heard  Pulmonary: Decreased breath sounds bilaterally, rhonchi bibasilarly, more on the right side.  No wheezing, no rales in auscultation.    Abdominal: Soft, nontender, bowel sounds active all four quadrants,     no masses, no hepatomegaly, no splenomegaly.   Extremities: Normal, atraumatic, no cyanosis or edema  Pulses: 2 + symmetric all extremities  Neurological: Patient is alert and oriented to person, place, and time.                 CNII-XII intact, normal sensation intact throughout  Skin: Skin color, texture, normal. Turgor is decreased. No rashes or lesions         Data Review:    Results from last 7 days   Lab Units 07/03/20  0430 07/02/20  0433 07/01/20  0833   WBC 10*3/mm3 8.03 9.00 8.95   HEMOGLOBIN g/dL 10.3* 10.8* 11.2*   HEMATOCRIT % 31.7* 32.8* 34.0   PLATELETS 10*3/mm3 323 313 292       Results from last 7 days   Lab Units 07/03/20  0430 07/02/20  0433 07/01/20  2152 07/01/20  0833 06/30/20 2024   SODIUM mmol/L 137 137  --  139 136   POTASSIUM mmol/L 4.9 5.2 5.2 4.0 3.2*   CHLORIDE mmol/L 103 105  --  106 106   CO2 mmol/L 25.4 24.2  --  20.0* 24.1   BUN mg/dL 12 9  --  9 9   CREATININE mg/dL 0.60 0.55*  --  0.54* 0.52*   CALCIUM mg/dL 9.0 8.6  --  8.3* 8.4*   BILIRUBIN mg/dL  --  0.3  --   --  0.4   ALK PHOS U/L  --  54  --   --  54   ALT (SGPT) U/L  --  6  --   --  7   AST (SGOT) U/L  --  10  --   --  9   GLUCOSE mg/dL " 153* 143*  --  72 84                 Lab Results   Lab Value Date/Time    TROPONINT <0.010 06/30/2020 2024    TROPONINT <0.010 04/30/2020 1127    TROPONINT <0.010 02/22/2020 0542    TROPONINT <0.010 02/21/2020 1411    TROPONINT 0.109 (C) 02/13/2020 1429    TROPONINT 0.091 (C) 02/13/2020 1145    TROPONINT 0.02 10/31/2019 1837    TROPONINT <0.010 06/06/2019 2129    TROPONINT <0.010 05/27/2019 0735    TROPONINT <0.010 05/16/2019 1918       Microbiology Results (last 10 days)     Procedure Component Value - Date/Time    Respiratory Culture - Sputum, Cough [[id number]] Collected:  07/01/20 1629    Lab Status:  Final result Specimen:  Sputum from Cough Updated:  07/03/20 1206     Respiratory Culture Scant growth (1+) Normal Respiratory Sakina     Gram Stain Rare (1+) Mixed bacterial morphotypes seen on Gram Stain      Rare (1+) Epithelial cells per low power field      Rare (1+) WBCs per low power field    Respiratory Panel, PCR - Swab, Nasopharynx [[id number]]  (Normal) Collected:  06/30/20 2333    Lab Status:  Final result Specimen:  Swab from Nasopharynx Updated:  07/01/20 0035     ADENOVIRUS, PCR Not Detected     Coronavirus 229E Not Detected     Coronavirus HKU1 Not Detected     Coronavirus NL63 Not Detected     Coronavirus OC43 Not Detected     Human Metapneumovirus Not Detected     Human Rhinovirus/Enterovirus Not Detected     Influenza B PCR Not Detected     Parainfluenza Virus 1 Not Detected     Parainfluenza Virus 2 Not Detected     Parainfluenza Virus 3 Not Detected     Parainfluenza Virus 4 Not Detected     Bordetella pertussis pcr Not Detected     Influenza A H1 2009 PCR Not Detected     Chlamydophila pneumoniae PCR Not Detected     Mycoplasma pneumo by PCR Not Detected     Influenza A PCR Not Detected     Influenza A H3 Not Detected     Influenza A H1 Not Detected     RSV, PCR Not Detected     Bordetella parapertussis PCR Not Detected    Narrative:       The coronavirus on the RVP is NOT COVID-19 and is NOT  indicative of infection with COVID-19.     COVID-19,[facility], NP SWAB IN TRANSPORT MEDIA 8-12 HR TAT - Swab, Nasopharynx [[id number]]  (Normal) Collected:  06/30/20 2333    Lab Status:  Final result Specimen:  Swab from Nasopharynx Updated:  07/01/20 0120     COVID19 Not Detected    Narrative:       Fact sheet for providers: https://www.fda.gov/media/001286/download     Fact sheet for patients: https://www.fda.gov/media/307512/download           Imaging Results (Last 7 Days)     Procedure Component Value Units Date/Time    CT Angiogram Chest [[id number]] Collected:  06/30/20 2239     Updated:  06/30/20 2306    Narrative:       CT ANGIOGRAM OF THE CHEST     HISTORY: Weakness of air. Possible pneumonia.     COMPARISON: 02/13/2020     TECHNIQUE: Axial CT imaging was obtained through the thorax. IV contrast  was administered. Three-D reformatted images were obtained.     FINDINGS:  The thoracic aorta is normal in caliber. Examination was not optimized  for the aorta, but no obvious dissection is seen. There is calcification  of the aorta. There is a peripheral filling defect seen within the left  pulmonary artery, extending to its bifurcation, and with narrowing of  both the left pulmonary artery, and the pulmonary arterial branch to the  left lower lobe.. It is new when compared to prior examination, and is  worrisome for adherent thrombus, although the distal vessels appear  widely patent. This is near the site of the patient's prior treated lung  cancer, and potentially could reflect some tumor extension into the left  pulmonary artery, although a discrete mass within the mediastinum is not  seen. Advanced background emphysematous changes are seen. Since prior  examination, the patient has developed scattered micronodules within the  right lung. The single largest is identified within the right upper  lobe, and measures 7 mm in size. Interval development of these nodules  is concerning for metastatic disease,  and the patient has a left adrenal  nodule, which has increased significantly when compared to prior exam,  now measuring 3.2 x 2.9 cm. Previously, it measured 2.3 x 1.2 cm. There  is reflux of contrast material into the inferior vena cava, which can be  seen in the setting of right-sided heart failure. Trace pericardial  fluid is stable. Dense consolidation is noted at the lung bases  bilaterally. This actually appears improved on the right, but has  worsened on the left. These may reflect areas of mucus plugging, with  associated atelectasis, but pneumonia is obviously not excluded. There  is a trace right pleural effusion, and small left pleural effusion,  which is increased when compared to the prior study. No acute osseous  abnormalities are seen. The heart is enlarged. Left kidney is atrophic.       Impression:          1. Peripheral filling defect identified within the left pulmonary  artery, with extension to its bifurcation, and with narrowing of the  left pulmonary artery and pulmonary arterial branch the left lower lobe.  Its appearance is concerning for adherent thrombus. This is near the  site of the patient's prior lung cancer, and tumoral extension into the  pulmonary artery is not excluded, although a discrete mass within the  mediastinum is not seen.  2. Persistent bibasilar consolidation. Aeration at the right lung base  has improved, although there is increasing consolidation at the left  lung base. Pneumonia is not excluded.  3. New right-sided pulmonary nodules, as well as interval enlargement of  the left adrenal mass, worrisome for progressive metastatic disease.     FINDINGS were relayed to Dr. Plata in the emergency department at 10:52  PM.     Radiation dose reduction techniques were utilized, including automated  exposure control and exposure modulation based on body size.     This report was finalized on 6/30/2020 11:03 PM by Dr. Fadia Townsend M.D.       XR Chest 1 View [[id number]]  Collected:  06/30/20 2048     Updated:  06/30/20 2056    Narrative:       PORTABLE RADIOGRAPHIC VIEW OF THE CHEST     CLINICAL HISTORY: Weakness.     FINDINGS: Frontal projection of the chest is compared to prior study of  04/30/2020. The lungs are hyperinflated compatible with COPD. There is  prominence of the left hilum which is unchanged when compared to the  prior study. This is the site of chronic scarring in the location of a  previously treated lung cancer. There are new airspace opacities within  both lower lobes, left greater than right, which could be representative  of areas of pneumonia. Please correlate with clinical data.  Additionally, there is a suggestion of a left pleural effusion. The  cardiomediastinal silhouette is enlarged and appears larger in size when  compared to the previous examination of 04/30/2020. This could represent  intrinsic cardiac enlargement or a pericardial effusion. The osseous  structures are unremarkable. If further assessment is indicated, one  could obtain a CT scan of the chest. These findings were discussed with  Dr. Plata on 06/30/2020 at approximately 8:40 PM.     This report was finalized on 6/30/2020 8:53 PM by Dr. Michael Santiago M.D.               Assessment:        Pulmonary embolism (CMS/HCC)    Adenocarcinoma, lung, left (CMS/HCC)    Hypokalemia    Pneumonia of right lower lobe due to infectious organism    Chronic obstructive pulmonary disease (CMS/HCC)  Malnourishment      Plan:    Continue Lovenox  Agree with increasing prednisone  To meet hospice tomorrow morning  Monitor and correct electrolytes  We will add protein shakes  Monitor mental status  Continue home medications  DVT/stress ulcer prophylaxis  Labs in am      Marquis Gonsales MD  7/3/2020

## 2020-07-03 NOTE — PLAN OF CARE
Problem: Patient Care Overview  Goal: Plan of Care Review  Outcome: Ongoing (interventions implemented as appropriate)  VSS. 2L NC. AOx4. q2 turn. Patients IV infiltrated in the right AC. Ice pack applied and extremity elevated. Swelling went down, no tenderness. Yamilex and lovenox dc. Will ctm.  Goal: Individualization and Mutuality  Outcome: Ongoing (interventions implemented as appropriate)  Goal: Discharge Needs Assessment  Outcome: Ongoing (interventions implemented as appropriate)  Goal: Interprofessional Rounds/Family Conf  Outcome: Ongoing (interventions implemented as appropriate)     Problem: Activity Intolerance (Adult)  Goal: Identify Related Risk Factors and Signs and Symptoms  Outcome: Ongoing (interventions implemented as appropriate)  Goal: Activity Tolerance  Outcome: Ongoing (interventions implemented as appropriate)  Goal: Effective Energy Conservation Techniques  Outcome: Ongoing (interventions implemented as appropriate)     Problem: Infection, Risk/Actual (Adult)  Goal: Identify Related Risk Factors and Signs and Symptoms  Outcome: Ongoing (interventions implemented as appropriate)  Goal: Infection Prevention/Resolution  Outcome: Ongoing (interventions implemented as appropriate)     Problem: Fall Risk (Adult)  Goal: Identify Related Risk Factors and Signs and Symptoms  Outcome: Ongoing (interventions implemented as appropriate)  Goal: Absence of Fall  Outcome: Ongoing (interventions implemented as appropriate)     Problem: Wound (Includes Pressure Injury) (Adult)  Goal: Signs and Symptoms of Listed Potential Problems Will be Absent, Minimized or Managed (Wound)  Outcome: Ongoing (interventions implemented as appropriate)     Problem: Skin Injury Risk (Adult)  Goal: Identify Related Risk Factors and Signs and Symptoms  Outcome: Ongoing (interventions implemented as appropriate)  Goal: Skin Health and Integrity  Outcome: Ongoing (interventions implemented as appropriate)     Problem: VTE,  DVT and PE (Adult)  Goal: Signs and Symptoms of Listed Potential Problems Will be Absent, Minimized or Managed (VTE, DVT and PE)  Outcome: Ongoing (interventions implemented as appropriate)

## 2020-07-03 NOTE — PROGRESS NOTES
DAILY PROGRESS NOTE  [facility], [facility]    2020    Patient Identification:  Name: Anaid Moura  Age: 75 y.o.  Sex: female  :  1945  MRN: [id number]           Primary Care Physician: Marquis Gonsales MD    Subjective:    Interval History:    Patient is CT scan reviewed.  Appreciate oncologist input.  Family and patient accepted the situation.  To have a meeting with hospice tomorrow morning.  On Lovenox for pulmonary embolism, to switch to Eliquis.  Prednisone increased to 10 mg twice a day  Placed on Remeron for appetite stimulant, however, per  patient was sleepy today.  Sodium 137, creatinine 0.55, hemoglobin 10.8,  Doppler ultrasound of the lower extremity was normal.    ROS:     No nausea, vomiting.  No diarrhea, no constipation.  Still eating a little.    Objective:    Scheduled Meds:    aspirin 81 mg Oral Daily   atorvastatin 80 mg Oral Daily   budesonide 0.5 mg Nebulization BID - RT   cefTRIAXone 1 g Intravenous Q24H   enoxaparin 40 mg Subcutaneous Q12H   ipratropium-albuterol 3 mL Nebulization 4x Daily - RT   levothyroxine 75 mcg Oral Q AM   mirtazapine 15 mg Oral Nightly   OLANZapine 2.5 mg Oral Nightly   Petrolatum  Topical Q12H   predniSONE 10 mg Oral BID With Meals       Continuous Infusions:       PRN Meds:  •  albuterol  •  guaiFENesin  •  potassium chloride **OR** potassium chloride **OR** potassium chloride  •  [COMPLETED] Insert peripheral IV **AND** sodium chloride    Intake/Output:    Intake/Output Summary (Last 24 hours) at 2020  Last data filed at 2020  Gross per 24 hour   Intake 160 ml   Output 1450 ml   Net -1290 ml         Exam:    tMax 24 hrs: Temp (24hrs), Av.9 °F (36.6 °C), Min:97.5 °F (36.4 °C), Max:98.5 °F (36.9 °C)    Vitals Ranges:   Temp:  [97.5 °F (36.4 °C)-98.5 °F (36.9 °C)] 98.1 °F (36.7 °C)  Heart Rate:  [] 102  Resp:  [16-20] 16  BP: (107-145)/(57-84) 107/62    /62 (BP Location:  "Right arm, Patient Position: Lying)   Pulse 102   Temp 98.1 °F (36.7 °C) (Oral)   Resp 16   Ht 162.6 cm (64.02\")   Wt 39 kg (86 lb)   SpO2 97%   BMI 14.75 kg/m²     General: Alert, oriented x 3. Cooperative, no distress while on oxygen. Appears stated age.  Looks cachectic.  Neck: Supple, symmetrical, trachea midline, no adenopathy;              thyroid:  no enlargement/tenderness/nodules;              no carotid bruit or JVD  Cardiovascular: Normal rate, regular rhythm and intact distal pulses.              Exam reveals no gallop and no friction rub. No murmur heard  Pulmonary: Decreased breath sounds bilaterally, rhonchi bibasilarly, more on the right side.  No wheezing, no rales in auscultation.    Abdominal: Soft, nontender, bowel sounds active all four quadrants,     no masses, no hepatomegaly, no splenomegaly.   Extremities: Normal, atraumatic, no cyanosis or edema  Pulses: 2 + symmetric all extremities  Neurological: Patient is alert and oriented to person, place, and time.                 CNII-XII intact, normal sensation intact throughout  Skin: Skin color, texture, normal. Turgor is decreased. No rashes or lesions         Data Review:    Results from last 7 days   Lab Units 07/02/20  0433 07/01/20  0833 06/30/20  2024   WBC 10*3/mm3 9.00 8.95 9.07   HEMOGLOBIN g/dL 10.8* 11.2* 10.4*   HEMATOCRIT % 32.8* 34.0 32.4*   PLATELETS 10*3/mm3 313 292 285       Results from last 7 days   Lab Units 07/02/20  0433 07/01/20  2152 07/01/20  0833 06/30/20 2024   SODIUM mmol/L 137  --  139 136   POTASSIUM mmol/L 5.2 5.2 4.0 3.2*   CHLORIDE mmol/L 105  --  106 106   CO2 mmol/L 24.2  --  20.0* 24.1   BUN mg/dL 9  --  9 9   CREATININE mg/dL 0.55*  --  0.54* 0.52*   CALCIUM mg/dL 8.6  --  8.3* 8.4*   BILIRUBIN mg/dL 0.3  --   --  0.4   ALK PHOS U/L 54  --   --  54   ALT (SGPT) U/L 6  --   --  7   AST (SGOT) U/L 10  --   --  9   GLUCOSE mg/dL 143*  --  72 84                 Lab Results   Lab Value Date/Time    " TROPONINT <0.010 06/30/2020 2024    TROPONINT <0.010 04/30/2020 1127    TROPONINT <0.010 02/22/2020 0542    TROPONINT <0.010 02/21/2020 1411    TROPONINT 0.109 (C) 02/13/2020 1429    TROPONINT 0.091 (C) 02/13/2020 1145    TROPONINT 0.02 10/31/2019 1837    TROPONINT <0.010 06/06/2019 2129    TROPONINT <0.010 05/27/2019 0735    TROPONINT <0.010 05/16/2019 1918       Microbiology Results (last 10 days)     Procedure Component Value - Date/Time    Respiratory Culture - Sputum, Cough [[id number]] Collected:  07/01/20 1629    Lab Status:  Preliminary result Specimen:  Sputum from Cough Updated:  07/02/20 1052     Respiratory Culture Scant growth (1+) Normal Respiratory Sakina     Gram Stain Rare (1+) Mixed bacterial morphotypes seen on Gram Stain      Rare (1+) Epithelial cells per low power field      Rare (1+) WBCs per low power field    Respiratory Panel, PCR - Swab, Nasopharynx [[id number]]  (Normal) Collected:  06/30/20 2333    Lab Status:  Final result Specimen:  Swab from Nasopharynx Updated:  07/01/20 0035     ADENOVIRUS, PCR Not Detected     Coronavirus 229E Not Detected     Coronavirus HKU1 Not Detected     Coronavirus NL63 Not Detected     Coronavirus OC43 Not Detected     Human Metapneumovirus Not Detected     Human Rhinovirus/Enterovirus Not Detected     Influenza B PCR Not Detected     Parainfluenza Virus 1 Not Detected     Parainfluenza Virus 2 Not Detected     Parainfluenza Virus 3 Not Detected     Parainfluenza Virus 4 Not Detected     Bordetella pertussis pcr Not Detected     Influenza A H1 2009 PCR Not Detected     Chlamydophila pneumoniae PCR Not Detected     Mycoplasma pneumo by PCR Not Detected     Influenza A PCR Not Detected     Influenza A H3 Not Detected     Influenza A H1 Not Detected     RSV, PCR Not Detected     Bordetella parapertussis PCR Not Detected    Narrative:       The coronavirus on the RVP is NOT COVID-19 and is NOT indicative of infection with COVID-19.     COVID-19,[facility], NP  SWAB IN TRANSPORT MEDIA 8-12 HR TAT - Swab, Nasopharynx [[id number]]  (Normal) Collected:  06/30/20 2333    Lab Status:  Final result Specimen:  Swab from Nasopharynx Updated:  07/01/20 0120     COVID19 Not Detected    Narrative:       Fact sheet for providers: https://www.fda.gov/media/500217/download     Fact sheet for patients: https://www.fda.gov/media/869432/download           Imaging Results (Last 7 Days)     Procedure Component Value Units Date/Time    CT Angiogram Chest [[id number]] Collected:  06/30/20 2239     Updated:  06/30/20 2306    Narrative:       CT ANGIOGRAM OF THE CHEST     HISTORY: Weakness of air. Possible pneumonia.     COMPARISON: 02/13/2020     TECHNIQUE: Axial CT imaging was obtained through the thorax. IV contrast  was administered. Three-D reformatted images were obtained.     FINDINGS:  The thoracic aorta is normal in caliber. Examination was not optimized  for the aorta, but no obvious dissection is seen. There is calcification  of the aorta. There is a peripheral filling defect seen within the left  pulmonary artery, extending to its bifurcation, and with narrowing of  both the left pulmonary artery, and the pulmonary arterial branch to the  left lower lobe.. It is new when compared to prior examination, and is  worrisome for adherent thrombus, although the distal vessels appear  widely patent. This is near the site of the patient's prior treated lung  cancer, and potentially could reflect some tumor extension into the left  pulmonary artery, although a discrete mass within the mediastinum is not  seen. Advanced background emphysematous changes are seen. Since prior  examination, the patient has developed scattered micronodules within the  right lung. The single largest is identified within the right upper  lobe, and measures 7 mm in size. Interval development of these nodules  is concerning for metastatic disease, and the patient has a left adrenal  nodule, which has increased  significantly when compared to prior exam,  now measuring 3.2 x 2.9 cm. Previously, it measured 2.3 x 1.2 cm. There  is reflux of contrast material into the inferior vena cava, which can be  seen in the setting of right-sided heart failure. Trace pericardial  fluid is stable. Dense consolidation is noted at the lung bases  bilaterally. This actually appears improved on the right, but has  worsened on the left. These may reflect areas of mucus plugging, with  associated atelectasis, but pneumonia is obviously not excluded. There  is a trace right pleural effusion, and small left pleural effusion,  which is increased when compared to the prior study. No acute osseous  abnormalities are seen. The heart is enlarged. Left kidney is atrophic.       Impression:          1. Peripheral filling defect identified within the left pulmonary  artery, with extension to its bifurcation, and with narrowing of the  left pulmonary artery and pulmonary arterial branch the left lower lobe.  Its appearance is concerning for adherent thrombus. This is near the  site of the patient's prior lung cancer, and tumoral extension into the  pulmonary artery is not excluded, although a discrete mass within the  mediastinum is not seen.  2. Persistent bibasilar consolidation. Aeration at the right lung base  has improved, although there is increasing consolidation at the left  lung base. Pneumonia is not excluded.  3. New right-sided pulmonary nodules, as well as interval enlargement of  the left adrenal mass, worrisome for progressive metastatic disease.     FINDINGS were relayed to Dr. Plata in the emergency department at 10:52  PM.     Radiation dose reduction techniques were utilized, including automated  exposure control and exposure modulation based on body size.     This report was finalized on 6/30/2020 11:03 PM by Dr. Fadia Townsend M.D.       XR Chest 1 View [[id number]] Collected:  06/30/20 2048     Updated:  06/30/20 2056     Narrative:       PORTABLE RADIOGRAPHIC VIEW OF THE CHEST     CLINICAL HISTORY: Weakness.     FINDINGS: Frontal projection of the chest is compared to prior study of  04/30/2020. The lungs are hyperinflated compatible with COPD. There is  prominence of the left hilum which is unchanged when compared to the  prior study. This is the site of chronic scarring in the location of a  previously treated lung cancer. There are new airspace opacities within  both lower lobes, left greater than right, which could be representative  of areas of pneumonia. Please correlate with clinical data.  Additionally, there is a suggestion of a left pleural effusion. The  cardiomediastinal silhouette is enlarged and appears larger in size when  compared to the previous examination of 04/30/2020. This could represent  intrinsic cardiac enlargement or a pericardial effusion. The osseous  structures are unremarkable. If further assessment is indicated, one  could obtain a CT scan of the chest. These findings were discussed with  Dr. Plata on 06/30/2020 at approximately 8:40 PM.     This report was finalized on 6/30/2020 8:53 PM by Dr. Michael Santiago M.D.               Assessment:        Pulmonary embolism (CMS/HCC)    Adenocarcinoma, lung, left (CMS/HCC)    Hypokalemia    Pneumonia of right lower lobe due to infectious organism    Chronic obstructive pulmonary disease (CMS/HCC)  Malnourishment      Plan:    Continue Lovenox  Agree with increasing prednisone  To meet hospice tomorrow morning  Monitor and correct electrolytes  We will add protein shakes  Monitor mental status  Continue home medications  DVT/stress ulcer prophylaxis  Labs in am      Marquis Gonsales MD  7/2/2020

## 2020-07-03 NOTE — PROGRESS NOTES
"                                              LOS: 2 days   Patient Care Team:  Marquis Gonsales MD as PCP - General (Internal Medicine)  Miah Whalen MD as Consulting Physician (Radiation Oncology)  Timothy Taylor MD as Referring Physician (Internal Medicine)  Solis Godfrey Jr., MD as Consulting Physician (Hematology and Oncology)    Chief Complaint:  F/up respiratory failure, PE/tumor emboli, abnormal CT chest and medical problems listed below    Subjective   Interval History  On RA. Has dyspnea on activires. Doppler LE -ve for DVT. She seems to be content with the hospice idea.   No hemoptysis. No productive cough. Slept well last night. Appetite remains poor      REVIEW OF SYSTEMS:      GASTROINTESTINAL: Poor appetite. No diarrhea or abdominal pain     Ventilator/Non-Invasive Ventilation Settings (From admission, onward)    None                Physical Exam:     Vital Signs  Temp:  [97.5 °F (36.4 °C)-98.1 °F (36.7 °C)] 97.5 °F (36.4 °C)  Heart Rate:  [] 97  Resp:  [16-20] 18  BP: (107-110)/(57-70) 107/62    Intake/Output Summary (Last 24 hours) at 7/2/2020 2348  Last data filed at 7/2/2020 2130  Gross per 24 hour   Intake 278 ml   Output 1625 ml   Net -1347 ml     Flowsheet Rows      First Filed Value   Admission Height  162.6 cm (64\") Documented at 06/30/2020 2326   Admission Weight  39 kg (86 lb) Documented at 06/30/2020 2314        No change  General Appearance:   Alert, cooperative, in no acute distress   ENMT:  Mallampati score 3, moist mucous membrane   Eyes:  Pupils equal and reactive to light. EOMI   Neck:   Trachea midline. No thyromegaly.   Lungs:    Diminished breath sounds at the bases with mild crackles.  No wheezing.  Nonlabored breathing    Heart:   Regular rhythm and normal rate, normal S1 and S2, no         murmur   Skin:   No rash but ecchymosis in arms   Abdomen:    soft. No tenderness. No HSM.   Neuro:  Conscious, alert, oriented x3. Strength 5/5 in upper and lower  ext "   Extremities:  No cyanosis, clubbing or edema.  Warm extremities and well-perfused          Results Review:        Results from last 7 days   Lab Units 07/02/20  0433 07/01/20  2152 07/01/20  0833 06/30/20 2024   SODIUM mmol/L 137  --  139 136   POTASSIUM mmol/L 5.2 5.2 4.0 3.2*   CHLORIDE mmol/L 105  --  106 106   CO2 mmol/L 24.2  --  20.0* 24.1   BUN mg/dL 9  --  9 9   CREATININE mg/dL 0.55*  --  0.54* 0.52*   GLUCOSE mg/dL 143*  --  72 84   CALCIUM mg/dL 8.6  --  8.3* 8.4*     Results from last 7 days   Lab Units 06/30/20 2024   TROPONIN T ng/mL <0.010     Results from last 7 days   Lab Units 07/02/20  0433 07/01/20  0833 06/30/20 2024   WBC 10*3/mm3 9.00 8.95 9.07   HEMOGLOBIN g/dL 10.8* 11.2* 10.4*   HEMATOCRIT % 32.8* 34.0 32.4*   PLATELETS 10*3/mm3 313 292 285         Results from last 7 days   Lab Units 06/30/20 2024   PROBNP pg/mL 1,670.0*       I reviewed the patient's new clinical results.        Medication Review:     aspirin 81 mg Oral Daily   atorvastatin 80 mg Oral Daily   budesonide 0.5 mg Nebulization BID - RT   cefTRIAXone 1 g Intravenous Q24H   enoxaparin 40 mg Subcutaneous Q12H   ipratropium-albuterol 3 mL Nebulization 4x Daily - RT   levothyroxine 75 mcg Oral Q AM   mirtazapine 15 mg Oral Nightly   OLANZapine 2.5 mg Oral Nightly   Petrolatum  Topical Q12H   predniSONE 10 mg Oral BID With Meals            Diagnostic imaging:  I personally and independently reviewed the following images:  CT chest 6/30/2020 compared to 2/13/2020:  Bilateral lower lobe consolidation and bilateral lower lobe bronchiectasis which has slightly improved since last exam.  New right upper lobe pulmonary nodule      Assessment    1. COPD/emphysema, no exacerbation  2. Left lower lobe pulmonary embolism versus tumor emboli, doppler LE -ve for DVT  3. Chronic hypoxic respiratory failure, on oxygen 2 L/min  4. Bilateral bronchiectasis  5. Bilateral lower lobe consolidations, chronic  6. New right upper lobe pulmonary  nodule, 7 mm  7. Left pleural effusion, mild  8. Enlargement of left adrenal nodule concerning for worsening metastasis  9. Chronic systolic and diastolic CHF, EF 33%  10. Chronic steroid therapy on prednisone 10 mg daily  11. Decreased appetite  12. Metastatic adenocarcinoma of the lung, previously treated with immunotherapy which was held for concerns of pneumonitis    All problems new to me      Plan       · DC Lovenox and start Eliquis  · Remeron  · Continue DuoNeb  · DC antibiotic. she does not have fever, leukocytosis and her procalcitonin is normal. Sputum culture showed normal [place]    Agree with hospice. Dr Franks has placed the consult.     Carroll Hope MD  07/02/20  23:48            This note was dictated utilizing [employer] dictation

## 2020-07-03 NOTE — NURSING NOTE
Patient's IV infiltrated. Rocpehin was running. Insertion site at FA is swollen, pink, and tender. Ice pack applied and extremity elevated. Will ctm

## 2020-07-03 NOTE — PROGRESS NOTES
DAILY PROGRESS NOTE  [facility], [facility]    7/3/2020    Patient Identification:  Name: Anaid Moura  Age: 75 y.o.  Sex: female  :  1945  MRN: [id number]           Primary Care Physician: Marquis Gonsales MD    Subjective:    Interval History:    Patient is her family will take hospice service, and request to be discharged home tomorrow morning.  Eating better, and Remeron as an appetite stimulant.  Also Zyprexa  On Eliquis for pulmonary embolism  On prednisone twice a day  She is feeling good and with good spirits today.  Doppler ultrasound of the lower extremity was normal.    ROS:     No nausea, vomiting.  No diarrhea, no constipation.  Still eating a little.    Objective:    Scheduled Meds:    apixaban 10 mg Oral Q12H   Followed by      [START ON 7/10/2020] apixaban 5 mg Oral Q12H   aspirin 81 mg Oral Daily   atorvastatin 80 mg Oral Daily   budesonide 0.5 mg Nebulization BID - RT   ipratropium-albuterol 3 mL Nebulization 4x Daily - RT   levothyroxine 75 mcg Oral Q AM   mirtazapine 15 mg Oral Nightly   OLANZapine 2.5 mg Oral Nightly   Petrolatum  Topical Q12H   predniSONE 10 mg Oral BID With Meals       Continuous Infusions:       PRN Meds:  •  albuterol  •  guaiFENesin  •  potassium chloride **OR** potassium chloride **OR** potassium chloride  •  [COMPLETED] Insert peripheral IV **AND** sodium chloride    Intake/Output:    Intake/Output Summary (Last 24 hours) at 7/3/2020 1714  Last data filed at 7/3/2020 1437  Gross per 24 hour   Intake 716 ml   Output 1425 ml   Net -709 ml         Exam:    tMax 24 hrs: Temp (24hrs), Av.8 °F (36.6 °C), Min:97.5 °F (36.4 °C), Max:98.1 °F (36.7 °C)    Vitals Ranges:   Temp:  [97.5 °F (36.4 °C)-98.1 °F (36.7 °C)] 97.6 °F (36.4 °C)  Heart Rate:  [] 98  Resp:  [16-18] 16  BP: ()/(50-87) 93/50    BP 93/50 (BP Location: Right arm, Patient Position: Lying)   Pulse 98   Temp 97.6 °F (36.4 °C) (Oral)   Resp 16   Ht  "162.6 cm (64.02\")   Wt 39 kg (86 lb)   SpO2 95%   BMI 14.75 kg/m²     General: Alert, oriented x 3. Cooperative, no distress while on oxygen. Appears stated age.  Looks cachectic.  Neck: Supple, symmetrical, trachea midline, no adenopathy;              thyroid:  no enlargement/tenderness/nodules;              no carotid bruit or JVD  Cardiovascular: Normal rate, regular rhythm and intact distal pulses.              Exam reveals no gallop and no friction rub. No murmur heard  Pulmonary: Decreased breath sounds bilaterally, rhonchi bibasilarly, more on the right side.  No wheezing, no rales in auscultation.    Abdominal: Soft, nontender, bowel sounds active all four quadrants,     no masses, no hepatomegaly, no splenomegaly.   Extremities: Normal, atraumatic, no cyanosis or edema  Pulses: 2 + symmetric all extremities  Neurological: Patient is alert and oriented to person, place, and time.                 CNII-XII intact, normal sensation intact throughout  Skin: Skin color, texture, normal. Turgor is decreased. No rashes or lesions         Data Review:    Results from last 7 days   Lab Units 07/03/20  0430 07/02/20  0433 07/01/20  0833   WBC 10*3/mm3 8.03 9.00 8.95   HEMOGLOBIN g/dL 10.3* 10.8* 11.2*   HEMATOCRIT % 31.7* 32.8* 34.0   PLATELETS 10*3/mm3 323 313 292       Results from last 7 days   Lab Units 07/03/20  0430 07/02/20  0433 07/01/20  2152 07/01/20  0833 06/30/20 2024   SODIUM mmol/L 137 137  --  139 136   POTASSIUM mmol/L 4.9 5.2 5.2 4.0 3.2*   CHLORIDE mmol/L 103 105  --  106 106   CO2 mmol/L 25.4 24.2  --  20.0* 24.1   BUN mg/dL 12 9  --  9 9   CREATININE mg/dL 0.60 0.55*  --  0.54* 0.52*   CALCIUM mg/dL 9.0 8.6  --  8.3* 8.4*   BILIRUBIN mg/dL  --  0.3  --   --  0.4   ALK PHOS U/L  --  54  --   --  54   ALT (SGPT) U/L  --  6  --   --  7   AST (SGOT) U/L  --  10  --   --  9   GLUCOSE mg/dL 153* 143*  --  72 84                 Lab Results   Lab Value Date/Time    TROPONINT <0.010 06/30/2020 2024    " TROPONINT <0.010 04/30/2020 1127    TROPONINT <0.010 02/22/2020 0542    TROPONINT <0.010 02/21/2020 1411    TROPONINT 0.109 (C) 02/13/2020 1429    TROPONINT 0.091 (C) 02/13/2020 1145    TROPONINT 0.02 10/31/2019 1837    TROPONINT <0.010 06/06/2019 2129    TROPONINT <0.010 05/27/2019 0735    TROPONINT <0.010 05/16/2019 1918       Microbiology Results (last 10 days)     Procedure Component Value - Date/Time    Respiratory Culture - Sputum, Cough [[id number]] Collected:  07/01/20 1629    Lab Status:  Final result Specimen:  Sputum from Cough Updated:  07/03/20 1206     Respiratory Culture Scant growth (1+) Normal Respiratory Sakina     Gram Stain Rare (1+) Mixed bacterial morphotypes seen on Gram Stain      Rare (1+) Epithelial cells per low power field      Rare (1+) WBCs per low power field    Respiratory Panel, PCR - Swab, Nasopharynx [[id number]]  (Normal) Collected:  06/30/20 2333    Lab Status:  Final result Specimen:  Swab from Nasopharynx Updated:  07/01/20 0035     ADENOVIRUS, PCR Not Detected     Coronavirus 229E Not Detected     Coronavirus HKU1 Not Detected     Coronavirus NL63 Not Detected     Coronavirus OC43 Not Detected     Human Metapneumovirus Not Detected     Human Rhinovirus/Enterovirus Not Detected     Influenza B PCR Not Detected     Parainfluenza Virus 1 Not Detected     Parainfluenza Virus 2 Not Detected     Parainfluenza Virus 3 Not Detected     Parainfluenza Virus 4 Not Detected     Bordetella pertussis pcr Not Detected     Influenza A H1 2009 PCR Not Detected     Chlamydophila pneumoniae PCR Not Detected     Mycoplasma pneumo by PCR Not Detected     Influenza A PCR Not Detected     Influenza A H3 Not Detected     Influenza A H1 Not Detected     RSV, PCR Not Detected     Bordetella parapertussis PCR Not Detected    Narrative:       The coronavirus on the RVP is NOT COVID-19 and is NOT indicative of infection with COVID-19.     COVID-19,[facility] IN-HOUSE, NP SWAB IN TRANSPORT MEDIA 8-12 HR TAT -  Swab, Nasopharynx [[id number]]  (Normal) Collected:  06/30/20 2333    Lab Status:  Final result Specimen:  Swab from Nasopharynx Updated:  07/01/20 0120     COVID19 Not Detected    Narrative:       Fact sheet for providers: https://www.fda.gov/media/460248/download     Fact sheet for patients: https://www.fda.gov/media/383888/download           Imaging Results (Last 7 Days)     Procedure Component Value Units Date/Time    CT Angiogram Chest [[id number]] Collected:  06/30/20 2239     Updated:  06/30/20 2306    Narrative:       CT ANGIOGRAM OF THE CHEST     HISTORY: Weakness of air. Possible pneumonia.     COMPARISON: 02/13/2020     TECHNIQUE: Axial CT imaging was obtained through the thorax. IV contrast  was administered. Three-D reformatted images were obtained.     FINDINGS:  The thoracic aorta is normal in caliber. Examination was not optimized  for the aorta, but no obvious dissection is seen. There is calcification  of the aorta. There is a peripheral filling defect seen within the left  pulmonary artery, extending to its bifurcation, and with narrowing of  both the left pulmonary artery, and the pulmonary arterial branch to the  left lower lobe.. It is new when compared to prior examination, and is  worrisome for adherent thrombus, although the distal vessels appear  widely patent. This is near the site of the patient's prior treated lung  cancer, and potentially could reflect some tumor extension into the left  pulmonary artery, although a discrete mass within the mediastinum is not  seen. Advanced background emphysematous changes are seen. Since prior  examination, the patient has developed scattered micronodules within the  right lung. The single largest is identified within the right upper  lobe, and measures 7 mm in size. Interval development of these nodules  is concerning for metastatic disease, and the patient has a left adrenal  nodule, which has increased significantly when compared to prior exam,  now  measuring 3.2 x 2.9 cm. Previously, it measured 2.3 x 1.2 cm. There  is reflux of contrast material into the inferior vena cava, which can be  seen in the setting of right-sided heart failure. Trace pericardial  fluid is stable. Dense consolidation is noted at the lung bases  bilaterally. This actually appears improved on the right, but has  worsened on the left. These may reflect areas of mucus plugging, with  associated atelectasis, but pneumonia is obviously not excluded. There  is a trace right pleural effusion, and small left pleural effusion,  which is increased when compared to the prior study. No acute osseous  abnormalities are seen. The heart is enlarged. Left kidney is atrophic.       Impression:          1. Peripheral filling defect identified within the left pulmonary  artery, with extension to its bifurcation, and with narrowing of the  left pulmonary artery and pulmonary arterial branch the left lower lobe.  Its appearance is concerning for adherent thrombus. This is near the  site of the patient's prior lung cancer, and tumoral extension into the  pulmonary artery is not excluded, although a discrete mass within the  mediastinum is not seen.  2. Persistent bibasilar consolidation. Aeration at the right lung base  has improved, although there is increasing consolidation at the left  lung base. Pneumonia is not excluded.  3. New right-sided pulmonary nodules, as well as interval enlargement of  the left adrenal mass, worrisome for progressive metastatic disease.     FINDINGS were relayed to Dr. Plata in the emergency department at 10:52  PM.     Radiation dose reduction techniques were utilized, including automated  exposure control and exposure modulation based on body size.     This report was finalized on 6/30/2020 11:03 PM by Dr. Fadia Townsend M.D.       XR Chest 1 View [[id number]] Collected:  06/30/20 2048     Updated:  06/30/20 2056    Narrative:       PORTABLE RADIOGRAPHIC VIEW OF THE  CHEST     CLINICAL HISTORY: Weakness.     FINDINGS: Frontal projection of the chest is compared to prior study of  04/30/2020. The lungs are hyperinflated compatible with COPD. There is  prominence of the left hilum which is unchanged when compared to the  prior study. This is the site of chronic scarring in the location of a  previously treated lung cancer. There are new airspace opacities within  both lower lobes, left greater than right, which could be representative  of areas of pneumonia. Please correlate with clinical data.  Additionally, there is a suggestion of a left pleural effusion. The  cardiomediastinal silhouette is enlarged and appears larger in size when  compared to the previous examination of 04/30/2020. This could represent  intrinsic cardiac enlargement or a pericardial effusion. The osseous  structures are unremarkable. If further assessment is indicated, one  could obtain a CT scan of the chest. These findings were discussed with  Dr. Plata on 06/30/2020 at approximately 8:40 PM.     This report was finalized on 6/30/2020 8:53 PM by Dr. Michael Santiago M.D.               Assessment:        Pulmonary embolism (CMS/HCC)    Adenocarcinoma, lung, left (CMS/HCC)    Hypokalemia    Pneumonia of right lower lobe due to infectious organism    Chronic obstructive pulmonary disease (CMS/HCC)  Malnourishment      Plan:    Continue current management  Continue Eliquis, twice daily prednisone.  Patient and her family okay with hospice, probably discharge home with hospice tomorrow morning  Encourage nutrition  Continue home medications  DVT/stress ulcer prophylaxis  Labs in am      Marquis Gonsales MD  7/3/2020

## 2020-07-03 NOTE — PROGRESS NOTES
"                                              LOS: 3 days   Patient Care Team:  Marquis Gonsales MD as PCP - General (Internal Medicine)  Miah Whalen MD as Consulting Physician (Radiation Oncology)  Timothy Taylor MD as Referring Physician (Internal Medicine)  Solis Godfrey Jr., MD as Consulting Physician (Hematology and Oncology)    Chief Complaint:  F/up respiratory failure, PE/tumor emboli, abnormal CT chest and medical problems listed below    Subjective   Interval History  On RA.  Denies shortness of breath.  She reported chronic cough with slightly yellowish phlegm.  No change from baseline.      REVIEW OF SYSTEMS:      GASTROINTESTINAL: Improving appetite.  No abdominal pain or diarrhea.    Ventilator/Non-Invasive Ventilation Settings (From admission, onward)    None                Physical Exam:     Vital Signs  Temp:  [97.5 °F (36.4 °C)-98.1 °F (36.7 °C)] 97.6 °F (36.4 °C)  Heart Rate:  [] 88  Resp:  [16-18] 16  BP: ()/(50-87) 93/50    Intake/Output Summary (Last 24 hours) at 7/3/2020 1541  Last data filed at 7/3/2020 0544  Gross per 24 hour   Intake 236 ml   Output 1425 ml   Net -1189 ml     Flowsheet Rows      First Filed Value   Admission Height  162.6 cm (64\") Documented at 06/30/2020 2326   Admission Weight  39 kg (86 lb) Documented at 06/30/2020 2314          General Appearance:   Alert, cooperative, in no acute distress   ENMT:  Mallampati score 3, moist mucous membrane.  Mild thrush on tongue   Eyes:  Pupils equal and reactive to light. EOMI   Neck:   Trachea midline. No thyromegaly.   Lungs:    Diminished breath sounds at the bases with mild on the right base and left base.  No wheezing.  Nonlabored breathing    Heart:   Regular rhythm and normal rate, normal S1 and S2, no         murmur   Skin:   No rash but ecchymosis in arms   Abdomen:    soft. No tenderness. No HSM.   Neuro:  Conscious, alert, oriented x3. Strength 5/5 in upper and lower  ext   Extremities:  No cyanosis, " clubbing or edema.  Warm extremities and well-perfused          Results Review:        Results from last 7 days   Lab Units 07/03/20  0430 07/02/20  0433 07/01/20  2152 07/01/20  0833   SODIUM mmol/L 137 137  --  139   POTASSIUM mmol/L 4.9 5.2 5.2 4.0   CHLORIDE mmol/L 103 105  --  106   CO2 mmol/L 25.4 24.2  --  20.0*   BUN mg/dL 12 9  --  9   CREATININE mg/dL 0.60 0.55*  --  0.54*   GLUCOSE mg/dL 153* 143*  --  72   CALCIUM mg/dL 9.0 8.6  --  8.3*     Results from last 7 days   Lab Units 06/30/20 2024   TROPONIN T ng/mL <0.010     Results from last 7 days   Lab Units 07/03/20  0430 07/02/20  0433 07/01/20  0833   WBC 10*3/mm3 8.03 9.00 8.95   HEMOGLOBIN g/dL 10.3* 10.8* 11.2*   HEMATOCRIT % 31.7* 32.8* 34.0   PLATELETS 10*3/mm3 323 313 292         Results from last 7 days   Lab Units 06/30/20 2024   PROBNP pg/mL 1,670.0*       I reviewed the patient's new clinical results.        Medication Review:     apixaban 10 mg Oral Q12H   Followed by      [START ON 7/10/2020] apixaban 5 mg Oral Q12H   aspirin 81 mg Oral Daily   atorvastatin 80 mg Oral Daily   budesonide 0.5 mg Nebulization BID - RT   ipratropium-albuterol 3 mL Nebulization 4x Daily - RT   levothyroxine 75 mcg Oral Q AM   mirtazapine 15 mg Oral Nightly   OLANZapine 2.5 mg Oral Nightly   Petrolatum  Topical Q12H   predniSONE 10 mg Oral BID With Meals            Diagnostic imaging:  I personally and independently reviewed the following images:  CT chest 6/30/2020 compared to 2/13/2020:  Bilateral lower lobe consolidation and bilateral lower lobe bronchiectasis which has slightly improved since last exam.  New right upper lobe pulmonary nodule      Assessment    1. COPD/emphysema, no exacerbation  2. Left lower lobe pulmonary embolism versus tumor emboli, doppler LE -ve for DVT  3. Chronic hypoxic respiratory failure, on oxygen 2 L/min  4. Bilateral bronchiectasis  5. Bilateral lower lobe consolidations, chronic  6. New right upper lobe pulmonary nodule, 7  mm  7. Left pleural effusion, mild  8. Enlargement of left adrenal nodule concerning for worsening metastasis  9. Chronic systolic and diastolic CHF, EF 33%  10. Chronic steroid therapy on prednisone 10 mg daily  11. Decreased appetite  12. Metastatic adenocarcinoma of the lung, previously treated with immunotherapy which was held for concerns of pneumonitis        Plan       · Eliquis twice a day for PE but she may also have pulmonary tubular emboli especially since her Doppler of the lower extremities was negative for DVT  · Remeron 50 mg nightly for appetite and anxiety  · Continue DuoNeb  · Off antibiotics.  Doing well from that perspective  · Prednisone 10 mg daily.  She is chronically on that.  · Palliative care.  I discussed that with her and she wishes to proceed with hospice and also take the recommended medications and the blood thinner      Carroll Hope MD  07/03/20  15:41            This note was dictated utilizing Dragon dictation

## 2020-07-04 NOTE — PLAN OF CARE
Problem: Patient Care Overview  Goal: Plan of Care Review  Outcome: Ongoing (interventions implemented as appropriate)  VSS. Room air. AOx4. q2turn. Patient slept throughout night. Will ctm  Goal: Individualization and Mutuality  Outcome: Ongoing (interventions implemented as appropriate)  Goal: Discharge Needs Assessment  Outcome: Ongoing (interventions implemented as appropriate)  Goal: Interprofessional Rounds/Family Conf  Outcome: Ongoing (interventions implemented as appropriate)     Problem: Activity Intolerance (Adult)  Goal: Identify Related Risk Factors and Signs and Symptoms  Outcome: Ongoing (interventions implemented as appropriate)  Goal: Activity Tolerance  Outcome: Ongoing (interventions implemented as appropriate)  Goal: Effective Energy Conservation Techniques  Outcome: Ongoing (interventions implemented as appropriate)     Problem: Infection, Risk/Actual (Adult)  Goal: Identify Related Risk Factors and Signs and Symptoms  Outcome: Ongoing (interventions implemented as appropriate)  Goal: Infection Prevention/Resolution  Outcome: Ongoing (interventions implemented as appropriate)     Problem: Fall Risk (Adult)  Goal: Identify Related Risk Factors and Signs and Symptoms  Outcome: Ongoing (interventions implemented as appropriate)  Goal: Absence of Fall  Outcome: Ongoing (interventions implemented as appropriate)     Problem: Wound (Includes Pressure Injury) (Adult)  Goal: Signs and Symptoms of Listed Potential Problems Will be Absent, Minimized or Managed (Wound)  Outcome: Ongoing (interventions implemented as appropriate)     Problem: Skin Injury Risk (Adult)  Goal: Identify Related Risk Factors and Signs and Symptoms  Outcome: Ongoing (interventions implemented as appropriate)  Goal: Skin Health and Integrity  Outcome: Ongoing (interventions implemented as appropriate)     Problem: VTE, DVT and PE (Adult)  Goal: Signs and Symptoms of Listed Potential Problems Will be Absent, Minimized or Managed  (VTE, DVT and PE)  Outcome: Ongoing (interventions implemented as appropriate)

## 2020-07-04 NOTE — DISCHARGE SUMMARY
PHYSICIAN DISCHARGE SUMMARY  [facility], [facility]    Patient Identification:    Name: Anaid Moura  Age: 75 y.o.  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  She also has history of lung cancer on the right side, positive pneumonia, as well as atelectasis, this improved after antibiotics and radiation and chemotherapy which was known about a year ago.  She did okay after that until her last hospitalization which was this past February, she was hospitalized for pneumonia, at that time there were no indications of progression of her cancer.  She was discharged home improved.  She was doing okay although still very weak until a few weeks ago when she developed it generalized weakness, lethargy, feeling poorly, worsening shortness of breath that prompted her to come to the emergency room.  In the ER, patient was found to have: Potassium 3.2, creatinine 0.52, procalcitonin 0.07, hemoglobin 10.4, WBC 9.07, urine with 3-5 WBC, respiratory panel negative CT angiogram of the chest showed peripheral filling defect identified within the left pulmonary artery, with extension to its bifurcation, and with narrowing of the left pulmonary artery and pulmonary artery branch of the left lower lobe.  Also, it showed worsening consolidation in the left lung  base; also it showed new right-sided pulmonary nodules as well as interval enlargement of left adrenal mass.  Patient was admitted for further management.    Upon admission patient was started on Lovenox therapeutic dose.  Patient was placed on an antibiotic for community-acquired pneumonia, but then discontinued since the infiltrates found on the x-rays were chronic.  Pulmonary as well as hematology were consulted.  Low extremity Doppler ultrasound was negative for DVT.  Electrolytes were corrected, potassium was low initially, this was corrected and at this time of the discharge K isnormal.  Her COVID-19 test came back negative.  CT scan of chest abdomen pelvis showed, besides the pulmonary embolism, new right-sided pulmonary nodules, interval enlargement of left adrenal mass.  Oncology was consulted and the recommendation was that since there is no more treatment available for her condition, hospice will be the best option for her.  Patient and patient's family met with hospice on July 3, she was actually feeling better and eating better since Remeron was added to the regimen.  After conversation with hospice, family agreed with their service and they are ready to be discharged today.  Patient is stable.  Has no pain.  She is breathing okay.  She is ready to be discharged home with hospice service.  She does have a mild redness on her left face, she is to call either me or the hospice physician if this continues to get worse.    PMHX:   Past Medical History:   Diagnosis Date   • Acute on chronic combined systolic and diastolic CHF (congestive heart failure) (CMS/HCC)    • Acute on chronic respiratory failure with hypoxemia (CMS/HCC)    • Benign essential hypertension    • Cachexia (CMS/HCC)    • CAD (coronary artery disease)    • Cancer (CMS/HCC) 05/2019    Left lung   • Carotid artery stenosis    • Cerebellar artery occlusion 06/2008    causing left arm and leg paresis   • CKD (chronic kidney disease), stage III  "(CMS/[facility])    • COPD (chronic obstructive pulmonary disease) (CMS/[facility])    • Gastroesophageal reflux disease 12/10/2015   • 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   • Hyperlipidemia    • Left hemiplegia (CMS/HCC) 12/10/2015   • Lung cancer (CMS/[facility])    • Myocardial infarct (CMS/[facility]) 1996   • Osteopenia    • Pneumonia of right lower lobe due to infectious organism    • Stroke syndrome 2008   • Thyroid cyst     right   • Thyroid lump 12/10/2015    Description: Biopsy 05/15 at [facility], benign   • Transient cerebral ischemia    • Urge incontinence of urine      PSHX:   Past Surgical History:   Procedure Laterality Date   • BREAST BIOPSY     • BRONCHOSCOPY Bilateral 5/20/2019    Procedure: BRONCHOSCOPY WITH  BIOPSY AND BAL, WITH ENDOBRONCHIAL ULTRASOUND WITH FNA;  Surgeon: Chris Tirado MD;  [facility];  Service: Pulmonary   • BRONCHOSCOPY N/A 2/19/2020    Procedure: BRONCHOSCOPY WITH WASHINGS AND BAL;  Surgeon: Ronnie Pruett MD;  [facility];  Service: Pulmonary;  Laterality: N/A;  PRE OP - PNEUMONIA  POST OP - SAME   • COLONOSCOPY      REC 07/2007, REC 02/2009, REC 12/2011, REC 01/14,  She says she cant do the prep because of poor mobility to get to BR.   • EYE SURGERY  1950   • OTHER SURGICAL HISTORY      Ventricular lake holes for drainage of subdural hematoma   • TOTAL THYROIDECTOMY  08/2015    Dr. Ahmadi           Consults:     Consults     Date and Time Order Name Status Description    7/1/2020 1351 Hematology & Oncology Inpatient Consult Completed     6/30/2020 2301 Pulmonology (on-call MD unless specified) Completed     6/30/2020 2301 Family Medicine Consult Completed           Discharge Exam:    /57 (BP Location: Right arm, Patient Position: Lying)   Pulse 91   Temp 98.5 °F (36.9 °C) (Oral)   Resp 16   Ht 162.6 cm (64.02\")   Wt 39 kg (86 lb)   SpO2 97%   BMI 14.75 kg/m²     General: Alert, oriented x 3. Cooperative, no distress while on oxygen. Appears " stated age.  Looks cachectic.  Neck: Supple, symmetrical, trachea midline, no adenopathy;              thyroid:  no enlargement/tenderness/nodules;              no carotid bruit or JVD  Cardiovascular: Normal rate, regular rhythm and intact distal pulses.              Exam reveals no gallop and no friction rub. No murmur heard  Pulmonary: Decreased breath sounds bilaterally, rhonchi bibasilarly, more on the right side.  No wheezing, no rales in auscultation.    Abdominal: Soft, nontender, bowel sounds active all four quadrants,     no masses, no hepatomegaly, no splenomegaly.   Extremities: Normal, atraumatic, no cyanosis or edema  Pulses: 2 + symmetric all extremities  Neurological: Patient is alert and oriented to person, place, and time.                 CNII-XII intact, normal sensation intact throughout  Skin: Mild redness on the left side of her face, no murmurs, mild abrasion from the oxygen tubing.       Data Review:        Results from last 7 days   Lab Units 07/03/20  0430 07/02/20  0433 07/01/20  0833   WBC 10*3/mm3 8.03 9.00 8.95   HEMOGLOBIN g/dL 10.3* 10.8* 11.2*   HEMATOCRIT % 31.7* 32.8* 34.0   PLATELETS 10*3/mm3 323 313 292       Results from last 7 days   Lab Units 07/03/20  0430 07/02/20  0433 07/01/20  2152 07/01/20  0833 06/30/20 2024   SODIUM mmol/L 137 137  --  139 136   POTASSIUM mmol/L 4.9 5.2 5.2 4.0 3.2*   CHLORIDE mmol/L 103 105  --  106 106   CO2 mmol/L 25.4 24.2  --  20.0* 24.1   BUN mg/dL 12 9  --  9 9   CREATININE mg/dL 0.60 0.55*  --  0.54* 0.52*   CALCIUM mg/dL 9.0 8.6  --  8.3* 8.4*   BILIRUBIN mg/dL  --  0.3  --   --  0.4   ALK PHOS U/L  --  54  --   --  54   ALT (SGPT) U/L  --  6  --   --  7   AST (SGOT) U/L  --  10  --   --  9   GLUCOSE mg/dL 153* 143*  --  72 84           Lab Results   Lab Value Date/Time    TROPONINT <0.010 06/30/2020 2024    TROPONINT <0.010 04/30/2020 1127    TROPONINT <0.010 02/22/2020 0542    TROPONINT <0.010 02/21/2020 1411    TROPONINT 0.109 (C)  02/13/2020 1429    TROPONINT 0.091 (C) 02/13/2020 1145    TROPONINT 0.02 10/31/2019 1837    TROPONINT <0.010 06/06/2019 2129    TROPONINT <0.010 05/27/2019 0735    TROPONINT <0.010 05/16/2019 1918       Microbiology Results (last 10 days)     Procedure Component Value - Date/Time    Respiratory Culture - Sputum, Cough [[id number]] Collected:  07/01/20 1629    Lab Status:  Final result Specimen:  Sputum from Cough Updated:  07/03/20 1206     Respiratory Culture Scant growth (1+) Normal Respiratory Sakina     Gram Stain Rare (1+) Mixed bacterial morphotypes seen on Gram Stain      Rare (1+) Epithelial cells per low power field      Rare (1+) WBCs per low power field    Respiratory Panel, PCR - Swab, Nasopharynx [[id number]]  (Normal) Collected:  06/30/20 1489    Lab Status:  Final result Specimen:  Swab from Nasopharynx Updated:  07/01/20 0035     ADENOVIRUS, PCR Not Detected     Coronavirus 229E Not Detected     Coronavirus HKU1 Not Detected     Coronavirus NL63 Not Detected     Coronavirus OC43 Not Detected     Human Metapneumovirus Not Detected     Human Rhinovirus/Enterovirus Not Detected     Influenza B PCR Not Detected     Parainfluenza Virus 1 Not Detected     Parainfluenza Virus 2 Not Detected     Parainfluenza Virus 3 Not Detected     Parainfluenza Virus 4 Not Detected     Bordetella pertussis pcr Not Detected     Influenza A H1 2009 PCR Not Detected     Chlamydophila pneumoniae PCR Not Detected     Mycoplasma pneumo by PCR Not Detected     Influenza A PCR Not Detected     Influenza A H3 Not Detected     Influenza A H1 Not Detected     RSV, PCR Not Detected     Bordetella parapertussis PCR Not Detected    Narrative:       The coronavirus on the RVP is NOT COVID-19 and is NOT indicative of infection with COVID-19.     COVID-19,[facility], NP SWAB IN TRANSPORT MEDIA 8-12 HR TAT - Swab, Nasopharynx [[id number]]  (Normal) Collected:  06/30/20 2333    Lab Status:  Final result Specimen:  Swab from Nasopharynx  Updated:  07/01/20 0120     COVID19 Not Detected    Narrative:       Fact sheet for providers: https://www.fda.gov/media/978888/download     Fact sheet for patients: https://www.fda.gov/media/407498/download           Imaging Results (All)     Procedure Component Value Units Date/Time    CT Angiogram Chest [[id number]] Collected:  06/30/20 2239     Updated:  06/30/20 2306    Narrative:       CT ANGIOGRAM OF THE CHEST     HISTORY: Weakness of air. Possible pneumonia.     COMPARISON: 02/13/2020     TECHNIQUE: Axial CT imaging was obtained through the thorax. IV contrast  was administered. Three-D reformatted images were obtained.     FINDINGS:  The thoracic aorta is normal in caliber. Examination was not optimized  for the aorta, but no obvious dissection is seen. There is calcification  of the aorta. There is a peripheral filling defect seen within the left  pulmonary artery, extending to its bifurcation, and with narrowing of  both the left pulmonary artery, and the pulmonary arterial branch to the  left lower lobe.. It is new when compared to prior examination, and is  worrisome for adherent thrombus, although the distal vessels appear  widely patent. This is near the site of the patient's prior treated lung  cancer, and potentially could reflect some tumor extension into the left  pulmonary artery, although a discrete mass within the mediastinum is not  seen. Advanced background emphysematous changes are seen. Since prior  examination, the patient has developed scattered micronodules within the  right lung. The single largest is identified within the right upper  lobe, and measures 7 mm in size. Interval development of these nodules  is concerning for metastatic disease, and the patient has a left adrenal  nodule, which has increased significantly when compared to prior exam,  now measuring 3.2 x 2.9 cm. Previously, it measured 2.3 x 1.2 cm. There  is reflux of contrast material into the inferior vena cava, which  can be  seen in the setting of right-sided heart failure. Trace pericardial  fluid is stable. Dense consolidation is noted at the lung bases  bilaterally. This actually appears improved on the right, but has  worsened on the left. These may reflect areas of mucus plugging, with  associated atelectasis, but pneumonia is obviously not excluded. There  is a trace right pleural effusion, and small left pleural effusion,  which is increased when compared to the prior study. No acute osseous  abnormalities are seen. The heart is enlarged. Left kidney is atrophic.       Impression:          1. Peripheral filling defect identified within the left pulmonary  artery, with extension to its bifurcation, and with narrowing of the  left pulmonary artery and pulmonary arterial branch the left lower lobe.  Its appearance is concerning for adherent thrombus. This is near the  site of the patient's prior lung cancer, and tumoral extension into the  pulmonary artery is not excluded, although a discrete mass within the  mediastinum is not seen.  2. Persistent bibasilar consolidation. Aeration at the right lung base  has improved, although there is increasing consolidation at the left  lung base. Pneumonia is not excluded.  3. New right-sided pulmonary nodules, as well as interval enlargement of  the left adrenal mass, worrisome for progressive metastatic disease.     FINDINGS were relayed to Dr. Plata in the emergency department at 10:52  PM.     Radiation dose reduction techniques were utilized, including automated  exposure control and exposure modulation based on body size.     This report was finalized on 6/30/2020 11:03 PM by Dr. Fad[facility] M.D.       XR Chest 1 View [[id number]] Collected:  06/30/20 2048     Updated:  06/30/20 2056    Narrative:       PORTABLE RADIOGRAPHIC VIEW OF THE CHEST     CLINICAL HISTORY: Weakness.     FINDINGS: Frontal projection of the chest is compared to prior study of  04/30/2020. The lungs are  hyperinflated compatible with COPD. There is  prominence of the left hilum which is unchanged when compared to the  prior study. This is the site of chronic scarring in the location of a  previously treated lung cancer. There are new airspace opacities within  both lower lobes, left greater than right, which could be representative  of areas of pneumonia. Please correlate with clinical data.  Additionally, there is a suggestion of a left pleural effusion. The  cardiomediastinal silhouette is enlarged and appears larger in size when  compared to the previous examination of 04/30/2020. This could represent  intrinsic cardiac enlargement or a pericardial effusion. The osseous  structures are unremarkable. If further assessment is indicated, one  could obtain a CT scan of the chest. These findings were discussed with  Dr. Plata on 06/30/2020 at approximately 8:40 PM.     This report was finalized on 6/30/2020 8:53 PM by Dr. Michael Santiago M.D.               Disposition:    Home    Patient Instructions:        Discharge Medications      New Medications      Instructions Start Date   apixaban 5 MG tablet tablet  Commonly known as:  ELIQUIS   10 mg, Oral, Every 12 Hours Scheduled      apixaban 5 MG tablet tablet  Commonly known as:  ELIQUIS   5 mg, Oral, Every 12 Hours Scheduled   Start Date:  July 10, 2020     mirtazapine 15 MG tablet  Commonly known as:  REMERON   15 mg, Oral, Nightly         Changes to Medications      Instructions Start Date   atorvastatin 80 MG tablet  Commonly known as:  LIPITOR  What changed:  when to take this   80 mg, Oral, Daily, Needs an appointment for further refills      predniSONE 10 MG tablet  Commonly known as:  DELTASONE  What changed:    · medication strength  · when to take this  · additional instructions   10 mg, Oral, 2 Times Daily With Meals      tolterodine 1 MG tablet  Commonly known as:  Detrol  What changed:  additional instructions   1 mg, Oral, 2 Times Daily      Tussin  Mucus+Chest Congestion 100 MG/5ML liquid  Generic drug:  guaifenesin  What changed:    · when to take this  · reasons to take this   400 mg, Oral, Every 4 Hours, While awake         Continue These Medications      Instructions Start Date   aspirin 81 MG chewable tablet   81 mg, Oral, Daily      budesonide 0.5 MG/2ML nebulizer solution  Commonly known as:  PULMICORT   0.5 mg, Nebulization, 2 Times Daily - RT      folic acid 1 MG tablet  Commonly known as:  FOLVITE   1 mg, Oral, Daily      ipratropium-albuterol 0.5-2.5 mg/3 ml nebulizer  Commonly known as:  DUO-NEB   3 mL, Nebulization, 4 Times Daily - RT      levothyroxine 75 MCG tablet  Commonly known as:  SYNTHROID, LEVOTHROID   TAKE ONE TABLET BY MOUTH DAILY      OLANZapine 2.5 MG tablet  Commonly known as:  zyPREXA   2.5 mg, Oral, Nightly         Stop These Medications    sertraline 25 MG tablet  Commonly known as:  Zoloft            Discharge Order (From admission, onward)     Start     Ordered    07/04/20 0859  Discharge patient  Once     Expected Discharge Date:  07/04/20    Discharge Disposition:  Home or Self Care    Physician of Record for Attribution - Please select from Treatment Team:  ELVIS GONSALES [[id number]]    Review needed by CMO to determine Physician of Record:  No       Question Answer Comment   Physician of Record for Attribution - Please select from Treatment Team ELVIS GONSALES    Review needed by CMO to determine Physician of Record No        07/04/20 0901                Follow-up Information     Elvis Gonsales MD Follow up in 2 week(s).    Specialties:  Internal Medicine, Hospitalist  Contact information:  [address] [place], [place] [id number] [phone]                   Total time spent discharging patient including evaluation,post hospitalization follow up,  medication and post hospitalization instructions and education total time exceeds 30 minutes.    Signed:  Elvis Gonsales MD  7/4/2020  09:11

## 2020-07-04 NOTE — PLAN OF CARE
Problem: Patient Care Overview  Goal: Plan of Care Review  Outcome: Outcome(s) achieved  Flowsheets (Taken 7/4/2020 1118)  Progress: improving  Plan of Care Reviewed With: patient  Goal: Individualization and Mutuality  Outcome: Outcome(s) achieved  Goal: Discharge Needs Assessment  Outcome: Outcome(s) achieved  Goal: Interprofessional Rounds/Family Conf  Outcome: Outcome(s) achieved     Problem: Activity Intolerance (Adult)  Goal: Identify Related Risk Factors and Signs and Symptoms  Outcome: Outcome(s) achieved  Goal: Activity Tolerance  Outcome: Outcome(s) achieved  Goal: Effective Energy Conservation Techniques  Outcome: Outcome(s) achieved     Problem: Infection, Risk/Actual (Adult)  Goal: Identify Related Risk Factors and Signs and Symptoms  Outcome: Outcome(s) achieved  Goal: Infection Prevention/Resolution  Outcome: Outcome(s) achieved     Problem: Fall Risk (Adult)  Goal: Identify Related Risk Factors and Signs and Symptoms  Outcome: Outcome(s) achieved  Goal: Absence of Fall  Outcome: Outcome(s) achieved     Problem: Wound (Includes Pressure Injury) (Adult)  Goal: Signs and Symptoms of Listed Potential Problems Will be Absent, Minimized or Managed (Wound)  Outcome: Outcome(s) achieved     Problem: Skin Injury Risk (Adult)  Goal: Identify Related Risk Factors and Signs and Symptoms  Outcome: Outcome(s) achieved  Goal: Skin Health and Integrity  Outcome: Outcome(s) achieved     Problem: VTE, DVT and PE (Adult)  Goal: Signs and Symptoms of Listed Potential Problems Will be Absent, Minimized or Managed (VTE, DVT and PE)  Outcome: Outcome(s) achieved

## 2020-07-05 NOTE — OUTREACH NOTE
Prep Survey      Responses   [facility] facility patient discharged from?  [facility]   Is LACE score < 7 ?  No   Eligibility  Not Eligible   What are the reasons patient is not eligible?  Hospice/Pallative Care   COVID-19 Test Status  Negative   Does the patient have one of the following disease processes/diagnoses(primary or secondary)?  Other   Prep survey completed?  Yes          Cherry Talley RN

## 2020-07-06 NOTE — PROGRESS NOTES
Discharge Planning Assessment  [facility]     Patient Name: Anaid Moura  MRN: [id number]  Today's Date: 7/6/2020    Admit Date: 6/30/2020    Discharge Needs Assessment    No documentation.       Discharge Plan     Row Name 07/06/20 1405       Plan    Plan  Home with [facility]     Final Discharge Disposition Code  04 - intermediate care facility    Final Note  Home with [facility]         Destination      Coordination has not been started for this encounter.      Durable Medical Equipment      Coordination has not been started for this encounter.      Dialysis/Infusion      Coordination has not been started for this encounter.      Home Medical Care      Service Provider Request Status Selected Services Address Phone Number Fax Number    [facility] Accepted N/A [id number] [address] [phone]      Therapy      Coordination has not been started for this encounter.      Community Resources      Coordination has not been started for this encounter.        Expected Discharge Date and Time     Expected Discharge Date Expected Discharge Time    Jul 4, 2020         Demographic Summary    No documentation.       Functional Status    No documentation.       Psychosocial    No documentation.       Abuse/Neglect    No documentation.       Legal    No documentation.       Substance Abuse    No documentation.       Patient Forms    No documentation.           Luz Marina Muro, RN

## 2020-07-06 NOTE — TELEPHONE ENCOUNTER
----- Message from Solis Godfrey Jr., MD sent at 7/6/2020  4:47 PM EDT -----  Please arrange a video visit Dr Godfrey in 2 weeks

## 2020-07-06 NOTE — TELEPHONE ENCOUNTER
CALLED AND SPOKE WITH MR SHAFER AND HE IS AWARE OF THE VIDEO VISIT AND THAT THEY ARE DOING ALRIGHT - [facility] HAS SET IN. SHE IS NOT IN ANY PAIN

## 2020-07-21 NOTE — PROGRESS NOTES
Subjective .     REASONS FOR FOLLOWUP:    1. Metastatic non-small cell lung cancer (adenocarcinoma):  · Patient with long-standing smoking history x40 years quit in 2008  · Underlying significant COPD with FEV1 1.4 (58%) on 11/20/2014  · CT 5/16/2019 with left upper lobe mass, partially cavitary measuring 2.3 x 1.5 cm.  Additional 8 mm similar appearing right upper lobe nodule.  Bibasilar consolidation, small bilateral pleural effusions, mediastinal lymphadenopathy up to 2.7 centimeters.  · Bronchoscopy 5/20/2019 with identification of left mainstem malignancy extending to the level of the jose, biopsy showed poorly differentiated adenocarcinoma of pulmonary origin. EBUS with FNA station 7 lymph node negative for malignant cells, only scattered lymphoid aggregates.  BAL left upper lobe negative for malignant cells.  Negative EGFR mutation, negative ALK/ROS 1 rearrangement. PDL1 95%.  · Patient was intubated with possible pneumonia, significant mucus plugging with underlying significant COPD.  Extubated on 5/28/2019.   · Staging evaluation performed during hospitalization with negative CT abdomen/pelvis, negative bone scan.  · MRI brain 5/29/2019 with evidence of metastatic disease, 3-4 enhancing lesions involving left occipital lobe 5 mm, left posterior parietal lobe 4 mm, left frontal lobe 4 mm, left parietal 3-4 mm with small amounts of surrounding edema.  Given the minimal extent of disease elected to observe with short interval MRI.  · Discussion regarding systemic therapy with single agent Keytruda due to PDL1 95%  · Subsequent disease progression on CT 6/8/2019 with increasing left upper lobe mass, mediastinal and hilar lymphadenopathy with occlusion of left mainstem bronchus at origin.  · Received palliative radiation therapy to left mainstem bronchus x10 fractions, completed 6/25/2019.  · Initiated systemic therapy with Keytruda 6/28/2019.  · Scans 8/6/2019 following 2 cycles Keytruda with decrease left  upper lobe primary lesion and left hilar/mediastinal lymphadenopathy.  Response primarily felt to be related to radiation therapy.  Continuation of immunotherapy.  · CT scans 9/17/2019 following 4 cycles Keytruda with further decrease in AP window lymph node, suspected evolving postradiation change in left lower lobe.  MRI brain 9/17/2019 with improvement in 3 small metastatic lesions.  Continuation of immunotherapy.  · CT scan 10/29/2019 following 6 cycles Keytruda with new/enlarging 2 cm left upper lobe lesion, stable mediastinal lymph nodes, increased air space opacities in the lungs bilaterally, new left adrenal nodule 1.3 cm.  MRI brain with stable to slightly improved findings 10/29/2019.  Cycle 7 Keytruda held 11/8/2019 with plans to pursue PET scan.  Unclear whether pulmonary findings represented infectious change versus pneumonitis from immunotherapy.  · PET scan 11/14/2019 with worsening areas of consolidation involving the lungs bilaterally, decrease in left upper lobe nodular opacification with minimal uptake, stable mildly hypermetabolic mediastinal and left hilar lymph nodes, intensely hypermetabolic left adrenal 1.7 cm lesion SUV 11.2.  With clinical improvement in pulmonary status, pulmonary changes suspected to represent pneumonitis from immunotherapy.  The only area of true progression in left adrenal, will pursue stereotactic radiation.  Patient will remain off immunotherapy, initiated prednisone 40 mg daily.  · CT chest 12/4/2019 with improvement in left upper and lower lobe consolidation, slight further progression left adrenal.  Prednisone tapered.  · Left adrenal radiation completed 1/2/2020  · Evidence of progressive disease on CT angiogram chest 6/30/2020 with bilateral pulmonary nodules and increasing left adrenal mass.  Evidence of pulmonary embolism on the left.  Anticoagulation with Eliquis 5 mg twice daily.  Patient not a candidate for further active therapy of malignancy and transition  to home hospice care at time of discharge from hospital 7/4/2020.  2. 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  · Patient extubated 5/28/2019.  · Subsequent occlusion of left mainstem bronchus from malignancy 6/8/2019.  Received palliative radiation completed 6/25/2019.  · Patient with decline in respiratory status in October 2019 requiring oxygen to be initiated.  Suspected related to immunotherapy induced pneumonitis.  Response to steroids with significant improvement in respiratory status.  3. Prior hemorrhagic CVA:  · Occurred in 2008, residual left upper extremity weakness and left lower extremity weakness.   4. Anemia:  · Hemoglobin prior to admission was 13.1 on 5/16/2019  · Hemoglobin declined in the 10-11 range, related to malignancy, pneumonia  · Progressive anemia during hospitalization 2/13-3/4/2020 requiring transfusion support.  5. Depression and nausea/anorexia and chronic fatigue:  · Significant improvement following addition of Zyprexa nightly, dose escalated to 7.5 mg.  6. Hypothyroidism:  · Labs 6/28/2019 with TSH 23.8, normal free T4 of 1.43  · Treated with levothyroxine 100 mcg daily  · Subsequent thyroid function studies with TSH 0.065 and free T4 elevated at 2.06, levothyroxine dose decreased 8/9/19 to 50 mcg daily.  · 11/8/2019 TSH 37.5, free T4 1.15, levothyroxine dose increased to 75 mcg daily.  7. Left pulmonary embolism:  · CT angiogram chest 6/30/2020 with evidence of left-sided pulmonary embolism  · Bilateral lower extremity Dopplers negative for DVT 7/1/2020  · Patient anticoagulated with Eliquis 5 mg twice daily      HISTORY OF PRESENT ILLNESS:  The patient is a 75 y.o. year old female who is here for follow-up with the above-mentioned history.    History of Present Illness   Patient is evaluated today in follow-up via telephone visit.   She was recently hospitalized 6/30-7/4/2020 with worsening generalized fatigue and dyspnea on exertion.  CT angiogram chest identified left-sided pulmonary embolism and she was anticoagulated.  There was evidence of progressive malignancy with bilateral pulmonary nodules presumed to be malignant in nature and progression of left adrenal metastasis.  Since the patient was not a candidate for any further active therapy of her disease given her performance status and comorbidities, it was elected to transition to palliative/supportive care alone with home hospice involvement.  Since her discharge home on 7/4/2020 she has been continuing with home hospice care.  The patient notes today that she has been fairly stable since going home.  She has been continuing on prednisone 10 mg twice daily for both her respiratory issues as well as her appetite.  She feels that this has helped somewhat.  Her shortness of breath is stable.  She continues with profound fatigue.  She is on anticoagulation with Eliquis 5 mg twice daily and has not experienced any bleeding issues.  She reports that hospice care has been very effective in managing her symptoms to date.    Past Medical History:   Diagnosis Date   • Acute on chronic combined systolic and diastolic CHF (congestive heart failure) (CMS/HCC)    • Acute on chronic respiratory failure with hypoxemia (CMS/HCC)    • Benign essential hypertension    • Cachexia (CMS/HCC)    • CAD (coronary artery disease)    • Cancer (CMS/HCC) 05/2019    Left lung   • Carotid artery stenosis    • Cerebellar artery occlusion 06/2008    causing left arm and leg paresis   • CKD (chronic kidney disease), stage III (CMS/HCC)    • COPD (chronic obstructive pulmonary disease) (CMS/HCC)    • Gastroesophageal reflux disease 12/10/2015   • 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   • Hyperlipidemia    • Left hemiplegia (CMS/HCC) 12/10/2015   • Lung cancer (CMS/HCC)    • Myocardial infarct (CMS/HCC)  1996   • Osteopenia    • Pneumonia of right lower lobe due to infectious organism    • Pulmonary embolism (CMS/HCC)    • Stroke syndrome 2008   • Thyroid cyst     right   • Thyroid lump 12/10/2015    Description: Biopsy 05/15 at [facility], benign   • Transient cerebral ischemia    • Urge incontinence of urine      Past Surgical History:   Procedure Laterality Date   • BREAST BIOPSY     • BRONCHOSCOPY Bilateral 5/20/2019    Procedure: BRONCHOSCOPY WITH  BIOPSY AND BAL, WITH ENDOBRONCHIAL ULTRASOUND WITH FNA;  Surgeon: Chris Tirado MD;  Location: [facility] ENDOSCOPY;  Service: Pulmonary   • BRONCHOSCOPY N/A 2/19/2020    Procedure: BRONCHOSCOPY WITH WASHINGS AND BAL;  Surgeon: Ronnie Pruett MD;  Location: [facility] ENDOSCOPY;  Service: Pulmonary;  Laterality: N/A;  PRE OP - PNEUMONIA  POST OP - SAME   • COLONOSCOPY      REC 07/2007, REC 02/2009, REC 12/2011, REC 01/14,  She says she cant do the prep because of poor mobility to get to .   • EYE SURGERY  1950   • OTHER SURGICAL HISTORY      Ventricular [place] for drainage of subdural hematoma   • TOTAL THYROIDECTOMY  08/2015    Dr. Ahmadi         ONCOLOGIC HISTORY:  (History from previous dates can be found in the separate document.)    Current Outpatient Medications on File Prior to Visit   Medication Sig Dispense Refill   • apixaban (ELIQUIS) 5 MG tablet tablet Take 1 tablet by mouth Every 12 (Twelve) Hours. Indications: DVT/PE (active thrombosis) 60 tablet 3   • aspirin 81 MG chewable tablet Chew 81 mg Daily.     • atorvastatin (LIPITOR) 80 MG tablet Take 1 tablet by mouth Daily. Needs an appointment for further refills (Patient taking differently: Take 80 mg by mouth Every Night. Needs an appointment for further refills) 90 tablet 0   • budesonide (PULMICORT) 0.5 MG/2ML nebulizer solution Take 0.5 mg by nebulization 2 (Two) Times a Day.     • folic acid (FOLVITE) 1 MG tablet Take 1 tablet by mouth Daily. 90 tablet 3   • guaiFENesin (ROBITUSSIN) 100 MG/5ML solution  oral solution Take 20 mL by mouth Every 4 (Four) Hours. While awake (Patient taking differently: Take 400 mg by mouth Every 4 (Four) Hours As Needed (Cough). While awake) 3600 mL 3   • ipratropium-albuterol (DUO-NEB) 0.5-2.5 mg/3 ml nebulizer Take 3 mL by nebulization 4 (Four) Times a Day. 360 mL 2   • levothyroxine (SYNTHROID, LEVOTHROID) 75 MCG tablet TAKE ONE TABLET BY MOUTH DAILY 30 tablet 0   • mirtazapine (REMERON) 15 MG tablet Take 1 tablet by mouth Every Night. 30 tablet 3   • OLANZapine (zyPREXA) 2.5 MG tablet Take 1 tablet by mouth Every Night. 30 tablet 3   • predniSONE (DELTASONE) 10 MG tablet Take 1 tablet by mouth 2 (Two) Times a Day With Meals. 60 tablet 6   • tolterodine (Detrol) 1 MG tablet Take 1 tablet by mouth 2 (Two) Times a Day. (Patient taking differently: Take 1 mg by mouth 2 (Two) Times a Day. Two in the morning, two at night) 180 tablet 3     No current facility-administered medications on file prior to visit.        ALLERGIES:     Allergies   Allergen Reactions   • Ace Inhibitors Cough     Other reaction(s): Cough       Social History     Socioeconomic History   • Marital status:      Spouse name: Miah   • Number of children: Not on file   • Years of education: College   • Highest education level: Not on file   Occupational History     Employer: RETIRED   Tobacco Use   • Smoking status: Former Smoker     Types: Cigarettes     Last attempt to quit:      Years since quittin.5   • Smokeless tobacco: Never Used   • Tobacco comment: caffeine use-soda   Substance and Sexual Activity   • Alcohol use: Yes     Comment: rarely   • Drug use: No   • Sexual activity: Defer     Family History   Problem Relation Age of Onset   • Heart attack Mother         Acute   • Heart disease Mother    • Colon polyps Sister         Sigmoid colon   • Coronary artery disease Brother    • Diabetes Brother    • Heart disease Brother    • Alcohol abuse Son               Review of Systems   Constitutional:  Positive for fatigue. Negative for activity change, appetite change, fever and unexpected weight change.   HENT: Negative for congestion, mouth sores, nosebleeds, sore throat and voice change.    Respiratory: Positive for cough and shortness of breath. Negative for wheezing.    Cardiovascular: Negative for chest pain, palpitations and leg swelling.   Gastrointestinal: Negative for abdominal distention, abdominal pain, blood in stool, constipation, diarrhea, nausea and vomiting.   Endocrine: Negative for cold intolerance and heat intolerance.   Genitourinary: Negative for difficulty urinating, dysuria, frequency and hematuria.   Musculoskeletal: Negative for arthralgias, back pain, joint swelling and myalgias.   Skin: Negative for rash.   Neurological: Positive for weakness. Negative for dizziness, syncope, light-headedness, numbness and headaches.   Hematological: Negative for adenopathy. Does not bruise/bleed easily.   Psychiatric/Behavioral: Positive for dysphoric mood. Negative for confusion and sleep disturbance. The patient is not nervous/anxious.          Objective      There were no vitals filed for this visit.   Current Status 2/4/2020   ECOG score 3   Pain 0/10    Physical Exam   Constitutional: She is oriented to person, place, and time.   Neurological: She is alert and oriented to person, place, and time.   Psychiatric: She has a normal mood and affect. Her behavior is normal. Judgment and thought content normal.   The patient was evaluated via telephone visit today and therefore full physical exam was not performed.    RECENT LABS:  Hematology WBC   Date Value Ref Range Status   07/03/2020 8.03 3.40 - 10.80 10*3/mm3 Final   10/31/2019 15.71 (H) 4.5 - 11 x10E3/uL Final     RBC   Date Value Ref Range Status   07/03/2020 3.70 (L) 3.77 - 5.28 10*6/mm3 Final   10/31/2019 4.13 (L) 4.20 - 5.40 x10E6/uL Final     Hemoglobin   Date Value Ref Range Status   07/03/2020 10.3 (L) 12.0 - 15.9 g/dL Final   10/31/2019  11.1 (L) 12.0 - 16.0 g/dL Final     Hematocrit   Date Value Ref Range Status   07/03/2020 31.7 (L) 34.0 - 46.6 % Final   10/31/2019 36.7 (L) 37.0 - 47.0 % Final     Platelets   Date Value Ref Range Status   07/03/2020 323 140 - 450 10*3/mm3 Final     External Platelets   Date Value Ref Range Status   10/31/2019 348 140 - 440 thou/cu mm Final        Lab Results   Component Value Date    GLUCOSE 153 (H) 07/03/2020    BUN 12 07/03/2020    CREATININE 0.60 07/03/2020    EGFRIFNONA 98 07/03/2020    EGFRIFAFRI 74 08/11/2016    BCR 20.0 07/03/2020    K 4.9 07/03/2020    CO2 25.4 07/03/2020    CALCIUM 9.0 07/03/2020    PROTENTOTREF 7.4 08/11/2016    ALBUMIN 2.70 (L) 07/02/2020    LABIL2 0.5 11/01/2019    AST 10 07/02/2020    ALT 6 07/02/2020     I did review multiple records today including hospital progress notes, discharge summary, scan results.  All as outlined above and below.    Assessment/Plan     1. Metastatic non-small cell lung cancer (adenocarcinoma):  · Patient with long-standing smoking history x40 years quit in 2008  · Underlying significant COPD with FEV1 1.4 (58%) on 11/20/2014  · CT 5/16/2019 with left upper lobe mass, partially cavitary measuring 2.3 x 1.5 cm.  Additional 8 mm similar appearing right upper lobe nodule.  Bibasilar consolidation, small bilateral pleural effusions, mediastinal lymphadenopathy up to 2.7 centimeters.  · Bronchoscopy 5/20/2019 with identification of left mainstem malignancy extending to the level of the jose, biopsy showed poorly differentiated adenocarcinoma of pulmonary origin. EBUS with FNA station 7 lymph node negative for malignant cells, only scattered lymphoid aggregates.  BAL left upper lobe negative for malignant cells.  Negative EGFR mutation, negative ALK/ROS 1 rearrangement. PDL1 95%.  · Patient was intubated with possible pneumonia, significant mucus plugging with underlying significant COPD.  Extubated on 5/28/2019.   · Staging evaluation performed during  hospitalization with negative CT abdomen/pelvis, negative bone scan.  · MRI brain 5/29/2019 with evidence of metastatic disease, 3-4 enhancing lesions involving left occipital lobe 5 mm, left posterior parietal lobe 4 mm, left frontal lobe 4 mm, left parietal 3-4 mm with small amounts of surrounding edema.  Given the minimal extent of disease elected to observe with short interval MRI.  · Discussion regarding systemic therapy with single agent Keytruda due to PDL1 95%  · Subsequent disease progression on CT 6/8/2019 with increasing left upper lobe mass, mediastinal and hilar lymphadenopathy with occlusion of left mainstem bronchus at origin.  · Received palliative radiation therapy to left mainstem bronchus x10 fractions, completed 6/25/2019.  · Initiated systemic therapy with Keytruda 6/28/2019.  · CT scan following 2 cycles Keytruda and prior radiation from 8/6/2019 showed decrease in the left upper lobe primary lesion with essentially no residual discrete lung mass in that area.  Lymphadenopathy in the mediastinum/AP window and left hilum with significant improvement and resolution of left mainstem obstruction.  No new evidence of disease.  The patient did receive radiation therapy to both the primary left upper lobe mass and left hilum/mediastinum accounting for most of the improvement.  MRI brain 8/6/2019 with 3 enhancing metastatic lesions, 2 of which had a more nodular appearance as opposed to previous ring-enhancing appearance with stable size.  These were in the left occipital and left frontal regions.  The third lesion in the left parietal region decreased in size from 4 mm down to 2 mm.  There were no new lesions identified.  She has not undergone any specific treatment for her brain metastases.  Continuation of immunotherapy with Keytruda.  · Following 4 cycles Keytruda, 9/17/2019 CT scan showed improvement in AP window lymph node from 2.7 x 1.9 down to 2.1 x 1.5 cm.  Progressive change in the left lower  lobe around the pleura, with one area having a masslike appearance suspected secondary to radiation pneumonitis rather than malignancy.  MRI brain with small metastatic lesions decreased in size.  · CT scan 10/29/2019 following 6 cycles Keytruda with new/enlarging 2 cm left upper lobe lesion, stable mediastinal lymph nodes, increased air space opacities in the lungs bilaterally, new left adrenal nodule 1.3 cm.  MRI brain with stable to slightly improved findings 10/29/2019.  Cycle 7 Keytruda held 11/8/2019 with plans to pursue PET scan.  Unclear whether pulmonary findings represented infectious change versus pneumonitis from immunotherapy.  · PET scan 11/14/2019 with worsening areas of consolidation involving the lungs bilaterally, decrease in left upper lobe nodular opacification with minimal uptake, stable mildly hypermetabolic mediastinal and left hilar lymph nodes, intensely hypermetabolic left adrenal 1.7 cm lesion SUV 11.2.  With clinical improvement in pulmonary status, pulmonary changes suspected to represent pneumonitis from immunotherapy.  The only area of true progression in left adrenal, will pursue stereotactic radiation.  Patient will remain off immunotherapy, initiated prednisone 40 mg daily with subsequent taper.  · CT chest 12/4/2019 with improvement in left upper and lower lobe consolidation, slight further progression left adrenal.  Prednisone tapered.  · Left adrenal radiation completed 1/2/2020  · MRI brain and CT scan 1/27/2020 reviewed. MRI showed resolution of the 3 small residual areas of enhancement in the left cerebral hemisphere and no new areas of metastatic disease identified.  CT chest 1/27/2020 showed evidence of radiation fibrosis at the site of previous treatment in the left upper lobe, no evidence of previous nodular density seen in that location.  There was a new small left pleural effusion.  There was bronchial thickening and mucus plugging in bilateral lower lobes with  consolidation/atelectasis left greater than right.  There were stable mediastinal lymph nodes.  There was a new masslike area of subpleural consolidation right lower lobe measuring 4.1 x 2 x 4 cm of unclear etiology which may be related to atelectasis versus progressive malignancy.  She is a poor candidate for CT-guided biopsy with high risk for pneumothorax and has little pulmonary reserve to tolerate this.  It is likely not accessible via bronchoscopy given its peripheral location.  We discussed conservative management with short interval follow-up CT. With the possibility of progressive disease, we did discuss the option for any further systemic therapy.  This would require the use of chemotherapy.  The patient unfortunately is a poor candidate for palliative chemotherapy given her performance status and comorbidities at this point.  We discussed pursuing a palliative/supportive approach however she would like to know the status of her disease.  Therefore repeating scans for prognostic information is reasonable.  If she were to decline clinically or show more definitive evidence of progressive disease we would consider transitioning to hospice care.    · The patient was admitted 2/13-3/4/2020 with pneumonia, CT chest 2/13/2020 with decrease in left pleural effusion, decrease in left lower lobe consolidation, increased consolidation right lower lobe with evidence of mucous plugging. Patient underwent bronchoscopy 2/19/2028 with finding of copious secretions, cultures negative.  BAL specimen with negative cytology.  · Evidence of progressive disease on CT angiogram chest 6/30/2020 with bilateral pulmonary nodules and increasing left adrenal mass.  Evidence of pulmonary embolism on the left.  Anticoagulation with Eliquis 5 mg twice daily.  Patient not a candidate for further active therapy of malignancy and transition to home hospice care at time of discharge from hospital 7/4/2020.  · The patient is evaluated today  via telephone visit continuing currently on home hospice care since her discharge home from the hospital on 7/4/2020.  She has been relatively stable since her discharge home.  She reports that things have been going well with hospice care.  We will not make any changes currently to her regimen.  We will continue on prednisone 10 mg twice daily which is likely producing some benefit in terms of her respiratory status and her appetite.  She does continue on anticoagulation which is likely providing some quality of life benefit in terms of managing her pulmonary embolism and she has not experienced any bleeding issues.  2. 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  · Patient extubated 5/28/2019.  · Subsequent occlusion of left mainstem bronchus from malignancy 6/8/2019.  Received palliative radiation completed 6/25/2019.  · Patient hospitalized while in [place] at [facility] in October 2019 due to suspected aspiration pneumonia.  Doubtful that pulmonary findings are related to immunotherapy due to improvement on antibiotics and productive cough.  · Patient with decline in respiratory status in October 2019 requiring oxygen to be initiated.  Suspected related to immunotherapy induced pneumonitis.  Immunotherapy discontinued and initiated empiric prednisone 40 mg daily on 11/20/2019.  · Patient with overall improvement in respiratory status, currently on oxygen 1 L nasal cannula at night only with improvement in cough.    · The patient tapered prednisone down to 5 mg daily on 12/26/2019.  She developed declining weight and appetite with increase in prednisone back to 10 mg daily on 1/24/2020.  · Prednisone was tapered following hospitalization in March to 5 mg daily  · Patient is currently continuing on prednisone 10 mg twice daily and we will  maintain on this dose.  Respiratory status is stable.  3. Prior hemorrhagic CVA:  · Occurred in 2008, residual left upper extremity weakness and left lower extremity weakness.   4. Anemia:  · Hemoglobin prior to admission was 13.1 on 5/16/2019  · Hemoglobin declined in the 10-11 range, related to malignancy, pneumonia  · Hemoglobin on 1/8/2020 declined down to 9.9.  Additional labs performed with iron 21, ferritin 454, iron saturation 9%, TIBC 223, B12 348, folate 3.77.  Appears that patient has anemia of chronic disease as well as anemia related to folate deficiency.  Initiated oral folic acid 1 mg daily.  · Hemoglobin declined during hospitalization 2/13-3/4/2020 and required transfusion support  · Hemoglobin had improved on 4/30/2020 up to 13.9  · During recent hospitalization, hemoglobin had declined into the 10 range.  · We do not plan any further routine laboratory studies continuing on palliative/supportive care.  8. Hypothyroidism:  · Labs 6/28/2019 with TSH 23.8, normal free T4 of 1.43  · Patient was started on levothyroxine 100 mcg daily  · Repeat thyroid function studies on 8/9/2019 with TSH suppressed at 0.065 and elevated free T4 of 2.06.  Decreased levothyroxine dose to 50 mcg daily.    · 11/8/2019 TSH 37.5, free T4 1.15, levothyroxine dose increased to 75 mcg daily.  · Thyroid function studies on 2/4/2020 with TSH 5.08, free T4 1.43  · The patient continues on levothyroxine at 75 mcg daily.  9. Depression and nausea/anorexia:  · Patient with depression related to her underlying medical illness as well as social situation with her son who is an alcoholic and lives in the home  · Patient has been treated with Depakote 250 mg every 12 hours for mood stabilization under the direction of her primary care physician  · Patient with significant depressive symptoms, currently continuing on Zoloft under the direction of her primary care physician.  · Patient also with ongoing anorexia and nausea, initiated  Zyprexa 2.5 mg nightly on 7/19/2019 with dramatic improvement in symptoms  · Patient seen in the supportive oncology clinic on 7/29/2019, tapered off Depakote.  · Patient has PRN Compazine and Zyprexa to use.  · Zyprexa dose is currently 2.5 mg nightly  · The patient did stop Zoloft apparently after her hospitalization in March 2020.  She felt that her depressive symptoms increased and resumed Zoloft at 25 mg daily.  It appears however that she is currently not receiving Zoloft but is receiving Remeron 15 mg nightly in addition to Zyprexa 2.5 mg nightly.  10. Left pulmonary embolism:  · CT angiogram chest 6/30/2020 with evidence of left-sided pulmonary embolism  · Bilateral lower extremity Dopplers negative for DVT 7/1/2020  · Patient anticoagulated with Eliquis 5 mg twice daily    Plan:  1. The patient is currently continuing on palliative/supportive care alone with home hospice involvement  2. Continue prednisone 10 mg twice daily  3. Continue Eliquis 5 mg twice daily  4. Continue Zyprexa 2.5 mg nightly and Remeron 15 mg nightly  5. Continue levothyroxine 75 mcg daily  6. In 1 month we will schedule a telemedicine visit.    The patient did consent to telephone visit today.  I did spend 8 minutes on the telephone with the patient.

## 2020-08-18 NOTE — PROGRESS NOTES
Subjective .     REASONS FOR FOLLOWUP:    1. Metastatic non-small cell lung cancer (adenocarcinoma):  · Patient with long-standing smoking history x40 years quit in 2008  · Underlying significant COPD with FEV1 1.4 (58%) on 11/20/2014  · CT 5/16/2019 with left upper lobe mass, partially cavitary measuring 2.3 x 1.5 cm.  Additional 8 mm similar appearing right upper lobe nodule.  Bibasilar consolidation, small bilateral pleural effusions, mediastinal lymphadenopathy up to 2.7 centimeters.  · Bronchoscopy 5/20/2019 with identification of left mainstem malignancy extending to the level of the jose, biopsy showed poorly differentiated adenocarcinoma of pulmonary origin. EBUS with FNA station 7 lymph node negative for malignant cells, only scattered lymphoid aggregates.  BAL left upper lobe negative for malignant cells.  Negative EGFR mutation, negative ALK/ROS 1 rearrangement. PDL1 95%.  · Patient was intubated with possible pneumonia, significant mucus plugging with underlying significant COPD.  Extubated on 5/28/2019.   · Staging evaluation performed during hospitalization with negative CT abdomen/pelvis, negative bone scan.  · MRI brain 5/29/2019 with evidence of metastatic disease, 3-4 enhancing lesions involving left occipital lobe 5 mm, left posterior parietal lobe 4 mm, left frontal lobe 4 mm, left parietal 3-4 mm with small amounts of surrounding edema.  Given the minimal extent of disease elected to observe with short interval MRI.  · Discussion regarding systemic therapy with single agent Keytruda due to PDL1 95%  · Subsequent disease progression on CT 6/8/2019 with increasing left upper lobe mass, mediastinal and hilar lymphadenopathy with occlusion of left mainstem bronchus at origin.  · Received palliative radiation therapy to left mainstem bronchus x10 fractions, completed 6/25/2019.  · Initiated systemic therapy with Keytruda 6/28/2019.  · Scans 8/6/2019 following 2 cycles Keytruda with decrease left  upper lobe primary lesion and left hilar/mediastinal lymphadenopathy.  Response primarily felt to be related to radiation therapy.  Continuation of immunotherapy.  · CT scans 9/17/2019 following 4 cycles Keytruda with further decrease in AP window lymph node, suspected evolving postradiation change in left lower lobe.  MRI brain 9/17/2019 with improvement in 3 small metastatic lesions.  Continuation of immunotherapy.  · CT scan 10/29/2019 following 6 cycles Keytruda with new/enlarging 2 cm left upper lobe lesion, stable mediastinal lymph nodes, increased air space opacities in the lungs bilaterally, new left adrenal nodule 1.3 cm.  MRI brain with stable to slightly improved findings 10/29/2019.  Cycle 7 Keytruda held 11/8/2019 with plans to pursue PET scan.  Unclear whether pulmonary findings represented infectious change versus pneumonitis from immunotherapy.  · PET scan 11/14/2019 with worsening areas of consolidation involving the lungs bilaterally, decrease in left upper lobe nodular opacification with minimal uptake, stable mildly hypermetabolic mediastinal and left hilar lymph nodes, intensely hypermetabolic left adrenal 1.7 cm lesion SUV 11.2.  With clinical improvement in pulmonary status, pulmonary changes suspected to represent pneumonitis from immunotherapy.  The only area of true progression in left adrenal, will pursue stereotactic radiation.  Patient will remain off immunotherapy, initiated prednisone 40 mg daily.  · CT chest 12/4/2019 with improvement in left upper and lower lobe consolidation, slight further progression left adrenal.  Prednisone tapered.  · Left adrenal radiation completed 1/2/2020  · Evidence of progressive disease on CT angiogram chest 6/30/2020 with bilateral pulmonary nodules and increasing left adrenal mass.  Evidence of pulmonary embolism on the left.  Anticoagulation with Eliquis 5 mg twice daily.  Patient not a candidate for further active therapy of malignancy and transition  to home hospice care at time of discharge from hospital 7/4/2020.  2. 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  · Patient extubated 5/28/2019.  · Subsequent occlusion of left mainstem bronchus from malignancy 6/8/2019.  Received palliative radiation completed 6/25/2019.  · Patient with decline in respiratory status in October 2019 requiring oxygen to be initiated.  Suspected related to immunotherapy induced pneumonitis.  Response to steroids with significant improvement in respiratory status.  3. Prior hemorrhagic CVA:  · Occurred in 2008, residual left upper extremity weakness and left lower extremity weakness.   4. Anemia:  · Hemoglobin prior to admission was 13.1 on 5/16/2019  · Hemoglobin declined in the 10-11 range, related to malignancy, pneumonia  · Progressive anemia during hospitalization 2/13-3/4/2020 requiring transfusion support.  5. Depression and nausea/anorexia and chronic fatigue:  · Significant improvement following addition of Zyprexa nightly, dose escalated to 7.5 mg.  6. Hypothyroidism:  · Labs 6/28/2019 with TSH 23.8, normal free T4 of 1.43  · Treated with levothyroxine 100 mcg daily  · Subsequent thyroid function studies with TSH 0.065 and free T4 elevated at 2.06, levothyroxine dose decreased 8/9/19 to 50 mcg daily.  · 11/8/2019 TSH 37.5, free T4 1.15, levothyroxine dose increased to 75 mcg daily.  7. Left pulmonary embolism:  · CT angiogram chest 6/30/2020 with evidence of left-sided pulmonary embolism  · Bilateral lower extremity Dopplers negative for DVT 7/1/2020  · Patient anticoagulated with Eliquis 5 mg twice daily      HISTORY OF PRESENT ILLNESS:  The patient is a 75 y.o. year old female who is here for follow-up with the above-mentioned history.    History of Present Illness   The patient was evaluated again today via telephone visit given  the viral pandemic.  She does continue on home hospice care.  She continues on prednisone 10 mg twice daily in regards to her respiratory status and appetite.  She is also continuing on Eliquis 5 mg twice daily for anticoagulation.  She reports that she has done overall very well.  She has remained very stable with no new respiratory issues.  Her appetite has remained excellent.  She in fact has no significant complaints at this time.  She notes that hospice does continue weekly visits either via telephone or via personal visit.    Past Medical History:   Diagnosis Date   • Acute on chronic combined systolic and diastolic CHF (congestive heart failure) (CMS/HCC)    • Acute on chronic respiratory failure with hypoxemia (CMS/HCC)    • Benign essential hypertension    • Cachexia (CMS/HCC)    • CAD (coronary artery disease)    • Cancer (CMS/HCC) 05/2019    Left lung   • Carotid artery stenosis    • Cerebellar artery occlusion 06/2008    causing left arm and leg paresis   • CKD (chronic kidney disease), stage III (CMS/HCC)    • COPD (chronic obstructive pulmonary disease) (CMS/HCC)    • Gastroesophageal reflux disease 12/10/2015   • 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   • Hyperlipidemia    • Left hemiplegia (CMS/HCC) 12/10/2015   • Lung cancer (CMS/HCC)    • Myocardial infarct (CMS/HCC) 1996   • Osteopenia    • Pneumonia of right lower lobe due to infectious organism    • Pulmonary embolism (CMS/HCC)    • Stroke syndrome 2008   • Thyroid cyst     right   • Thyroid lump 12/10/2015    Description: Biopsy 05/15 at [facility], benign   • Transient cerebral ischemia    • Urge incontinence of urine      Past Surgical History:   Procedure Laterality Date   • BREAST BIOPSY     • BRONCHOSCOPY Bilateral 5/20/2019    Procedure: BRONCHOSCOPY WITH  BIOPSY AND BAL, WITH ENDOBRONCHIAL ULTRASOUND WITH FNA;  Surgeon: Chris Tirado MD;  Location: [facility] ENDOSCOPY;  Service: Pulmonary   • BRONCHOSCOPY N/A 2/19/2020     Procedure: BRONCHOSCOPY WITH WASHINGS AND BAL;  Surgeon: Ronnie Pruett MD;  Location: [facility] ENDOSCOPY;  Service: Pulmonary;  Laterality: N/A;  PRE OP - PNEUMONIA  POST OP - SAME   • COLONOSCOPY      REC 07/2007, REC 02/2009, REC 12/2011, REC 01/14,  She says she cant do the prep because of poor mobility to get to .   • EYE SURGERY  1950   • OTHER SURGICAL HISTORY      Ventricular [place] for drainage of subdural hematoma   • TOTAL THYROIDECTOMY  08/2015    Dr. Ahmadi         ONCOLOGIC HISTORY:  (History from previous dates can be found in the separate document.)    Current Outpatient Medications on File Prior to Visit   Medication Sig Dispense Refill   • apixaban (ELIQUIS) 5 MG tablet tablet Take 1 tablet by mouth Every 12 (Twelve) Hours. Indications: DVT/PE (active thrombosis) 60 tablet 3   • aspirin 81 MG chewable tablet Chew 81 mg Daily.     • atorvastatin (LIPITOR) 80 MG tablet Take 1 tablet by mouth Daily. Needs an appointment for further refills (Patient taking differently: Take 80 mg by mouth Every Night. Needs an appointment for further refills) 90 tablet 0   • budesonide (PULMICORT) 0.5 MG/2ML nebulizer solution Take 0.5 mg by nebulization 2 (Two) Times a Day.     • folic acid (FOLVITE) 1 MG tablet Take 1 tablet by mouth Daily. 90 tablet 3   • guaiFENesin (ROBITUSSIN) 100 MG/5ML solution oral solution Take 20 mL by mouth Every 4 (Four) Hours. While awake (Patient taking differently: Take 400 mg by mouth Every 4 (Four) Hours As Needed (Cough). While awake) 3600 mL 3   • ipratropium-albuterol (DUO-NEB) 0.5-2.5 mg/3 ml nebulizer Take 3 mL by nebulization 4 (Four) Times a Day. 360 mL 2   • levothyroxine (SYNTHROID, LEVOTHROID) 75 MCG tablet TAKE ONE TABLET BY MOUTH DAILY 30 tablet 0   • mirtazapine (REMERON) 15 MG tablet Take 1 tablet by mouth Every Night. 30 tablet 3   • OLANZapine (zyPREXA) 2.5 MG tablet Take 1 tablet by mouth Every Night. 30 tablet 3   • predniSONE (DELTASONE) 10 MG tablet Take 1 tablet  by mouth 2 (Two) Times a Day With Meals. 60 tablet 6   • tolterodine (Detrol) 1 MG tablet Take 1 tablet by mouth 2 (Two) Times a Day. (Patient taking differently: Take 1 mg by mouth 2 (Two) Times a Day. Two in the morning, two at night) 180 tablet 3     No current facility-administered medications on file prior to visit.        ALLERGIES:     Allergies   Allergen Reactions   • Ace Inhibitors Cough     Other reaction(s): Cough       Social History     Socioeconomic History   • Marital status:      Spouse name: Miah   • Number of children: Not on file   • Years of education: College   • Highest education level: Not on file   Occupational History     Employer: RETIRED   Tobacco Use   • Smoking status: Former Smoker     Types: Cigarettes     Last attempt to quit:      Years since quittin.6   • Smokeless tobacco: Never Used   • Tobacco comment: caffeine use-soda   Substance and Sexual Activity   • Alcohol use: Yes     Comment: rarely   • Drug use: No   • Sexual activity: Defer     Family History   Problem Relation Age of Onset   • Heart attack Mother         Acute   • Heart disease Mother    • Colon polyps Sister         Sigmoid colon   • Coronary artery disease Brother    • Diabetes Brother    • Heart disease Brother    • Alcohol abuse Son               Review of Systems   Constitutional: Positive for fatigue. Negative for activity change, appetite change, fever and unexpected weight change.   HENT: Negative for congestion, mouth sores, nosebleeds, sore throat and voice change.    Respiratory: Positive for cough and shortness of breath. Negative for wheezing.    Cardiovascular: Negative for chest pain, palpitations and leg swelling.   Gastrointestinal: Negative for abdominal distention, abdominal pain, blood in stool, constipation, diarrhea, nausea and vomiting.   Endocrine: Negative for cold intolerance and heat intolerance.   Genitourinary: Negative for difficulty urinating, dysuria, frequency and  hematuria.   Musculoskeletal: Negative for arthralgias, back pain, joint swelling and myalgias.   Skin: Negative for rash.   Neurological: Positive for weakness. Negative for dizziness, syncope, light-headedness, numbness and headaches.   Hematological: Negative for adenopathy. Does not bruise/bleed easily.   Psychiatric/Behavioral: Negative for confusion, dysphoric mood and sleep disturbance. The patient is not nervous/anxious.          Objective      There were no vitals filed for this visit.   Current Status 2/4/2020   ECOG score 3   Pain 0/10    Physical Exam   Constitutional: She is oriented to person, place, and time.   Neurological: She is alert and oriented to person, place, and time.   Psychiatric: She has a normal mood and affect. Her behavior is normal. Judgment and thought content normal.   The patient was evaluated via telephone visit today and therefore full physical exam was not performed.    RECENT LABS:      Assessment/Plan     1. Metastatic non-small cell lung cancer (adenocarcinoma):  · Patient with long-standing smoking history x40 years quit in 2008  · Underlying significant COPD with FEV1 1.4 (58%) on 11/20/2014  · CT 5/16/2019 with left upper lobe mass, partially cavitary measuring 2.3 x 1.5 cm.  Additional 8 mm similar appearing right upper lobe nodule.  Bibasilar consolidation, small bilateral pleural effusions, mediastinal lymphadenopathy up to 2.7 centimeters.  · Bronchoscopy 5/20/2019 with identification of left mainstem malignancy extending to the level of the jose, biopsy showed poorly differentiated adenocarcinoma of pulmonary origin. EBUS with FNA station 7 lymph node negative for malignant cells, only scattered lymphoid aggregates.  BAL left upper lobe negative for malignant cells.  Negative EGFR mutation, negative ALK/ROS 1 rearrangement. PDL1 95%.  · Patient was intubated with possible pneumonia, significant mucus plugging with underlying significant COPD.  Extubated on  5/28/2019.   · Staging evaluation performed during hospitalization with negative CT abdomen/pelvis, negative bone scan.  · MRI brain 5/29/2019 with evidence of metastatic disease, 3-4 enhancing lesions involving left occipital lobe 5 mm, left posterior parietal lobe 4 mm, left frontal lobe 4 mm, left parietal 3-4 mm with small amounts of surrounding edema.  Given the minimal extent of disease elected to observe with short interval MRI.  · Discussion regarding systemic therapy with single agent Keytruda due to PDL1 95%  · Subsequent disease progression on CT 6/8/2019 with increasing left upper lobe mass, mediastinal and hilar lymphadenopathy with occlusion of left mainstem bronchus at origin.  · Received palliative radiation therapy to left mainstem bronchus x10 fractions, completed 6/25/2019.  · Initiated systemic therapy with Keytruda 6/28/2019.  · CT scan following 2 cycles Keytruda and prior radiation from 8/6/2019 showed decrease in the left upper lobe primary lesion with essentially no residual discrete lung mass in that area.  Lymphadenopathy in the mediastinum/AP window and left hilum with significant improvement and resolution of left mainstem obstruction.  No new evidence of disease.  The patient did receive radiation therapy to both the primary left upper lobe mass and left hilum/mediastinum accounting for most of the improvement.  MRI brain 8/6/2019 with 3 enhancing metastatic lesions, 2 of which had a more nodular appearance as opposed to previous ring-enhancing appearance with stable size.  These were in the left occipital and left frontal regions.  The third lesion in the left parietal region decreased in size from 4 mm down to 2 mm.  There were no new lesions identified.  She has not undergone any specific treatment for her brain metastases.  Continuation of immunotherapy with Keytruda.  · Following 4 cycles Keytruda, 9/17/2019 CT scan showed improvement in AP window lymph node from 2.7 x 1.9 down to  2.1 x 1.5 cm.  Progressive change in the left lower lobe around the pleura, with one area having a masslike appearance suspected secondary to radiation pneumonitis rather than malignancy.  MRI brain with small metastatic lesions decreased in size.  · CT scan 10/29/2019 following 6 cycles Keytruda with new/enlarging 2 cm left upper lobe lesion, stable mediastinal lymph nodes, increased air space opacities in the lungs bilaterally, new left adrenal nodule 1.3 cm.  MRI brain with stable to slightly improved findings 10/29/2019.  Cycle 7 Keytruda held 11/8/2019 with plans to pursue PET scan.  Unclear whether pulmonary findings represented infectious change versus pneumonitis from immunotherapy.  · PET scan 11/14/2019 with worsening areas of consolidation involving the lungs bilaterally, decrease in left upper lobe nodular opacification with minimal uptake, stable mildly hypermetabolic mediastinal and left hilar lymph nodes, intensely hypermetabolic left adrenal 1.7 cm lesion SUV 11.2.  With clinical improvement in pulmonary status, pulmonary changes suspected to represent pneumonitis from immunotherapy.  The only area of true progression in left adrenal, will pursue stereotactic radiation.  Patient will remain off immunotherapy, initiated prednisone 40 mg daily with subsequent taper.  · CT chest 12/4/2019 with improvement in left upper and lower lobe consolidation, slight further progression left adrenal.  Prednisone tapered.  · Left adrenal radiation completed 1/2/2020  · MRI brain and CT scan 1/27/2020 reviewed. MRI showed resolution of the 3 small residual areas of enhancement in the left cerebral hemisphere and no new areas of metastatic disease identified.  CT chest 1/27/2020 showed evidence of radiation fibrosis at the site of previous treatment in the left upper lobe, no evidence of previous nodular density seen in that location.  There was a new small left pleural effusion.  There was bronchial thickening and  mucus plugging in bilateral lower lobes with consolidation/atelectasis left greater than right.  There were stable mediastinal lymph nodes.  There was a new masslike area of subpleural consolidation right lower lobe measuring 4.1 x 2 x 4 cm of unclear etiology which may be related to atelectasis versus progressive malignancy.  She is a poor candidate for CT-guided biopsy with high risk for pneumothorax and has little pulmonary reserve to tolerate this.  It is likely not accessible via bronchoscopy given its peripheral location.  We discussed conservative management with short interval follow-up CT. With the possibility of progressive disease, we did discuss the option for any further systemic therapy.  This would require the use of chemotherapy.  The patient unfortunately is a poor candidate for palliative chemotherapy given her performance status and comorbidities at this point.  We discussed pursuing a palliative/supportive approach however she would like to know the status of her disease.  Therefore repeating scans for prognostic information is reasonable.  If she were to decline clinically or show more definitive evidence of progressive disease we would consider transitioning to hospice care.    · The patient was admitted 2/13-3/4/2020 with pneumonia, CT chest 2/13/2020 with decrease in left pleural effusion, decrease in left lower lobe consolidation, increased consolidation right lower lobe with evidence of mucous plugging. Patient underwent bronchoscopy 2/19/2028 with finding of copious secretions, cultures negative.  BAL specimen with negative cytology.  · Evidence of progressive disease on CT angiogram chest 6/30/2020 with bilateral pulmonary nodules and increasing left adrenal mass.  Evidence of pulmonary embolism on the left.  Anticoagulation with Eliquis 5 mg twice daily.  Patient not a candidate for further active therapy of malignancy and transition to home hospice care at time of discharge from  [facility] 7/4/2020.  · The patient is evaluated today via telephone visit.  She is continuing on home hospice care.  Amazingly things have gone very well and she has remained very stable in terms of her respiratory status and her overall physical status.  She appears to be enjoying is good a quality of life is can be expected at this time.  We will therefore not change her regimen and continue on prednisone 10 mg twice daily.  She will continue on home hospice care and we discussed another follow-up telephone visit in 4 to 6 weeks.  2. 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  · Patient extubated 5/28/2019.  · Subsequent occlusion of left mainstem bronchus from malignancy 6/8/2019.  Received palliative radiation completed 6/25/2019.  · Patient hospitalized while in [place] at [facility] in October 2019 due to suspected aspiration pneumonia.  Doubtful that pulmonary findings are related to immunotherapy due to improvement on antibiotics and productive cough.  · Patient with decline in respiratory status in October 2019 requiring oxygen to be initiated.  Suspected related to immunotherapy induced pneumonitis.  Immunotherapy discontinued and initiated empiric prednisone 40 mg daily on 11/20/2019.  · Patient with overall improvement in respiratory status, currently on oxygen 1 L nasal cannula at night only with improvement in cough.    · The patient tapered prednisone down to 5 mg daily on 12/26/2019.  She developed declining weight and appetite with increase in prednisone back to 10 mg daily on 1/24/2020.  · Prednisone was tapered following hospitalization in March to 5 mg daily  · Patient is currently continuing on prednisone 10 mg twice daily and we will maintain on this dose.  Respiratory status is stable.  3. Prior hemorrhagic  CVA:  · Occurred in 2008, residual left upper extremity weakness and left lower extremity weakness.   8. Hypothyroidism:  · Labs 6/28/2019 with TSH 23.8, normal free T4 of 1.43  · Patient was started on levothyroxine 100 mcg daily  · Repeat thyroid function studies on 8/9/2019 with TSH suppressed at 0.065 and elevated free T4 of 2.06.  Decreased levothyroxine dose to 50 mcg daily.    · 11/8/2019 TSH 37.5, free T4 1.15, levothyroxine dose increased to 75 mcg daily.  · Thyroid function studies on 2/4/2020 with TSH 5.08, free T4 1.43  · The patient continues on levothyroxine at 75 mcg daily.  9. Depression and nausea/anorexia:  · Patient with depression related to her underlying medical illness as well as social situation with her son who is an alcoholic and lives in the home  · Patient has been treated with Depakote 250 mg every 12 hours for mood stabilization under the direction of her primary care physician  · Patient with significant depressive symptoms, currently continuing on Zoloft under the direction of her primary care physician.  · Patient also with ongoing anorexia and nausea, initiated Zyprexa 2.5 mg nightly on 7/19/2019 with dramatic improvement in symptoms  · Patient seen in the supportive oncology clinic on 7/29/2019, tapered off Depakote.  · Patient has PRN Compazine and Zyprexa to use.  · Zyprexa dose is currently 2.5 mg nightly  · The patient did stop Zoloft apparently after her hospitalization in March 2020.  She felt that her depressive symptoms increased and resumed Zoloft at 25 mg daily.  Currently it appears that she is not receiving Zoloft but is receiving Remeron 15 mg nightly in addition to Zyprexa 2.5 mg nightly.  10. Left pulmonary embolism:  · CT angiogram chest 6/30/2020 with evidence of left-sided pulmonary embolism  · Bilateral lower extremity Dopplers negative for DVT 7/1/2020  · Patient anticoagulated with Eliquis 5 mg twice daily    Plan:  1. The patient is currently continuing on  palliative/supportive care alone with home hospice involvement  2. Continue prednisone 10 mg twice daily  3. Continue Eliquis 5 mg twice daily  4. Continue Zyprexa 2.5 mg nightly and Remeron 15 mg nightly  5. Continue levothyroxine 75 mcg daily  6. We will have another telephone visit in 4 to 6 weeks.    Patient did consent to telephone visit today.  I did spend 6 minutes on the telephone with the patient.

## 2022-08-07 NOTE — TELEPHONE ENCOUNTER
----- Message from Marycarmen Weber sent at 10/23/2019  9:49 AM EDT -----  Contact: [phone]  Michael   Pt  has concerns about Detrol medication.   Borderline controlled, continue current medications

## (undated) DEVICE — LN SMPL O2 NASL/ORL SMART/CAPNOLINE PLS A/

## (undated) DEVICE — SINGLE USE ASPIRATION NEEDLE: Brand: SINGLE USE ASPIRATION NEEDLE

## (undated) DEVICE — Device: Brand: BALLOON

## (undated) DEVICE — SINGLE USE BIOPSY VALVE MAJ-210: Brand: SINGLE USE BIOPSY VALVE (STERILE)

## (undated) DEVICE — VITAL SIGNS™ JACKSON-REES CIRCUITS: Brand: VITAL SIGNS™

## (undated) DEVICE — TUBING, SUCTION, 1/4" X 10', STRAIGHT: Brand: MEDLINE

## (undated) DEVICE — SENSR O2 OXIMAX FNGR A/ 18IN NONSTR

## (undated) DEVICE — BITEBLOCK OMNI BLOC

## (undated) DEVICE — LARGE BORE STOPCOCK WITH EXTENSION SET, MALE LUER LOCK ADAPTER WITH RETRACTABLE COLLAR

## (undated) DEVICE — ADAPT SWVL FIBROPTIC BRONCH

## (undated) DEVICE — TRAP,MUCUS SPECIMEN, 80CC: Brand: MEDLINE

## (undated) DEVICE — MSK AIRWY LARYNG LMA PILOT SZ4

## (undated) DEVICE — ADAPT CLN BIOGUARD AIR/H2O DISP

## (undated) DEVICE — FRCP BX RADJAW4 PULM WO NDL STD1.8X2 100

## (undated) DEVICE — SINGLE USE SUCTION VALVE MAJ-209: Brand: SINGLE USE SUCTION VALVE (STERILE)